# Patient Record
Sex: MALE | Race: WHITE | NOT HISPANIC OR LATINO | Employment: OTHER | URBAN - METROPOLITAN AREA
[De-identification: names, ages, dates, MRNs, and addresses within clinical notes are randomized per-mention and may not be internally consistent; named-entity substitution may affect disease eponyms.]

---

## 2018-05-14 ENCOUNTER — HOSPITAL ENCOUNTER (INPATIENT)
Facility: MEDICAL CENTER | Age: 68
LOS: 4 days | DRG: 603 | End: 2018-05-18
Attending: EMERGENCY MEDICINE | Admitting: INTERNAL MEDICINE
Payer: MEDICARE

## 2018-05-14 ENCOUNTER — HOSPITAL ENCOUNTER (OUTPATIENT)
Dept: RADIOLOGY | Facility: MEDICAL CENTER | Age: 68
End: 2018-05-14

## 2018-05-14 ENCOUNTER — APPOINTMENT (OUTPATIENT)
Dept: RADIOLOGY | Facility: MEDICAL CENTER | Age: 68
DRG: 603 | End: 2018-05-14
Attending: EMERGENCY MEDICINE
Payer: MEDICARE

## 2018-05-14 DIAGNOSIS — L03.116 LEFT LEG CELLULITIS: ICD-10-CM

## 2018-05-14 LAB
CRP SERPL HS-MCNC: 2.34 MG/DL (ref 0–0.75)
EKG IMPRESSION: NORMAL
ERYTHROCYTE [SEDIMENTATION RATE] IN BLOOD BY WESTERGREN METHOD: 18 MM/HOUR (ref 0–20)

## 2018-05-14 PROCEDURE — 93005 ELECTROCARDIOGRAM TRACING: CPT

## 2018-05-14 PROCEDURE — 700105 HCHG RX REV CODE 258: Performed by: EMERGENCY MEDICINE

## 2018-05-14 PROCEDURE — 51702 INSERT TEMP BLADDER CATH: CPT

## 2018-05-14 PROCEDURE — 93005 ELECTROCARDIOGRAM TRACING: CPT | Performed by: EMERGENCY MEDICINE

## 2018-05-14 PROCEDURE — 93922 UPR/L XTREMITY ART 2 LEVELS: CPT

## 2018-05-14 PROCEDURE — 303105 HCHG CATHETER EXTRA

## 2018-05-14 PROCEDURE — 770020 HCHG ROOM/CARE - TELE (206)

## 2018-05-14 PROCEDURE — 96375 TX/PRO/DX INJ NEW DRUG ADDON: CPT

## 2018-05-14 PROCEDURE — 96365 THER/PROPH/DIAG IV INF INIT: CPT

## 2018-05-14 PROCEDURE — 85652 RBC SED RATE AUTOMATED: CPT

## 2018-05-14 PROCEDURE — 73590 X-RAY EXAM OF LOWER LEG: CPT | Mod: LT

## 2018-05-14 PROCEDURE — 86140 C-REACTIVE PROTEIN: CPT

## 2018-05-14 PROCEDURE — 700111 HCHG RX REV CODE 636 W/ 250 OVERRIDE (IP): Performed by: EMERGENCY MEDICINE

## 2018-05-14 PROCEDURE — 99285 EMERGENCY DEPT VISIT HI MDM: CPT

## 2018-05-14 PROCEDURE — 96366 THER/PROPH/DIAG IV INF ADDON: CPT

## 2018-05-14 RX ORDER — CARVEDILOL 3.12 MG/1
3.12 TABLET ORAL 2 TIMES DAILY WITH MEALS
Status: DISCONTINUED | OUTPATIENT
Start: 2018-05-15 | End: 2018-05-18 | Stop reason: HOSPADM

## 2018-05-14 RX ORDER — NICOTINE 21 MG/24HR
21 PATCH, TRANSDERMAL 24 HOURS TRANSDERMAL
Status: DISCONTINUED | OUTPATIENT
Start: 2018-05-15 | End: 2018-05-18 | Stop reason: HOSPADM

## 2018-05-14 RX ORDER — ACETAMINOPHEN 325 MG/1
650 TABLET ORAL EVERY 6 HOURS PRN
Status: DISCONTINUED | OUTPATIENT
Start: 2018-05-14 | End: 2018-05-18 | Stop reason: HOSPADM

## 2018-05-14 RX ORDER — BISACODYL 10 MG
10 SUPPOSITORY, RECTAL RECTAL
Status: DISCONTINUED | OUTPATIENT
Start: 2018-05-14 | End: 2018-05-18 | Stop reason: HOSPADM

## 2018-05-14 RX ORDER — OXYCODONE AND ACETAMINOPHEN 10; 325 MG/1; MG/1
1-2 TABLET ORAL EVERY 6 HOURS PRN
Status: DISCONTINUED | OUTPATIENT
Start: 2018-05-14 | End: 2018-05-18 | Stop reason: HOSPADM

## 2018-05-14 RX ORDER — ONDANSETRON 2 MG/ML
4 INJECTION INTRAMUSCULAR; INTRAVENOUS ONCE
Status: COMPLETED | OUTPATIENT
Start: 2018-05-14 | End: 2018-05-14

## 2018-05-14 RX ORDER — CEPHALEXIN 500 MG/1
500 CAPSULE ORAL 4 TIMES DAILY
Status: ON HOLD | COMMUNITY
Start: 2018-04-05 | End: 2018-05-18

## 2018-05-14 RX ORDER — ASPIRIN 325 MG
325 TABLET ORAL DAILY
Status: DISCONTINUED | OUTPATIENT
Start: 2018-05-15 | End: 2018-05-18 | Stop reason: HOSPADM

## 2018-05-14 RX ORDER — ONDANSETRON 4 MG/1
4 TABLET, ORALLY DISINTEGRATING ORAL EVERY 4 HOURS PRN
Status: DISCONTINUED | OUTPATIENT
Start: 2018-05-14 | End: 2018-05-18 | Stop reason: HOSPADM

## 2018-05-14 RX ORDER — ONDANSETRON 2 MG/ML
4 INJECTION INTRAMUSCULAR; INTRAVENOUS EVERY 4 HOURS PRN
Status: DISCONTINUED | OUTPATIENT
Start: 2018-05-14 | End: 2018-05-18 | Stop reason: HOSPADM

## 2018-05-14 RX ORDER — MORPHINE SULFATE 4 MG/ML
4 INJECTION, SOLUTION INTRAMUSCULAR; INTRAVENOUS ONCE
Status: COMPLETED | OUTPATIENT
Start: 2018-05-14 | End: 2018-05-14

## 2018-05-14 RX ORDER — POLYETHYLENE GLYCOL 3350 17 G/17G
1 POWDER, FOR SOLUTION ORAL
Status: DISCONTINUED | OUTPATIENT
Start: 2018-05-14 | End: 2018-05-18 | Stop reason: HOSPADM

## 2018-05-14 RX ORDER — TRAZODONE HYDROCHLORIDE 50 MG/1
450 TABLET ORAL NIGHTLY
Status: DISCONTINUED | OUTPATIENT
Start: 2018-05-14 | End: 2018-05-18 | Stop reason: HOSPADM

## 2018-05-14 RX ORDER — DOXYCYCLINE HYCLATE 100 MG
100 TABLET ORAL 2 TIMES DAILY
Status: ON HOLD | COMMUNITY
Start: 2018-04-05 | End: 2018-05-18

## 2018-05-14 RX ORDER — SODIUM CHLORIDE 9 MG/ML
1000 INJECTION, SOLUTION INTRAVENOUS ONCE
Status: COMPLETED | OUTPATIENT
Start: 2018-05-14 | End: 2018-05-14

## 2018-05-14 RX ORDER — GABAPENTIN 300 MG/1
600 CAPSULE ORAL 2 TIMES DAILY
Status: DISCONTINUED | OUTPATIENT
Start: 2018-05-14 | End: 2018-05-18 | Stop reason: HOSPADM

## 2018-05-14 RX ORDER — AMOXICILLIN 250 MG
2 CAPSULE ORAL 2 TIMES DAILY
Status: DISCONTINUED | OUTPATIENT
Start: 2018-05-14 | End: 2018-05-17

## 2018-05-14 RX ORDER — GABAPENTIN 600 MG/1
2400 TABLET ORAL 2 TIMES DAILY
Status: ON HOLD | COMMUNITY
End: 2020-02-20

## 2018-05-14 RX ADMIN — ONDANSETRON HYDROCHLORIDE 4 MG: 2 INJECTION, SOLUTION INTRAMUSCULAR; INTRAVENOUS at 21:51

## 2018-05-14 RX ADMIN — SODIUM CHLORIDE 1000 ML: 9 INJECTION, SOLUTION INTRAVENOUS at 20:15

## 2018-05-14 RX ADMIN — MORPHINE SULFATE 4 MG: 4 INJECTION INTRAVENOUS at 21:51

## 2018-05-14 RX ADMIN — VANCOMYCIN HYDROCHLORIDE 1700 MG: 100 INJECTION, POWDER, LYOPHILIZED, FOR SOLUTION INTRAVENOUS at 20:15

## 2018-05-14 ASSESSMENT — LIFESTYLE VARIABLES: DO YOU DRINK ALCOHOL: NO

## 2018-05-14 ASSESSMENT — PAIN SCALES - GENERAL
PAINLEVEL_OUTOF10: 8
PAINLEVEL_OUTOF10: 4
PAINLEVEL_OUTOF10: 10

## 2018-05-14 ASSESSMENT — PATIENT HEALTH QUESTIONNAIRE - PHQ9
2. FEELING DOWN, DEPRESSED, IRRITABLE, OR HOPELESS: NOT AT ALL
1. LITTLE INTEREST OR PLEASURE IN DOING THINGS: NOT AT ALL
SUM OF ALL RESPONSES TO PHQ9 QUESTIONS 1 AND 2: 0

## 2018-05-15 ENCOUNTER — APPOINTMENT (OUTPATIENT)
Dept: RADIOLOGY | Facility: MEDICAL CENTER | Age: 68
DRG: 603 | End: 2018-05-15
Attending: INTERNAL MEDICINE
Payer: MEDICARE

## 2018-05-15 PROBLEM — K57.92 DIVERTICULITIS: Status: ACTIVE | Noted: 2018-05-15

## 2018-05-15 PROBLEM — I73.9 PERIPHERAL ARTERY DISEASE (HCC): Status: ACTIVE | Noted: 2018-05-15

## 2018-05-15 PROBLEM — L03.116 CELLULITIS OF LEFT LOWER EXTREMITY: Status: ACTIVE | Noted: 2018-05-15

## 2018-05-15 PROBLEM — R20.2 NUMBNESS AND TINGLING IN LEFT HAND: Status: ACTIVE | Noted: 2018-05-15

## 2018-05-15 PROBLEM — R20.0 NUMBNESS AND TINGLING IN LEFT HAND: Status: ACTIVE | Noted: 2018-05-15

## 2018-05-15 LAB
ALBUMIN SERPL BCP-MCNC: 3.3 G/DL (ref 3.2–4.9)
ALBUMIN/GLOB SERPL: 1.2 G/DL
ALP SERPL-CCNC: 58 U/L (ref 30–99)
ALT SERPL-CCNC: 29 U/L (ref 2–50)
ANION GAP SERPL CALC-SCNC: 4 MMOL/L (ref 0–11.9)
AST SERPL-CCNC: 22 U/L (ref 12–45)
BASOPHILS # BLD AUTO: 0.6 % (ref 0–1.8)
BASOPHILS # BLD: 0.05 K/UL (ref 0–0.12)
BILIRUB SERPL-MCNC: 0.5 MG/DL (ref 0.1–1.5)
BUN SERPL-MCNC: 10 MG/DL (ref 8–22)
CALCIUM SERPL-MCNC: 8.8 MG/DL (ref 8.5–10.5)
CHLORIDE SERPL-SCNC: 107 MMOL/L (ref 96–112)
CO2 SERPL-SCNC: 27 MMOL/L (ref 20–33)
CREAT SERPL-MCNC: 0.72 MG/DL (ref 0.5–1.4)
EOSINOPHIL # BLD AUTO: 0.3 K/UL (ref 0–0.51)
EOSINOPHIL NFR BLD: 3.9 % (ref 0–6.9)
ERYTHROCYTE [DISTWIDTH] IN BLOOD BY AUTOMATED COUNT: 47.2 FL (ref 35.9–50)
EST. AVERAGE GLUCOSE BLD GHB EST-MCNC: 214 MG/DL
GLOBULIN SER CALC-MCNC: 2.8 G/DL (ref 1.9–3.5)
GLUCOSE BLD-MCNC: 191 MG/DL (ref 65–99)
GLUCOSE BLD-MCNC: 235 MG/DL (ref 65–99)
GLUCOSE SERPL-MCNC: 157 MG/DL (ref 65–99)
GRAM STN SPEC: NORMAL
HBA1C MFR BLD: 9.1 % (ref 0–5.6)
HCT VFR BLD AUTO: 44 % (ref 42–52)
HGB BLD-MCNC: 14.2 G/DL (ref 14–18)
IMM GRANULOCYTES # BLD AUTO: 0.06 K/UL (ref 0–0.11)
IMM GRANULOCYTES NFR BLD AUTO: 0.8 % (ref 0–0.9)
LACTATE BLD-SCNC: 1.1 MMOL/L (ref 0.5–2)
LYMPHOCYTES # BLD AUTO: 2.26 K/UL (ref 1–4.8)
LYMPHOCYTES NFR BLD: 29 % (ref 22–41)
MCH RBC QN AUTO: 30 PG (ref 27–33)
MCHC RBC AUTO-ENTMCNC: 32.3 G/DL (ref 33.7–35.3)
MCV RBC AUTO: 93 FL (ref 81.4–97.8)
MONOCYTES # BLD AUTO: 0.88 K/UL (ref 0–0.85)
MONOCYTES NFR BLD AUTO: 11.3 % (ref 0–13.4)
NEUTROPHILS # BLD AUTO: 4.24 K/UL (ref 1.82–7.42)
NEUTROPHILS NFR BLD: 54.4 % (ref 44–72)
NRBC # BLD AUTO: 0 K/UL
NRBC BLD-RTO: 0 /100 WBC
PLATELET # BLD AUTO: 217 K/UL (ref 164–446)
PMV BLD AUTO: 10.2 FL (ref 9–12.9)
POTASSIUM SERPL-SCNC: 3.8 MMOL/L (ref 3.6–5.5)
PROT SERPL-MCNC: 6.1 G/DL (ref 6–8.2)
RBC # BLD AUTO: 4.73 M/UL (ref 4.7–6.1)
SIGNIFICANT IND 70042: NORMAL
SITE SITE: NORMAL
SODIUM SERPL-SCNC: 138 MMOL/L (ref 135–145)
SOURCE SOURCE: NORMAL
WBC # BLD AUTO: 7.8 K/UL (ref 4.8–10.8)

## 2018-05-15 PROCEDURE — 700105 HCHG RX REV CODE 258: Performed by: INTERNAL MEDICINE

## 2018-05-15 PROCEDURE — 700102 HCHG RX REV CODE 250 W/ 637 OVERRIDE(OP): Performed by: STUDENT IN AN ORGANIZED HEALTH CARE EDUCATION/TRAINING PROGRAM

## 2018-05-15 PROCEDURE — A9270 NON-COVERED ITEM OR SERVICE: HCPCS | Performed by: INTERNAL MEDICINE

## 2018-05-15 PROCEDURE — 87070 CULTURE OTHR SPECIMN AEROBIC: CPT

## 2018-05-15 PROCEDURE — 70553 MRI BRAIN STEM W/O & W/DYE: CPT

## 2018-05-15 PROCEDURE — 99223 1ST HOSP IP/OBS HIGH 75: CPT | Performed by: INTERNAL MEDICINE

## 2018-05-15 PROCEDURE — A9270 NON-COVERED ITEM OR SERVICE: HCPCS | Performed by: STUDENT IN AN ORGANIZED HEALTH CARE EDUCATION/TRAINING PROGRAM

## 2018-05-15 PROCEDURE — A9579 GAD-BASE MR CONTRAST NOS,1ML: HCPCS | Performed by: INTERNAL MEDICINE

## 2018-05-15 PROCEDURE — 85025 COMPLETE CBC W/AUTO DIFF WBC: CPT

## 2018-05-15 PROCEDURE — 82962 GLUCOSE BLOOD TEST: CPT

## 2018-05-15 PROCEDURE — 83036 HEMOGLOBIN GLYCOSYLATED A1C: CPT

## 2018-05-15 PROCEDURE — 700111 HCHG RX REV CODE 636 W/ 250 OVERRIDE (IP): Performed by: INTERNAL MEDICINE

## 2018-05-15 PROCEDURE — 700102 HCHG RX REV CODE 250 W/ 637 OVERRIDE(OP): Performed by: INTERNAL MEDICINE

## 2018-05-15 PROCEDURE — 87205 SMEAR GRAM STAIN: CPT

## 2018-05-15 PROCEDURE — 770006 HCHG ROOM/CARE - MED/SURG/GYN SEMI*

## 2018-05-15 PROCEDURE — 36415 COLL VENOUS BLD VENIPUNCTURE: CPT

## 2018-05-15 PROCEDURE — 83605 ASSAY OF LACTIC ACID: CPT

## 2018-05-15 PROCEDURE — 87040 BLOOD CULTURE FOR BACTERIA: CPT | Mod: 91

## 2018-05-15 PROCEDURE — 80053 COMPREHEN METABOLIC PANEL: CPT

## 2018-05-15 PROCEDURE — 700111 HCHG RX REV CODE 636 W/ 250 OVERRIDE (IP): Performed by: STUDENT IN AN ORGANIZED HEALTH CARE EDUCATION/TRAINING PROGRAM

## 2018-05-15 PROCEDURE — 700117 HCHG RX CONTRAST REV CODE 255: Performed by: INTERNAL MEDICINE

## 2018-05-15 RX ORDER — ATORVASTATIN CALCIUM 80 MG/1
80 TABLET, FILM COATED ORAL
Status: DISCONTINUED | OUTPATIENT
Start: 2018-05-15 | End: 2018-05-18 | Stop reason: HOSPADM

## 2018-05-15 RX ORDER — SODIUM CHLORIDE 9 MG/ML
INJECTION, SOLUTION INTRAVENOUS
Status: ACTIVE
Start: 2018-05-15 | End: 2018-05-15

## 2018-05-15 RX ORDER — DEXTROSE MONOHYDRATE 25 G/50ML
25 INJECTION, SOLUTION INTRAVENOUS
Status: DISCONTINUED | OUTPATIENT
Start: 2018-05-15 | End: 2018-05-18 | Stop reason: HOSPADM

## 2018-05-15 RX ADMIN — AMPICILLIN SODIUM AND SULBACTAM SODIUM 3 G: 2; 1 INJECTION, POWDER, FOR SOLUTION INTRAMUSCULAR; INTRAVENOUS at 17:00

## 2018-05-15 RX ADMIN — ENOXAPARIN SODIUM 40 MG: 100 INJECTION SUBCUTANEOUS at 05:16

## 2018-05-15 RX ADMIN — GABAPENTIN 600 MG: 300 CAPSULE ORAL at 17:00

## 2018-05-15 RX ADMIN — ATORVASTATIN CALCIUM 80 MG: 80 TABLET, FILM COATED ORAL at 20:23

## 2018-05-15 RX ADMIN — OXYCODONE HYDROCHLORIDE AND ACETAMINOPHEN 2 TABLET: 10; 325 TABLET ORAL at 00:10

## 2018-05-15 RX ADMIN — AMPICILLIN SODIUM AND SULBACTAM SODIUM 3 G: 2; 1 INJECTION, POWDER, FOR SOLUTION INTRAMUSCULAR; INTRAVENOUS at 05:17

## 2018-05-15 RX ADMIN — STANDARDIZED SENNA CONCENTRATE AND DOCUSATE SODIUM 2 TABLET: 8.6; 5 TABLET, FILM COATED ORAL at 05:16

## 2018-05-15 RX ADMIN — STANDARDIZED SENNA CONCENTRATE AND DOCUSATE SODIUM 2 TABLET: 8.6; 5 TABLET, FILM COATED ORAL at 17:00

## 2018-05-15 RX ADMIN — AMPICILLIN SODIUM AND SULBACTAM SODIUM 3 G: 2; 1 INJECTION, POWDER, FOR SOLUTION INTRAMUSCULAR; INTRAVENOUS at 00:33

## 2018-05-15 RX ADMIN — VANCOMYCIN HYDROCHLORIDE 2000 MG: 100 INJECTION, POWDER, LYOPHILIZED, FOR SOLUTION INTRAVENOUS at 14:09

## 2018-05-15 RX ADMIN — INSULIN HUMAN 1 UNITS: 100 INJECTION, SOLUTION PARENTERAL at 19:45

## 2018-05-15 RX ADMIN — TRAZODONE HYDROCHLORIDE 450 MG: 50 TABLET ORAL at 00:09

## 2018-05-15 RX ADMIN — GADODIAMIDE 20 ML: 287 INJECTION INTRAVENOUS at 12:07

## 2018-05-15 RX ADMIN — GABAPENTIN 600 MG: 300 CAPSULE ORAL at 00:10

## 2018-05-15 RX ADMIN — AMPICILLIN SODIUM AND SULBACTAM SODIUM 3 G: 2; 1 INJECTION, POWDER, FOR SOLUTION INTRAMUSCULAR; INTRAVENOUS at 23:34

## 2018-05-15 RX ADMIN — ASPIRIN 325 MG: 325 TABLET ORAL at 05:15

## 2018-05-15 RX ADMIN — GABAPENTIN 600 MG: 300 CAPSULE ORAL at 05:15

## 2018-05-15 RX ADMIN — AMPICILLIN SODIUM AND SULBACTAM SODIUM 3 G: 2; 1 INJECTION, POWDER, FOR SOLUTION INTRAMUSCULAR; INTRAVENOUS at 12:31

## 2018-05-15 RX ADMIN — TRAZODONE HYDROCHLORIDE 450 MG: 50 TABLET ORAL at 21:36

## 2018-05-15 RX ADMIN — STANDARDIZED SENNA CONCENTRATE AND DOCUSATE SODIUM 2 TABLET: 8.6; 5 TABLET, FILM COATED ORAL at 00:10

## 2018-05-15 RX ADMIN — OXYCODONE HYDROCHLORIDE AND ACETAMINOPHEN 1 TABLET: 10; 325 TABLET ORAL at 12:31

## 2018-05-15 ASSESSMENT — ENCOUNTER SYMPTOMS
STRIDOR: 0
ORTHOPNEA: 0
HEADACHES: 0
SPEECH CHANGE: 1
NAUSEA: 0
DIARRHEA: 0
BRUISES/BLEEDS EASILY: 0
EYE DISCHARGE: 0
SENSORY CHANGE: 1
INSOMNIA: 0
DOUBLE VISION: 0
WEIGHT LOSS: 0
HEMOPTYSIS: 0
NERVOUS/ANXIOUS: 0
FOCAL WEAKNESS: 1
SORE THROAT: 0
BACK PAIN: 0
SHORTNESS OF BREATH: 0
PALPITATIONS: 0
DIZZINESS: 0
SEIZURES: 0
COUGH: 0
CHILLS: 0
SPUTUM PRODUCTION: 0
POLYDIPSIA: 0
HEARTBURN: 0
TINGLING: 1
FEVER: 0
ABDOMINAL PAIN: 0
DEPRESSION: 0
PHOTOPHOBIA: 0
FOCAL WEAKNESS: 0
BLURRED VISION: 0
DIAPHORESIS: 0
SHORTNESS OF BREATH: 1
SINUS PAIN: 0
LOSS OF CONSCIOUSNESS: 0
VOMITING: 0
MYALGIAS: 0
NECK PAIN: 0

## 2018-05-15 ASSESSMENT — PAIN SCALES - GENERAL
PAINLEVEL_OUTOF10: 0
PAINLEVEL_OUTOF10: 7
PAINLEVEL_OUTOF10: 7
PAINLEVEL_OUTOF10: 0

## 2018-05-15 ASSESSMENT — LIFESTYLE VARIABLES
EVER_SMOKED: YES
SUBSTANCE_ABUSE: 0

## 2018-05-15 NOTE — ED PROVIDER NOTES
"ED Provider Note    CHIEF COMPLAINT  Chief Complaint   Patient presents with   • Numbness     left hand numbness x1 week   • Dizziness     x2-3 months   • Slurred Speech     x3 days   • Other     transfer from [facility]   • Open Wound     left lower leg     Patient is a vague historian    HPI  Luis Kellogg is a 67 y.o. diabetic male who presents as a transfer from [facility] for evaluation of left leg cellulitis.  Patient complains of multiple issues.    Left hand weakness and tingling x 3 days  Right leg collapsed today while getting into friend's car  Left leg weakness   Left leg wound x 5 years which he believes is making his left hand and leg weak  Stent in left leg placed 4 months ago at [facility] for a blocked artery    10/10 pain, sharp, intermittent shocks radiating into left leg    Denies trauma, fever, chills, head trauma, abdominal pain, vomiting, diarrhea    ALLERGIES  No Known Allergies    CURRENT MEDICATIONS  Aspirin, carvedilol, trazodone    PAST MEDICAL HISTORY   has a past medical history of Chicken pox; Diabetes (HCC); Heart disease; and Measles.    CADz s/p stent placement  DVT     SURGICAL HISTORY   has a past surgical history that includes knee replacement, total (Bilateral) and other surgical procedure.    SOCIAL HISTORY  Social History     Social History Main Topics   • Smoking status: Current Every Day Smoker     Packs/day: 1.00     Years: 54.00     Types: Cigarettes   • Smokeless tobacco: Former User   • Alcohol use No      Comment: No per patient    • Drug use: No   • Sexual activity: Not Currently       REVIEW OF SYSTEMS  See HPI for further details.  All other systems are negative except as above in HPI.      PHYSICAL EXAM  VITAL SIGNS: /65   Pulse 82   Temp 36.2 °C (97.2 °F)   Resp 12   Ht 1.702 m (5' 7\")   Wt 108.4 kg (239 lb)   SpO2 93%   BMI 37.43 kg/m²     General:  WDobese, nontoxic appearing in NAD; A+Ox3; V/S as above; afebrile  Skin: warm and dry; " good color; no rash  HEENT: NCAT; strabismus of right eye noted; no scleral icterus   Neck: FROM; soft  Cardiovascular: Regular heart rate and rhythm.  No murmurs, rubs, or gallops; pulses 2+ bilaterally radially  Lungs: Clear to auscultation with good air movement bilaterally.  No wheezes, rhonchi, or rales.   Abdomen: BS present; soft; NTND; no rebound, guarding, or rigidity.  No organomegaly or pulsatile mass  Extremities: MOYER x 4; thickened toenails; deep ulcerated lesion medial aspect of left lower leg with yellow, crusted d/c with edema and deep erythema with tenderness over left calf; no streaking, no obvious abscess; cool purplish colored toes on left with decreased DP pulse but palpable; tenderness to palpation of left calf; no crepitus  Neurologic: CNs III-XII grossly intact; speech clear; distal sensation intact  Psychiatric: Appropriate affect, normal mood    LABS  Results for orders placed or performed during the hospital encounter of 18   EKG (ER)   Result Value Ref Range    Report       [facility] Emergency Dept.    Test Date:  2018  Pt Name:    MILI HANSON               Department: ER  MRN:        [id number]                      Room:       [facility]  Gender:     Male                         Technician: [id number]  :        1950                   Requested By:ER TRIAGE PROTOCOL  Order #:    [id number]                    Reading MD:    Measurements  Intervals                                Axis  Rate:       77                           P:          50  AR:         148                          QRS:        41  QRSD:       116                          T:          24  QT:         412  QTc:        467    Interpretive Statements  SINUS RHYTHM  INCOMPLETE RIGHT BUNDLE BRANCH BLOCK  INFERIOR INFARCT, AGE INDETERMINATE  No previous ECG available for comparison       IMAGING  DX-TIBIA AND FIBULA LEFT   Final Result         Diffuse soft tissue swelling.   No acute osseous  abnormality.      OUTSIDE IMAGES-DX CHEST   Final Result      US-FOREIGN FILM ULTRASOUND   Final Result      OUTSIDE IMAGES-CT ABDOMEN /PELVIS   Final Result      OUTSIDE IMAGES-CT HEAD   Final Result      LE ART DUPLX/IMAG (Specify in Comments Left, Right Or Bilateral)    (Results Pending)   LE ART/MIA (Specify in Comments Left, Right Or Bilateral)    (Results Pending)         MEDICAL RECORD  I have reviewed patient's medical record and pertinent results are listed below.    WBC 8.6  ESR elevated to 25  CRP elevated to 3.9    CT abd/pelvis and head  US GB      COURSE & MEDICAL DECISION MAKING  I have reviewed any medical record information, laboratory studies and radiographic results as noted.    Luis Kellogg is a 67 y.o. diabetic male who presents as a transfer for evaluation of left leg cellulitis.  I was called by the RN for a down trending blood pressure, in the low 100s.  Normal saline bolus was ordered.    Vanc and Zosyn given prior to arrival    Concern for occlusion of stent, osteomyelitis, cellulitis.  I doubt necrotizing fasciitis as patient is afebrile, without an elevated white blood cell count, and without pain out of proportion to exam.  Sodium was also normal at the outside facility which can also be seen with necrotizing fasciitis.    ESR and CRP were both elevated.    I have added imaging including ultrasound and MIA of the left leg to evaluate for patency of his stents that he reports as well as x-ray.  I also requested records from [facility] for the patient's recent left lower extremity stent placement.      9:19 PM  Paging [facility] IM for admission    X-ray negative for subcutaneous gas  RN reports Doppler signal is audible on the left DP and PT areas    9:41 PM  I discussed the case with the [facility] internal medicine resident who agrees to admit the patient and is aware that arterial/Doppler studies are pending.    FINAL IMPRESSION  1. Left leg cellulitis      Electronically signed by: Hlaey BAUER  Miles, 5/14/2018 7:46 PM

## 2018-05-15 NOTE — PROGRESS NOTES
2 RN skin check completed. Pt has an Arterial ulcer on medial aspect of the L shin with cellulitis. Scab to R knee.

## 2018-05-15 NOTE — CARE PLAN
Problem: Safety  Goal: Will remain free from falls  Outcome: PROGRESSING AS EXPECTED  Pt educated regarding fall precautions, confirms understanding. Call light within reach. Bed in low position. Non-skid socks in place.     Problem: Venous Thromboembolism (VTW)/Deep Vein Thrombosis (DVT) Prevention:  Goal: Patient will participate in Venous Thrombosis (VTE)/Deep Vein Thrombosis (DVT)Prevention Measures  Outcome: PROGRESSING AS EXPECTED   05/14/18 2345   OTHER   Risk Assessment Score 1   VTE RISK Moderate   Pharmacologic Prophylaxis Used LMWH: Enoxaparin(Lovenox)

## 2018-05-15 NOTE — PROGRESS NOTES
Internal Medicine Interval Note  Note Author: Dylan Zavala M.D.     Name Luis Kellogg 1950   Age/Sex 67 y.o. male   MRN [id number]   Code Status full     After 5PM or if no immediate response to page, please call for cross-coverage  Attending/Team: /Soto See Patient List for primary contact information  Call [phone] to page    1st Call - Day Intern (R1):    2nd Call - Day Sr. Resident (R2/R3):   Dr Bragg         Reason for interval visit  (Principal Problem)   Pain of left lower leg    Interval Problem Daily Status Update  (24 hours)   Patient still having pain and tenderness to the left lower extremity,with discomfort in the left lower leg.and inability to weight bear on the leg.Patient has a persistent swelling and discomfort in the left calf muscles.no other new complaints seen.patient still has a feeling of weakness in his left  hands and his left leg,and and feelings of cold toes    Review of Systems   Constitutional: Positive for malaise/fatigue. Negative for chills, diaphoresis, fever and weight loss.   HENT: Negative for nosebleeds and sinus pain.    Eyes: Negative for double vision, photophobia and discharge.   Respiratory: Negative for cough, hemoptysis, sputum production and stridor.    Cardiovascular: Negative for chest pain, palpitations and leg swelling.   Gastrointestinal: Negative for nausea and vomiting.   Genitourinary: Negative for frequency, hematuria and urgency.   Musculoskeletal: Negative for back pain, myalgias and neck pain.   Skin: Negative for itching and rash.   Neurological: Positive for tingling, sensory change, speech change and focal weakness. Negative for dizziness, seizures, loss of consciousness and headaches.   Endo/Heme/Allergies: Does not bruise/bleed easily.   Psychiatric/Behavioral: Negative for depression. The patient is not nervous/anxious and does not have insomnia.        Consultants/Specialty  Limb  preservation consult.  Vascular surgery consult    Disposition  inpatient    Quality Measures  Quality-Core Measures   Reviewed items::  Labs reviewed, Medications reviewed and Radiology images reviewed          Physical Exam       Vitals:    05/15/18 0745 05/15/18 0800 05/15/18 1230 05/15/18 1600   BP: (!) 92/65 (!) 89/56 (!) 97/63 (!) 92/58   Pulse: 74 62 68 63   Resp: 16  16 16   Temp: 36.2 °C (97.2 °F)  36.3 °C (97.4 °F) 36.4 °C (97.5 °F)   SpO2: 92%  93% 91%   Weight:       Height:         Body mass index is 39.85 kg/m². Weight: 115.4 kg (254 lb 6.6 oz)  Oxygen Therapy:  Pulse Oximetry: 91 %, O2 (LPM): 0, O2 Delivery: None (Room Air)    Physical Exam   Constitutional: He is oriented to person, place, and time and well-developed, well-nourished, and in no distress.   HENT:   Head: Normocephalic and atraumatic.   Eyes: Pupils are equal, round, and reactive to light.   Neck: Normal range of motion. Neck supple. No tracheal deviation present. No thyromegaly present.   Cardiovascular: Normal rate and regular rhythm.  Exam reveals no friction rub.    Murmur heard.  Pulmonary/Chest: Effort normal. He has no wheezes. He has no rales. He exhibits no tenderness.   Abdominal: Soft. Bowel sounds are normal. He exhibits no distension. There is no tenderness. There is no rebound and no guarding.   Musculoskeletal: Normal range of motion. He exhibits no edema, tenderness or deformity.   Pain and swelling to the posterior aspect of the left leg with erythema and trophic changes to the distal 1/3 of the left  leg   Neurological: He is alert and oriented to person, place, and time. No cranial nerve deficit. Coordination normal.   Grade-4+/5 power in the left hand   Skin: Skin is warm and dry.   Psychiatric: Mood, memory, affect and judgment normal.         Lab Data Review:         5/15/2018  3:43 PM    Recent Labs      05/14/18   1300  05/15/18   0333   SODIUM  135*  138   POTASSIUM  4.0  3.8   CHLORIDE  100  107   CO2  24  27    BUN  12  10   CREATININE  0.8  0.72   CALCIUM  9.4  8.8       Recent Labs      05/14/18   1300  05/15/18   0333   ALTSGPT  49  29   ASTSGOT  35  22   ALKPHOSPHAT  80  58   TBILIRUBIN  0.5  0.5   GLUCOSE  173*  157*       Recent Labs      05/14/18   1300  05/15/18   0333   RBC  5.22  4.73   HEMOGLOBIN  16.0  14.2   HEMATOCRIT  45.8  44.0   PLATELETCT  258  217   PROTHROMBTM  10.5   --    APTT  29.5   --    INR  1.00   --        Recent Labs      05/14/18   1300  05/15/18   0333   WBC  8.6  7.8   NEUTSPOLYS   --   54.40   LYMPHOCYTES   --   29.00   MONOCYTES   --   11.30   EOSINOPHILS   --   3.90   BASOPHILS   --   0.60   ASTSGOT  35  22   ALTSGPT  49  29   ALKPHOSPHAT  80  58   TBILIRUBIN  0.5  0.5           Assessment/Plan     * Pain of left lower leg- (present on admission)   Assessment & Plan    - per patient symptoms of discoloration and swelling are chronic for years, his chief complaint was hand numbness and he mentioned this while he was there after the fact  - no acute ischemia  - weak pulses on exam however present on Doppler, normal MIA  - venous and arterial studies to assess for DVT and stent patency.  -Patient has a healing ulcer on the posterior aspect of the left leg with pain and swelling ,and trophic changes in the left leg.  Pulses -dorsalis pedis and the post tibial of the left leg are feeble.  -Awaiting duplex ultrasound of the carotids,as there is less flow noted on the left side.        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   -wound culture and limb preservation consult placed.  -vascular surgery consult to be placed after results are in.         Diverticulitis   Assessment & Plan    - had dark stool and abdominal pain days before presentation which resolved  - no symptoms currently  - continue to monitor        Numbness and tingling in left hand   Assessment & Plan    - also noted dysarthria, ?1 week duration of symptoms (poor historian), NIHSS 2  - CT head at [facility] no acute findings  - ordered MRI brain w/wo contrast.  -MRI Brain shows mild supratentorial white matter disease consistent with microvascular ischemic change,and no evidence of intracranial bleed or mass lesion,         Type 2 diabetes mellitus with other circulatory complications (HCC)- (present on admission)   Assessment & Plan    - hold home Januvia and metformin.  -patient awaiting contrast studies.  -insulin sliding scale if noted to be elevated

## 2018-05-15 NOTE — WOUND TEAM
"[facility]  Inpatient Services  Initial Wound and Skin Care Evaluation    Admission Date: 05/14/2018  HPI, PMH, SH: Reviewed  Unit where seen by Wound Team: T713 Bed 1    WOUND CONSULT RELATED TO: Wound to posterior of LLE    SUBJECTIVE:  \"I try to keep my blood sugar between 130 and 230.\"    Self Report / Pain Level: 6/10 when wound bed touched.     OBJECTIVE: Pt sitting up in bed eating breakfast. Pt agreeable to assessment.  Bilateral lower extremities red, edematous and hot to touch.  Wound noted to back of LLE ROBI.    WOUND TYPE, LOCATION, CHARACTERISTICS (Pressure ulcers: location, stage, POA or date identified)    Location and type of wound: Left Posterior Medial Lower Leg of Unknown Etiology, POA.        Periwound:               Red, Edematous  Drainage:              Scant, Serosanginous    Tissue Type and %:  >95% yellow slough with red border.   Wound Edges:   attached  Odor:     NONE  Exposed structure(s):  NONE  S&S of Infection:          NONE      Measurements: (Taken 05/15/2018)  Length:    3.7 cm  Width:     2.0 cm  Depth:                           ROSITA due to slough  Tracts/Undermining:  ROSITA due to slough      INTERVENTIONS BY WOUND TEAM: Wound team in to assess wound. Wound ROBI.  Attempted to cleanse with NS and dry gauze, pt states it is painful to touch and flinches with cleansing. Wound dried with dry gauze and indu-wound prepped with no sting barrier.  Honey colloid then cut to fit wound bed and secured with adhesive foam dressing.     Interdisciplinary consultation: Patient, Staff RN; spoke with case coordination regarding patient need for outpatient resources for wound care and proper footwear upon discharge.     EVALUATION: Pt is an uncontrolled diabetic with infection to lower extremity. Pt states he lives in [place] and states he does not drive and has transportation barriers.  Pt last A1C was completed 2yrs ago.  Recommend repeat A1C. Pt denies having a primary care " provider. Wound bed has a yellow slough over wound bed. Honey colloid placed to assist with autolytic debridement and to keep wound moist.     Factors affecting wound healing: Obesity, Infection, Diabetes, non compliance with disease process   Goals: Wound will decrease in size by 2% weekly    NURSING PLAN OF CARE ORDERS (X):    Dressing changes: See Dressing Maintenance orders: X  Skin care: See Skin Care orders:   Rectal tube care: See Rectal Tube Care orders:   Other orders:    RSKIN: CURRENT (X) ORDERED (O)  Q shift Donis:  X  Q shift pressure point assessments:  X  Pressure redistribution mattress        CRISTOPHER      Bariatric CRISTOPHER      Bariatric foam        Heel float boots  Patient independent with bed mobility     Heels floated on pillows      Barrier wipes      Barrier Cream      Barrier paste      Sacral silicone dressing      Silicone O2 tubing      Anchorfast      Trach with Optifoam split foam       Waffle cushion      Rectal tube or BMS      Antifungal tx    Turn q 2 hours   Pt moves self in bed.  Up to chair  X   Ambulate X  PT/OT     Dietician      PO  X   TF   TPN     PVN    NPO   # days   Other       WOUND TEAM PLAN OF CARE (X):   NPWT change 3 x week:        Dressing changes by wound team:       Follow up as needed:  X     Other (explain):    Anticipated discharge plans (X):  SNF:           Home Care:           Outpatient Wound Center:            Self Care:            Other:    X TBD Case coordination to provide patient with outpatient wound care resources

## 2018-05-15 NOTE — ED NOTES
[place] Fall risk done- high risk.    Call light within reach.  Bed low.  Railings up.  Armband on.  Socks on.  Sign posted.  Possessions close.    Pt understands to call RN prior to moving.

## 2018-05-15 NOTE — ASSESSMENT & PLAN NOTE
- Per patient symptoms of discoloration and swelling are chronic for years, his chief complaint was hand numbness and he mentioned this while he was there after the fact  - No acute ischemia of the left lower limb seen.  - weak pulses on exam however present on Doppler, normal MIA  -Venous duplex Doppler ultrasound is negative.  -Patient has a healing ulcer on the posterior aspect of the left leg with pain and swelling ,and trophic changes in the left leg.  Pulses dorsalis pedis and the post tibial of the left leg are present on the left lower limb  - duplex ultrasound of the carotids,  -normal MIA.  -LE  duplex U/S  reveals no evidence of  superficial or deep violet thrombosis.  -Patient was seen by Dr. Babb, vascular surgery, who has assessed him, as a chronic venous insufficiency, secondary to valvular incompetence from recurrent deep vein thrombosis and has been advised graduated compression stockings and weight loss.  Patient to be followed up in approximately 2 weeks time.  -Patient has been evaluated for his, left carotid artery stenosis and is to be placed on a surveillance program for his carotid disease.  -Patient advised follow-up at Dr. Babb's office for  Patient has been given , compression stockings for home at [facility]

## 2018-05-15 NOTE — SENIOR ADMIT NOTE
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 He reports pain in his left leg but denies any fever/chills, chest pain/sob/palpitations. He also reported having purplish discoloration of his left leg/toes which is not new/ongoing for several months as well they are cold all the time.Denies any abdominal pain,hematochezia, diarrhea or constipation or melena.    Per ERP/notes from [facility] transfer for left leg cellulitis and concern for possible occlusion of stent.      At [facility] he was noted to be hypotensive(96/64) and given 2L bolus of fluids/Vanco plus Zosyn. His labs showed elevated ESR 25, CRP 3.9 with no leukocytosis and lactic acid 1.8. He reports 3 days before presentation he was experiencing abdominal pain and dark stools. Underwent CT of abdomen/pelvis showing fat stranding?concerning for diverticulitis and U/s showing hydropic gallbladder with no evidence of cholecystitis or cholangitis. CT of head/CXR/UA was normal.      On physical examination, left leg swelling with circumferential erythema, non palpable dorsalis pedis/posterior tibialis of the LLE. Open wound ulcer noted on the medial aspect of the left lower extremities. Pulses palpable on the Right lower extremities.     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   2)Left leg/toes with purple discoloration/cold toes with non palpable pulses but appreciated with  doppler signals in the setting of May-Thurner syndrome(Iliac vein compression).  3)Left Iliac vein stenosis s/p stent with ?re-occlusion of the stent.   4)?Acute Diverticulitis/Diverticulosis  5)DM type -Insulin dependent  6) Peripheral Neuropathy.  7)History of left leg DVT  8)Smoking Abuse    Plan:  -IV fluids, empiric treatment of left leg cellulitis with open wound ulcer with Vancomycin plus Unasyn.  -Bilateral MIA is normal:Doppler waveforms at the ankle are biphasic, slurred, and widened.  -Follow up on duplex/Arterial studies and Vascular consult/wound care consult as appropriate.   -Follow up on MRI of the brain.  -Aspirin 325 mg plus Statin 80 mg.  -Hold Metformin/Sitagliptin/Lasix/Lisinopril given his hypotension.      Code Status: Full Code  DVT Prophylaxis: Enoxaparin.

## 2018-05-15 NOTE — PROGRESS NOTES
Care of patient assumed after report. Assessment completed. Patient denies having any pain, no distress noted. Call light in reach, fall precautions in place. Discussed plan of care with patient. Will continue to monitor.

## 2018-05-15 NOTE — H&P
"      Internal Medicine Admitting History and Physical    Note Author: Abdi Toledo M.D.       Name Luis Kellogg 1950   Age/Sex 67 y.o. male   MRN [id number]   Code Status FULL     After 5PM or if no immediate response to page, please call for cross-coverage  Attending/Team: Zoe See Patient List for primary contact information  Call [phone] to page    1st Call - Day Intern (R1):   Lucas 2nd Call - Day Sr. Resident (R2/R3):   Elpidio       Chief Complaint:  Left leg wound    HPI:  67 year old male with a history of PAD with left common femoral vein stent 2/15/2018 at [facility], 3 years of LLE swelling and discoloration, recurrent lower extremity cellulitis with recent 1 week antibiotic course of Keflex and doxycycline ending , LLE DVT 4 months ago, peripheral neuropathy, MI 1.5 years ago, T2DM, HTN, current tobacco use 1 PPD, was transferred from [facility] for management of left lower extremity due to concern of ischemic limb.    Patient states he presented to [facility] ED because his left hand had been numb and tingling for 1 week, and he \"wanted to make sure it wouldn't spread to the rest of my body.\"    At the outside hospital he was found to be hypotensive, unclear to what blood pressure based on outside records, only set of vitals shows 96/64. He was given 2L IVF. He was noted to have  left leg and hand weakness, slurred speech, and abdominal tenderness. NIHSS 2. ESR 25, CRP 3.9, UA no infection, lactic acid 1.8, Na 135, , BUN/Cr and AST/ALT WNL, albumin 3.3, troponin negative, WBC 8.6, H/H and plt WNL. CT abd/pelv w/contrast was done showing diverticulitis and question of gallbladder stones, so US RUQ done showing hydropic gallbladder without stones. CT head showed no acute intracranial processes. CXR showed no acute changes. Given empiric vancomycin and Zosyn to cover for diverticulitis as patient was initially hypotensive. Black stools were noted 3 days " before presentation that resolved since. After patient was stabilized, he was transferred to [facility] due to left leg discoloration and wound due to concern of stent thrombosis, however it was noted that these symptoms have been present for 3 years without change per patient.     At [facility] ED, patient noted to have left leg cellulitis and ulcer of medial aspect of LLE, cold left toes but with positive Doppler signal, and normal MIA. Patient was started on Unasyn and vancomycin, and was admitted for further imaging to assess for thrombosis and extremity stent patency.    Denies EtOH and illicit drugs.    Review of Systems   Constitutional: Negative for chills and fever.   HENT: Negative for congestion and sore throat.    Eyes: Negative for double vision and photophobia.   Respiratory: Positive for shortness of breath (chronic, unchanged). Negative for cough.    Cardiovascular: Negative for chest pain and palpitations.   Gastrointestinal: Negative for abdominal pain, diarrhea, nausea and vomiting.   Genitourinary: Negative for dysuria and urgency.   Musculoskeletal: Negative for myalgias and neck pain.   Skin:        Left leg discoloration   Neurological: Positive for tingling, sensory change and speech change. Negative for focal weakness, seizures and headaches.   Endo/Heme/Allergies: Negative for polydipsia. Does not bruise/bleed easily.   Psychiatric/Behavioral: Negative for depression and substance abuse.             Past Medical History:   Past Medical History:   Diagnosis Date   • Chicken pox    • Diabetes (HCC)    • Heart disease    • Measles        Past Surgical History:  Past Surgical History:   Procedure Laterality Date   • KNEE REPLACEMENT, TOTAL Bilateral    • OTHER SURGICAL PROCEDURE      back surgery - Unknown        Current Outpatient Medications:  Home Medications     Reviewed by Joan Miles (Pharmacy Tech) on 05/14/18 at 2141  Med List Status: Complete   Medication Last Dose Status   aspirin  "(ASA) 325 MG Tab 5/14/2018 Active   carvedilol (COREG) 3.125 MG Tab 5/14/2018 Active   cephALEXin (KEFLEX) 500 MG Cap 4/11/2018 Active   docusate sodium (COLACE) 100 MG Cap 5/14/2018 Active   doxycycline (VIBRAMYCIN) 100 MG Tab 4/11/2018 Active   furosemide (LASIX) 80 MG Tab 5/14/2018 Active   gabapentin (NEURONTIN) 600 MG tablet 5/14/2018 Active   lisinopril (PRINIVIL) 5 MG Tab 5/14/2018 Active   metformin (GLUCOPHAGE) 1000 MG tablet 5/14/2018 Active   Oxycodone-Acetaminophen (PERCOCET-10)  MG Tab > 1 week Active   sitagliptin (JANUVIA) 50 MG Tab 5/14/2018 Active   traZODone (DESYREL) 150 MG Tab 5/13/2018 Active                Medication Allergy/Sensitivities:  No Known Allergies      Family History:  Family History   Problem Relation Age of Onset   • Arthritis Father    • Cancer Father    • Heart Disease Father    • Diabetes         Social History:  Social History     Social History   • Marital status: Single     Spouse name: N/A   • Number of children: N/A   • Years of education: N/A     Occupational History   • Not on file.     Social History Main Topics   • Smoking status: Current Every Day Smoker     Packs/day: 1.00     Years: 54.00     Types: Cigarettes   • Smokeless tobacco: Former User   • Alcohol use No      Comment: No per patient    • Drug use: No   • Sexual activity: Not Currently     Other Topics Concern   • Not on file     Social History Narrative   • No narrative on file     Living situation: lives in [place]  PCP : Jet Gonzalez P.A.-C.      Physical Exam     Vitals:    05/14/18 1957 05/14/18 2002 05/14/18 2035 05/14/18 2100   BP:       Pulse: 81 82 82 83   Resp: 17 13 12 (!) 21   Temp:       SpO2: 94% 93% 93% 94%   Weight:       Height:         Body mass index is 37.43 kg/m².  /65   Pulse 83   Temp 36.2 °C (97.2 °F)   Resp (!) 21   Ht 1.702 m (5' 7\")   Wt 108.4 kg (239 lb)   SpO2 94%   BMI 37.43 kg/m²   O2 therapy: Pulse Oximetry: 94 %    Physical Exam   Constitutional: No " distress.   HENT:   Head: Normocephalic and atraumatic.   Eyes: EOM are normal. Pupils are equal, round, and reactive to light.   Neck: Normal range of motion. Neck supple.   Cardiovascular: Normal rate and regular rhythm.    Pulses:       Dorsalis pedis pulses are 2+ on the right side, and 0 on the left side.        Posterior tibial pulses are 2+ on the right side, and 0 on the left side.   Pulmonary/Chest: Effort normal and breath sounds normal.   Abdominal: Soft. Bowel sounds are normal. There is no tenderness.   Musculoskeletal: He exhibits edema and tenderness.   Lymphadenopathy:     He has no cervical adenopathy.   Neurological: He is alert.   Skin: He is not diaphoretic.   Left lower extremity purple discoloration and ulcer on medical aspect   Psychiatric: Mood and affect normal.             Data Review       Old Records Request:   Deferred  Current Records review and summary: Completed    Lab Data Review:  Recent Results (from the past 24 hour(s))   CBC WITH DIFFERENTIAL    Collection Time: 05/14/18  1:00 PM   Result Value Ref Range    WBC 8.6 4.8 - 10.8 K/uL    RBC 5.22 4.70 - 6.10 M/uL    Hemoglobin 16.0 14.0 - 18.0 g/dL    Hematocrit 45.8 42.0 - 52.0 %    MCV 87.7 80.0 - 94.0 fL    MCH 30.7 27.0 - 31.0 pg    MCHC 34.9 33.0 - 37.0 g/dL    RDW 13.3 11.5 - 14.5 %    Platelet Count 258 130 - 400 K/uL    MPV 10.2 7.4 - 10.4 fL    Neutrophils Automated 59.8 39.0 - 70.0 %    Lymphocytes Automated 24.9 21.0 - 50.0 %    Monocytes Automated 12.0 (H) 2.0 - 9.0 %    Eosinophils Automated 2.1 0.0 - 5.0 %    Basophils Automated 0.6 0.0 - 3.0 %    Abs Neutrophils Automated 5.2 1.8 - 7.7 K/uL    Abs Lymph Automated 2.2 1.2 - 4.8 K/uL    Eosinophil Count, Blood 0.18 0.00 - 0.50 K/uL   COMP METABOLIC PANEL    Collection Time: 05/14/18  1:00 PM   Result Value Ref Range    Sodium 135 (L) 136 - 145 mmol/L    Potassium 4.0 3.5 - 5.1 mmol/L    Chloride 100 98 - 107 mmol/L    Co2 24 21 - 32 mmol/L    Anion Gap 15 10 - 18 mmol/L     Glucose 173 (H) 74 - 99 mg/dL    Bun 12 7 - 18 mg/dL    Creatinine 0.8 0.8 - 1.3 mg/dL    Calcium 9.4 8.5 - 11.0 mg/dL    AST(SGOT) 35 15 - 37 U/L    ALT(SGPT) 49 12 - 78 U/L    Alkaline Phosphatase 80 46 - 116 U/L    Total Bilirubin 0.5 0.2 - 1.0 mg/dL    Albumin 3.3 (L) 3.4 - 5.0 g/dL    Total Protein 8.0 6.4 - 8.2 g/dL    A-G Ratio 0.7    TROPONIN    Collection Time: 05/14/18  1:00 PM   Result Value Ref Range    Troponin I <0.02 0.00 - 0.06 ng/mL   LACTIC ACID    Collection Time: 05/14/18  1:00 PM   Result Value Ref Range    Lactic Acid 1.8 0.4 - 2.0 mmol/L   APTT    Collection Time: 05/14/18  1:00 PM   Result Value Ref Range    APTT 29.5 22.8 - 31.5 sec   PROTHROMBIN TIME    Collection Time: 05/14/18  1:00 PM   Result Value Ref Range    PT 10.5 9.3 - 11.7 sec    INR 1.00    ESTIMATED GFR    Collection Time: 05/14/18  1:00 PM   Result Value Ref Range    GFR If African American >60 >60 mL/min/1.73 m 2    GFR If Non African American >60 >60 mL/min/1.73 m 2   WESTERGREN SED RATE    Collection Time: 05/14/18  1:00 PM   Result Value Ref Range    Sed Rate Westergren 25 (H) 0 - 20 mm/hour   CRP QUANTITIVE (NON-CARDIAC)    Collection Time: 05/14/18  1:00 PM   Result Value Ref Range    Stat C-Reactive Protein 3.9 (H) 0.0 - 0.9 mg/dL   ABO AND RH DETERMINATION    Collection Time: 05/14/18  1:27 PM   Result Value Ref Range    ABO Grouping Only O     Rh Grouping Only Positive    ANTIBODY SCREEN    Collection Time: 05/14/18  1:27 PM   Result Value Ref Range    Antibody Screen Scrn Negative    URINALYSIS,CULTURE IF INDICATED    Collection Time: 05/14/18  2:28 PM   Result Value Ref Range    Culture Indicated No UA Culture    Color YELLOW     Character CLEAR     Specific Gravity 1.015 1.003 - 1.030    Ph 7.0 5.0 - 8.0    Glucose NEGATIVE Negative mg/dL    Ketones NEGATIVE Negative mg/dL    Protein TRACE (A) Negative mg/dL    Urobilinogen, Urine 1.0 0.2 - 1.0 mg/dL    Nitrite NEGATIVE Negative    Leukocyte Esterase NEGATIVE  Negative    Occult Blood NEGATIVE Negative   EKG (ER)    Collection Time: 18  7:13 PM   Result Value Ref Range    Report       [facility] Emergency Dept.    Test Date:  2018  Pt Name:    MILI HANSON               Department: ER  MRN:        [id number]                      Room:        16  Gender:     Male                         Technician: [id number]  :        1950                   Requested By:ER TRIAGE PROTOCOL  Order #:    [id number]                    Reading MD:    Measurements  Intervals                                Axis  Rate:       77                           P:          50  PA:         148                          QRS:        41  QRSD:       116                          T:          24  QT:         412  QTc:        467    Interpretive Statements  SINUS RHYTHM  INCOMPLETE RIGHT BUNDLE BRANCH BLOCK  INFERIOR INFARCT, AGE INDETERMINATE  No previous ECG available for comparison         Imaging/Procedures Review:    ndependant Imaging Review: Completed  LE ART/MIA (Specify in Comments Left, Right Or Bilateral)   Final Result      DX-TIBIA AND FIBULA LEFT   Final Result         Diffuse soft tissue swelling.   No acute osseous abnormality.      OUTSIDE IMAGES-DX CHEST   Final Result      US-FOREIGN FILM ULTRASOUND   Final Result      OUTSIDE IMAGES-CT ABDOMEN /PELVIS   Final Result      OUTSIDE IMAGES-CT HEAD   Final Result      LE ART DUPLX/IMAG (Specify in Comments Left, Right Or Bilateral)    (Results Pending)   MR-BRAIN-WITH & W/O    (Results Pending)   ARTERIAL STUDY FOR PREVIOUS INTERVENTION    (Results Pending)              Assessment/Plan     * Pain of left lower leg- (present on admission)   Assessment & Plan    - per patient symptoms of discoloration and swelling are chronic for years, his chief complaint was hand numbness and he mentioned this while he was there after the fact  - no acute ischemia  - weak pulses on exam however present on Doppler, normal MIA  -  venous and arterial studies to assess for DVT and stent patency        Cellulitis of left lower extremity   Assessment & Plan    - diabetic neuropathy   - concern for MRSA due to recent finished course 1 month ago of doxycycline  - Unasyn and vancomycin IV        Diverticulitis   Assessment & Plan    - had dark stool and abdominal pain days before presentation which resolved  - no symptoms currently  - continue to monitor        Numbness and tingling in left hand   Assessment & Plan    - also noted dysarthria, ?1 week duration of symptoms (poor historian), NIHSS 2  - CT head at [facility] no acute findings  - ordered MRI brain w/wo        Type 2 diabetes mellitus with other circulatory complications (HCC)- (present on admission)   Assessment & Plan    - hold home Januvia and metformin  - monitor BG on chem, start insulin sliding scale if noted to be elevated            Anticipated Hospital stay:  >2 midnights        Quality Measures  Quality-Core Measures   Reviewed items::  EKG reviewed, Radiology images reviewed, Labs reviewed and Medications reviewed  Fajardo catheter::  No Fajardo  DVT prophylaxis pharmacological::  Enoxaparin (Lovenox)  Ulcer Prophylaxis::  Not indicated  Antibiotics:  Treating active infection/contamination beyond 24 hours perioperative coverage

## 2018-05-15 NOTE — PROGRESS NOTES
"Pharmacy Kinetics 67 y.o. male on vancomycin day # 1 5/15/2018    Received ~1g Vancomycin prior to transfer  Given 1700mg @2000 at [facility] to complete loading dose    Indication for Treatment: Left leg cellulitis    Pertinent history per medical record: Admitted on 2018. Presents as a transfer from [facility] for evaluation of left leg cellulitis.  Left leg wound x 5 years. Stent in left leg placed 4 months ago at [facility] for a blocked artery.     Examined leg in room, warm to the touch, erythema with tenderness over left calf, complaining of pain. No streaking, no obvious abscess, deep yellow ulcerated lesion. States he has been putting topical ABX on wound for quite some time.    Other antibiotics: Unasyn    Allergies: Patient has no known allergies.     List concerns for renal function: age, obesity (BMI 37), recent IV contrast     Pertinent cultures to date:   18 blood cultures x2 - in process    Recent Labs      18   1300   WBC  8.6     Recent Labs      18   1300   BUN  12   CREATININE  0.8   ALBUMIN  3.3*     No results for input(s): VANCOTROUGH, VANCOPEAK, VANCORANDOM in the last 72 hours.  Intake/Output Summary (Last 24 hours) at 05/15/18 0058  Last data filed at 05/15/18 0000   Gross per 24 hour   Intake              240 ml   Output              500 ml   Net             -260 ml      Blood pressure 126/65, pulse 77, temperature 36.2 °C (97.2 °F), resp. rate 12, height 1.702 m (5' 7\"), weight 108.4 kg (239 lb), SpO2 97 %. Temp (24hrs), Av.2 °C (97.2 °F), Min:36.2 °C (97.2 °F), Max:36.2 °C (97.2 °F)      A/P   1. Vancomycin dose change: Start vancomycin 19mg/kg (2000mg) q24 hours, first dose ~18 hours post loading dose.  2. Next vancomycin level: Prior to the 3rd or 4th dose if remains on vancomycin (not ordered)  3. Goal trough: 12-16 mcg/ml  4. Comments: Dosing as above, no leukocytosis or fever. Consider PO ABX if improvement. Pharmacy will follow. Narrow " therapy as able.    Alfonso Moreno, PharmD, BCPS

## 2018-05-15 NOTE — PROGRESS NOTES
Pt admitted from ED. Report received. Pt A&Ox4, in no signs of distress. LDAs functioning properly. Discussed POC with pt. Oriented to room and unit routine. Monitor placed on pt, confirmed with monitor room.  Reports pain at this time, will medicate per [facility]. Call light within reach. Fall precautions in place. Requesting food, will check diet order.

## 2018-05-15 NOTE — ASSESSMENT & PLAN NOTE
- had dark stool and abdominal pain days before presentation which resolved  -Patient advised to follow-up with the ED or the PCP in case of recurrence of dark tarry stools.

## 2018-05-15 NOTE — ED NOTES
Med Rec complete per PT at bedside   Allergies Reviewed    Pt finished 7 day course of KEFLEX on 4/11     Pt finshed 7 day course of DOXYCYCLINE on 4/11

## 2018-05-15 NOTE — ED NOTES
Report received from Leonor LEDEZMA. Assumed care of patient. Pt assessed, A&O x 4. Patient's concerns addressed.  Fall precautions in place.  Pt repositioned and comfortable.  Call light within reach. Will continue to monitor.

## 2018-05-15 NOTE — PROGRESS NOTES
"Pharmacy Kinetics 67 y.o. male on vancomycin day # 2   5/15/2018    Currently on Vancomycin 2,000mg iv q24hr (1300)    Indication for Treatment: Left leg cellulitis     Pertinent history per medical record: Admitted on 2018 as a transfer from [facility] for evaluation of left leg cellulitis. PMH significant for chronic left leg wound x 5 years s/p stent to left the leg 4 months ago at [facility] for a blocked artery. Dx Tibia, Fibula revealed Diffuse soft tissue swelling. No acute osseous abnormality. LE ART/MIA showed no evidence of major arterial disease. Empiric antibiotics initiated.      Physical examination: warm to the touch, erythema with tenderness over left calf, complaining of pain. No streaking, no obvious abscess, deep yellow ulcerated lesion. States he has been putting topical ABX on wound for quite some time.     Other antibiotics: Unasyn     Allergies: Patient has no known allergies.      List concerns for renal function: age, obesity (BMI 37), recent IV contrast      Pertinent cultures to date:   18 blood cultures x2 - in process    Recent Labs      18   1300  05/15/18   0333   WBC  8.6  7.8   NEUTSPOLYS   --   54.40     Recent Labs      18   1300  05/15/18   0333   BUN  12  10   CREATININE  0.8  0.72   ALBUMIN  3.3*  3.3     No results for input(s): VANCOTROUGH, VANCOPEAK, VANCORANDOM in the last 72 hours.  Intake/Output Summary (Last 24 hours) at 05/15/18 1107  Last data filed at 05/15/18 0900   Gross per 24 hour   Intake             1020 ml   Output              500 ml   Net              520 ml      Blood pressure (!) 89/56, pulse 62, temperature 36.2 °C (97.2 °F), resp. rate 16, height 1.702 m (5' 7\"), weight 115.4 kg (254 lb 6.6 oz), SpO2 92 %. Temp (24hrs), Av.3 °C (97.4 °F), Min:36.2 °C (97.2 °F), Max:36.6 °C (97.8 °F)      A/P   1. Vancomycin dose change: Not indicated.   2. Next vancomycin level: Tomorrow, , at 1230   3. Goal trough: 12 - " 16 mcg/mL   4. Comments: Hypotensive (Coreg held this morning), afebrile without leukocytosis. Renal indices are stable following loading doses of vancomycin. Patient is high risk for accumulation as vancomycin therapy continues, therefore, conservative dosing frequency of every 24 hours initiated. Vancomycin trough level tomorrow to preliminarily assess pharmacokinetics. Blood cultures in process.     Sandy Jameson, PharmD

## 2018-05-15 NOTE — CARE PLAN
Problem: Safety  Goal: Will remain free from falls  Bed in low/locked position. Treaded slipper socks on. Call light within patient's reach. Hourly rounding in place.     Problem: Pain Management  Goal: Pain level will decrease to patient's comfort goal    Intervention: Follow pain managment plan developed in collaboration with patient and Interdisciplinary Team  Patient denies having any pain at this time.

## 2018-05-15 NOTE — NON-PROVIDER
Internal Medicine Medical Student Admitting History and Physical  Note Author: Dwight Richmond, Student    Name Luis Kellogg 1950   Age/Sex 67 y.o. male   MRN [id number]   Code Status FULL     After 5PM or if no immediate response to page, please call for cross-coverage  Attending/Team: Dr. Mims See Patient List for primary contact information  Call [phone] to page after hours   1st Call - Day Intern (R1):   Dr. Zavala 2nd Call - Day Sr. Resident (R2/R3):   Dr. Padilla       Chief Complaint:  Left leg wound     HPI:  Mr. Kellogg is a 68 yo male with PMH of DM type 2, HTN, PAD with left common femoral vein stent, chronic LLE swelling and discoloration, recent LLE cellulitis after 1 week antibiotic course, LLE DVT 4 months ago, peripheral neuropathy, and 54 smoke pack year hx, that was from [facility] for management of LLE due to concern of ischemic limb. He initially went to [facility] due to left hand numbness and tingling. There, he was given 2L of IVF after being shown to be hypotensive. He had an NIHSS of 2. There were no signs of infection and troponins were negative. CT head showed no acute intracranial processes. CT abdomen showed diverticulitis; US RUQ showed hydropic gallbladder without stones. The patient was given vancomycin and zosyn to treat the diverticulitis. After patient was stabilized, he was transferred to [facility] to further assess his LLE. At the [facility] ED, the patient was noted to have LLE cellulitis and ulcer of medial aspect, cold left toes with positive Doppler signal and normal MIA. Patient was started on unasyn and vancomycin. Venous duplex and arterial study was ordered, along with brain MRI.     Review of Systems   Constitutional: Negative for chills, fever and weight loss.   HENT: Negative for congestion, ear pain, hearing loss, sore throat and tinnitus.    Eyes: Negative for blurred vision, double vision and photophobia.   Respiratory: Negative for  "cough, sputum production and shortness of breath.    Cardiovascular: Positive for leg swelling. Negative for chest pain, palpitations and orthopnea.   Gastrointestinal: Negative for abdominal pain, diarrhea, heartburn, nausea and vomiting.   Genitourinary: Negative for dysuria, frequency, hematuria and urgency.   Musculoskeletal: Negative for back pain, joint pain and neck pain.   Neurological: Positive for tingling and sensory change.             Past Medical History  And current medications (link outpatient medications  with diagnosis)    DM type 2, HTN, Heart disease, PAD with left common femoral vein stent, LLE DVT, peripheral neuropathy, chicken pox, measles     Past Surgical History:  Past Surgical History:   Procedure Laterality Date   • KNEE REPLACEMENT, TOTAL Bilateral    • OTHER SURGICAL PROCEDURE      back surgery - Unknown        Medication Allergy/Sensitivities:  No Known Allergies      Family History:  Father has history of arthritis, cancer, heart disease, diabetes     Social History:  Smokin ppd x 54 years   Alcohol: denies  Illictis: denies  Other: denies   Living situation:           Physical Exam     Vitals:    18 2230 18 2325 18 2331 05/15/18 0000   BP:    136/75   Pulse: 75 79 77 90   Resp: 12 17 12 18   Temp:    36.4 °C (97.5 °F)   SpO2: 95% 98% 97% 91%   Weight:    115.4 kg (254 lb 6.6 oz)   Height:         Body mass index is 39.85 kg/m².  /75   Pulse 90   Temp 36.4 °C (97.5 °F)   Resp 18   Ht 1.702 m (5' 7\")   Wt 115.4 kg (254 lb 6.6 oz)   SpO2 91%   BMI 39.85 kg/m²   O2 therapy: Pulse Oximetry: 91 %, O2 (LPM): 3, O2 Delivery: Silicone Nasal Cannula     Recent Labs      18   1300  05/15/18   0333   WBC  8.6  7.8   RBC  5.22  4.73   HEMOGLOBIN  16.0  14.2   HEMATOCRIT  45.8  44.0   MCV  87.7  93.0   MCH  30.7  30.0   RDW  13.3  47.2   PLATELETCT  258  217   MPV  10.2  10.2   NEUTSPOLYS   --   54.40   LYMPHOCYTES   --   29.00   MONOCYTES   --   11.30 "   EOSINOPHILS   --   3.90   BASOPHILS   --   0.60     Recent Labs      05/14/18   1300  05/15/18   0333   SODIUM  135*  138   POTASSIUM  4.0  3.8   CHLORIDE  100  107   CO2  24  27   GLUCOSE  173*  157*   BUN  12  10     Dx - tibia and fibula left  Diffuse soft tissue swelling.   No acute osseous abnormality.     Physical Exam   Constitutional: He is oriented to person, place, and time.   HENT:   Head: Normocephalic.   Eyes: Pupils are equal, round, and reactive to light.   Neck: No JVD present. No tracheal deviation present.   Cardiovascular: Normal rate, regular rhythm and normal heart sounds.    No murmur heard.  Pulmonary/Chest: Effort normal and breath sounds normal. No respiratory distress. He has no wheezes. He has no rales.   Abdominal: Soft. Bowel sounds are normal. He exhibits no distension. There is no tenderness. There is no rebound.   Musculoskeletal: Normal range of motion. He exhibits edema and tenderness. He exhibits no deformity.   LLE swelling and discoloration; cellulitis on medial aspect of left leg    Neurological: He is alert and oriented to person, place, and time.   Skin: Skin is warm and dry. There is erythema.           Assessment/Plan     #pain of left lower leg   Patient reports the discoloration and swelling of the LLE has been a chronic issue  -normal Doppler and MIA   -Duplex venous and arterial studies to assess DVT and stent     #cellulitis of LLE   Continue IV unasyn and vancomycin   -wound culture   -limb preservation study     #numbness and tingling of left hand   CT of head at [facility] showed no acute findings  -MRI brain w/wo pending     #diverticulitis  Abdominal US at [facility] showed diverticulitis. Was treated with course of antibiotics.  -no current symptoms     #DM type 2   Hold metformin   -monitor blood glucose, get A1C

## 2018-05-15 NOTE — ASSESSMENT & PLAN NOTE
Patient's wound on the left posterior aspect of the leg, healing up well with no signs of overt infection.  -Patient is being discharged on oral Augmentin twice daily for a period of 11 days.  Advised to follow-up with PCP, in case there is reinfection.  Healing left leg posterior calf ulcer, with decrease in swelling of the left lower limb.

## 2018-05-15 NOTE — ASSESSMENT & PLAN NOTE
-Patient has a history of dysarthria in the past with numbness and tingling of his left hand.  -Tinel's sign positive-possible left carpal tunnel syndrome.  Advised left wrist splint, and follow-up with the PCP.  -Advised to take orthopedic opinion if PCP concurs for carpal tunnel release surgery.  -MRI Brain shows mild supratentorial white matter disease consistent with microvascular ischemic change,and no evidence of intracranial bleed or mass lesion,.  -Patient reports no new focal neurological deficits during his hospital stay.

## 2018-05-15 NOTE — ED NOTES
Pt bib [facility], transfer from [facility] for left hand numbness x1 week, dizziness x 2-3 months, slurred speech x3 days, open wound LLE x4 weeks.      Per EMS, pt had a few episodes of hypotension while in [facility] ER.    Per nursing notes from [facility], levophed, 2L NS, vancomycin & zosyn given PTA.    Pt arrives with vancomycin NOT running.    Pt arrives a&ox4.  On cardiac monitor.  Chart up for ERP evaluation.

## 2018-05-16 LAB
ANION GAP SERPL CALC-SCNC: 7 MMOL/L (ref 0–11.9)
BUN SERPL-MCNC: 9 MG/DL (ref 8–22)
CALCIUM SERPL-MCNC: 8.7 MG/DL (ref 8.5–10.5)
CHLORIDE SERPL-SCNC: 108 MMOL/L (ref 96–112)
CO2 SERPL-SCNC: 23 MMOL/L (ref 20–33)
CREAT SERPL-MCNC: 0.68 MG/DL (ref 0.5–1.4)
ERYTHROCYTE [DISTWIDTH] IN BLOOD BY AUTOMATED COUNT: 44.2 FL (ref 35.9–50)
GLUCOSE BLD-MCNC: 172 MG/DL (ref 65–99)
GLUCOSE BLD-MCNC: 178 MG/DL (ref 65–99)
GLUCOSE BLD-MCNC: 186 MG/DL (ref 65–99)
GLUCOSE SERPL-MCNC: 178 MG/DL (ref 65–99)
HCT VFR BLD AUTO: 38.9 % (ref 42–52)
HGB BLD-MCNC: 13.1 G/DL (ref 14–18)
MCH RBC QN AUTO: 30.3 PG (ref 27–33)
MCHC RBC AUTO-ENTMCNC: 33.7 G/DL (ref 33.7–35.3)
MCV RBC AUTO: 89.8 FL (ref 81.4–97.8)
PLATELET # BLD AUTO: 212 K/UL (ref 164–446)
PMV BLD AUTO: 10.4 FL (ref 9–12.9)
POTASSIUM SERPL-SCNC: 3.7 MMOL/L (ref 3.6–5.5)
RBC # BLD AUTO: 4.33 M/UL (ref 4.7–6.1)
SODIUM SERPL-SCNC: 138 MMOL/L (ref 135–145)
VANCOMYCIN TROUGH SERPL-MCNC: 7 UG/ML (ref 10–20)
WBC # BLD AUTO: 7.1 K/UL (ref 4.8–10.8)

## 2018-05-16 PROCEDURE — 700111 HCHG RX REV CODE 636 W/ 250 OVERRIDE (IP): Performed by: INTERNAL MEDICINE

## 2018-05-16 PROCEDURE — 36415 COLL VENOUS BLD VENIPUNCTURE: CPT

## 2018-05-16 PROCEDURE — A9270 NON-COVERED ITEM OR SERVICE: HCPCS | Performed by: INTERNAL MEDICINE

## 2018-05-16 PROCEDURE — 700102 HCHG RX REV CODE 250 W/ 637 OVERRIDE(OP): Performed by: STUDENT IN AN ORGANIZED HEALTH CARE EDUCATION/TRAINING PROGRAM

## 2018-05-16 PROCEDURE — 82962 GLUCOSE BLOOD TEST: CPT | Mod: 91

## 2018-05-16 PROCEDURE — 93880 EXTRACRANIAL BILAT STUDY: CPT

## 2018-05-16 PROCEDURE — 700111 HCHG RX REV CODE 636 W/ 250 OVERRIDE (IP): Performed by: STUDENT IN AN ORGANIZED HEALTH CARE EDUCATION/TRAINING PROGRAM

## 2018-05-16 PROCEDURE — A9270 NON-COVERED ITEM OR SERVICE: HCPCS | Performed by: STUDENT IN AN ORGANIZED HEALTH CARE EDUCATION/TRAINING PROGRAM

## 2018-05-16 PROCEDURE — 700102 HCHG RX REV CODE 250 W/ 637 OVERRIDE(OP): Performed by: INTERNAL MEDICINE

## 2018-05-16 PROCEDURE — 80202 ASSAY OF VANCOMYCIN: CPT

## 2018-05-16 PROCEDURE — 770006 HCHG ROOM/CARE - MED/SURG/GYN SEMI*

## 2018-05-16 PROCEDURE — 700105 HCHG RX REV CODE 258

## 2018-05-16 PROCEDURE — 700105 HCHG RX REV CODE 258: Performed by: INTERNAL MEDICINE

## 2018-05-16 PROCEDURE — 80048 BASIC METABOLIC PNL TOTAL CA: CPT

## 2018-05-16 PROCEDURE — 93971 EXTREMITY STUDY: CPT

## 2018-05-16 PROCEDURE — 99233 SBSQ HOSP IP/OBS HIGH 50: CPT | Mod: GC | Performed by: INTERNAL MEDICINE

## 2018-05-16 PROCEDURE — 85027 COMPLETE CBC AUTOMATED: CPT

## 2018-05-16 RX ORDER — SODIUM CHLORIDE 9 MG/ML
INJECTION, SOLUTION INTRAVENOUS
Status: COMPLETED
Start: 2018-05-16 | End: 2018-05-16

## 2018-05-16 RX ADMIN — NICOTINE POLACRILEX 2 MG: 2 GUM, CHEWING BUCCAL at 10:30

## 2018-05-16 RX ADMIN — INSULIN HUMAN 1 UNITS: 100 INJECTION, SOLUTION PARENTERAL at 17:57

## 2018-05-16 RX ADMIN — INSULIN HUMAN 1 UNITS: 100 INJECTION, SOLUTION PARENTERAL at 20:55

## 2018-05-16 RX ADMIN — INSULIN HUMAN 1 UNITS: 100 INJECTION, SOLUTION PARENTERAL at 08:55

## 2018-05-16 RX ADMIN — CARVEDILOL 3.12 MG: 3.12 TABLET, FILM COATED ORAL at 08:56

## 2018-05-16 RX ADMIN — AMPICILLIN SODIUM AND SULBACTAM SODIUM 3 G: 2; 1 INJECTION, POWDER, FOR SOLUTION INTRAMUSCULAR; INTRAVENOUS at 05:32

## 2018-05-16 RX ADMIN — STANDARDIZED SENNA CONCENTRATE AND DOCUSATE SODIUM 2 TABLET: 8.6; 5 TABLET, FILM COATED ORAL at 05:27

## 2018-05-16 RX ADMIN — TRAZODONE HYDROCHLORIDE 450 MG: 50 TABLET ORAL at 20:51

## 2018-05-16 RX ADMIN — ATORVASTATIN CALCIUM 80 MG: 80 TABLET, FILM COATED ORAL at 20:51

## 2018-05-16 RX ADMIN — AMPICILLIN SODIUM AND SULBACTAM SODIUM 3 G: 2; 1 INJECTION, POWDER, FOR SOLUTION INTRAMUSCULAR; INTRAVENOUS at 12:04

## 2018-05-16 RX ADMIN — ASPIRIN 325 MG: 325 TABLET ORAL at 05:27

## 2018-05-16 RX ADMIN — AMPICILLIN SODIUM AND SULBACTAM SODIUM 3 G: 2; 1 INJECTION, POWDER, FOR SOLUTION INTRAMUSCULAR; INTRAVENOUS at 17:56

## 2018-05-16 RX ADMIN — SODIUM CHLORIDE 250 ML: 9 INJECTION, SOLUTION INTRAVENOUS at 05:39

## 2018-05-16 RX ADMIN — ENOXAPARIN SODIUM 40 MG: 100 INJECTION SUBCUTANEOUS at 05:28

## 2018-05-16 RX ADMIN — CARVEDILOL 3.12 MG: 3.12 TABLET, FILM COATED ORAL at 17:56

## 2018-05-16 RX ADMIN — VANCOMYCIN HYDROCHLORIDE 2000 MG: 100 INJECTION, POWDER, LYOPHILIZED, FOR SOLUTION INTRAVENOUS at 14:15

## 2018-05-16 RX ADMIN — GABAPENTIN 600 MG: 300 CAPSULE ORAL at 17:56

## 2018-05-16 RX ADMIN — GABAPENTIN 600 MG: 300 CAPSULE ORAL at 05:27

## 2018-05-16 ASSESSMENT — ENCOUNTER SYMPTOMS
HEADACHES: 0
HEMOPTYSIS: 0
WEIGHT LOSS: 0
DIZZINESS: 0
FEVER: 0
VOMITING: 0
ABDOMINAL PAIN: 0
SPUTUM PRODUCTION: 0
FOCAL WEAKNESS: 0
BRUISES/BLEEDS EASILY: 0
ORTHOPNEA: 0
DOUBLE VISION: 0
NECK PAIN: 0
TINGLING: 0
SORE THROAT: 0
SENSORY CHANGE: 0
CHILLS: 0
SHORTNESS OF BREATH: 0
COUGH: 0
NERVOUS/ANXIOUS: 0
BACK PAIN: 0
TREMORS: 0
BLURRED VISION: 0
PALPITATIONS: 0
DIARRHEA: 0
NAUSEA: 0

## 2018-05-16 ASSESSMENT — PAIN SCALES - GENERAL
PAINLEVEL_OUTOF10: 3
PAINLEVEL_OUTOF10: 3
PAINLEVEL_OUTOF10: 5
PAINLEVEL_OUTOF10: 4
PAINLEVEL_OUTOF10: 6

## 2018-05-16 ASSESSMENT — LIFESTYLE VARIABLES: SUBSTANCE_ABUSE: 0

## 2018-05-16 NOTE — NON-PROVIDER
Internal Medicine Medical Student Note  Note Author: Dwight Richmond, Student    Name Luis Kellogg 1950   Age/Sex 67 y.o. male   MRN [id number]   Code Status Full     After 5PM or if no immediate response to page, please call for cross-coverage  Attending/Team: Dr. Mims See Patient List for primary contact information  Call [phone] to page after hours   1st Call - Day Intern (R1):   Dr. Zavala 2nd Call - Day Sr. Resident (R2/R3):   Dr. Padilla         Reason for interval visit  (Principal Problem)   Pain of left lower leg    Interval Problem Daily Status Update  (24 hours)   No acute events overnight. The patient's leg pain and swelling has improved from yesterday. The patient rates the pain 4/10. Wound cultures were negative. The patient denies headache, chest pain, or shortness of breath. Awaiting duplex venous and arterial studies to assess for DVT and stent patency.     Review of Systems   Constitutional: Negative for chills, fever and weight loss.   HENT: Negative for congestion and sore throat.    Eyes: Negative for blurred vision and double vision.   Respiratory: Negative for cough, sputum production and shortness of breath.    Cardiovascular: Positive for leg swelling. Negative for chest pain, palpitations and orthopnea.   Gastrointestinal: Negative for abdominal pain, diarrhea, nausea and vomiting.   Genitourinary: Negative for dysuria, frequency and urgency.   Musculoskeletal: Negative for back pain, joint pain and neck pain.   Neurological: Negative for dizziness, tingling, tremors, sensory change, focal weakness and headaches.       Physical Exam       Vitals:    05/15/18 1600 05/15/18 2000 18 0400 18 0800   BP: (!) 92/58 (!) 89/49 110/81 129/82   Pulse: 63 79 67 73   Resp:    Temp: 36.4 °C (97.5 °F) 36.9 °C (98.4 °F) 37.6 °C (99.7 °F) 36.1 °C (97 °F)   SpO2: 91% 97% 94% 92%   Weight:  114.3 kg (251 lb 15.8 oz)     Height:         Body mass index  is 39.47 kg/m². Weight: 114.3 kg (251 lb 15.8 oz)  Oxygen Therapy:  Pulse Oximetry: 92 %, O2 (LPM): 0, O2 Delivery: None (Room Air)     Recent Labs      05/14/18   1300  05/15/18   0333  05/16/18   0349   WBC  8.6  7.8  7.1   RBC  5.22  4.73  4.33*   HEMOGLOBIN  16.0  14.2  13.1*   HEMATOCRIT  45.8  44.0  38.9*   MCV  87.7  93.0  89.8   MCH  30.7  30.0  30.3   RDW  13.3  47.2  44.2   PLATELETCT  258  217  212   MPV  10.2  10.2  10.4   NEUTSPOLYS   --   54.40   --    LYMPHOCYTES   --   29.00   --    MONOCYTES   --   11.30   --    EOSINOPHILS   --   3.90   --    BASOPHILS   --   0.60   --      Recent Labs      05/14/18   1300  05/15/18   0333  05/16/18   0350   SODIUM  135*  138  138   POTASSIUM  4.0  3.8  3.7   CHLORIDE  100  107  108   CO2  24  27  23   GLUCOSE  173*  157*  178*   BUN  12  10  9       Blood and wound cultures negative.     Physical Exam   Constitutional: He is oriented to person, place, and time.   HENT:   Head: Normocephalic.   Eyes: Pupils are equal, round, and reactive to light.   Neck: No JVD present. No tracheal deviation present.   Cardiovascular: Normal rate, regular rhythm, normal heart sounds and intact distal pulses.    Pulmonary/Chest: Effort normal and breath sounds normal. No respiratory distress. He has no wheezes.   Abdominal: Soft. Bowel sounds are normal. He exhibits no distension. There is no tenderness. There is no rebound.   Musculoskeletal: Normal range of motion. He exhibits edema and tenderness. He exhibits no deformity.   Neurological: He is alert and oriented to person, place, and time.   Skin: Skin is warm and dry. No rash noted. There is erythema. No pallor.       Assessment/Plan     #pain of left lower leg   Patient reports the discoloration and swelling of the LLE has been a chronic issue  -normal Doppler and MIA   -Duplex venous and arterial studies to assess DVT and stent   -ASA 325mg daily      #cellulitis of LLE   Continue IV unasyn and vancomycin   -wound culture  negative   -limb preservation team       #numbness and tingling of left hand   CT of head at [facility] showed no acute findings  -MRI brain shows no evidence of acute infarct, hemorrhage, or mass enhancing lesion      #diverticulitis  Abdominal US at [facility] showed diverticulitis. Was treated with course of antibiotics.  -no current symptoms      #DM type 2   A1C 9.1   Hold metformin   -insulin sliding scale

## 2018-05-16 NOTE — PROGRESS NOTES
Bedside report completed. Pt lying in bed comfortably. A/O x 4, pain 7/10 in left leg, pt declines and medication, repostioned for comfort. Safety precautions in place. Call light and personal belongings within reach. Pt educated on POC and all questions answered at this time.  Educated patient on calling for assistance when needed. All pt needs are met at this time.  Will continue to monitor.

## 2018-05-16 NOTE — PROGRESS NOTES
Bedside report completed with noc RN. Pt resting, denies pain. Bed locked in low position, call light within reach. Reviewed plan of care with noc RN and pt. Patient has no questions at this time.

## 2018-05-16 NOTE — CARE PLAN
Problem: Safety  Goal: Will remain free from falls  Outcome: PROGRESSING AS EXPECTED  Pt remains free from falls at this time. Safety precautions in place. Pt educated on calling for assistance when needed.     Problem: Venous Thromboembolism (VTW)/Deep Vein Thrombosis (DVT) Prevention:  Goal: Patient will participate in Venous Thrombosis (VTE)/Deep Vein Thrombosis (DVT)Prevention Measures  Outcome: PROGRESSING AS EXPECTED  Pt is receiving LMWH (lovenox) for VTE prophylaxis. Pt assessed and monitored for anticoagulation complications. Pt educated on importantance of ambulation at level with which they can tolerate. Pt verbalized understanding.

## 2018-05-17 LAB
ANION GAP SERPL CALC-SCNC: 5 MMOL/L (ref 0–11.9)
BUN SERPL-MCNC: 9 MG/DL (ref 8–22)
CALCIUM SERPL-MCNC: 8.7 MG/DL (ref 8.5–10.5)
CHLORIDE SERPL-SCNC: 110 MMOL/L (ref 96–112)
CO2 SERPL-SCNC: 25 MMOL/L (ref 20–33)
CREAT SERPL-MCNC: 0.67 MG/DL (ref 0.5–1.4)
ERYTHROCYTE [DISTWIDTH] IN BLOOD BY AUTOMATED COUNT: 42.6 FL (ref 35.9–50)
GLUCOSE BLD-MCNC: 145 MG/DL (ref 65–99)
GLUCOSE BLD-MCNC: 174 MG/DL (ref 65–99)
GLUCOSE BLD-MCNC: 178 MG/DL (ref 65–99)
GLUCOSE BLD-MCNC: 194 MG/DL (ref 65–99)
GLUCOSE SERPL-MCNC: 154 MG/DL (ref 65–99)
HCT VFR BLD AUTO: 38.9 % (ref 42–52)
HGB BLD-MCNC: 13.4 G/DL (ref 14–18)
LACTATE BLD-SCNC: 1.3 MMOL/L (ref 0.5–2)
MCH RBC QN AUTO: 30.8 PG (ref 27–33)
MCHC RBC AUTO-ENTMCNC: 34.4 G/DL (ref 33.7–35.3)
MCV RBC AUTO: 89.4 FL (ref 81.4–97.8)
PLATELET # BLD AUTO: 204 K/UL (ref 164–446)
PMV BLD AUTO: 10 FL (ref 9–12.9)
POTASSIUM SERPL-SCNC: 3.7 MMOL/L (ref 3.6–5.5)
RBC # BLD AUTO: 4.35 M/UL (ref 4.7–6.1)
SODIUM SERPL-SCNC: 140 MMOL/L (ref 135–145)
WBC # BLD AUTO: 6 K/UL (ref 4.8–10.8)

## 2018-05-17 PROCEDURE — 700111 HCHG RX REV CODE 636 W/ 250 OVERRIDE (IP): Performed by: INTERNAL MEDICINE

## 2018-05-17 PROCEDURE — 99232 SBSQ HOSP IP/OBS MODERATE 35: CPT | Performed by: INTERNAL MEDICINE

## 2018-05-17 PROCEDURE — 82962 GLUCOSE BLOOD TEST: CPT

## 2018-05-17 PROCEDURE — 80048 BASIC METABOLIC PNL TOTAL CA: CPT

## 2018-05-17 PROCEDURE — A6212 FOAM DRG <=16 SQ IN W/BORDER: HCPCS | Performed by: INTERNAL MEDICINE

## 2018-05-17 PROCEDURE — 700102 HCHG RX REV CODE 250 W/ 637 OVERRIDE(OP): Performed by: INTERNAL MEDICINE

## 2018-05-17 PROCEDURE — A9270 NON-COVERED ITEM OR SERVICE: HCPCS | Performed by: INTERNAL MEDICINE

## 2018-05-17 PROCEDURE — 700111 HCHG RX REV CODE 636 W/ 250 OVERRIDE (IP): Performed by: STUDENT IN AN ORGANIZED HEALTH CARE EDUCATION/TRAINING PROGRAM

## 2018-05-17 PROCEDURE — 36415 COLL VENOUS BLD VENIPUNCTURE: CPT

## 2018-05-17 PROCEDURE — 700102 HCHG RX REV CODE 250 W/ 637 OVERRIDE(OP): Performed by: STUDENT IN AN ORGANIZED HEALTH CARE EDUCATION/TRAINING PROGRAM

## 2018-05-17 PROCEDURE — 700105 HCHG RX REV CODE 258: Performed by: INTERNAL MEDICINE

## 2018-05-17 PROCEDURE — 85027 COMPLETE CBC AUTOMATED: CPT

## 2018-05-17 PROCEDURE — 770006 HCHG ROOM/CARE - MED/SURG/GYN SEMI*

## 2018-05-17 PROCEDURE — 83605 ASSAY OF LACTIC ACID: CPT

## 2018-05-17 PROCEDURE — 700105 HCHG RX REV CODE 258

## 2018-05-17 PROCEDURE — A9270 NON-COVERED ITEM OR SERVICE: HCPCS | Performed by: STUDENT IN AN ORGANIZED HEALTH CARE EDUCATION/TRAINING PROGRAM

## 2018-05-17 RX ORDER — DOCUSATE SODIUM 100 MG/1
100 CAPSULE, LIQUID FILLED ORAL 2 TIMES DAILY PRN
Status: DISCONTINUED | OUTPATIENT
Start: 2018-05-17 | End: 2018-05-18 | Stop reason: HOSPADM

## 2018-05-17 RX ORDER — SODIUM CHLORIDE 9 MG/ML
INJECTION, SOLUTION INTRAVENOUS
Status: COMPLETED
Start: 2018-05-17 | End: 2018-05-17

## 2018-05-17 RX ORDER — INSULIN GLARGINE 100 [IU]/ML
5 INJECTION, SOLUTION SUBCUTANEOUS
Status: DISCONTINUED | OUTPATIENT
Start: 2018-05-17 | End: 2018-05-18 | Stop reason: HOSPADM

## 2018-05-17 RX ADMIN — GABAPENTIN 600 MG: 300 CAPSULE ORAL at 05:14

## 2018-05-17 RX ADMIN — CARVEDILOL 3.12 MG: 3.12 TABLET, FILM COATED ORAL at 09:31

## 2018-05-17 RX ADMIN — SODIUM CHLORIDE 500 ML: 9 INJECTION, SOLUTION INTRAVENOUS at 13:37

## 2018-05-17 RX ADMIN — TRAZODONE HYDROCHLORIDE 450 MG: 50 TABLET ORAL at 20:59

## 2018-05-17 RX ADMIN — AMPICILLIN SODIUM AND SULBACTAM SODIUM 3 G: 2; 1 INJECTION, POWDER, FOR SOLUTION INTRAMUSCULAR; INTRAVENOUS at 18:21

## 2018-05-17 RX ADMIN — NICOTINE 21 MG: 21 PATCH, EXTENDED RELEASE TRANSDERMAL at 09:31

## 2018-05-17 RX ADMIN — INSULIN HUMAN 1 UNITS: 100 INJECTION, SOLUTION PARENTERAL at 21:06

## 2018-05-17 RX ADMIN — INSULIN GLARGINE 5 UNITS: 100 INJECTION, SOLUTION SUBCUTANEOUS at 09:30

## 2018-05-17 RX ADMIN — ATORVASTATIN CALCIUM 80 MG: 80 TABLET, FILM COATED ORAL at 20:59

## 2018-05-17 RX ADMIN — AMPICILLIN SODIUM AND SULBACTAM SODIUM 3 G: 2; 1 INJECTION, POWDER, FOR SOLUTION INTRAMUSCULAR; INTRAVENOUS at 05:14

## 2018-05-17 RX ADMIN — ASPIRIN 325 MG: 325 TABLET ORAL at 05:14

## 2018-05-17 RX ADMIN — AMPICILLIN SODIUM AND SULBACTAM SODIUM 3 G: 2; 1 INJECTION, POWDER, FOR SOLUTION INTRAMUSCULAR; INTRAVENOUS at 00:14

## 2018-05-17 RX ADMIN — ENOXAPARIN SODIUM 40 MG: 100 INJECTION SUBCUTANEOUS at 05:22

## 2018-05-17 RX ADMIN — INSULIN HUMAN 1 UNITS: 100 INJECTION, SOLUTION PARENTERAL at 13:45

## 2018-05-17 RX ADMIN — CARVEDILOL 3.12 MG: 3.12 TABLET, FILM COATED ORAL at 18:20

## 2018-05-17 RX ADMIN — GABAPENTIN 600 MG: 300 CAPSULE ORAL at 18:21

## 2018-05-17 RX ADMIN — AMPICILLIN SODIUM AND SULBACTAM SODIUM 3 G: 2; 1 INJECTION, POWDER, FOR SOLUTION INTRAMUSCULAR; INTRAVENOUS at 13:36

## 2018-05-17 RX ADMIN — INSULIN HUMAN 1 UNITS: 100 INJECTION, SOLUTION PARENTERAL at 09:29

## 2018-05-17 ASSESSMENT — ENCOUNTER SYMPTOMS
DOUBLE VISION: 0
DIARRHEA: 0
TREMORS: 0
ABDOMINAL PAIN: 0
NECK PAIN: 0
WEIGHT LOSS: 0
TINGLING: 0
VOMITING: 0
BRUISES/BLEEDS EASILY: 0
HEADACHES: 0
DIZZINESS: 0
ORTHOPNEA: 0
BLURRED VISION: 0
CHILLS: 0
SENSORY CHANGE: 1
COUGH: 0
SHORTNESS OF BREATH: 0
SORE THROAT: 0
SPEECH CHANGE: 0
HEMOPTYSIS: 0
BACK PAIN: 0
NAUSEA: 0
FEVER: 0
NERVOUS/ANXIOUS: 0
PALPITATIONS: 0

## 2018-05-17 ASSESSMENT — PAIN SCALES - GENERAL
PAINLEVEL_OUTOF10: 0

## 2018-05-17 ASSESSMENT — LIFESTYLE VARIABLES: SUBSTANCE_ABUSE: 0

## 2018-05-17 NOTE — CARE PLAN
Problem: Safety  Goal: Will remain free from falls  Outcome: PROGRESSING AS EXPECTED  Bed alarm on, bed locked in low position, treaded socks on, call light and items within reach. Hourly rounding in place, near nurses station. Assessed mobility and communicated with CNA and patient regarding risk of fall.   Stayed with patient in the bathroom, and assisted him to and from restroom. Reinforced calling before getting up.     Ensured pt was up for meals, to prevent aspiration pneumonia     Problem: Infection  Goal: Will remain free from infection  Outcome: PROGRESSING AS EXPECTED  Assessing closely for s/s of worsening infection. Vitals q4, and additionally as needed. Encouraged pt on using alcohol hand wipes before eating meals, and after using urinal.   Used scrupulous hand hygiene, and sterile technique when administering IV medications.

## 2018-05-17 NOTE — PROGRESS NOTES
Internal Medicine Interval Note  Note Author: Timothy Nino M.D.     Name Luis Kellogg 1950   Age/Sex 67 y.o. male   MRN [id number]   Code Status Full      After 5PM or if no immediate response to page, please call for cross-coverage  Attending/Team: /[place]oto See Patient List for primary contact information  Call [phone] to page    1st Call - Day Intern (R1):    2nd Call - Day Sr. Resident (R2/R3):   Dr Timothy Martines.         Reason for interval visit  (Principal Problem)   Pain of left lower leg    Interval Problem Daily Status Update  (24 hours)   No acute events overnight, feel better, pain is improving, will continue with one day of IV antibiotics, switch to oral and discharge tomorrow on oral antibiotics augmentin for a total of 20 days including the hospital stay time, patient aware of the plan, vascular eval the patient today and recommend graduated compression stocking and weight loss, they will schedule to follow the patient as outpatient     Review of Systems   Constitutional: Negative for chills and fever.   HENT: Negative for congestion, ear discharge and nosebleeds.    Eyes: Negative for blurred vision and double vision.   Respiratory: Negative for cough and hemoptysis.    Cardiovascular: Negative for chest pain and palpitations.   Gastrointestinal: Negative for nausea and vomiting.   Genitourinary: Negative for frequency and urgency.   Musculoskeletal: Negative for back pain and neck pain.   Skin: Negative for itching and rash.   Neurological: Negative for dizziness and headaches.   Endo/Heme/Allergies: Does not bruise/bleed easily.   Psychiatric/Behavioral: Negative for substance abuse and suicidal ideas. The patient is not nervous/anxious.        Consultants/Specialty  Awaiting vascular surgery recs.  Limb preservation consult  Disposition  inpatient    Quality Measures  Quality-Core Measures   Reviewed items::  Labs reviewed and  Medications reviewed          Physical Exam       Vitals:    05/16/18 1600 05/16/18 2040 05/17/18 0450 05/17/18 0730   BP:   138/86 140/98   Pulse: 75 76 76 73   Resp: 20 20 20 20   Temp:   36.7 °C (98.1 °F) 36.7 °C (98.1 °F)   SpO2: 93% 92% 92% 93%   Weight:  115.8 kg (255 lb 4.7 oz)     Height:         Body mass index is 39.98 kg/m². Weight: 115.8 kg (255 lb 4.7 oz)  Oxygen Therapy:  Pulse Oximetry: 93 %, O2 (LPM): 0, O2 Delivery: None (Room Air)    Physical Exam   Constitutional: He is oriented to person, place, and time and well-developed, well-nourished, and in no distress.   HENT:   Head: Normocephalic and atraumatic.   Eyes: EOM are normal. Pupils are equal, round, and reactive to light.   Neck: Normal range of motion. Neck supple.   Cardiovascular: Normal rate, regular rhythm and normal heart sounds.    No murmur heard.  Pulmonary/Chest: Effort normal and breath sounds normal. No respiratory distress. He has no wheezes.   Abdominal: Soft. Bowel sounds are normal. He exhibits no distension. There is no tenderness. There is no rebound.   Musculoskeletal: Normal range of motion.     left leg stasis dermatitis and mild erythema.mild discharge from the wound on the posterior calf.   Lymphadenopathy:     He has no cervical adenopathy.   Neurological: He is alert and oriented to person, place, and time. No cranial nerve deficit.   Skin: Skin is warm and dry. No rash noted. No erythema.   Psychiatric: Mood and affect normal.         Lab Data Review:     5/16/2018  6:34 PM    Recent Labs      05/15/18   0333  05/16/18   0350  05/17/18   0333   SODIUM  138  138  140   POTASSIUM  3.8  3.7  3.7   CHLORIDE  107  108  110   CO2  27  23  25   BUN  10  9  9   CREATININE  0.72  0.68  0.67   CALCIUM  8.8  8.7  8.7       Recent Labs      05/15/18   0333  05/16/18   0350  05/17/18   0333   ALTSGPT  29   --    --    ASTSGOT  22   --    --    ALKPHOSPHAT  58   --    --    TBILIRUBIN  0.5   --    --    GLUCOSE  157*  178*  154*        Recent Labs      05/15/18   0333  05/16/18   0349  05/17/18   0333   RBC  4.73  4.33*  4.35*   HEMOGLOBIN  14.2  13.1*  13.4*   HEMATOCRIT  44.0  38.9*  38.9*   PLATELETCT  217  212  204       Recent Labs      05/15/18   0333  05/16/18 0349  05/17/18   0333   WBC  7.8  7.1  6.0   NEUTSPOLYS  54.40   --    --    LYMPHOCYTES  29.00   --    --    MONOCYTES  11.30   --    --    EOSINOPHILS  3.90   --    --    BASOPHILS  0.60   --    --    ASTSGOT  22   --    --    ALTSGPT  29   --    --    ALKPHOSPHAT  58   --    --    TBILIRUBIN  0.5   --    --            Assessment/Plan     1. Acute cellulitis of the left leg with a wound   2. Carpel tunnel syndrome  3. Left-sided carotid artery total occlusion    4. type two DM             -  No acute events overnight, feel better, pain is improving,             - on IV unasyn day three, stopped vanco after two days treatment             - ve blood and wound culture results             - pain controlled              - negative US leg results             - Dr Skinny brannon the patient and recommended graduated compression stocking and weight loss, they will schedule to follow the patient as outpatient              -  will continue with one day of IV antibiotics, switch to oral and discharge tomorrow on oral antibiotics augmentin for a total of 20 days including the hospital stay time, patient aware of the plan that discharge is tomorrow             - left sided carotid artery occlusion need no intervention              - started with Lantus today with dose of 5 units and ISS

## 2018-05-17 NOTE — PROGRESS NOTES
Bedside report received from day shift RN, pt is resting in bed, denies pain, no needs at this time, updated on POC and answered all questions, call light and personal belongings in reach.

## 2018-05-17 NOTE — NON-PROVIDER
Internal Medicine Medical Student Note  Note Author: Dwight Richmond, Student    Name Luis Kellogg 1950   Age/Sex 67 y.o. male   MRN [id number]   Code Status Full     After 5PM or if no immediate response to page, please call for cross-coverage  Attending/Team: Dr. Mims See Patient List for primary contact information  Call [phone] to page after hours   1st Call - Day Intern (R1):   Dr. Zavala 2nd Call - Day Sr. Resident (R2/R3):   Dr. Nino         Reason for interval visit  (Principal Problem)   Pain of left lower leg    Interval Problem Daily Status Update  (24 hours)   No acute events overnight. Patient reports LLE tenderness is getting much better. LE venous duplex showed normal flow and no evidence of DVT. Carotid duplex showed absence of left carotid flow. Vascular surgery is going to see the patient today. The patient reports that his hand numbness is getting worse. Patient was advised to lift leg and follow up with rheumatology outpatient. The patient denies headache, shortness of breath, chest pain or abdominal pain.     Review of Systems   Constitutional: Negative for chills, fever and weight loss.   HENT: Negative for congestion, ear pain and sore throat.    Eyes: Negative for blurred vision and double vision.   Respiratory: Negative for cough and shortness of breath.    Cardiovascular: Negative for chest pain, palpitations, orthopnea and leg swelling.   Gastrointestinal: Negative for abdominal pain, diarrhea, nausea and vomiting.   Genitourinary: Negative for dysuria, frequency and urgency.   Musculoskeletal: Negative for back pain, joint pain and neck pain.   Neurological: Positive for sensory change. Negative for tingling, tremors, speech change and headaches.       Physical Exam       Vitals:    18 0800 18 1600 18 2040 18 0450   BP: 129/82 126/89 133/94 138/86   Pulse: 73 75 76 76   Resp: 20 20 20 20   Temp: 36.1 °C (97 °F) 36.2 °C (97.2  °F) 36.4 °C (97.6 °F) 36.7 °C (98.1 °F)   SpO2: 92% 93% 92% 92%   Weight:   115.8 kg (255 lb 4.7 oz)    Height:         Body mass index is 39.98 kg/m². Weight: 115.8 kg (255 lb 4.7 oz)  Oxygen Therapy:  Pulse Oximetry: 92 %, O2 (LPM): 0, O2 Delivery: None (Room Air)     Recent Labs      05/15/18   0333  05/16/18   0349  05/17/18   0333   WBC  7.8  7.1  6.0   RBC  4.73  4.33*  4.35*   HEMOGLOBIN  14.2  13.1*  13.4*   HEMATOCRIT  44.0  38.9*  38.9*   MCV  93.0  89.8  89.4   MCH  30.0  30.3  30.8   RDW  47.2  44.2  42.6   PLATELETCT  217  212  204   MPV  10.2  10.4  10.0   NEUTSPOLYS  54.40   --    --    LYMPHOCYTES  29.00   --    --    MONOCYTES  11.30   --    --    EOSINOPHILS  3.90   --    --    BASOPHILS  0.60   --    --      Recent Labs      05/15/18   0333  05/16/18   0350  05/17/18   0333   SODIUM  138  138  140   POTASSIUM  3.8  3.7  3.7   CHLORIDE  107  108  110   CO2  27  23  25   GLUCOSE  157*  178*  154*   BUN  10  9  9     Wound and blood culture negative     Physical Exam   Constitutional: He is oriented to person, place, and time.   Obese male   HENT:   Head: Normocephalic.   Eyes: Pupils are equal, round, and reactive to light.   Neck: No JVD present. No tracheal deviation present.   Cardiovascular: Normal rate, regular rhythm and normal heart sounds.    No murmur heard.  Pulmonary/Chest: Effort normal and breath sounds normal. No respiratory distress. He has no wheezes. He has no rales.   Abdominal: Soft. Bowel sounds are normal. He exhibits no distension. There is no tenderness. There is no rebound.   Musculoskeletal: Normal range of motion. He exhibits tenderness. He exhibits no edema or deformity.   Neurological: He is alert and oriented to person, place, and time.   Skin: Skin is warm and dry. No rash noted. There is erythema. No pallor.         Assessment/Plan     #pain of left lower leg   Patient reports the discoloration and swelling of the LLE has been a chronic issue  -normal Doppler and MIA    -Duplex venous study normal  -ASA 325mg daily   -patient advised to lift leg      #cellulitis of LLE  Wound and blood culture negative    Continue IV unasyn  -d/c augmentin tomorrow       #numbness and tingling of left hand   CT of head at [facility] showed no acute findings. MRI brain shows no evidence of acute infarct, hemorrhage, or mass enhancing lesion. Left carotid duplex showed absence of flow. Hand numbness most likely unrelated to carotids, more related to nerve compression.   -vascular surgeon, Dr. Echeverria, will follow up  -f/u with rheumatology outpatient      #diverticulitis  Abdominal US at [facility] showed diverticulitis. Was treated with course of antibiotics.  -no current symptoms      #DM type 2   A1C 9.1   Hold metformin   -insulin sliding scale

## 2018-05-17 NOTE — PROGRESS NOTES
Internal Medicine Interval Note  Note Author: Dylan Zavala M.D.     Name Luis Kellogg 1950   Age/Sex 67 y.o. male   MRN [id number]   Code Status Full      After 5PM or if no immediate response to page, please call for cross-coverage  Attending/Team: /Soto See Patient List for primary contact information  Call [phone] to page    1st Call - Day Intern (R1):    2nd Call - Day Sr. Resident (R2/R3):   Dr Timothy Martines.         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    Review of Systems   Constitutional: Negative for chills and fever.   HENT: Negative for congestion, ear discharge and nosebleeds.    Eyes: Negative for blurred vision and double vision.   Respiratory: Negative for cough and hemoptysis.    Cardiovascular: Negative for chest pain and palpitations.   Gastrointestinal: Negative for nausea and vomiting.   Genitourinary: Negative for frequency and urgency.   Musculoskeletal: Negative for back pain and neck pain.   Skin: Negative for itching and rash.   Neurological: Negative for dizziness and headaches.   Endo/Heme/Allergies: Does not bruise/bleed easily.   Psychiatric/Behavioral: Negative for substance abuse and suicidal ideas. The patient is not nervous/anxious.        Consultants/Specialty  Awaiting vascular surgery recs.  Limb preservation consult  Disposition  inpatient    Quality Measures  Quality-Core Measures   Reviewed items::  Labs reviewed and Medications reviewed          Physical Exam       Vitals:    05/15/18 2000  05/16/18 0400 05/16/18 0800 05/16/18 1600   BP: (!) 89/49 110/81 129/82 126/89   Pulse: 79 67 73 75   Resp: 18 18 20 20   Temp: 36.9 °C (98.4 °F) 37.6 °C (99.7 °F) 36.1 °C (97 °F) 36.2 °C (97.2 °F)   SpO2: 97% 94% 92% 93%   Weight: 114.3 kg (251 lb 15.8 oz)      Height:         Body mass index is 39.47 kg/m². Weight: 114.3 kg (251 lb 15.8 oz)  Oxygen Therapy:  Pulse Oximetry: 93 %, O2 (LPM): 0, O2 Delivery: None (Room Air)    Physical Exam   Constitutional: He is oriented to person, place, and time and well-developed, well-nourished, and in no distress.   HENT:   Head: Normocephalic and atraumatic.   Eyes: EOM are normal. Pupils are equal, round, and reactive to light.   Neck: Normal range of motion. Neck supple.   Cardiovascular: Normal rate, regular rhythm and normal heart sounds.    No murmur heard.  Pulmonary/Chest: Effort normal and breath sounds normal. No respiratory distress. He has no wheezes.   Abdominal: Soft. Bowel sounds are normal. He exhibits no distension. There is no tenderness. There is no rebound.   Musculoskeletal: Normal range of motion.   Pain and swelling with trophic changes to the skin of the left leg,with stasis dermatitis and mild erythema.mild discharge from the wound on the posterior calf.   Lymphadenopathy:     He has no cervical adenopathy.   Neurological: He is alert and oriented to person, place, and time. No cranial nerve deficit.   Skin: Skin is warm and dry. No rash noted. No erythema.   Psychiatric: Mood and affect normal.         Lab Data Review:         5/16/2018  6:34 PM    Recent Labs      05/14/18   1300  05/15/18   0333  05/16/18   0350   SODIUM  135*  138  138   POTASSIUM  4.0  3.8  3.7   CHLORIDE  100  107  108   CO2  24  27  23   BUN  12  10  9   CREATININE  0.8  0.72  0.68   CALCIUM  9.4  8.8  8.7       Recent Labs      05/14/18   1300  05/15/18   0333  05/16/18   0350   ALTSGPT  49  29   --    ASTSGOT  35  22   --    ALKPHOSPHAT  80  58   --    TBILIRUBIN  0.5  0.5    --    GLUCOSE  173*  157*  178*       Recent Labs      05/14/18   1300  05/15/18   0333  05/16/18   0349   RBC  5.22  4.73  4.33*   HEMOGLOBIN  16.0  14.2  13.1*   HEMATOCRIT  45.8  44.0  38.9*   PLATELETCT  258  217  212   PROTHROMBTM  10.5   --    --    APTT  29.5   --    --    INR  1.00   --    --        Recent Labs      05/14/18   1300  05/15/18   0333  05/16/18   0349   WBC  8.6  7.8  7.1   NEUTSPOLYS   --   54.40   --    LYMPHOCYTES   --   29.00   --    MONOCYTES   --   11.30   --    EOSINOPHILS   --   3.90   --    BASOPHILS   --   0.60   --    ASTSGOT  35  22   --    ALTSGPT  49  29   --    ALKPHOSPHAT  80  58   --    TBILIRUBIN  0.5  0.5   --            Assessment/Plan     * Pain of left lower leg- (present on admission)   Assessment & Plan    - per patient symptoms of discoloration and swelling are chronic for years, his chief complaint was hand numbness and he mentioned this while he was there after the fact  - no acute ischemia  - weak pulses on exam however present on Doppler, normal MIA  - venous and arterial studies to assess for DVT and stent patency.  -Patient has a healing ulcer on the posterior aspect of the left leg with pain and swelling ,and trophic changes in the left leg.  Pulses -dorsalis pedis and the post tibial of the left leg are feeble.  -Awaiting duplex ultrasound of the carotids,as there is less flow noted on the left side.  -normal MIA.  -LE  duplex U/S  reveals no evidence of  superficial or deep violet thrombosis.  -patient D/W ,vascular surgery, about his venous stasis ,and possible hypoperfusion with regard to weak pulses .will assess the patient.  - limb preservation consult on.  -wound care consult on.         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   -wound culture and limb preservation consult placed.  -vascular surgery consult placed, would review.        Diverticulitis   Assessment & Plan    - had dark stool and abdominal pain days before presentation which resolved  - no symptoms currently  - continue to monitor        Numbness and tingling in left hand   Assessment & Plan    - also noted dysarthria, ?1 week duration of symptoms (poor historian), NIHSS 2  - CT [facility] at [facility] no acute findings  - ordered MRI brain w/wo contrast.  -MRI Brain shows mild supratentorial white matter disease consistent with microvascular ischemic change,and no evidence of intracranial bleed or mass lesion,         Type 2 diabetes mellitus with other circulatory complications (HCC)- (present on admission)   Assessment & Plan    - hold home Januvia and metformin.  -patient awaiting contrast studies.  -insulin sliding scale if noted to be elevated

## 2018-05-17 NOTE — CARE PLAN
Problem: Safety  Goal: Will remain free from injury    Intervention: Provide assistance with mobility  Bed alarm on, bed locked in low position, treaded socks on, call light and items within reach. Hourly rounding in place, near nurses station. Assessed mobility and communicated with CNA and patient regarding risk of fall.         Problem: Infection  Goal: Will remain free from infection    Intervention: Implement standard precautions and perform hand washing before and after patient contact  Assessing closely for s/s of worsening infection. Vitals q4, and additionally as needed. Encouraged pt on using alcohol hand wipes before eating meals, and after using urinal.   Used scrupulous hand hygiene, and sterile technique when administering IV medications.   Up for meals to prevent pneumonia

## 2018-05-17 NOTE — PROGRESS NOTES
Pt has been resting in bed and chair, watching TV. Denies leg pain. He had one episode of a dull ache near his epigastric area, and food and repositioning relieved it. It has not returned. He walked around half of the unit with the RN earlier and tolerated it well, used the FWW. He is walking to and from restroom with minimal assistance from staff.  RN went over diabetes education and reason for compliance. Pt verbalized understanding and teach back was used, as well as assessing prior knowledge before starting teaching.

## 2018-05-17 NOTE — PROGRESS NOTES
Pt has been sitting in bed, and the chair this morning, ambulating to and from the restroom with minimal assistance and his cane. He denies any interventions for his foot pain, and has all needs met. Hourly rounding in place, call light and items within reach. He calls appropriately today.

## 2018-05-17 NOTE — CARE PLAN
Problem: Communication  Goal: The ability to communicate needs accurately and effectively will improve  Outcome: PROGRESSING AS EXPECTED  RN updated pt on POC, answered all questions, educated use on call light system.     Problem: Safety  Goal: Will remain free from falls  Outcome: PROGRESSING AS EXPECTED  RN educated pt on fall risk and fall prevention, bed locked and in lowest position, call light and personal belongings in reach, bed alarm and hourly rounding in place, mobility assessed and proper communication signs placed.

## 2018-05-17 NOTE — PROGRESS NOTES
Pt ambulated to and from restroom with minimal assistance, and denies any interventions for his pain in his wound, pain is minimal he says. Call light and items within reach, bed locked in low position. He calls appropriately. He showered this afternoon. VSS

## 2018-05-18 ENCOUNTER — PATIENT OUTREACH (OUTPATIENT)
Dept: HEALTH INFORMATION MANAGEMENT | Facility: OTHER | Age: 68
End: 2018-05-18

## 2018-05-18 VITALS
DIASTOLIC BLOOD PRESSURE: 93 MMHG | WEIGHT: 254.63 LBS | RESPIRATION RATE: 18 BRPM | HEIGHT: 67 IN | HEART RATE: 60 BPM | SYSTOLIC BLOOD PRESSURE: 163 MMHG | BODY MASS INDEX: 39.97 KG/M2 | TEMPERATURE: 97.5 F | OXYGEN SATURATION: 91 %

## 2018-05-18 LAB
BACTERIA WND AEROBE CULT: NORMAL
GLUCOSE BLD-MCNC: 223 MG/DL (ref 65–99)
GLUCOSE BLD-MCNC: 233 MG/DL (ref 65–99)
GRAM STN SPEC: NORMAL
SIGNIFICANT IND 70042: NORMAL
SITE SITE: NORMAL
SOURCE SOURCE: NORMAL

## 2018-05-18 PROCEDURE — 700102 HCHG RX REV CODE 250 W/ 637 OVERRIDE(OP): Performed by: INTERNAL MEDICINE

## 2018-05-18 PROCEDURE — 82962 GLUCOSE BLOOD TEST: CPT | Mod: 91

## 2018-05-18 PROCEDURE — 700111 HCHG RX REV CODE 636 W/ 250 OVERRIDE (IP): Performed by: STUDENT IN AN ORGANIZED HEALTH CARE EDUCATION/TRAINING PROGRAM

## 2018-05-18 PROCEDURE — 99239 HOSP IP/OBS DSCHRG MGMT >30: CPT | Performed by: INTERNAL MEDICINE

## 2018-05-18 PROCEDURE — 700102 HCHG RX REV CODE 250 W/ 637 OVERRIDE(OP): Performed by: STUDENT IN AN ORGANIZED HEALTH CARE EDUCATION/TRAINING PROGRAM

## 2018-05-18 PROCEDURE — 700105 HCHG RX REV CODE 258: Performed by: INTERNAL MEDICINE

## 2018-05-18 PROCEDURE — A9270 NON-COVERED ITEM OR SERVICE: HCPCS | Performed by: STUDENT IN AN ORGANIZED HEALTH CARE EDUCATION/TRAINING PROGRAM

## 2018-05-18 PROCEDURE — A9270 NON-COVERED ITEM OR SERVICE: HCPCS | Performed by: INTERNAL MEDICINE

## 2018-05-18 PROCEDURE — 700111 HCHG RX REV CODE 636 W/ 250 OVERRIDE (IP): Performed by: INTERNAL MEDICINE

## 2018-05-18 RX ORDER — AMOXICILLIN AND CLAVULANATE POTASSIUM 875; 125 MG/1; MG/1
1 TABLET, FILM COATED ORAL 2 TIMES DAILY
Qty: 22 TAB | Refills: 0 | Status: SHIPPED | OUTPATIENT
Start: 2018-05-18 | End: 2019-01-05

## 2018-05-18 RX ADMIN — INSULIN GLARGINE 5 UNITS: 100 INJECTION, SOLUTION SUBCUTANEOUS at 08:55

## 2018-05-18 RX ADMIN — ENOXAPARIN SODIUM 40 MG: 100 INJECTION SUBCUTANEOUS at 04:03

## 2018-05-18 RX ADMIN — ASPIRIN 325 MG: 325 TABLET ORAL at 04:03

## 2018-05-18 RX ADMIN — AMPICILLIN SODIUM AND SULBACTAM SODIUM 3 G: 2; 1 INJECTION, POWDER, FOR SOLUTION INTRAMUSCULAR; INTRAVENOUS at 00:05

## 2018-05-18 RX ADMIN — INSULIN HUMAN 2 UNITS: 100 INJECTION, SOLUTION PARENTERAL at 08:53

## 2018-05-18 RX ADMIN — AMPICILLIN SODIUM AND SULBACTAM SODIUM 3 G: 2; 1 INJECTION, POWDER, FOR SOLUTION INTRAMUSCULAR; INTRAVENOUS at 06:15

## 2018-05-18 RX ADMIN — CARVEDILOL 3.12 MG: 3.12 TABLET, FILM COATED ORAL at 09:00

## 2018-05-18 RX ADMIN — NICOTINE 21 MG: 21 PATCH, EXTENDED RELEASE TRANSDERMAL at 04:04

## 2018-05-18 RX ADMIN — GABAPENTIN 600 MG: 300 CAPSULE ORAL at 04:03

## 2018-05-18 RX ADMIN — INSULIN HUMAN 2 UNITS: 100 INJECTION, SOLUTION PARENTERAL at 13:00

## 2018-05-18 RX ADMIN — AMPICILLIN SODIUM AND SULBACTAM SODIUM 3 G: 2; 1 INJECTION, POWDER, FOR SOLUTION INTRAMUSCULAR; INTRAVENOUS at 12:53

## 2018-05-18 ASSESSMENT — ENCOUNTER SYMPTOMS
VOMITING: 0
ORTHOPNEA: 0
SPEECH CHANGE: 0
SPUTUM PRODUCTION: 0
FEVER: 0
ABDOMINAL PAIN: 0
NAUSEA: 0
COUGH: 0
HEADACHES: 0
TREMORS: 0
BACK PAIN: 0
CHILLS: 0
SHORTNESS OF BREATH: 0
PALPITATIONS: 0
BLURRED VISION: 0
DOUBLE VISION: 0
TINGLING: 0
DIARRHEA: 0
SENSORY CHANGE: 1
NECK PAIN: 0

## 2018-05-18 ASSESSMENT — PAIN SCALES - GENERAL
PAINLEVEL_OUTOF10: 0

## 2018-05-18 NOTE — DISCHARGE INSTRUCTIONS
Discharge Instructions    Discharged to home by car with relative. Discharged via wheelchair, hospital escort: Yes.  Special equipment needed: Not Applicable    Be sure to schedule a follow-up appointment with your primary care doctor or any specialists as instructed.     Discharge Plan:   Diet Plan: Discussed  Activity Level: Discussed  Smoking Cessation Offered: Patient Refused  Confirmed Follow up Appointment: Appointment Scheduled  Confirmed Symptoms Management: Discussed  Medication Reconciliation Updated: Yes  Influenza Vaccine Indication: Not indicated: Previously immunized this influenza season and > 8 years of age    I understand that a diet low in cholesterol, fat, and sodium is recommended for good health. Unless I have been given specific instructions below for another diet, I accept this instruction as my diet prescription.   Other diet: diabetic    Special Instructions: Sepsis, Adult  Sepsis is a serious infection of your blood or tissues that affects your whole body. The infection that causes sepsis may be bacterial, viral, fungal, or parasitic. Sepsis may be life threatening. Sepsis can cause your blood pressure to drop. This may result in shock. Shock causes your central nervous system and your organs to stop working correctly.  What increases the risk?  Sepsis can happen in anyone, but it is more likely to happen in people who have weakened immune systems.  What are the signs or symptoms?  Symptoms of sepsis can include:  · Fever or low body temperature (hypothermia).  · Rapid breathing (hyperventilation).  · Chills.  · Rapid heartbeat (tachycardia).  · Confusion or light-headedness.  · Trouble breathing.  · Urinating much less than usual.  · Cool, clammy skin or red, flushed skin.  · Other problems with the heart, kidneys, or brain.  How is this diagnosed?  Your health care provider will likely do tests to look for an infection, to see if the infection has spread to your blood, and to see how  serious your condition is. Tests can include:  · Blood tests, including cultures of your blood.  · Cultures of other fluids from your body, such as:  ¨ Urine.  ¨ Pus from wounds.  ¨ Mucus coughed up from your lungs.  · Urine tests other than cultures.  · X-ray exams or other imaging tests.  How is this treated?  Treatment will begin with elimination of the source of infection. If your sepsis is likely caused by a bacterial or fungal infection, you will be given antibiotic or antifungal medicines.  You may also receive:  · Oxygen.  · Fluids through an IV tube.  · Medicines to increase your blood pressure.  · A machine to clean your blood (dialysis) if your kidneys fail.  · A machine to help you breathe if your lungs fail.  Get help right away if:  You get an infection or develop any of the signs and symptoms of sepsis after surgery or a hospitalization.  This information is not intended to replace advice given to you by your health care provider. Make sure you discuss any questions you have with your health care provider.  Document Released: 09/15/2004 Document Revised: 05/25/2017 Document Reviewed: 08/25/2014  [employer] Interactive Patient Education © 2017 [employer]      · Is patient discharged on Warfarin / Coumadin?   No       Cellulitis, Adult  Introduction  Cellulitis is a skin infection. The infected area is usually red and sore. This condition occurs most often in the arms and lower legs. It is very important to get treated for this condition.  Follow these instructions at home:  · Take over-the-counter and prescription medicines only as told by your doctor.  · If you were prescribed an antibiotic medicine, take it as told by your doctor. Do not stop taking the antibiotic even if you start to feel better.  · Drink enough fluid to keep your pee (urine) clear or pale yellow.  · Do not touch or rub the infected area.  · Raise (elevate) the infected area above the level of your heart while you are sitting or  lying down.  · Place warm or cold wet cloths (warm or cold compresses) on the infected area. Do this as told by your doctor.  · Keep all follow-up visits as told by your doctor. This is important. These visits let your doctor make sure your infection is not getting worse.  Contact a doctor if:  · You have a fever.  · Your symptoms do not get better after 1-2 days of treatment.  · Your bone or joint under the infected area starts to hurt after the skin has healed.  · Your infection comes back. This can happen in the same area or another area.  · You have a swollen bump in the infected area.  · You have new symptoms.  · You feel ill and also have muscle aches and pains.  Get help right away if:  · Your symptoms get worse.  · You feel very sleepy.  · You throw up (vomit) or have watery poop (diarrhea) for a long time.  · There are red streaks coming from the infected area.  · Your red area gets larger.  · Your red area turns darker.  This information is not intended to replace advice given to you by your health care provider. Make sure you discuss any questions you have with your health care provider.  Document Released: 06/05/2009 Document Revised: 05/25/2017 Document Reviewed: 10/26/2016  © 2017 [employer]      Depression / Suicide Risk    As you are discharged from this [facility] facility, it is important to learn how to keep safe from harming yourself.    Recognize the warning signs:  · Abrupt changes in personality, positive or negative- including increase in energy   · Giving away possessions  · Change in eating patterns- significant weight changes-  positive or negative  · Change in sleeping patterns- unable to sleep or sleeping all the time   · Unwillingness or inability to communicate  · Depression  · Unusual sadness, discouragement and loneliness  · Talk of wanting to die  · Neglect of personal appearance   · Rebelliousness- reckless behavior  · Withdrawal from people/activities they love  · Confusion-  inability to concentrate     If you or a loved one observes any of these behaviors or has concerns about self-harm, here's what you can do:  · Talk about it- your feelings and reasons for harming yourself  · Remove any means that you might use to hurt yourself (examples: pills, rope, extension cords, firearm)  · Get professional help from the community (Mental Health, Substance Abuse, psychological counseling)  · Do not be alone:Call your Safe Contact- someone whom you trust who will be there for you.  · Call your local CRISIS HOTLINE 369-5962 or 961-329-0451  · Call your local Children's Mobile Crisis Response Team [place] (267) 088-9604 or www.[employer]  · Call the toll free National Suicide Prevention Hotlines   · National Suicide Prevention Lifeline 340-353-GFNR (6742)  · [employer] Line Network 800-SUICIDE (194-1080)          Diabetes Mellitus and Exercise  Exercising regularly is important for your overall health, especially when you have diabetes (diabetes mellitus). Exercising is not only about losing weight. It has many health benefits, such as increasing muscle strength and bone density and reducing body fat and stress. This leads to improved fitness, flexibility, and endurance, all of which result in better overall health.  Exercise has additional benefits for people with diabetes, including:  · Reducing appetite.  · Helping to lower and control blood glucose.  · Lowering blood pressure.  · Helping to control amounts of fatty substances (lipids) in the blood, such as cholesterol and triglycerides.  · Helping the body to respond better to insulin (improving insulin sensitivity).  · Reducing how much insulin the body needs.  · Decreasing the risk for heart disease by:  ¨ Lowering cholesterol and triglyceride levels.  ¨ Increasing the levels of good cholesterol.  ¨ Lowering blood glucose levels.  What is my activity plan?  Your health care provider or certified diabetes educator can help you  make a plan for the type and frequency of exercise (activity plan) that works for you. Make sure that you:  · Do at least 150 minutes of moderate-intensity or vigorous-intensity exercise each week. This could be brisk walking, biking, or water aerobics.  ¨ Do stretching and strength exercises, such as yoga or weightlifting, at least 2 times a week.  ¨ Spread out your activity over at least 3 days of the week.  · Get some form of physical activity every day.  ¨ Do not go more than 2 days in a row without some kind of physical activity.  ¨ Avoid being inactive for more than 90 minutes at a time. Take frequent breaks to walk or stretch.  · Choose a type of exercise or activity that you enjoy, and set realistic goals.  · Start slowly, and gradually increase the intensity of your exercise over time.  What do I need to know about managing my diabetes?  · Check your blood glucose before and after exercising.  ¨ If your blood glucose is higher than 240 mg/dL (13.3 mmol/L) before you exercise, check your urine for ketones. If you have ketones in your urine, do not exercise until your blood glucose returns to normal.  · Know the symptoms of low blood glucose (hypoglycemia) and how to treat it. Your risk for hypoglycemia increases during and after exercise. Common symptoms of hypoglycemia can include:  ¨ Hunger.  ¨ Anxiety.  ¨ Sweating and feeling clammy.  ¨ Confusion.  ¨ Dizziness or feeling light-headed.  ¨ Increased heart rate or palpitations.  ¨ Blurry vision.  ¨ Tingling or numbness around the mouth, lips, or tongue.  ¨ Tremors or shakes.  ¨ Irritability.  · Keep a rapid-acting carbohydrate snack available before, during, and after exercise to help prevent or treat hypoglycemia.  · Avoid injecting insulin into areas of the body that are going to be exercised. For example, avoid injecting insulin into:  ¨ The arms, when playing tennis.  ¨ The legs, when jogging.  · Keep records of your exercise habits. Doing this can help  you and your health care provider adjust your diabetes management plan as needed. Write down:  ¨ Food that you eat before and after you exercise.  ¨ Blood glucose levels before and after you exercise.  ¨ The type and amount of exercise you have done.  ¨ When your insulin is expected to peak, if you use insulin. Avoid exercising at times when your insulin is peaking.  · When you start a new exercise or activity, work with your health care provider to make sure the activity is safe for you, and to adjust your insulin, medicines, or food intake as needed.  · Drink plenty of water while you exercise to prevent dehydration or heat stroke. Drink enough fluid to keep your urine clear or pale yellow.  This information is not intended to replace advice given to you by your health care provider. Make sure you discuss any questions you have with your health care provider.  Document Released: 03/09/2005 Document Revised: 07/07/2017 Document Reviewed: 05/29/2017  [employer] Interactive Patient Education © 2017 [employer]

## 2018-05-18 NOTE — PROGRESS NOTES
Pt discharged via wheelchair to friend's car to home. He is AOx4 at time of discharge. All PIVs removed,  Compression stalkings applied and given extra supply. Went over discharge instructions thoroughly including medications, diabetic diet, activity & exercise, signs and symptoms of infection, heart attack and stroke. Paperwork signed and all questions answered. Follow up appointments set and pt verbally understands that following through with the appointments is necessary. He also verbally stated he knows he needs to call the [facility] office to schedule appt. Phone number written down for him on the AVS instructions. All personal belongings with patient including his single point cane. Signed prescriptions given to patient and a copy of AVS discharge instructions.

## 2018-05-18 NOTE — NON-PROVIDER
Internal Medicine Medical Student Note  Note Author: Dwight Richmond, Student    Name Luis Kellogg 1950   Age/Sex 67 y.o. male   MRN [id number]   Code Status Full     After 5PM or if no immediate response to page, please call for cross-coverage  Attending/Team: Dr. Mims See Patient List for primary contact information  Call [phone] to page after hours   1st Call - Day Intern (R1):   Dr. Zavala 2nd Call - Day Sr. Resident (R2/R3):   Dr. Nino         Reason for interval visit  (Principal Problem)   Pain of left lower leg    Interval Problem Daily Status Update  (24 hours)   Vascular came to talk to patient. Recommends compression stocking and weight loss; they will follow up with patient in two weeks as outpatient. Patient's pain and swelling has improved significantly. He still reports numbness on his left hand. The patient denies headache, chest pain, or shortness of breath. He will be discharged on oral augmentin.     Review of Systems   Constitutional: Negative for chills and fever.   HENT: Negative for congestion and ear pain.    Eyes: Negative for blurred vision and double vision.   Respiratory: Negative for cough, sputum production and shortness of breath.    Cardiovascular: Positive for leg swelling. Negative for chest pain, palpitations and orthopnea.   Gastrointestinal: Negative for abdominal pain, diarrhea, nausea and vomiting.   Genitourinary: Negative for dysuria, frequency and urgency.   Musculoskeletal: Negative for back pain, joint pain and neck pain.   Neurological: Positive for sensory change. Negative for tingling, tremors, speech change and headaches.         Physical Exam       Vitals:    18 0730 18 1555 18 2000 18 0400   BP: 140/98 141/91 146/95 151/91   Pulse: 73 71 76 68   Resp:    Temp: 36.7 °C (98.1 °F) 36.5 °C (97.7 °F) 36.6 °C (97.8 °F) 36.5 °C (97.7 °F)   SpO2: 93% 92% 92% 95%   Weight:   115.5 kg (254 lb 10.1 oz)     Height:         Body mass index is 39.88 kg/m². Weight: 115.5 kg (254 lb 10.1 oz)  Oxygen Therapy:  Pulse Oximetry: 95 %, O2 (LPM): 0, O2 Delivery: None (Room Air)     Recent Labs      05/16/18   0349  05/17/18   0333   WBC  7.1  6.0   RBC  4.33*  4.35*   HEMOGLOBIN  13.1*  13.4*   HEMATOCRIT  38.9*  38.9*   MCV  89.8  89.4   MCH  30.3  30.8   RDW  44.2  42.6   PLATELETCT  212  204   MPV  10.4  10.0     Recent Labs      05/16/18   0350  05/17/18   0333   SODIUM  138  140   POTASSIUM  3.7  3.7   CHLORIDE  108  110   CO2  23  25   GLUCOSE  178*  154*   BUN  9  9     Blood and wound cultures negative to date    Physical Exam   Constitutional: He is oriented to person, place, and time.   Obese male   HENT:   Head: Normocephalic.   Eyes: Pupils are equal, round, and reactive to light.   Neck: No JVD present. No tracheal deviation present.   Cardiovascular: Normal rate, regular rhythm, normal heart sounds and intact distal pulses.    Pulmonary/Chest: Effort normal and breath sounds normal. No respiratory distress. He has no wheezes. He has no rales.   Abdominal: Soft. Bowel sounds are normal. He exhibits no distension. There is no tenderness. There is no rebound.   Musculoskeletal: Normal range of motion. He exhibits tenderness. He exhibits no edema.   Neurological: He is alert and oriented to person, place, and time.   Skin: Skin is warm and dry. No rash noted. There is erythema.     Assessment/Plan        #pain of left lower leg   Vascular surgery saw patient. Advised compression stocking and weight loss.   -normal Doppler and MIA   -Duplex venous study normal  -compression stocking  -ASA 325mg daily   -patient advised to lift leg   -weight loss       #cellulitis of LLE  Wound and blood culture negative   -d/c on augmentin today         #numbness and tingling of left hand   CT of head at [facility] showed no acute findings. MRI brain shows no evidence of acute infarct, hemorrhage, or mass enhancing lesion. Left  carotid duplex showed absence of flow. Hand numbness most likely unrelated to carotids, more related to nerve compression.   -vascular surgeon, Dr. Echeverria, will follow up  -f/u with rheumatology outpatient      #diverticulitis  Abdominal US at [facility] showed diverticulitis. Was treated with course of antibiotics.  -no current symptoms      #DM type 2   A1C 9.1   -metformin

## 2018-05-18 NOTE — DISCHARGE SUMMARY
Internal Medicine Discharge Summary  Note Author: Dylan Zavala M.D.       Admit Date:  5/14/2018       Discharge Date:5/18/18    Service:   [facility] Internal Medicine green team  Attending Physician(s):     Senior Resident(s):Dr.Jaafar Nino  Jony Resident(s): .      Primary Diagnosis:  Cellulitis with venous ulcer left leg    Secondary Diagnoses:                Principal Problem:    Pain of left lower leg POA: Yes  Active Problems:    Cellulitis of left lower extremity POA: Unknown    Numbness and tingling in left hand POA: Unknown    Diverticulitis POA: Unknown    Type 2 diabetes mellitus with other circulatory complications (HCC) POA: Yes    Peripheral neuropathy (HCC) POA: Yes    History of MI (myocardial infarction) POA: Yes    Peripheral artery disease (HCC) POA: Unknown  Resolved Problems:    * No resolved hospital problems. *      Hospital Summary (Brief Narrative):       Patient is a 67-year-old male with a history of peripheral artery disease, with a left iliac vein stent at [facility] on 15 February 2018, and a 3 year history of left lower extremity swelling and discoloration, and recurrent left lower extremity cellulitis, left lower extremity DVT 4 months ago, MI 1.5 years ago, type 2 diabetes mellitus, hypertension, who was transferred from [facility] for the management of left lower extremity pain, ulceration to the back of the left leg and concerns of ischemia to the left lower limb.  Patient also had numbness and tingling to the left hand for many months which was worsening at the time of presentation.  Patient was seen in the emergency department and significant findings on admission was a WBC count of 8.6, troponins were negative, CT abdomen and pelvis with contrast, revealed diverticulitis and possible gallstones which was repeated with an ultrasound of the right upper quadrant which showed hydropic gallbladder without stones.   Patient was stabilized in the emergency department and was admitted to the medical floor with concerns for possible recurrent thrombosis in the left lower extremity associated with the cellulitis and the patient was started on Unasyn and vancomycin.  Patient had, an arterial duplex which demonstrated good flow down to the lower extremities and bilateral lower extremity venous study which revealed no acute deep vein thrombosis although the stent was not seen.  Patient had good blood flow dynamics and flow waveforms in the femoral vein and the distal external iliac vein per vascular surgery, who were consulted on the medical floor, and an diagnosis of chronic venous insufficiency likely secondary to a valvular incompetence, from previous deep vein thrombosis, with no evidence of iliac stent thrombosis was made.  Vascular surgery has advised graduated compression stockings and weight loss.  The patient also had a carotid artery occlusion on duplex carotid artery ultrasound which will be followed up by Dr. Babb in his office in 2 weeks time.  Patient had intravenous Unasyn for 4 days and vancomycin was stopped 2 days into the treatment of the wound cultures and blood cultures turned out negative and the pain was controlled.  Patient was discharged on oral Augmentin twice daily for 12 days and has been advised to follow up with his PCP, and to follow-up with vascular surgery as scheduled in 2 weeks time.  Patient has been discharged on oral Augmentin tablets for 12 days, compression stockings, and advised for a wrist splint,, and to continue taking his aspirin , and to follow-up with his PCP and vascular surgery.      Patient /Hospital Summary (Details -- Problem Oriented) :          Cellulitis of left lower extremity   Assessment & Plan    Patient's wound on the left posterior aspect of the leg, healing up well with no signs of overt infection.  -Patient is being discharged on oral Augmentin twice daily for a period  of 11 days.  Advised to follow-up with PCP, in case there is reinfection.  Healing left leg posterior calf ulcer, with decrease in swelling of the left lower limb.        * Pain of left lower leg   Assessment & Plan    - Per patient symptoms of discoloration and swelling are chronic for years, his chief complaint was hand numbness and he mentioned this while he was there after the fact  - No acute ischemia of the left lower limb seen.  - weak pulses on exam however present on Doppler, normal MIA  -Venous duplex Doppler ultrasound is negative.  -Patient has a healing ulcer on the posterior aspect of the left leg with pain and swelling ,and trophic changes in the left leg.  Pulses dorsalis pedis and the post tibial of the left leg are present on the left lower limb  - duplex ultrasound of the carotids,  -normal MIA.  -LE  duplex U/S  reveals no evidence of  superficial or deep violet thrombosis.  -Patient was seen by Dr. Babb, vascular surgery, who has assessed him, as a chronic venous insufficiency, secondary to valvular incompetence from recurrent deep vein thrombosis and has been advised graduated compression stockings and weight loss.  Patient to be followed up in approximately 2 weeks time.  -Patient has been evaluated for his, left carotid artery stenosis and is to be placed on a surveillance program for his carotid disease.  -Patient advised follow-up at Dr. Babb's office for  Patient [facility]   Assessment & Plan    - had dark stool and abdominal pain days before presentation which resolved  -Patient advised to follow-up with the ED or the PCP in case of recurrence of dark tarry stools.        Numbness and tingling in left hand   Assessment & Plan    -Patient has a history of dysarthria in the past with numbness and tingling of his left hand.  -Tinel's sign positive-possible left carpal tunnel syndrome.  Advised left wrist splint, and  follow-up with the PCP.  -Advised to take orthopedic opinion if PCP concurs for carpal tunnel release surgery.  -MRI Brain shows mild supratentorial white matter disease consistent with microvascular ischemic change,and no evidence of intracranial bleed or mass lesion,.  -Patient reports no new focal neurological deficits during his hospital stay.        Type 2 diabetes mellitus with other circulatory complications (HCC)   Assessment & Plan    -Patient had his home Januvia and metformin, withheld in the hospital, due to concerns of lactic acidosis, and decreased organ perfusion, and because of contrast imaging study.  - will discharge on home medications.            Consultants:     Vascular surgery    Procedures:        None    Imaging/ Testing:      CT scan of the abdomen and pelvis.  CT scan of the head without contrast  X-rays of the tibia and fibula.  X-ray of the chest.  MRA of the brain with and without contrast.  Carotid duplex ultrasound.  Arterial ultrasound of the lower extremities.  Lower extremity venous duplex ultrasound  Discharge Medications:         Medication Reconciliation: Completed       Medication List      START taking these medications      Instructions   amoxicillin-clavulanate 875-125 MG Tabs  Commonly known as:  AUGMENTIN   Take 1 Tab by mouth 2 times a day.  Dose:  1 Tab        CONTINUE taking these medications      Instructions   aspirin 325 MG Tabs  Commonly known as:  ASA   Take 325 mg by mouth every day.  Dose:  325 mg     carvedilol 3.125 MG Tabs  Commonly known as:  COREG   Take 3.125 mg by mouth 2 times a day, with meals.  Dose:  3.125 mg     docusate sodium 100 MG Caps  Commonly known as:  COLACE   Take 100 mg by mouth 2 times a day.  Dose:  100 mg     furosemide 80 MG Tabs  Commonly known as:  LASIX   Take 80 mg by mouth every day.  Dose:  80 mg     gabapentin 600 MG tablet  Commonly known as:  NEURONTIN   Take 600 mg by mouth 2 times a day as needed (pain).  Dose:  600 mg      lisinopril 5 MG Tabs  Commonly known as:  PRINIVIL   Take 5 mg by mouth every day.  Dose:  5 mg     metformin 1000 MG tablet  Commonly known as:  GLUCOPHAGE   Take 1,000 mg by mouth 2 times a day, with meals.  Dose:  1000 mg     oxyCODONE-acetaminophen  MG Tabs  Commonly known as:  PERCOCET-10   Take 1-2 Tabs by mouth every 6 hours as needed for Severe Pain.  Dose:  1-2 Tab     SITagliptin 50 MG Tabs  Commonly known as:  JANUVIA   Take 50 mg by mouth every day.  Dose:  50 mg     traZODone 150 MG Tabs  Commonly known as:  DESYREL   Take 450 mg by mouth every evening.  Dose:  450 mg        STOP taking these medications    cephALEXin 500 MG Caps  Commonly known as:  KEFLEX     doxycycline 100 MG Tabs  Commonly known as:  VIBRAMYCIN            Can use .DISCHARGEMEDSLIST if going to another facility         Disposition: For home    Diet: Diabetic diet    Activity: As tolerated    Instructions:      The patient was instructed to return to the ER in the event of worsening symptoms. I have counseled the patient on the importance of compliance and the patient has agreed to proceed with all medical recommendations and follow up plan indicated above.   The patient understands that all medications come with benefits and risks. Risks may include permanent injury or death and these risks can be minimized with close reassessment and monitoring.        Primary Care Provider:     Jet Gonzalez P.A.-C.Discharge summary faxed to primary care provider:  Completed  Copy of discharge summary given to the patient: Completed      Follow up appointment details :      Follow up with the PCP and vascular surgery.'  Patient to follow-up with orthopedics for  possible carpal tunnel release, after consulting with the primary care doctor.  Patient advised a left wrist splint and compression stockings at the hospital, at discharge.      Pending Studies:        None    Time spent on discharge day patient visit, preparing discharge paperwork  and arranging for patient follow up.    Summary of follow up issues:   Patient to follow-up with primary care and vascular surgery as an outpatient.    Discharge Time (Minutes) : 45 minutes  Hospital Course Type: Inpatient Stay < 2 midnights, patient recovered more rapidly than anticipated      Condition on Discharge stable  ______________________________________________________________________    Interval history/exam for day of discharge:       No acute events overnight, feel better, pain is improving Patient will be  discharged today on oral antibiotics Augmentin, compression stockings, and advice for a wrist splint to the left wrist , as the patient still has a little pain in the wrist.  Patient advised to consult with his PCP regarding the best pain and orthopedic referral if needed.  Patient also advised to follow-up with vascular surgery as an outpatient basis in 2 weeks time and has been recommended  compression stockings and weight loss.    Vitals:    05/17/18 1555 05/17/18 2000 05/18/18 0400 05/18/18 0800   BP: 141/91 146/95 151/91 (!) 163/93   Pulse: 71 76 68 60   Resp: 20 18 18 18   Temp: 36.5 °C (97.7 °F) 36.6 °C (97.8 °F) 36.5 °C (97.7 °F) 36.4 °C (97.5 °F)   SpO2: 92% 92% 95% 91%   Weight:  115.5 kg (254 lb 10.1 oz)     Height:         Weight/BMI: Body mass index is 39.88 kg/m².  Pulse Oximetry: 91 %, O2 (LPM): 0, O2 Delivery: None (Room Air)    Constitutional: He is oriented to person, place, and time and well-developed, well-nourished, and in no distress.   HENT:   Head: Normocephalic and atraumatic.   Eyes: EOM are normal. Pupils are equal, round, and reactive to light.   Neck: Normal range of motion. Neck supple.   Cardiovascular: Normal rate, regular rhythm and normal heart sounds.    No murmur heard.  Pulmonary/Chest: Effort normal and breath sounds normal. No respiratory distress. He has no wheezes.   Abdominal: Soft. Bowel sounds are normal. He exhibits no distension. There is no tenderness.  There is no rebound.   Musculoskeletal: Normal range of motion.     left leg stasis dermatitis and mild erythema.mild discharge from the wound on the posterior calf.   Lymphadenopathy:     He has no cervical adenopathy.   Neurological: He is alert and oriented to person, place, and time. No cranial nerve deficit.   Skin: Skin is warm and dry. No rash noted. No erythema.   Psychiatric: Mood and affect normal.  Most Recent Labs:    Lab Results   Component Value Date/Time    WBC 6.0 05/17/2018 03:33 AM    RBC 4.35 (L) 05/17/2018 03:33 AM    HEMOGLOBIN 13.4 (L) 05/17/2018 03:33 AM    HEMATOCRIT 38.9 (L) 05/17/2018 03:33 AM    MCV 89.4 05/17/2018 03:33 AM    MCH 30.8 05/17/2018 03:33 AM    MCHC 34.4 05/17/2018 03:33 AM    MPV 10.0 05/17/2018 03:33 AM    NEUTSPOLYS 54.40 05/15/2018 03:33 AM    LYMPHOCYTES 29.00 05/15/2018 03:33 AM    MONOCYTES 11.30 05/15/2018 03:33 AM    EOSINOPHILS 3.90 05/15/2018 03:33 AM    BASOPHILS 0.60 05/15/2018 03:33 AM      Lab Results   Component Value Date/Time    SODIUM 140 05/17/2018 03:33 AM    POTASSIUM 3.7 05/17/2018 03:33 AM    CHLORIDE 110 05/17/2018 03:33 AM    CO2 25 05/17/2018 03:33 AM    GLUCOSE 154 (H) 05/17/2018 03:33 AM    BUN 9 05/17/2018 03:33 AM    CREATININE 0.67 05/17/2018 03:33 AM      Lab Results   Component Value Date/Time    ALTSGPT 29 05/15/2018 03:33 AM    ASTSGOT 22 05/15/2018 03:33 AM    ALKPHOSPHAT 58 05/15/2018 03:33 AM    TBILIRUBIN 0.5 05/15/2018 03:33 AM    ALBUMIN 3.3 05/15/2018 03:33 AM    GLOBULIN 2.8 05/15/2018 03:33 AM    INR 1.00 05/14/2018 01:00 PM     Lab Results   Component Value Date/Time    PROTHROMBTM 10.5 05/14/2018 01:00 PM    INR 1.00 05/14/2018 01:00 PM      CAROTID DUPLEX   Final Result      LE VENOUS DUPLEX (DVT)   Final Result      MR-BRAIN-WITH & W/O   Final Result      1.  27 mm suboccipital lipoma. Doubtful clinical significance.   2.  Mild supratentorial white matter disease most consistent with microvascular ischemic change.   3.   Abnormal left carotid flow void which shows abnormal intermediate T1 and T2 signal consistent with slow flow (possible proximal stenosis) or occlusion. Duplex Doppler ultrasound cervical carotid arteries, CTA, or MRA may be helpful for further    evaluation.   4.  Probable chronic right maxillary sinusitis. Additional right frontoethmoid opacities.   5.  No evidence of acute infarction, acute hemorrhage, or enhancing mass lesion.      LE ART/MIA (Specify in Comments Left, Right Or Bilateral)   Final Result      DX-TIBIA AND FIBULA LEFT   Final Result         Diffuse soft tissue swelling.   No acute osseous abnormality.      OUTSIDE IMAGES-DX CHEST   Final Result      US-FOREIGN FILM ULTRASOUND   Final Result      OUTSIDE IMAGES-CT ABDOMEN /PELVIS   Final Result      OUTSIDE IMAGES-CT HEAD   Final Result      LE ART DUPLX/IMAG (Specify in Comments Left, Right Or Bilateral)    (Results Pending)   Dylan Zavala

## 2018-05-18 NOTE — CONSULTS
Diabetes Education:  Patient with existing type 2 diabetes x 3 years, HbA1c was 7.4% 2 weeks ago, now 9.1% due to cellulitis.  He takes metformin twice daily and rarely tests FSBG.      Reviewed basic type 2 care including nutrition, exercise, FSBG testing, medications, sick day care, foot care, preventing and treating hypoglycemia, preventing complications.  Written material provided.  Recommend follow up with PCP after discharge.    I recommend starting glimepiride 2 mg, as patients's HbA1c was a bit high at 7.4% prior to the infection.  Patient has metformin and testing supplies at home

## 2018-05-18 NOTE — CONSULTS
DATE OF SERVICE:  05/17/2018    CONSULTING PHYSICIAN:  Hospitalist.    CONSULTANT:  Juan Francisco Echeverria MD    REASON FOR CONSULTATION:  Evaluate patient with venous disease.    HISTORY OF PRESENT ILLNESS:  This is a 67-year-old gentleman, who has a   history of previous thromboembolic disease.  He has had several episodes of   deep vein thrombosis in his bilateral lower extremities.  He also underwent a   left iliac stent allegedly reported by the patient.  The patient was admitted   to the hospital with some suspicion of perhaps an occluded left iliac vein   stent, and he also had an ulceration on his left leg.  I was asked to consult.    The patient has undergone several noninvasive studies including arterial   duplex, which demonstrated good flow down to the lower extremities.  He also   underwent a bilateral lower extremity venous study, which demonstrated no   acute deep vein thrombosis, although the stent was not seen.  The flow   dynamics and flow waveforms in the femoral vein and distal external iliac vein   did not suggest any degree of more proximal occlusion or stenosis.    PAST MEDICAL HISTORY:  The patient's past medical history is that for morbid   obesity.  He has a history of diverticulitis and diverticulosis, has a history   of diabetes, peripheral neuropathy, and tobacco use.    PHYSICAL EXAMINATION:  GENERAL:  He is sitting in bed, in no apparent distress.  HEENT:  Head is normocephalic, atraumatic.  LUNGS:  Clear.  ABDOMEN:  Soft, obese.  EXTREMITIES:  His left leg has moderate swelling.  He has clear evidence of   chronic venous insufficiency with lipodermatosclerosis, small ulceration, and   stasis dermatitis.  Right leg has similar findings, but less significant.  He   has palpable pedal pulses on his lower extremities, and there is no erythema,   no significant evidence of infection.    LABORATORY WORK:  Demonstrates a white blood cell count of 6, H and H of 13   and 38, platelet count of 204.   Sodium is 140, potassium is 3.7, BUN is 9 with   a creatinine 0.67.    IMAGING STUDIES:  As noted above.    IMPRESSION:  Chronic venous insufficiency, likely secondary to valvular   incompetence from previous deep vein thrombosis.  There is nothing to suggest   that iliac stent is occluded based on noninvasive studies.  The patient has   not been compliant with graduated compression stockings, and I had a long   discussion with the patient with respect to the pathophysiology and natural   history of chronic venous insufficiency.  I have recommended graduated   compression stockings.  I have also strongly recommended weight loss.  The   patient also of note had a carotid artery occlusion noted on his carotid   duplex with minimal contralateral disease.  I will follow up with the patient   in my office in approximately 2 weeks to reiterate my recommendations for   management of his chronic venous insufficiency and placed on a surveillance   program for his carotid disease.       ____________________________________     MD JAYDEN FINCH / DORON    DD:  05/17/2018 16:38:14  DT:  05/17/2018 17:15:52    D#:  [id number]  Job#:  [id number]

## 2018-05-18 NOTE — PROGRESS NOTES
Bedside report completed with noc RN. Pt resting, denies pain. Bed locked in low position, call light within reach.  Reviewed plan of care with noc RN and pt. Patient has no questions at this time.   No events overnight

## 2018-05-18 NOTE — PROGRESS NOTES
Received report on patient. He is sitting up in bed eating dinner, has no complaints of pain, and no indications of distress. Bed in the lowest position and call light and personal belongings within reach.

## 2018-05-18 NOTE — CARE PLAN
Problem: Bowel/Gastric:  Goal: Normal bowel function is maintained or improved  Outcome: PROGRESSING AS EXPECTED  Patient has a daily bowel movement and has no complaints of complications.    Problem: Pain Management  Goal: Pain level will decrease to patient's comfort goal  Outcome: PROGRESSING AS EXPECTED  Patient has not had any complaints of pain during shift.

## 2018-05-19 ENCOUNTER — PATIENT OUTREACH (OUTPATIENT)
Dept: HEALTH INFORMATION MANAGEMENT | Facility: OTHER | Age: 68
End: 2018-05-19

## 2018-05-20 LAB
BACTERIA BLD CULT: NORMAL
BACTERIA BLD CULT: NORMAL
SIGNIFICANT IND 70042: NORMAL
SIGNIFICANT IND 70042: NORMAL
SITE SITE: NORMAL
SITE SITE: NORMAL
SOURCE SOURCE: NORMAL
SOURCE SOURCE: NORMAL

## 2018-05-21 NOTE — ED NOTES
Chart reviewed. Pt was seen here on 5-14 and transferred to [facility]. He has been discharged from [facility]. His blood cultures were negative . His wound culture shows mod growth priscila. Reviewed by dr obando, no new meds rxd. No further bact workup requested by dr arango. Pt states his leg is better. He is on augmentin. He is to f/u at his clinic and dr de la cruz in [place]. He was advised to f/u as planned and ret er any concerns. Pt advised wound showed candida growth and no new rxs at this time

## 2018-05-21 NOTE — ED NOTES
Chart reviewed. Pt was seen here on 5-14 and transferred to [facility]. He has been discharged from [facility]. His blood cultures were negative. His wound culture shows mod growth priscila. Reviewed by dr arango no new meds rxd.no further bact t workup requested by dr arango. Pt states his leg is better. He is on augmentin. He is to f/u at his clinic and dr de la cruz in [place]. He was advised to f/u as planned and ret er any concerns. Pt advised wound showed candida growth and no new rxs at this time. This note was originally entered erroneously under concha infante sign-on and subsequently corrected by this author

## 2018-10-30 PROBLEM — Z91.81 HISTORY OF FALLING: Status: ACTIVE | Noted: 2018-10-30

## 2019-01-16 PROBLEM — G56.22 ULNAR NEUROPATHY OF LEFT UPPER EXTREMITY: Status: ACTIVE | Noted: 2019-01-16

## 2020-02-12 PROBLEM — R60.0 LEG EDEMA, LEFT: Status: ACTIVE | Noted: 2020-02-12

## 2020-02-12 PROBLEM — I96 NECROSIS OF TOE (HCC): Status: ACTIVE | Noted: 2020-02-12

## 2020-02-12 PROBLEM — I25.10 CORONARY ARTERY DISEASE INVOLVING NATIVE CORONARY ARTERY: Status: ACTIVE | Noted: 2020-02-12

## 2020-02-15 PROBLEM — K59.03 DRUG-INDUCED CONSTIPATION: Status: ACTIVE | Noted: 2020-02-15

## 2020-02-15 PROBLEM — E87.1 HYPONATREMIA: Status: ACTIVE | Noted: 2020-02-15

## 2020-02-15 PROBLEM — I25.10 CORONARY ARTERY DISEASE INVOLVING NATIVE CORONARY ARTERY: Chronic | Status: ACTIVE | Noted: 2020-02-12

## 2020-02-15 PROBLEM — K57.92 DIVERTICULITIS: Status: RESOLVED | Noted: 2018-05-15 | Resolved: 2020-02-15

## 2020-02-16 PROBLEM — M86.9 OSTEOMYELITIS (HCC): Status: ACTIVE | Noted: 2020-02-16

## 2020-10-24 PROBLEM — E11.622 DIABETIC LEG ULCER (HCC): Status: ACTIVE | Noted: 2020-10-24

## 2020-10-24 PROBLEM — L03.116 CELLULITIS OF LEFT LEG WITHOUT FOOT: Status: ACTIVE | Noted: 2020-10-24

## 2020-10-24 PROBLEM — L97.909 DIABETIC LEG ULCER (HCC): Status: ACTIVE | Noted: 2020-10-24

## 2021-06-07 PROBLEM — I96 NECROSIS OF TOE (HCC): Status: RESOLVED | Noted: 2020-02-12 | Resolved: 2021-06-07

## 2021-06-07 PROBLEM — M86.9 OSTEOMYELITIS (HCC): Status: RESOLVED | Noted: 2020-02-16 | Resolved: 2021-06-07

## 2021-10-07 ENCOUNTER — HOSPITAL ENCOUNTER (INPATIENT)
Facility: MEDICAL CENTER | Age: 71
LOS: 22 days | DRG: 854 | End: 2021-10-30
Attending: EMERGENCY MEDICINE | Admitting: STUDENT IN AN ORGANIZED HEALTH CARE EDUCATION/TRAINING PROGRAM
Payer: MEDICARE

## 2021-10-07 DIAGNOSIS — E11.628 DIABETIC INFECTION OF LEFT FOOT (HCC): ICD-10-CM

## 2021-10-07 DIAGNOSIS — I25.10 CORONARY ARTERY DISEASE INVOLVING NATIVE CORONARY ARTERY OF NATIVE HEART WITHOUT ANGINA PECTORIS: Chronic | ICD-10-CM

## 2021-10-07 DIAGNOSIS — L08.9 DIABETIC INFECTION OF LEFT FOOT (HCC): ICD-10-CM

## 2021-10-07 DIAGNOSIS — L97.909 DIABETIC LEG ULCER (HCC): ICD-10-CM

## 2021-10-07 DIAGNOSIS — E11.622 DIABETIC LEG ULCER (HCC): ICD-10-CM

## 2021-10-07 DIAGNOSIS — E11.628 DIABETIC INFECTION OF RIGHT FOOT (HCC): ICD-10-CM

## 2021-10-07 DIAGNOSIS — I73.9 PERIPHERAL ARTERY DISEASE (HCC): ICD-10-CM

## 2021-10-07 DIAGNOSIS — L03.116 BILATERAL LOWER LEG CELLULITIS: ICD-10-CM

## 2021-10-07 DIAGNOSIS — E11.65 UNCONTROLLED TYPE 2 DIABETES MELLITUS WITH HYPERGLYCEMIA (HCC): ICD-10-CM

## 2021-10-07 DIAGNOSIS — L08.9 DIABETIC INFECTION OF RIGHT FOOT (HCC): ICD-10-CM

## 2021-10-07 DIAGNOSIS — L03.115 BILATERAL LOWER LEG CELLULITIS: ICD-10-CM

## 2021-10-07 LAB
ALBUMIN SERPL BCP-MCNC: 4.2 G/DL (ref 3.2–4.9)
ALBUMIN/GLOB SERPL: 1 G/DL
ALP SERPL-CCNC: 102 U/L (ref 30–99)
ALT SERPL-CCNC: 19 U/L (ref 2–50)
ANION GAP SERPL CALC-SCNC: 13 MMOL/L (ref 7–16)
AST SERPL-CCNC: 20 U/L (ref 12–45)
BASOPHILS # BLD AUTO: 0.3 % (ref 0–1.8)
BASOPHILS # BLD: 0.03 K/UL (ref 0–0.12)
BILIRUB SERPL-MCNC: 0.6 MG/DL (ref 0.1–1.5)
BUN SERPL-MCNC: 8 MG/DL (ref 8–22)
CALCIUM SERPL-MCNC: 9.8 MG/DL (ref 8.5–10.5)
CHLORIDE SERPL-SCNC: 98 MMOL/L (ref 96–112)
CO2 SERPL-SCNC: 24 MMOL/L (ref 20–33)
CREAT SERPL-MCNC: 0.47 MG/DL (ref 0.5–1.4)
CRP SERPL HS-MCNC: 6.25 MG/DL (ref 0–0.75)
EOSINOPHIL # BLD AUTO: 0.04 K/UL (ref 0–0.51)
EOSINOPHIL NFR BLD: 0.4 % (ref 0–6.9)
ERYTHROCYTE [DISTWIDTH] IN BLOOD BY AUTOMATED COUNT: 43.6 FL (ref 35.9–50)
ERYTHROCYTE [SEDIMENTATION RATE] IN BLOOD BY WESTERGREN METHOD: 1 MM/HOUR (ref 0–20)
GLOBULIN SER CALC-MCNC: 4.2 G/DL (ref 1.9–3.5)
GLUCOSE SERPL-MCNC: 187 MG/DL (ref 65–99)
HCT VFR BLD AUTO: 46.4 % (ref 42–52)
HGB BLD-MCNC: 15.5 G/DL (ref 14–18)
IMM GRANULOCYTES # BLD AUTO: 0.04 K/UL (ref 0–0.11)
IMM GRANULOCYTES NFR BLD AUTO: 0.4 % (ref 0–0.9)
LYMPHOCYTES # BLD AUTO: 1.29 K/UL (ref 1–4.8)
LYMPHOCYTES NFR BLD: 11.7 % (ref 22–41)
MCH RBC QN AUTO: 29.2 PG (ref 27–33)
MCHC RBC AUTO-ENTMCNC: 33.4 G/DL (ref 33.7–35.3)
MCV RBC AUTO: 87.4 FL (ref 81.4–97.8)
MONOCYTES # BLD AUTO: 0.86 K/UL (ref 0–0.85)
MONOCYTES NFR BLD AUTO: 7.8 % (ref 0–13.4)
NEUTROPHILS # BLD AUTO: 8.75 K/UL (ref 1.82–7.42)
NEUTROPHILS NFR BLD: 79.4 % (ref 44–72)
NRBC # BLD AUTO: 0 K/UL
NRBC BLD-RTO: 0 /100 WBC
PLATELET # BLD AUTO: 308 K/UL (ref 164–446)
PMV BLD AUTO: 9.9 FL (ref 9–12.9)
POTASSIUM SERPL-SCNC: 3.7 MMOL/L (ref 3.6–5.5)
PROT SERPL-MCNC: 8.4 G/DL (ref 6–8.2)
RBC # BLD AUTO: 5.31 M/UL (ref 4.7–6.1)
SODIUM SERPL-SCNC: 135 MMOL/L (ref 135–145)
WBC # BLD AUTO: 11 K/UL (ref 4.8–10.8)

## 2021-10-07 PROCEDURE — 86140 C-REACTIVE PROTEIN: CPT

## 2021-10-07 PROCEDURE — 80053 COMPREHEN METABOLIC PANEL: CPT

## 2021-10-07 PROCEDURE — 85610 PROTHROMBIN TIME: CPT

## 2021-10-07 PROCEDURE — 36415 COLL VENOUS BLD VENIPUNCTURE: CPT

## 2021-10-07 PROCEDURE — 700111 HCHG RX REV CODE 636 W/ 250 OVERRIDE (IP): Performed by: EMERGENCY MEDICINE

## 2021-10-07 PROCEDURE — 85025 COMPLETE CBC W/AUTO DIFF WBC: CPT

## 2021-10-07 PROCEDURE — 87040 BLOOD CULTURE FOR BACTERIA: CPT | Mod: 91

## 2021-10-07 PROCEDURE — 96366 THER/PROPH/DIAG IV INF ADDON: CPT

## 2021-10-07 PROCEDURE — 700105 HCHG RX REV CODE 258: Performed by: EMERGENCY MEDICINE

## 2021-10-07 PROCEDURE — 96365 THER/PROPH/DIAG IV INF INIT: CPT

## 2021-10-07 PROCEDURE — 99285 EMERGENCY DEPT VISIT HI MDM: CPT

## 2021-10-07 PROCEDURE — 94760 N-INVAS EAR/PLS OXIMETRY 1: CPT

## 2021-10-07 PROCEDURE — 85652 RBC SED RATE AUTOMATED: CPT

## 2021-10-07 PROCEDURE — 96375 TX/PRO/DX INJ NEW DRUG ADDON: CPT

## 2021-10-07 PROCEDURE — 83735 ASSAY OF MAGNESIUM: CPT

## 2021-10-07 PROCEDURE — 96376 TX/PRO/DX INJ SAME DRUG ADON: CPT

## 2021-10-07 RX ORDER — INSULIN GLARGINE 100 [IU]/ML
40 INJECTION, SOLUTION SUBCUTANEOUS EVERY EVENING
Status: ON HOLD | COMMUNITY
End: 2021-10-30 | Stop reason: SDUPTHER

## 2021-10-07 RX ADMIN — FENTANYL CITRATE 50 MCG: 50 INJECTION INTRAMUSCULAR; INTRAVENOUS at 21:35

## 2021-10-07 RX ADMIN — VANCOMYCIN HYDROCHLORIDE 2500 MG: 500 INJECTION, POWDER, LYOPHILIZED, FOR SOLUTION INTRAVENOUS at 21:36

## 2021-10-07 RX ADMIN — FENTANYL CITRATE 50 MCG: 50 INJECTION INTRAMUSCULAR; INTRAVENOUS at 22:27

## 2021-10-07 ASSESSMENT — FIBROSIS 4 INDEX: FIB4 SCORE: 1.97

## 2021-10-07 NOTE — ED TRIAGE NOTES
Pt to triage .  Chief Complaint   Patient presents with   • Wound Infection     bilateral leg wounds. pt states he has had them for years but seem to be getting worse

## 2021-10-08 ENCOUNTER — APPOINTMENT (OUTPATIENT)
Dept: RADIOLOGY | Facility: MEDICAL CENTER | Age: 71
DRG: 854 | End: 2021-10-08
Attending: GENERAL PRACTICE
Payer: MEDICARE

## 2021-10-08 ENCOUNTER — APPOINTMENT (OUTPATIENT)
Dept: RADIOLOGY | Facility: MEDICAL CENTER | Age: 71
DRG: 854 | End: 2021-10-08
Attending: STUDENT IN AN ORGANIZED HEALTH CARE EDUCATION/TRAINING PROGRAM
Payer: MEDICARE

## 2021-10-08 PROBLEM — L08.9 DIABETIC INFECTION OF RIGHT FOOT (HCC): Status: ACTIVE | Noted: 2021-10-08

## 2021-10-08 PROBLEM — A40.0 SEPSIS DUE TO GROUP A STREPTOCOCCUS (HCC): Status: ACTIVE | Noted: 2021-10-08

## 2021-10-08 PROBLEM — E11.628 DIABETIC INFECTION OF RIGHT FOOT (HCC): Status: ACTIVE | Noted: 2021-10-08

## 2021-10-08 LAB
ALBUMIN SERPL BCP-MCNC: 3.3 G/DL (ref 3.2–4.9)
ALBUMIN/GLOB SERPL: 0.9 G/DL
ALP SERPL-CCNC: 87 U/L (ref 30–99)
ALT SERPL-CCNC: 15 U/L (ref 2–50)
ANION GAP SERPL CALC-SCNC: 14 MMOL/L (ref 7–16)
AST SERPL-CCNC: 22 U/L (ref 12–45)
BILIRUB SERPL-MCNC: 0.7 MG/DL (ref 0.1–1.5)
BUN SERPL-MCNC: 7 MG/DL (ref 8–22)
CALCIUM SERPL-MCNC: 9.1 MG/DL (ref 8.5–10.5)
CHLORIDE SERPL-SCNC: 102 MMOL/L (ref 96–112)
CHOLEST SERPL-MCNC: 121 MG/DL (ref 100–199)
CO2 SERPL-SCNC: 20 MMOL/L (ref 20–33)
CREAT SERPL-MCNC: 0.39 MG/DL (ref 0.5–1.4)
EKG IMPRESSION: NORMAL
ERYTHROCYTE [DISTWIDTH] IN BLOOD BY AUTOMATED COUNT: 45.9 FL (ref 35.9–50)
EST. AVERAGE GLUCOSE BLD GHB EST-MCNC: 214 MG/DL
GLOBULIN SER CALC-MCNC: 3.6 G/DL (ref 1.9–3.5)
GLUCOSE BLD-MCNC: 141 MG/DL (ref 65–99)
GLUCOSE BLD-MCNC: 146 MG/DL (ref 65–99)
GLUCOSE BLD-MCNC: 177 MG/DL (ref 65–99)
GLUCOSE BLD-MCNC: 183 MG/DL (ref 65–99)
GLUCOSE SERPL-MCNC: 158 MG/DL (ref 65–99)
GRAM STN SPEC: NORMAL
HBA1C MFR BLD: 9.1 % (ref 4–5.6)
HCT VFR BLD AUTO: 44.9 % (ref 42–52)
HDLC SERPL-MCNC: 28 MG/DL
HGB BLD-MCNC: 14.5 G/DL (ref 14–18)
INR PPP: 1.09 (ref 0.87–1.13)
LACTATE BLD-SCNC: 1.6 MMOL/L (ref 0.5–2)
LDLC SERPL CALC-MCNC: 69 MG/DL
MAGNESIUM SERPL-MCNC: 1.8 MG/DL (ref 1.5–2.5)
MCH RBC QN AUTO: 29.6 PG (ref 27–33)
MCHC RBC AUTO-ENTMCNC: 32.3 G/DL (ref 33.7–35.3)
MCV RBC AUTO: 91.6 FL (ref 81.4–97.8)
MRSA DNA SPEC QL NAA+PROBE: ABNORMAL
PLATELET # BLD AUTO: 259 K/UL (ref 164–446)
PMV BLD AUTO: 9.9 FL (ref 9–12.9)
POTASSIUM SERPL-SCNC: 3.7 MMOL/L (ref 3.6–5.5)
PROT SERPL-MCNC: 6.9 G/DL (ref 6–8.2)
PROTHROMBIN TIME: 13.8 SEC (ref 12–14.6)
RBC # BLD AUTO: 4.9 M/UL (ref 4.7–6.1)
SIGNIFICANT IND 70042: ABNORMAL
SIGNIFICANT IND 70042: NORMAL
SITE SITE: ABNORMAL
SITE SITE: NORMAL
SODIUM SERPL-SCNC: 136 MMOL/L (ref 135–145)
SOURCE SOURCE: ABNORMAL
SOURCE SOURCE: NORMAL
TRIGL SERPL-MCNC: 120 MG/DL (ref 0–149)
WBC # BLD AUTO: 10 K/UL (ref 4.8–10.8)

## 2021-10-08 PROCEDURE — 93971 EXTREMITY STUDY: CPT | Mod: LT

## 2021-10-08 PROCEDURE — 97535 SELF CARE MNGMENT TRAINING: CPT

## 2021-10-08 PROCEDURE — 93925 LOWER EXTREMITY STUDY: CPT | Mod: 26,GZ | Performed by: INTERNAL MEDICINE

## 2021-10-08 PROCEDURE — 93010 ELECTROCARDIOGRAM REPORT: CPT | Performed by: INTERNAL MEDICINE

## 2021-10-08 PROCEDURE — 36415 COLL VENOUS BLD VENIPUNCTURE: CPT

## 2021-10-08 PROCEDURE — 99233 SBSQ HOSP IP/OBS HIGH 50: CPT | Performed by: GENERAL PRACTICE

## 2021-10-08 PROCEDURE — 73590 X-RAY EXAM OF LOWER LEG: CPT | Mod: RT

## 2021-10-08 PROCEDURE — 83036 HEMOGLOBIN GLYCOSYLATED A1C: CPT

## 2021-10-08 PROCEDURE — A9270 NON-COVERED ITEM OR SERVICE: HCPCS | Performed by: GENERAL PRACTICE

## 2021-10-08 PROCEDURE — 700102 HCHG RX REV CODE 250 W/ 637 OVERRIDE(OP): Performed by: GENERAL PRACTICE

## 2021-10-08 PROCEDURE — 87077 CULTURE AEROBIC IDENTIFY: CPT

## 2021-10-08 PROCEDURE — 85027 COMPLETE CBC AUTOMATED: CPT

## 2021-10-08 PROCEDURE — 80061 LIPID PANEL: CPT

## 2021-10-08 PROCEDURE — 96366 THER/PROPH/DIAG IV INF ADDON: CPT

## 2021-10-08 PROCEDURE — 80053 COMPREHEN METABOLIC PANEL: CPT

## 2021-10-08 PROCEDURE — 87205 SMEAR GRAM STAIN: CPT

## 2021-10-08 PROCEDURE — 96372 THER/PROPH/DIAG INJ SC/IM: CPT

## 2021-10-08 PROCEDURE — 99223 1ST HOSP IP/OBS HIGH 75: CPT | Mod: AI | Performed by: STUDENT IN AN ORGANIZED HEALTH CARE EDUCATION/TRAINING PROGRAM

## 2021-10-08 PROCEDURE — 93922 UPR/L XTREMITY ART 2 LEVELS: CPT | Mod: 26,GZ | Performed by: INTERNAL MEDICINE

## 2021-10-08 PROCEDURE — 700111 HCHG RX REV CODE 636 W/ 250 OVERRIDE (IP): Performed by: STUDENT IN AN ORGANIZED HEALTH CARE EDUCATION/TRAINING PROGRAM

## 2021-10-08 PROCEDURE — 93005 ELECTROCARDIOGRAM TRACING: CPT | Performed by: STUDENT IN AN ORGANIZED HEALTH CARE EDUCATION/TRAINING PROGRAM

## 2021-10-08 PROCEDURE — 87641 MR-STAPH DNA AMP PROBE: CPT

## 2021-10-08 PROCEDURE — 93971 EXTREMITY STUDY: CPT | Mod: 26,LT | Performed by: INTERNAL MEDICINE

## 2021-10-08 PROCEDURE — 87070 CULTURE OTHR SPECIMN AEROBIC: CPT

## 2021-10-08 PROCEDURE — 93922 UPR/L XTREMITY ART 2 LEVELS: CPT

## 2021-10-08 PROCEDURE — 82962 GLUCOSE BLOOD TEST: CPT

## 2021-10-08 PROCEDURE — 87147 CULTURE TYPE IMMUNOLOGIC: CPT

## 2021-10-08 PROCEDURE — 700105 HCHG RX REV CODE 258: Performed by: STUDENT IN AN ORGANIZED HEALTH CARE EDUCATION/TRAINING PROGRAM

## 2021-10-08 PROCEDURE — 73590 X-RAY EXAM OF LOWER LEG: CPT | Mod: LT

## 2021-10-08 PROCEDURE — 770006 HCHG ROOM/CARE - MED/SURG/GYN SEMI*

## 2021-10-08 PROCEDURE — 87186 SC STD MICRODIL/AGAR DIL: CPT

## 2021-10-08 PROCEDURE — 93925 LOWER EXTREMITY STUDY: CPT

## 2021-10-08 PROCEDURE — 83605 ASSAY OF LACTIC ACID: CPT

## 2021-10-08 PROCEDURE — A9270 NON-COVERED ITEM OR SERVICE: HCPCS | Performed by: STUDENT IN AN ORGANIZED HEALTH CARE EDUCATION/TRAINING PROGRAM

## 2021-10-08 PROCEDURE — 700102 HCHG RX REV CODE 250 W/ 637 OVERRIDE(OP): Performed by: STUDENT IN AN ORGANIZED HEALTH CARE EDUCATION/TRAINING PROGRAM

## 2021-10-08 PROCEDURE — 96367 TX/PROPH/DG ADDL SEQ IV INF: CPT

## 2021-10-08 PROCEDURE — 73630 X-RAY EXAM OF FOOT: CPT | Mod: RT

## 2021-10-08 PROCEDURE — 73630 X-RAY EXAM OF FOOT: CPT | Mod: LT

## 2021-10-08 RX ORDER — ONDANSETRON 4 MG/1
4 TABLET, ORALLY DISINTEGRATING ORAL EVERY 4 HOURS PRN
Status: DISCONTINUED | OUTPATIENT
Start: 2021-10-08 | End: 2021-10-30 | Stop reason: HOSPADM

## 2021-10-08 RX ORDER — FUROSEMIDE 40 MG/1
80 TABLET ORAL DAILY
Status: DISCONTINUED | OUTPATIENT
Start: 2021-10-08 | End: 2021-10-18

## 2021-10-08 RX ORDER — HYDROXYZINE HYDROCHLORIDE 10 MG/1
10 TABLET, FILM COATED ORAL 3 TIMES DAILY PRN
Status: DISCONTINUED | OUTPATIENT
Start: 2021-10-08 | End: 2021-10-30 | Stop reason: HOSPADM

## 2021-10-08 RX ORDER — HEPARIN SODIUM 5000 [USP'U]/ML
5000 INJECTION, SOLUTION INTRAVENOUS; SUBCUTANEOUS EVERY 8 HOURS
Status: DISCONTINUED | OUTPATIENT
Start: 2021-10-08 | End: 2021-10-08

## 2021-10-08 RX ORDER — ONDANSETRON 2 MG/ML
4 INJECTION INTRAMUSCULAR; INTRAVENOUS EVERY 4 HOURS PRN
Status: DISCONTINUED | OUTPATIENT
Start: 2021-10-08 | End: 2021-10-30 | Stop reason: HOSPADM

## 2021-10-08 RX ORDER — ASPIRIN 325 MG
325 TABLET ORAL DAILY
Status: DISCONTINUED | OUTPATIENT
Start: 2021-10-08 | End: 2021-10-14

## 2021-10-08 RX ORDER — OXYCODONE AND ACETAMINOPHEN 10; 325 MG/1; MG/1
1 TABLET ORAL EVERY 4 HOURS PRN
Status: DISCONTINUED | OUTPATIENT
Start: 2021-10-07 | End: 2021-10-12

## 2021-10-08 RX ORDER — POLYETHYLENE GLYCOL 3350 17 G/17G
1 POWDER, FOR SOLUTION ORAL
Status: DISCONTINUED | OUTPATIENT
Start: 2021-10-07 | End: 2021-10-30 | Stop reason: HOSPADM

## 2021-10-08 RX ORDER — BISACODYL 10 MG
10 SUPPOSITORY, RECTAL RECTAL
Status: DISCONTINUED | OUTPATIENT
Start: 2021-10-07 | End: 2021-10-30 | Stop reason: HOSPADM

## 2021-10-08 RX ORDER — SODIUM HYPOCHLORITE 1.25 MG/ML
SOLUTION TOPICAL 2 TIMES DAILY
Status: DISCONTINUED | OUTPATIENT
Start: 2021-10-08 | End: 2021-10-14

## 2021-10-08 RX ORDER — INSULIN LISPRO 100 [IU]/ML
2-9 INJECTION, SOLUTION INTRAVENOUS; SUBCUTANEOUS EVERY 6 HOURS
Status: DISCONTINUED | OUTPATIENT
Start: 2021-10-08 | End: 2021-10-12

## 2021-10-08 RX ORDER — TRAZODONE HYDROCHLORIDE 150 MG/1
450 TABLET ORAL NIGHTLY
Status: DISCONTINUED | OUTPATIENT
Start: 2021-10-08 | End: 2021-10-30 | Stop reason: HOSPADM

## 2021-10-08 RX ORDER — GABAPENTIN 300 MG/1
300 CAPSULE ORAL 2 TIMES DAILY
Status: DISCONTINUED | OUTPATIENT
Start: 2021-10-08 | End: 2021-10-30 | Stop reason: HOSPADM

## 2021-10-08 RX ORDER — ENALAPRILAT 1.25 MG/ML
1.25 INJECTION INTRAVENOUS EVERY 6 HOURS PRN
Status: DISCONTINUED | OUTPATIENT
Start: 2021-10-08 | End: 2021-10-26

## 2021-10-08 RX ORDER — CARVEDILOL 12.5 MG/1
12.5 TABLET ORAL 2 TIMES DAILY WITH MEALS
Status: DISCONTINUED | OUTPATIENT
Start: 2021-10-08 | End: 2021-10-18

## 2021-10-08 RX ORDER — OXYCODONE HYDROCHLORIDE AND ACETAMINOPHEN 5; 325 MG/1; MG/1
2 TABLET ORAL EVERY 6 HOURS PRN
Status: DISCONTINUED | OUTPATIENT
Start: 2021-10-08 | End: 2021-10-12

## 2021-10-08 RX ORDER — SODIUM CHLORIDE 9 MG/ML
INJECTION, SOLUTION INTRAVENOUS CONTINUOUS
Status: DISCONTINUED | OUTPATIENT
Start: 2021-10-08 | End: 2021-10-08

## 2021-10-08 RX ORDER — DEXTROSE MONOHYDRATE 25 G/50ML
50 INJECTION, SOLUTION INTRAVENOUS
Status: DISCONTINUED | OUTPATIENT
Start: 2021-10-08 | End: 2021-10-12

## 2021-10-08 RX ORDER — ACETAMINOPHEN 325 MG/1
650 TABLET ORAL EVERY 6 HOURS PRN
Status: DISCONTINUED | OUTPATIENT
Start: 2021-10-08 | End: 2021-10-30 | Stop reason: HOSPADM

## 2021-10-08 RX ORDER — AMOXICILLIN 250 MG
2 CAPSULE ORAL 2 TIMES DAILY
Status: DISCONTINUED | OUTPATIENT
Start: 2021-10-08 | End: 2021-10-18

## 2021-10-08 RX ORDER — LABETALOL HYDROCHLORIDE 5 MG/ML
10 INJECTION, SOLUTION INTRAVENOUS EVERY 4 HOURS PRN
Status: DISCONTINUED | OUTPATIENT
Start: 2021-10-08 | End: 2021-10-26

## 2021-10-08 RX ORDER — SODIUM CHLORIDE, SODIUM LACTATE, POTASSIUM CHLORIDE, AND CALCIUM CHLORIDE .6; .31; .03; .02 G/100ML; G/100ML; G/100ML; G/100ML
30 INJECTION, SOLUTION INTRAVENOUS ONCE
Status: ACTIVE | OUTPATIENT
Start: 2021-10-08 | End: 2021-10-09

## 2021-10-08 RX ORDER — HYDROCHLOROTHIAZIDE 25 MG/1
25 TABLET ORAL DAILY
Status: DISCONTINUED | OUTPATIENT
Start: 2021-10-08 | End: 2021-10-08

## 2021-10-08 RX ORDER — DICLOFENAC SODIUM 25 MG/1
50 TABLET, DELAYED RELEASE ORAL
Status: DISCONTINUED | OUTPATIENT
Start: 2021-10-08 | End: 2021-10-26

## 2021-10-08 RX ORDER — LISINOPRIL 5 MG/1
5 TABLET ORAL DAILY
Status: DISCONTINUED | OUTPATIENT
Start: 2021-10-08 | End: 2021-10-23

## 2021-10-08 RX ADMIN — CARVEDILOL 12.5 MG: 12.5 TABLET, FILM COATED ORAL at 09:43

## 2021-10-08 RX ADMIN — INSULIN LISPRO 2 UNITS: 100 INJECTION, SOLUTION INTRAVENOUS; SUBCUTANEOUS at 17:43

## 2021-10-08 RX ADMIN — ASPIRIN 325 MG ORAL TABLET 325 MG: 325 PILL ORAL at 06:09

## 2021-10-08 RX ADMIN — HEPARIN SODIUM 5000 UNITS: 5000 INJECTION, SOLUTION INTRAVENOUS; SUBCUTANEOUS at 01:39

## 2021-10-08 RX ADMIN — DICLOFENAC SODIUM 50 MG: 25 TABLET, DELAYED RELEASE ORAL at 01:32

## 2021-10-08 RX ADMIN — CARVEDILOL 12.5 MG: 12.5 TABLET, FILM COATED ORAL at 17:34

## 2021-10-08 RX ADMIN — SODIUM CHLORIDE 3 G: 900 INJECTION INTRAVENOUS at 06:06

## 2021-10-08 RX ADMIN — OXYCODONE HYDROCHLORIDE AND ACETAMINOPHEN 2 TABLET: 5; 325 TABLET ORAL at 11:24

## 2021-10-08 RX ADMIN — INSULIN GLARGINE 20 UNITS: 100 INJECTION, SOLUTION SUBCUTANEOUS at 17:44

## 2021-10-08 RX ADMIN — VANCOMYCIN HYDROCHLORIDE 1250 MG: 500 INJECTION, POWDER, LYOPHILIZED, FOR SOLUTION INTRAVENOUS at 09:36

## 2021-10-08 RX ADMIN — DICLOFENAC SODIUM 50 MG: 25 TABLET, DELAYED RELEASE ORAL at 09:43

## 2021-10-08 RX ADMIN — VANCOMYCIN HYDROCHLORIDE 1250 MG: 500 INJECTION, POWDER, LYOPHILIZED, FOR SOLUTION INTRAVENOUS at 21:00

## 2021-10-08 RX ADMIN — OXYCODONE AND ACETAMINOPHEN 1 TABLET: 10; 325 TABLET ORAL at 06:46

## 2021-10-08 RX ADMIN — TRAZODONE HYDROCHLORIDE 450 MG: 150 TABLET ORAL at 01:31

## 2021-10-08 RX ADMIN — SODIUM CHLORIDE 3 G: 900 INJECTION INTRAVENOUS at 12:04

## 2021-10-08 RX ADMIN — SODIUM CHLORIDE: 9 INJECTION, SOLUTION INTRAVENOUS at 02:42

## 2021-10-08 RX ADMIN — INSULIN LISPRO 2 UNITS: 100 INJECTION, SOLUTION INTRAVENOUS; SUBCUTANEOUS at 01:40

## 2021-10-08 RX ADMIN — GABAPENTIN 300 MG: 300 CAPSULE ORAL at 06:09

## 2021-10-08 RX ADMIN — SODIUM CHLORIDE 3 G: 900 INJECTION INTRAVENOUS at 01:40

## 2021-10-08 RX ADMIN — OXYCODONE HYDROCHLORIDE AND ACETAMINOPHEN 2 TABLET: 5; 325 TABLET ORAL at 20:59

## 2021-10-08 RX ADMIN — OXYCODONE AND ACETAMINOPHEN 1 TABLET: 10; 325 TABLET ORAL at 02:36

## 2021-10-08 RX ADMIN — GABAPENTIN 300 MG: 300 CAPSULE ORAL at 17:34

## 2021-10-08 RX ADMIN — SODIUM CHLORIDE 3 G: 900 INJECTION INTRAVENOUS at 17:34

## 2021-10-08 RX ADMIN — TRAZODONE HYDROCHLORIDE 450 MG: 150 TABLET ORAL at 20:59

## 2021-10-08 RX ADMIN — DICLOFENAC SODIUM 50 MG: 25 TABLET, DELAYED RELEASE ORAL at 20:58

## 2021-10-08 ASSESSMENT — PAIN DESCRIPTION - PAIN TYPE
TYPE: ACUTE PAIN

## 2021-10-08 ASSESSMENT — FIBROSIS 4 INDEX: FIB4 SCORE: 1.04

## 2021-10-08 ASSESSMENT — ACTIVITIES OF DAILY LIVING (ADL): TOILETING: INDEPENDENT

## 2021-10-08 NOTE — ASSESSMENT & PLAN NOTE
Continue DAPT after stenting  Restarted atorvastatin (max dose as tolerated) and carvedilol (half-dose due to borderline BP)

## 2021-10-08 NOTE — PROGRESS NOTES
Pt arrived to unit via gurney at 0150, ambulated to hospital bed with x1-2 assist and FWW, unsteady gait. Pt A&Ox4 on RA, reported 8/10 pain, managed with PRN Percocet per MAR. Dressings applied to BLE wounds. Pt running NS at 100 mL/hr. Safety precautions and hourly rounding in place.      COVID-19 surge in effect.

## 2021-10-08 NOTE — ED PROVIDER NOTES
ED Provider Note    CHIEF COMPLAINT  Chief Complaint   Patient presents with   • Wound Infection     bilateral leg wounds. pt states he has had them for years but seem to be getting worse        HPI  Luis Kellogg is a 71 y.o. male who presents to the emergency department with bilateral lower extremity wounds with chronic worsening. States that the wounds have been present for at least six months and progressively worsening over the last few months. Lives in [place]. Known diabetic. Seen by PCP two days ago and was told to be seen in the emergency department. Drove today for further help. Has not had help with wound care. Notes of the lesions again had been progressively worsening. No other real symptomatology. No other recent illness. No recent medication changes.    REVIEW OF SYSTEMS  See HPI for further details. All other systems are negative.     PAST MEDICAL HISTORY   has a past medical history of Arthritis, Asthma, At risk for sleep apnea, Breath shortness, Chicken pox, Diabetes (HCC), Heart disease, Hepatitis C, Hyperlipidemia, Measles, Muscle disorder, Myocardial infarct (HCC), Parathyroid disorder (HCC), Snoring, Substance abuse (HCC), and Urinary tract infection.    SOCIAL HISTORY  Social History     Tobacco Use   • Smoking status: Current Every Day Smoker     Packs/day: 0.50     Years: 54.00     Pack years: 27.00     Types: Cigarettes   • Smokeless tobacco: Former User   Vaping Use   • Vaping Use: Unknown   Substance and Sexual Activity   • Alcohol use: Not Currently     Alcohol/week: 0.0 oz     Comment: occas   • Drug use: Yes     Types: Inhaled     Comment: nightly for sleep   • Sexual activity: Not Currently       SURGICAL HISTORY   has a past surgical history that includes knee replacement, total (Bilateral); other surgical procedure; unlisted proc, arthroscopy (Left, 7/25/2019); and toe amputation (Left, 2/17/2020).    CURRENT MEDICATIONS  Home Medications     Reviewed by Joan Knowles  "(Pharmacy Tech) on 10/07/21 at 2146  Med List Status: Complete   Medication Last Dose Status   aspirin (ASA) 325 MG Tab 10/7/2021 Active   bisacodyl (DULCOLAX) 10 MG Suppos prn Active   carvedilol (COREG) 12.5 MG Tab 10/7/2021 Active   diclofenac (CATAFLAM) 50 MG tablet 10/7/2021 Active   docusate sodium (COLACE) 100 MG Cap 10/7/2021 Active   furosemide (LASIX) 80 MG Tab 10/7/2021 Active   gabapentin (NEURONTIN) 600 MG tablet 10/7/2021 Active   glucagon 1 MG Recon Soln prn Active   hydroCHLOROthiazide (HYDRODIURIL) 25 MG Tab 10/7/2021 Active   insulin glargine (LANTUS) 100 UNIT/ML Solution 10/5/2021 Active   lisinopril (PRINIVIL) 5 MG Tab 10/7/2021 Active   metformin (GLUCOPHAGE) 1000 MG tablet 10/7/2021 Active   oxyCODONE-acetaminophen (PERCOCET-10)  MG Tab 10/6/2021 Active   traZODone (DESYREL) 150 MG Tab 10/6/2021 Active                ALLERGIES  No Known Allergies    PHYSICAL EXAM  VITAL SIGNS: BP (!) 166/81   Pulse 100   Temp 36.3 °C (97.3 °F) (Temporal)   Resp (!) 22   Ht 1.702 m (5' 7\")   Wt 102 kg (225 lb)   SpO2 96%   BMI 35.24 kg/m²  @NURY[[id number]::@  Pulse ox interpretation: I interpret this pulse ox as normal.  Constitutional: Alert in no apparent distress.  HENT: Normocephalic, Atraumatic, Bilateral external ears normal. Nose normal.   Eyes: Pupils are equal and reactive.  Heart: Regular rate and rythm, no murmurs.    Lungs: Clear to auscultation bilaterally.  Skin: Warm, Dry. Severe ulcerative bilateral leg wounds with right lateral leg necrotic eschar. Additional absence of middle toes on left foot with additional ulcerative diabetic foot wounds with exudative bases similar to others.   Neurologic: Alert, Grossly non-focal.         Results for orders placed or performed during the hospital encounter of 10/07/21   CBC WITH DIFFERENTIAL   Result Value Ref Range    WBC 11.0 (H) 4.8 - 10.8 K/uL    RBC 5.31 4.70 - 6.10 M/uL    Hemoglobin 15.5 14.0 - 18.0 g/dL    Hematocrit 46.4 42.0 - 52.0 %    " MCV 87.4 81.4 - 97.8 fL    MCH 29.2 27.0 - 33.0 pg    MCHC 33.4 (L) 33.7 - 35.3 g/dL    RDW 43.6 35.9 - 50.0 fL    Platelet Count 308 164 - 446 K/uL    MPV 9.9 9.0 - 12.9 fL    Neutrophils-Polys 79.40 (H) 44.00 - 72.00 %    Lymphocytes 11.70 (L) 22.00 - 41.00 %    Monocytes 7.80 0.00 - 13.40 %    Eosinophils 0.40 0.00 - 6.90 %    Basophils 0.30 0.00 - 1.80 %    Immature Granulocytes 0.40 0.00 - 0.90 %    Nucleated RBC 0.00 /100 WBC    Neutrophils (Absolute) 8.75 (H) 1.82 - 7.42 K/uL    Lymphs (Absolute) 1.29 1.00 - 4.80 K/uL    Monos (Absolute) 0.86 (H) 0.00 - 0.85 K/uL    Eos (Absolute) 0.04 0.00 - 0.51 K/uL    Baso (Absolute) 0.03 0.00 - 0.12 K/uL    Immature Granulocytes (abs) 0.04 0.00 - 0.11 K/uL    NRBC (Absolute) 0.00 K/uL   COMP METABOLIC PANEL   Result Value Ref Range    Sodium 135 135 - 145 mmol/L    Potassium 3.7 3.6 - 5.5 mmol/L    Chloride 98 96 - 112 mmol/L    Co2 24 20 - 33 mmol/L    Anion Gap 13.0 7.0 - 16.0    Glucose 187 (H) 65 - 99 mg/dL    Bun 8 8 - 22 mg/dL    Creatinine 0.47 (L) 0.50 - 1.40 mg/dL    Calcium 9.8 8.5 - 10.5 mg/dL    AST(SGOT) 20 12 - 45 U/L    ALT(SGPT) 19 2 - 50 U/L    Alkaline Phosphatase 102 (H) 30 - 99 U/L    Total Bilirubin 0.6 0.1 - 1.5 mg/dL    Albumin 4.2 3.2 - 4.9 g/dL    Total Protein 8.4 (H) 6.0 - 8.2 g/dL    Globulin 4.2 (H) 1.9 - 3.5 g/dL    A-G Ratio 1.0 g/dL   Sed Rate   Result Value Ref Range    Sed Rate Westergren 1 0 - 20 mm/hour   CRP QUANTITIVE (NON-CARDIAC)   Result Value Ref Range    Stat C-Reactive Protein 6.25 (H) 0.00 - 0.75 mg/dL   ESTIMATED GFR   Result Value Ref Range    GFR If African American >60 >60 mL/min/1.73 m 2    GFR If Non African American >60 >60 mL/min/1.73 m 2     DX-TIBIA AND FIBULA LEFT    (Results Pending)   US-EXTREMITY VENOUS LOWER UNILAT LEFT    (Results Pending)         COURSE & MEDICAL DECISION MAKING  Pertinent Labs & Imaging studies reviewed. (See chart for details)  71-year-old male presented to the emergency department for  diabetic leg wounds. History as above. Patient has been started on empiric antibiotics. I discussed case with hospitals for ongoing inpatient care. Osteomyelitis will need to be in the differential for ongoing workup. Patient hemodynamically stable does not appear slightly as time. Wound and blood culture is pending.        FINAL IMPRESSION  1. Bilateral lower leg cellulitis               Electronically signed by: Dany Arnold M.D., 10/7/2021 8:53 PM

## 2021-10-08 NOTE — ED NOTES
Med Rec completed: per pt at bedside      No ORAL antibiotics in last 30 days    Preferred Pharmacy: [facility]     Pt confirmed following allergies:  No Known Allergies     Pt's home medications:   Medication Sig   • insulin glargine (LANTUS) 100 UNIT/ML Solution Inject 40 Units under the skin every evening.   • metformin (GLUCOPHAGE) 1000 MG tablet Take 1 tablet by mouth 2 times a day.   • diclofenac (CATAFLAM) 50 MG tablet Take 50 mg by mouth 2 (two) times a day. TAKE 1 TABLET BY MOUTH TWICE A DAY   • hydroCHLOROthiazide (HYDRODIURIL) 25 MG Tab Take 25 mg by mouth every day.   • oxyCODONE-acetaminophen (PERCOCET-10)  MG Tab Take 1 Tab by mouth every four hours as needed for Severe Pain.   • gabapentin (NEURONTIN) 600 MG tablet Take 2 Tabs by mouth 2 times a day.   • glucagon 1 MG Recon Soln 1 mg by Intramuscular route as needed (FSBG BELOW 70 MG/DL AND UNABLE TO SWALLOW WITH NO IV ACCESS.).   • bisacodyl (DULCOLAX) 10 MG Suppos Insert 1 Suppository in rectum as needed (if no BM from MOM).   • aspirin (ASA) 325 MG Tab Take 325 mg by mouth every day.   • traZODone (DESYREL) 150 MG Tab Take 450 mg by mouth every evening.   • carvedilol (COREG) 12.5 MG Tab Take 12.5 mg by mouth 2 times a day, with meals.   • furosemide (LASIX) 80 MG Tab Take 80 mg by mouth every day.   • lisinopril (PRINIVIL) 5 MG Tab Take 5 mg by mouth every day.   • docusate sodium (COLACE) 100 MG Cap Take 100 mg by mouth 2 times a day.     Removed medications:   Medication Removal Reason   • [DISCONTINUED] insulin lispro (HUMALOG) 100 UNIT/ML   [DISCONTINUED] polyethylene glycol/lytes (MIRALAX) Pack   [DISCONTINUED] magnesium hydroxide (MILK OF MAGNESIA) 400 MG/5ML Suspension    Pt reports not taking

## 2021-10-08 NOTE — ASSESSMENT & PLAN NOTE
A1c 9.1  POCT + SSI  Glargine held on admission, restart at half-dose for glucose monitoring prior to discharge  BG goal <200 for wound-healing

## 2021-10-08 NOTE — PROGRESS NOTES
ACUTE CARE VASCULAR SERVICE  PROGRESS NOTE      Notified of BLE wounds  NIAS show inflow disease therefore will order a CTA to further assess.  I suspect the patient is going to need revascularization of at least the left if not both lower extremities, however what will be needed and when we can schedule it won't be clear until after the CTA    Valerio Purcell MD  [facility] (General and Vascular Surgery)  Cell: [phone] (text/call)  Office: [phone]  __________________________________________________________________  Patient:Luis Kellogg   MRN:[id number]   CSN:[id number]    10/8/2021    2:41 PM

## 2021-10-08 NOTE — ASSESSMENT & PLAN NOTE
Patient has known history of uncontrolled type 2 diabetes, he has had prior admissions for cellulitis and osteomyelitis.  Patient does have history of PAD.  Venous dopplers negative for DVT   Arterial Dopplers - inflow arterial insufficiency in lower extremities bilaterally  X-ray of the left tib-fib, no concern for osteomyelitis.    Wound care consulted. LPS following.  S/p revasculazization, debridement on 10/12    Wound culture MRSA, GBS  Completed 14-day course of keflex and bactrim

## 2021-10-08 NOTE — ASSESSMENT & PLAN NOTE
This is Sepsis Present on admission  SIRS criteria identified on my evaluation include: Tachycardia, with heart rate greater than 90 BPM, Tachypnea, with respirations greater than 20 per minute and Bandemia, greater than 10% bands  Source is diabetic infected ulcers and cellulitis of Lower extremities  Sepsis protocol initiated  Fluid resuscitation ordered per protocol  IV antibiotics as appropriate for source of sepsis  While organ dysfunction may be noted elsewhere in this problem list or in the chart, degree of organ dysfunction does not meet CMS criteria for severe sepsis    BCx NGTD  Wounds are superficial and intervened on for revascularization  Wound Cx +MRSA  Completed 14-day course of antibiotics for leg wounds

## 2021-10-08 NOTE — THERAPY
Missed Therapy     Patient Name: Luis Kellogg  Age:  71 y.o., Sex:  male  Medical Record #: [id number]  Today's Date: 10/8/2021    Discussed missed therapy with RN       10/08/21 1010   Interdisciplinary Plan of Care Collaboration   IDT Collaboration with  Nursing   Collaboration Comments Imaging in room with two procedures planned, asks to return in 90 minutes. PT will complete eval as able.    Session Information   Date / Session Number  10/7-ed /PLF obtained

## 2021-10-08 NOTE — HOSPITAL COURSE
This is a 71 year old male with PMHx of hyperlipidemia, type 2 diabetes with A1c of 9.1, obesity, CAD, hx MI,PAD, history of lower extremity osteomyelitis and recurrent LLE cellulitis who was admitted for sepsis secondary to BLE diabetic, nonhealing wounds and cellulitis.    Patient has known history of uncontrolled type 2 diabetes, he has had prior admissions for cellulitis and osteomyelitis.  Patient does have history of PAD.  Venous dopplers negative for DVT and arterial Dopplers ordered.  X-ray of the left tib-fib, no concern for osteomyelitis.  Wound care consulted. LPS consulted.    Wound cultures from March 20, 2021, noted E. Faecalis sensitive to ampicillin, Cipro, Levaquin and vancomycin.  Patient is currently on vancomycin only as per wound culture results.

## 2021-10-08 NOTE — PROGRESS NOTES
COVID-19 surge in effect.    Pt is A&Ox4, on RA. Denies any pain at the moment. NS running at 100ml/hr. All needs met at this time. Hourly rounding in place.

## 2021-10-08 NOTE — ED NOTES
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

## 2021-10-08 NOTE — CONSULTS
LIMB PRESERVATION SERVICE CONSULT      REFERRED BY: Dr. Hu    DATE OF CONSULTATION: 10/8/2021    REASON FOR CONSULT: B/l lower extremity diabetic / vascular ulcers    HISTORY OF PRESENT ILLNESS: Luis Kellogg is a 71 y.o.  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   LPS has been consulted for bilateral lower extremity diabetic ulcers.    Patient has long history of lower extremity chronic wounds, PAD, and left 2-4th toe amputation in 2/2020 at outside hospital. Patient presented with worsening bilateral lower extremity wounds with worsening swelling and pain consistent with cellulitis. He denies any fevers, chills, nausea, or vomiting. Patient is poor historian and unable to tell me how long he has had wounds, but does report that they have been worsening with occasional purulent drainage. He reports that left toe amputation site never healed after surgery in 2/2020. Patient reports that he previously saw a vascular surgeon in [place], does not remember his name, and had angiogram and my have had a stent in one of his femoral arteries though not sure which and thinks it was 1.5-2 years ago. Patient was referred to  [facility] and had evaluation and non-invasive studies and was supposed to follow up with  [facility] for intervention but never followed up. Patient was previously being followed by [facility], however he has not followed up since 3/2021 and has not been performing any wound care on his lower extremity wounds.     IV antibiotics were started on this admission.  Infectious diseases has not been consulted.    Xray completed and is negative for osteomyelitis.  Ortho has not been consulted yet.    Diagnosed with diabetes 7-8 years ago, and is currently managing with metformin and insulin.  Checks blood sugars 1 times per day and reports  that these typically average 110-150. Does have numbness to feet. Usually wears tennis shoes . Does have diabetic shoes and inserts from 4-5 that are years old and in not interested in diabetic footwear at this time.  Has had previous foot surgeries including L 2-4 toe and MTH amputation.  Current occupation retired.     Smoking:   reports that he has been smoking cigarettes. He has a 27.00 pack-year smoking history. He has quit using smokeless tobacco.    Alcohol:   reports previous alcohol use.    Drug:   reports current drug use. Drug: Inhaled.      PAST MEDICAL HISTORY:   Past Medical History:   Diagnosis Date   • Arthritis    • Asthma    • At risk for sleep apnea    • Breath shortness     with exertion,   • Chicken pox    • Diabetes (HCC)    • Heart disease    • Hepatitis C    • Hyperlipidemia    • Measles    • Muscle disorder    • Myocardial infarct (HCC)     2016-stents   • Parathyroid disorder (HCC)    • Snoring    • Substance abuse (HCC)    • Urinary tract infection         PAST SURGICAL HISTORY:   Past Surgical History:   Procedure Laterality Date   • TOE AMPUTATION Left 2/17/2020    Procedure: LEFT SECOND, THIRD, AND FORTH TOE AMPUTATION;  Surgeon: Alfonso Esteban M.D.;  Location: SURGERY [facility];  Service: Orthopedics   • AK UNLISTED PROC, ARTHROSCOPY Left 7/25/2019    Procedure: LEFT ENDOSCOPIC ULNAR NERVE DECOMPRESSION, LEFT CARPAL TUNNEL RELEASE;  Surgeon: Red Chacon M.D.;  Location: SURGERY [facility];  Service: Orthopedics   • KNEE REPLACEMENT, TOTAL Bilateral    • OTHER SURGICAL PROCEDURE      back surgery - Unknown        MEDICATIONS:   Current Facility-Administered Medications   Medication Dose   • oxyCODONE-acetaminophen (PERCOCET-10)  MG per tablet 1 Tablet  1 Tablet   • aspirin (ASA) tablet 325 mg  325 mg   • diclofenac DR (Voltaren) tablet 50 mg  50 mg   • gabapentin (NEURONTIN) capsule 300 mg  300 mg   • traZODone (DESYREL) tablet 450 mg  450 mg   • lisinopril (PRINIVIL)  tablet 5 mg  5 mg   • carvedilol (COREG) tablet 12.5 mg  12.5 mg   • furosemide (LASIX) tablet 80 mg  80 mg   • senna-docusate (PERICOLACE or SENOKOT S) 8.6-50 MG per tablet 2 Tablet  2 Tablet    And   • polyethylene glycol/lytes (MIRALAX) PACKET 1 Packet  1 Packet    And   • magnesium hydroxide (MILK OF MAGNESIA) suspension 30 mL  30 mL    And   • bisacodyl (DULCOLAX) suppository 10 mg  10 mg   • lactated ringers infusion (BOLUS): BMI greater than 30  30 mL/kg (Ideal)   • acetaminophen (Tylenol) tablet 650 mg  650 mg   • ampicillin/sulbactam (UNASYN) 3 g in  mL IVPB  3 g   • MD Alert...Vancomycin per Pharmacy  1 Each   • enalaprilat (Vasotec) injection 1.25 mg 1 mL  1.25 mg   • labetalol (NORMODYNE/TRANDATE) injection 10 mg  10 mg   • ondansetron (ZOFRAN) syringe/vial injection 4 mg  4 mg   • ondansetron (ZOFRAN ODT) dispertab 4 mg  4 mg   • insulin glargine (Semglee) injection  0.2 Units/kg/day    And   • insulin lispro (AdmeLOG) injection  2-9 Units    And   • glucose 4 g chewable tablet 16 g  16 g    And   • dextrose 50% (D50W) injection 50 mL  50 mL   • vancomycin (VANCOCIN) 1,250 mg in  mL IVPB  1,250 mg   • oxyCODONE-acetaminophen (PERCOCET) 5-325 MG per tablet 2 Tablet  2 Tablet   • [Held by provider] enoxaparin (LOVENOX) inj 40 mg  40 mg       ALLERGIES:  No Known Allergies     FAMILY HISTORY:   Family History   Problem Relation Age of Onset   • Arthritis Father    • Cancer Father    • Heart Disease Father    • Diabetes Other          REVIEW OF SYSTEMS:   Constitutional: Negative for chills, fever   Respiratory: Negative for cough and shortness of breath.    Cardiovascular:Negative for chest pain, and claudication.   Gastrointestinal: Negative for constipation, diarrhea, nausea and vomiting.   Lower extremities: positive for swelling and redness  Neurological: positive for numbness to feet and lower legs  All other systems reviewed and are negative     RESULTS:     Recent Labs     10/07/21  2030  10/08/21  0426   WBC 11.0* 10.0   RBC 5.31 4.90   HEMOGLOBIN 15.5 14.5   HEMATOCRIT 46.4 44.9   MCV 87.4 91.6   MCH 29.2 29.6   MCHC 33.4* 32.3*   RDW 43.6 45.9   PLATELETCT 308 259   MPV 9.9 9.9     Recent Labs     10/07/21  2030 10/08/21  0426   SODIUM 135 136   POTASSIUM 3.7 3.7   CHLORIDE 98 102   CO2 24 20   GLUCOSE 187* 158*   BUN 8 7*         ESR:     Results from last 7 days   Lab Units 10/07/21  2030   SED RATE WESTERGREN 1526 mm/hour 1       CRP:       Results from last 7 days   Lab Units 10/07/21  2030   C REACTIVE PROTEIN 4596 mg/dL 6.25*         COVID-19: Not completed this admission    Imaging:  DX-FOOT-COMPLETE 3+ LEFT   Final Result         1.  Disuse osteoporosis of the left foot.      2. Large subcutaneous ulcer on the dorsal surface of left foot.      3. Previous amputations of the left second through fourth toes.      DX-FOOT-COMPLETE 3+ RIGHT   Final Result      1.  No fracture or dislocation of RIGHT foot.   2.  Periarticular osteopenia suggests inflammatory arthropathy.   3.  No soft tissue gas or focal bony destruction however osteomyelitis is not excluded by plain film.   4.  Severe degenerative change of ankle joint.      DX-TIBIA AND FIBULA RIGHT   Final Result      No evidence of fracture or dislocation or acute osteomyelitis.      US-EXTREMITY ARTERY LOWER BILAT   Final Result      US-MIA SINGLE LEVEL BILAT   Final Result      US-EXTREMITY VENOUS LOWER UNILAT LEFT   Final Result      DX-TIBIA AND FIBULA LEFT   Final Result         1.  No acute traumatic bony injury.   2.  Soft tissue erosion of the posterior mid calf, corresponding with history of soft tissue ulcers.      CT-CTA AORTA-RO WITH & W/O-POST PROCESS    (Results Pending)       Arterial studies: 10/8  Vascular Laboratory   Conclusions   Suspect inflow arterial insufficiency in bilateral lower extremities.   Bilateral arterial duplex scan was performed in accordance with lower    extremity arterial evaluation protocol - see  "separate report.  Findings   Bilateral.    Doppler waveforms of the common femoral arteries are abnormally dampened/    monophasic.    Doppler waveforms at the ankle are monophasic.    The ankle pressures are not obtained due to pain.    Digit PPG waveforms are normal in the right toes.   Digit PPG waveforms are normal in the left 1st and 5th toe. The other toes    are absent/amputated.    Toe-brachial indices is reduced right side.   Toe-brachial indices are left side.    Toe-brachial indices:     Right:  0.54   Left:  0.90    CONCLUSIONS   The RIGHT mid superficial femoral artery is occluded. Reconstitution of    flow is demonstrated in the distal superficial femoral artery.       More than 75% stenosis in the LEFT mid to distal superficial femoral    artery.      Suspect more proximal inflow disease, based on body habitus, a CTA might be    better tool to further assess.    A1c:  Lab Results   Component Value Date/Time    HBA1C 9.1 (H) 10/08/2021 04:26 AM            Microbiology:  Results     Procedure Component Value Units Date/Time    CULTURE WOUND W/ GRAM STAIN [[id number]] Collected: 10/08/21 1413    Order Status: Completed Specimen: Wound from Left Leg Updated: 10/08/21 1503    Narrative:      Collected By:[id number] MERRILL DUFF V    BLOOD CULTURE x2 [[id number]] Collected: 10/07/21 2118    Order Status: Completed Specimen: Blood from Peripheral Updated: 10/08/21 0904     Significant Indicator NEG     Source BLD     Site PERIPHERAL     Culture Result No Growth  Note: Blood cultures are incubated for 5 days and  are monitored continuously.Positive blood cultures  are called to the RN and reported as soon as  they are identified.      Narrative:      Per Hospital Policy: Only change Specimen Src: to \"Line\" if  specified by physician order.  No site indicated    BLOOD CULTURE x2 [[id number]] Collected: 10/07/21 2118    Order Status: Completed Specimen: Blood from Peripheral Updated: 10/08/21 0904     Significant " "Indicator NEG     Source BLD     Site PERIPHERAL     Culture Result No Growth  Note: Blood cultures are incubated for 5 days and  are monitored continuously.Positive blood cultures  are called to the RN and reported as soon as  they are identified.      Narrative:      Per Hospital Policy: Only change Specimen Src: to \"Line\" if  specified by physician order.  No site indicated    MRSA By PCR (Amp) [[id number]] Collected: 10/08/21 0615    Order Status: Completed Specimen: Respirate from Nares Updated: 10/08/21 0640    Narrative:      Collected By:[id number] DEMETRIA DOTTIE    CULTURE WOUND W/ GRAM STAIN [[id number]]     Order Status: Canceled Specimen: Wound from Right Leg     Urinalysis [[id number]]     Order Status: Canceled Specimen: Urine     Blood Culture [[id number]]     Order Status: Canceled Specimen: Blood from Peripheral     Blood Culture [[id number]]     Order Status: Canceled Specimen: Blood from Peripheral            PHYSICAL EXAMINATION:     VITAL SIGNS: /74   Pulse 87   Temp 36.7 °C (98 °F) (Temporal)   Resp 16   Ht 1.702 m (5' 7\")   Wt 100 kg (221 lb 1.9 oz)   SpO2 92%   BMI 34.63 kg/m²       General Appearance:  Well developed, Obese, in no acute distress    Lower Extremity Assessment:    Edema:   2+ lower extremity edema      ROM dorsi/plantarflexion   Dorsiflexion intact    Structural /mechanical changes:  Surgically absent L 2-4th toes with open amputation site    Sensory Assessment  Feet Insensate to light touch      Pulses:  R foot: Unable to palpate, Monophasic DP/PT  L foot: Unable to palpate, Monophasic DP/PT      Wound Assessment:    Right lateral tibial wounds x2  Proximal: 4.8x3.7xUTD  Distal: 11x7.7xUTD  Wound base covered with desiccated slough and eschar  No drainage  No odor  Soo wound erythema  Unable to assess wound depth with slough / eschar          R foot dorsal 1st toe wound - 1x1.2x0.2cm  R foot dorsal 2nd toe wound - 1.6x1x0.1  Wound appear with dried eschar  Appear " superficial  No drainage  No odor  Periwound erythema  Mild periwound callus          L medial heel wound  1.7x2.8x0.2cm  Wound base covered with dry eschar  No drainage  No odor  No erythema        L 2-4 toe and MTH amputation site  3.7x4.0xUTD  Chronic non healing amputation site from 2020  Patient reports never closed  Dried eschar covering wound bed  Periwound callus  Periwound erythema          Left foot dorsal wound  4.3x3.2x0.6cm  Wound bed covered with wet slough  Malodorous  Mild serous drainage  Periwound erythema        L Anterior Tibial wounds x 6  Largest: 3.2x3.3xUTD  Wound base covered with combination of dry and wet slough  Foul smelling  No discharge  Periwound erythema          L Posterior tib/fib wounds x 3  Wound base covered in dry exudate  No drainage  Periwound erythema  Fould smelling  Wound complex 14.5x4.5x0.2          Wound care completed by wound team: Wet to dry dressings with Dakins BID      ASSESSMENT AND PLAN:     71M with PMHx of uncontrolled DM2 (A1C 9.1), obesity, CAD with Hx MI, PAD, Current tobacco use. Patient admitted for sepsis due to lower extremity cellulitis with worsening chronic non healing diabetic wounds in setting of vasculopathy.    - Patient with history of PAD. He is poor historian but reports possible femoral stent but unsure of side. Was seen at [facility] vascular but did not follow up for intervention.  - B/l LE US show severe PAD with monophasic signals and SFA occlusion on R and 75% SFA stenosis left. Dr. Purcell consulted for evaluation and possible revascularization prior to surgical debridement. Concern that he will have worsening wounds if not re-vascularized  - CTA ordered by Dr. Purcell  - Start wound care with wet to dry Dakins for rehydration of wounds and removal of slough  - Due to chronicity of wounds, Xrays of b/l feet and b/l Tib/fib obtained with no evidence of osteomyelitis  - Abx per primary team for cellulitis  - Wound culture pending      Wound  care:   -Wound care orders placed for nursing  - b/l LE wet to dry with Dakins BID    Imaging/Labs:  -COVID-19: not completed    Vascular status:   -Non palpable pulses, Monophasic b/l PT/DP  - Vascular surgery consulted and discussed with Dr. Purcell  - CTA with run off pending    Surgery:   - Suspect patient will need re-vascularization by vascular prior to surgical debridement    Antibiotics:   -currently on antibiotics managed by hospitalist     Weight Bearing Status:   -Weight bearing as tolerated    Offloading:   -None; May need depending on surgical intervention  -Orthotic company: None    PT/OT:   -consult placed      Diabetes Education:   - consult CDE and RD.     - Implications of loss of protective sensation (LOPS) discussed with patient- including increased risk for amputation.  Advised to check feet at least daily, moisturize feet, and to always wear protective foot wear.   -avoid trimming own nails. See podiatrist or certified foot and nail RN  -keep blood sugars <150 for improved wound healing      Discharge Plan:  TBD  Will depend on extent of surgical intervention    Follow up: wound care clinic referral placed  Follow up: Will determine need and timing of LPS clinic follow up pending surgical intervention    D/W: pt, RN, Dr. Purcell, Dr. Hu    Please note that this dictation was created using voice recognition software. I have  worked with technical experts from [employer] to optimize the interface.  I have made every reasonable attempt to correct obvious errors, but there may be errors of grammar and possibly content that I did not discover before finalizing the note.    Please contact [facility] through [employer].

## 2021-10-08 NOTE — H&P
Hospital Medicine History & Physical Note    Date of Service  10/8/2021    Primary Care Physician  Pcp Pt States None    Consultants  None    Code Status  Full Code    Chief Complaint  Chief Complaint   Patient presents with   • Wound Infection     bilateral leg wounds. pt states he has had them for years but seem to be getting worse        History of Presenting Illness  71M with diabetes, Hx Hep C, MI, Parathyroid disorder, substance abuse, LLE was recommended to present to ED after being seen by PCP on 10/5/21 for worsening diabetic wounds for past 6 months with an acceleration of its worsening the past month. Patient lives remotely in [facility] and has not had consistent wound care. Patient denies fevers, chills, nausea, vomiting, night sweats, hot flashes, dysuria, abdominal pain, but has had ongoing lower extremity neuropathy consistent with his advanced state of diabetes for which he is compliant with his gabapentin. Patient denies any other complaints.    Patient at time of admission mild leukocytosis, ANC elevation, INR 1.09, crp 6.25 and  has glucose 187. LE Xray tibia fibula did not show overt Osteomyelitis.    Will admit for diabetic infected ulcers and sepsis.    I discussed the plan of care with patient.    Review of Systems  ROS  All systems reviewed and negative except as noted in HPI.    Past Medical History   has a past medical history of Arthritis, Asthma, At risk for sleep apnea, Breath shortness, Chicken pox, Diabetes (HCC), Heart disease, Hepatitis C, Hyperlipidemia, Measles, Muscle disorder, Myocardial infarct (HCC), Parathyroid disorder (HCC), Snoring, Substance abuse (HCC), and Urinary tract infection.    Surgical History   has a past surgical history that includes knee replacement, total (Bilateral); other surgical procedure; pr unlisted proc, arthroscopy (Left, 7/25/2019); and toe amputation (Left, 2/17/2020).     Family History  family history includes Arthritis in his father; Cancer in  his father; Diabetes in an other family member; Heart Disease in his father.   Family history reviewed with patient. There is family history that is pertinent to the chief complaint.     Social History   reports that he has been smoking cigarettes. He has a 27.00 pack-year smoking history. He has quit using smokeless tobacco. He reports previous alcohol use. He reports current drug use. Drug: Inhaled.    Allergies  No Known Allergies    Medications  Prior to Admission Medications   Prescriptions Last Dose Informant Patient Reported? Taking?   aspirin (ASA) 325 MG Tab 10/7/2021 at am Patient Yes No   Sig: Take 325 mg by mouth every day.   bisacodyl (DULCOLAX) 10 MG Suppos prn at prn  No No   Sig: Insert 1 Suppository in rectum as needed (if no BM from MOM).   carvedilol (COREG) 12.5 MG Tab 10/7/2021 at am Patient Yes No   Sig: Take 12.5 mg by mouth 2 times a day, with meals.   diclofenac (CATAFLAM) 50 MG tablet 10/7/2021 at am  Yes No   Sig: Take 50 mg by mouth 2 (two) times a day. TAKE 1 TABLET BY MOUTH TWICE A DAY   docusate sodium (COLACE) 100 MG Cap 10/7/2021 at am Patient Yes No   Sig: Take 100 mg by mouth 2 times a day.   furosemide (LASIX) 80 MG Tab 10/7/2021 at am Patient Yes No   Sig: Take 80 mg by mouth every day.   gabapentin (NEURONTIN) 600 MG tablet 10/7/2021 at am  No No   Sig: Take 2 Tabs by mouth 2 times a day.   glucagon 1 MG Recon Soln prn at prn  No No   Si mg by Intramuscular route as needed (FSBG BELOW 70 MG/DL AND UNABLE TO SWALLOW WITH NO IV ACCESS.).   hydroCHLOROthiazide (HYDRODIURIL) 25 MG Tab 10/7/2021 at am  Yes No   Sig: Take 25 mg by mouth every day.   insulin glargine (LANTUS) 100 UNIT/ML Solution 10/5/2021 at pm  Yes Yes   Sig: Inject 40 Units under the skin every evening.   lisinopril (PRINIVIL) 5 MG Tab 10/7/2021 at am Patient Yes No   Sig: Take 5 mg by mouth every day.   metformin (GLUCOPHAGE) 1000 MG tablet 10/7/2021 at am  No No   Sig: Take 1 tablet by mouth 2 times a day.    oxyCODONE-acetaminophen (PERCOCET-10)  MG Tab 10/6/2021 at Unknown time  Yes No   Sig: Take 1 Tab by mouth every four hours as needed for Severe Pain.   traZODone (DESYREL) 150 MG Tab 10/6/2021 at pmtime Patient Yes No   Sig: Take 450 mg by mouth every evening.      Facility-Administered Medications: None       Physical Exam  Temp:  [36.3 °C (97.3 °F)-36.9 °C (98.5 °F)] 36.6 °C (97.8 °F)  Pulse:  [] 97  Resp:  [17-22] 22  BP: (107-174)/() 107/60  SpO2:  [92 %-96 %] 92 %  Blood Pressure : (!) 166/81   Temperature: 36.3 °C (97.3 °F)   Pulse: 100   Respiration: (!) 22   Pulse Oximetry: 96 %       Physical Exam    Constitutional: Resting comfortably in NAD   HENT: Normocephalic, no obvious evidence of acute trauma.  Eyes: No scleral icterus. Normal conjunctiva   Neck: Comfortable movement without any obvious restriction in the range of motion.  Cardiovascular: Upon ascultation I appreciate a tachycardic heart rhythm and a normal rate with no murmurs, rubs or gallops  Thorax & Lungs: No respiratory distress. No wheezing, rales or rhonchi heard on ausculation.  there is no obvious chest wall tenderness. I appreciate normal air movement throughout.   Abdomen: The abdomen is not visibly distended but morbidly obese. Upon palpation, I find it to be without tenderness.  No mass appreciated.  Skin: The exposed portions of skin reveal no obvious rash or other abnormalities.  Extremities/Musculoskeletal: Lower extremity edema b/l Left >Right, with multiple infected diabetic ulcer and vascular sites including digits, stumps (only Digit #1 and #5 present on LLE), lateral calf (5 cm diameter), and medially. Malodorous and erythematous left > right. Palpable dorsal pulses b/l warm extremities.  Neurologic: Alert & oriented. No focal deficits observed.   Psychiatric: Normal affect appropriate for the clinical situation.      Laboratory:  Recent Labs     10/07/21  2030 10/08/21  0426   WBC 11.0* 10.0   RBC 5.31  4.90   HEMOGLOBIN 15.5 14.5   HEMATOCRIT 46.4 44.9   MCV 87.4 91.6   MCH 29.2 29.6   MCHC 33.4* 32.3*   RDW 43.6 45.9   PLATELETCT 308 259   MPV 9.9 9.9     Recent Labs     10/07/21  2030 10/08/21  0426   SODIUM 135 136   POTASSIUM 3.7 3.7   CHLORIDE 98 102   CO2 24 20   GLUCOSE 187* 158*   BUN 8 7*   CREATININE 0.47* 0.39*   CALCIUM 9.8 9.1     Recent Labs     10/07/21  2030 10/08/21  0426   ALTSGPT 19 15   ASTSGOT 20 22   ALKPHOSPHAT 102* 87   TBILIRUBIN 0.6 0.7   GLUCOSE 187* 158*     Recent Labs     10/07/21  2030   INR 1.09     No results for input(s): NTPROBNP in the last 72 hours.      No results for input(s): TROPONINT in the last 72 hours.    Imaging:  DX-TIBIA AND FIBULA LEFT   Final Result         1.  No acute traumatic bony injury.   2.  Soft tissue erosion of the posterior mid calf, corresponding with history of soft tissue ulcers.      US-EXTREMITY VENOUS LOWER UNILAT LEFT    (Results Pending)   DX-FOOT-COMPLETE 3+ RIGHT    (Results Pending)   US-EXTREMITY ARTERY LOWER BILAT W/MIA (COMBO)    (Results Pending)       X-Ray:  I have personally reviewed the images and compared with prior images.    Assessment/Plan:  I anticipate this patient will require at least two midnights for appropriate medical management, necessitating inpatient admission.    Sepsis due to group A Streptococcus (HCC)- (present on admission)  Assessment & Plan  This is Sepsis Present on admission  SIRS criteria identified on my evaluation include: Tachycardia, with heart rate greater than 90 BPM, Tachypnea, with respirations greater than 20 per minute and Bandemia, greater than 10% bands  Source is diabetic infected ulcers and cellulitis of Lower extremities  Sepsis protocol initiated  Fluid resuscitation ordered per protocol  IV antibiotics as appropriate for source of sepsis  While organ dysfunction may be noted elsewhere in this problem list or in the chart, degree of organ dysfunction does not meet CMS criteria for severe  sepsis    Diabetic infection may include other organisms such as staph. Will cover with unasyn vanc and obtain bcx, wcx          Uncontrolled type 2 diabetes mellitus (HCC)- (present on admission)  Assessment & Plan  ISS + Basal  A1c  Hypoglycemia protocol      Diabetic infection of left foot (HCC)- (present on admission)  Assessment & Plan  Unasyn + Vancomycin  Wound care  Wound culture  Sepsis protocol  Ortho consult in AM for possible debridement    Diabetic infection of right foot (HCC)- (present on admission)  Assessment & Plan  As per left lower extremity plan    Peripheral neuropathy (HCC)- (present on admission)  Assessment & Plan  Cont home pain medications    Hyperlipidemia- (present on admission)  Assessment & Plan  F/u lipid panel outpatient      VTE prophylaxis: SCDs/TEDs and pharmacologic prophylaxis contraindicated due to possible OR

## 2021-10-08 NOTE — PROGRESS NOTES
Pharmacy Vancomycin Kinetics Note for 10/8/2021     71 y.o. male on Vancomycin day # 2     Vancomycin Indication (AUC Dosing): Skin/skin structure infection  Vancomycin Indication (Two level/Trough based Dosing): Skin/skin structure infection (goal trough 10-15)    Provider specified end date: 10/17/21    Active Antibiotics (From admission, onward)    Ordered     Ordering Provider       Fri Oct 8, 2021  2:03 AM    10/08/21 0203  vancomycin (VANCOCIN) 1,250 mg in  mL IVPB  (vancomycin (VANCOCIN) IV (LD + Maintenance))  EVERY 12 HOURS         Davi Mendoza M.D.       Fri Oct 8, 2021 12:14 AM    10/08/21 0014  ampicillin/sulbactam (UNASYN) 3 g in  mL IVPB  (Diabetic Foot Infection (Simple Sepsis))  EVERY 6 HOURS         Davi Mendoza M.D.    10/08/21 0014  MD Alert...Vancomycin per Pharmacy  (Diabetic Foot Infection (Simple Sepsis))  PHARMACY TO DOSE        Question:  Indication(s) for vancomycin?  Answer:  Skin and soft tissue infection    Davi Mendoza M.D.       Thu Oct 7, 2021  8:56 PM    10/07/21 2056  vancomycin (VANCOCIN) 2,500 mg in  mL IVPB  (vancomycin (VANCOCIN) IV (LD + Maintenance))  ONCE         Dany Arnold M.D.          Dosing Weight: 102 kg (224 lb 13.9 oz)      Admission History: Admitted on 10/7/2021 for Cellulitis of left lower extremity [L03.116]  Pertinent history: Pt presents w/ worsening bilateral LE wounds he states he was had for years.  SIRS (leukocytosis, tachycardia, tachypnea),a febrile, elevated CRP on admit.  Pt has a PMH of T2DM, Hepatitis C (LFTs WNL), and wound cultures positive for Enterococcus faecalis and Bordetella hinzii last March.    Allergies:     Patient has no known allergies.     Pertinent cultures to date:     Results     Procedure Component Value Units Date/Time    MRSA By PCR (Amp) [[id number]]     Order Status: No result Specimen: Respirate from Nares     Urinalysis [[id number]]     Order Status: Sent Specimen: Urine     Blood Culture  "[[id number]]     Order Status: Sent Specimen: Blood from Peripheral     Blood Culture [[id number]]     Order Status: Sent Specimen: Blood from Peripheral     BLOOD CULTURE x2 [[id number]] Collected: 10/07/21 2118    Order Status: Completed Specimen: Blood from Peripheral Updated: 10/07/21 2143    Narrative:      Per Hospital Policy: Only change Specimen Src: to \"Line\" if  specified by physician order.    BLOOD CULTURE x2 [[id number]] Collected: 10/07/21 2118    Order Status: Completed Specimen: Blood from Peripheral Updated: 10/07/21 2140    Narrative:      Per Hospital Policy: Only change Specimen Src: to \"Line\" if  specified by physician order.          Labs:     Estimated Creatinine Clearance: 166.5 mL/min (A) (by C-G formula based on SCr of 0.47 mg/dL (L)).  Recent Labs     10/07/21  2030   WBC 11.0*   NEUTSPOLYS 79.40*     Recent Labs     10/07/21  2030   BUN 8   CREATININE 0.47*   ALBUMIN 4.2       Intake/Output Summary (Last 24 hours) at 10/8/2021 0213  Last data filed at 10/8/2021 0125  Gross per 24 hour   Intake 500 ml   Output --   Net 500 ml      BP (!) 166/77   Pulse (!) 103   Temp 36.3 °C (97.3 °F) (Temporal)   Resp (!) 21   Ht 1.702 m (5' 7\")   Wt 102 kg (225 lb)   SpO2 92%  Temp (24hrs), Av.6 °C (97.9 °F), Min:36.3 °C (97.3 °F), Max:36.9 °C (98.5 °F)      List concerns for Vancomycin clearance:     Obesity;Nephrotoxic drugs;Age;Hypermetabolic State (SIRS)    Pharmacokinetics:     AUC kinetics:   Ke (hr ^-1): 0.1214 hr^-1  Half life: 5.71 hr  Clearance: 5.164  Estimated TDD: 2582  Estimated Dose: 828  Estimated interval: 7.7    A/P:     -  Vancomycin dose: 1250mg every 12 hours    -  Next vancomycin level(s):    -10/10  @ 0000   -10/10 Vt @ 0830     -  Predicted vancomycin AUC from initial AUC test calculator: 484 mg·hr/L    -  Comments: 2500mg loading dose given at 2136.  PBC, UA MRSA nare swab, LA trend pending.  Pharmacy will continue to monitor.     Jacob Lucero, PharmD  "

## 2021-10-08 NOTE — PROGRESS NOTES
[facility] Medicine Daily Progress Note    Date of Service  10/8/2021    Chief Complaint  Luis Kellogg is a 71 y.o. male admitted 10/7/2021 with bilateral leg wounds    Hospital Course  This is a 71 year old male with PMHx of hyperlipidemia, type 2 diabetes with A1c of 9.1, obesity, CAD, hx MI,PAD, history of lower extremity osteomyelitis and recurrent LLE cellulitis who was admitted for sepsis secondary to BLE diabetic, nonhealing wounds and cellulitis.    Patient has known history of uncontrolled type 2 diabetes, he has had prior admissions for cellulitis and osteomyelitis.  Patient does have history of PAD.  Venous dopplers negative for DVT and arterial Dopplers ordered.  X-ray of the left tib-fib, no concern for osteomyelitis.  Wound care consulted. LPS consulted.    Wound cultures from March 20, 2021, noted E. Faecalis sensitive to ampicillin, Cipro, Levaquin and vancomycin.  Patient is currently on Unasyn and vancomycin.    Interval Problem Update  Patient does have history of PAD, previous MIA was performed in June 2021, showed mild to moderate disease.    Repeat ABIs ordered given the extent of nonhealing chronic wounds.  Patient was formally established with outpatient wound care, however he stopped going due to dissatisfaction with the service.    LPS and wound team consulted, patient to be kept n.p.o. until repeat ABIs are performed.  Patient will likely require debridement.    Currently on Unasyn and vancomycin.    I have personally seen and examined the patient at bedside. I discussed the plan of care with patient and bedside RN.    Consultants/Specialty  LPS    Code Status  Full Code    Disposition  Patient is not medically cleared.   Anticipate discharge to TBD.  I have placed the appropriate orders for post-discharge needs.    Review of Systems  Review of Systems   All other systems reviewed and are negative.    Physical Exam  Temp:  [36.3 °C (97.3 °F)-36.9 °C (98.5 °F)] 36.7 °C (98 °F)  Pulse:   [] 87  Resp:  [17-22] 17  BP: (107-174)/() 121/74  SpO2:  [92 %-96 %] 92 %    Physical Exam  Vitals and nursing note reviewed.   Constitutional:       General: He is not in acute distress.     Appearance: He is obese.   HENT:      Head: Normocephalic and atraumatic.   Eyes:      Extraocular Movements: Extraocular movements intact.      Conjunctiva/sclera: Conjunctivae normal.      Pupils: Pupils are equal, round, and reactive to light.   Cardiovascular:      Rate and Rhythm: Normal rate and regular rhythm.      Pulses: Normal pulses.      Heart sounds: No murmur heard.   No friction rub. No gallop.    Pulmonary:      Effort: Pulmonary effort is normal. No respiratory distress.      Breath sounds: Normal breath sounds. No wheezing, rhonchi or rales.   Abdominal:      General: Bowel sounds are normal. There is no distension.      Palpations: Abdomen is soft.      Tenderness: There is no abdominal tenderness.   Musculoskeletal:         General: No swelling or tenderness. Normal range of motion.      Cervical back: Normal range of motion and neck supple. No muscular tenderness.      Right lower leg: No edema.      Left lower leg: No edema.      Comments: Bilateral lower extremity cellulitis, with deep, purulent open wounds on the lateral aspects of bilateral legs, foul-smelling pus   Skin:     General: Skin is warm and dry.      Capillary Refill: Capillary refill takes less than 2 seconds.      Findings: No bruising, erythema or rash.   Neurological:      General: No focal deficit present.      Mental Status: He is alert and oriented to person, place, and time.       Fluids    Intake/Output Summary (Last 24 hours) at 10/8/2021 1005  Last data filed at 10/8/2021 0240  Gross per 24 hour   Intake 600 ml   Output --   Net 600 ml       Laboratory  Recent Labs     10/07/21  2030 10/08/21  0426   WBC 11.0* 10.0   RBC 5.31 4.90   HEMOGLOBIN 15.5 14.5   HEMATOCRIT 46.4 44.9   MCV 87.4 91.6   MCH 29.2 29.6   MCHC  33.4* 32.3*   RDW 43.6 45.9   PLATELETCT 308 259   MPV 9.9 9.9     Recent Labs     10/07/21  2030 10/08/21  0426   SODIUM 135 136   POTASSIUM 3.7 3.7   CHLORIDE 98 102   CO2 24 20   GLUCOSE 187* 158*   BUN 8 7*   CREATININE 0.47* 0.39*   CALCIUM 9.8 9.1     Recent Labs     10/07/21  2030   INR 1.09         Recent Labs     10/08/21  0426   TRIGLYCERIDE 120   HDL 28*   LDL 69       Imaging  US-EXTREMITY VENOUS LOWER UNILAT LEFT   Final Result      DX-TIBIA AND FIBULA LEFT   Final Result         1.  No acute traumatic bony injury.   2.  Soft tissue erosion of the posterior mid calf, corresponding with history of soft tissue ulcers.      US-MIA SINGLE LEVEL BILAT    (Results Pending)        Assessment/Plan  * Sepsis- (present on admission)  Assessment & Plan  This is Sepsis Present on admission  SIRS criteria identified on my evaluation include: Tachycardia, with heart rate greater than 90 BPM, Tachypnea, with respirations greater than 20 per minute and Bandemia, greater than 10% bands  Source is diabetic infected ulcers and cellulitis of Lower extremities  Sepsis protocol initiated  Fluid resuscitation ordered per protocol  IV antibiotics as appropriate for source of sepsis  While organ dysfunction may be noted elsewhere in this problem list or in the chart, degree of organ dysfunction does not meet CMS criteria for severe sepsis      Diabetic infection of right foot (HCC)- (present on admission)  Assessment & Plan  As per left lower extremity plan    Diabetic infection of left foot (HCC)- (present on admission)  Assessment & Plan  Patient has known history of uncontrolled type 2 diabetes, he has had prior admissions for cellulitis and osteomyelitis.  Patient does have history of PAD.  Venous dopplers negative for DVT and arterial Dopplers ordered.    X-ray of the left tib-fib, no concern for osteomyelitis.    Wound care consulted. LPS consulted.    Wound cultures from March 20, 2021, noted E. Faecalis sensitive to  ampicillin, Cipro, Levaquin and vancomycin.  Patient is currently on Unasyn and vancomycin.    Uncontrolled type 2 diabetes mellitus (HCC)- (present on admission)  Assessment & Plan  ISS + Basal  A1c 9.1  Hypoglycemia protocol    Coronary artery disease involving native coronary artery - lexiscan 2/14/2020 - EF 60%  with fixed prominent defecct of interior and lateral walls, no stress induced ischemia, mild global hypokinesis- (present on admission)  Assessment & Plan  Continue with aspirin and statin therapy    Peripheral neuropathy (HCC)- (present on admission)  Assessment & Plan  Cont home pain medications    Hyperlipidemia- (present on admission)  Assessment & Plan  F/u lipid panel outpatient       VTE prophylaxis: SCDs/TEDs and enoxaparin ppx    I have performed a physical exam and reviewed and updated ROS and Plan today (10/8/2021). In review of yesterday's note (10/7/2021), there are no changes except as documented above.

## 2021-10-09 ENCOUNTER — APPOINTMENT (OUTPATIENT)
Dept: RADIOLOGY | Facility: MEDICAL CENTER | Age: 71
DRG: 854 | End: 2021-10-09
Attending: SURGERY
Payer: MEDICARE

## 2021-10-09 LAB
ANION GAP SERPL CALC-SCNC: 11 MMOL/L (ref 7–16)
BUN SERPL-MCNC: 11 MG/DL (ref 8–22)
CALCIUM SERPL-MCNC: 9 MG/DL (ref 8.5–10.5)
CHLORIDE SERPL-SCNC: 101 MMOL/L (ref 96–112)
CO2 SERPL-SCNC: 22 MMOL/L (ref 20–33)
CREAT SERPL-MCNC: 0.45 MG/DL (ref 0.5–1.4)
GLUCOSE BLD-MCNC: 134 MG/DL (ref 65–99)
GLUCOSE BLD-MCNC: 160 MG/DL (ref 65–99)
GLUCOSE BLD-MCNC: 192 MG/DL (ref 65–99)
GLUCOSE SERPL-MCNC: 221 MG/DL (ref 65–99)
POTASSIUM SERPL-SCNC: 3.2 MMOL/L (ref 3.6–5.5)
SODIUM SERPL-SCNC: 134 MMOL/L (ref 135–145)

## 2021-10-09 PROCEDURE — A9270 NON-COVERED ITEM OR SERVICE: HCPCS | Performed by: GENERAL PRACTICE

## 2021-10-09 PROCEDURE — 700102 HCHG RX REV CODE 250 W/ 637 OVERRIDE(OP): Performed by: GENERAL PRACTICE

## 2021-10-09 PROCEDURE — 99233 SBSQ HOSP IP/OBS HIGH 50: CPT | Performed by: GENERAL PRACTICE

## 2021-10-09 PROCEDURE — 82962 GLUCOSE BLOOD TEST: CPT | Mod: 91

## 2021-10-09 PROCEDURE — A9270 NON-COVERED ITEM OR SERVICE: HCPCS | Performed by: STUDENT IN AN ORGANIZED HEALTH CARE EDUCATION/TRAINING PROGRAM

## 2021-10-09 PROCEDURE — 80048 BASIC METABOLIC PNL TOTAL CA: CPT

## 2021-10-09 PROCEDURE — 97535 SELF CARE MNGMENT TRAINING: CPT | Performed by: PHYSICAL THERAPIST

## 2021-10-09 PROCEDURE — 700117 HCHG RX CONTRAST REV CODE 255: Performed by: SURGERY

## 2021-10-09 PROCEDURE — 36415 COLL VENOUS BLD VENIPUNCTURE: CPT

## 2021-10-09 PROCEDURE — 770006 HCHG ROOM/CARE - MED/SURG/GYN SEMI*

## 2021-10-09 PROCEDURE — 700102 HCHG RX REV CODE 250 W/ 637 OVERRIDE(OP): Performed by: STUDENT IN AN ORGANIZED HEALTH CARE EDUCATION/TRAINING PROGRAM

## 2021-10-09 PROCEDURE — 700111 HCHG RX REV CODE 636 W/ 250 OVERRIDE (IP): Performed by: STUDENT IN AN ORGANIZED HEALTH CARE EDUCATION/TRAINING PROGRAM

## 2021-10-09 PROCEDURE — 700105 HCHG RX REV CODE 258: Performed by: STUDENT IN AN ORGANIZED HEALTH CARE EDUCATION/TRAINING PROGRAM

## 2021-10-09 PROCEDURE — 75635 CT ANGIO ABDOMINAL ARTERIES: CPT | Mod: MF

## 2021-10-09 PROCEDURE — 700101 HCHG RX REV CODE 250: Performed by: GENERAL PRACTICE

## 2021-10-09 RX ORDER — POTASSIUM CHLORIDE 20 MEQ/1
40 TABLET, EXTENDED RELEASE ORAL ONCE
Status: COMPLETED | OUTPATIENT
Start: 2021-10-09 | End: 2021-10-10

## 2021-10-09 RX ADMIN — OXYCODONE HYDROCHLORIDE AND ACETAMINOPHEN 2 TABLET: 5; 325 TABLET ORAL at 16:30

## 2021-10-09 RX ADMIN — INSULIN GLARGINE 20 UNITS: 100 INJECTION, SOLUTION SUBCUTANEOUS at 18:00

## 2021-10-09 RX ADMIN — ASPIRIN 325 MG ORAL TABLET 325 MG: 325 PILL ORAL at 04:14

## 2021-10-09 RX ADMIN — DOCUSATE SODIUM 50 MG AND SENNOSIDES 8.6 MG 2 TABLET: 8.6; 5 TABLET, FILM COATED ORAL at 16:46

## 2021-10-09 RX ADMIN — LISINOPRIL 5 MG: 5 TABLET ORAL at 04:14

## 2021-10-09 RX ADMIN — SODIUM CHLORIDE 3 G: 900 INJECTION INTRAVENOUS at 00:18

## 2021-10-09 RX ADMIN — DICLOFENAC SODIUM 50 MG: 25 TABLET, DELAYED RELEASE ORAL at 20:53

## 2021-10-09 RX ADMIN — SODIUM CHLORIDE 3 G: 900 INJECTION INTRAVENOUS at 18:00

## 2021-10-09 RX ADMIN — IOHEXOL 100 ML: 350 INJECTION, SOLUTION INTRAVENOUS at 12:00

## 2021-10-09 RX ADMIN — CARVEDILOL 12.5 MG: 12.5 TABLET, FILM COATED ORAL at 09:03

## 2021-10-09 RX ADMIN — CARVEDILOL 12.5 MG: 12.5 TABLET, FILM COATED ORAL at 16:45

## 2021-10-09 RX ADMIN — GABAPENTIN 300 MG: 300 CAPSULE ORAL at 04:15

## 2021-10-09 RX ADMIN — DICLOFENAC SODIUM 50 MG: 25 TABLET, DELAYED RELEASE ORAL at 09:03

## 2021-10-09 RX ADMIN — FUROSEMIDE 80 MG: 40 TABLET ORAL at 04:14

## 2021-10-09 RX ADMIN — TRAZODONE HYDROCHLORIDE 450 MG: 150 TABLET ORAL at 20:53

## 2021-10-09 RX ADMIN — SODIUM CHLORIDE 3 G: 900 INJECTION INTRAVENOUS at 05:30

## 2021-10-09 RX ADMIN — VANCOMYCIN HYDROCHLORIDE 1250 MG: 500 INJECTION, POWDER, LYOPHILIZED, FOR SOLUTION INTRAVENOUS at 20:55

## 2021-10-09 RX ADMIN — OXYCODONE HYDROCHLORIDE AND ACETAMINOPHEN 2 TABLET: 5; 325 TABLET ORAL at 10:00

## 2021-10-09 RX ADMIN — VANCOMYCIN HYDROCHLORIDE 1250 MG: 500 INJECTION, POWDER, LYOPHILIZED, FOR SOLUTION INTRAVENOUS at 09:04

## 2021-10-09 RX ADMIN — GABAPENTIN 300 MG: 300 CAPSULE ORAL at 16:46

## 2021-10-09 RX ADMIN — DAKIN'S SOLUTION 0.125% (QUARTER STRENGTH) 1 ML: 0.12 SOLUTION at 04:15

## 2021-10-09 RX ADMIN — OXYCODONE HYDROCHLORIDE AND ACETAMINOPHEN 2 TABLET: 5; 325 TABLET ORAL at 03:44

## 2021-10-09 RX ADMIN — INSULIN LISPRO 2 UNITS: 100 INJECTION, SOLUTION INTRAVENOUS; SUBCUTANEOUS at 00:18

## 2021-10-09 ASSESSMENT — PAIN DESCRIPTION - PAIN TYPE
TYPE: ACUTE PAIN
TYPE: ACUTE PAIN

## 2021-10-09 ASSESSMENT — PATIENT HEALTH QUESTIONNAIRE - PHQ9
2. FEELING DOWN, DEPRESSED, IRRITABLE, OR HOPELESS: NOT AT ALL
SUM OF ALL RESPONSES TO PHQ9 QUESTIONS 1 AND 2: 0
1. LITTLE INTEREST OR PLEASURE IN DOING THINGS: NOT AT ALL

## 2021-10-09 NOTE — PROGRESS NOTES
Hospital Medicine Daily Progress Note    Date of Service  10/9/2021    Chief Complaint  Luis Kellogg is a 71 y.o. male admitted 10/7/2021 with bilateral leg wounds    Hospital Course  This is a 71 year old male with PMHx of hyperlipidemia, type 2 diabetes with A1c of 9.1, obesity, CAD, hx MI,PAD, history of lower extremity osteomyelitis and recurrent LLE cellulitis who was admitted for sepsis secondary to BLE diabetic, nonhealing wounds and cellulitis.    Patient has known history of uncontrolled type 2 diabetes, he has had prior admissions for cellulitis and osteomyelitis.  Patient does have history of PAD.  Venous dopplers negative for DVT and arterial Dopplers ordered.  X-ray of the left tib-fib, no concern for osteomyelitis.  Wound care consulted. LPS consulted.    Wound cultures from March 20, 2021, noted E. Faecalis sensitive to ampicillin, Cipro, Levaquin and vancomycin.  Patient is currently on Unasyn and vancomycin.    Interval Problem Update  Patient does have history of PAD, previous MIA was performed in June 2021, showed mild to moderate disease. Repeat MIA noted significant worsening disease.  Vascular surgery consulted, patient is for CTA today, plans for revascularization prior to surgical intervention for the wounds.    Currently on Unasyn and vancomycin (MRSA nares +).    I have personally seen and examined the patient at bedside. I discussed the plan of care with patient and bedside RN.    Consultants/Specialty  LPS    Code Status  Full Code    Disposition  Patient is not medically cleared.   Anticipate discharge to TBD.  I have placed the appropriate orders for post-discharge needs.    Review of Systems  Review of Systems   All other systems reviewed and are negative.    Physical Exam  Temp:  [36.4 °C (97.5 °F)-36.8 °C (98.2 °F)] 36.4 °C (97.6 °F)  Pulse:  [70-92] 70  Resp:  [16-18] 18  BP: (102-137)/(53-84) 114/78  SpO2:  [93 %-94 %] 94 %    Physical Exam  Vitals and nursing note reviewed.    Constitutional:       General: He is not in acute distress.     Appearance: He is obese.   HENT:      Head: Normocephalic and atraumatic.   Eyes:      Extraocular Movements: Extraocular movements intact.      Conjunctiva/sclera: Conjunctivae normal.      Pupils: Pupils are equal, round, and reactive to light.   Cardiovascular:      Rate and Rhythm: Normal rate and regular rhythm.      Pulses: Normal pulses.      Heart sounds: No murmur heard.   No friction rub. No gallop.    Pulmonary:      Effort: Pulmonary effort is normal. No respiratory distress.      Breath sounds: Normal breath sounds. No wheezing, rhonchi or rales.   Abdominal:      General: Bowel sounds are normal. There is no distension.      Palpations: Abdomen is soft.      Tenderness: There is no abdominal tenderness.   Musculoskeletal:         General: No swelling or tenderness. Normal range of motion.      Cervical back: Normal range of motion and neck supple. No muscular tenderness.      Right lower leg: No edema.      Left lower leg: No edema.      Comments: Bilateral lower extremity cellulitis, with deep, purulent open wounds on the lateral aspects of bilateral legs, foul-smelling pus   Skin:     General: Skin is warm and dry.      Capillary Refill: Capillary refill takes less than 2 seconds.      Findings: No bruising, erythema or rash.   Neurological:      General: No focal deficit present.      Mental Status: He is alert and oriented to person, place, and time.       Fluids    Intake/Output Summary (Last 24 hours) at 10/9/2021 0914  Last data filed at 10/9/2021 0500  Gross per 24 hour   Intake 590 ml   Output 1200 ml   Net -610 ml       Laboratory  Recent Labs     10/07/21  2030 10/08/21  0426   WBC 11.0* 10.0   RBC 5.31 4.90   HEMOGLOBIN 15.5 14.5   HEMATOCRIT 46.4 44.9   MCV 87.4 91.6   MCH 29.2 29.6   MCHC 33.4* 32.3*   RDW 43.6 45.9   PLATELETCT 308 259   MPV 9.9 9.9     Recent Labs     10/07/21  2030 10/08/21  0426   SODIUM 135 136    POTASSIUM 3.7 3.7   CHLORIDE 98 102   CO2 24 20   GLUCOSE 187* 158*   BUN 8 7*   CREATININE 0.47* 0.39*   CALCIUM 9.8 9.1     Recent Labs     10/07/21  2030   INR 1.09         Recent Labs     10/08/21  0426   TRIGLYCERIDE 120   HDL 28*   LDL 69       Imaging  DX-FOOT-COMPLETE 3+ LEFT   Final Result         1.  Disuse osteoporosis of the left foot.      2. Large subcutaneous ulcer on the dorsal surface of left foot.      3. Previous amputations of the left second through fourth toes.      DX-FOOT-COMPLETE 3+ RIGHT   Final Result      1.  No fracture or dislocation of RIGHT foot.   2.  Periarticular osteopenia suggests inflammatory arthropathy.   3.  No soft tissue gas or focal bony destruction however osteomyelitis is not excluded by plain film.   4.  Severe degenerative change of ankle joint.      DX-TIBIA AND FIBULA RIGHT   Final Result      No evidence of fracture or dislocation or acute osteomyelitis.      US-EXTREMITY ARTERY LOWER BILAT   Final Result      US-MIA SINGLE LEVEL BILAT   Final Result      US-EXTREMITY VENOUS LOWER UNILAT LEFT   Final Result      DX-TIBIA AND FIBULA LEFT   Final Result         1.  No acute traumatic bony injury.   2.  Soft tissue erosion of the posterior mid calf, corresponding with history of soft tissue ulcers.      CT-CTA AORTA-RO WITH & W/O-POST PROCESS    (Results Pending)        Assessment/Plan  * Sepsis- (present on admission)  Assessment & Plan  This is Sepsis Present on admission  SIRS criteria identified on my evaluation include: Tachycardia, with heart rate greater than 90 BPM, Tachypnea, with respirations greater than 20 per minute and Bandemia, greater than 10% bands  Source is diabetic infected ulcers and cellulitis of Lower extremities  Sepsis protocol initiated  Fluid resuscitation ordered per protocol  IV antibiotics as appropriate for source of sepsis  While organ dysfunction may be noted elsewhere in this problem list or in the chart, degree of organ dysfunction  does not meet CMS criteria for severe sepsis      Diabetic infection of right foot (HCC)- (present on admission)  Assessment & Plan  As per left lower extremity plan    Peripheral artery disease (HCC) - (present on admission)  Assessment & Plan  Patient does have history of PAD, previous MIA was performed in June 2021, showed mild to moderate disease. Repeat MIA noted significant worsening disease.    Vascular surgery consulted, patient is for CTA today, plans for revascularization prior to surgical intervention for the wounds.    Diabetic infection of left foot (HCC)- (present on admission)  Assessment & Plan  Patient has known history of uncontrolled type 2 diabetes, he has had prior admissions for cellulitis and osteomyelitis.  Patient does have history of PAD.  Venous dopplers negative for DVT and arterial Dopplers ordered.    X-ray of the left tib-fib, no concern for osteomyelitis.    Wound care consulted. LPS consulted.    Wound cultures from March 20, 2021, noted E. Faecalis sensitive to ampicillin, Cipro, Levaquin and vancomycin.  Patient is currently on Unasyn and vancomycin.    Uncontrolled type 2 diabetes mellitus (HCC)- (present on admission)  Assessment & Plan  ISS + Basal  A1c 9.1  Hypoglycemia protocol    Coronary artery disease involving native coronary artery - lexiscan 2/14/2020 - EF 60%  with fixed prominent defecct of interior and lateral walls, no stress induced ischemia, mild global hypokinesis- (present on admission)  Assessment & Plan  Continue with aspirin and statin therapy    Peripheral neuropathy (HCC)- (present on admission)  Assessment & Plan  Cont home pain medications    Hyperlipidemia- (present on admission)  Assessment & Plan  F/u lipid panel outpatient       VTE prophylaxis: SCDs/TEDs and enoxaparin ppx    I have performed a physical exam and reviewed and updated ROS and Plan today (10/9/2021). In review of yesterday's note (10/8/2021), there are no changes except as documented  above.

## 2021-10-09 NOTE — WOUND TEAM
"[facility]  Inpatient Services  Initial Wound and Skin Care Evaluation    Admission Date: 10/7/2021     Last order of IP CONSULT TO WOUND CARE was found on 10/8/2021 from Hospital Encounter on 10/7/2021     HPI, PMH, SH: Reviewed    Past Surgical History:   Procedure Laterality Date   • TOE AMPUTATION Left 2/17/2020    Procedure: LEFT SECOND, THIRD, AND FORTH TOE AMPUTATION;  Surgeon: Alfonso Esteban M.D.;  Location: SURGERY [facility];  Service: Orthopedics   • AK UNLISTED PROC, ARTHROSCOPY Left 7/25/2019    Procedure: LEFT ENDOSCOPIC ULNAR NERVE DECOMPRESSION, LEFT CARPAL TUNNEL RELEASE;  Surgeon: Red Chcaon M.D.;  Location: SURGERY [facility];  Service: Orthopedics   • KNEE REPLACEMENT, TOTAL Bilateral    • OTHER SURGICAL PROCEDURE      back surgery - Unknown      Social History     Tobacco Use   • Smoking status: Current Every Day Smoker     Packs/day: 0.50     Years: 54.00     Pack years: 27.00     Types: Cigarettes   • Smokeless tobacco: Former User   Substance Use Topics   • Alcohol use: Not Currently     Alcohol/week: 0.0 oz     Comment: occas     Chief Complaint   Patient presents with   • Wound Infection     bilateral leg wounds. pt states he has had them for years but seem to be getting worse      Diagnosis: Cellulitis of left lower extremity [L03.116]    Unit where seen by Wound Team: S623/02     WOUND CONSULT/FOLLOW UP RELATED TO:  BLE     WOUND HISTORY:  Patient admitted with infected large wounds to [facility]. States they have been there for 3-4 years and he has had multiple wound care treatments and toe amputations. \"71M with diabetes, Hx Hep C, MI, Parathyroid disorder, substance abuse, LLE was recommended to present to ED after being seen by PCP on 10/5/21 for worsening diabetic wounds for past 6 months with an acceleration of its worsening the past month. Patient lives remotely in [facility] and has not had consistent wound care. Patient denies fevers, chills, nausea, " "vomiting, night sweats, hot flashes, dysuria, abdominal pain, but has had ongoing lower extremity neuropathy consistent with his advanced state of diabetes for which he is compliant with his gabapentin\"    WOUND ASSESSMENT/LDA      Wound 10/08/21 Soft Tissue Necrosis Leg Left POA full thickness to lateral & posterior leg, dorsal foot, heel, toe amputation site (Active)   Wound Image      10/08/21 1500   Site Assessment Brown;Black;Pink;Yellow;Tan 10/08/21 1500   Periwound Assessment Red;Edema;Pink 10/08/21 1500   Margins Unattached edges;Defined edges 10/08/21 1500   Closure Secondary intention 10/08/21 1500   Drainage Amount Small 10/08/21 1500   Drainage Description Purulent 10/08/21 1500   Treatments Cleansed;Site care;Tape change 10/08/21 1500   Wound Cleansing Approved Wound Cleanser 10/08/21 1500   Periwound Protectant Barrier Paste 10/08/21 1500   Dressing Cleansing/Solutions 1/4 Strength Dakin's Solution 10/08/21 1500   Dressing Options Dry Roll Gauze;Absorbent Abdominal Pad;Moist Roll Gauze 10/08/21 1500   Dressing Changed New 10/08/21 1500   Dressing Status Clean;Dry;Intact 10/08/21 1500   Dressing Change/Treatment Frequency Every Shift, and As Needed 10/08/21 1500   NEXT Dressing Change/Treatment Date 10/08/21 10/08/21 1500   NEXT Weekly Photo (Inpatient Only) 10/15/21 10/08/21 1500   Non-staged Wound Description Full thickness 10/08/21 1500   Number of days: 0       Wound 10/08/21 Soft Tissue Necrosis Leg;Pretibial Lateral Right POA full thickness (Active)   Wound Image    10/08/21 1500   Site Assessment Black;Brown;Yellow;Painful;Drainage;Fragile 10/08/21 1500   Periwound Assessment Pink;Red;Edema 10/08/21 1500   Margins Unattached edges;Defined edges 10/08/21 1500   Closure Secondary intention 10/08/21 1500   Drainage Amount Moderate 10/08/21 1500   Drainage Description Purulent 10/08/21 1500   Treatments Cleansed;Site care;Tape change 10/08/21 1500   Wound Cleansing Approved Wound Cleanser 10/08/21 " 1500   Periwound Protectant Barrier Paste 10/08/21 1500   Dressing Cleansing/Solutions 1/4 Strength Dakin's Solution 10/08/21 1500   Dressing Options Dry Roll Gauze;Moist Roll Gauze;Absorbent Abdominal Pad 10/08/21 1500   Dressing Changed New 10/08/21 1500   Dressing Status Clean;Dry;Intact 10/08/21 1500   Dressing Change/Treatment Frequency Every Shift, and As Needed 10/08/21 1500   NEXT Dressing Change/Treatment Date 10/08/21 10/08/21 1500   NEXT Weekly Photo (Inpatient Only) 10/15/21 10/08/21 1500   Non-staged Wound Description Full thickness 10/08/21 1500   WOUND NURSE ONLY - Time Spent with Patient (mins) 60 10/08/21 1500   Number of days: 0        Vascular:    MIA:   MIA Results, Last 30 Days US-MIA SINGLE LEVEL BILAT    Result Date: 10/8/2021  Narrative  Vascular Laboratory  Conclusions  Suspect inflow arterial insufficiency in bilateral lower extremities.  Bilateral arterial duplex scan was performed in accordance with lower  extremity arterial evaluation protocol - see separate report.  MILI HANSON  Age:    71    Gender:     M  MRN:    [id number]  :    1950      BSA:  Exam Date:     10/08/2021 09:53  Room #:     Inpatient  Priority:     Stat  Ht (in):             Wt (lb):  Ordering Physician:        JOSE HORAN  Referring Physician:       JOSE HORAN  Sonographer:               Sally Whipple RDMS                             T  Study Type:                Complete Bilateral  Technical Quality:         Adequate  Indications:     Ulceration of LE  CPT Codes:       18877  ICD Codes:       707.1  History:         Peripheral Vascular Disease, Nonhealing wound,  Limitations:                 RIGHT  Waveform            Systolic BPs (mmHg)                                           Brachial  Monophasic                               Common Femoral  Monophasic                               Posterior Tibial  Monophasic                               Dorsalis Pedis                                            Peroneal                                           MIA                                           TBI                       LEFT  Waveform        Systolic BPs (mmHg)                             143           Brachial  Monophasic                               Common Femoral  Monophasic                               Posterior Tibial  Monophasic                               Dorsalis Pedis                                           Peroneal                                           MIA                                           TBI  Findings  Bilateral.  Doppler waveforms of the common femoral arteries are abnormally dampened/  monophasic.  Doppler waveforms at the ankle are monophasic.  The ankle pressures are not obtained due to pain.  Digit PPG waveforms are normal in the right toes.  Digit PPG waveforms are normal in the left 1st and 5th toe. The other toes  are absent/amputated.  Toe-brachial indices is reduced right side.  Toe-brachial indices are left side.  Toe-brachial indices:     Right:  0.54   Left:  0.90  Unable to obtained a right brachial pressure due to actively bleeding IV.  Bilateral arterial duplex scan was performed in accordance with lower  extremity arterial evaluation protocol - see separate report.    Du Hoffman  (Electronically Signed)  Final Date:      08 October 2021                   10:50    MIA Results, Last 30 Days US-EXTREMITY ARTERY LOWER BILAT    Result Date: 10/8/2021  Narrative Lower Extremity  Arterial Duplex Report  Vascular Laboratory  CONCLUSIONS  The RIGHT mid superficial femoral artery is occluded. Reconstitution of  flow is demonstrated in the distal superficial femoral artery.  More than 75% stenosis in the LEFT mid to distal superficial femoral  artery.  Suspect more proximal inflow disease, based on body habitus, a CTA might be  better tool to further assess.  MILI HANSON  Exam Date:     10/08/2021 10:46  Room #:     Inpatient  Priority:      Stat  Ht (in):             Wt (lb):  Ordering Physician:        JOSE HORAN  Referring Physician:       AUNG Mcqueen  Sonographer:               Sally Whipple RVT, RDMS  Study Type:                Complete Bilateral  Technical Quality:         Adequate  Age:    71    Gender:     M  MRN:    [id number]  :    1950      BSA:  Indications:     Ulceration of LE  CPT Codes:       49794  ICD Codes:       707.1  History:         Bilateral necrotic nonhealing wounds.  Limitations:                RIGHT  Waveform        Peak Systolic Velocity (cm/s)                  Prox    Prox-Mid  Mid    Mid-Dist  Distal  Triphasic                         106                      CFA  Triphasic       86                                         PFA  Absent          69       39       0       0        49      SFA  Monophasic                        55                       POP  Monophasic      82                                 47      AT  Monophasic      37                                 33      PT  Monophasic      17                                 24      SOFIA                LEFT  Waveform        Peak Systolic Velocity (cm/s)                  Prox    Prox-Mid  Mid    Mid-Dist  Distal  Monophasic                        138                      CFA  Monophasic      71                                         PFA  Monophasic      130               148     465      114     SFA  Monophasic                        76                       POP  Monophasic      40                        53       15      AT  Monophasic      96                                 71      PT  Absent                                                     SOFIA  FINDINGS  Right.  Diffuse calcific plaque seen throughout.  The mid superficial femoral artery is occluded- Reconstitution of flow is  demonstrated in the distal superficial femoral artery.  Triphasic waveforms demonstrated in the common femoral, profunda femoral  and proximal  femoral arteries.  Monophasic waveforms demonstrated in the distal femoral, popliteal and  three vessel runoff to the ankle.  No evidence of hemodynamically significant stenosis seen in this limb at  this time.  Left.  Diffuse calcific plaque seen throughout.  > 75% stenosis in the superficial femoral artery.  Waveforms from common femoral to distal superficial femoral artery are  monophasic/hyperemic with high amplitude.  Waveforms from popliteal to the visualized tibial arteries are monophasic  with reduced amplitude.  Monophasic waveforms with severely reduced amplitude seen at the distal  anterior tibial artery.  The peroneal artery could not be visualized at this time due to edema and  body habitus.  Du GHOSH To  (Electronically Signed)  Final Date:      08 October 2021                   11:56  Amended:         08 October 2021 11:58      Lab Values:    Lab Results   Component Value Date/Time    WBC 10.0 10/08/2021 04:26 AM    RBC 4.90 10/08/2021 04:26 AM    HEMOGLOBIN 14.5 10/08/2021 04:26 AM    HEMATOCRIT 44.9 10/08/2021 04:26 AM    CREACTPROT 6.25 (H) 10/07/2021 08:30 PM    SEDRATEWES 1 10/07/2021 08:30 PM    HBA1C 9.1 (H) 10/08/2021 04:26 AM        Culture Results show:  No results found for this or any previous visit (from the past 720 hour(s)).     **cultured LLE, no viable tissue on RLE to culture**    Pain Level/Medicated:  Pain with assessment, tolerated well. Will need pain meds most likely for further changes.       INTERVENTIONS BY WOUND TEAM:  Chart and images reviewed. Discussed with bedside RN. All areas of concern (based on picture review, LDA review and discussion with bedside RN) have been thoroughly assessed. Documentation of areas based on significant findings. This RN in to assess patient. Performed standard wound care which includes appropriate positioning, dressing removal and non-selective debridement. Pictures and measurements obtained weekly if/when required.  BLE:  Preparation  for Dressing removal: open to air.   Non-selectively Debrided with:  Wound cleanser and gauze.  Sharp debridement: n/a.   Soo wound: Cleansed with wound cleanser, Prepped with barrier paste  Primary Dressing: Dakins moistened roll gauze   Secondary (Outer) Dressing: abdominal pad, dry roll gauze.     Interdisciplinary consultation: Patient, Bedside RN (Mayra), wound tech Cassy, LUKASZ Ardon    EVALUATION / RATIONALE FOR TREATMENT:  Most Recent Date:  10/8/21: Patient admitted with POA full thickness soft necrosis wounds to his Bilateral lower extremites. Right pretibial lateral leg and toes with wounds. Left pretibial posterior / lateral / anterior / heel / dorsal foot / old amputation site with wounds. Odor present, no viable tissue on any wounds except for a 0.3 x 0.3cm spot on the left dorsal foot (where culture taken), all other tissue is necrotic. Dakins wet to dry applied bilaterally to assist in chemical debridement of non-viable tissue. Patient likely will need surgical interventions.      Goals: Steady decrease in wound area and depth weekly.    WOUND TEAM PLAN OF CARE ([X] for frequency of wound follow up,):   Nursing to follow orders written for wound care. Contact wound team if area fails to progress, deteriorates or with any questions/concerns  Dressing changes by wound team:                   Follow up 3 times weekly:                NPWT change 3 times weekly:     Follow up 1-2 times weekly:   X   Follow up Bi-Monthly:                   Follow up as needed:     Other (explain):     NURSING PLAN OF CARE ORDERS (X):  Dressing changes: See Dressing Care orders: X  Skin care: See Skin Care orders:   RN Prevention Protocol:   Rectal tube care: See Rectal Tube Care orders:   Other orders:    RSKIN:   CURRENTLY IN PLACE (X), APPLIED THIS VISIT (A), ORDERED (O):   Q shift Donis:  X  Q shift pressure point assessments:  X    Surface/Positioning   Pressure redistribution mattress   X         Low Airloss           Bariatric foam      Bariatric CRISTOPHER     Waffle cushion        Waffle Overlay          Reposition q 2 hours      TAPs Turning system     Z Jason Pillow     Offloading/Redistribution ROSITA  Sacral Mepilex (Silicone dressing)     Heel Mepilex (Silicone dressing)         Heel float boots (Prevalon boot)             Float Heels off Bed with Pillows           Respiratory ROSITA  Silicone O2 tubing         Gray Foam Ear protectors     Cannula fixation Device (Tender )          High flow offloading Clip    Elastic head band offloading device      Anchorfast                                                         Trach with Optifoam split foam             Containment/Moisture Prevention n/a    Rectal tube or BMS    Purwick/Condom Cath        Fajardo Catheter    Barrier wipes           Barrier paste       Antifungal tx      Interdry        Mobilization Edge of bed.       Up to chair        Ambulate      PT/OT      Nutrition       Dietician        Diabetes Education      PO  X   TF     TPN     NPO   # days     Other        Anticipated discharge plans: will need ongoing care.   LTACH:    X    SNF/Rehab:   X               Home Health Care:           Outpatient Wound Center:   X         Self/Family Care:        Other:                  Vac Discharge Needs:   Not Applicable Pt not on a wound vac:  X     Regular Vac while inpatient, alternative dressing at DC:        Regular Vac in use and continued at DC:            Reg. Vac w/ Skin Sub/Biologic in use. Will need to be changed 2x wkly:      Veraflo Vac while inpatient, ok to transition to Regular Vac on Discharge:           Veraflo Vac while inpatient, will need to remain on Veraflo Vac upon discharge:

## 2021-10-09 NOTE — ASSESSMENT & PLAN NOTE
Patient does have history of PAD, previous MIA was performed in June 2021, showed mild to moderate disease. Repeat MIA noted significant worsening disease.    Vascular surgery consulted,s/p CTA today, plans for revascularization prior to surgical intervention for the wounds, of LLE 10/11/2021    S/p OR 10/12 for revascularization  Continue DAPT and high-intensity atorvastatin after stent, follow up with Vascular Surgery

## 2021-10-09 NOTE — PROGRESS NOTES
Pt A&Ox4 on RA, reported 8/10 pain to BLE, managed with PRN Percocet per MAR. Pt up with x1-2 assist with FWW. All needs met at this time. Consent for iodine contrast in pt chart.  Safety precautions and hourly rounding in place.      COVID-19 surge in effect.

## 2021-10-10 LAB
ANION GAP SERPL CALC-SCNC: 12 MMOL/L (ref 7–16)
BACTERIA WND AEROBE CULT: ABNORMAL
BUN SERPL-MCNC: 12 MG/DL (ref 8–22)
CALCIUM SERPL-MCNC: 8.8 MG/DL (ref 8.5–10.5)
CHLORIDE SERPL-SCNC: 106 MMOL/L (ref 96–112)
CO2 SERPL-SCNC: 22 MMOL/L (ref 20–33)
CREAT SERPL-MCNC: 0.41 MG/DL (ref 0.5–1.4)
GLUCOSE BLD-MCNC: 124 MG/DL (ref 65–99)
GLUCOSE BLD-MCNC: 159 MG/DL (ref 65–99)
GLUCOSE BLD-MCNC: 160 MG/DL (ref 65–99)
GLUCOSE BLD-MCNC: 209 MG/DL (ref 65–99)
GLUCOSE SERPL-MCNC: 176 MG/DL (ref 65–99)
GRAM STN SPEC: ABNORMAL
POTASSIUM SERPL-SCNC: 3 MMOL/L (ref 3.6–5.5)
SIGNIFICANT IND 70042: ABNORMAL
SITE SITE: ABNORMAL
SODIUM SERPL-SCNC: 140 MMOL/L (ref 135–145)
SOURCE SOURCE: ABNORMAL
VANCOMYCIN PEAK SERPL-MCNC: 23.2 UG/ML (ref 20–40)
VANCOMYCIN TROUGH SERPL-MCNC: 10 UG/ML (ref 10–20)

## 2021-10-10 PROCEDURE — A9270 NON-COVERED ITEM OR SERVICE: HCPCS | Performed by: GENERAL PRACTICE

## 2021-10-10 PROCEDURE — 700111 HCHG RX REV CODE 636 W/ 250 OVERRIDE (IP): Performed by: STUDENT IN AN ORGANIZED HEALTH CARE EDUCATION/TRAINING PROGRAM

## 2021-10-10 PROCEDURE — 700102 HCHG RX REV CODE 250 W/ 637 OVERRIDE(OP): Performed by: STUDENT IN AN ORGANIZED HEALTH CARE EDUCATION/TRAINING PROGRAM

## 2021-10-10 PROCEDURE — 82962 GLUCOSE BLOOD TEST: CPT | Mod: 91

## 2021-10-10 PROCEDURE — 80202 ASSAY OF VANCOMYCIN: CPT | Mod: 91

## 2021-10-10 PROCEDURE — 80048 BASIC METABOLIC PNL TOTAL CA: CPT

## 2021-10-10 PROCEDURE — 700102 HCHG RX REV CODE 250 W/ 637 OVERRIDE(OP): Performed by: GENERAL PRACTICE

## 2021-10-10 PROCEDURE — A9270 NON-COVERED ITEM OR SERVICE: HCPCS | Performed by: STUDENT IN AN ORGANIZED HEALTH CARE EDUCATION/TRAINING PROGRAM

## 2021-10-10 PROCEDURE — 99233 SBSQ HOSP IP/OBS HIGH 50: CPT | Performed by: GENERAL PRACTICE

## 2021-10-10 PROCEDURE — 770006 HCHG ROOM/CARE - MED/SURG/GYN SEMI*

## 2021-10-10 PROCEDURE — 700111 HCHG RX REV CODE 636 W/ 250 OVERRIDE (IP): Performed by: GENERAL PRACTICE

## 2021-10-10 PROCEDURE — 97161 PT EVAL LOW COMPLEX 20 MIN: CPT

## 2021-10-10 PROCEDURE — 36415 COLL VENOUS BLD VENIPUNCTURE: CPT

## 2021-10-10 PROCEDURE — 700105 HCHG RX REV CODE 258: Performed by: STUDENT IN AN ORGANIZED HEALTH CARE EDUCATION/TRAINING PROGRAM

## 2021-10-10 PROCEDURE — 99223 1ST HOSP IP/OBS HIGH 75: CPT | Performed by: SURGERY

## 2021-10-10 RX ORDER — POTASSIUM CHLORIDE 20 MEQ/1
40 TABLET, EXTENDED RELEASE ORAL DAILY
Status: DISCONTINUED | OUTPATIENT
Start: 2021-10-11 | End: 2021-10-21

## 2021-10-10 RX ORDER — POTASSIUM CHLORIDE 20 MEQ/1
40 TABLET, EXTENDED RELEASE ORAL 2 TIMES DAILY
Status: DISPENSED | OUTPATIENT
Start: 2021-10-10 | End: 2021-10-11

## 2021-10-10 RX ADMIN — SODIUM CHLORIDE 3 G: 900 INJECTION INTRAVENOUS at 06:13

## 2021-10-10 RX ADMIN — OXYCODONE HYDROCHLORIDE AND ACETAMINOPHEN 2 TABLET: 5; 325 TABLET ORAL at 15:00

## 2021-10-10 RX ADMIN — VANCOMYCIN HYDROCHLORIDE 1250 MG: 500 INJECTION, POWDER, LYOPHILIZED, FOR SOLUTION INTRAVENOUS at 10:27

## 2021-10-10 RX ADMIN — TRAZODONE HYDROCHLORIDE 450 MG: 150 TABLET ORAL at 20:13

## 2021-10-10 RX ADMIN — VANCOMYCIN HYDROCHLORIDE 1250 MG: 500 INJECTION, POWDER, LYOPHILIZED, FOR SOLUTION INTRAVENOUS at 20:13

## 2021-10-10 RX ADMIN — DAKIN'S SOLUTION 0.125% (QUARTER STRENGTH) 1 ML: 0.12 SOLUTION at 06:24

## 2021-10-10 RX ADMIN — CARVEDILOL 12.5 MG: 12.5 TABLET, FILM COATED ORAL at 08:05

## 2021-10-10 RX ADMIN — ENOXAPARIN SODIUM 40 MG: 40 INJECTION SUBCUTANEOUS at 06:16

## 2021-10-10 RX ADMIN — LISINOPRIL 5 MG: 5 TABLET ORAL at 06:17

## 2021-10-10 RX ADMIN — POTASSIUM CHLORIDE 40 MEQ: 1500 TABLET, EXTENDED RELEASE ORAL at 08:05

## 2021-10-10 RX ADMIN — GABAPENTIN 300 MG: 300 CAPSULE ORAL at 17:07

## 2021-10-10 RX ADMIN — SODIUM CHLORIDE 3 G: 900 INJECTION INTRAVENOUS at 00:06

## 2021-10-10 RX ADMIN — POTASSIUM CHLORIDE 40 MEQ: 1500 TABLET, EXTENDED RELEASE ORAL at 17:07

## 2021-10-10 RX ADMIN — GABAPENTIN 300 MG: 300 CAPSULE ORAL at 06:15

## 2021-10-10 RX ADMIN — INSULIN LISPRO 3 UNITS: 100 INJECTION, SOLUTION INTRAVENOUS; SUBCUTANEOUS at 12:20

## 2021-10-10 RX ADMIN — OXYCODONE AND ACETAMINOPHEN 1 TABLET: 10; 325 TABLET ORAL at 09:03

## 2021-10-10 RX ADMIN — DAKIN'S SOLUTION 0.125% (QUARTER STRENGTH) 5 ML: 0.12 SOLUTION at 16:53

## 2021-10-10 RX ADMIN — INSULIN LISPRO 2 UNITS: 100 INJECTION, SOLUTION INTRAVENOUS; SUBCUTANEOUS at 00:08

## 2021-10-10 RX ADMIN — DICLOFENAC SODIUM 50 MG: 25 TABLET, DELAYED RELEASE ORAL at 08:07

## 2021-10-10 RX ADMIN — CARVEDILOL 12.5 MG: 12.5 TABLET, FILM COATED ORAL at 17:07

## 2021-10-10 RX ADMIN — ASPIRIN 325 MG ORAL TABLET 325 MG: 325 PILL ORAL at 06:15

## 2021-10-10 RX ADMIN — OXYCODONE HYDROCHLORIDE AND ACETAMINOPHEN 2 TABLET: 5; 325 TABLET ORAL at 00:08

## 2021-10-10 RX ADMIN — FUROSEMIDE 80 MG: 40 TABLET ORAL at 06:17

## 2021-10-10 RX ADMIN — INSULIN LISPRO 2 UNITS: 100 INJECTION, SOLUTION INTRAVENOUS; SUBCUTANEOUS at 17:02

## 2021-10-10 ASSESSMENT — COGNITIVE AND FUNCTIONAL STATUS - GENERAL
STANDING UP FROM CHAIR USING ARMS: A LITTLE
MOVING FROM LYING ON BACK TO SITTING ON SIDE OF FLAT BED: A LITTLE
SUGGESTED CMS G CODE MODIFIER MOBILITY: CJ
WALKING IN HOSPITAL ROOM: A LITTLE
CLIMB 3 TO 5 STEPS WITH RAILING: A LITTLE
MOBILITY SCORE: 20

## 2021-10-10 ASSESSMENT — PAIN DESCRIPTION - PAIN TYPE
TYPE: ACUTE PAIN

## 2021-10-10 ASSESSMENT — GAIT ASSESSMENTS
DISTANCE (FEET): 10
ASSISTIVE DEVICE: FRONT WHEEL WALKER
DEVIATION: ANTALGIC;SHUFFLED GAIT;BRADYKINETIC;DECREASED HEEL STRIKE;DECREASED TOE OFF
GAIT LEVEL OF ASSIST: SUPERVISED

## 2021-10-10 NOTE — CONSULTS
ACUTE CARE VASCULAR SERVICE  CONSULT NOTE      Date: 10/10/2021    Referring Provider: Jennifer Hu D.o.    Consulting Physician: Valerio Purcell M.D. [facility]    -------------------------------------------------------------------------------------------------    Reason for consultation:  Bilateral lower leg wounds and peripheral arterial disease    HPI:  This is a 71 y.o. male who is presenting with a longstanding history of wounds of the bilateral lower legs and also wounds of the left foot.  The patient actually underwent an amputation of the left second through fourth toes sometime ago and the site is never healed and the resultant wound continues to enlarge.  Currently he is alert and conversant and is reporting pain with manipulation of the legs with the ulcerations are.  He reports that he has been trying to get an evaluation and treatment for his legs but it overall has been difficult.  Based on his noninvasive studies it appeared that the patient had inflow disease so he underwent a CT angiogram this actually showed good flow through the iliac femoral and popliteal arteries.  The status of the tibial arteries and microvasculature of the feet is indeterminate based on the CTA however given the fact that he has diabetes there is likely a component of tibial and microvascular disease compounding the situation with his wounds.    Past Medical History:   Diagnosis Date   • Arthritis    • Asthma    • At risk for sleep apnea    • Breath shortness     with exertion,   • Chicken pox    • Diabetes (HCC)    • Heart disease    • Hepatitis C    • Hyperlipidemia    • Measles    • Muscle disorder    • Myocardial infarct (HCC)     2016-stents   • Parathyroid disorder (HCC)    • Snoring    • Substance abuse (HCC)    • Urinary tract infection        Past Surgical History:   Procedure Laterality Date   • TOE AMPUTATION Left 2/17/2020    Procedure: LEFT SECOND, THIRD, AND FORTH TOE AMPUTATION;  Surgeon:  Alfonso Esteban M.D.;  Location: SURGERY [facility];  Service: Orthopedics   • DC UNLISTED PROC, ARTHROSCOPY Left 7/25/2019    Procedure: LEFT ENDOSCOPIC ULNAR NERVE DECOMPRESSION, LEFT CARPAL TUNNEL RELEASE;  Surgeon: Red Chacon M.D.;  Location: SURGERY [facility];  Service: Orthopedics   • KNEE REPLACEMENT, TOTAL Bilateral    • OTHER SURGICAL PROCEDURE      back surgery - Unknown        Current Facility-Administered Medications   Medication Dose Route Frequency Provider Last Rate Last Admin   • potassium chloride SA (Kdur) tablet 40 mEq  40 mEq Oral BID Jenniferminesh Hu, D.O.       • [START ON 10/11/2021] potassium chloride SA (Kdur) tablet 40 mEq  40 mEq Oral DAILY Jennifer Mayte, D.O.       • insulin glargine (Semglee) injection  10 Units Subcutaneous Q EVENING Jenniferminesh Hu, D.O.       • oxyCODONE-acetaminophen (PERCOCET-10)  MG per tablet 1 Tablet  1 Tablet Oral Q4HRS PRN Jennifer Hu D.O.   1 Tablet at 10/10/21 0903   • aspirin (ASA) tablet 325 mg  325 mg Oral DAILY Davi Mendoza M.D.   325 mg at 10/10/21 0615   • diclofenac DR (Voltaren) tablet 50 mg  50 mg Oral BID Davi Mendoza M.D.   50 mg at 10/10/21 0807   • gabapentin (NEURONTIN) capsule 300 mg  300 mg Oral BID Davi Mendoza M.D.   300 mg at 10/10/21 0615   • traZODone (DESYREL) tablet 450 mg  450 mg Oral Nightly Davi Mendoza M.D.   450 mg at 10/09/21 2053   • lisinopril (PRINIVIL) tablet 5 mg  5 mg Oral DAILY Jennifer Hu, D.O.   5 mg at 10/10/21 0617   • carvedilol (COREG) tablet 12.5 mg  12.5 mg Oral BID WITH MEALS Davi Mendoza M.D.   12.5 mg at 10/10/21 0805   • furosemide (LASIX) tablet 80 mg  80 mg Oral DAILY Jenniferjodee Hu, D.O.   80 mg at 10/10/21 0617   • senna-docusate (PERICOLACE or SENOKOT S) 8.6-50 MG per tablet 2 Tablet  2 Tablet Oral BID Davi Mendoza M.D.   2 Tablet at 10/09/21 1646    And   • polyethylene glycol/lytes (MIRALAX) PACKET 1 Packet  1 Packet Oral QDAY PRN Davi Mendoza M.D.         And   • magnesium hydroxide (MILK OF MAGNESIA) suspension 30 mL  30 mL Oral QDAY PRN Davi Mendoza M.D.        And   • bisacodyl (DULCOLAX) suppository 10 mg  10 mg Rectal QDAY PRN Davi Mendoza M.D.       • acetaminophen (Tylenol) tablet 650 mg  650 mg Oral Q6HRS PRN Davi Mendoza M.D.       • MD Alert...Vancomycin per Pharmacy  1 Each Other PHARMACY TO DOSE Davi Mendoza M.D.       • enalaprilat (Vasotec) injection 1.25 mg 1 mL  1.25 mg Intravenous Q6HRS PRN Davi Mendoza M.D.       • labetalol (NORMODYNE/TRANDATE) injection 10 mg  10 mg Intravenous Q4HRS PRN Davi Mendoza M.D.       • ondansetron (ZOFRAN) syringe/vial injection 4 mg  4 mg Intravenous Q4HRS PRN Davi Mendoza M.D.       • ondansetron (ZOFRAN ODT) dispertab 4 mg  4 mg Oral Q4HRS PRN Davi Mendoza M.D.       • [Held by provider] insulin glargine (Semglee) injection  0.2 Units/kg/day Subcutaneous Q EVENING Davi Mendoza M.D.   20 Units at 10/09/21 1800    And   • insulin lispro (AdmeLOG) injection  2-9 Units Subcutaneous Q6HRS Davi Mendoza M.D.   3 Units at 10/10/21 1220    And   • glucose 4 g chewable tablet 16 g  16 g Oral Q15 MIN PRN Davi Mendoza M.D.        And   • dextrose 50% (D50W) injection 50 mL  50 mL Intravenous Q15 MIN PRN Davi Mendoza M.D.       • vancomycin (VANCOCIN) 1,250 mg in  mL IVPB  1,250 mg Intravenous Q12HR Davi Mendoza M.D.   Stopped at 10/10/21 1227   • oxyCODONE-acetaminophen (PERCOCET) 5-325 MG per tablet 2 Tablet  2 Tablet Oral Q6HRS PRN Jennifer uH D.O.   2 Tablet at 10/10/21 0008   • [Held by provider] enoxaparin (LOVENOX) inj 40 mg  40 mg Subcutaneous DAILY Jennifer Fabara, D.O.   40 mg at 10/10/21 0616   • dakins 0.125% (1/4 strength) topical soln   Topical BID Jennifer Hu D.O.   1 mL at 10/10/21 0624   • hydrOXYzine HCl (ATARAX) tablet 10 mg  10 mg Oral TID PRN Jennifer Hu D.O.           Social History     Socioeconomic History   • Marital  status: Single     Spouse name: Not on file   • Number of children: Not on file   • Years of education: Not on file   • Highest education level: Not on file   Occupational History   • Not on file   Tobacco Use   • Smoking status: Current Every Day Smoker     Packs/day: 0.50     Years: 54.00     Pack years: 27.00     Types: Cigarettes   • Smokeless tobacco: Former User   Vaping Use   • Vaping Use: Unknown   Substance and Sexual Activity   • Alcohol use: Not Currently     Alcohol/week: 0.0 oz     Comment: occas   • Drug use: Yes     Types: Inhaled     Comment: nightly for sleep   • Sexual activity: Not Currently   Other Topics Concern   • Not on file   Social History Narrative   • Not on file     Social Determinants of Health     Financial Resource Strain:    • Difficulty of Paying Living Expenses:    Food Insecurity:    • Worried About Running Out of Food in the Last Year:    • Ran Out of Food in the Last Year:    Transportation Needs:    • Lack of Transportation (Medical):    • Lack of Transportation (Non-Medical):    Physical Activity:    • Days of Exercise per Week:    • Minutes of Exercise per Session:    Stress:    • Feeling of Stress :    Social Connections:    • Frequency of Communication with Friends and Family:    • Frequency of Social Gatherings with Friends and Family:    • Attends [employer] Services:    • Active Member of Clubs or Organizations:    • Attends Club or Organization Meetings:    • Marital Status:    Intimate Partner Violence:    • Fear of Current or Ex-Partner:    • Emotionally Abused:    • Physically Abused:    • Sexually Abused:        Family History   Problem Relation Age of Onset   • Arthritis Father    • Cancer Father    • Heart Disease Father    • Diabetes Other        Allergies:  Patient has no known allergies.    Review of Systems:  Constitutional: Negative for fever, chills, weight loss,   HENT:   Negative for hearing loss or tinnitus    Eyes:    Negative for blurred vision, double  "vision, or loss of vision  Respiratory:  Negative for cough, hemoptysis, or wheezing    Cardiac:  Negative for chest pain or palpitations or orthopnea  Vascular:  Negative for claudication or rest pain   Gastrointestinal: Negative for nausea, vomiting, or abdominal pain     Negative for hematochezia or melena   Genitourinary: Negative for dysuria, frequency, or hematuria   Musculoskeletal: Negative for myalgias, back pain, or joint pain  Skin:   As per hpi  Neurological:  Negative for dizziness, headaches, or tremors     Negative for speech disturbance     Negative for extremity weakness or paresthesias  Endo/Heme:  Negative for easy bruising or bleeding  Psychiatric:  Negative for depression, suicidal ideas, or hallucinations    Physical Exam:  /71   Pulse 80   Temp 36.7 °C (98 °F) (Temporal)   Resp 16   Ht 1.702 m (5' 7\")   Wt 100 kg (221 lb 1.9 oz)   SpO2 90%     Constitutional: Alert, oriented, no acute distress  HEENT:  Normocephalic and atraumatic, EOMI  Neck:   Supple, no JVD,   Cardiovascular: Regular rate and rhythm,   Pulmonary:  Good air entry bilaterally,    Abdominal:  Soft, non-tender, non-distended     Aortic impulse not widened  Musculoskeletal: No edema, no tenderness  Neurological:  CN II-XII grossly intact, no focal deficits  Skin:   Skin is warm and dry. No rash noted.  Psychiatric:  Normal mood and affect.  Vascular:  RUE: WWP     LUE: WWP     RLE: Monophasic pedal Doppler signals, superficial ulcerations of the mid lower leg, mild edema but no hyperpigmentation to suggest venous insufficiency     LLE: Monophasic pedal Doppler signals, superficial ulcerations of the mid lower leg, mild edema but no hyperpigmentation to suggest venous insufficiency.  On the left forefoot the second through fourth toes were amputated.      Labs:  Recent Labs     10/07/21  2030 10/08/21  0426   WBC 11.0* 10.0   RBC 5.31 4.90   HEMOGLOBIN 15.5 14.5   HEMATOCRIT 46.4 44.9   MCV 87.4 91.6   MCH 29.2 29.6 "   MCHC 33.4* 32.3*   RDW 43.6 45.9   PLATELETCT 308 259   MPV 9.9 9.9     Recent Labs     10/08/21  0426 10/09/21  1044 10/10/21  0017   SODIUM 136 134* 140   POTASSIUM 3.7 3.2* 3.0*   CHLORIDE 102 101 106   CO2 20 22 22   GLUCOSE 158* 221* 176*   BUN 7* 11 12   CREATININE 0.39* 0.45* 0.41*   CALCIUM 9.1 9.0 8.8     Recent Labs     10/07/21  2030   INR 1.09     Recent Labs     10/07/21  2030 10/08/21  0426   ASTSGOT 20 22   ALTSGPT 19 15   TBILIRUBIN 0.6 0.7   ALKPHOSPHAT 102* 87   GLOBULIN 4.2* 3.6*   INR 1.09  --        Radiology:  Noninvasive arterial study    Doppler waveforms of the common femoral arteries are abnormally dampened/    monophasic.    Doppler waveforms at the ankle are monophasic.    The ankle pressures are not obtained due to pain.    Digit PPG waveforms are normal in the right toes.   Digit PPG waveforms are normal in the left 1st and 5th toe. The other toes    are absent/amputated.    Toe-brachial indices is reduced right side.   Toe-brachial indices are left side.    Toe-brachial indices:     Right:  0.54   Left:  0.90      CT angiogram actually showed good flow through the iliac femoral and popliteal arteries.  The status of the tibial arteries and microvasculature of the feet is indeterminate based on the CTA     Assessment/Plan:  -Peripheral arterial occlusive disease  -Nonhealing wounds of the bilateral lower extremities  -Diabetes  -Coronary artery disease with history of MI  -Hepatitis C    Patient has nonhealing wounds of the bilateral lower extremities.  I am particularly concerned about the left lower extremity which has wounds not only of the lower leg but also the foot as well, and the foot wound is fairly large.  Most likely the left leg will require some level of amputation.  However before we progress that I am recommending we perform a formal angiogram and revascularization if possible.  The angiogram will include both lower extremities as there are nonhealing wounds of both  legs we will also plan to perform a surgical debridement of the wounds at the same time.  The results of the angiogram will help us determine whether or not an attempt limb salvage efforts or if we should discuss amputation    We will likely be able to perform this in the next 1 to 2 days, depending on OR availability. NPO except meds after midnight pending OR scheduling    Valerio Purcell MD  [facility] (General and Vascular Surgery)  Cell: [phone] (text or call is fine, if you don't reach me please try my office)  Office: [phone]    10/10/2021    2:48 PM  ___________________________________________________________________  Patient:Luis Kellogg   MRN:[id number]   CSN:[id number]

## 2021-10-10 NOTE — PROGRESS NOTES
LIMB PRESERVATION SERVICE  PROGRESS NOTE    HPI: Luis Kellogg is a 71 y.o.  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   LPS has been consulted for bilateral lower extremity diabetic ulcers.     Patient has long history of lower extremity chronic wounds, PAD, and left 2-4th toe amputation in 2/2020 at outside hospital. Patient presented with worsening bilateral lower extremity wounds with worsening swelling and pain consistent with cellulitis. He denies any fevers, chills, nausea, or vomiting. Patient is poor historian and unable to tell me how long he has had wounds, but does report that they have been worsening with occasional purulent drainage. He reports that left toe amputation site never healed after surgery in 2/2020. Patient reports that he previously saw a vascular surgeon in [place], does not remember his name, and had angiogram and my have had a stent in one of his femoral arteries though not sure which and thinks it was 1.5-2 years ago. Patient was referred to  [facility] and had evaluation and non-invasive studies and was supposed to follow up with [facility] for intervention but never followed up. Patient was previously being followed by [facility], however he has not followed up since 3/2021 and has not been performing any wound care on his lower extremity wounds.         INTERVAL HISTORY:  10/10/2021: Patient denies fevers, chills, nausea, vomiting.  Pain well controlled.       PERTINENT LPS RESULTS:   COVID-19: not detected    Arterial                RIGHT   Waveform        Peak Systolic Velocity (cm/s)                   Prox    Prox-Mid  Mid    Mid-Dist  Distal   Triphasic                         106                      CFA         Triphasic       86                                         PFA         Absent          69       39      "  0       0        49      SFA         Monophasic                        55                       POP         Monophasic      82                                 47      AT         Monophasic      37                                 33      PT         Monophasic      17                                 24      SOFIA                       LEFT   Waveform        Peak Systolic Velocity (cm/s)                   Prox    Prox-Mid  Mid    Mid-Dist  Distal   Monophasic                        138                      CFA         Monophasic      71                                         PFA         Monophasic      130               148     465      114     SFA         Monophasic                        76                       POP         Monophasic      40                        53       15      AT         Monophasic      96                                 71      PT         Absent                                                     SOFIA       EXAM:      /71   Pulse 80   Temp 36.7 °C (98 °F) (Temporal)   Resp 16   Ht 1.702 m (5' 7\")   Wt 100 kg (221 lb 1.9 oz)   SpO2 90%   BMI 34.63 kg/m²     Pedal Pulses:  Faint to absent    Sensation: intact      Wound :            DIABETES MANAGEMENT:    Blood glucose: Results from last 7 days   Lab Units 10/10/21  0612 10/10/21  0007 10/09/21  1634 10/09/21  0529 10/09/21  0012 10/08/21  1728 10/08/21  1129 10/08/21  0614   ACCU CHECK GLUCOSE 788 mg/dL 124* 159* 192* 134* 160* 183* 141* 146*     A1c:   Lab Results   Component Value Date/Time    HBA1C 9.1 (H) 10/08/2021 04:26 AM            INFECTION MANAGEMENT:    Results from last 7 days   Lab Units 10/08/21  0426 10/07/21  2030   WBC 1501 K/uL 10.0 11.0*   PLATELET COUNT 1518 K/uL 259 308     Wound culture results:   Results     Procedure Component Value Units Date/Time    CULTURE WOUND W/ GRAM STAIN [[id number]]  (Abnormal)  (Susceptibility) Collected: 10/08/21 1413    Order Status: Completed Specimen: Wound from Left Leg " Updated: 10/10/21 0928     Significant Indicator POS     Source WND     Site LEFT LEG     Culture Result Rare growth mixed enteric collins.     Gram Stain Result Moderate WBCs.  No organisms seen.       Culture Result Methicillin Resistant Staphylococcus aureus  Light growth  This isolate is presumed to be clindamycin resistant based on  detection of inducible resistance.  Clindamycin may still  be effective in some patients.        Streptococcus agalactiae (Group B)  Light growth      Narrative:      Collected By:[id number] MERRILL DUFF V  Collected By:[id number] MERRILL DUFF V    Susceptibility     Methicillin resistant staphylococcus aureus (1)     Antibiotic Interpretation Microscan Method Status    Azithromycin Resistant >4 mcg/mL MELA Final    Clindamycin Resistant <=0.25 mcg/mL MELA Final    Cefazolin Resistant <=8 mcg/mL MELA Final    Cefepime Resistant 16 mcg/mL MELA Final    Ceftaroline Sensitive <=0.5 mcg/mL MELA Final    Daptomycin Sensitive <=0.5 mcg/mL MELA Final    Ampicillin/sulbactam Resistant 16/8 mcg/mL MELA Final    Erythromycin Resistant >4 mcg/mL MELA Final    Vancomycin Sensitive 1 mcg/mL MELA Final    Oxacillin Resistant >2 mcg/mL MELA Final    Trimeth/Sulfa Sensitive <=0.5/9.5 mcg/mL MELA Final    Tetracycline Sensitive <=4 mcg/mL MELA Final                   GRAM STAIN [[id number]] Collected: 10/08/21 1413    Order Status: Completed Specimen: Wound Updated: 10/08/21 2152     Significant Indicator .     Source WND     Site LEFT LEG     Gram Stain Result Moderate WBCs.  No organisms seen.      Narrative:      Collected By:[id number] MERRILL DUFF V  Collected By:[id number] SkyDoxSANDY DUFF V    MRSA By PCR (Amp) [[id number]]  (Abnormal) Collected: 10/08/21 0615    Order Status: Completed Specimen: Respirate from Nares Updated: 10/08/21 1820     Significant Indicator POS     Source RESP     Site NARES     MRSA PCR POSITIVE for MRSA by PCR.    Narrative:      CALL  [place]  61 tel. [phone],  CALLED  61 tel.  "[phone] 10/08/2021, 18:20, RB PERF. RESULTS CALLED  TO:[id number], faxed [facility].  Collected By:[id number] DEMETRIA DOTTIE  Collected By:[id number] DEMETRIANORMA SICNLAIR    BLOOD CULTURE x2 [[id number]] Collected: 10/07/21 2118    Order Status: Completed Specimen: Blood from Peripheral Updated: 10/08/21 0904     Significant Indicator NEG     Source BLD     Site PERIPHERAL     Culture Result No Growth  Note: Blood cultures are incubated for 5 days and  are monitored continuously.Positive blood cultures  are called to the RN and reported as soon as  they are identified.      Narrative:      Per Hospital Policy: Only change Specimen Src: to \"Line\" if  specified by physician order.  No site indicated    BLOOD CULTURE x2 [[id number]] Collected: 10/07/21 2118    Order Status: Completed Specimen: Blood from Peripheral Updated: 10/08/21 0904     Significant Indicator NEG     Source BLD     Site PERIPHERAL     Culture Result No Growth  Note: Blood cultures are incubated for 5 days and  are monitored continuously.Positive blood cultures  are called to the RN and reported as soon as  they are identified.      Narrative:      Per Hospital Policy: Only change Specimen Src: to \"Line\" if  specified by physician order.  No site indicated    CULTURE WOUND W/ GRAM STAIN [[id number]]     Order Status: Canceled Specimen: Wound from Right Leg     Urinalysis [[id number]]     Order Status: Canceled Specimen: Urine     Blood Culture [[id number]]     Order Status: Canceled Specimen: Blood from Peripheral     Blood Culture [[id number]]     Order Status: Canceled Specimen: Blood from Peripheral            ASSESSMENT/PLAN:   NPO at midnight for left angiogram tomorrow with Dr. Purcell        Wound care:   -wound care orders placed for nursing   -    Labs/Imaging:  -COVID-19: not detected  -no further labs or imaging at this time    Vascular status:   -faint    Surgery:   -    Antibiotics:   -ID: involved    Weight Bearing Status:   -Weight bearing as " tolerated    Offloading:   -None, may need depending on surgical intervention  -Orthotic company:     PT/OT :   -involved, last seen 10/10    Diabetes Education:   -involved  - Implications of loss of protective sensation (LOPS) discussed with patient- including increased risk for amputation.  Advised to check feet at least daily, moisturize feet, and to always wear protective foot wear.   -avoid trimming own nails. See podiatrist or certified foot and nail RN  -keep blood sugars <150 for improved wound healing        DISCHARGE PLAN:    Disposition:     Follow-up: TBD.       Discussed with: pt, RN, Dr. Purcell     Please note that this dictation was created using voice recognition software. I have  worked with technical experts from [employer] to optimize the interface.  I have made every reasonable attempt to correct obvious errors, but there may be errors of grammar and possibly content that I did not discover before finalizing the note.    Enedina Hubbard R.N.    If any questions or concerns, please contact LPS through voalte.

## 2021-10-10 NOTE — PROGRESS NOTES
Pharmacy Vancomycin Kinetics Note for 10/10/2021     71 y.o. male on Vancomycin day # 4     Vancomycin Indication (Two level): Skin/skin structure infection (goal trough 10-15)    Provider specified end date: 10/17/21    Active Antibiotics (From admission, onward)    Ordered     Ordering Provider       Fri Oct 8, 2021  2:03 AM    10/08/21 0203  vancomycin (VANCOCIN) 1,250 mg in  mL IVPB  (vancomycin (VANCOCIN) IV (LD + Maintenance))  EVERY 12 HOURS         Davi Mendoza M.D.       Fri Oct 8, 2021 12:14 AM    10/08/21 0014  ampicillin/sulbactam (UNASYN) 3 g in  mL IVPB  (Diabetic Foot Infection (Simple Sepsis))  EVERY 6 HOURS         Davi Mendoza M.D.    10/08/21 0014  MD Alert...Vancomycin per Pharmacy  (Diabetic Foot Infection (Simple Sepsis))  PHARMACY TO DOSE        Question:  Indication(s) for vancomycin?  Answer:  Skin and soft tissue infection    Davi Mendoza M.D.          Dosing Weight: 102 kg (224 lb 13.9 oz)      Admission History: Admitted on 10/7/2021 for Cellulitis of left lower extremity [L03.116]  Pertinent history: Pt presents w/ worsening bilateral LE wounds he states he was had for years.  SIRS (leukocytosis, tachycardia, tachypnea),a febrile, elevated CRP on admit.  Pt has a PMH of T2DM, Hepatitis C (LFTs WNL), and wound cultures positive for Enterococcus faecalis and Bordetella hinzii last March.    Allergies:     Patient has no known allergies.     Pertinent cultures to date:     Results     Procedure Component Value Units Date/Time    CULTURE WOUND W/ GRAM STAIN [[id number]]  (Abnormal)  (Susceptibility) Collected: 10/08/21 1413    Order Status: Completed Specimen: Wound from Left Leg Updated: 10/10/21 0928     Significant Indicator POS     Source WND     Site LEFT LEG     Culture Result Rare growth mixed enteric collins.     Gram Stain Result Moderate WBCs.  No organisms seen.       Culture Result Methicillin Resistant Staphylococcus aureus  Light growth  This isolate is  "presumed to be clindamycin resistant based on  detection of inducible resistance.  Clindamycin may still  be effective in some patients.        Streptococcus agalactiae (Group B)  Light growth      Narrative:      Collected By:[id number] MERRILL DUFF V  Collected By:[id number] MERRILL DUFF V    GRAM STAIN [[id number]] Collected: 10/08/21 1413    Order Status: Completed Specimen: Wound Updated: 10/08/21 2152     Significant Indicator .     Source WND     Site LEFT LEG     Gram Stain Result Moderate WBCs.  No organisms seen.      Narrative:      Collected By:[id number] MERRILL DUFF  V  Collected By:[id number] MERRILL DUFF V    MRSA By PCR (Amp) [[id number]]  (Abnormal) Collected: 10/08/21 0615    Order Status: Completed Specimen: Respirate from Nares Updated: 10/08/21 1820     Significant Indicator POS     Source RESP     Site NARES     MRSA PCR POSITIVE for MRSA by PCR.    Narrative:      CALL  [facility]  61 tel. [phone],  CALLED  61 tel. [phone] 10/08/2021, 18:20, RB PERF. RESULTS CALLED  TO:[id number], faxed [facility].  Collected By:[id number] DEMETRIA SINCLAIR  Collected By:[id number] DEMETRIA SINCLAIR    BLOOD CULTURE x2 [[id number]] Collected: 10/07/21 2118    Order Status: Completed Specimen: Blood from Peripheral Updated: 10/08/21 0904     Significant Indicator NEG     Source BLD     Site PERIPHERAL     Culture Result No Growth  Note: Blood cultures are incubated for 5 days and  are monitored continuously.Positive blood cultures  are called to the RN and reported as soon as  they are identified.      Narrative:      Per Hospital Policy: Only change Specimen Src: to \"Line\" if  specified by physician order.  No site indicated    BLOOD CULTURE x2 [[id number]] Collected: 10/07/21 2118    Order Status: Completed Specimen: Blood from Peripheral Updated: 10/08/21 0904     Significant Indicator NEG     Source BLD     Site PERIPHERAL     Culture Result No Growth  Note: Blood cultures are incubated for 5 days and  are monitored " "continuously.Positive blood cultures  are called to the RN and reported as soon as  they are identified.      Narrative:      Per Hospital Policy: Only change Specimen Src: to \"Line\" if  specified by physician order.  No site indicated    CULTURE WOUND W/ GRAM STAIN [[id number]]     Order Status: Canceled Specimen: Wound from Right Leg     Urinalysis [[id number]]     Order Status: Canceled Specimen: Urine     Blood Culture [[id number]]     Order Status: Canceled Specimen: Blood from Peripheral     Blood Culture [[id number]]     Order Status: Canceled Specimen: Blood from Peripheral           Labs:     Estimated Creatinine Clearance: 189 mL/min (A) (by C-G formula based on SCr of 0.41 mg/dL (L)).  Recent Labs     10/07/21  2030 10/08/21  0426   WBC 11.0* 10.0   NEUTSPOLYS 79.40*  --      Recent Labs     10/07/21  2030 10/08/21  0426 10/09/21  1044 10/10/21  0017   BUN 8 7* 11 12   CREATININE 0.47* 0.39* 0.45* 0.41*   ALBUMIN 4.2 3.3  --   --        Intake/Output Summary (Last 24 hours) at 10/10/2021 1022  Last data filed at 10/10/2021 0948  Gross per 24 hour   Intake 720 ml   Output --   Net 720 ml      /71   Pulse 80   Temp 36.7 °C (98 °F) (Temporal)   Resp 16   Ht 1.702 m (5' 7\")   Wt 100 kg (221 lb 1.9 oz)   SpO2 90%  Temp (24hrs), Av.7 °C (98.1 °F), Min:36.5 °C (97.7 °F), Max:37.1 °C (98.8 °F)      List concerns for Vancomycin clearance:     Obesity;Age;Nephrotoxic drugs    Pharmacokinetics:     Two level kinetics:   Ke (hr ^-1): 0.103  Half life: 6.7  Vd: Steady state Vd : 47.691  Calculated AUC: 406 mg·hr/L    Trough kinetics:   Recent Labs     10/10/21  0017 10/10/21  0827   VANCOTROUGH  --  10.0   VANCOPEAK 23.2  --        A/P:     -  Vancomycin dose: Continue with vancomycin 1000 mg q12H    -  Next vancomycin level(s): ~5 days or sooner pending renal function     - Calculated AUC (from trough): 406 mg·hr/L (goal 400-600 mg·hr/L)    -  Comments: Scr stable. AUC within goal range. Culture positive " for MRSA. No changes to current vancomycin dosing regimen.     Ann Sutton, PharmD

## 2021-10-10 NOTE — PROGRESS NOTES
ACUTE CARE VASCULAR SERVICE  PROGRESS NOTE      CTA reviewed. Patient will need a left lower extremity angiogram. I will discuss it with him tomorrow and plan to perform on Monday    Valerio Purcell MD  [facility] (General and Vascular Surgery)  Cell: [phone] (text/call)  Office: [phone]  __________________________________________________________________  Patient:Luis Kellogg   MRN:[id number]   CSN:[id number]    10/9/2021    9:00 PM

## 2021-10-10 NOTE — DIETARY
Nutrition Education Note:    Education Documentation  Diet, taught by Kristina Lynn R.D. at 10/10/2021  1:27 PM.  Learner: Patient  Readiness: Refuses  Method: Explanation, Handout  Response: Teaching Refused  Comment: Patient stated he had had education and conversations like these at least 35 times.  He was unhappy with food choices and wanted more salt and liberalization of diet.

## 2021-10-10 NOTE — PROGRESS NOTES
For a POC value of 192 at 1634, a missing dose communication for insulin lispro was sent to pharmacy at 1642, 1702 and 1735. At 1834 the requested insulin lispro could not be found at either S6 tubing station nor in the patient's medication unit container.  Hence, the two scheduled units of lispro for a 192 POC value could not be given. S6 charge nurse aware. This communication will be shared with the oncoming nurse.

## 2021-10-10 NOTE — PROGRESS NOTES
Patient had an order for potassium 40 meq once and daily. Charted on the once then place held on the am scheduled one. Patient did receive 40 meq potassium this morning

## 2021-10-10 NOTE — THERAPY
"Physical Therapy   Initial Evaluation     Patient Name: Luis Kellogg  Age:  71 y.o., Sex:  male  Medical Record #: [id number]  Today's Date: 10/10/2021     Precautions  Precautions: Fall Risk    Assessment\  Patient is 71 y.o. male with a diagnosis of sepsis due to infected diabetic ulcers and cellulitis of LE's  Additional factors influencing patient status / progress [place]. PM hx: Hep C, MI, Parathyroid disorder, substance abuse.  Pt presenting with decreased activity tolerance and unsteady gait. Pt will benefit from continued inpatient PT while in house to advance mobility. ( see below for status and goals).    Plan    Recommend Physical Therapy 3 times per week until therapy goals are met for the following treatments:  Equipment, Gait Training, Neuro Re-Education / Balance, Stair Training, Therapeutic Activities and Therapeutic Exercises    DC Equipment Recommendations: Unable to determine at this time  Discharge Recommendations: Recommend post-acute placement for additional physical therapy services prior to discharge home (or home health  pending wound care )          Objective       10/10/21 1002   Prior Living Situation   Prior Services None   Housing / Facility Mobile Home   Steps Into Home 4   Steps In Home 0   Rail Right Rail  (Steps in Home)   Elevator No   Equipment Owned Front-Wheel Walker;Single Point Cane   Lives with - Patient's Self Care Capacity Alone and Able to Care For Self   Comments Pt has a neighbor who assists as needed with \"anything I need\"   Prior Level of Functional Mobility   Bed Mobility Independent   Transfer Status Independent   Ambulation Independent   Distance Ambulation (Feet)   (short distances in mobile home and outside to vehicle.)   Assistive Devices Used Front-Wheel Walker   Stairs Independent   Comments Pt states he has been doing his own wound care.    History of Falls   History of Falls Yes  (No specific date " given. )   Cognition    Cognition / Consciousness WDL   Active ROM Lower Body    Active ROM Lower Body  WDL   Comments limited left DF   Strength Lower Body   Lower Body Strength  X   Gross Strength Generalized Weakness, Equal Bilaterally   Sensation Lower Body   Lower Extremity Sensation   X   Paresthesia Present    Comments bilateral mid-calf distal    Vision   Vision Comments no c/o deficts   Balance Assessment   Sitting Balance (Static) Good   Sitting Balance (Dynamic) Good   Standing Balance (Static) Fair   Standing Balance (Dynamic) Fair -   Weight Shift Sitting Good   Weight Shift Standing Fair   Comments heavy reliance on furniture in room    Gait Analysis   Gait Level Of Assist Supervised   Assistive Device Front Wheel Walker   Distance (Feet) 10   # of Times Distance was Traveled 2   Deviation Antalgic;Shuffled Gait;Bradykinetic;Decreased Heel Strike;Decreased Toe Off  (forward flexed at hips, unable to stand upright, )   # of Stairs Climbed 0   Weight Bearing Status no restrictions.    Comments increased time and effort to ambulate, Pt found standing at sink performing ADL's. No socks or shoes on. Educated on fall risk and infection control.    Bed Mobility    Supine to Sit Supervised   Sit to Supine Supervised   Scooting Supervised   Rolling Supervised   Functional Mobility   Sit to Stand Supervised   Bed, Chair, Wheelchair Transfer Supervised   Toilet Transfers Supervised   Transfer Method Stand Step   Mobility in room only   Short Term Goals    Short Term Goal # 1 Pt will transfer with LRAD at Mod I level for safe DC home by the 6thtx   Short Term Goal # 2 Pt will ambulate for 50 ft with LRAD at supervision level to access home by the 6th tx   Short Term Goal # 3 Pt will ascend and descend steps with bilateral railing at SPV level to access his home by the 6th tx   Education Group   Role of Physical Therapist Patient Response Patient;Acceptance;Explanation;Verbal Demonstration   Problem List     Problems Pain;Impaired Transfers;Impaired Ambulation;Decreased Activity Tolerance

## 2021-10-10 NOTE — PROGRESS NOTES
Dressing to BLE changed, left leg dressing used yellow petroleum instead of dakin's per pt request. Pt refused to have moist gauze with dakin's on LLE due to increased pain when taking dressing off.

## 2021-10-10 NOTE — PROGRESS NOTES
Hospital Medicine Daily Progress Note    Date of Service  10/10/2021    Chief Complaint  Luis Kellogg is a 71 y.o. male admitted 10/7/2021 with bilateral leg wounds    Hospital Course  This is a 71 year old male with PMHx of hyperlipidemia, type 2 diabetes with A1c of 9.1, obesity, CAD, hx MI,PAD, history of lower extremity osteomyelitis and recurrent LLE cellulitis who was admitted for sepsis secondary to BLE diabetic, nonhealing wounds and cellulitis.    Patient has known history of uncontrolled type 2 diabetes, he has had prior admissions for cellulitis and osteomyelitis.  Patient does have history of PAD.  Venous dopplers negative for DVT and arterial Dopplers ordered.  X-ray of the left tib-fib, no concern for osteomyelitis.  Wound care consulted. LPS consulted.    Wound cultures from March 20, 2021, noted E. Faecalis sensitive to ampicillin, Cipro, Levaquin and vancomycin.  Patient is currently on Unasyn and vancomycin.    Interval Problem Update   Vascular surgery consulted, plans for revascularization 10/11/2021.  Patient to be n.p.o. past midnight, long-acting insulin reduced from 20 units to 10 units tonight as patient will not be eating tomorrow morning.    Currently on Unasyn and vancomycin (MRSA nares +).    I have personally seen and examined the patient at bedside. I discussed the plan of care with patient and bedside RN.    Consultants/Specialty  LPS    Code Status  Full Code    Disposition  Patient is not medically cleared.   Anticipate discharge to TBD.  I have placed the appropriate orders for post-discharge needs.    Review of Systems  Review of Systems   All other systems reviewed and are negative.    Physical Exam  Temp:  [36.5 °C (97.7 °F)-37.1 °C (98.8 °F)] 36.7 °C (98 °F)  Pulse:  [72-83] 80  Resp:  [16-19] 16  BP: (126-151)/(71-91) 137/71  SpO2:  [90 %-93 %] 90 %    Physical Exam  Vitals and nursing note reviewed.   Constitutional:       General: He is not in acute distress.      Appearance: He is obese.   HENT:      Head: Normocephalic and atraumatic.   Eyes:      Extraocular Movements: Extraocular movements intact.      Conjunctiva/sclera: Conjunctivae normal.      Pupils: Pupils are equal, round, and reactive to light.   Cardiovascular:      Rate and Rhythm: Normal rate and regular rhythm.      Pulses: Normal pulses.      Heart sounds: No murmur heard.   No friction rub. No gallop.    Pulmonary:      Effort: Pulmonary effort is normal. No respiratory distress.      Breath sounds: Normal breath sounds. No wheezing, rhonchi or rales.   Abdominal:      General: Bowel sounds are normal. There is no distension.      Palpations: Abdomen is soft.      Tenderness: There is no abdominal tenderness.   Musculoskeletal:         General: No swelling or tenderness. Normal range of motion.      Cervical back: Normal range of motion and neck supple. No muscular tenderness.      Right lower leg: No edema.      Left lower leg: No edema.      Comments: Bilateral lower extremity cellulitis, with deep, purulent open wounds on the lateral aspects of bilateral legs, foul-smelling pus   Skin:     General: Skin is warm and dry.      Capillary Refill: Capillary refill takes less than 2 seconds.      Findings: No bruising, erythema or rash.   Neurological:      General: No focal deficit present.      Mental Status: He is alert and oriented to person, place, and time.       Fluids    Intake/Output Summary (Last 24 hours) at 10/10/2021 1108  Last data filed at 10/10/2021 0948  Gross per 24 hour   Intake 720 ml   Output --   Net 720 ml       Laboratory  Recent Labs     10/07/21  2030 10/08/21  0426   WBC 11.0* 10.0   RBC 5.31 4.90   HEMOGLOBIN 15.5 14.5   HEMATOCRIT 46.4 44.9   MCV 87.4 91.6   MCH 29.2 29.6   MCHC 33.4* 32.3*   RDW 43.6 45.9   PLATELETCT 308 259   MPV 9.9 9.9     Recent Labs     10/08/21  0426 10/09/21  1044 10/10/21  0017   SODIUM 136 134* 140   POTASSIUM 3.7 3.2* 3.0*   CHLORIDE 102 101 106   CO2 20  22 22   GLUCOSE 158* 221* 176*   BUN 7* 11 12   CREATININE 0.39* 0.45* 0.41*   CALCIUM 9.1 9.0 8.8     Recent Labs     10/07/21  2030   INR 1.09         Recent Labs     10/08/21  0426   TRIGLYCERIDE 120   HDL 28*   LDL 69       Imaging  CT-CTA AORTA-RO WITH & W/O-POST PROCESS   Final Result      1.  Occlusion of the distal right femoral artery with reconstitution distally.      2.  Calcified plaque in small diameter of the tibial and peroneal arteries bilaterally makes it difficult to evaluate for degree of stenosis and occlusion.      3.  Plaque within the left femoral artery without evidence of occlusion.      4.  Aortic atherosclerosis without aneurysm.      5.  Diverticulosis without evidence of diverticulitis.      6.  Hepatic steatosis.      DX-FOOT-COMPLETE 3+ LEFT   Final Result         1.  Disuse osteoporosis of the left foot.      2. Large subcutaneous ulcer on the dorsal surface of left foot.      3. Previous amputations of the left second through fourth toes.      DX-FOOT-COMPLETE 3+ RIGHT   Final Result      1.  No fracture or dislocation of RIGHT foot.   2.  Periarticular osteopenia suggests inflammatory arthropathy.   3.  No soft tissue gas or focal bony destruction however osteomyelitis is not excluded by plain film.   4.  Severe degenerative change of ankle joint.      DX-TIBIA AND FIBULA RIGHT   Final Result      No evidence of fracture or dislocation or acute osteomyelitis.      US-EXTREMITY ARTERY LOWER BILAT   Final Result      US-MIA SINGLE LEVEL BILAT   Final Result      US-EXTREMITY VENOUS LOWER UNILAT LEFT   Final Result      DX-TIBIA AND FIBULA LEFT   Final Result         1.  No acute traumatic bony injury.   2.  Soft tissue erosion of the posterior mid calf, corresponding with history of soft tissue ulcers.           Assessment/Plan  * Sepsis- (present on admission)  Assessment & Plan  This is Sepsis Present on admission  SIRS criteria identified on my evaluation include: Tachycardia, with  heart rate greater than 90 BPM, Tachypnea, with respirations greater than 20 per minute and Bandemia, greater than 10% bands  Source is diabetic infected ulcers and cellulitis of Lower extremities  Sepsis protocol initiated  Fluid resuscitation ordered per protocol  IV antibiotics as appropriate for source of sepsis  While organ dysfunction may be noted elsewhere in this problem list or in the chart, degree of organ dysfunction does not meet CMS criteria for severe sepsis      Diabetic infection of right foot (HCC)- (present on admission)  Assessment & Plan  As per left lower extremity plan    Peripheral artery disease (HCC) - (present on admission)  Assessment & Plan  Patient does have history of PAD, previous MIA was performed in June 2021, showed mild to moderate disease. Repeat MIA noted significant worsening disease.    Vascular surgery consulted,s/p CTA today, plans for revascularization prior to surgical intervention for the wounds, of [facility] 10/11/2021    Diabetic infection of left foot (HCC)- (present on admission)  Assessment & Plan  Patient has known history of uncontrolled type 2 diabetes, he has had prior admissions for cellulitis and osteomyelitis.  Patient does have history of PAD.  Venous dopplers negative for DVT and arterial Dopplers ordered.    X-ray of the left tib-fib, no concern for osteomyelitis.    Wound care consulted. LPS consulted.    Wound cultures from March 20, 2021, noted E. Faecalis sensitive to ampicillin, Cipro, Levaquin and vancomycin.  Patient is currently on Unasyn and vancomycin.    Uncontrolled type 2 diabetes mellitus (HCC)- (present on admission)  Assessment & Plan  ISS + Basal  A1c 9.1  Hypoglycemia protocol    Coronary artery disease involving native coronary artery - lexiscan 2/14/2020 - EF 60%  with fixed prominent defecct of interior and lateral walls, no stress induced ischemia, mild global hypokinesis- (present on admission)  Assessment & Plan  Continue with aspirin and  statin therapy    Peripheral neuropathy (HCC)- (present on admission)  Assessment & Plan  Cont home pain medications    Hyperlipidemia- (present on admission)  Assessment & Plan  F/u lipid panel outpatient       VTE prophylaxis: SCDs/TEDs and enoxaparin ppx    I have performed a physical exam and reviewed and updated ROS and Plan today (10/10/2021). In review of yesterday's note (10/9/2021), there are no changes except as documented above.

## 2021-10-11 LAB
ANION GAP SERPL CALC-SCNC: 8 MMOL/L (ref 7–16)
BUN SERPL-MCNC: 15 MG/DL (ref 8–22)
CALCIUM SERPL-MCNC: 9.4 MG/DL (ref 8.5–10.5)
CHLORIDE SERPL-SCNC: 106 MMOL/L (ref 96–112)
CO2 SERPL-SCNC: 28 MMOL/L (ref 20–33)
CREAT SERPL-MCNC: 0.54 MG/DL (ref 0.5–1.4)
GLUCOSE BLD-MCNC: 129 MG/DL (ref 65–99)
GLUCOSE BLD-MCNC: 166 MG/DL (ref 65–99)
GLUCOSE BLD-MCNC: 168 MG/DL (ref 65–99)
GLUCOSE BLD-MCNC: 175 MG/DL (ref 65–99)
GLUCOSE BLD-MCNC: 177 MG/DL (ref 65–99)
GLUCOSE SERPL-MCNC: 165 MG/DL (ref 65–99)
MAGNESIUM SERPL-MCNC: 1.8 MG/DL (ref 1.5–2.5)
POTASSIUM SERPL-SCNC: 3.9 MMOL/L (ref 3.6–5.5)
SODIUM SERPL-SCNC: 142 MMOL/L (ref 135–145)

## 2021-10-11 PROCEDURE — 700111 HCHG RX REV CODE 636 W/ 250 OVERRIDE (IP): Performed by: STUDENT IN AN ORGANIZED HEALTH CARE EDUCATION/TRAINING PROGRAM

## 2021-10-11 PROCEDURE — 700102 HCHG RX REV CODE 250 W/ 637 OVERRIDE(OP): Performed by: GENERAL PRACTICE

## 2021-10-11 PROCEDURE — A9270 NON-COVERED ITEM OR SERVICE: HCPCS | Performed by: STUDENT IN AN ORGANIZED HEALTH CARE EDUCATION/TRAINING PROGRAM

## 2021-10-11 PROCEDURE — 36415 COLL VENOUS BLD VENIPUNCTURE: CPT

## 2021-10-11 PROCEDURE — 99233 SBSQ HOSP IP/OBS HIGH 50: CPT | Performed by: GENERAL PRACTICE

## 2021-10-11 PROCEDURE — 82962 GLUCOSE BLOOD TEST: CPT | Mod: 91

## 2021-10-11 PROCEDURE — 700105 HCHG RX REV CODE 258: Performed by: STUDENT IN AN ORGANIZED HEALTH CARE EDUCATION/TRAINING PROGRAM

## 2021-10-11 PROCEDURE — 83735 ASSAY OF MAGNESIUM: CPT

## 2021-10-11 PROCEDURE — 80048 BASIC METABOLIC PNL TOTAL CA: CPT

## 2021-10-11 PROCEDURE — A9270 NON-COVERED ITEM OR SERVICE: HCPCS | Performed by: GENERAL PRACTICE

## 2021-10-11 PROCEDURE — 770006 HCHG ROOM/CARE - MED/SURG/GYN SEMI*

## 2021-10-11 PROCEDURE — 700102 HCHG RX REV CODE 250 W/ 637 OVERRIDE(OP): Performed by: STUDENT IN AN ORGANIZED HEALTH CARE EDUCATION/TRAINING PROGRAM

## 2021-10-11 RX ADMIN — DICLOFENAC SODIUM 50 MG: 25 TABLET, DELAYED RELEASE ORAL at 00:27

## 2021-10-11 RX ADMIN — INSULIN LISPRO 2 UNITS: 100 INJECTION, SOLUTION INTRAVENOUS; SUBCUTANEOUS at 16:54

## 2021-10-11 RX ADMIN — GABAPENTIN 300 MG: 300 CAPSULE ORAL at 16:54

## 2021-10-11 RX ADMIN — CARVEDILOL 12.5 MG: 12.5 TABLET, FILM COATED ORAL at 08:06

## 2021-10-11 RX ADMIN — LISINOPRIL 5 MG: 5 TABLET ORAL at 05:09

## 2021-10-11 RX ADMIN — OXYCODONE AND ACETAMINOPHEN 1 TABLET: 10; 325 TABLET ORAL at 11:19

## 2021-10-11 RX ADMIN — VANCOMYCIN HYDROCHLORIDE 1250 MG: 500 INJECTION, POWDER, LYOPHILIZED, FOR SOLUTION INTRAVENOUS at 20:33

## 2021-10-11 RX ADMIN — INSULIN LISPRO 2 UNITS: 100 INJECTION, SOLUTION INTRAVENOUS; SUBCUTANEOUS at 05:10

## 2021-10-11 RX ADMIN — DICLOFENAC SODIUM 50 MG: 25 TABLET, DELAYED RELEASE ORAL at 23:41

## 2021-10-11 RX ADMIN — OXYCODONE HYDROCHLORIDE AND ACETAMINOPHEN 2 TABLET: 5; 325 TABLET ORAL at 00:27

## 2021-10-11 RX ADMIN — FUROSEMIDE 80 MG: 40 TABLET ORAL at 05:09

## 2021-10-11 RX ADMIN — POTASSIUM CHLORIDE 40 MEQ: 1500 TABLET, EXTENDED RELEASE ORAL at 05:09

## 2021-10-11 RX ADMIN — GABAPENTIN 300 MG: 300 CAPSULE ORAL at 05:09

## 2021-10-11 RX ADMIN — DICLOFENAC SODIUM 50 MG: 25 TABLET, DELAYED RELEASE ORAL at 08:09

## 2021-10-11 RX ADMIN — INSULIN LISPRO 2 UNITS: 100 INJECTION, SOLUTION INTRAVENOUS; SUBCUTANEOUS at 23:42

## 2021-10-11 RX ADMIN — OXYCODONE HYDROCHLORIDE AND ACETAMINOPHEN 2 TABLET: 5; 325 TABLET ORAL at 06:21

## 2021-10-11 RX ADMIN — OXYCODONE HYDROCHLORIDE AND ACETAMINOPHEN 2 TABLET: 5; 325 TABLET ORAL at 20:32

## 2021-10-11 RX ADMIN — VANCOMYCIN HYDROCHLORIDE 1250 MG: 500 INJECTION, POWDER, LYOPHILIZED, FOR SOLUTION INTRAVENOUS at 11:11

## 2021-10-11 RX ADMIN — ASPIRIN 325 MG ORAL TABLET 325 MG: 325 PILL ORAL at 05:10

## 2021-10-11 RX ADMIN — OXYCODONE AND ACETAMINOPHEN 1 TABLET: 10; 325 TABLET ORAL at 15:31

## 2021-10-11 RX ADMIN — DOCUSATE SODIUM 50 MG AND SENNOSIDES 8.6 MG 2 TABLET: 8.6; 5 TABLET, FILM COATED ORAL at 05:09

## 2021-10-11 RX ADMIN — TRAZODONE HYDROCHLORIDE 450 MG: 150 TABLET ORAL at 20:32

## 2021-10-11 RX ADMIN — DAKIN'S SOLUTION 0.125% (QUARTER STRENGTH) 1 ML: 0.12 SOLUTION at 16:58

## 2021-10-11 RX ADMIN — CARVEDILOL 12.5 MG: 12.5 TABLET, FILM COATED ORAL at 16:54

## 2021-10-11 ASSESSMENT — PAIN DESCRIPTION - PAIN TYPE
TYPE: ACUTE PAIN

## 2021-10-11 ASSESSMENT — PAIN SCALES - WONG BAKER: WONGBAKER_NUMERICALRESPONSE: HURTS A LITTLE MORE

## 2021-10-11 NOTE — DISCHARGE PLANNING
Anticipated Discharge Disposition: TBD, SNF vs HH likely     Action/Information: SW attended IDT rounds and reviewed chart. SW attempted to meet with pt for assessment, however, pt busy with medical staff. Per IDT rounds, pt will be having surgery today, and I&D likely later this week. Pt may need wound vac. Physical therapy saw pt yesterday and recommends post-acute placement, or possibly HH depending on progress and wound care needs.    Pt lives in [place], [place] in mobile home with very limited support. Chart review indicates that HH was unable to be arranged in that area in the past due to lack of service providers. Additionally, pt has [employer] as secondary, which may limit local options for care.      Barriers: Complex medical needs; lack of services where pt lives     Plan: HCM will continue to collaborate with pt/family, medical care team, and outpatient providers for ongoing discharge planning support and collaboration.

## 2021-10-11 NOTE — PROGRESS NOTES
ACUTE CARE VASCULAR SERVICE  PROGRESS NOTE      Limited OR availability today, will reschedule to tomorrow  Diet resumed  NPO except meds at midnight sana Prucell MD  [facility] (General and Vascular Surgery)  Cell: [phone] (text/call)  Office: [phone]  __________________________________________________________________  Patient:Luis Valdez   MRN:[id number]   CSN:[id number]    10/11/2021    11:01 AM

## 2021-10-11 NOTE — CARE PLAN
Problem: Skin Integrity  Goal: Skin integrity is maintained or improved  Outcome: Progressing  Note: Surgery today     Problem: Fall Risk  Goal: Patient will remain free from falls  Outcome: Progressing  Note: 19 risk for falls.  Non skid socks, fall band on, hourly rounding in place, call light within reach, path free of clutter. Pt calls appropriately. Education provided on need to call staff for assistance with mobility and pt states understanding.    The patient is Stable - Low risk of patient condition declining or worsening    Shift Goals  Clinical Goals: manage pain    Progress made toward(s) clinical / shift goals:  Surgery today, pain control. Pt remains NPO    Patient is not progressing towards the following goals:

## 2021-10-11 NOTE — PROGRESS NOTES
Hospital Medicine Daily Progress Note    Date of Service  10/11/2021    Chief Complaint  Luis Kellogg is a 71 y.o. male admitted 10/7/2021 with bilateral leg wounds    Hospital Course  This is a 71 year old male with PMHx of hyperlipidemia, type 2 diabetes with A1c of 9.1, obesity, CAD, hx MI,PAD, history of lower extremity osteomyelitis and recurrent LLE cellulitis who was admitted for sepsis secondary to BLE diabetic, nonhealing wounds and cellulitis.    Patient has known history of uncontrolled type 2 diabetes, he has had prior admissions for cellulitis and osteomyelitis.  Patient does have history of PAD.  Venous dopplers negative for DVT and arterial Dopplers ordered.  X-ray of the left tib-fib, no concern for osteomyelitis.  Wound care consulted. LPS consulted.    Wound cultures from March 20, 2021, noted E. Faecalis sensitive to ampicillin, Cipro, Levaquin and vancomycin.  Patient is currently on vancomycin only as per wound culture results.    Interval Problem Update  Vascular surgery consulted, plans for revascularization 10/12/2021, delayed due to OR availability.  Patient to be n.p.o. past midnight, long-acting insulin reduced from 20 units to 10 units tonight as patient will not be eating tomorrow morning.    Currently on vancomycin.     After patient has revascularization, then he will need to have surgical debridement of his bilateral lower extremity wounds.    I have personally seen and examined the patient at bedside. I discussed the plan of care with patient and bedside RN.    Consultants/Specialty  LPS    Code Status  Full Code    Disposition  Patient is not medically cleared.   Anticipate discharge to TBD.  I have placed the appropriate orders for post-discharge needs.    Review of Systems  Review of Systems   All other systems reviewed and are negative.    Physical Exam  Temp:  [36.3 °C (97.4 °F)-36.6 °C (97.9 °F)] 36.3 °C (97.4 °F)  Pulse:  [75-87] 75  Resp:  [16-19] 17  BP: (122-144)/(68-85)  144/82  SpO2:  [91 %-95 %] 94 %    Physical Exam  Vitals and nursing note reviewed.   Constitutional:       General: He is not in acute distress.     Appearance: He is obese.   HENT:      Head: Normocephalic and atraumatic.   Eyes:      Extraocular Movements: Extraocular movements intact.      Conjunctiva/sclera: Conjunctivae normal.      Pupils: Pupils are equal, round, and reactive to light.   Cardiovascular:      Rate and Rhythm: Normal rate and regular rhythm.      Pulses: Normal pulses.      Heart sounds: No murmur heard.   No friction rub. No gallop.    Pulmonary:      Effort: Pulmonary effort is normal. No respiratory distress.      Breath sounds: Normal breath sounds. No wheezing, rhonchi or rales.   Abdominal:      General: Bowel sounds are normal. There is no distension.      Palpations: Abdomen is soft.      Tenderness: There is no abdominal tenderness.   Musculoskeletal:         General: No swelling or tenderness. Normal range of motion.      Cervical back: Normal range of motion and neck supple. No muscular tenderness.      Right lower leg: No edema.      Left lower leg: No edema.      Comments: Bilateral lower extremity cellulitis, with deep, purulent open wounds on the lateral aspects of bilateral legs, foul-smelling pus   Skin:     General: Skin is warm and dry.      Capillary Refill: Capillary refill takes less than 2 seconds.      Findings: No bruising, erythema or rash.   Neurological:      General: No focal deficit present.      Mental Status: He is alert and oriented to person, place, and time.       Fluids    Intake/Output Summary (Last 24 hours) at 10/11/2021 1222  Last data filed at 10/11/2021 0800  Gross per 24 hour   Intake 1240 ml   Output --   Net 1240 ml       Laboratory      Recent Labs     10/09/21  1044 10/10/21  0017 10/11/21  0426   SODIUM 134* 140 142   POTASSIUM 3.2* 3.0* 3.9   CHLORIDE 101 106 106   CO2 22 22 28   GLUCOSE 221* 176* 165*   BUN 11 12 15   CREATININE 0.45* 0.41*  0.54   CALCIUM 9.0 8.8 9.4                   Imaging  CT-CTA AORTA-RO WITH & W/O-POST PROCESS   Final Result      1.  Occlusion of the distal right femoral artery with reconstitution distally.      2.  Calcified plaque in small diameter of the tibial and peroneal arteries bilaterally makes it difficult to evaluate for degree of stenosis and occlusion.      3.  Plaque within the left femoral artery without evidence of occlusion.      4.  Aortic atherosclerosis without aneurysm.      5.  Diverticulosis without evidence of diverticulitis.      6.  Hepatic steatosis.      DX-FOOT-COMPLETE 3+ LEFT   Final Result         1.  Disuse osteoporosis of the left foot.      2. Large subcutaneous ulcer on the dorsal surface of left foot.      3. Previous amputations of the left second through fourth toes.      DX-FOOT-COMPLETE 3+ RIGHT   Final Result      1.  No fracture or dislocation of RIGHT foot.   2.  Periarticular osteopenia suggests inflammatory arthropathy.   3.  No soft tissue gas or focal bony destruction however osteomyelitis is not excluded by plain film.   4.  Severe degenerative change of ankle joint.      DX-TIBIA AND FIBULA RIGHT   Final Result      No evidence of fracture or dislocation or acute osteomyelitis.      US-EXTREMITY ARTERY LOWER BILAT   Final Result      US-MIA SINGLE LEVEL BILAT   Final Result      US-EXTREMITY VENOUS LOWER UNILAT LEFT   Final Result      DX-TIBIA AND FIBULA LEFT   Final Result         1.  No acute traumatic bony injury.   2.  Soft tissue erosion of the posterior mid calf, corresponding with history of soft tissue ulcers.           Assessment/Plan  * Sepsis- (present on admission)  Assessment & Plan  This is Sepsis Present on admission  SIRS criteria identified on my evaluation include: Tachycardia, with heart rate greater than 90 BPM, Tachypnea, with respirations greater than 20 per minute and Bandemia, greater than 10% bands  Source is diabetic infected ulcers and cellulitis of  Lower extremities  Sepsis protocol initiated  Fluid resuscitation ordered per protocol  IV antibiotics as appropriate for source of sepsis  While organ dysfunction may be noted elsewhere in this problem list or in the chart, degree of organ dysfunction does not meet CMS criteria for severe sepsis      Diabetic infection of right foot (HCC)- (present on admission)  Assessment & Plan  As per left lower extremity plan    Peripheral artery disease (HCC) - (present on admission)  Assessment & Plan  Patient does have history of PAD, previous MIA was performed in June 2021, showed mild to moderate disease. Repeat MIA noted significant worsening disease.    Vascular surgery consulted,s/p CTA today, plans for revascularization prior to surgical intervention for the wounds, of LLE 10/11/2021    Diabetic infection of left foot (HCC)- (present on admission)  Assessment & Plan  Patient has known history of uncontrolled type 2 diabetes, he has had prior admissions for cellulitis and osteomyelitis.  Patient does have history of PAD.  Venous dopplers negative for DVT and arterial Dopplers ordered.    X-ray of the left tib-fib, no concern for osteomyelitis.    Wound care consulted. LPS consulted.    Wound cultures from March 20, 2021, noted E. Faecalis sensitive to ampicillin, Cipro, Levaquin and vancomycin.  Patient is currently on Vancomycin.    Uncontrolled type 2 diabetes mellitus (HCC)- (present on admission)  Assessment & Plan  ISS + Basal  A1c 9.1  Hypoglycemia protocol    Coronary artery disease involving native coronary artery - lexiscan 2/14/2020 - EF 60%  with fixed prominent defecct of interior and lateral walls, no stress induced ischemia, mild global hypokinesis- (present on admission)  Assessment & Plan  Continue with aspirin and statin therapy    Peripheral neuropathy (HCC)- (present on admission)  Assessment & Plan  Cont home pain medications    Hyperlipidemia- (present on admission)  Assessment & Plan  F/u lipid  panel outpatient       VTE prophylaxis: SCDs/TEDs and enoxaparin ppx    I have performed a physical exam and reviewed and updated ROS and Plan today (10/11/2021). In review of yesterday's note (10/10/2021), there are no changes except as documented above.

## 2021-10-12 ENCOUNTER — ANESTHESIA EVENT (OUTPATIENT)
Dept: SURGERY | Facility: MEDICAL CENTER | Age: 71
DRG: 854 | End: 2021-10-12
Payer: MEDICARE

## 2021-10-12 ENCOUNTER — ANESTHESIA (OUTPATIENT)
Dept: SURGERY | Facility: MEDICAL CENTER | Age: 71
DRG: 854 | End: 2021-10-12
Payer: MEDICARE

## 2021-10-12 ENCOUNTER — APPOINTMENT (OUTPATIENT)
Dept: RADIOLOGY | Facility: MEDICAL CENTER | Age: 71
DRG: 854 | End: 2021-10-12
Attending: SURGERY
Payer: MEDICARE

## 2021-10-12 LAB
BACTERIA BLD CULT: NORMAL
BACTERIA BLD CULT: NORMAL
EXTERNAL QUALITY CONTROL: NORMAL
GLUCOSE BLD-MCNC: 139 MG/DL (ref 65–99)
GLUCOSE BLD-MCNC: 227 MG/DL (ref 65–99)
GLUCOSE BLD-MCNC: 303 MG/DL (ref 65–99)
SARS-COV+SARS-COV-2 AG RESP QL IA.RAPID: NEGATIVE
SIGNIFICANT IND 70042: NORMAL
SIGNIFICANT IND 70042: NORMAL
SITE SITE: NORMAL
SITE SITE: NORMAL
SOURCE SOURCE: NORMAL
SOURCE SOURCE: NORMAL

## 2021-10-12 PROCEDURE — 11042 DBRDMT SUBQ TIS 1ST 20SQCM/<: CPT | Mod: 59 | Performed by: SURGERY

## 2021-10-12 PROCEDURE — 500166 HCHG CATH, ANGIO: Performed by: SURGERY

## 2021-10-12 PROCEDURE — 160002 HCHG RECOVERY MINUTES (STAT): Performed by: SURGERY

## 2021-10-12 PROCEDURE — 160028 HCHG SURGERY MINUTES - 1ST 30 MINS LEVEL 3: Performed by: SURGERY

## 2021-10-12 PROCEDURE — 0JBN0ZZ EXCISION OF RIGHT LOWER LEG SUBCUTANEOUS TISSUE AND FASCIA, OPEN APPROACH: ICD-10-PCS | Performed by: SURGERY

## 2021-10-12 PROCEDURE — C1887 CATHETER, GUIDING: HCPCS | Performed by: SURGERY

## 2021-10-12 PROCEDURE — 75716 ARTERY X-RAYS ARMS/LEGS: CPT | Mod: 26,59 | Performed by: SURGERY

## 2021-10-12 PROCEDURE — 700102 HCHG RX REV CODE 250 W/ 637 OVERRIDE(OP): Performed by: INTERNAL MEDICINE

## 2021-10-12 PROCEDURE — 37226 PR REVASCULARIZE FEM/POP ARTERY,ANGIOPLASTY/*: CPT | Mod: RT | Performed by: SURGERY

## 2021-10-12 PROCEDURE — C1894 INTRO/SHEATH, NON-LASER: HCPCS | Performed by: SURGERY

## 2021-10-12 PROCEDURE — 700111 HCHG RX REV CODE 636 W/ 250 OVERRIDE (IP): Performed by: STUDENT IN AN ORGANIZED HEALTH CARE EDUCATION/TRAINING PROGRAM

## 2021-10-12 PROCEDURE — 160035 HCHG PACU - 1ST 60 MINS PHASE I: Performed by: SURGERY

## 2021-10-12 PROCEDURE — C1760 CLOSURE DEV, VASC: HCPCS | Performed by: SURGERY

## 2021-10-12 PROCEDURE — 700101 HCHG RX REV CODE 250: Performed by: ANESTHESIOLOGY

## 2021-10-12 PROCEDURE — 700111 HCHG RX REV CODE 636 W/ 250 OVERRIDE (IP): Performed by: INTERNAL MEDICINE

## 2021-10-12 PROCEDURE — 82962 GLUCOSE BLOOD TEST: CPT

## 2021-10-12 PROCEDURE — A9270 NON-COVERED ITEM OR SERVICE: HCPCS | Performed by: GENERAL PRACTICE

## 2021-10-12 PROCEDURE — A6223 GAUZE >16<=48 NO W/SAL W/O B: HCPCS | Performed by: SURGERY

## 2021-10-12 PROCEDURE — 160039 HCHG SURGERY MINUTES - EA ADDL 1 MIN LEVEL 3: Performed by: SURGERY

## 2021-10-12 PROCEDURE — 700117 HCHG RX CONTRAST REV CODE 255: Performed by: SURGERY

## 2021-10-12 PROCEDURE — 75625 CONTRAST EXAM ABDOMINL AORTA: CPT | Mod: 26 | Performed by: SURGERY

## 2021-10-12 PROCEDURE — 99233 SBSQ HOSP IP/OBS HIGH 50: CPT | Performed by: INTERNAL MEDICINE

## 2021-10-12 PROCEDURE — 700111 HCHG RX REV CODE 636 W/ 250 OVERRIDE (IP)

## 2021-10-12 PROCEDURE — 502000 HCHG MISC OR IMPLANTS RC 0278: Performed by: SURGERY

## 2021-10-12 PROCEDURE — 87426 SARSCOV CORONAVIRUS AG IA: CPT | Performed by: SURGERY

## 2021-10-12 PROCEDURE — 700111 HCHG RX REV CODE 636 W/ 250 OVERRIDE (IP): Performed by: ANESTHESIOLOGY

## 2021-10-12 PROCEDURE — 36247 INS CATH ABD/L-EXT ART 3RD: CPT | Mod: 59,RT | Performed by: SURGERY

## 2021-10-12 PROCEDURE — A9270 NON-COVERED ITEM OR SERVICE: HCPCS | Performed by: INTERNAL MEDICINE

## 2021-10-12 PROCEDURE — 047K3DZ DILATION OF RIGHT FEMORAL ARTERY WITH INTRALUMINAL DEVICE, PERCUTANEOUS APPROACH: ICD-10-PCS | Performed by: SURGERY

## 2021-10-12 PROCEDURE — A9270 NON-COVERED ITEM OR SERVICE: HCPCS | Performed by: STUDENT IN AN ORGANIZED HEALTH CARE EDUCATION/TRAINING PROGRAM

## 2021-10-12 PROCEDURE — B41D1ZZ FLUOROSCOPY OF AORTA AND BILATERAL LOWER EXTREMITY ARTERIES USING LOW OSMOLAR CONTRAST: ICD-10-PCS | Performed by: SURGERY

## 2021-10-12 PROCEDURE — 0JBP0ZZ EXCISION OF LEFT LOWER LEG SUBCUTANEOUS TISSUE AND FASCIA, OPEN APPROACH: ICD-10-PCS | Performed by: SURGERY

## 2021-10-12 PROCEDURE — 770006 HCHG ROOM/CARE - MED/SURG/GYN SEMI*

## 2021-10-12 PROCEDURE — C1769 GUIDE WIRE: HCPCS | Performed by: SURGERY

## 2021-10-12 PROCEDURE — 700102 HCHG RX REV CODE 250 W/ 637 OVERRIDE(OP): Performed by: GENERAL PRACTICE

## 2021-10-12 PROCEDURE — 160048 HCHG OR STATISTICAL LEVEL 1-5: Performed by: SURGERY

## 2021-10-12 PROCEDURE — 700102 HCHG RX REV CODE 250 W/ 637 OVERRIDE(OP): Performed by: STUDENT IN AN ORGANIZED HEALTH CARE EDUCATION/TRAINING PROGRAM

## 2021-10-12 PROCEDURE — 700105 HCHG RX REV CODE 258: Performed by: STUDENT IN AN ORGANIZED HEALTH CARE EDUCATION/TRAINING PROGRAM

## 2021-10-12 PROCEDURE — C1725 CATH, TRANSLUMIN NON-LASER: HCPCS

## 2021-10-12 PROCEDURE — 160036 HCHG PACU - EA ADDL 30 MINS PHASE I: Performed by: SURGERY

## 2021-10-12 PROCEDURE — 700105 HCHG RX REV CODE 258: Performed by: ANESTHESIOLOGY

## 2021-10-12 PROCEDURE — 160009 HCHG ANES TIME/MIN: Performed by: SURGERY

## 2021-10-12 DEVICE — DEVICE CLSR 6FR HMST IMPL SLF STS PLUS ANGIOSEAL (10EA/CA): Type: IMPLANTABLE DEVICE | Status: FUNCTIONAL

## 2021-10-12 DEVICE — IMPLANTABLE DEVICE: Type: IMPLANTABLE DEVICE | Status: FUNCTIONAL

## 2021-10-12 RX ORDER — OXYCODONE HYDROCHLORIDE 5 MG/1
5 TABLET ORAL
Status: DISCONTINUED | OUTPATIENT
Start: 2021-10-12 | End: 2021-10-14

## 2021-10-12 RX ORDER — OXYCODONE HCL 5 MG/5 ML
10 SOLUTION, ORAL ORAL
Status: DISCONTINUED | OUTPATIENT
Start: 2021-10-12 | End: 2021-10-12 | Stop reason: HOSPADM

## 2021-10-12 RX ORDER — ONDANSETRON 2 MG/ML
4 INJECTION INTRAMUSCULAR; INTRAVENOUS
Status: DISCONTINUED | OUTPATIENT
Start: 2021-10-12 | End: 2021-10-12 | Stop reason: HOSPADM

## 2021-10-12 RX ORDER — DEXTROSE MONOHYDRATE 25 G/50ML
50 INJECTION, SOLUTION INTRAVENOUS
Status: DISCONTINUED | OUTPATIENT
Start: 2021-10-12 | End: 2021-10-30 | Stop reason: HOSPADM

## 2021-10-12 RX ORDER — HEPARIN SODIUM 1000 [USP'U]/ML
INJECTION, SOLUTION INTRAVENOUS; SUBCUTANEOUS PRN
Status: DISCONTINUED | OUTPATIENT
Start: 2021-10-12 | End: 2021-10-12 | Stop reason: SURG

## 2021-10-12 RX ORDER — SODIUM CHLORIDE, SODIUM LACTATE, POTASSIUM CHLORIDE, CALCIUM CHLORIDE 600; 310; 30; 20 MG/100ML; MG/100ML; MG/100ML; MG/100ML
INJECTION, SOLUTION INTRAVENOUS CONTINUOUS
Status: DISCONTINUED | OUTPATIENT
Start: 2021-10-12 | End: 2021-10-12 | Stop reason: HOSPADM

## 2021-10-12 RX ORDER — PROTAMINE SULFATE 10 MG/ML
INJECTION, SOLUTION INTRAVENOUS PRN
Status: DISCONTINUED | OUTPATIENT
Start: 2021-10-12 | End: 2021-10-12 | Stop reason: SURG

## 2021-10-12 RX ORDER — HALOPERIDOL 5 MG/ML
1 INJECTION INTRAMUSCULAR
Status: DISCONTINUED | OUTPATIENT
Start: 2021-10-12 | End: 2021-10-12 | Stop reason: HOSPADM

## 2021-10-12 RX ORDER — CEFAZOLIN SODIUM 1 G/3ML
INJECTION, POWDER, FOR SOLUTION INTRAMUSCULAR; INTRAVENOUS PRN
Status: DISCONTINUED | OUTPATIENT
Start: 2021-10-12 | End: 2021-10-12 | Stop reason: SURG

## 2021-10-12 RX ORDER — OXYCODONE HYDROCHLORIDE 10 MG/1
10 TABLET ORAL
Status: DISCONTINUED | OUTPATIENT
Start: 2021-10-12 | End: 2021-10-14

## 2021-10-12 RX ORDER — OXYCODONE HCL 5 MG/5 ML
5 SOLUTION, ORAL ORAL
Status: DISCONTINUED | OUTPATIENT
Start: 2021-10-12 | End: 2021-10-12 | Stop reason: HOSPADM

## 2021-10-12 RX ORDER — HYDROMORPHONE HYDROCHLORIDE 1 MG/ML
0.5 INJECTION, SOLUTION INTRAMUSCULAR; INTRAVENOUS; SUBCUTANEOUS
Status: DISCONTINUED | OUTPATIENT
Start: 2021-10-12 | End: 2021-10-14

## 2021-10-12 RX ORDER — INSULIN LISPRO 100 [IU]/ML
2-9 INJECTION, SOLUTION INTRAVENOUS; SUBCUTANEOUS
Status: DISCONTINUED | OUTPATIENT
Start: 2021-10-12 | End: 2021-10-30 | Stop reason: HOSPADM

## 2021-10-12 RX ORDER — IODIXANOL 270 MG/ML
95 INJECTION, SOLUTION INTRAVASCULAR ONCE
Status: COMPLETED | OUTPATIENT
Start: 2021-10-12 | End: 2021-10-12

## 2021-10-12 RX ORDER — IODIXANOL 270 MG/ML
95 INJECTION, SOLUTION INTRAVASCULAR ONCE
Status: CANCELLED | OUTPATIENT
Start: 2021-10-12 | End: 2021-10-12

## 2021-10-12 RX ORDER — SODIUM CHLORIDE 9 MG/ML
INJECTION, SOLUTION INTRAVENOUS
Status: DISCONTINUED | OUTPATIENT
Start: 2021-10-12 | End: 2021-10-12 | Stop reason: SURG

## 2021-10-12 RX ORDER — MIDAZOLAM HYDROCHLORIDE 1 MG/ML
INJECTION INTRAMUSCULAR; INTRAVENOUS
Status: COMPLETED
Start: 2021-10-12 | End: 2021-10-12

## 2021-10-12 RX ORDER — DEXTROSE MONOHYDRATE 25 G/50ML
50 INJECTION, SOLUTION INTRAVENOUS
Status: CANCELLED | OUTPATIENT
Start: 2021-10-12

## 2021-10-12 RX ORDER — DEXAMETHASONE SODIUM PHOSPHATE 4 MG/ML
INJECTION, SOLUTION INTRA-ARTICULAR; INTRALESIONAL; INTRAMUSCULAR; INTRAVENOUS; SOFT TISSUE PRN
Status: DISCONTINUED | OUTPATIENT
Start: 2021-10-12 | End: 2021-10-12 | Stop reason: SURG

## 2021-10-12 RX ORDER — DIPHENHYDRAMINE HYDROCHLORIDE 50 MG/ML
12.5 INJECTION INTRAMUSCULAR; INTRAVENOUS
Status: DISCONTINUED | OUTPATIENT
Start: 2021-10-12 | End: 2021-10-12 | Stop reason: HOSPADM

## 2021-10-12 RX ORDER — MIDAZOLAM HYDROCHLORIDE 1 MG/ML
1 INJECTION INTRAMUSCULAR; INTRAVENOUS
Status: COMPLETED | OUTPATIENT
Start: 2021-10-12 | End: 2021-10-12

## 2021-10-12 RX ORDER — ROCURONIUM BROMIDE 10 MG/ML
INJECTION, SOLUTION INTRAVENOUS PRN
Status: DISCONTINUED | OUTPATIENT
Start: 2021-10-12 | End: 2021-10-12 | Stop reason: SURG

## 2021-10-12 RX ORDER — INSULIN LISPRO 100 [IU]/ML
2-9 INJECTION, SOLUTION INTRAVENOUS; SUBCUTANEOUS EVERY 6 HOURS
Status: CANCELLED | OUTPATIENT
Start: 2021-10-12

## 2021-10-12 RX ORDER — DEXMEDETOMIDINE HYDROCHLORIDE 4 UG/ML
INJECTION, SOLUTION INTRAVENOUS PRN
Status: DISCONTINUED | OUTPATIENT
Start: 2021-10-12 | End: 2021-10-12 | Stop reason: SURG

## 2021-10-12 RX ADMIN — DICLOFENAC SODIUM 50 MG: 25 TABLET, DELAYED RELEASE ORAL at 20:00

## 2021-10-12 RX ADMIN — IODIXANOL 95 ML: 270 INJECTION, SOLUTION INTRAVASCULAR at 11:00

## 2021-10-12 RX ADMIN — CEFAZOLIN 2 G: 330 INJECTION, POWDER, FOR SOLUTION INTRAMUSCULAR; INTRAVENOUS at 10:43

## 2021-10-12 RX ADMIN — CARVEDILOL 12.5 MG: 12.5 TABLET, FILM COATED ORAL at 08:06

## 2021-10-12 RX ADMIN — FENTANYL CITRATE 50 MCG: 50 INJECTION, SOLUTION INTRAMUSCULAR; INTRAVENOUS at 12:38

## 2021-10-12 RX ADMIN — ROCURONIUM BROMIDE 70 MG: 10 INJECTION, SOLUTION INTRAVENOUS at 10:43

## 2021-10-12 RX ADMIN — FUROSEMIDE 80 MG: 40 TABLET ORAL at 05:58

## 2021-10-12 RX ADMIN — ONDANSETRON 4 MG: 2 INJECTION INTRAMUSCULAR; INTRAVENOUS at 11:27

## 2021-10-12 RX ADMIN — HEPARIN SODIUM 8000 UNITS: 1000 INJECTION, SOLUTION INTRAVENOUS; SUBCUTANEOUS at 11:32

## 2021-10-12 RX ADMIN — FENTANYL CITRATE 50 MCG: 50 INJECTION, SOLUTION INTRAMUSCULAR; INTRAVENOUS at 12:52

## 2021-10-12 RX ADMIN — ASPIRIN 325 MG ORAL TABLET 325 MG: 325 PILL ORAL at 05:57

## 2021-10-12 RX ADMIN — EPHEDRINE SULFATE 10 MG: 50 INJECTION INTRAMUSCULAR; INTRAVENOUS; SUBCUTANEOUS at 11:51

## 2021-10-12 RX ADMIN — DEXAMETHASONE SODIUM PHOSPHATE 4 MG: 4 INJECTION, SOLUTION INTRA-ARTICULAR; INTRALESIONAL; INTRAMUSCULAR; INTRAVENOUS; SOFT TISSUE at 11:27

## 2021-10-12 RX ADMIN — FENTANYL CITRATE 50 MCG: 50 INJECTION, SOLUTION INTRAMUSCULAR; INTRAVENOUS at 12:01

## 2021-10-12 RX ADMIN — VANCOMYCIN HYDROCHLORIDE 1250 MG: 500 INJECTION, POWDER, LYOPHILIZED, FOR SOLUTION INTRAVENOUS at 08:45

## 2021-10-12 RX ADMIN — INSULIN LISPRO 3 UNITS: 100 INJECTION, SOLUTION INTRAVENOUS; SUBCUTANEOUS at 17:09

## 2021-10-12 RX ADMIN — FENTANYL CITRATE 50 MCG: 50 INJECTION, SOLUTION INTRAMUSCULAR; INTRAVENOUS at 10:43

## 2021-10-12 RX ADMIN — TRAZODONE HYDROCHLORIDE 450 MG: 150 TABLET ORAL at 20:00

## 2021-10-12 RX ADMIN — OXYCODONE HYDROCHLORIDE 10 MG: 10 TABLET ORAL at 20:03

## 2021-10-12 RX ADMIN — FENTANYL CITRATE 100 MCG: 50 INJECTION, SOLUTION INTRAMUSCULAR; INTRAVENOUS at 13:09

## 2021-10-12 RX ADMIN — LISINOPRIL 5 MG: 5 TABLET ORAL at 06:03

## 2021-10-12 RX ADMIN — OXYCODONE HYDROCHLORIDE AND ACETAMINOPHEN 2 TABLET: 5; 325 TABLET ORAL at 16:00

## 2021-10-12 RX ADMIN — MIDAZOLAM HYDROCHLORIDE 1 MG: 1 INJECTION, SOLUTION INTRAMUSCULAR; INTRAVENOUS at 13:37

## 2021-10-12 RX ADMIN — DICLOFENAC SODIUM 50 MG: 25 TABLET, DELAYED RELEASE ORAL at 08:06

## 2021-10-12 RX ADMIN — OXYCODONE HYDROCHLORIDE AND ACETAMINOPHEN 2 TABLET: 5; 325 TABLET ORAL at 05:57

## 2021-10-12 RX ADMIN — DEXMEDETOMIDINE HYDROCHLORIDE 25 MCG: 100 INJECTION, SOLUTION INTRAVENOUS at 13:29

## 2021-10-12 RX ADMIN — GABAPENTIN 300 MG: 300 CAPSULE ORAL at 17:05

## 2021-10-12 RX ADMIN — SODIUM CHLORIDE: 9 INJECTION, SOLUTION INTRAVENOUS at 10:30

## 2021-10-12 RX ADMIN — HYDROMORPHONE HYDROCHLORIDE 0.5 MG: 1 INJECTION, SOLUTION INTRAMUSCULAR; INTRAVENOUS; SUBCUTANEOUS at 21:25

## 2021-10-12 RX ADMIN — GABAPENTIN 300 MG: 300 CAPSULE ORAL at 05:58

## 2021-10-12 RX ADMIN — DOCUSATE SODIUM 50 MG AND SENNOSIDES 8.6 MG 2 TABLET: 8.6; 5 TABLET, FILM COATED ORAL at 17:05

## 2021-10-12 RX ADMIN — CARVEDILOL 12.5 MG: 12.5 TABLET, FILM COATED ORAL at 17:05

## 2021-10-12 RX ADMIN — INSULIN LISPRO 6 UNITS: 100 INJECTION, SOLUTION INTRAVENOUS; SUBCUTANEOUS at 20:21

## 2021-10-12 RX ADMIN — MIDAZOLAM HYDROCHLORIDE 1 MG: 1 INJECTION, SOLUTION INTRAMUSCULAR; INTRAVENOUS at 13:50

## 2021-10-12 RX ADMIN — DOCUSATE SODIUM 50 MG AND SENNOSIDES 8.6 MG 2 TABLET: 8.6; 5 TABLET, FILM COATED ORAL at 05:57

## 2021-10-12 RX ADMIN — FENTANYL CITRATE 50 MCG: 50 INJECTION, SOLUTION INTRAMUSCULAR; INTRAVENOUS at 12:32

## 2021-10-12 RX ADMIN — SUGAMMADEX 200 MG: 100 INJECTION, SOLUTION INTRAVENOUS at 12:29

## 2021-10-12 RX ADMIN — FENTANYL CITRATE 50 MCG: 50 INJECTION, SOLUTION INTRAMUSCULAR; INTRAVENOUS at 13:30

## 2021-10-12 RX ADMIN — FENTANYL CITRATE 50 MCG: 50 INJECTION INTRAMUSCULAR; INTRAVENOUS at 13:30

## 2021-10-12 RX ADMIN — FENTANYL CITRATE 50 MCG: 50 INJECTION, SOLUTION INTRAMUSCULAR; INTRAVENOUS at 13:42

## 2021-10-12 RX ADMIN — PROPOFOL 150 MG: 10 INJECTION, EMULSION INTRAVENOUS at 10:43

## 2021-10-12 RX ADMIN — POTASSIUM CHLORIDE 40 MEQ: 1500 TABLET, EXTENDED RELEASE ORAL at 05:58

## 2021-10-12 RX ADMIN — PROTAMINE SULFATE 50 MG: 10 INJECTION, SOLUTION INTRAVENOUS at 12:12

## 2021-10-12 RX ADMIN — OXYCODONE HYDROCHLORIDE 10 MG: 10 TABLET ORAL at 23:05

## 2021-10-12 RX ADMIN — ROCURONIUM BROMIDE 30 MG: 10 INJECTION, SOLUTION INTRAVENOUS at 11:39

## 2021-10-12 RX ADMIN — VANCOMYCIN HYDROCHLORIDE 1250 MG: 500 INJECTION, POWDER, LYOPHILIZED, FOR SOLUTION INTRAVENOUS at 20:19

## 2021-10-12 RX ADMIN — FENTANYL CITRATE 50 MCG: 50 INJECTION, SOLUTION INTRAMUSCULAR; INTRAVENOUS at 12:25

## 2021-10-12 RX ADMIN — HYDROMORPHONE HYDROCHLORIDE 0.5 MG: 1 INJECTION, SOLUTION INTRAMUSCULAR; INTRAVENOUS; SUBCUTANEOUS at 18:34

## 2021-10-12 ASSESSMENT — ENCOUNTER SYMPTOMS
SEIZURES: 0
LOSS OF CONSCIOUSNESS: 0
VOMITING: 0
NAUSEA: 0
SORE THROAT: 0
ABDOMINAL PAIN: 0
COUGH: 0
MYALGIAS: 0
BLURRED VISION: 0
FEVER: 0
CHILLS: 0
DEPRESSION: 0
SHORTNESS OF BREATH: 0
CLAUDICATION: 1
PALPITATIONS: 0

## 2021-10-12 ASSESSMENT — PAIN DESCRIPTION - PAIN TYPE
TYPE: SURGICAL PAIN
TYPE: ACUTE PAIN
TYPE: ACUTE PAIN;SURGICAL PAIN
TYPE: SURGICAL PAIN
TYPE: SURGICAL PAIN
TYPE: ACUTE PAIN;SURGICAL PAIN
TYPE: ACUTE PAIN;SURGICAL PAIN

## 2021-10-12 ASSESSMENT — PAIN SCALES - GENERAL: PAIN_LEVEL: 4

## 2021-10-12 NOTE — PROGRESS NOTES
ACUTE CARE VASCULAR SERVICE  PROGRESS NOTE      OR today for BLE angiogram and BLE wound debridement  NPO except meds  Time TBD based on OR availability    Valerio Purcell MD  [facility] (General and Vascular Surgery)  Cell: [phone] (text/call)  Office: [phone]  __________________________________________________________________  Patient:Luis Valdez   MRN:[id number]   CSN:[id number]    10/12/2021    9:16 AM

## 2021-10-12 NOTE — WOUND TEAM
Wound team consulted for bilateral LE wound VAC.  VAC orders placed for checks.  Wound consult completed, wound team to change wound VAC starting on 10/14/21.

## 2021-10-12 NOTE — PROGRESS NOTES
ACUTE CARE VASCULAR SERVICE  PROGRESS NOTE      Angio complete: Right SFA stent placed and right foot now well perfused, the left foot has good perfusion without intervention.    Vac applied to larger wounds.    Wound care consulted    Valerio Purcell MD  [facility] (General and Vascular Surgery)  Cell: [phone] (text/call)  Office: [phone]  __________________________________________________________________  Patient:Luis Kellogg   MRN:[id number]   CSN:[id number]    10/12/2021    2:50 PM

## 2021-10-12 NOTE — PROGRESS NOTES
Hospital Medicine Daily Progress Note    Date of Service  10/12/2021    Chief Complaint  Luis Kellogg is a 71 y.o. male admitted 10/7/2021 with bilateral leg wounds    Hospital Course  This is a 71 year old male with PMHx of hyperlipidemia, type 2 diabetes with A1c of 9.1, obesity, CAD, hx MI,PAD, history of lower extremity osteomyelitis and recurrent LLE cellulitis who was admitted for sepsis secondary to BLE diabetic, nonhealing wounds and cellulitis.    Patient has known history of uncontrolled type 2 diabetes, he has had prior admissions for cellulitis and osteomyelitis.  Patient does have history of PAD.  Venous dopplers negative for DVT and arterial Dopplers ordered.  X-ray of the left tib-fib, no concern for osteomyelitis.  Wound care consulted. LPS consulted.    Wound cultures from March 20, 2021, noted E. Faecalis sensitive to ampicillin, Cipro, Levaquin and vancomycin.  Patient is currently on vancomycin only as per wound culture results.    Interval Problem Update  Patient was seen and examined at bedside.  No acute events overnight. Patient is resting comfortably in bed and in no acute distress.     OR today for revascularization and BLE wound debridement  Vanc/Unasyn  Wound culture: GBS and MRSA  Recent a1C 9.1    I have personally seen and examined the patient at bedside. I discussed the plan of care with patient and bedside RN.    Consultants/Specialty  LPS   Vascular surgery    Code Status  Full Code    Disposition  Patient is not medically cleared.   Anticipate discharge to TBD.  I have placed the appropriate orders for post-discharge needs.    Review of Systems  Review of Systems   Constitutional: Negative for chills and fever.   HENT: Negative for congestion and sore throat.    Eyes: Negative for blurred vision.   Respiratory: Negative for cough and shortness of breath.    Cardiovascular: Positive for claudication. Negative for chest pain and palpitations.   Gastrointestinal: Negative for  abdominal pain, nausea and vomiting.   Genitourinary: Negative for dysuria and frequency.   Musculoskeletal: Negative for myalgias.   Neurological: Negative for seizures and loss of consciousness.   Psychiatric/Behavioral: Negative for depression.     Physical Exam  Temp:  [36.3 °C (97.4 °F)-37.4 °C (99.4 °F)] 36.3 °C (97.4 °F)  Pulse:  [69-92] 72  Resp:  [17-20] 18  BP: ()/(55-91) 94/55  SpO2:  [92 %-95 %] 93 %    Physical Exam  Vitals and nursing note reviewed.   Constitutional:       General: He is not in acute distress.     Appearance: He is obese. He is not toxic-appearing.   HENT:      Head: Normocephalic and atraumatic.      Right Ear: External ear normal.      Left Ear: External ear normal.      Nose: No congestion.   Eyes:      General: No scleral icterus.     Extraocular Movements: Extraocular movements intact.      Conjunctiva/sclera: Conjunctivae normal.      Pupils: Pupils are equal, round, and reactive to light.   Cardiovascular:      Rate and Rhythm: Normal rate and regular rhythm.      Pulses: Normal pulses.      Heart sounds: No murmur heard.     Pulmonary:      Effort: Pulmonary effort is normal.      Breath sounds: Normal breath sounds. No wheezing.   Abdominal:      General: Bowel sounds are normal.      Palpations: Abdomen is soft.      Tenderness: There is no abdominal tenderness. There is no guarding or rebound.   Musculoskeletal:         General: No swelling or tenderness. Normal range of motion.      Cervical back: Normal range of motion and neck supple. No muscular tenderness.      Comments: Bilateral lower extremity cellulitis, with deep, purulent open wounds on the lateral aspects of bilateral legs, foul-smelling pus   Skin:     General: Skin is warm and dry.      Capillary Refill: Capillary refill takes less than 2 seconds.      Findings: No bruising, erythema or rash.   Neurological:      General: No focal deficit present.      Mental Status: He is alert and oriented to person,  place, and time.   Psychiatric:         Mood and Affect: Mood normal.         Thought Content: Thought content normal.       Fluids    Intake/Output Summary (Last 24 hours) at 10/12/2021 1320  Last data filed at 10/11/2021 1731  Gross per 24 hour   Intake 240 ml   Output --   Net 240 ml       Laboratory      Recent Labs     10/10/21  0017 10/11/21  0426   SODIUM 140 142   POTASSIUM 3.0* 3.9   CHLORIDE 106 106   CO2 22 28   GLUCOSE 176* 165*   BUN 12 15   CREATININE 0.41* 0.54   CALCIUM 8.8 9.4                   Imaging  CT-CTA AORTA-RO WITH & W/O-POST PROCESS   Final Result      1.  Occlusion of the distal right femoral artery with reconstitution distally.      2.  Calcified plaque in small diameter of the tibial and peroneal arteries bilaterally makes it difficult to evaluate for degree of stenosis and occlusion.      3.  Plaque within the left femoral artery without evidence of occlusion.      4.  Aortic atherosclerosis without aneurysm.      5.  Diverticulosis without evidence of diverticulitis.      6.  Hepatic steatosis.      DX-FOOT-COMPLETE 3+ LEFT   Final Result         1.  Disuse osteoporosis of the left foot.      2. Large subcutaneous ulcer on the dorsal surface of left foot.      3. Previous amputations of the left second through fourth toes.      DX-FOOT-COMPLETE 3+ RIGHT   Final Result      1.  No fracture or dislocation of RIGHT foot.   2.  Periarticular osteopenia suggests inflammatory arthropathy.   3.  No soft tissue gas or focal bony destruction however osteomyelitis is not excluded by plain film.   4.  Severe degenerative change of ankle joint.      DX-TIBIA AND FIBULA RIGHT   Final Result      No evidence of fracture or dislocation or acute osteomyelitis.      US-EXTREMITY ARTERY LOWER BILAT   Final Result      US-MIA SINGLE LEVEL BILAT   Final Result      US-EXTREMITY VENOUS LOWER UNILAT LEFT   Final Result      DX-TIBIA AND FIBULA LEFT   Final Result         1.  No acute traumatic bony injury.    2.  Soft tissue erosion of the posterior mid calf, corresponding with history of soft tissue ulcers.      IR-EXTREMITY ANGIOGRAM-BILATERAL    (Results Pending)        Assessment/Plan  * Diabetic infection of left foot (HCC)- (present on admission)  Assessment & Plan  Patient has known history of uncontrolled type 2 diabetes, he has had prior admissions for cellulitis and osteomyelitis.  Patient does have history of PAD.  Venous dopplers negative for DVT and arterial Dopplers ordered.    X-ray of the left tib-fib, no concern for osteomyelitis.    Wound care consulted. LPS consulted.    Wound culture MRSA, GBS  Continue vanc/unasyn    Diabetic infection of right foot (HCC)- (present on admission)  Assessment & Plan  As per left lower extremity plan    Sepsis- (present on admission)  Assessment & Plan  This is Sepsis Present on admission  SIRS criteria identified on my evaluation include: Tachycardia, with heart rate greater than 90 BPM, Tachypnea, with respirations greater than 20 per minute and Bandemia, greater than 10% bands  Source is diabetic infected ulcers and cellulitis of Lower extremities  Sepsis protocol initiated  Fluid resuscitation ordered per protocol  IV antibiotics as appropriate for source of sepsis  While organ dysfunction may be noted elsewhere in this problem list or in the chart, degree of organ dysfunction does not meet CMS criteria for severe sepsis      Uncontrolled type 2 diabetes mellitus (HCC)- (present on admission)  Assessment & Plan  ISS + Basal  A1c 9.1  Hypoglycemia protocol    Coronary artery disease involving native coronary artery - lexiscan 2/14/2020 - EF 60%  with fixed prominent defecct of interior and lateral walls, no stress induced ischemia, mild global hypokinesis- (present on admission)  Assessment & Plan  Continue with aspirin and statin therapy    Peripheral artery disease (HCC) - (present on admission)  Assessment & Plan  Patient does have history of PAD, previous MIA  was performed in June 2021, showed mild to moderate disease. Repeat MIA noted significant worsening disease.    Vascular surgery consulted,s/p CTA today, plans for revascularization prior to surgical intervention for the wounds, of LLE 10/11/2021    OR today for revascularization    Peripheral neuropathy (HCC)- (present on admission)  Assessment & Plan  Cont home pain medications    Hyperlipidemia- (present on admission)  Assessment & Plan  F/u lipid panel outpatient       VTE prophylaxis: SCDs/TEDs and enoxaparin ppx    I have performed a physical exam and reviewed and updated ROS and Plan today (10/12/2021). In review of yesterday's note (10/11/2021), there are no changes except as documented above.

## 2021-10-12 NOTE — CONSULTS
Diabetes education:  Met with pt this evening. Pt has a hx of diabetes on Lantus and metformin per med rec. Pt was admitted with blood sugar of 187 and Hg a1c of 9.1%. Pt is currently on Admelog sliding scale coverage every six hours (should be ac and hs if eating and not NPO). Semglee 20 units was last given on 10/9 , was consistently held and now discontinued. Blood sugars are 177 ( 2 units), 129, and 166 (2 units) without Semglee.  Insulin pens reviewed, as well as insulin storage, shelf life and site rotation. Pt has a meter and supplies but only does finger sticks occasionally. Discussed [place] but pt states he would get 4 sensors for $90 ( not sure if that is the 10 day or the 14 sensors).  Plan: CDE to continue to follow. Please send text via Voalte if needs change.

## 2021-10-12 NOTE — ANESTHESIA PREPROCEDURE EVALUATION
Relevant Problems   NEURO   (positive) History of MI (myocardial infarction)   (positive) History of falling      CARDIAC   (positive) Coronary artery disease involving native coronary artery - lexiscan 2/14/2020 - EF 60%  with fixed prominent defecct of interior and lateral walls, no stress induced ischemia, mild global hypokinesis   (positive) Peripheral artery disease (HCC)        Physical Exam    Airway   Mallampati: II  TM distance: >3 FB  Neck ROM: full       Cardiovascular - normal exam  Rhythm: regular  Rate: normal  (-) murmur     Dental - normal exam           Pulmonary - normal exam  Breath sounds clear to auscultation     Abdominal    Neurological - normal exam                 Anesthesia Plan    ASA 3   ASA physical status 3 criteria: other (comment)    Plan - general       Airway plan will be ETT                    Informed Consent:

## 2021-10-12 NOTE — ANESTHESIA TIME REPORT
Anesthesia Start and Stop Event Times     Date Time Event    10/12/2021 1027 Ready for Procedure     1034 Anesthesia Start     1334 Anesthesia Stop        Responsible Staff  10/12/21    Name Role Begin End    Red Dudley M.D. Anesth 1034 1334        Preop Diagnosis (Free Text):  Pre-op Diagnosis     BILATERAL LOWER EXTREMITY WOUNDS, PERIPHERAL ARTERY DISEASE         Preop Diagnosis (Codes):    Premium Reason  Non-Premium    Comments:

## 2021-10-12 NOTE — ANESTHESIA PROCEDURE NOTES
Airway    Date/Time: 10/12/2021 10:44 AM  Performed by: Red Dudley M.D.  Authorized by: Red Dudley M.D.     Location:  OR  Urgency:  Elective  Indications for Airway Management:  Anesthesia      Spontaneous Ventilation: absent    Sedation Level:  Deep  Preoxygenated: Yes    Patient Position:  Sniffing  Final Airway Type:  Endotracheal airway  Final Endotracheal Airway:  ETT  Cuffed: Yes    Technique Used for Successful ETT Placement:  Video laryngoscopy    Insertion Site:  Oral  Blade Type:  Glide  Laryngoscope Blade/Videolaryngoscope Blade Size:  4  ETT Size (mm):  7.5  Measured from:  Teeth  ETT to Teeth (cm):  21  Placement Verified by: auscultation and capnometry    Cormack-Lehane Classification:  Grade I - full view of glottis  Number of Attempts at Approach:  1

## 2021-10-12 NOTE — PROGRESS NOTES
Diabetes education: Met with pt this evening. Please see consult note.  Plan: CDE to continue to follow. Please send text via Voalte if needs change.

## 2021-10-12 NOTE — PROGRESS NOTES
Bedside report received at change of shift. Pt is A&Ox4, reports no pain at this time. Plan is to have angiogram of lower extremity done today. Educated on use of call light. Safety precautions in place. All pt needs met at this time.     COVID-19 surge in effect.

## 2021-10-12 NOTE — THERAPY
Missed Therapy     Patient Name: Luis Kellogg  Age:  71 y.o., Sex:  male  Medical Record #: [id number]  Today's Date: 10/12/2021         10/12/21 1014   Initial Contact Note    Initial Contact Note Order Received and Verified, Occupational Therapy Evaluation in Progress with Full Report to Follow.   Interdisciplinary Plan of Care Collaboration   Collaboration Comments OT eval held pt in surgery today will follow up post op as appropriate needs eval    Session Information   Date / Session Number  10/12 NEEDS EVAL    Priority 4

## 2021-10-12 NOTE — ANESTHESIA POSTPROCEDURE EVALUATION
Patient: Luis Kellogg    Procedure Summary     Date: 10/12/21 Room / Location: [place] [id number] / SURGERY [facility]    Anesthesia Start: 1034 Anesthesia Stop: 1334    Procedures:       BILATERAL LOWER EXTREMITY ANGIOGRAM (Bilateral Groin)      IRRIGATION AND DEBRIDEMENT, WOUND, BILATERAL LOWER EXTREMITY (Bilateral Leg Lower)      APPLICATION WOUND VAC (Bilateral Leg Lower)      STENT PLACEMENT, RIGHT SUPERFICIAL FEMORAL ARTERY (Right Groin) Diagnosis: (BILATERAL LOWER EXTREMITY WOUNDS, PERIPHERAL ARTERY DISEASE )    Surgeons: Valerio Purcell M.D. Responsible Provider: Red Dudley M.D.    Anesthesia Type: general ASA Status: 3          Final Anesthesia Type: general  Last vitals  BP   Blood Pressure : (!) 94/55    Temp   36.3 °C (97.4 °F)    Pulse   72   Resp   18    SpO2   93 %      Anesthesia Post Evaluation    Patient location during evaluation: PACU  Patient participation: complete - patient participated  Level of consciousness: awake and alert  Pain score: 4    Airway patency: patent  Anesthetic complications: no  Cardiovascular status: hemodynamically stable  Respiratory status: acceptable  Hydration status: euvolemic    PONV: none          No complications documented.     Nurse Pain Score: 0 (NPRS)

## 2021-10-12 NOTE — OR NURSING
Patient is awake, alert, expresses fatigue, vital signs stable, on 3L oxymask, denies nausea states pain is tolerable.     B/L groin site soft, dressings CDI. Right pedal pulse 2+, left inaccessible, however great toe cap refill is 3 sec. B/L leg wound vacs in place, 125 mmHg continuous therapy.     Report given to Jazlyn LEDEZMA

## 2021-10-12 NOTE — PROGRESS NOTES
Pt returned to floor from post-op. Report received from Danae LEDEZMA. Pt reported 10/10 pain in his lower extremities. Medicated per MAR. VSS. Right LE pulse is 2+, cap refill on L side is <3 seconds. Wound vac in place bilaterally. Groin site dressings are C/D/I. All pt needs met at this time.

## 2021-10-13 LAB
ALBUMIN SERPL BCP-MCNC: 3.6 G/DL (ref 3.2–4.9)
ANION GAP SERPL CALC-SCNC: 11 MMOL/L (ref 7–16)
BASOPHILS # BLD AUTO: 0.4 % (ref 0–1.8)
BASOPHILS # BLD: 0.03 K/UL (ref 0–0.12)
BUN SERPL-MCNC: 16 MG/DL (ref 8–22)
CALCIUM SERPL-MCNC: 9.4 MG/DL (ref 8.5–10.5)
CHLORIDE SERPL-SCNC: 101 MMOL/L (ref 96–112)
CO2 SERPL-SCNC: 26 MMOL/L (ref 20–33)
CREAT SERPL-MCNC: 0.59 MG/DL (ref 0.5–1.4)
EOSINOPHIL # BLD AUTO: 0.03 K/UL (ref 0–0.51)
EOSINOPHIL NFR BLD: 0.4 % (ref 0–6.9)
ERYTHROCYTE [DISTWIDTH] IN BLOOD BY AUTOMATED COUNT: 44.1 FL (ref 35.9–50)
GLUCOSE BLD-MCNC: 165 MG/DL (ref 65–99)
GLUCOSE BLD-MCNC: 197 MG/DL (ref 65–99)
GLUCOSE BLD-MCNC: 232 MG/DL (ref 65–99)
GLUCOSE BLD-MCNC: 234 MG/DL (ref 65–99)
GLUCOSE SERPL-MCNC: 181 MG/DL (ref 65–99)
HCT VFR BLD AUTO: 39.6 % (ref 42–52)
HGB BLD-MCNC: 13.1 G/DL (ref 14–18)
IMM GRANULOCYTES # BLD AUTO: 0.05 K/UL (ref 0–0.11)
IMM GRANULOCYTES NFR BLD AUTO: 0.6 % (ref 0–0.9)
LYMPHOCYTES # BLD AUTO: 1.24 K/UL (ref 1–4.8)
LYMPHOCYTES NFR BLD: 15.4 % (ref 22–41)
MAGNESIUM SERPL-MCNC: 1.9 MG/DL (ref 1.5–2.5)
MCH RBC QN AUTO: 29.8 PG (ref 27–33)
MCHC RBC AUTO-ENTMCNC: 33.1 G/DL (ref 33.7–35.3)
MCV RBC AUTO: 90 FL (ref 81.4–97.8)
MONOCYTES # BLD AUTO: 1.08 K/UL (ref 0–0.85)
MONOCYTES NFR BLD AUTO: 13.4 % (ref 0–13.4)
NEUTROPHILS # BLD AUTO: 5.64 K/UL (ref 1.82–7.42)
NEUTROPHILS NFR BLD: 69.8 % (ref 44–72)
NRBC # BLD AUTO: 0 K/UL
NRBC BLD-RTO: 0 /100 WBC
PHOSPHATE SERPL-MCNC: 3 MG/DL (ref 2.5–4.5)
PLATELET # BLD AUTO: 284 K/UL (ref 164–446)
PMV BLD AUTO: 9.9 FL (ref 9–12.9)
POTASSIUM SERPL-SCNC: 3.9 MMOL/L (ref 3.6–5.5)
RBC # BLD AUTO: 4.4 M/UL (ref 4.7–6.1)
SODIUM SERPL-SCNC: 138 MMOL/L (ref 135–145)
WBC # BLD AUTO: 8.1 K/UL (ref 4.8–10.8)

## 2021-10-13 PROCEDURE — 99233 SBSQ HOSP IP/OBS HIGH 50: CPT | Performed by: INTERNAL MEDICINE

## 2021-10-13 PROCEDURE — A9270 NON-COVERED ITEM OR SERVICE: HCPCS | Performed by: STUDENT IN AN ORGANIZED HEALTH CARE EDUCATION/TRAINING PROGRAM

## 2021-10-13 PROCEDURE — A9270 NON-COVERED ITEM OR SERVICE: HCPCS | Performed by: GENERAL PRACTICE

## 2021-10-13 PROCEDURE — 700105 HCHG RX REV CODE 258: Performed by: INTERNAL MEDICINE

## 2021-10-13 PROCEDURE — 36415 COLL VENOUS BLD VENIPUNCTURE: CPT

## 2021-10-13 PROCEDURE — 85025 COMPLETE CBC W/AUTO DIFF WBC: CPT

## 2021-10-13 PROCEDURE — 700102 HCHG RX REV CODE 250 W/ 637 OVERRIDE(OP): Performed by: GENERAL PRACTICE

## 2021-10-13 PROCEDURE — A9270 NON-COVERED ITEM OR SERVICE: HCPCS | Performed by: INTERNAL MEDICINE

## 2021-10-13 PROCEDURE — 700102 HCHG RX REV CODE 250 W/ 637 OVERRIDE(OP): Performed by: STUDENT IN AN ORGANIZED HEALTH CARE EDUCATION/TRAINING PROGRAM

## 2021-10-13 PROCEDURE — 700111 HCHG RX REV CODE 636 W/ 250 OVERRIDE (IP): Performed by: STUDENT IN AN ORGANIZED HEALTH CARE EDUCATION/TRAINING PROGRAM

## 2021-10-13 PROCEDURE — 700102 HCHG RX REV CODE 250 W/ 637 OVERRIDE(OP): Performed by: INTERNAL MEDICINE

## 2021-10-13 PROCEDURE — 770006 HCHG ROOM/CARE - MED/SURG/GYN SEMI*

## 2021-10-13 PROCEDURE — 83735 ASSAY OF MAGNESIUM: CPT

## 2021-10-13 PROCEDURE — 82962 GLUCOSE BLOOD TEST: CPT | Mod: 91

## 2021-10-13 PROCEDURE — 80069 RENAL FUNCTION PANEL: CPT

## 2021-10-13 PROCEDURE — 700105 HCHG RX REV CODE 258: Performed by: STUDENT IN AN ORGANIZED HEALTH CARE EDUCATION/TRAINING PROGRAM

## 2021-10-13 PROCEDURE — 97530 THERAPEUTIC ACTIVITIES: CPT

## 2021-10-13 PROCEDURE — 700111 HCHG RX REV CODE 636 W/ 250 OVERRIDE (IP): Performed by: INTERNAL MEDICINE

## 2021-10-13 RX ORDER — SULFAMETHOXAZOLE AND TRIMETHOPRIM 800; 160 MG/1; MG/1
1 TABLET ORAL EVERY 12 HOURS
Status: DISPENSED | OUTPATIENT
Start: 2021-10-13 | End: 2021-10-27

## 2021-10-13 RX ORDER — NICOTINE 21 MG/24HR
14 PATCH, TRANSDERMAL 24 HOURS TRANSDERMAL
Status: DISCONTINUED | OUTPATIENT
Start: 2021-10-13 | End: 2021-10-26

## 2021-10-13 RX ORDER — SODIUM CHLORIDE, SODIUM LACTATE, POTASSIUM CHLORIDE, AND CALCIUM CHLORIDE .6; .31; .03; .02 G/100ML; G/100ML; G/100ML; G/100ML
500 INJECTION, SOLUTION INTRAVENOUS ONCE
Status: COMPLETED | OUTPATIENT
Start: 2021-10-13 | End: 2021-10-13

## 2021-10-13 RX ORDER — CEPHALEXIN 500 MG/1
1000 CAPSULE ORAL 3 TIMES DAILY
Status: DISPENSED | OUTPATIENT
Start: 2021-10-13 | End: 2021-10-27

## 2021-10-13 RX ADMIN — GABAPENTIN 300 MG: 300 CAPSULE ORAL at 17:49

## 2021-10-13 RX ADMIN — DICLOFENAC SODIUM 50 MG: 25 TABLET, DELAYED RELEASE ORAL at 20:55

## 2021-10-13 RX ADMIN — CEPHALEXIN 1000 MG: 500 CAPSULE ORAL at 15:03

## 2021-10-13 RX ADMIN — DOCUSATE SODIUM 50 MG AND SENNOSIDES 8.6 MG 2 TABLET: 8.6; 5 TABLET, FILM COATED ORAL at 04:06

## 2021-10-13 RX ADMIN — INSULIN LISPRO 3 UNITS: 100 INJECTION, SOLUTION INTRAVENOUS; SUBCUTANEOUS at 11:25

## 2021-10-13 RX ADMIN — CEPHALEXIN 1000 MG: 500 CAPSULE ORAL at 21:00

## 2021-10-13 RX ADMIN — DOCUSATE SODIUM 50 MG AND SENNOSIDES 8.6 MG 2 TABLET: 8.6; 5 TABLET, FILM COATED ORAL at 17:49

## 2021-10-13 RX ADMIN — SODIUM CHLORIDE, POTASSIUM CHLORIDE, SODIUM LACTATE AND CALCIUM CHLORIDE 500 ML: 600; 310; 30; 20 INJECTION, SOLUTION INTRAVENOUS at 14:12

## 2021-10-13 RX ADMIN — OXYCODONE HYDROCHLORIDE 10 MG: 10 TABLET ORAL at 20:54

## 2021-10-13 RX ADMIN — GABAPENTIN 300 MG: 300 CAPSULE ORAL at 04:07

## 2021-10-13 RX ADMIN — HYDROMORPHONE HYDROCHLORIDE 0.5 MG: 1 INJECTION, SOLUTION INTRAMUSCULAR; INTRAVENOUS; SUBCUTANEOUS at 00:32

## 2021-10-13 RX ADMIN — TRAZODONE HYDROCHLORIDE 450 MG: 150 TABLET ORAL at 21:00

## 2021-10-13 RX ADMIN — ASPIRIN 325 MG ORAL TABLET 325 MG: 325 PILL ORAL at 04:06

## 2021-10-13 RX ADMIN — OXYCODONE HYDROCHLORIDE 10 MG: 10 TABLET ORAL at 07:13

## 2021-10-13 RX ADMIN — INSULIN LISPRO 3 UNITS: 100 INJECTION, SOLUTION INTRAVENOUS; SUBCUTANEOUS at 20:55

## 2021-10-13 RX ADMIN — INSULIN LISPRO 2 UNITS: 100 INJECTION, SOLUTION INTRAVENOUS; SUBCUTANEOUS at 16:57

## 2021-10-13 RX ADMIN — OXYCODONE HYDROCHLORIDE 10 MG: 10 TABLET ORAL at 14:53

## 2021-10-13 RX ADMIN — DICLOFENAC SODIUM 50 MG: 25 TABLET, DELAYED RELEASE ORAL at 09:02

## 2021-10-13 RX ADMIN — POTASSIUM CHLORIDE 40 MEQ: 1500 TABLET, EXTENDED RELEASE ORAL at 04:06

## 2021-10-13 RX ADMIN — OXYCODONE HYDROCHLORIDE 10 MG: 10 TABLET ORAL at 10:16

## 2021-10-13 RX ADMIN — INSULIN LISPRO 2 UNITS: 100 INJECTION, SOLUTION INTRAVENOUS; SUBCUTANEOUS at 06:50

## 2021-10-13 RX ADMIN — SULFAMETHOXAZOLE AND TRIMETHOPRIM 1 TABLET: 800; 160 TABLET ORAL at 17:49

## 2021-10-13 RX ADMIN — OXYCODONE HYDROCHLORIDE 10 MG: 10 TABLET ORAL at 04:06

## 2021-10-13 RX ADMIN — OXYCODONE HYDROCHLORIDE 10 MG: 10 TABLET ORAL at 17:51

## 2021-10-13 RX ADMIN — VANCOMYCIN HYDROCHLORIDE 1250 MG: 500 INJECTION, POWDER, LYOPHILIZED, FOR SOLUTION INTRAVENOUS at 09:02

## 2021-10-13 ASSESSMENT — COGNITIVE AND FUNCTIONAL STATUS - GENERAL
MOBILITY SCORE: 20
MOVING FROM LYING ON BACK TO SITTING ON SIDE OF FLAT BED: A LITTLE
WALKING IN HOSPITAL ROOM: A LITTLE
CLIMB 3 TO 5 STEPS WITH RAILING: A LITTLE
SUGGESTED CMS G CODE MODIFIER MOBILITY: CJ
STANDING UP FROM CHAIR USING ARMS: A LITTLE

## 2021-10-13 ASSESSMENT — ENCOUNTER SYMPTOMS
MYALGIAS: 0
NAUSEA: 0
PALPITATIONS: 0
SHORTNESS OF BREATH: 0
FEVER: 0
ABDOMINAL PAIN: 0
SEIZURES: 0
BLURRED VISION: 0
LOSS OF CONSCIOUSNESS: 0
COUGH: 0
SORE THROAT: 0
DEPRESSION: 0
CHILLS: 0
VOMITING: 0

## 2021-10-13 ASSESSMENT — PAIN DESCRIPTION - PAIN TYPE
TYPE: ACUTE PAIN;SURGICAL PAIN

## 2021-10-13 ASSESSMENT — GAIT ASSESSMENTS: GAIT LEVEL OF ASSIST: REFUSED

## 2021-10-13 NOTE — THERAPY
Physical Therapy   Daily Treatment     Patient Name: Luis Kellogg  Age:  71 y.o., Sex:  male  Medical Record #: [id number]  Today's Date: 10/13/2021     Precautions  Precautions: Fall Risk  Comments:  (BLE w/ one vac and T piece may be a mobility hazard)    Assessment    Rec'd pt alert, in bed, initially pleasant and willing to work w/ PT.  He does now have one wound vac, attached to both LE's.  He is able to move to the eob w/o assist, but then became immediately frustrated, shouted profanities and refused to participate any further w/ therapy.  Per nsg, this has happened before.  Also spoke w/ nsg regarding obtaining a shoe for his LE to aide in ambulation. Unable to perform a full treatment due to pts behavior.    Plan    Continue current treatment plan.    DC Equipment Recommendations: Unable to determine at this time  Discharge Recommendations: Recommend post-acute placement for additional physical therapy services prior to discharge home        Objective       10/13/21 0727   Balance   Sitting Balance (Static) Fair +   Sitting Balance (Dynamic) Fair +   Standing Balance (Static)   (refused)   Weight Shift Sitting Good   Skilled Intervention Verbal Cuing   Gait Analysis   Gait Level Of Assist Refused   Bed Mobility    Supine to Sit Supervised   Sit to Supine Supervised   Skilled Intervention Verbal Cuing   Functional Mobility   Sit to Stand Refused   Short Term Goals    Short Term Goal # 1 Pt will transfer with LRAD at Mod I level for safe DC home by the 6thtx   Goal Outcome # 1 goal not met   Short Term Goal # 2 Pt will ambulate for 50 ft with LRAD at supervision level to access home by the 6th tx   Goal Outcome # 2 Goal not met   Short Term Goal # 3 Pt will ascend and descend steps with bilateral railing at SPV level to access his home by the 6th tx   Goal Outcome # 3 Goal not met   Anticipated Discharge Equipment and Recommendations   DC Equipment Recommendations Unable to determine at this time   Discharge  Recommendations Recommend post-acute placement for additional physical therapy services prior to discharge home

## 2021-10-13 NOTE — PROGRESS NOTES
Pt's blood pressure was 72/44 at 1406. MD Kamara notified immediately. 500 mL bolus of LR is currently running.    After the bolus BP was 92/52. MD Kamara aware. No further orders at this time. Pt encouraged to increase his oral fluid intake.

## 2021-10-13 NOTE — DISCHARGE PLANNING
Anticipated Discharge Disposition: SNF for rehab services      Action: met with pt at bedside to discuss coordination of SNF stay. Provided pt with SNF choice options for [facility] as well as [facility] area. Pt chose SNF facilities in both areas.     [place] SNF choice:   1. [facility]  2. [facility]   3. [facility]   4. [facility] SNF choice:   1. [facility]   2. [facility]   3. [facility]   4. [facility]     Faxed to [facility] at [phone]      Barriers to Discharge: medical clearance, accepting snf     Plan: LSW to assist as needed and monitor for barriers to discharge.

## 2021-10-13 NOTE — DISCHARGE PLANNING
Received Choice form at 0428  Agency/Facility Name: [facility]  Referral sent per Choice form @ 2821

## 2021-10-13 NOTE — PROGRESS NOTES
Bedside report received at change of shift. Pt is A&Ox4, reported 9/10 pain in his lower legs, medicated per MAR. Pt sat up in bed for breakfast this AM. Safety precautions in place. All pt needs met at this time.     COVID-19 surge in effect.

## 2021-10-13 NOTE — PROGRESS NOTES
A&Ox4. Medicating as needed for post-surgical pain. Pt denied further needs at this time. Snack provided per request. Call light and belongings within reach, able to make needs known.    COVID-19 surge in effect.

## 2021-10-13 NOTE — PROGRESS NOTES
Hospital Medicine Daily Progress Note    Date of Service  10/13/2021    Chief Complaint  Luis Kellogg is a 71 y.o. male admitted 10/7/2021 with bilateral leg wounds    Hospital Course  This is a 71 year old male with PMHx of hyperlipidemia, type 2 diabetes with A1c of 9.1, obesity, CAD, hx MI,PAD, history of lower extremity osteomyelitis and recurrent LLE cellulitis who was admitted for sepsis secondary to BLE diabetic, nonhealing wounds and cellulitis.    Patient has known history of uncontrolled type 2 diabetes, he has had prior admissions for cellulitis and osteomyelitis.  Patient does have history of PAD.  Venous dopplers negative for DVT and arterial Dopplers ordered.  X-ray of the left tib-fib, no concern for osteomyelitis.  Wound care consulted. LPS consulted.    Wound cultures from March 20, 2021, noted E. Faecalis sensitive to ampicillin, Cipro, Levaquin and vancomycin.  Patient is currently on vancomycin only as per wound culture results.    Interval Problem Update  Patient was seen and examined at bedside.  No acute events overnight. Patient is resting comfortably in bed and in no acute distress.     S/p revascularization and debridement of lower extremity wounds  LPS following  Wound vac in place  Wound culture: GBS and MRSA  Keflex/Bactrim    I have personally seen and examined the patient at bedside. I discussed the plan of care with patient and bedside RN.    Consultants/Specialty  LPS   Vascular surgery    Code Status  Full Code    Disposition  Patient is not medically cleared.   Anticipate discharge to TBD.  I have placed the appropriate orders for post-discharge needs.    Review of Systems  Review of Systems   Constitutional: Negative for chills and fever.   HENT: Negative for congestion and sore throat.    Eyes: Negative for blurred vision.   Respiratory: Negative for cough and shortness of breath.    Cardiovascular: Negative for chest pain and palpitations.   Gastrointestinal: Negative for  abdominal pain, nausea and vomiting.   Genitourinary: Negative for dysuria and frequency.   Musculoskeletal: Positive for joint pain. Negative for myalgias.   Neurological: Negative for seizures and loss of consciousness.   Psychiatric/Behavioral: Negative for depression.     Physical Exam  Temp:  [36.4 °C (97.5 °F)-36.8 °C (98.3 °F)] 36.8 °C (98.3 °F)  Pulse:  [81-93] 87  Resp:  [12-19] 18  BP: ()/(57-92) 90/57  SpO2:  [90 %-97 %] 90 %    Physical Exam  Vitals and nursing note reviewed.   Constitutional:       General: He is not in acute distress.     Appearance: He is obese. He is not toxic-appearing.   HENT:      Head: Normocephalic and atraumatic.      Right Ear: External ear normal.      Left Ear: External ear normal.      Nose: No congestion.      Mouth/Throat:      Pharynx: No oropharyngeal exudate.   Eyes:      General: No scleral icterus.     Extraocular Movements: Extraocular movements intact.      Conjunctiva/sclera: Conjunctivae normal.      Pupils: Pupils are equal, round, and reactive to light.   Cardiovascular:      Rate and Rhythm: Normal rate and regular rhythm.      Pulses: Normal pulses.      Heart sounds: No murmur heard.     Pulmonary:      Effort: Pulmonary effort is normal.      Breath sounds: Normal breath sounds. No wheezing.   Abdominal:      General: Bowel sounds are normal.      Palpations: Abdomen is soft.      Tenderness: There is no abdominal tenderness. There is no guarding or rebound.   Musculoskeletal:         General: No swelling or tenderness. Normal range of motion.      Cervical back: Normal range of motion and neck supple. No muscular tenderness.      Comments: Bilateral lower extremities with wound vacs and dressings in place   Skin:     General: Skin is warm and dry.      Capillary Refill: Capillary refill takes less than 2 seconds.      Findings: No bruising, erythema or rash.   Neurological:      General: No focal deficit present.      Mental Status: He is alert and  oriented to person, place, and time.   Psychiatric:         Mood and Affect: Mood normal.         Thought Content: Thought content normal.       Fluids    Intake/Output Summary (Last 24 hours) at 10/13/2021 1413  Last data filed at 10/13/2021 1239  Gross per 24 hour   Intake 960 ml   Output 1600 ml   Net -640 ml       Laboratory  Recent Labs     10/13/21  0324   WBC 8.1   RBC 4.40*   HEMOGLOBIN 13.1*   HEMATOCRIT 39.6*   MCV 90.0   MCH 29.8   MCHC 33.1*   RDW 44.1   PLATELETCT 284   MPV 9.9     Recent Labs     10/11/21  0426 10/13/21  0324   SODIUM 142 138   POTASSIUM 3.9 3.9   CHLORIDE 106 101   CO2 28 26   GLUCOSE 165* 181*   BUN 15 16   CREATININE 0.54 0.59   CALCIUM 9.4 9.4                   Imaging  CT-CTA AORTA-RO WITH & W/O-POST PROCESS   Final Result      1.  Occlusion of the distal right femoral artery with reconstitution distally.      2.  Calcified plaque in small diameter of the tibial and peroneal arteries bilaterally makes it difficult to evaluate for degree of stenosis and occlusion.      3.  Plaque within the left femoral artery without evidence of occlusion.      4.  Aortic atherosclerosis without aneurysm.      5.  Diverticulosis without evidence of diverticulitis.      6.  Hepatic steatosis.      DX-FOOT-COMPLETE 3+ LEFT   Final Result         1.  Disuse osteoporosis of the left foot.      2. Large subcutaneous ulcer on the dorsal surface of left foot.      3. Previous amputations of the left second through fourth toes.      DX-FOOT-COMPLETE 3+ RIGHT   Final Result      1.  No fracture or dislocation of RIGHT foot.   2.  Periarticular osteopenia suggests inflammatory arthropathy.   3.  No soft tissue gas or focal bony destruction however osteomyelitis is not excluded by plain film.   4.  Severe degenerative change of ankle joint.      DX-TIBIA AND FIBULA RIGHT   Final Result      No evidence of fracture or dislocation or acute osteomyelitis.      US-EXTREMITY ARTERY LOWER BILAT   Final Result       US-MIA SINGLE LEVEL BILAT   Final Result      US-EXTREMITY VENOUS LOWER UNILAT LEFT   Final Result      DX-TIBIA AND FIBULA LEFT   Final Result         1.  No acute traumatic bony injury.   2.  Soft tissue erosion of the posterior mid calf, corresponding with history of soft tissue ulcers.      IR-EXTREMITY ANGIOGRAM-BILATERAL    (Results Pending)        Assessment/Plan  * Diabetic infection of left foot (HCC)- (present on admission)  Assessment & Plan  Patient has known history of uncontrolled type 2 diabetes, he has had prior admissions for cellulitis and osteomyelitis.  Patient does have history of PAD.  Venous dopplers negative for DVT   Arterial Dopplers - inflow arterial insufficiency in lower extremities bilaterally  X-ray of the left tib-fib, no concern for osteomyelitis.    Wound care consulted. LPS following.  S/p revasculazization, debridement on 10/12    Wound culture MRSA, GBS  Keflex, bactrim    Diabetic infection of right foot (HCC)- (present on admission)  Assessment & Plan  As per left lower extremity plan    Sepsis- (present on admission)  Assessment & Plan  This is Sepsis Present on admission  SIRS criteria identified on my evaluation include: Tachycardia, with heart rate greater than 90 BPM, Tachypnea, with respirations greater than 20 per minute and Bandemia, greater than 10% bands  Source is diabetic infected ulcers and cellulitis of Lower extremities  Sepsis protocol initiated  Fluid resuscitation ordered per protocol  IV antibiotics as appropriate for source of sepsis  While organ dysfunction may be noted elsewhere in this problem list or in the chart, degree of organ dysfunction does not meet CMS criteria for severe sepsis      Uncontrolled type 2 diabetes mellitus (HCC)- (present on admission)  Assessment & Plan  ISS + Basal  A1c 9.1  Hypoglycemia protocol    Coronary artery disease involving native coronary artery - lexiscan 2/14/2020 - EF 60%  with fixed prominent defecct of interior  and lateral walls, no stress induced ischemia, mild global hypokinesis- (present on admission)  Assessment & Plan  Continue with aspirin and statin therapy    Peripheral artery disease (HCC) - (present on admission)  Assessment & Plan  Patient does have history of PAD, previous MIA was performed in June 2021, showed mild to moderate disease. Repeat MIA noted significant worsening disease.    Vascular surgery consulted,s/p CTA today, plans for revascularization prior to surgical intervention for the wounds, of LLE 10/11/2021    OR 10/12 for revascularization    Peripheral neuropathy (HCC)- (present on admission)  Assessment & Plan  Cont home pain medications    Hyperlipidemia- (present on admission)  Assessment & Plan  F/u lipid panel outpatient       VTE prophylaxis: SCDs/TEDs and enoxaparin ppx    I have performed a physical exam and reviewed and updated ROS and Plan today (10/13/2021). In review of yesterday's note (10/12/2021), there are no changes except as documented above.

## 2021-10-14 PROBLEM — I95.9 HYPOTENSION: Status: ACTIVE | Noted: 2021-10-14

## 2021-10-14 LAB
ALBUMIN SERPL BCP-MCNC: 3.2 G/DL (ref 3.2–4.9)
ANION GAP SERPL CALC-SCNC: 7 MMOL/L (ref 7–16)
BASOPHILS # BLD AUTO: 0.8 % (ref 0–1.8)
BASOPHILS # BLD: 0.06 K/UL (ref 0–0.12)
BUN SERPL-MCNC: 21 MG/DL (ref 8–22)
CALCIUM SERPL-MCNC: 9.3 MG/DL (ref 8.5–10.5)
CHLORIDE SERPL-SCNC: 104 MMOL/L (ref 96–112)
CO2 SERPL-SCNC: 28 MMOL/L (ref 20–33)
CREAT SERPL-MCNC: 0.96 MG/DL (ref 0.5–1.4)
EOSINOPHIL # BLD AUTO: 0.17 K/UL (ref 0–0.51)
EOSINOPHIL NFR BLD: 2.2 % (ref 0–6.9)
ERYTHROCYTE [DISTWIDTH] IN BLOOD BY AUTOMATED COUNT: 44.5 FL (ref 35.9–50)
GLUCOSE BLD-MCNC: 174 MG/DL (ref 65–99)
GLUCOSE BLD-MCNC: 195 MG/DL (ref 65–99)
GLUCOSE BLD-MCNC: 202 MG/DL (ref 65–99)
GLUCOSE BLD-MCNC: 257 MG/DL (ref 65–99)
GLUCOSE SERPL-MCNC: 185 MG/DL (ref 65–99)
HCT VFR BLD AUTO: 38.6 % (ref 42–52)
HGB BLD-MCNC: 12.4 G/DL (ref 14–18)
IMM GRANULOCYTES # BLD AUTO: 0.04 K/UL (ref 0–0.11)
IMM GRANULOCYTES NFR BLD AUTO: 0.5 % (ref 0–0.9)
LYMPHOCYTES # BLD AUTO: 1.16 K/UL (ref 1–4.8)
LYMPHOCYTES NFR BLD: 15 % (ref 22–41)
MAGNESIUM SERPL-MCNC: 1.9 MG/DL (ref 1.5–2.5)
MCH RBC QN AUTO: 28.9 PG (ref 27–33)
MCHC RBC AUTO-ENTMCNC: 32.1 G/DL (ref 33.7–35.3)
MCV RBC AUTO: 90 FL (ref 81.4–97.8)
MONOCYTES # BLD AUTO: 0.93 K/UL (ref 0–0.85)
MONOCYTES NFR BLD AUTO: 12 % (ref 0–13.4)
NEUTROPHILS # BLD AUTO: 5.38 K/UL (ref 1.82–7.42)
NEUTROPHILS NFR BLD: 69.5 % (ref 44–72)
NRBC # BLD AUTO: 0 K/UL
NRBC BLD-RTO: 0 /100 WBC
PHOSPHATE SERPL-MCNC: 2.8 MG/DL (ref 2.5–4.5)
PLATELET # BLD AUTO: 276 K/UL (ref 164–446)
PMV BLD AUTO: 9.7 FL (ref 9–12.9)
POTASSIUM SERPL-SCNC: 4.7 MMOL/L (ref 3.6–5.5)
RBC # BLD AUTO: 4.29 M/UL (ref 4.7–6.1)
SODIUM SERPL-SCNC: 139 MMOL/L (ref 135–145)
WBC # BLD AUTO: 7.7 K/UL (ref 4.8–10.8)

## 2021-10-14 PROCEDURE — 97605 NEG PRS WND THER DME<=50SQCM: CPT

## 2021-10-14 PROCEDURE — 83735 ASSAY OF MAGNESIUM: CPT

## 2021-10-14 PROCEDURE — 700111 HCHG RX REV CODE 636 W/ 250 OVERRIDE (IP): Performed by: GENERAL PRACTICE

## 2021-10-14 PROCEDURE — 36415 COLL VENOUS BLD VENIPUNCTURE: CPT

## 2021-10-14 PROCEDURE — 82962 GLUCOSE BLOOD TEST: CPT | Mod: 91

## 2021-10-14 PROCEDURE — 700102 HCHG RX REV CODE 250 W/ 637 OVERRIDE(OP): Performed by: SURGERY

## 2021-10-14 PROCEDURE — 302098 PASTE RING (FLAT): Performed by: INTERNAL MEDICINE

## 2021-10-14 PROCEDURE — 700111 HCHG RX REV CODE 636 W/ 250 OVERRIDE (IP): Performed by: INTERNAL MEDICINE

## 2021-10-14 PROCEDURE — 85025 COMPLETE CBC W/AUTO DIFF WBC: CPT

## 2021-10-14 PROCEDURE — A9270 NON-COVERED ITEM OR SERVICE: HCPCS | Performed by: SURGERY

## 2021-10-14 PROCEDURE — 700102 HCHG RX REV CODE 250 W/ 637 OVERRIDE(OP): Performed by: GENERAL PRACTICE

## 2021-10-14 PROCEDURE — 700101 HCHG RX REV CODE 250: Performed by: INTERNAL MEDICINE

## 2021-10-14 PROCEDURE — 700102 HCHG RX REV CODE 250 W/ 637 OVERRIDE(OP): Performed by: NURSE PRACTITIONER

## 2021-10-14 PROCEDURE — 770001 HCHG ROOM/CARE - MED/SURG/GYN PRIV*

## 2021-10-14 PROCEDURE — 700105 HCHG RX REV CODE 258: Performed by: INTERNAL MEDICINE

## 2021-10-14 PROCEDURE — A9270 NON-COVERED ITEM OR SERVICE: HCPCS | Performed by: INTERNAL MEDICINE

## 2021-10-14 PROCEDURE — A9270 NON-COVERED ITEM OR SERVICE: HCPCS | Performed by: STUDENT IN AN ORGANIZED HEALTH CARE EDUCATION/TRAINING PROGRAM

## 2021-10-14 PROCEDURE — A9270 NON-COVERED ITEM OR SERVICE: HCPCS | Performed by: GENERAL PRACTICE

## 2021-10-14 PROCEDURE — A9270 NON-COVERED ITEM OR SERVICE: HCPCS | Performed by: NURSE PRACTITIONER

## 2021-10-14 PROCEDURE — 700105 HCHG RX REV CODE 258: Performed by: NURSE PRACTITIONER

## 2021-10-14 PROCEDURE — 700102 HCHG RX REV CODE 250 W/ 637 OVERRIDE(OP): Performed by: INTERNAL MEDICINE

## 2021-10-14 PROCEDURE — 80069 RENAL FUNCTION PANEL: CPT

## 2021-10-14 PROCEDURE — 700111 HCHG RX REV CODE 636 W/ 250 OVERRIDE (IP): Performed by: NURSE PRACTITIONER

## 2021-10-14 PROCEDURE — 99233 SBSQ HOSP IP/OBS HIGH 50: CPT | Performed by: INTERNAL MEDICINE

## 2021-10-14 PROCEDURE — 700102 HCHG RX REV CODE 250 W/ 637 OVERRIDE(OP): Performed by: STUDENT IN AN ORGANIZED HEALTH CARE EDUCATION/TRAINING PROGRAM

## 2021-10-14 RX ORDER — MIDODRINE HYDROCHLORIDE 5 MG/1
5 TABLET ORAL
Status: COMPLETED | OUTPATIENT
Start: 2021-10-14 | End: 2021-10-15

## 2021-10-14 RX ORDER — SODIUM CHLORIDE, SODIUM LACTATE, POTASSIUM CHLORIDE, AND CALCIUM CHLORIDE .6; .31; .03; .02 G/100ML; G/100ML; G/100ML; G/100ML
500 INJECTION, SOLUTION INTRAVENOUS ONCE
Status: COMPLETED | OUTPATIENT
Start: 2021-10-14 | End: 2021-10-14

## 2021-10-14 RX ORDER — LIDOCAINE HYDROCHLORIDE 40 MG/ML
SOLUTION TOPICAL
Status: DISCONTINUED | OUTPATIENT
Start: 2021-10-14 | End: 2021-10-30 | Stop reason: HOSPADM

## 2021-10-14 RX ORDER — LIDOCAINE HYDROCHLORIDE 20 MG/ML
20 INJECTION, SOLUTION INFILTRATION; PERINEURAL
Status: DISCONTINUED | OUTPATIENT
Start: 2021-10-14 | End: 2021-10-30 | Stop reason: HOSPADM

## 2021-10-14 RX ORDER — OXYCODONE HYDROCHLORIDE 10 MG/1
10 TABLET ORAL
Status: DISCONTINUED | OUTPATIENT
Start: 2021-10-14 | End: 2021-10-30 | Stop reason: HOSPADM

## 2021-10-14 RX ORDER — CLOPIDOGREL BISULFATE 75 MG/1
75 TABLET ORAL DAILY
Status: DISCONTINUED | OUTPATIENT
Start: 2021-10-14 | End: 2021-10-30 | Stop reason: HOSPADM

## 2021-10-14 RX ORDER — SODIUM CHLORIDE, SODIUM LACTATE, POTASSIUM CHLORIDE, AND CALCIUM CHLORIDE .6; .31; .03; .02 G/100ML; G/100ML; G/100ML; G/100ML
1000 INJECTION, SOLUTION INTRAVENOUS ONCE
Status: COMPLETED | OUTPATIENT
Start: 2021-10-14 | End: 2021-10-14

## 2021-10-14 RX ORDER — HYDROMORPHONE HYDROCHLORIDE 1 MG/ML
0.5 INJECTION, SOLUTION INTRAMUSCULAR; INTRAVENOUS; SUBCUTANEOUS
Status: DISCONTINUED | OUTPATIENT
Start: 2021-10-14 | End: 2021-10-30 | Stop reason: HOSPADM

## 2021-10-14 RX ORDER — SODIUM CHLORIDE, SODIUM LACTATE, POTASSIUM CHLORIDE, CALCIUM CHLORIDE 600; 310; 30; 20 MG/100ML; MG/100ML; MG/100ML; MG/100ML
INJECTION, SOLUTION INTRAVENOUS ONCE
Status: COMPLETED | OUTPATIENT
Start: 2021-10-15 | End: 2021-10-14

## 2021-10-14 RX ORDER — KETOROLAC TROMETHAMINE 30 MG/ML
15 INJECTION, SOLUTION INTRAMUSCULAR; INTRAVENOUS ONCE
Status: COMPLETED | OUTPATIENT
Start: 2021-10-15 | End: 2021-10-14

## 2021-10-14 RX ORDER — OXYCODONE HYDROCHLORIDE 10 MG/1
20 TABLET ORAL
Status: DISCONTINUED | OUTPATIENT
Start: 2021-10-14 | End: 2021-10-30 | Stop reason: HOSPADM

## 2021-10-14 RX ORDER — HYDROMORPHONE HYDROCHLORIDE 1 MG/ML
1 INJECTION, SOLUTION INTRAMUSCULAR; INTRAVENOUS; SUBCUTANEOUS
Status: DISCONTINUED | OUTPATIENT
Start: 2021-10-14 | End: 2021-10-30 | Stop reason: HOSPADM

## 2021-10-14 RX ADMIN — DOCUSATE SODIUM 50 MG AND SENNOSIDES 8.6 MG 2 TABLET: 8.6; 5 TABLET, FILM COATED ORAL at 05:11

## 2021-10-14 RX ADMIN — SULFAMETHOXAZOLE AND TRIMETHOPRIM 1 TABLET: 800; 160 TABLET ORAL at 16:59

## 2021-10-14 RX ADMIN — OXYCODONE HYDROCHLORIDE 10 MG: 10 TABLET ORAL at 12:54

## 2021-10-14 RX ADMIN — OXYCODONE HYDROCHLORIDE 20 MG: 10 TABLET ORAL at 15:52

## 2021-10-14 RX ADMIN — CEPHALEXIN 1000 MG: 500 CAPSULE ORAL at 08:41

## 2021-10-14 RX ADMIN — SODIUM CHLORIDE, POTASSIUM CHLORIDE, SODIUM LACTATE AND CALCIUM CHLORIDE 1000 ML: 600; 310; 30; 20 INJECTION, SOLUTION INTRAVENOUS at 20:05

## 2021-10-14 RX ADMIN — ENOXAPARIN SODIUM 40 MG: 40 INJECTION SUBCUTANEOUS at 05:11

## 2021-10-14 RX ADMIN — ASPIRIN 325 MG ORAL TABLET 325 MG: 325 PILL ORAL at 05:12

## 2021-10-14 RX ADMIN — CEPHALEXIN 1000 MG: 500 CAPSULE ORAL at 15:31

## 2021-10-14 RX ADMIN — MIDODRINE HYDROCHLORIDE 5 MG: 5 TABLET ORAL at 20:04

## 2021-10-14 RX ADMIN — OXYCODONE HYDROCHLORIDE 10 MG: 10 TABLET ORAL at 05:12

## 2021-10-14 RX ADMIN — INSULIN LISPRO 3 UNITS: 100 INJECTION, SOLUTION INTRAVENOUS; SUBCUTANEOUS at 21:23

## 2021-10-14 RX ADMIN — SODIUM CHLORIDE, POTASSIUM CHLORIDE, SODIUM LACTATE AND CALCIUM CHLORIDE 500 ML: 600; 310; 30; 20 INJECTION, SOLUTION INTRAVENOUS at 08:09

## 2021-10-14 RX ADMIN — CARVEDILOL 12.5 MG: 12.5 TABLET, FILM COATED ORAL at 16:59

## 2021-10-14 RX ADMIN — DICLOFENAC SODIUM 50 MG: 25 TABLET, DELAYED RELEASE ORAL at 08:41

## 2021-10-14 RX ADMIN — SODIUM CHLORIDE, POTASSIUM CHLORIDE, SODIUM LACTATE AND CALCIUM CHLORIDE 500 ML: 600; 310; 30; 20 INJECTION, SOLUTION INTRAVENOUS at 09:49

## 2021-10-14 RX ADMIN — KETOROLAC TROMETHAMINE 15 MG: 30 INJECTION, SOLUTION INTRAMUSCULAR at 23:47

## 2021-10-14 RX ADMIN — FUROSEMIDE 80 MG: 40 TABLET ORAL at 05:13

## 2021-10-14 RX ADMIN — INSULIN LISPRO 5 UNITS: 100 INJECTION, SOLUTION INTRAVENOUS; SUBCUTANEOUS at 17:01

## 2021-10-14 RX ADMIN — SODIUM CHLORIDE, POTASSIUM CHLORIDE, SODIUM LACTATE AND CALCIUM CHLORIDE: 600; 310; 30; 20 INJECTION, SOLUTION INTRAVENOUS at 23:53

## 2021-10-14 RX ADMIN — CLOPIDOGREL BISULFATE 75 MG: 75 TABLET ORAL at 15:31

## 2021-10-14 RX ADMIN — INSULIN LISPRO 2 UNITS: 100 INJECTION, SOLUTION INTRAVENOUS; SUBCUTANEOUS at 11:29

## 2021-10-14 RX ADMIN — HYDROMORPHONE HYDROCHLORIDE 0.5 MG: 1 INJECTION, SOLUTION INTRAMUSCULAR; INTRAVENOUS; SUBCUTANEOUS at 10:51

## 2021-10-14 RX ADMIN — LISINOPRIL 5 MG: 5 TABLET ORAL at 05:13

## 2021-10-14 RX ADMIN — POTASSIUM CHLORIDE 40 MEQ: 1500 TABLET, EXTENDED RELEASE ORAL at 05:12

## 2021-10-14 RX ADMIN — ACETAMINOPHEN 650 MG: 325 TABLET ORAL at 22:25

## 2021-10-14 RX ADMIN — DOCUSATE SODIUM 50 MG AND SENNOSIDES 8.6 MG 2 TABLET: 8.6; 5 TABLET, FILM COATED ORAL at 17:00

## 2021-10-14 RX ADMIN — SULFAMETHOXAZOLE AND TRIMETHOPRIM 1 TABLET: 800; 160 TABLET ORAL at 05:12

## 2021-10-14 RX ADMIN — CEPHALEXIN 1000 MG: 500 CAPSULE ORAL at 21:19

## 2021-10-14 RX ADMIN — LIDOCAINE HYDROCHLORIDE 60 ML: 40 SOLUTION TOPICAL at 11:00

## 2021-10-14 RX ADMIN — DICLOFENAC SODIUM 50 MG: 25 TABLET, DELAYED RELEASE ORAL at 21:19

## 2021-10-14 RX ADMIN — GABAPENTIN 300 MG: 300 CAPSULE ORAL at 05:12

## 2021-10-14 RX ADMIN — INSULIN LISPRO 2 UNITS: 100 INJECTION, SOLUTION INTRAVENOUS; SUBCUTANEOUS at 05:10

## 2021-10-14 RX ADMIN — OXYCODONE HYDROCHLORIDE 10 MG: 10 TABLET ORAL at 00:20

## 2021-10-14 RX ADMIN — GABAPENTIN 300 MG: 300 CAPSULE ORAL at 16:59

## 2021-10-14 ASSESSMENT — ENCOUNTER SYMPTOMS
SHORTNESS OF BREATH: 0
ABDOMINAL PAIN: 0
SORE THROAT: 0
FEVER: 0
PALPITATIONS: 0
NAUSEA: 0
DEPRESSION: 0
LOSS OF CONSCIOUSNESS: 0
VOMITING: 0
CHILLS: 0
MYALGIAS: 0
SEIZURES: 0
COUGH: 0
BLURRED VISION: 0

## 2021-10-14 ASSESSMENT — PAIN DESCRIPTION - PAIN TYPE
TYPE: ACUTE PAIN

## 2021-10-14 NOTE — DISCHARGE PLANNING
Notified by RN that pt is refusing offloading shoe if his insurance will not cover cost of shoe.     Provided RN with phone numbers to contact patient supply to find out if shoe is covered by insurance. [phone] or [phone]

## 2021-10-14 NOTE — PROGRESS NOTES
Patient refused the offloading shoe. RN notified shoe was left with RN please [place] at [phone] if offloading shoe is not needed.

## 2021-10-14 NOTE — WOUND TEAM
[facility]  Inpatient Services  Initial Wound and Skin Care Evaluation    Admission Date: 10/7/2021     Last order of IP CONSULT TO WOUND CARE was found on 10/12/2021 from Hospital Encounter on 10/7/2021     HPI, PMH, SH: Reviewed    Past Surgical History:   Procedure Laterality Date   • ANGIOGRAM Bilateral 10/12/2021    Procedure: BILATERAL LOWER EXTREMITY ANGIOGRAM;  Surgeon: Valerio Purcell M.D.;  Location: SURGERY [facility];  Service: Vascular   • IRRIGATION & DEBRIDEMENT GENERAL Bilateral 10/12/2021    Procedure: IRRIGATION AND DEBRIDEMENT, WOUND, BILATERAL LOWER EXTREMITY;  Surgeon: Valerio Purcell M.D.;  Location: SURGERY [facility];  Service: Vascular   • APPLICATION OR REPLACEMENT, WOUND VAC Bilateral 10/12/2021    Procedure: APPLICATION WOUND VAC;  Surgeon: Valerio Purcell M.D.;  Location: SURGERY [facility];  Service: Vascular   • STENT PLACEMENT Right 10/12/2021    Procedure: STENT PLACEMENT, RIGHT SUPERFICIAL FEMORAL ARTERY;  Surgeon: Valerio Purcell M.D.;  Location: SURGERY [facility];  Service: Vascular   • TOE AMPUTATION Left 2/17/2020    Procedure: LEFT SECOND, THIRD, AND FORTH TOE AMPUTATION;  Surgeon: Alfonso Esteban M.D.;  Location: SURGERY [facility];  Service: Orthopedics   • KS UNLISTED PROC, ARTHROSCOPY Left 7/25/2019    Procedure: LEFT ENDOSCOPIC ULNAR NERVE DECOMPRESSION, LEFT CARPAL TUNNEL RELEASE;  Surgeon: Red Chacon M.D.;  Location: SURGERY [facility];  Service: Orthopedics   • KNEE REPLACEMENT, TOTAL Bilateral    • OTHER SURGICAL PROCEDURE      back surgery - Unknown      Social History     Tobacco Use   • Smoking status: Current Every Day Smoker     Packs/day: 0.50     Years: 54.00     Pack years: 27.00     Types: Cigarettes   • Smokeless tobacco: Former User   Substance Use Topics   • Alcohol use: Not Currently     Alcohol/week: 0.0 oz     Comment: occas     Chief Complaint   Patient presents with   • Wound Infection     bilateral leg  wounds. pt states he has had them for years but seem to be getting worse      Diagnosis: Cellulitis of left lower extremity [L03.116]    Unit where seen by Wound Team: S623/02     WOUND CONSULT/FOLLOW UP RELATED TO:  Initial vac change     WOUND HISTORY:  70 yo Male underwent Right SFA stent placement and OR debridement on BLE with subsequent vac placement.    WOUND ASSESSMENT/LDA      Negative Pressure Wound Therapy 10/12/21 Other (Comment);Surgical Pretibial;Leg Right;Left (Active)   NPWT Pump Mode / Pressure Setting Ulta;125 mmHg    Dressing Type Medium;Black Foam (Regular);Black Foam (Veraflo);Non-adherent    Canister Changed No    VAC VeraFlo Irrigant Normal Saline    VAC VeraFlo Soak Time (mins) 6    VAC VeraFlo Instill Volume (ml) 16    VAC VeraFlo - Therapy Time (hrs) 1.5    VAC VeraFlo Pressure (mm/Hg) 125 mmHg            Wound 10/14/21 Full Thickness Wound Leg Lateral Right NPWT veraflo (Active)   Wound Image      Site Assessment Yellow;Red;Painful    Periwound Assessment Intact    Margins Unattached edges    Closure Secondary intention    Drainage Amount Small    Drainage Description Sanguineous    Treatments Site care    Wound Cleansing Approved Wound Cleanser    Periwound Protectant Paste Ring    Dressing Cleansing/Solutions Normal Saline    Dressing Options Wound Vac    Dressing Changed Changed    Dressing Status Intact    Dressing Change/Treatment Frequency By Wound Team Only    NEXT Dressing Change/Treatment Date 10/16/21    NEXT Weekly Photo (Inpatient Only) 10/21/21    Wound Length (cm) 17 cm    Wound Width (cm) 8.5 cm    Wound Depth (cm) 0.5 cm    Wound Surface Area (cm^2) 144.5 cm^2    Wound Volume (cm^3) 72.25 cm^3    Shape oval    Wound Odor None    Exposed Structures None        Wound 10/14/21 Full Thickness Wound Leg Lateral Left + Toe amp site Regular NPWT with adaptic (Active)   Wound Image    10/14/21 1100   Site Assessment Red;Painful 10/14/21 1100   Periwound Assessment Red 10/14/21 1100    Margins Unattached edges 10/14/21 1100   Closure Secondary intention 10/14/21 1100   Drainage Amount Small 10/14/21 1100   Drainage Description Serosanguineous 10/14/21 1100   Treatments Site care 10/14/21 1100   Wound Cleansing Approved Wound Cleanser 10/14/21 1100   Periwound Protectant Drape;Skin Protectant Wipes to Periwound 10/14/21 1100   Dressing Cleansing/Solutions Not Applicable 10/14/21 1100   Dressing Options Adaptic;Wound Vac 10/14/21 1100   Dressing Changed Changed 10/14/21 1100   Dressing Status Intact 10/14/21 1100   Dressing Change/Treatment Frequency By Wound Team Only 10/14/21 1100   NEXT Dressing Change/Treatment Date 10/16/21 10/14/21 1100   NEXT Weekly Photo (Inpatient Only) 10/21/21 10/14/21 1100   Wound Length (cm) 9.5 cm 10/14/21 1100   Wound Width (cm) 8 cm 10/14/21 1100   Wound Depth (cm) 0.1 cm 10/14/21 1100   Wound Surface Area (cm^2) 76 cm^2 10/14/21 1100   Wound Volume (cm^3) 7.6 cm^3 10/14/21 1100   Shape oval 10/14/21 1100   Wound Odor None 10/14/21 1100   Exposed Structures None 10/14/21 1100       Wound 10/14/21 Full Thickness Wound Leg Anterior;Posterior Honey colloid (Active)   Wound Image    10/14/21 1100   Site Assessment Red;Yellow;Painful    Periwound Assessment Red    Margins Unattached edges    Closure Secondary intention    Drainage Amount Scant    Drainage Description Serosanguineous    Treatments Site care    Wound Cleansing Approved Wound Cleanser    Periwound Protectant Not Applicable    Dressing Cleansing/Solutions Not Applicable    Dressing Options Honey Colloid;Mepilex    Dressing Changed Changed    Dressing Status Intact    Dressing Change/Treatment Frequency By Wound Team Only    NEXT Dressing Change/Treatment Date 10/16/21    NEXT Weekly Photo (Inpatient Only) 10/21/21    Shape irregular, scattered    Wound Odor None    Exposed Structures None           Vascular:    MIA:   MIA Results, Last 30 Days US-MIA SINGLE LEVEL BILAT     RIGHT      Waveform             Systolic BPs (mmHg)                                                     Brachial   Monophasic                               Common Femoral   Monophasic                               Posterior Tibial   Monophasic                               Dorsalis Pedis                                                    Peroneal                                            MIA                                            TBI                           LEFT   Waveform        Systolic BPs (mmHg)                                    143           Brachial   Monophasic                               Common Femoral   Monophasic                               Posterior Tibial   Monophasic                               Dorsalis Pedis                                                     Peroneal                                            MIA                                            TBI         Findings   Bilateral.    Doppler waveforms of the common femoral arteries are abnormally dampened/    monophasic.    Doppler waveforms at the ankle are monophasic.    The ankle pressures are not obtained due to pain.    Digit PPG waveforms are normal in the right toes.   Digit PPG waveforms are normal in the left 1st and 5th toe. The other toes    are absent/amputated.    Toe-brachial indices is reduced right side.   Toe-brachial indices are left side.    Toe-brachial indices:     Right:  0.54   Left:  0.90         Lab Values:    Lab Results   Component Value Date/Time    WBC 7.7 10/14/2021 06:32 AM    RBC 4.29 (L) 10/14/2021 06:32 AM    HEMOGLOBIN 12.4 (L) 10/14/2021 06:32 AM    HEMATOCRIT 38.6 (L) 10/14/2021 06:32 AM    CREACTPROT 6.25 (H) 10/07/2021 08:30 PM    SEDRATEWES 1 10/07/2021 08:30 PM    HBA1C 9.1 (H) 10/08/2021 04:26 AM        Culture Results show:  Recent Results (from the past 720 hour(s))   CULTURE WOUND W/ GRAM STAIN    Collection Time: 10/08/21  2:13 PM    Specimen: Left Leg; Wound   Result Value Ref Range    Significant Indicator POS  (POS)     Source WND     Site LEFT LEG     Culture Result Rare growth mixed enteric collins. (A)     Gram Stain Result Moderate WBCs.  No organisms seen.       Culture Result (A)      Methicillin Resistant Staphylococcus aureus  Light growth  This isolate is presumed to be clindamycin resistant based on  detection of inducible resistance.  Clindamycin may still  be effective in some patients.      Culture Result Streptococcus agalactiae (Group B)  Light growth   (A)        Susceptibility    Methicillin resistant staphylococcus aureus - MELA     Azithromycin >4 Resistant mcg/mL     Clindamycin <=0.25 Resistant mcg/mL     Cefazolin <=8 Resistant mcg/mL     Cefepime 16 Resistant mcg/mL     Ceftaroline <=0.5 Sensitive mcg/mL     Daptomycin <=0.5 Sensitive mcg/mL     Ampicillin/sulbactam 16/8 Resistant mcg/mL     Erythromycin >4 Resistant mcg/mL     Vancomycin 1 Sensitive mcg/mL     Oxacillin >2 Resistant mcg/mL     Trimeth/Sulfa <=0.5/9.5 Sensitive mcg/mL     Tetracycline <=4 Sensitive mcg/mL       Pain Level/Medicated:  Max pain; Premed IV, topical lidocaine Soln x2 vials, Requires pain meds after dressing change       INTERVENTIONS BY WOUND TEAM:  Chart and images reviewed. Discussed with bedside RN. All areas of concern (based on picture review, LDA review and discussion with bedside RN) have been thoroughly assessed. Documentation of areas based on significant findings. This RN in to assess patient. Performed standard wound care which includes appropriate positioning, dressing removal and non-selective debridement. Pictures and measurements obtained weekly if/when required.    LAT RLE, LAT LLE and L toe amp site   Preparation for Dressing removal: Dressing soaked with Lidocaine and NS. Staples Removed.  Non-selectively Debrided with:  cleanser and gauze.  Sharp debridement: NA  Soo wound: Cleansed with cleanser, Prepped with Cavilon skin prep, drape and paste ring on RLE  Primary Dressing: LLE and toe amp: adaptic to  wound bed, regular wound vac foam to depth.  RLE: veraflo foam. Both sites Y-connected.  Secondary (Outer) Dressing: button, drape and tracpad to foam. Mepilex applied to offload tubing see flowsheet for vac settings.    ANTERIOR/ POSTERIOR LLE   Preparation for Dressing removal: Dressing soaked with Lidocaine and cleanser  Non-selectively Debrided with:  cleanser and gauze.  Sharp debridement: NA  Soo wound: Cleansed with cleanser and gauze  Primary Dressing: honeycolloid cut to fit  Secondary (Outer) Dressing: Mepilex    --Hydrocolloid thin to R dorsal toes, betadine to L medial heel    Interdisciplinary consultation: Patient, Bedside RN, Dr Duglas LAL, Love wound PT, [facility] wound tech.    EVALUATION / RATIONALE FOR TREATMENT:  Most Recent Date:  10/14: RLE lateral wound with adherent slough at base. Vera harry initiated to encourage mechanical debridement. LLE lateral and toe amp wound with clean, partially granulated wound base. Maintained regular wound vac suction to these sites. Implemented adaptic as a nonadherent layer to decrease pain upon removal next change.     Honeycolloid applied to anterior and posterior aspect on LLE given adherent slough at base. Pt will benefit from veraflo wound vac to LLE including bridging of scattered lesions, therefore, will need extra machine, cassette, Y-connector and tracpad next session.     Pt was in significant pain throughout the dressing care process. Will benefit from ongoing  Premedication and lidocaine soln for pain control. Check with primary RN prior to wound care as pt may be hypotensive and not appropriate for IV pain meds right away. LPS following patient, refer to LPS noted for POC.      Goals: Steady decrease in wound area and depth weekly.    WOUND TEAM PLAN OF CARE ([X] for frequency of wound follow up,):   Nursing to follow orders written for wound care. Contact wound team if area fails to progress, deteriorates or with any questions/concerns  Dressing  changes by wound team:                   Follow up 3 times weekly:                NPWT change 3 times weekly: x    Follow up 1-2 times weekly:      Follow up Bi-Monthly:                   Follow up as needed:     Other (explain):     NURSING PLAN OF CARE ORDERS (X):  Dressing changes: See Dressing Care orders: x  Skin care: See Skin Care orders: x  RN Prevention Protocol: x  Rectal tube care: See Rectal Tube Care orders:   Other orders:    RSKIN:   CURRENTLY IN PLACE (X), APPLIED THIS VISIT (A), ORDERED (O):   Q shift Donis:  X  Q shift pressure point assessments:  X    Surface/Positioning   Pressure redistribution mattress   x         Low Airloss          Bariatric foam      Bariatric CRISTOPHER     Waffle cushion    O    Waffle Overlay    O      Reposition q 2 hours    O  TAPs Turning system     Z Jason Pillow     Offloading/Redistribution   Sacral Mepilex (Silicone dressing)     Heel Mepilex (Silicone dressing)         Heel float boots (Prevalon boot)   O          Float Heels off Bed with Pillows           Respiratory   Silicone O2 tubing         Gray Foam Ear protectors     Cannula fixation Device (Tender )          High flow offloading Clip    Elastic head band offloading device      Anchorfast                                                         Trach with Optifoam split foam             Containment/Moisture Prevention     Rectal tube or BMS    Purwick/Condom Cath     O  Fajardo Catheter    Barrier wipes        O   Barrier paste       Antifungal tx      Interdry    O  Mobilization       Up to chair     x   Ambulate      PT/OT      Nutrition       Dietician        Diabetes Education      PO x    TF     TPN     NPO   # days     Other        Anticipated discharge plans:   LTACH:        SNF/Rehab:      X will need ongoing wound care            Home Health Care:           Outpatient Wound Center:            Self/Family Care:        Other:                  Vac Discharge Needs:   Not Applicable Pt not on a wound vac:        Regular Vac while inpatient, alternative dressing at DC:        Regular Vac in use and continued at DC:     X       Reg. Vac w/ Skin Sub/Biologic in use. Will need to be changed 2x wkly:      Veraflo Vac while inpatient, ok to transition to Regular Vac on Discharge:           Veraflo Vac while inpatient, will need to remain on Veraflo Vac upon discharge:

## 2021-10-14 NOTE — PROGRESS NOTES
ACUTE CARE VASCULAR SERVICE  PROGRESS NOTE      Seen and examined 10/13/21  Vacs intact  Feet well perfused    Changed ASA to 81mg daily and added Plavix 75mg daily started today  No further plans for surgical intervention at this time  Continue wound care, ideally vac therapy if able  OK for discharge from my standpoint anytime with outpatient FU 2-4 weeks    Valerio Purcell MD  [facility] (General and Vascular Surgery)  Cell: [phone] (text/call)  Office: [phone]  __________________________________________________________________  Patient:Luis Mariscalino   MRN:[id number]   CSN:[id number]    10/14/2021    3:41 PM

## 2021-10-14 NOTE — PROGRESS NOTES
LIMB PRESERVATION SERVICE   POST SURGICAL PROGRESS NOTE      HPI:  Luis Kellogg is a 71 y.o.  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   LPS has been consulted for bilateral lower extremity diabetic ulcers.     Patient has long history of lower extremity chronic wounds, PAD, and left 2-4th toe amputation in 2/2020 at outside hospital. Patient presented with worsening bilateral lower extremity wounds with worsening swelling and pain consistent with cellulitis. He denies any fevers, chills, nausea, or vomiting. Patient is poor historian and unable to tell me how long he has had wounds, but does report that they have been worsening with occasional purulent drainage. He reports that left toe amputation site never healed after surgery in 2/2020. Patient reports that he previously saw a vascular surgeon in [place], does not remember his name, and had angiogram and my have had a stent in one of his femoral arteries though not sure which and thinks it was 1.5-2 years ago. Patient was referred to [facility] and had evaluation and non-invasive studies and was supposed to follow up with [facility] for intervention but never followed up. Patient was previously being followed by [facility], however he has not followed up since 3/2021 and has not been performing any wound care on his lower extremity wounds.      IV antibiotics were started on this admission.  Infectious diseases has not been consulted.    Xray completed and is negative for osteomyelitis.  Ortho has not been consulted yet.     Diagnosed with diabetes 7-8 years ago, and is currently managing with metformin and insulin.  Checks blood sugars 1 times per day and reports that these typically average 110-150. Does have numbness to feet. Usually wears tennis shoes . Does have diabetic shoes and inserts  "from 4-5 that are years old and in not interested in diabetic footwear at this time.  Has had previous foot surgeries including L 2-4 toe and MTH amputation.  Current occupation retired.     SURGERY DATE: 10/12/2021 - Dr. Purcell  PROCEDURE:   1) Angiogram  2) Right SFA stent  3) I&D of wounds with wound vac placement      INTERVAL HISTORY:  10/14/2021: POD #2.  Patient denies fevers, chills, nausea, vomiting.  Pain poorly controlled, discussed with hospitalist. Patient hypotensive this morning requiring fluid bolus. Seen with wound care team for wound vac change. Patient in excruciating pain with vac change    PERTINENT LPS RESULTS:   Pathology: None sent  Cultures: leg wound 10/8: MRSA, Group B Streptococcus    SURGICAL SITE EXAM:      BP (!) 98/54   Pulse 78   Temp 36 °C (96.8 °F) (Temporal)   Resp 18   Ht 1.702 m (5' 7\")   Wt 100 kg (221 lb 1.9 oz)   SpO2 91%   BMI 34.63 kg/m²     Pedal Pulses: R foot: Unable to palpate, Monophasic DP/PT  L foot: Unable to palpate, Monophasic DP/PT   Sensation: Sensation largely intact on legs, diminished on feet  Surgical foot warm, well perfused    Right lateral tibial wounds x2  Surgically debrided in OR, wound vac placed in OR  Slough improved following OR, 30% slough now  No drainage  No odor  Soo wound erythema    17 x 8.5 x 0.5cm      R foot dorsal 1st toe wound   R foot dorsal 2nd toe wound   Treated with wound vac  Dried base with desiccated slough  No drainage  No odor  Periwound erythema improved  Mild periwound callus    Picture from 10/8          L medial heel wound  Wound base dry with desiccated slough  Stable  No drainage  No odor  No erythema            L 2-4 toe and MTH amputation site  Left foot dorsal wound  Chronic non healing amputation site from 2020  Patient reports never closed  Good granulation tissue with minimal slough  Periwound callus  Periwound erythema improved          L Anterior Tibial wounds x 6  Improved slough, 50% present now  Wound " "bed dry  No odor  No discharge  Periwound erythema          Left Lateral Leg  Treated with wound vac  Base is majority granulation tissue, minimal slough  No odor or drainage          L Posterior tib/fib wounds x 3  Wound base improved, 80% slough  No drainage  Periwound erythema  No odor                Wound care completed by wound care team: please refer to wound care note and flow sheet for details       DIABETES MANAGEMENT:    Blood glucose:   Results from last 7 days   Lab Units 10/14/21  0459 10/13/21  2045 10/13/21  1656 10/13/21  1125 10/13/21  0646 10/12/21  2009 10/12/21  1704 10/12/21  0549   ACCU CHECK GLUCOSE 788 mg/dL 174* 234* 197* 232* 165* 303* 227* 139*     A1c:   Lab Results   Component Value Date/Time    HBA1C 9.1 (H) 10/08/2021 04:26 AM            INFECTION MANAGEMENT:    Results from last 7 days   Lab Units 10/14/21  0632 10/13/21  0324 10/08/21  0426 10/07/21  2030   WBC 1501 K/uL 7.7 8.1 10.0 11.0*   PLATELET COUNT 1518 K/uL 276 284 259 308     Wound culture results:   Results     Procedure Component Value Units Date/Time    BLOOD CULTURE x2 [[id number]] Collected: 10/07/21 2118    Order Status: Completed Specimen: Blood from Peripheral Updated: 10/12/21 2300     Significant Indicator NEG     Source BLD     Site PERIPHERAL     Culture Result No growth after 5 days of incubation.    Narrative:      Per Hospital Policy: Only change Specimen Src: to \"Line\" if  specified by physician order.  No site indicated    BLOOD CULTURE x2 [[id number]] Collected: 10/07/21 2118    Order Status: Completed Specimen: Blood from Peripheral Updated: 10/12/21 2300     Significant Indicator NEG     Source BLD     Site PERIPHERAL     Culture Result No growth after 5 days of incubation.    Narrative:      Per Hospital Policy: Only change Specimen Src: to \"Line\" if  specified by physician order.  No site indicated    CULTURE WOUND W/ GRAM STAIN [[id number]]  (Abnormal)  (Susceptibility) Collected: 10/08/21 1413    Order " Status: Completed Specimen: Wound from Left Leg Updated: 10/10/21 0928     Significant Indicator POS     Source WND     Site LEFT LEG     Culture Result Rare growth mixed enteric collins.     Gram Stain Result Moderate WBCs.  No organisms seen.       Culture Result Methicillin Resistant Staphylococcus aureus  Light growth  This isolate is presumed to be clindamycin resistant based on  detection of inducible resistance.  Clindamycin may still  be effective in some patients.        Streptococcus agalactiae (Group B)  Light growth      Narrative:      Collected By:[id number] MERRILL DUFF V  Collected By:[id number] MERRILL DUFF V    Susceptibility     Methicillin resistant staphylococcus aureus (1)     Antibiotic Interpretation Microscan Method Status    Azithromycin Resistant >4 mcg/mL MELA Final    Clindamycin Resistant <=0.25 mcg/mL MELA Final    Cefazolin Resistant <=8 mcg/mL MELA Final    Cefepime Resistant 16 mcg/mL MELA Final    Ceftaroline Sensitive <=0.5 mcg/mL MELA Final    Daptomycin Sensitive <=0.5 mcg/mL MELA Final    Ampicillin/sulbactam Resistant 16/8 mcg/mL MELA Final    Erythromycin Resistant >4 mcg/mL MELA Final    Vancomycin Sensitive 1 mcg/mL MELA Final    Oxacillin Resistant >2 mcg/mL MELA Final    Trimeth/Sulfa Sensitive <=0.5/9.5 mcg/mL MELA Final    Tetracycline Sensitive <=4 mcg/mL MELA Final                   GRAM STAIN [[id number]] Collected: 10/08/21 1413    Order Status: Completed Specimen: Wound Updated: 10/08/21 2152     Significant Indicator .     Source WND     Site LEFT LEG     Gram Stain Result Moderate WBCs.  No organisms seen.      Narrative:      Collected By:[id number] MERRILL DUFF V  Collected By:[id number] MERRILL DUFF V    MRSA By PCR (Amp) [[id number]]  (Abnormal) Collected: 10/08/21 0615    Order Status: Completed Specimen: Respirate from Nares Updated: 10/08/21 1820     Significant Indicator POS     Source RESP     Site NARES     MRSA PCR POSITIVE for MRSA by PCR.    Narrative:      CALL   [place]  61 tel. [phone],  CALLED  61 tel. [phone] 10/08/2021, 18:20, RB PERF. RESULTS CALLED  TO:[id number], faxed [phone].  Collected By:[id number] DEMETRIA SINCLAIR  Collected By:[id number] DEMETRIA SINCLAIR    CULTURE WOUND W/ GRAM STAIN [[id number]]     Order Status: Canceled Specimen: Wound from Right Leg     Urinalysis [[id number]]     Order Status: Canceled Specimen: Urine     Blood Culture [[id number]]     Order Status: Canceled Specimen: Blood from Peripheral     Blood Culture [[id number]]     Order Status: Canceled Specimen: Blood from Peripheral                ASSESSMENT/PLAN:   POD #2 S/P Angiogram, I&D, wound vac placement by Dr. Purcell on 10/12.    - Hypotensive today, received fluid bolus. Pain poorly controlled and discussed with hospitalist.  - Patient treated with wound vac to largest wounds.  - Due to pain and slough of right lateral leg wounds, wound vac changed to veraflo wound vac for now.  - Left lateral leg wound very painful with vac change, placed adaptic to wound bed prior to vac application to assist with pain  - Did not tolerate bedside debridement due to pain  - Will need to continue wound vac for now due to extent of wounds and to promote granulation and more rapid healing.      Wound care:   -Wound care orders updated for nursing  -Right dorsal toes: Apply hydrocolloid thin to allow for softening of base and slough  -Left medial heel: betadine to L medial heel, off loading in moon boot  - Right lateral leg: Veraflo wound vac, to be changed by wound team 3x weekly  - Left lateral leg wound, left foot amputation site and dorsal wound: Adaptic to prevent adhesion of foam for pain, regular wound vac with black foam  - Left anterior and posterior leg wounds: Honey colloid for improved enzymatic debridement and added moisture to wound beds    Imaging/Labs:  -COVID-19: not detected this admission    Vascular status:   -s/p angiogram and right SFA stent by Dr. Purcell 10/12, adequate blood flow after re-  vascularization    Surgery:   --no further surgeries planned at this time    Antibiotics:   -Defer to hospitalist    Weight Bearing Status:   -Weight bearing as tolerated    Offloading:   -Offloading shoe; ordered  -Orthotic company:     PT/OT:   -involved       Diabetes Education:   - consult CDE and RD involved     - Implications of loss of protective sensation (LOPS) discussed with patient- including increased risk for amputation.  Advised to check feet at least daily, moisturize feet, and to always wear protective foot wear.   -avoid trimming own nails. See podiatrist or certified foot and nail RN  -keep blood sugars <150 for improved wound healing    DISCHARGE PLAN:  Patient has extensive wounds requiring wound Vac  Treated with Veraflo wound vac while admitted, can be converted to regular vac on discharge    Disposition:   SNF/LTAC per PT recommendation and due to extensive wounds    Follow-up: Dr. Purcell approximately 3 weeks post-op  Follow up LPS rounds: Will be scheduled when closer to discharge date.      Discussed with: pt, RN, Dr. Kamara    Please note that this dictation was created using voice recognition software. I have  worked with technical experts from [employer] [facility] to optimize the interface.  I have made every reasonable attempt to correct obvious errors, but there may be errors of grammar and possibly content that I did not discover before finalizing the note.      Rodri Ardon M.D.    If any questions or concerns, please contact [facility] through voalte.

## 2021-10-14 NOTE — ASSESSMENT & PLAN NOTE
Asymptomatic, after stenting  Lisinopril and lasix held  Carvedilol restarted at half-dose due to h/o CAD  No indication for strict BP control, PRN antihypertensives discontinued

## 2021-10-14 NOTE — PROGRESS NOTES
Hospital Medicine Daily Progress Note    Date of Service  10/14/2021    Chief Complaint  Luis Kellogg is a 71 y.o. male admitted 10/7/2021 with bilateral leg wounds    Hospital Course  This is a 71 year old male with PMHx of hyperlipidemia, type 2 diabetes with A1c of 9.1, obesity, CAD, hx MI,PAD, history of lower extremity osteomyelitis and recurrent LLE cellulitis who was admitted for sepsis secondary to BLE diabetic, nonhealing wounds and cellulitis.    Patient has known history of uncontrolled type 2 diabetes, he has had prior admissions for cellulitis and osteomyelitis.  Patient does have history of PAD.  Venous dopplers negative for DVT and arterial Dopplers ordered.  X-ray of the left tib-fib, no concern for osteomyelitis.  Wound care consulted. LPS consulted.    Wound cultures from March 20, 2021, noted E. Faecalis sensitive to ampicillin, Cipro, Levaquin and vancomycin.  Patient is currently on vancomycin only as per wound culture results.    Interval Problem Update  Patient was seen and examined at bedside.  No acute events overnight. Patient is resting comfortably in bed and in no acute distress.     S/p revascularization and debridement of lower extremity wounds  LPS following  Wound vac in place  Wound culture: GBS and MRSA  Keflex/Bactrim  SNF placement  Asymptomatic hypotension requiring IVF  Check cortisol in AM    I have personally seen and examined the patient at bedside. I discussed the plan of care with patient and bedside RN.    Consultants/Specialty  LPS   Vascular surgery    Code Status  Full Code    Disposition  Patient is not medically cleared.   Anticipate discharge to TBD.  I have placed the appropriate orders for post-discharge needs.    Review of Systems  Review of Systems   Constitutional: Negative for chills and fever.   HENT: Negative for congestion and sore throat.    Eyes: Negative for blurred vision.   Respiratory: Negative for cough and shortness of breath.    Cardiovascular:  Negative for chest pain and palpitations.   Gastrointestinal: Negative for abdominal pain, nausea and vomiting.   Genitourinary: Negative for dysuria and frequency.   Musculoskeletal: Positive for joint pain. Negative for myalgias.   Neurological: Negative for seizures and loss of consciousness.   Psychiatric/Behavioral: Negative for depression.     Physical Exam  Temp:  [36 °C (96.8 °F)-37.2 °C (99 °F)] 36 °C (96.8 °F)  Pulse:  [73-95] 78  Resp:  [16-18] 18  BP: ()/(42-72) 98/54  SpO2:  [91 %-95 %] 91 %    Physical Exam  Vitals and nursing note reviewed.   Constitutional:       General: He is not in acute distress.     Appearance: He is obese. He is not toxic-appearing.   HENT:      Head: Normocephalic and atraumatic.      Right Ear: External ear normal.      Left Ear: External ear normal.      Nose: No congestion.      Mouth/Throat:      Pharynx: No oropharyngeal exudate.   Eyes:      General: No scleral icterus.     Extraocular Movements: Extraocular movements intact.      Conjunctiva/sclera: Conjunctivae normal.      Pupils: Pupils are equal, round, and reactive to light.   Cardiovascular:      Rate and Rhythm: Normal rate and regular rhythm.      Pulses: Normal pulses.      Heart sounds: No murmur heard.     Pulmonary:      Effort: Pulmonary effort is normal.      Breath sounds: Normal breath sounds. No wheezing.   Abdominal:      General: Bowel sounds are normal.      Palpations: Abdomen is soft.      Tenderness: There is no abdominal tenderness. There is no guarding or rebound.   Musculoskeletal:         General: No swelling or tenderness. Normal range of motion.      Cervical back: Normal range of motion and neck supple. No muscular tenderness.      Comments: Bilateral lower extremities with wound vacs and dressings in place   Skin:     General: Skin is warm and dry.      Capillary Refill: Capillary refill takes less than 2 seconds.      Findings: No bruising, erythema or rash.   Neurological:       General: No focal deficit present.      Mental Status: He is alert and oriented to person, place, and time.   Psychiatric:         Mood and Affect: Mood normal.         Thought Content: Thought content normal.                     Fluids    Intake/Output Summary (Last 24 hours) at 10/14/2021 1330  Last data filed at 10/14/2021 0730  Gross per 24 hour   Intake 720 ml   Output 450 ml   Net 270 ml       Laboratory  Recent Labs     10/13/21  0324 10/14/21  0632   WBC 8.1 7.7   RBC 4.40* 4.29*   HEMOGLOBIN 13.1* 12.4*   HEMATOCRIT 39.6* 38.6*   MCV 90.0 90.0   MCH 29.8 28.9   MCHC 33.1* 32.1*   RDW 44.1 44.5   PLATELETCT 284 276   MPV 9.9 9.7     Recent Labs     10/13/21  0324 10/14/21  0632   SODIUM 138 139   POTASSIUM 3.9 4.7   CHLORIDE 101 104   CO2 26 28   GLUCOSE 181* 185*   BUN 16 21   CREATININE 0.59 0.96   CALCIUM 9.4 9.3                   Imaging  CT-CTA AORTA-RO WITH & W/O-POST PROCESS   Final Result      1.  Occlusion of the distal right femoral artery with reconstitution distally.      2.  Calcified plaque in small diameter of the tibial and peroneal arteries bilaterally makes it difficult to evaluate for degree of stenosis and occlusion.      3.  Plaque within the left femoral artery without evidence of occlusion.      4.  Aortic atherosclerosis without aneurysm.      5.  Diverticulosis without evidence of diverticulitis.      6.  Hepatic steatosis.      DX-FOOT-COMPLETE 3+ LEFT   Final Result         1.  Disuse osteoporosis of the left foot.      2. Large subcutaneous ulcer on the dorsal surface of left foot.      3. Previous amputations of the left second through fourth toes.      DX-FOOT-COMPLETE 3+ RIGHT   Final Result      1.  No fracture or dislocation of RIGHT foot.   2.  Periarticular osteopenia suggests inflammatory arthropathy.   3.  No soft tissue gas or focal bony destruction however osteomyelitis is not excluded by plain film.   4.  Severe degenerative change of ankle joint.      DX-TIBIA AND  FIBULA RIGHT   Final Result      No evidence of fracture or dislocation or acute osteomyelitis.      US-EXTREMITY ARTERY LOWER BILAT   Final Result      US-MIA SINGLE LEVEL BILAT   Final Result      US-EXTREMITY VENOUS LOWER UNILAT LEFT   Final Result      DX-TIBIA AND FIBULA LEFT   Final Result         1.  No acute traumatic bony injury.   2.  Soft tissue erosion of the posterior mid calf, corresponding with history of soft tissue ulcers.      IR-EXTREMITY ANGIOGRAM-BILATERAL    (Results Pending)        Assessment/Plan  * Diabetic infection of left foot (HCC)- (present on admission)  Assessment & Plan  Patient has known history of uncontrolled type 2 diabetes, he has had prior admissions for cellulitis and osteomyelitis.  Patient does have history of PAD.  Venous dopplers negative for DVT   Arterial Dopplers - inflow arterial insufficiency in lower extremities bilaterally  X-ray of the left tib-fib, no concern for osteomyelitis.    Wound care consulted. LPS following.  S/p revasculazization, debridement on 10/12    Wound culture MRSA, GBS  Keflex, bactrim    Hypotension  Assessment & Plan  Asymptomatic  Requiring intermittent IVF bolus  Check AM cortisol    Diabetic infection of right foot (HCC)- (present on admission)  Assessment & Plan  As per left lower extremity plan    Sepsis- (present on admission)  Assessment & Plan  This is Sepsis Present on admission  SIRS criteria identified on my evaluation include: Tachycardia, with heart rate greater than 90 BPM, Tachypnea, with respirations greater than 20 per minute and Bandemia, greater than 10% bands  Source is diabetic infected ulcers and cellulitis of Lower extremities  Sepsis protocol initiated  Fluid resuscitation ordered per protocol  IV antibiotics as appropriate for source of sepsis  While organ dysfunction may be noted elsewhere in this problem list or in the chart, degree of organ dysfunction does not meet CMS criteria for severe sepsis      Uncontrolled  type 2 diabetes mellitus (HCC)- (present on admission)  Assessment & Plan  ISS + Basal  A1c 9.1  Hypoglycemia protocol    Coronary artery disease involving native coronary artery - lexiscan 2/14/2020 - EF 60%  with fixed prominent defecct of interior and lateral walls, no stress induced ischemia, mild global hypokinesis- (present on admission)  Assessment & Plan  Continue with aspirin and statin therapy    Peripheral artery disease (HCC) - (present on admission)  Assessment & Plan  Patient does have history of PAD, previous MIA was performed in June 2021, showed mild to moderate disease. Repeat MIA noted significant worsening disease.    Vascular surgery consulted,s/p CTA today, plans for revascularization prior to surgical intervention for the wounds, of LLE 10/11/2021    OR 10/12 for revascularization    Peripheral neuropathy (HCC)- (present on admission)  Assessment & Plan  Cont home pain medications    Hyperlipidemia- (present on admission)  Assessment & Plan  F/u lipid panel outpatient       VTE prophylaxis: SCDs/TEDs and enoxaparin ppx    I have performed a physical exam and reviewed and updated ROS and Plan today (10/14/2021). In review of yesterday's note (10/13/2021), there are no changes except as documented above.

## 2021-10-14 NOTE — PROGRESS NOTES
Bedside report received at change of shift. Pt is A&Ox4. Blood pressure this AM was 73/48(0804), MD Kamara notified. Pt is asymptomatic, currently sitting up in bed eating breakfast. Lactated Ringers 500mL bolus running at this time. Safety precautions in place. All pt needs met at this time.     0858- pt's BP remains low at 71/48. MD Kamara notified and visited pt at bedside. Pt remains asymptomatic. Will recheck BP at 0930.     0954- BP 76/42 at 0930, new bolus ordered and running at this time. Will recheck when completed.    1035- BP 81/54, pt remains asymptomatic. Pt educated to let this RN know if he begins to feel dizzy or lightheaded. No further orders at this time. MD okay with pre-medicating pt before dressing change.    1250- BP 98/54, pt medicated per MAR for pain after dressing change.     1500- /63    COVID-19 surge in effect.

## 2021-10-15 LAB
ALBUMIN SERPL BCP-MCNC: 3.2 G/DL (ref 3.2–4.9)
ANION GAP SERPL CALC-SCNC: 11 MMOL/L (ref 7–16)
BASOPHILS # BLD AUTO: 0.6 % (ref 0–1.8)
BASOPHILS # BLD: 0.04 K/UL (ref 0–0.12)
BUN SERPL-MCNC: 27 MG/DL (ref 8–22)
BUN SERPL-MCNC: 27 MG/DL (ref 8–22)
CALCIUM SERPL-MCNC: 9.3 MG/DL (ref 8.5–10.5)
CALCIUM SERPL-MCNC: 9.4 MG/DL (ref 8.5–10.5)
CHLORIDE SERPL-SCNC: 103 MMOL/L (ref 96–112)
CHLORIDE SERPL-SCNC: 103 MMOL/L (ref 96–112)
CO2 SERPL-SCNC: 26 MMOL/L (ref 20–33)
CO2 SERPL-SCNC: 28 MMOL/L (ref 20–33)
CORTIS SERPL-MCNC: 4 UG/DL (ref 0–23)
CREAT SERPL-MCNC: 1.2 MG/DL (ref 0.5–1.4)
CREAT SERPL-MCNC: 1.3 MG/DL (ref 0.5–1.4)
EOSINOPHIL # BLD AUTO: 0.19 K/UL (ref 0–0.51)
EOSINOPHIL NFR BLD: 2.7 % (ref 0–6.9)
ERYTHROCYTE [DISTWIDTH] IN BLOOD BY AUTOMATED COUNT: 45.1 FL (ref 35.9–50)
GLUCOSE BLD-MCNC: 145 MG/DL (ref 65–99)
GLUCOSE BLD-MCNC: 147 MG/DL (ref 65–99)
GLUCOSE BLD-MCNC: 169 MG/DL (ref 65–99)
GLUCOSE BLD-MCNC: 207 MG/DL (ref 65–99)
GLUCOSE SERPL-MCNC: 183 MG/DL (ref 65–99)
GLUCOSE SERPL-MCNC: 188 MG/DL (ref 65–99)
HCT VFR BLD AUTO: 36.2 % (ref 42–52)
HGB BLD-MCNC: 12 G/DL (ref 14–18)
IMM GRANULOCYTES # BLD AUTO: 0.04 K/UL (ref 0–0.11)
IMM GRANULOCYTES NFR BLD AUTO: 0.6 % (ref 0–0.9)
LYMPHOCYTES # BLD AUTO: 1.36 K/UL (ref 1–4.8)
LYMPHOCYTES NFR BLD: 19.2 % (ref 22–41)
MAGNESIUM SERPL-MCNC: 1.9 MG/DL (ref 1.5–2.5)
MCH RBC QN AUTO: 30.2 PG (ref 27–33)
MCHC RBC AUTO-ENTMCNC: 33.1 G/DL (ref 33.7–35.3)
MCV RBC AUTO: 91 FL (ref 81.4–97.8)
MONOCYTES # BLD AUTO: 0.97 K/UL (ref 0–0.85)
MONOCYTES NFR BLD AUTO: 13.7 % (ref 0–13.4)
NEUTROPHILS # BLD AUTO: 4.49 K/UL (ref 1.82–7.42)
NEUTROPHILS NFR BLD: 63.2 % (ref 44–72)
NRBC # BLD AUTO: 0 K/UL
NRBC BLD-RTO: 0 /100 WBC
PHOSPHATE SERPL-MCNC: 3.8 MG/DL (ref 2.5–4.5)
PLATELET # BLD AUTO: 256 K/UL (ref 164–446)
PMV BLD AUTO: 10.2 FL (ref 9–12.9)
POTASSIUM SERPL-SCNC: 5.3 MMOL/L (ref 3.6–5.5)
POTASSIUM SERPL-SCNC: 5.4 MMOL/L (ref 3.6–5.5)
RBC # BLD AUTO: 3.98 M/UL (ref 4.7–6.1)
SODIUM SERPL-SCNC: 139 MMOL/L (ref 135–145)
SODIUM SERPL-SCNC: 140 MMOL/L (ref 135–145)
WBC # BLD AUTO: 7.1 K/UL (ref 4.8–10.8)

## 2021-10-15 PROCEDURE — 80048 BASIC METABOLIC PNL TOTAL CA: CPT

## 2021-10-15 PROCEDURE — 770001 HCHG ROOM/CARE - MED/SURG/GYN PRIV*

## 2021-10-15 PROCEDURE — A9270 NON-COVERED ITEM OR SERVICE: HCPCS | Performed by: NURSE PRACTITIONER

## 2021-10-15 PROCEDURE — 700102 HCHG RX REV CODE 250 W/ 637 OVERRIDE(OP): Performed by: INTERNAL MEDICINE

## 2021-10-15 PROCEDURE — 97165 OT EVAL LOW COMPLEX 30 MIN: CPT

## 2021-10-15 PROCEDURE — 700111 HCHG RX REV CODE 636 W/ 250 OVERRIDE (IP): Performed by: GENERAL PRACTICE

## 2021-10-15 PROCEDURE — 700102 HCHG RX REV CODE 250 W/ 637 OVERRIDE(OP): Performed by: NURSE PRACTITIONER

## 2021-10-15 PROCEDURE — 99233 SBSQ HOSP IP/OBS HIGH 50: CPT | Performed by: INTERNAL MEDICINE

## 2021-10-15 PROCEDURE — 700102 HCHG RX REV CODE 250 W/ 637 OVERRIDE(OP): Performed by: SURGERY

## 2021-10-15 PROCEDURE — 700102 HCHG RX REV CODE 250 W/ 637 OVERRIDE(OP): Performed by: STUDENT IN AN ORGANIZED HEALTH CARE EDUCATION/TRAINING PROGRAM

## 2021-10-15 PROCEDURE — A9270 NON-COVERED ITEM OR SERVICE: HCPCS | Performed by: INTERNAL MEDICINE

## 2021-10-15 PROCEDURE — 700102 HCHG RX REV CODE 250 W/ 637 OVERRIDE(OP): Performed by: GENERAL PRACTICE

## 2021-10-15 PROCEDURE — A9270 NON-COVERED ITEM OR SERVICE: HCPCS | Performed by: GENERAL PRACTICE

## 2021-10-15 PROCEDURE — 82533 TOTAL CORTISOL: CPT

## 2021-10-15 PROCEDURE — 80069 RENAL FUNCTION PANEL: CPT

## 2021-10-15 PROCEDURE — A9270 NON-COVERED ITEM OR SERVICE: HCPCS | Performed by: SURGERY

## 2021-10-15 PROCEDURE — 82962 GLUCOSE BLOOD TEST: CPT | Mod: 91

## 2021-10-15 PROCEDURE — A9270 NON-COVERED ITEM OR SERVICE: HCPCS | Performed by: STUDENT IN AN ORGANIZED HEALTH CARE EDUCATION/TRAINING PROGRAM

## 2021-10-15 PROCEDURE — 83735 ASSAY OF MAGNESIUM: CPT

## 2021-10-15 PROCEDURE — 36415 COLL VENOUS BLD VENIPUNCTURE: CPT

## 2021-10-15 PROCEDURE — 85025 COMPLETE CBC W/AUTO DIFF WBC: CPT

## 2021-10-15 RX ADMIN — CEPHALEXIN 1000 MG: 500 CAPSULE ORAL at 14:45

## 2021-10-15 RX ADMIN — DOCUSATE SODIUM 50 MG AND SENNOSIDES 8.6 MG 2 TABLET: 8.6; 5 TABLET, FILM COATED ORAL at 16:33

## 2021-10-15 RX ADMIN — INSULIN LISPRO 2 UNITS: 100 INJECTION, SOLUTION INTRAVENOUS; SUBCUTANEOUS at 06:29

## 2021-10-15 RX ADMIN — OXYCODONE HYDROCHLORIDE 10 MG: 10 TABLET ORAL at 23:06

## 2021-10-15 RX ADMIN — OXYCODONE HYDROCHLORIDE 10 MG: 10 TABLET ORAL at 14:46

## 2021-10-15 RX ADMIN — SULFAMETHOXAZOLE AND TRIMETHOPRIM 1 TABLET: 800; 160 TABLET ORAL at 16:33

## 2021-10-15 RX ADMIN — CEPHALEXIN 1000 MG: 500 CAPSULE ORAL at 19:50

## 2021-10-15 RX ADMIN — POTASSIUM CHLORIDE 40 MEQ: 1500 TABLET, EXTENDED RELEASE ORAL at 06:21

## 2021-10-15 RX ADMIN — DICLOFENAC SODIUM 50 MG: 25 TABLET, DELAYED RELEASE ORAL at 08:33

## 2021-10-15 RX ADMIN — TRAZODONE HYDROCHLORIDE 450 MG: 150 TABLET ORAL at 19:50

## 2021-10-15 RX ADMIN — GABAPENTIN 300 MG: 300 CAPSULE ORAL at 16:33

## 2021-10-15 RX ADMIN — MIDODRINE HYDROCHLORIDE 5 MG: 5 TABLET ORAL at 11:38

## 2021-10-15 RX ADMIN — OXYCODONE HYDROCHLORIDE 10 MG: 10 TABLET ORAL at 08:35

## 2021-10-15 RX ADMIN — MIDODRINE HYDROCHLORIDE 5 MG: 5 TABLET ORAL at 08:33

## 2021-10-15 RX ADMIN — CLOPIDOGREL BISULFATE 75 MG: 75 TABLET ORAL at 06:21

## 2021-10-15 RX ADMIN — DOCUSATE SODIUM 50 MG AND SENNOSIDES 8.6 MG 2 TABLET: 8.6; 5 TABLET, FILM COATED ORAL at 06:21

## 2021-10-15 RX ADMIN — OXYCODONE HYDROCHLORIDE 10 MG: 10 TABLET ORAL at 03:11

## 2021-10-15 RX ADMIN — OXYCODONE HYDROCHLORIDE 10 MG: 10 TABLET ORAL at 19:50

## 2021-10-15 RX ADMIN — FUROSEMIDE 80 MG: 40 TABLET ORAL at 06:21

## 2021-10-15 RX ADMIN — OXYCODONE HYDROCHLORIDE 10 MG: 10 TABLET ORAL at 11:38

## 2021-10-15 RX ADMIN — GABAPENTIN 300 MG: 300 CAPSULE ORAL at 06:21

## 2021-10-15 RX ADMIN — DICLOFENAC SODIUM 50 MG: 25 TABLET, DELAYED RELEASE ORAL at 19:50

## 2021-10-15 RX ADMIN — INSULIN LISPRO 3 UNITS: 100 INJECTION, SOLUTION INTRAVENOUS; SUBCUTANEOUS at 19:55

## 2021-10-15 RX ADMIN — HYDROXYZINE HYDROCHLORIDE 10 MG: 10 TABLET, FILM COATED ORAL at 14:46

## 2021-10-15 RX ADMIN — CEPHALEXIN 1000 MG: 500 CAPSULE ORAL at 08:33

## 2021-10-15 RX ADMIN — ASPIRIN 81 MG: 81 TABLET, COATED ORAL at 06:21

## 2021-10-15 RX ADMIN — ENOXAPARIN SODIUM 40 MG: 40 INJECTION SUBCUTANEOUS at 06:21

## 2021-10-15 RX ADMIN — POLYETHYLENE GLYCOL 3350 1 PACKET: 17 POWDER, FOR SOLUTION ORAL at 06:20

## 2021-10-15 RX ADMIN — SULFAMETHOXAZOLE AND TRIMETHOPRIM 1 TABLET: 800; 160 TABLET ORAL at 06:21

## 2021-10-15 RX ADMIN — CARVEDILOL 12.5 MG: 12.5 TABLET, FILM COATED ORAL at 16:33

## 2021-10-15 ASSESSMENT — ENCOUNTER SYMPTOMS
CHILLS: 0
PALPITATIONS: 0
LOSS OF CONSCIOUSNESS: 0
SEIZURES: 0
NAUSEA: 0
SHORTNESS OF BREATH: 0
MYALGIAS: 0
DEPRESSION: 0
FEVER: 0
SORE THROAT: 0
ABDOMINAL PAIN: 0
BLURRED VISION: 0
COUGH: 0
VOMITING: 0

## 2021-10-15 ASSESSMENT — PAIN DESCRIPTION - PAIN TYPE
TYPE: ACUTE PAIN

## 2021-10-15 ASSESSMENT — COGNITIVE AND FUNCTIONAL STATUS - GENERAL
DAILY ACTIVITIY SCORE: 21
DRESSING REGULAR LOWER BODY CLOTHING: A LITTLE
TOILETING: A LITTLE
HELP NEEDED FOR BATHING: A LITTLE
SUGGESTED CMS G CODE MODIFIER DAILY ACTIVITY: CJ

## 2021-10-15 ASSESSMENT — ACTIVITIES OF DAILY LIVING (ADL): TOILETING: INDEPENDENT

## 2021-10-15 NOTE — PROGRESS NOTES
Assumed care at 1845. Pt resting in bed. A&ox 4  LLE and RLE wound vac.   Left heel painted with betadine. Float heel boots ordered  Rt toes dressed with hydrocolloid thin  Pt didn't ambulate for the night  Voiding in urinal  No bm for 4 days per pt. miralax and stool softener given this AM  Call light within reach. Hourly rounding in place      Pt's bp 66/43 at the beginning of shift. Asymptomatic. hospitalist notified. 1L LR bolus ordered and given. Midodrine ordered as well.  bp 88/39 after bolus. Pt c/o significant shooting pain in b/l LE. Tylenol given without relief.  toradol ordered x1  and given.  LR @ 125 cc x 1 bag ordered as well.  bp @ 0300 106/65

## 2021-10-15 NOTE — CARE PLAN
The patient is Stable - Low risk of patient condition declining or worsening    Shift Goals  Clinical Goals: pain control. wound healing  Patient Goals: rest    Progress made toward(s) clinical / shift goals:  wound vac in place. Dressing changed,  Patient is not progressing towards the following goals:    Problem: Fluid Volume  Goal: Fluid volume balance will be maintained  Outcome: Progressing     Problem: Skin Integrity  Goal: Skin integrity is maintained or improved  Outcome: Progressing

## 2021-10-15 NOTE — CARE PLAN
The patient is Stable - Low risk of patient condition declining or worsening    Shift Goals  Clinical Goals: pain control.   Patient Goals:     Progress made toward(s) clinical / shift goals:  Medicated for pain per MAR.    Patient is not progressing towards the following goals:      Problem: Fluid Volume  Goal: Fluid volume balance will be maintained  Outcome: Progressing    Problem: Pain - Standard  Goal: Alleviation of pain or a reduction in pain to the patient’s comfort goal  Outcome: Progressing

## 2021-10-15 NOTE — PROGRESS NOTES
Diabetes education: CDE called [facility] regarding cost of [place]. Per pharmacy [facility] 2 was last picked up in March 2021, for $65.25.

## 2021-10-15 NOTE — PROGRESS NOTES
Assumed care of pt at shift change. Pt is on RA with no signs of acute distress. A&Ox4.Medicated pr MAR for pain to BLE.wound vac in place. Dressings to LEs in place, CDI. All safety measures in place. Call light and personal belongings by bedside. Bed locked and in lowest position. Hourly rounding in place.

## 2021-10-15 NOTE — THERAPY
Occupational Therapy   Initial Evaluation     Patient Name: Luis Kellogg  Age:  71 y.o., Sex:  male  Medical Record #: [id number]  Today's Date: 10/15/2021     Precautions  Precautions: Fall Risk, Other (See Comments)  Comments: Wound vac bilat LE into one device; gets under feet with standing and steps    Assessment  Patient is 71 y.o. male with a diagnosis of bilateral LE cellulitis with wounds and bilateral LE wound vacs.  Additional factors influencing patient status / progress: Pt lives alone with little to no social support in a remote area ([facility]). Pt is lacking insight into his risk for falls and increased medical decline if non-compliant. Pt is not wanting to use AD or off-loading shoes. Pt is cooperative and agreeable to try therapy at this time despite above factors. Pt can benefit from acute OT to increase independence with ADLs prior to d/c. If agreeable, pt could benefit from post acute placement but he is not sure if this is his preferred destination. But at current level of function, he is not safe to d/c home alone.     Plan    Recommend Occupational Therapy 3 times per week until therapy goals are met for the following treatments:  Adaptive Equipment, Neuro Re-Education / Balance, Self Care/Activities of Daily Living and Therapeutic Activities.    DC Equipment Recommendations: Unable to determine at this time  Discharge Recommendations: Recommend post-acute placement for additional occupational therapy services prior to discharge home        10/15/21 1006   Prior Living Situation   Prior Services None;Home-Independent   Housing / Facility Mobile Home   Steps Into Home 4   Steps In Home 0   Rail Left Rail  (Steps into Home)   Bathroom Set up Bathtub / Shower Combination  (Pt has modified steps to enter bathtub)   Equipment Owned Front-Wheel Walker;Single Point Cane   Lives with - Patient's Self Care Capacity Alone and Able to Care For Self   Comments Pt gets light assist from neighbor; uses  "paid  for appts   Prior Level of ADL Function   Self Feeding Independent   Grooming / Hygiene Independent   Bathing Independent   Dressing Independent   Toileting Independent   Prior Level of IADL Function   Medication Management Independent   Laundry Independent   Kitchen Mobility Independent   Finances Independent   Home Management Independent   Shopping Independent   Prior Level Of Mobility Independent With Device in Community   Driving / Transportation Utilizes Taxi Service for Transportation   History of Falls   History of Falls Yes  (pt unable to state)   Precautions   Precautions Fall Risk;Other (See Comments)   Comments Wound vac bilat LE into one device; gets under feet with standing and steps   Cognition    Cognition / Consciousness X   Safety Awareness Impaired   Comments Pt lacks appropriate fall prevention and insight; pleasant but stubborn and set in his ways; declining off-loading shoe   Balance Assessment   Sitting Balance (Static) Fair +   Sitting Balance (Dynamic) Fair +   Standing Balance (Static) Poor +   Standing Balance (Dynamic) Poor   Weight Shift Sitting Fair   Weight Shift Standing Poor   Comments Refusing devices; heavy reliance on furniture despite warning of lack of safety; risk for falls.    Bed Mobility    Supine to Sit Supervised   Sit to Supine Supervised   Scooting Supervised   Comments using rail   ADL Assessment   Eating Independent   Grooming Seated;Independent   Upper Body Dressing Supervision   Lower Body Dressing Supervision   Functional Mobility   Sit to Stand Minimal Assist   Bed, Chair, Wheelchair Transfer Minimal Assist   Transfer Method Stand Step  (with HHA; declined device)   Comments Pt is kyphotic, unsteady, at risk for falls, holdig onto furniture and stating \"I've got this.\"   Edema / Skin Assessment   Edema / Skin  X   Comments multiple wounds B LE; wound vacs B LE   Activity Tolerance   Sitting Edge of Bed 10 min   Standing 2 min   Patient / Family Goals "   Patient / Family Goal #1 Get these vacs off and go home   Short Term Goals   Short Term Goal # 1 Toilet xfer with mod I using AD prn   Short Term Goal # 2 standing grooming  x 10 min without LOB at Mod I   Problem List   Problem List Decreased Active Daily Living Skills;Decreased Homemaking Skills;Decreased Functional Mobility;Decreased Activity Tolerance;Safety Awareness Deficits / Cognition;Impaired Postural Control / Balance   Anticipated Discharge Equipment and Recommendations   DC Equipment Recommendations Unable to determine at this time   Discharge Recommendations Recommend post-acute placement for additional occupational therapy services prior to discharge home

## 2021-10-16 LAB
ANION GAP SERPL CALC-SCNC: 8 MMOL/L (ref 7–16)
BUN SERPL-MCNC: 18 MG/DL (ref 8–22)
CALCIUM SERPL-MCNC: 9.4 MG/DL (ref 8.5–10.5)
CHLORIDE SERPL-SCNC: 99 MMOL/L (ref 96–112)
CO2 SERPL-SCNC: 26 MMOL/L (ref 20–33)
CREAT SERPL-MCNC: 0.97 MG/DL (ref 0.5–1.4)
GLUCOSE BLD-MCNC: 148 MG/DL (ref 65–99)
GLUCOSE BLD-MCNC: 166 MG/DL (ref 65–99)
GLUCOSE BLD-MCNC: 200 MG/DL (ref 65–99)
GLUCOSE BLD-MCNC: 217 MG/DL (ref 65–99)
GLUCOSE SERPL-MCNC: 164 MG/DL (ref 65–99)
POTASSIUM SERPL-SCNC: 4.6 MMOL/L (ref 3.6–5.5)
SODIUM SERPL-SCNC: 133 MMOL/L (ref 135–145)

## 2021-10-16 PROCEDURE — 700111 HCHG RX REV CODE 636 W/ 250 OVERRIDE (IP): Performed by: GENERAL PRACTICE

## 2021-10-16 PROCEDURE — A9270 NON-COVERED ITEM OR SERVICE: HCPCS | Performed by: INTERNAL MEDICINE

## 2021-10-16 PROCEDURE — 97606 NEG PRS WND THER DME>50 SQCM: CPT

## 2021-10-16 PROCEDURE — 770001 HCHG ROOM/CARE - MED/SURG/GYN PRIV*

## 2021-10-16 PROCEDURE — 36415 COLL VENOUS BLD VENIPUNCTURE: CPT

## 2021-10-16 PROCEDURE — 700102 HCHG RX REV CODE 250 W/ 637 OVERRIDE(OP): Performed by: SURGERY

## 2021-10-16 PROCEDURE — A9270 NON-COVERED ITEM OR SERVICE: HCPCS | Performed by: GENERAL PRACTICE

## 2021-10-16 PROCEDURE — 700102 HCHG RX REV CODE 250 W/ 637 OVERRIDE(OP): Performed by: INTERNAL MEDICINE

## 2021-10-16 PROCEDURE — 99233 SBSQ HOSP IP/OBS HIGH 50: CPT | Performed by: INTERNAL MEDICINE

## 2021-10-16 PROCEDURE — 80048 BASIC METABOLIC PNL TOTAL CA: CPT

## 2021-10-16 PROCEDURE — 700102 HCHG RX REV CODE 250 W/ 637 OVERRIDE(OP): Performed by: STUDENT IN AN ORGANIZED HEALTH CARE EDUCATION/TRAINING PROGRAM

## 2021-10-16 PROCEDURE — 82962 GLUCOSE BLOOD TEST: CPT | Mod: 91

## 2021-10-16 PROCEDURE — A9270 NON-COVERED ITEM OR SERVICE: HCPCS | Performed by: STUDENT IN AN ORGANIZED HEALTH CARE EDUCATION/TRAINING PROGRAM

## 2021-10-16 PROCEDURE — 302098 PASTE RING (FLAT): Performed by: INTERNAL MEDICINE

## 2021-10-16 PROCEDURE — 700102 HCHG RX REV CODE 250 W/ 637 OVERRIDE(OP): Performed by: GENERAL PRACTICE

## 2021-10-16 PROCEDURE — 700111 HCHG RX REV CODE 636 W/ 250 OVERRIDE (IP): Performed by: INTERNAL MEDICINE

## 2021-10-16 PROCEDURE — A9270 NON-COVERED ITEM OR SERVICE: HCPCS | Performed by: SURGERY

## 2021-10-16 RX ORDER — LIDOCAINE HYDROCHLORIDE 20 MG/ML
1 JELLY TOPICAL
Status: DISCONTINUED | OUTPATIENT
Start: 2021-10-16 | End: 2021-10-30 | Stop reason: HOSPADM

## 2021-10-16 RX ADMIN — CLOPIDOGREL BISULFATE 75 MG: 75 TABLET ORAL at 06:00

## 2021-10-16 RX ADMIN — INSULIN LISPRO 2 UNITS: 100 INJECTION, SOLUTION INTRAVENOUS; SUBCUTANEOUS at 11:29

## 2021-10-16 RX ADMIN — CEPHALEXIN 1000 MG: 500 CAPSULE ORAL at 22:12

## 2021-10-16 RX ADMIN — POTASSIUM CHLORIDE 40 MEQ: 1500 TABLET, EXTENDED RELEASE ORAL at 05:08

## 2021-10-16 RX ADMIN — DOCUSATE SODIUM 50 MG AND SENNOSIDES 8.6 MG 2 TABLET: 8.6; 5 TABLET, FILM COATED ORAL at 05:08

## 2021-10-16 RX ADMIN — GABAPENTIN 300 MG: 300 CAPSULE ORAL at 16:25

## 2021-10-16 RX ADMIN — HYDROMORPHONE HYDROCHLORIDE 1 MG: 1 INJECTION, SOLUTION INTRAMUSCULAR; INTRAVENOUS; SUBCUTANEOUS at 21:11

## 2021-10-16 RX ADMIN — DICLOFENAC SODIUM 50 MG: 25 TABLET, DELAYED RELEASE ORAL at 08:18

## 2021-10-16 RX ADMIN — INSULIN LISPRO 3 UNITS: 100 INJECTION, SOLUTION INTRAVENOUS; SUBCUTANEOUS at 05:14

## 2021-10-16 RX ADMIN — HYDROMORPHONE HYDROCHLORIDE 0.5 MG: 1 INJECTION, SOLUTION INTRAMUSCULAR; INTRAVENOUS; SUBCUTANEOUS at 11:53

## 2021-10-16 RX ADMIN — OXYCODONE HYDROCHLORIDE 10 MG: 10 TABLET ORAL at 19:33

## 2021-10-16 RX ADMIN — DICLOFENAC SODIUM 50 MG: 25 TABLET, DELAYED RELEASE ORAL at 22:12

## 2021-10-16 RX ADMIN — ENOXAPARIN SODIUM 40 MG: 40 INJECTION SUBCUTANEOUS at 05:07

## 2021-10-16 RX ADMIN — OXYCODONE HYDROCHLORIDE 10 MG: 10 TABLET ORAL at 08:18

## 2021-10-16 RX ADMIN — OXYCODONE HYDROCHLORIDE 10 MG: 10 TABLET ORAL at 11:24

## 2021-10-16 RX ADMIN — LISINOPRIL 5 MG: 5 TABLET ORAL at 05:08

## 2021-10-16 RX ADMIN — DOCUSATE SODIUM 50 MG AND SENNOSIDES 8.6 MG 2 TABLET: 8.6; 5 TABLET, FILM COATED ORAL at 16:25

## 2021-10-16 RX ADMIN — ASPIRIN 81 MG: 81 TABLET, COATED ORAL at 05:08

## 2021-10-16 RX ADMIN — TRAZODONE HYDROCHLORIDE 450 MG: 150 TABLET ORAL at 22:12

## 2021-10-16 RX ADMIN — OXYCODONE HYDROCHLORIDE 10 MG: 10 TABLET ORAL at 05:08

## 2021-10-16 RX ADMIN — OXYCODONE HYDROCHLORIDE 10 MG: 10 TABLET ORAL at 14:55

## 2021-10-16 RX ADMIN — GABAPENTIN 300 MG: 300 CAPSULE ORAL at 05:08

## 2021-10-16 RX ADMIN — INSULIN LISPRO 2 UNITS: 100 INJECTION, SOLUTION INTRAVENOUS; SUBCUTANEOUS at 22:17

## 2021-10-16 RX ADMIN — SULFAMETHOXAZOLE AND TRIMETHOPRIM 1 TABLET: 800; 160 TABLET ORAL at 05:08

## 2021-10-16 RX ADMIN — CEPHALEXIN 1000 MG: 500 CAPSULE ORAL at 08:18

## 2021-10-16 RX ADMIN — NICOTINE 14 MG: 14 PATCH TRANSDERMAL at 05:08

## 2021-10-16 RX ADMIN — MAGNESIUM HYDROXIDE 30 ML: 400 SUSPENSION ORAL at 08:18

## 2021-10-16 RX ADMIN — SULFAMETHOXAZOLE AND TRIMETHOPRIM 1 TABLET: 800; 160 TABLET ORAL at 16:25

## 2021-10-16 ASSESSMENT — PAIN DESCRIPTION - PAIN TYPE
TYPE: ACUTE PAIN

## 2021-10-16 ASSESSMENT — ENCOUNTER SYMPTOMS
MYALGIAS: 0
PALPITATIONS: 0
SHORTNESS OF BREATH: 0
BLURRED VISION: 0
FEVER: 0
SEIZURES: 0
DEPRESSION: 0
ABDOMINAL PAIN: 0
CONSTIPATION: 1
COUGH: 0
CHILLS: 0
NAUSEA: 0
LOSS OF CONSCIOUSNESS: 0
SORE THROAT: 0
VOMITING: 0

## 2021-10-16 NOTE — DISCHARGE PLANNING
Anticipated Discharge Disposition: SNF vs LTAC    Action: pt unlikely to be accepted by SNF due to complexity, [facility] choice for [facility] faxed to [facility]. Will need to f/u with pt if accepted.    Barriers to Discharge: SNF vs LTAC acceptance    Plan: f/u with medical team for clearance, pt for placement if accepted.

## 2021-10-16 NOTE — DISCHARGE PLANNING
Received Choice form at 1311  Agency/Facility Name: [facility]  Referral sent per Choice form @ 1885

## 2021-10-16 NOTE — PROGRESS NOTES
Hospital Medicine Daily Progress Note    Date of Service  10/15/2021    Chief Complaint  Lius Kellogg is a 71 y.o. male admitted 10/7/2021 with bilateral leg wounds    Hospital Course  This is a 71 year old male with PMHx of hyperlipidemia, type 2 diabetes with A1c of 9.1, obesity, CAD, hx MI,PAD, history of lower extremity osteomyelitis and recurrent LLE cellulitis who was admitted for sepsis secondary to BLE diabetic, nonhealing wounds and cellulitis.    Patient has known history of uncontrolled type 2 diabetes, he has had prior admissions for cellulitis and osteomyelitis.  Patient does have history of PAD.  Venous dopplers negative for DVT and arterial Dopplers ordered.  X-ray of the left tib-fib, no concern for osteomyelitis.  Wound care consulted. LPS consulted.    Wound cultures from March 20, 2021, noted E. Faecalis sensitive to ampicillin, Cipro, Levaquin and vancomycin.  Patient is currently on vancomycin only as per wound culture results.    Interval Problem Update  Patient was seen and examined at bedside.  No acute events overnight. Patient is resting comfortably in bed and in no acute distress.     S/p revascularization and debridement of lower extremity wounds  LPS following  Wound vac in place  Wound culture: GBS and MRSA  Keflex/Bactrim  SNF placement    I have personally seen and examined the patient at bedside. I discussed the plan of care with patient and bedside RN.    Consultants/Specialty  LPS   Vascular surgery    Code Status  Full Code    Disposition  Patient is not medically cleared.   Anticipate discharge to TBD.  I have placed the appropriate orders for post-discharge needs.    Review of Systems  Review of Systems   Constitutional: Negative for chills and fever.   HENT: Negative for congestion and sore throat.    Eyes: Negative for blurred vision.   Respiratory: Negative for cough and shortness of breath.    Cardiovascular: Negative for chest pain and palpitations.   Gastrointestinal:  Negative for abdominal pain, nausea and vomiting.   Genitourinary: Negative for dysuria and frequency.   Musculoskeletal: Positive for joint pain. Negative for myalgias.   Neurological: Negative for seizures and loss of consciousness.   Psychiatric/Behavioral: Negative for depression.     Physical Exam  Temp:  [36.2 °C (97.1 °F)-36.4 °C (97.6 °F)] 36.3 °C (97.3 °F)  Pulse:  [62-75] 62  Resp:  [16-18] 16  BP: ()/(39-76) 129/76  SpO2:  [94 %-95 %] 94 %    Physical Exam  Vitals and nursing note reviewed.   Constitutional:       General: He is not in acute distress.     Appearance: He is obese. He is not toxic-appearing.   HENT:      Head: Normocephalic and atraumatic.      Right Ear: External ear normal.      Left Ear: External ear normal.      Nose: No congestion.      Mouth/Throat:      Pharynx: No oropharyngeal exudate.   Eyes:      General: No scleral icterus.     Extraocular Movements: Extraocular movements intact.      Conjunctiva/sclera: Conjunctivae normal.      Pupils: Pupils are equal, round, and reactive to light.   Cardiovascular:      Rate and Rhythm: Normal rate and regular rhythm.      Pulses: Normal pulses.      Heart sounds: No murmur heard.     Pulmonary:      Effort: Pulmonary effort is normal.      Breath sounds: Normal breath sounds. No wheezing.   Abdominal:      General: Bowel sounds are normal.      Palpations: Abdomen is soft.      Tenderness: There is no abdominal tenderness. There is no guarding or rebound.   Musculoskeletal:         General: No swelling or tenderness. Normal range of motion.      Cervical back: Normal range of motion and neck supple. No muscular tenderness.      Comments: Bilateral lower extremities with wound vacs and dressings in place   Skin:     General: Skin is warm and dry.      Capillary Refill: Capillary refill takes less than 2 seconds.      Findings: No bruising, erythema or rash.   Neurological:      General: No focal deficit present.      Mental Status: He  is alert and oriented to person, place, and time.   Psychiatric:         Mood and Affect: Mood normal.         Thought Content: Thought content normal.                   Patient was seen and examined at bedside. No changes in physical exam from prior evaluation.    Fluids    Intake/Output Summary (Last 24 hours) at 10/15/2021 1719  Last data filed at 10/15/2021 0931  Gross per 24 hour   Intake 2100 ml   Output 1350 ml   Net 750 ml       Laboratory  Recent Labs     10/13/21  0324 10/14/21  0632 10/15/21  0257   WBC 8.1 7.7 7.1   RBC 4.40* 4.29* 3.98*   HEMOGLOBIN 13.1* 12.4* 12.0*   HEMATOCRIT 39.6* 38.6* 36.2*   MCV 90.0 90.0 91.0   MCH 29.8 28.9 30.2   MCHC 33.1* 32.1* 33.1*   RDW 44.1 44.5 45.1   PLATELETCT 284 276 256   MPV 9.9 9.7 10.2     Recent Labs     10/13/21  0324 10/14/21  0632 10/15/21  0257   SODIUM 138 139 140  139   POTASSIUM 3.9 4.7 5.4  5.3   CHLORIDE 101 104 103  103   CO2 26 28 26  28   GLUCOSE 181* 185* 183*  188*   BUN 16 21 27*  27*   CREATININE 0.59 0.96 1.20  1.30   CALCIUM 9.4 9.3 9.4  9.3                   Imaging  CT-CTA AORTA-RO WITH & W/O-POST PROCESS   Final Result      1.  Occlusion of the distal right femoral artery with reconstitution distally.      2.  Calcified plaque in small diameter of the tibial and peroneal arteries bilaterally makes it difficult to evaluate for degree of stenosis and occlusion.      3.  Plaque within the left femoral artery without evidence of occlusion.      4.  Aortic atherosclerosis without aneurysm.      5.  Diverticulosis without evidence of diverticulitis.      6.  Hepatic steatosis.      DX-FOOT-COMPLETE 3+ LEFT   Final Result         1.  Disuse osteoporosis of the left foot.      2. Large subcutaneous ulcer on the dorsal surface of left foot.      3. Previous amputations of the left second through fourth toes.      DX-FOOT-COMPLETE 3+ RIGHT   Final Result      1.  No fracture or dislocation of RIGHT foot.   2.  Periarticular osteopenia  suggests inflammatory arthropathy.   3.  No soft tissue gas or focal bony destruction however osteomyelitis is not excluded by plain film.   4.  Severe degenerative change of ankle joint.      DX-TIBIA AND FIBULA RIGHT   Final Result      No evidence of fracture or dislocation or acute osteomyelitis.      US-EXTREMITY ARTERY LOWER BILAT   Final Result      US-MIA SINGLE LEVEL BILAT   Final Result      US-EXTREMITY VENOUS LOWER UNILAT LEFT   Final Result      DX-TIBIA AND FIBULA LEFT   Final Result         1.  No acute traumatic bony injury.   2.  Soft tissue erosion of the posterior mid calf, corresponding with history of soft tissue ulcers.      IR-EXTREMITY ANGIOGRAM-BILATERAL    (Results Pending)        Assessment/Plan  * Diabetic infection of left foot (HCC)- (present on admission)  Assessment & Plan  Patient has known history of uncontrolled type 2 diabetes, he has had prior admissions for cellulitis and osteomyelitis.  Patient does have history of PAD.  Venous dopplers negative for DVT   Arterial Dopplers - inflow arterial insufficiency in lower extremities bilaterally  X-ray of the left tib-fib, no concern for osteomyelitis.    Wound care consulted. LPS following.  S/p revasculazization, debridement on 10/12    Wound culture MRSA, GBS  Keflex, bactrim    Hypotension  Assessment & Plan  Asymptomatic  Requiring intermittent IVF bolus  Check AM cortisol    Diabetic infection of right foot (HCC)- (present on admission)  Assessment & Plan  As per left lower extremity plan    Sepsis- (present on admission)  Assessment & Plan  This is Sepsis Present on admission  SIRS criteria identified on my evaluation include: Tachycardia, with heart rate greater than 90 BPM, Tachypnea, with respirations greater than 20 per minute and Bandemia, greater than 10% bands  Source is diabetic infected ulcers and cellulitis of Lower extremities  Sepsis protocol initiated  Fluid resuscitation ordered per protocol  IV antibiotics as  appropriate for source of sepsis  While organ dysfunction may be noted elsewhere in this problem list or in the chart, degree of organ dysfunction does not meet CMS criteria for severe sepsis      Uncontrolled type 2 diabetes mellitus (HCC)- (present on admission)  Assessment & Plan  ISS + Basal  A1c 9.1  Hypoglycemia protocol    Coronary artery disease involving native coronary artery - lexiscan 2/14/2020 - EF 60%  with fixed prominent defecct of interior and lateral walls, no stress induced ischemia, mild global hypokinesis- (present on admission)  Assessment & Plan  Continue with aspirin and statin therapy    Peripheral artery disease (HCC) - (present on admission)  Assessment & Plan  Patient does have history of PAD, previous MIA was performed in June 2021, showed mild to moderate disease. Repeat MIA noted significant worsening disease.    Vascular surgery consulted,s/p CTA today, plans for revascularization prior to surgical intervention for the wounds, of LLE 10/11/2021    OR 10/12 for revascularization    Peripheral neuropathy (HCC)- (present on admission)  Assessment & Plan  Cont home pain medications    Hyperlipidemia- (present on admission)  Assessment & Plan  F/u lipid panel outpatient       VTE prophylaxis: SCDs/TEDs and enoxaparin ppx    I have performed a physical exam and reviewed and updated ROS and Plan today (10/15/2021). In review of yesterday's note (10/14/2021), there are no changes except as documented above.

## 2021-10-16 NOTE — PROGRESS NOTES
Assumed care of pt at shift change. Pt is on RA with no signs of acute distress. A&Ox4. Medicated with Oxycodone per MAR for pain to BLE.wound vac in place. Dressings to LEs in place, CDI. Dressing changes performed as ordered.  All safety measures in place. Call light and personal belongings by bedside. Bed locked and in lowest position. Hourly rounding in place.

## 2021-10-16 NOTE — PROGRESS NOTES
COVID-19 surge in effect      Report received by eusebio RN. Assumed care of pt. Assessment complete. Contact precautions remain in place for MRSA in wound. Pt A&Ox4, VSS and on RA. Pt c/o BLE pain rated 8/10, medicated with PRN oxycodone 10mg q.3hr. Evening BS= 207, 3 units given. Plan of care discussed. Call light within reach, bed in lowest position, and pt has no further questions at this time.

## 2021-10-16 NOTE — WOUND TEAM
[facility]  Inpatient Services   Wound and Skin Care     Admission Date: 10/7/2021     Last order of IP CONSULT TO WOUND CARE was found on 10/12/2021 from Hospital Encounter on 10/7/2021     HPI, PMH, SH: Reviewed    Past Surgical History:   Procedure Laterality Date   • ANGIOGRAM Bilateral 10/12/2021    Procedure: BILATERAL LOWER EXTREMITY ANGIOGRAM;  Surgeon: Valerio Purcell M.D.;  Location: SURGERY [facility];  Service: Vascular   • IRRIGATION & DEBRIDEMENT GENERAL Bilateral 10/12/2021    Procedure: IRRIGATION AND DEBRIDEMENT, WOUND, BILATERAL LOWER EXTREMITY;  Surgeon: Valerio Purcell M.D.;  Location: SURGERY [facility];  Service: Vascular   • APPLICATION OR REPLACEMENT, WOUND VAC Bilateral 10/12/2021    Procedure: APPLICATION WOUND VAC;  Surgeon: Valerio Purcell M.D.;  Location: SURGERY [facility];  Service: Vascular   • STENT PLACEMENT Right 10/12/2021    Procedure: STENT PLACEMENT, RIGHT SUPERFICIAL FEMORAL ARTERY;  Surgeon: Valerio Purcell M.D.;  Location: SURGERY [facility];  Service: Vascular   • TOE AMPUTATION Left 2/17/2020    Procedure: LEFT SECOND, THIRD, AND FORTH TOE AMPUTATION;  Surgeon: Alfonso Esteban M.D.;  Location: SURGERY [facility];  Service: Orthopedics   • [place] UNLISTED PROC, ARTHROSCOPY Left 7/25/2019    Procedure: LEFT ENDOSCOPIC ULNAR NERVE DECOMPRESSION, LEFT CARPAL TUNNEL RELEASE;  Surgeon: Red Chacon M.D.;  Location: SURGERY [facility];  Service: Orthopedics   • KNEE REPLACEMENT, TOTAL Bilateral    • OTHER SURGICAL PROCEDURE      back surgery - Unknown      Social History     Tobacco Use   • Smoking status: Current Every Day Smoker     Packs/day: 0.50     Years: 54.00     Pack years: 27.00     Types: Cigarettes   • Smokeless tobacco: Former User   Substance Use Topics   • Alcohol use: Not Currently     Alcohol/week: 0.0 oz     Comment: occas     Chief Complaint   Patient presents with   • Wound Infection     bilateral leg wounds. pt states  he has had them for years but seem to be getting worse      Diagnosis: Cellulitis of left lower extremity [L03.116]    Unit where seen by Wound Team: S623/02     WOUND CONSULT/FOLLOW UP RELATED TO:  Initial vac change     WOUND HISTORY:  70 yo Male underwent Right SFA stent placement and OR debridement on BLE with subsequent vac placement.    WOUND ASSESSMENT/LDA       Negative Pressure Wound Therapy 10/12/21 Other (Comment);Surgical Leg Left (Active)   NPWT Pump Mode / Pressure Setting 125 mmHg;Continuous 10/16/21 1500   Dressing Type Black Foam (Veraflo) 10/16/21 1500   Canister Changed Yes 10/15/21 2000   Output (mL) 300 mL 10/15/21 2000   VAC VeraFlo Irrigant Normal Saline 10/16/21 1500   VAC VeraFlo Soak Time (mins) 6 10/16/21 1500   VAC VeraFlo Instill Volume (ml) 42 10/16/21 1500   VAC VeraFlo - Therapy Time (hrs) 2 10/16/21 1500   VAC VeraFlo Pressure (mm/Hg) 125 mmHg;Continuous 10/16/21 1500       Negative Pressure Wound Therapy 10/16/21 Right (Active)   Dressing Type Black Foam (Veraflo) 10/16/21 1500   VAC VeraFlo Irrigant Normal Saline 10/16/21 1500   VAC VeraFlo Soak Time (mins) 6 10/16/21 1500   VAC VeraFlo Instill Volume (ml) 16 10/16/21 1500   VAC VeraFlo - Therapy Time (hrs) 2 10/16/21 1500   VAC VeraFlo Pressure (mm/Hg) 125 mmHg;Continuous 10/16/21 1500               Wound 10/14/21 Full Thickness Wound Leg Lateral Right NPWT veraflo (Active)   Wound Image   10/14/21 1100   Site Assessment Red;Yellow 10/16/21 1500   Periwound Assessment Intact 10/14/21 1100   Margins Unattached edges 10/14/21 1100   Closure Secondary intention 10/14/21 1100   Drainage Amount Small 10/16/21 1500   Drainage Description Serosanguineous 10/16/21 1500   Treatments Site care 10/14/21 1100   Wound Cleansing Approved Wound Cleanser 10/15/21 0000   Periwound Protectant Paste Ring 10/14/21 1100   Dressing Cleansing/Solutions Normal Saline 10/15/21 0000   Dressing Options Wound Vac 10/16/21 1500   Dressing Changed Changed  10/16/21 1500   Dressing Status Clean;Dry;Intact 10/15/21 2000   Dressing Change/Treatment Frequency By Wound Team Only 10/15/21 2000   NEXT Dressing Change/Treatment Date 10/19/21 10/16/21 1500   NEXT Weekly Photo (Inpatient Only) 10/21/21 10/16/21 1500   Wound Length (cm) 17 cm 10/14/21 1100   Wound Width (cm) 8.5 cm 10/14/21 1100   Wound Depth (cm) 0.5 cm 10/14/21 1100   Wound Surface Area (cm^2) 144.5 cm^2 10/14/21 1100   Wound Volume (cm^3) 72.25 cm^3 10/14/21 1100   Wound Bed Granulation (%) 70 % 10/16/21 1500   Wound Bed Slough (%) 30 % 10/16/21 1500   Shape oval 10/14/21 1100   Wound Odor None 10/16/21 1500   Exposed Structures None 10/16/21 1500   WOUND NURSE ONLY - Time Spent with Patient (mins) 90 10/16/21 1500       Wound 10/14/21 Full Thickness Wound Leg;Foot Lateral;Dorsal Left (Active)   Wound Image    10/14/21 1100   Site Assessment Red;Yellow 10/16/21 1500   Periwound Assessment Red 10/14/21 1100   Margins Unattached edges 10/14/21 1100   Closure Secondary intention 10/14/21 1100   Drainage Amount Small 10/16/21 1500   Drainage Description Serosanguineous 10/16/21 1500   Treatments Site care 10/14/21 1100   Wound Cleansing Approved Wound Cleanser 10/15/21 0000   Periwound Protectant Drape;Skin Protectant Wipes to Periwound 10/14/21 1100   Dressing Cleansing/Solutions Normal Saline 10/15/21 0000   Dressing Options Wound Vac 10/16/21 1500   Dressing Changed Changed 10/16/21 1500   Dressing Status Clean;Dry;Intact 10/15/21 0000   Dressing Change/Treatment Frequency By Wound Team Only 10/15/21 2000   NEXT Dressing Change/Treatment Date 10/19/21 10/16/21 1500   NEXT Weekly Photo (Inpatient Only) 10/21/21 10/16/21 1500   Wound Length (cm) 9.5 cm 10/14/21 1100   Wound Width (cm) 8 cm 10/14/21 1100   Wound Depth (cm) 0.1 cm 10/14/21 1100   Wound Surface Area (cm^2) 76 cm^2 10/14/21 1100   Wound Volume (cm^3) 7.6 cm^3 10/14/21 1100   Wound Bed Granulation (%) 70 % 10/16/21 1500   Wound Bed Slough (%) 30 %  10/16/21 1500   Shape oval 10/14/21 1100   Wound Odor None 10/16/21 1500   Exposed Structures None 10/16/21 1500       Wound 10/07/21 Heel Lateral Left unstag POA (Active)            Site Assessment Dry;Brown 10/16/21 1500   Drainage Amount None 10/16/21 1500   Dressing Cleansing/Solutions 3% Betadine 10/16/21 1500   Dressing Options Open to Air 10/16/21 1500   Pressure Injury Stage U 10/16/21 1500   Wound Length (cm) 2 cm 10/16/21 1500   Wound Width (cm) 2 cm 10/16/21 1500   Wound Surface Area (cm^2) 4 cm^2 10/16/21 1500   Exposed Structures ROSITA 10/16/21 1500           Vascular:    MIA:   MIA Results, Last 30 Days US-MIA SINGLE LEVEL BILAT     RIGHT      Waveform            Systolic BPs (mmHg)                                                     Brachial   Monophasic                               Common Femoral   Monophasic                               Posterior Tibial   Monophasic                               Dorsalis Pedis                                                    Peroneal                                            MIA                                            TBI                           LEFT   Waveform        Systolic BPs (mmHg)                                    143           Brachial   Monophasic                               Common Femoral   Monophasic                               Posterior Tibial   Monophasic                               Dorsalis Pedis                                                     Peroneal                                            MIA                                            TBI         Findings   Bilateral.    Doppler waveforms of the common femoral arteries are abnormally dampened/    monophasic.    Doppler waveforms at the ankle are monophasic.    The ankle pressures are not obtained due to pain.    Digit PPG waveforms are normal in the right toes.   Digit PPG waveforms are normal in the left 1st and 5th toe. The other toes    are absent/amputated.    Toe-brachial  indices is reduced right side.   Toe-brachial indices are left side.    Toe-brachial indices:     Right:  0.54   Left:  0.90         Lab Values:    Lab Results   Component Value Date/Time    WBC 7.1 10/15/2021 02:57 AM    RBC 3.98 (L) 10/15/2021 02:57 AM    HEMOGLOBIN 12.0 (L) 10/15/2021 02:57 AM    HEMATOCRIT 36.2 (L) 10/15/2021 02:57 AM    CREACTPROT 6.25 (H) 10/07/2021 08:30 PM    SEDRATEWES 1 10/07/2021 08:30 PM    HBA1C 9.1 (H) 10/08/2021 04:26 AM        Culture Results show:  Recent Results (from the past 720 hour(s))   CULTURE WOUND W/ GRAM STAIN    Collection Time: 10/08/21  2:13 PM    Specimen: Left Leg; Wound   Result Value Ref Range    Significant Indicator POS (POS)     Source WND     Site LEFT LEG     Culture Result Rare growth mixed enteric collins. (A)     Gram Stain Result Moderate WBCs.  No organisms seen.       Culture Result (A)      Methicillin Resistant Staphylococcus aureus  Light growth  This isolate is presumed to be clindamycin resistant based on  detection of inducible resistance.  Clindamycin may still  be effective in some patients.      Culture Result Streptococcus agalactiae (Group B)  Light growth   (A)        Susceptibility    Methicillin resistant staphylococcus aureus - MELA     Azithromycin >4 Resistant mcg/mL     Clindamycin <=0.25 Resistant mcg/mL     Cefazolin <=8 Resistant mcg/mL     Cefepime 16 Resistant mcg/mL     Ceftaroline <=0.5 Sensitive mcg/mL     Daptomycin <=0.5 Sensitive mcg/mL     Ampicillin/sulbactam 16/8 Resistant mcg/mL     Erythromycin >4 Resistant mcg/mL     Vancomycin 1 Sensitive mcg/mL     Oxacillin >2 Resistant mcg/mL     Trimeth/Sulfa <=0.5/9.5 Sensitive mcg/mL     Tetracycline <=4 Sensitive mcg/mL       Pain Level/Medicated:  Max pain; Premed IV, topical lidocaine Soln x2 vials, Requires pain meds after dressing change       INTERVENTIONS BY WOUND TEAM:   Performed standard wound care which includes appropriate positioning, dressing removal and non-selective  debridement. Pictures and measurements obtained weekly if/when required.    RIGHT LE  Preparation for Dressing removal: Dressing soaked with Lidocaine and NS.   Non-selectively Debrided with:  cleanser and gauze.  Sharp debridement: NA  Soo wound: Cleansed with cleanser, Prepped with Cavilon skin prep, drape and paste rings, drape covered paste rings on LLE due to extensive bridging and portion of R paste ring to bridge 2 wounds.     aquacel ag to PTWs on left LE  Primary Dressing: black VF foam bridged as neede and covered with drape to achieve a seal.    Secondary (Outer) Dressing: button, drape and tracpad to foam. Tubi E bilateral with hole for tubing.       betadine to L medial heel - pt has monophasic pulses and usual care for heel wounds is to allow stable eschar to remain in place until it sloughs off on it's own.      Interdisciplinary consultation: Patient, Bedside RN, Elsi Elizabeth RN, Vinicius SOLANO.    EVALUATION / RATIONALE FOR TREATMENT:  Most Recent Date:  10/16/21 All wounds (except heel )with VF VAC to remove slough and encourage granular tissue formation.  VF should ease the pain with dressing removal.  Keeping heel wound dry due to diminished pulses and eschar is stable.    10/14: RLE lateral wound with adherent slough at base. Vera harry initiated to encourage mechanical debridement. LLE lateral and toe amp wound with clean, partially granulated wound base. Maintained regular wound vac suction to these sites. Implemented adaptic as a nonadherent layer to decrease pain upon removal next change.     Honeycolloid applied to anterior and posterior aspect on LLE given adherent slough at base. Pt will benefit from veraflo wound vac to LLE including bridging of scattered lesions, therefore, will need extra machine, cassette, Y-connector and tracpad next session.     Pt was in significant pain throughout the dressing care process. Will benefit from ongoing  Premedication and lidocaine soln for pain  control. Check with primary RN prior to wound care as pt may be hypotensive and not appropriate for IV pain meds right away. LPS following patient, refer to LPS noted for POC.      Goals: Steady decrease in wound area and depth weekly.    WOUND TEAM PLAN OF CARE ([X] for frequency of wound follow up,):   Nursing to follow orders written for wound care. Contact wound team if area fails to progress, deteriorates or with any questions/concerns  Dressing changes by wound team:                   Follow up 3 times weekly:                NPWT change 3 times weekly: x    Follow up 1-2 times weekly:      Follow up Bi-Monthly:                   Follow up as needed:     Other (explain):     NURSING PLAN OF CARE ORDERS (X):  Dressing changes: See Dressing Care orders: x  Skin care: See Skin Care orders: x  RN Prevention Protocol: x  Rectal tube care: See Rectal Tube Care orders:   Other orders:    Anticipated discharge plans:   LTACH:        SNF/Rehab:      X will need ongoing wound care            Home Health Care:           Outpatient Wound Center:            Self/Family Care:        Other:                  Vac Discharge Needs:   Not Applicable Pt not on a wound vac:       Regular Vac while inpatient, alternative dressing at DC:        Regular Vac in use and continued at DC:           Reg. Vac w/ Skin Sub/Biologic in use. Will need to be changed 2x wkly:      Veraflo Vac while inpatient, ok to transition to Regular Vac on Discharge:  X - pt will require 3x/week dressing changes         Veraflo Vac while inpatient, will need to remain on Veraflo Vac upon discharge:

## 2021-10-16 NOTE — CARE PLAN
The patient is Stable - Low risk of patient condition declining or worsening    Shift Goals  Clinical Goals: Pain management  Patient Goals: rest    Progress made toward(s) clinical / shift goals:  Pt assessed for pain regularly and medicated per MAR.    Patient is not progressing towards the following goals:

## 2021-10-16 NOTE — PROGRESS NOTES
Hospital Medicine Daily Progress Note    Date of Service  10/16/2021    Chief Complaint  Luis Kellogg is a 71 y.o. male admitted 10/7/2021 with bilateral leg wounds    Hospital Course  This is a 71 year old male with PMHx of hyperlipidemia, type 2 diabetes with A1c of 9.1, obesity, CAD, hx MI,PAD, history of lower extremity osteomyelitis and recurrent LLE cellulitis who was admitted for sepsis secondary to BLE diabetic, nonhealing wounds and cellulitis.    Patient has known history of uncontrolled type 2 diabetes, he has had prior admissions for cellulitis and osteomyelitis.  Patient does have history of PAD.  Venous dopplers negative for DVT and arterial Dopplers ordered.  X-ray of the left tib-fib, no concern for osteomyelitis.  Wound care consulted. LPS consulted.    Wound cultures from March 20, 2021, noted E. Faecalis sensitive to ampicillin, Cipro, Levaquin and vancomycin.  Patient is currently on vancomycin only as per wound culture results.    Interval Problem Update  Patient was seen and examined at bedside.  No acute events overnight. Patient is resting comfortably in bed and in no acute distress.     S/p revascularization and debridement of lower extremity wounds  LPS following  Wound vac in place  Wound culture: GBS and MRSA  Keflex/Bactrim  SNF placement  Constipation - miralax yesterday and milk of mag this AM    I have personally seen and examined the patient at bedside. I discussed the plan of care with patient and bedside RN.    Consultants/Specialty  LPS   Vascular surgery    Code Status  Full Code    Disposition  Patient is not medically cleared.   Anticipate discharge to TBD.  I have placed the appropriate orders for post-discharge needs.    Review of Systems  Review of Systems   Constitutional: Negative for chills and fever.   HENT: Negative for congestion and sore throat.    Eyes: Negative for blurred vision.   Respiratory: Negative for cough and shortness of breath.    Cardiovascular:  Negative for chest pain and palpitations.   Gastrointestinal: Positive for constipation. Negative for abdominal pain, nausea and vomiting.   Genitourinary: Negative for dysuria and frequency.   Musculoskeletal: Positive for joint pain. Negative for myalgias.   Neurological: Negative for seizures and loss of consciousness.   Psychiatric/Behavioral: Negative for depression.     Physical Exam  Temp:  [36.3 °C (97.3 °F)-36.7 °C (98.1 °F)] 36.3 °C (97.3 °F)  Pulse:  [62-78] 72  Resp:  [16-18] 16  BP: (102-129)/(61-76) 103/61  SpO2:  [92 %-94 %] 94 %    Physical Exam  Vitals and nursing note reviewed.   Constitutional:       General: He is not in acute distress.     Appearance: He is obese. He is not toxic-appearing.   HENT:      Head: Normocephalic and atraumatic.      Right Ear: External ear normal.      Left Ear: External ear normal.      Nose: No congestion.      Mouth/Throat:      Pharynx: No oropharyngeal exudate.   Eyes:      General: No scleral icterus.     Extraocular Movements: Extraocular movements intact.      Conjunctiva/sclera: Conjunctivae normal.      Pupils: Pupils are equal, round, and reactive to light.   Cardiovascular:      Rate and Rhythm: Normal rate and regular rhythm.      Pulses: Normal pulses.      Heart sounds: No murmur heard.     Pulmonary:      Effort: Pulmonary effort is normal.      Breath sounds: Normal breath sounds. No wheezing.   Abdominal:      General: Bowel sounds are normal.      Palpations: Abdomen is soft.      Tenderness: There is no abdominal tenderness. There is no guarding or rebound.   Musculoskeletal:         General: No swelling or tenderness. Normal range of motion.      Cervical back: Normal range of motion and neck supple. No muscular tenderness.      Comments: Bilateral lower extremities with wound vacs and dressings in place   Skin:     General: Skin is warm and dry.      Capillary Refill: Capillary refill takes less than 2 seconds.      Findings: No bruising,  erythema or rash.   Neurological:      General: No focal deficit present.      Mental Status: He is alert and oriented to person, place, and time.   Psychiatric:         Mood and Affect: Mood normal.         Thought Content: Thought content normal.                   Patient was seen and examined at bedside. No changes in physical exam from prior evaluation.    Fluids    Intake/Output Summary (Last 24 hours) at 10/16/2021 1419  Last data filed at 10/15/2021 2306  Gross per 24 hour   Intake 360 ml   Output 2125 ml   Net -1765 ml       Laboratory  Recent Labs     10/14/21  0632 10/15/21  0257   WBC 7.7 7.1   RBC 4.29* 3.98*   HEMOGLOBIN 12.4* 12.0*   HEMATOCRIT 38.6* 36.2*   MCV 90.0 91.0   MCH 28.9 30.2   MCHC 32.1* 33.1*   RDW 44.5 45.1   PLATELETCT 276 256   MPV 9.7 10.2     Recent Labs     10/14/21  0632 10/15/21  0257 10/16/21  0907   SODIUM 139 140  139 133*   POTASSIUM 4.7 5.4  5.3 4.6   CHLORIDE 104 103  103 99   CO2 28 26  28 26   GLUCOSE 185* 183*  188* 164*   BUN 21 27*  27* 18   CREATININE 0.96 1.20  1.30 0.97   CALCIUM 9.3 9.4  9.3 9.4                   Imaging  CT-CTA AORTA-RO WITH & W/O-POST PROCESS   Final Result      1.  Occlusion of the distal right femoral artery with reconstitution distally.      2.  Calcified plaque in small diameter of the tibial and peroneal arteries bilaterally makes it difficult to evaluate for degree of stenosis and occlusion.      3.  Plaque within the left femoral artery without evidence of occlusion.      4.  Aortic atherosclerosis without aneurysm.      5.  Diverticulosis without evidence of diverticulitis.      6.  Hepatic steatosis.      DX-FOOT-COMPLETE 3+ LEFT   Final Result         1.  Disuse osteoporosis of the left foot.      2. Large subcutaneous ulcer on the dorsal surface of left foot.      3. Previous amputations of the left second through fourth toes.      DX-FOOT-COMPLETE 3+ RIGHT   Final Result      1.  No fracture or dislocation of RIGHT foot.   2.   Periarticular osteopenia suggests inflammatory arthropathy.   3.  No soft tissue gas or focal bony destruction however osteomyelitis is not excluded by plain film.   4.  Severe degenerative change of ankle joint.      DX-TIBIA AND FIBULA RIGHT   Final Result      No evidence of fracture or dislocation or acute osteomyelitis.      US-EXTREMITY ARTERY LOWER BILAT   Final Result      US-MIA SINGLE LEVEL BILAT   Final Result      US-EXTREMITY VENOUS LOWER UNILAT LEFT   Final Result      DX-TIBIA AND FIBULA LEFT   Final Result         1.  No acute traumatic bony injury.   2.  Soft tissue erosion of the posterior mid calf, corresponding with history of soft tissue ulcers.      IR-EXTREMITY ANGIOGRAM-BILATERAL    (Results Pending)        Assessment/Plan  * Diabetic infection of left foot (HCC)- (present on admission)  Assessment & Plan  Patient has known history of uncontrolled type 2 diabetes, he has had prior admissions for cellulitis and osteomyelitis.  Patient does have history of PAD.  Venous dopplers negative for DVT   Arterial Dopplers - inflow arterial insufficiency in lower extremities bilaterally  X-ray of the left tib-fib, no concern for osteomyelitis.    Wound care consulted. LPS following.  S/p revasculazization, debridement on 10/12    Wound culture MRSA, GBS  Keflex, bactrim    Hypotension  Assessment & Plan  Asymptomatic  Requiring intermittent IVF bolus  Check AM cortisol    Diabetic infection of right foot (HCC)- (present on admission)  Assessment & Plan  As per left lower extremity plan    Sepsis- (present on admission)  Assessment & Plan  This is Sepsis Present on admission  SIRS criteria identified on my evaluation include: Tachycardia, with heart rate greater than 90 BPM, Tachypnea, with respirations greater than 20 per minute and Bandemia, greater than 10% bands  Source is diabetic infected ulcers and cellulitis of Lower extremities  Sepsis protocol initiated  Fluid resuscitation ordered per  protocol  IV antibiotics as appropriate for source of sepsis  While organ dysfunction may be noted elsewhere in this problem list or in the chart, degree of organ dysfunction does not meet CMS criteria for severe sepsis      Uncontrolled type 2 diabetes mellitus (HCC)- (present on admission)  Assessment & Plan  ISS + Basal  A1c 9.1  Hypoglycemia protocol    Coronary artery disease involving native coronary artery - lexiscan 2/14/2020 - EF 60%  with fixed prominent defecct of interior and lateral walls, no stress induced ischemia, mild global hypokinesis- (present on admission)  Assessment & Plan  Continue with aspirin and statin therapy    Peripheral artery disease (HCC) - (present on admission)  Assessment & Plan  Patient does have history of PAD, previous MIA was performed in June 2021, showed mild to moderate disease. Repeat MIA noted significant worsening disease.    Vascular surgery consulted,s/p CTA today, plans for revascularization prior to surgical intervention for the wounds, of LLE 10/11/2021    OR 10/12 for revascularization    Peripheral neuropathy (HCC)- (present on admission)  Assessment & Plan  Cont home pain medications    Hyperlipidemia- (present on admission)  Assessment & Plan  F/u lipid panel outpatient       VTE prophylaxis: SCDs/TEDs and enoxaparin ppx    I have performed a physical exam and reviewed and updated ROS and Plan today (10/16/2021). In review of yesterday's note (10/15/2021), there are no changes except as documented above.

## 2021-10-16 NOTE — CARE PLAN
The patient is Stable - Low risk of patient condition declining or worsening    Shift Goals  Clinical Goals: pain control, wound healing  Patient Goals: rest    Progress made toward(s) clinical / shift goals:     Patient is not progressing towards the following goals:

## 2021-10-17 LAB
ANION GAP SERPL CALC-SCNC: 8 MMOL/L (ref 7–16)
BUN SERPL-MCNC: 19 MG/DL (ref 8–22)
CALCIUM SERPL-MCNC: 9.3 MG/DL (ref 8.5–10.5)
CHLORIDE SERPL-SCNC: 101 MMOL/L (ref 96–112)
CO2 SERPL-SCNC: 18 MMOL/L (ref 20–33)
CREAT SERPL-MCNC: 0.98 MG/DL (ref 0.5–1.4)
GLUCOSE BLD-MCNC: 157 MG/DL (ref 65–99)
GLUCOSE BLD-MCNC: 183 MG/DL (ref 65–99)
GLUCOSE BLD-MCNC: 196 MG/DL (ref 65–99)
GLUCOSE SERPL-MCNC: 143 MG/DL (ref 65–99)
POTASSIUM SERPL-SCNC: 4.6 MMOL/L (ref 3.6–5.5)
SODIUM SERPL-SCNC: 127 MMOL/L (ref 135–145)

## 2021-10-17 PROCEDURE — 770001 HCHG ROOM/CARE - MED/SURG/GYN PRIV*

## 2021-10-17 PROCEDURE — 700102 HCHG RX REV CODE 250 W/ 637 OVERRIDE(OP): Performed by: INTERNAL MEDICINE

## 2021-10-17 PROCEDURE — 99233 SBSQ HOSP IP/OBS HIGH 50: CPT | Performed by: INTERNAL MEDICINE

## 2021-10-17 PROCEDURE — 700105 HCHG RX REV CODE 258: Performed by: INTERNAL MEDICINE

## 2021-10-17 PROCEDURE — A9270 NON-COVERED ITEM OR SERVICE: HCPCS | Performed by: INTERNAL MEDICINE

## 2021-10-17 PROCEDURE — 700102 HCHG RX REV CODE 250 W/ 637 OVERRIDE(OP): Performed by: GENERAL PRACTICE

## 2021-10-17 PROCEDURE — 82962 GLUCOSE BLOOD TEST: CPT | Mod: 91

## 2021-10-17 PROCEDURE — A9270 NON-COVERED ITEM OR SERVICE: HCPCS | Performed by: SURGERY

## 2021-10-17 PROCEDURE — 80048 BASIC METABOLIC PNL TOTAL CA: CPT

## 2021-10-17 PROCEDURE — 700102 HCHG RX REV CODE 250 W/ 637 OVERRIDE(OP): Performed by: NURSE PRACTITIONER

## 2021-10-17 PROCEDURE — 700111 HCHG RX REV CODE 636 W/ 250 OVERRIDE (IP): Performed by: GENERAL PRACTICE

## 2021-10-17 PROCEDURE — 700102 HCHG RX REV CODE 250 W/ 637 OVERRIDE(OP): Performed by: SURGERY

## 2021-10-17 PROCEDURE — A9270 NON-COVERED ITEM OR SERVICE: HCPCS | Performed by: STUDENT IN AN ORGANIZED HEALTH CARE EDUCATION/TRAINING PROGRAM

## 2021-10-17 PROCEDURE — 36415 COLL VENOUS BLD VENIPUNCTURE: CPT

## 2021-10-17 PROCEDURE — A9270 NON-COVERED ITEM OR SERVICE: HCPCS | Performed by: GENERAL PRACTICE

## 2021-10-17 PROCEDURE — 700102 HCHG RX REV CODE 250 W/ 637 OVERRIDE(OP): Performed by: STUDENT IN AN ORGANIZED HEALTH CARE EDUCATION/TRAINING PROGRAM

## 2021-10-17 PROCEDURE — A9270 NON-COVERED ITEM OR SERVICE: HCPCS | Performed by: NURSE PRACTITIONER

## 2021-10-17 RX ORDER — LACTULOSE 20 G/30ML
30 SOLUTION ORAL ONCE
Status: COMPLETED | OUTPATIENT
Start: 2021-10-17 | End: 2021-10-17

## 2021-10-17 RX ORDER — SODIUM CHLORIDE 9 MG/ML
500 INJECTION, SOLUTION INTRAVENOUS ONCE
Status: COMPLETED | OUTPATIENT
Start: 2021-10-17 | End: 2021-10-17

## 2021-10-17 RX ADMIN — OXYCODONE HYDROCHLORIDE 20 MG: 10 TABLET ORAL at 04:11

## 2021-10-17 RX ADMIN — CEPHALEXIN 1000 MG: 500 CAPSULE ORAL at 08:19

## 2021-10-17 RX ADMIN — POTASSIUM CHLORIDE 40 MEQ: 1500 TABLET, EXTENDED RELEASE ORAL at 04:12

## 2021-10-17 RX ADMIN — SULFAMETHOXAZOLE AND TRIMETHOPRIM 1 TABLET: 800; 160 TABLET ORAL at 16:36

## 2021-10-17 RX ADMIN — CLOPIDOGREL BISULFATE 75 MG: 75 TABLET ORAL at 06:00

## 2021-10-17 RX ADMIN — OXYCODONE HYDROCHLORIDE 10 MG: 10 TABLET ORAL at 19:59

## 2021-10-17 RX ADMIN — DOCUSATE SODIUM 50 MG AND SENNOSIDES 8.6 MG 2 TABLET: 8.6; 5 TABLET, FILM COATED ORAL at 16:36

## 2021-10-17 RX ADMIN — SODIUM CHLORIDE 500 ML: 9 INJECTION, SOLUTION INTRAVENOUS at 08:20

## 2021-10-17 RX ADMIN — CEPHALEXIN 1000 MG: 500 CAPSULE ORAL at 16:36

## 2021-10-17 RX ADMIN — FUROSEMIDE 80 MG: 40 TABLET ORAL at 04:11

## 2021-10-17 RX ADMIN — NICOTINE 14 MG: 14 PATCH TRANSDERMAL at 04:11

## 2021-10-17 RX ADMIN — OXYCODONE HYDROCHLORIDE 10 MG: 10 TABLET ORAL at 16:36

## 2021-10-17 RX ADMIN — CEPHALEXIN 1000 MG: 500 CAPSULE ORAL at 19:59

## 2021-10-17 RX ADMIN — ASPIRIN 81 MG: 81 TABLET, COATED ORAL at 04:11

## 2021-10-17 RX ADMIN — LISINOPRIL 5 MG: 5 TABLET ORAL at 04:12

## 2021-10-17 RX ADMIN — MAGNESIUM CITRATE 296 ML: 1.75 LIQUID ORAL at 22:56

## 2021-10-17 RX ADMIN — GABAPENTIN 300 MG: 300 CAPSULE ORAL at 16:36

## 2021-10-17 RX ADMIN — DOCUSATE SODIUM 50 MG AND SENNOSIDES 8.6 MG 2 TABLET: 8.6; 5 TABLET, FILM COATED ORAL at 04:10

## 2021-10-17 RX ADMIN — GABAPENTIN 300 MG: 300 CAPSULE ORAL at 04:11

## 2021-10-17 RX ADMIN — SULFAMETHOXAZOLE AND TRIMETHOPRIM 1 TABLET: 800; 160 TABLET ORAL at 04:11

## 2021-10-17 RX ADMIN — POLYETHYLENE GLYCOL 3350 1 PACKET: 17 POWDER, FOR SOLUTION ORAL at 20:08

## 2021-10-17 RX ADMIN — LACTULOSE 30 ML: 20 SOLUTION ORAL at 16:56

## 2021-10-17 RX ADMIN — TRAZODONE HYDROCHLORIDE 450 MG: 150 TABLET ORAL at 19:59

## 2021-10-17 RX ADMIN — ENOXAPARIN SODIUM 40 MG: 40 INJECTION SUBCUTANEOUS at 04:10

## 2021-10-17 RX ADMIN — INSULIN LISPRO 2 UNITS: 100 INJECTION, SOLUTION INTRAVENOUS; SUBCUTANEOUS at 11:44

## 2021-10-17 RX ADMIN — INSULIN LISPRO 2 UNITS: 100 INJECTION, SOLUTION INTRAVENOUS; SUBCUTANEOUS at 19:59

## 2021-10-17 RX ADMIN — INSULIN LISPRO 2 UNITS: 100 INJECTION, SOLUTION INTRAVENOUS; SUBCUTANEOUS at 16:51

## 2021-10-17 RX ADMIN — DICLOFENAC SODIUM 50 MG: 25 TABLET, DELAYED RELEASE ORAL at 19:59

## 2021-10-17 RX ADMIN — OXYCODONE HYDROCHLORIDE 10 MG: 10 TABLET ORAL at 00:45

## 2021-10-17 RX ADMIN — DICLOFENAC SODIUM 50 MG: 25 TABLET, DELAYED RELEASE ORAL at 08:19

## 2021-10-17 ASSESSMENT — ENCOUNTER SYMPTOMS
SEIZURES: 0
ABDOMINAL PAIN: 0
NAUSEA: 0
MYALGIAS: 0
CHILLS: 0
COUGH: 0
DEPRESSION: 0
PALPITATIONS: 0
SORE THROAT: 0
LOSS OF CONSCIOUSNESS: 0
SHORTNESS OF BREATH: 0
BLURRED VISION: 0
FEVER: 0
CONSTIPATION: 1
VOMITING: 0

## 2021-10-17 ASSESSMENT — PAIN DESCRIPTION - PAIN TYPE
TYPE: ACUTE PAIN

## 2021-10-17 NOTE — PROGRESS NOTES
COVID-19 surge in effect      Report received by eusebio RN. Assumed care of pt. Assessment complete. Pt A&Ox4, VSS and on RA. Pt medicated per MAR for BLE pain q.3hr. Wound vacs connected to each leg and dressings cdi. Up to bedside commode with x1 assist. Calls appropriately. Plan of care discussed. Call light within reach, bed in lowest position, and pt has no further questions at this time.

## 2021-10-17 NOTE — PROGRESS NOTES
Pt hypotensive this AM, with SBPs in the 70s. Pt denies any dizziness. MD notified and ordered 500 mL Bolus of NS. Infusion started, will recheck BP after completion.       1010: BP after bolus 94/64

## 2021-10-17 NOTE — CARE PLAN
The patient is Watcher - Medium risk of patient condition declining or worsening    Shift Goals  Clinical Goals: Pt will remain hemodynamically stable  Patient Goals: Pain management    Progress made toward(s) clinical / shift goals: BP in the 90s after 500cc Bolus, of NS. will continue to monitor. Pt reported pain to LE, unable to administer Oxycodone at this time due to low BP. Will administer pain medications as appropriate while monitoring BP.     Patient is not progressing towards the following goals:

## 2021-10-17 NOTE — PROGRESS NOTES
Hospital Medicine Daily Progress Note    Date of Service  10/17/2021    Chief Complaint  Luis Kellogg is a 71 y.o. male admitted 10/7/2021 with bilateral leg wounds    Hospital Course  This is a 71 year old male with PMHx of hyperlipidemia, type 2 diabetes with A1c of 9.1, obesity, CAD, hx MI,PAD, history of lower extremity osteomyelitis and recurrent LLE cellulitis who was admitted for sepsis secondary to BLE diabetic, nonhealing wounds and cellulitis.    Patient has known history of uncontrolled type 2 diabetes, he has had prior admissions for cellulitis and osteomyelitis.  Patient does have history of PAD.  Venous dopplers negative for DVT and arterial Dopplers ordered.  X-ray of the left tib-fib, no concern for osteomyelitis.  Wound care consulted. LPS consulted.    Wound cultures from March 20, 2021, noted E. Faecalis sensitive to ampicillin, Cipro, Levaquin and vancomycin.  Patient is currently on vancomycin only as per wound culture results.    Interval Problem Update  Patient was seen and examined at bedside.  No acute events overnight. Patient is resting comfortably in bed and in no acute distress.     S/p revascularization and debridement of lower extremity wounds  LPS following  Wound vac in place  Wound culture: GBS and MRSA  Keflex/Bactrim  SNF placement  Constipation - follow bowel regimen protocol  Asymptomatic hypotension    I have personally seen and examined the patient at bedside. I discussed the plan of care with patient and bedside RN.    Consultants/Specialty  LPS   Vascular surgery    Code Status  Full Code    Disposition  Patient is not medically cleared.   Anticipate discharge to TBD.  I have placed the appropriate orders for post-discharge needs.    Review of Systems  Review of Systems   Constitutional: Negative for chills and fever.   HENT: Negative for congestion and sore throat.    Eyes: Negative for blurred vision.   Respiratory: Negative for cough and shortness of breath.     Cardiovascular: Negative for chest pain and palpitations.   Gastrointestinal: Positive for constipation. Negative for abdominal pain, nausea and vomiting.   Genitourinary: Negative for dysuria and frequency.   Musculoskeletal: Positive for joint pain. Negative for myalgias.   Neurological: Negative for seizures and loss of consciousness.   Psychiatric/Behavioral: Negative for depression.     Physical Exam  Temp:  [36.4 °C (97.6 °F)-36.7 °C (98.1 °F)] 36.4 °C (97.6 °F)  Pulse:  [60-85] 70  Resp:  [17-20] 17  BP: ()/(49-89) 94/64  SpO2:  [91 %-95 %] 94 %    Physical Exam  Vitals and nursing note reviewed.   Constitutional:       General: He is not in acute distress.     Appearance: He is obese. He is not toxic-appearing.   HENT:      Head: Normocephalic and atraumatic.      Right Ear: External ear normal.      Left Ear: External ear normal.      Nose: No congestion.      Mouth/Throat:      Pharynx: No oropharyngeal exudate.   Eyes:      General: No scleral icterus.     Extraocular Movements: Extraocular movements intact.      Conjunctiva/sclera: Conjunctivae normal.      Pupils: Pupils are equal, round, and reactive to light.   Cardiovascular:      Rate and Rhythm: Normal rate and regular rhythm.      Pulses: Normal pulses.      Heart sounds: No murmur heard.     Pulmonary:      Effort: Pulmonary effort is normal.      Breath sounds: Normal breath sounds. No wheezing.   Abdominal:      General: Bowel sounds are normal.      Palpations: Abdomen is soft.      Tenderness: There is no abdominal tenderness. There is no guarding or rebound.   Musculoskeletal:         General: No swelling or tenderness. Normal range of motion.      Cervical back: Normal range of motion and neck supple. No muscular tenderness.      Comments: Bilateral lower extremities with wound vacs and dressings in place   Skin:     General: Skin is warm and dry.      Capillary Refill: Capillary refill takes less than 2 seconds.      Findings: No  bruising, erythema or rash.   Neurological:      General: No focal deficit present.      Mental Status: He is alert and oriented to person, place, and time.   Psychiatric:         Mood and Affect: Mood normal.         Thought Content: Thought content normal.                   Patient was seen and examined at bedside. No changes in physical exam from prior evaluation.      Fluids    Intake/Output Summary (Last 24 hours) at 10/17/2021 1339  Last data filed at 10/17/2021 0857  Gross per 24 hour   Intake 118 ml   Output --   Net 118 ml       Laboratory  Recent Labs     10/15/21  0257   WBC 7.1   RBC 3.98*   HEMOGLOBIN 12.0*   HEMATOCRIT 36.2*   MCV 91.0   MCH 30.2   MCHC 33.1*   RDW 45.1   PLATELETCT 256   MPV 10.2     Recent Labs     10/15/21  0257 10/16/21  0907 10/17/21  0851   SODIUM 140  139 133* 127*   POTASSIUM 5.4  5.3 4.6 4.6   CHLORIDE 103  103 99 101   CO2 26  28 26 18*   GLUCOSE 183*  188* 164* 143*   BUN 27*  27* 18 19   CREATININE 1.20  1.30 0.97 0.98   CALCIUM 9.4  9.3 9.4 9.3                   Imaging  CT-CTA AORTA-RO WITH & W/O-POST PROCESS   Final Result      1.  Occlusion of the distal right femoral artery with reconstitution distally.      2.  Calcified plaque in small diameter of the tibial and peroneal arteries bilaterally makes it difficult to evaluate for degree of stenosis and occlusion.      3.  Plaque within the left femoral artery without evidence of occlusion.      4.  Aortic atherosclerosis without aneurysm.      5.  Diverticulosis without evidence of diverticulitis.      6.  Hepatic steatosis.      DX-FOOT-COMPLETE 3+ LEFT   Final Result         1.  Disuse osteoporosis of the left foot.      2. Large subcutaneous ulcer on the dorsal surface of left foot.      3. Previous amputations of the left second through fourth toes.      DX-FOOT-COMPLETE 3+ RIGHT   Final Result      1.  No fracture or dislocation of RIGHT foot.   2.  Periarticular osteopenia suggests inflammatory arthropathy.    3.  No soft tissue gas or focal bony destruction however osteomyelitis is not excluded by plain film.   4.  Severe degenerative change of ankle joint.      DX-TIBIA AND FIBULA RIGHT   Final Result      No evidence of fracture or dislocation or acute osteomyelitis.      US-EXTREMITY ARTERY LOWER BILAT   Final Result      US-MIA SINGLE LEVEL BILAT   Final Result      US-EXTREMITY VENOUS LOWER UNILAT LEFT   Final Result      DX-TIBIA AND FIBULA LEFT   Final Result         1.  No acute traumatic bony injury.   2.  Soft tissue erosion of the posterior mid calf, corresponding with history of soft tissue ulcers.      IR-EXTREMITY ANGIOGRAM-BILATERAL    (Results Pending)        Assessment/Plan  * Diabetic infection of left foot (HCC)- (present on admission)  Assessment & Plan  Patient has known history of uncontrolled type 2 diabetes, he has had prior admissions for cellulitis and osteomyelitis.  Patient does have history of PAD.  Venous dopplers negative for DVT   Arterial Dopplers - inflow arterial insufficiency in lower extremities bilaterally  X-ray of the left tib-fib, no concern for osteomyelitis.    Wound care consulted. LPS following.  S/p revasculazization, debridement on 10/12    Wound culture MRSA, GBS  Keflex, bactrim    Hypotension  Assessment & Plan  Asymptomatic  Requiring intermittent IVF bolus  AM Cortisol normal    Diabetic infection of right foot (HCC)- (present on admission)  Assessment & Plan  As per left lower extremity plan    Sepsis- (present on admission)  Assessment & Plan  This is Sepsis Present on admission  SIRS criteria identified on my evaluation include: Tachycardia, with heart rate greater than 90 BPM, Tachypnea, with respirations greater than 20 per minute and Bandemia, greater than 10% bands  Source is diabetic infected ulcers and cellulitis of Lower extremities  Sepsis protocol initiated  Fluid resuscitation ordered per protocol  IV antibiotics as appropriate for source of sepsis  While  organ dysfunction may be noted elsewhere in this problem list or in the chart, degree of organ dysfunction does not meet CMS criteria for severe sepsis      Uncontrolled type 2 diabetes mellitus (HCC)- (present on admission)  Assessment & Plan  ISS + Basal  A1c 9.1  Hypoglycemia protocol    Coronary artery disease involving native coronary artery - lexiscan 2/14/2020 - EF 60%  with fixed prominent defecct of interior and lateral walls, no stress induced ischemia, mild global hypokinesis- (present on admission)  Assessment & Plan  Continue with aspirin and statin therapy    Peripheral artery disease (HCC) - (present on admission)  Assessment & Plan  Patient does have history of PAD, previous MIA was performed in June 2021, showed mild to moderate disease. Repeat MIA noted significant worsening disease.    Vascular surgery consulted,s/p CTA today, plans for revascularization prior to surgical intervention for the wounds, of LLE 10/11/2021    OR 10/12 for revascularization    Peripheral neuropathy (HCC)- (present on admission)  Assessment & Plan  Cont home pain medications    Hyperlipidemia- (present on admission)  Assessment & Plan  F/u lipid panel outpatient       VTE prophylaxis: SCDs/TEDs and enoxaparin ppx    I have performed a physical exam and reviewed and updated ROS and Plan today (10/17/2021). In review of yesterday's note (10/16/2021), there are no changes except as documented above.

## 2021-10-18 ENCOUNTER — PATIENT OUTREACH (OUTPATIENT)
Dept: HEALTH INFORMATION MANAGEMENT | Facility: OTHER | Age: 71
End: 2021-10-18

## 2021-10-18 LAB
GLUCOSE BLD-MCNC: 172 MG/DL (ref 65–99)
GLUCOSE BLD-MCNC: 191 MG/DL (ref 65–99)
GLUCOSE BLD-MCNC: 196 MG/DL (ref 65–99)
GLUCOSE BLD-MCNC: 231 MG/DL (ref 65–99)

## 2021-10-18 PROCEDURE — 700102 HCHG RX REV CODE 250 W/ 637 OVERRIDE(OP): Performed by: STUDENT IN AN ORGANIZED HEALTH CARE EDUCATION/TRAINING PROGRAM

## 2021-10-18 PROCEDURE — A9270 NON-COVERED ITEM OR SERVICE: HCPCS | Performed by: GENERAL PRACTICE

## 2021-10-18 PROCEDURE — 770001 HCHG ROOM/CARE - MED/SURG/GYN PRIV*

## 2021-10-18 PROCEDURE — 82962 GLUCOSE BLOOD TEST: CPT | Mod: 91

## 2021-10-18 PROCEDURE — 700102 HCHG RX REV CODE 250 W/ 637 OVERRIDE(OP): Performed by: GENERAL PRACTICE

## 2021-10-18 PROCEDURE — A9270 NON-COVERED ITEM OR SERVICE: HCPCS | Performed by: INTERNAL MEDICINE

## 2021-10-18 PROCEDURE — 700102 HCHG RX REV CODE 250 W/ 637 OVERRIDE(OP): Performed by: SURGERY

## 2021-10-18 PROCEDURE — 99233 SBSQ HOSP IP/OBS HIGH 50: CPT | Performed by: INTERNAL MEDICINE

## 2021-10-18 PROCEDURE — A9270 NON-COVERED ITEM OR SERVICE: HCPCS | Performed by: SURGERY

## 2021-10-18 PROCEDURE — 700111 HCHG RX REV CODE 636 W/ 250 OVERRIDE (IP): Performed by: GENERAL PRACTICE

## 2021-10-18 PROCEDURE — A9270 NON-COVERED ITEM OR SERVICE: HCPCS | Performed by: STUDENT IN AN ORGANIZED HEALTH CARE EDUCATION/TRAINING PROGRAM

## 2021-10-18 PROCEDURE — 700105 HCHG RX REV CODE 258: Performed by: HOSPITALIST

## 2021-10-18 PROCEDURE — 700102 HCHG RX REV CODE 250 W/ 637 OVERRIDE(OP): Performed by: INTERNAL MEDICINE

## 2021-10-18 PROCEDURE — 700105 HCHG RX REV CODE 258: Performed by: NURSE PRACTITIONER

## 2021-10-18 RX ORDER — BISACODYL 5 MG
10 TABLET, DELAYED RELEASE (ENTERIC COATED) ORAL 2 TIMES DAILY
Status: DISCONTINUED | OUTPATIENT
Start: 2021-10-18 | End: 2021-10-24

## 2021-10-18 RX ORDER — SODIUM CHLORIDE 9 MG/ML
1000 INJECTION, SOLUTION INTRAVENOUS ONCE
Status: COMPLETED | OUTPATIENT
Start: 2021-10-18 | End: 2021-10-18

## 2021-10-18 RX ORDER — SULFAMETHOXAZOLE AND TRIMETHOPRIM 800; 160 MG/1; MG/1
1 TABLET ORAL EVERY 12 HOURS
Qty: 8 TABLET | Refills: 0 | Status: SHIPPED | OUTPATIENT
Start: 2021-10-18 | End: 2021-10-30

## 2021-10-18 RX ORDER — SODIUM CHLORIDE, SODIUM LACTATE, POTASSIUM CHLORIDE, AND CALCIUM CHLORIDE .6; .31; .03; .02 G/100ML; G/100ML; G/100ML; G/100ML
1000 INJECTION, SOLUTION INTRAVENOUS ONCE
Status: COMPLETED | OUTPATIENT
Start: 2021-10-18 | End: 2021-10-18

## 2021-10-18 RX ORDER — CARVEDILOL 6.25 MG/1
6.25 TABLET ORAL 2 TIMES DAILY WITH MEALS
Status: DISCONTINUED | OUTPATIENT
Start: 2021-10-19 | End: 2021-10-19

## 2021-10-18 RX ORDER — CEPHALEXIN 500 MG/1
1000 CAPSULE ORAL 3 TIMES DAILY
Qty: 24 CAPSULE | Refills: 0 | Status: SHIPPED | OUTPATIENT
Start: 2021-10-18 | End: 2021-10-30

## 2021-10-18 RX ORDER — FUROSEMIDE 40 MG/1
40 TABLET ORAL DAILY
Status: DISCONTINUED | OUTPATIENT
Start: 2021-10-19 | End: 2021-10-30 | Stop reason: HOSPADM

## 2021-10-18 RX ORDER — CLOPIDOGREL BISULFATE 75 MG/1
75 TABLET ORAL DAILY
Qty: 30 TABLET | Status: ON HOLD
Start: 2021-10-19 | End: 2022-05-18

## 2021-10-18 RX ADMIN — OXYCODONE HYDROCHLORIDE 10 MG: 10 TABLET ORAL at 09:09

## 2021-10-18 RX ADMIN — OXYCODONE HYDROCHLORIDE 10 MG: 10 TABLET ORAL at 17:44

## 2021-10-18 RX ADMIN — CEPHALEXIN 1000 MG: 500 CAPSULE ORAL at 09:09

## 2021-10-18 RX ADMIN — TRAZODONE HYDROCHLORIDE 450 MG: 150 TABLET ORAL at 21:00

## 2021-10-18 RX ADMIN — DICLOFENAC SODIUM 50 MG: 25 TABLET, DELAYED RELEASE ORAL at 09:09

## 2021-10-18 RX ADMIN — GABAPENTIN 300 MG: 300 CAPSULE ORAL at 06:13

## 2021-10-18 RX ADMIN — INSULIN LISPRO 2 UNITS: 100 INJECTION, SOLUTION INTRAVENOUS; SUBCUTANEOUS at 07:45

## 2021-10-18 RX ADMIN — ENOXAPARIN SODIUM 40 MG: 40 INJECTION SUBCUTANEOUS at 06:13

## 2021-10-18 RX ADMIN — INSULIN LISPRO 2 UNITS: 100 INJECTION, SOLUTION INTRAVENOUS; SUBCUTANEOUS at 16:49

## 2021-10-18 RX ADMIN — LISINOPRIL 5 MG: 5 TABLET ORAL at 06:14

## 2021-10-18 RX ADMIN — SODIUM CHLORIDE, POTASSIUM CHLORIDE, SODIUM LACTATE AND CALCIUM CHLORIDE 1000 ML: 600; 310; 30; 20 INJECTION, SOLUTION INTRAVENOUS at 03:44

## 2021-10-18 RX ADMIN — DICLOFENAC SODIUM 50 MG: 25 TABLET, DELAYED RELEASE ORAL at 21:00

## 2021-10-18 RX ADMIN — OXYCODONE HYDROCHLORIDE 10 MG: 10 TABLET ORAL at 21:53

## 2021-10-18 RX ADMIN — POTASSIUM CHLORIDE 40 MEQ: 1500 TABLET, EXTENDED RELEASE ORAL at 06:13

## 2021-10-18 RX ADMIN — NICOTINE 14 MG: 14 PATCH TRANSDERMAL at 06:13

## 2021-10-18 RX ADMIN — SULFAMETHOXAZOLE AND TRIMETHOPRIM 1 TABLET: 800; 160 TABLET ORAL at 17:43

## 2021-10-18 RX ADMIN — INSULIN LISPRO 2 UNITS: 100 INJECTION, SOLUTION INTRAVENOUS; SUBCUTANEOUS at 11:33

## 2021-10-18 RX ADMIN — ACETAMINOPHEN 650 MG: 325 TABLET ORAL at 06:13

## 2021-10-18 RX ADMIN — CLOPIDOGREL BISULFATE 75 MG: 75 TABLET ORAL at 06:13

## 2021-10-18 RX ADMIN — GABAPENTIN 300 MG: 300 CAPSULE ORAL at 17:43

## 2021-10-18 RX ADMIN — ASPIRIN 81 MG: 81 TABLET, COATED ORAL at 06:13

## 2021-10-18 RX ADMIN — CEPHALEXIN 1000 MG: 500 CAPSULE ORAL at 15:29

## 2021-10-18 RX ADMIN — INSULIN LISPRO 3 UNITS: 100 INJECTION, SOLUTION INTRAVENOUS; SUBCUTANEOUS at 20:09

## 2021-10-18 RX ADMIN — SODIUM CHLORIDE 1000 ML: 9 INJECTION, SOLUTION INTRAVENOUS at 20:26

## 2021-10-18 RX ADMIN — ACETAMINOPHEN 650 MG: 325 TABLET ORAL at 20:08

## 2021-10-18 RX ADMIN — SULFAMETHOXAZOLE AND TRIMETHOPRIM 1 TABLET: 800; 160 TABLET ORAL at 06:13

## 2021-10-18 RX ADMIN — CEPHALEXIN 1000 MG: 500 CAPSULE ORAL at 21:00

## 2021-10-18 ASSESSMENT — PAIN DESCRIPTION - PAIN TYPE
TYPE: ACUTE PAIN

## 2021-10-18 ASSESSMENT — ENCOUNTER SYMPTOMS
MYALGIAS: 0
DEPRESSION: 0
SHORTNESS OF BREATH: 0
SEIZURES: 0
CHILLS: 0
NAUSEA: 0
PALPITATIONS: 0
SORE THROAT: 0
VOMITING: 0
FEVER: 0
BLURRED VISION: 0
COUGH: 0
ABDOMINAL PAIN: 0
LOSS OF CONSCIOUSNESS: 0

## 2021-10-18 NOTE — DISCHARGE PLANNING
Anticipated Discharge Disposition: LTAC vs SNF     Action: Referral placed to LTAC's- [facility], [facility], and [facility] on 10/16. Epic showing that all referrals are still pending. LSW staffed with Shaista AGUILAR and requested f/u with referrals.     SNF referrals also placed for pt.   Pt declined by-   [facility]- care exceeds capacity  [facility]- no d/c plan  [facility]- care exceeds capacity  [facility]- current drug/alcohol use     SNF referrals pending-   [facility], [facility], [facility]     Barriers to Discharge: placement     Plan: Await LTAC acceptance, Await SNF acceptance, LSW to continue to follow for d/c needs       Care Transition Team Assessment    Information Source  Orientation Level: Oriented X4  Information Given By: Other (Comments)  Who is responsible for making decisions for patient? : Patient    Elopement Risk  Legal Hold: No  Ambulatory or Self Mobile in Wheelchair: No-Not an Elopement Risk  Disoriented: No  Elopement Risk: Not at Risk for Elopement    Interdisciplinary Discharge Planning  Lives with - Patient's Self Care Capacity: Alone and Able to Care For Self  Housing / Facility: Mobile Home  Prior Services: None, Home-Independent    Discharge Preparedness  What is your plan after discharge?: Other (comment)  What are your discharge supports?: Sibling  Prior Functional Level: Independent with Activities of Daily Living, Independent with Medication Management    Functional Assesment  Prior Functional Level: Independent with Activities of Daily Living, Independent with Medication Management    Finances  Financial Barriers to Discharge: No  Prescription Coverage: Yes    Vision / Hearing Impairment  Right Eye Vision: Impaired, Wears Glasses  Left Eye Vision: Impaired, Wears Glasses  Hearing Impairment: Both Ears    Advance Directive  Advance Directive?: None    Domestic Abuse  Have you ever been the victim of abuse or violence?: No    Psychological  Assessment  History of Substance Abuse: None  History of Psychiatric Problems: No    Discharge Risks or Barriers  Discharge risks or barriers?: No  Patient risk factors: Vulnerable adult    Anticipated Discharge Information  Discharge Disposition: D/T to Medicare cert LTCH w/planned hosp IP readmit (91)

## 2021-10-18 NOTE — PROGRESS NOTES
Hospital Medicine Daily Progress Note    Date of Service  10/18/2021    Chief Complaint  Luis Kellogg is a 71 y.o. male admitted 10/7/2021 with bilateral leg wounds    Hospital Course  This is a 71 year old male with PMHx of hyperlipidemia, type 2 diabetes with A1c of 9.1, obesity, CAD, hx MI,PAD, history of lower extremity osteomyelitis and recurrent LLE cellulitis who was admitted for sepsis secondary to BLE diabetic, nonhealing wounds and cellulitis.    Patient has known history of uncontrolled type 2 diabetes, he has had prior admissions for cellulitis and osteomyelitis.  Patient does have history of PAD.  Venous dopplers negative for DVT and arterial Dopplers ordered.  X-ray of the left tib-fib, no concern for osteomyelitis.  Wound care consulted. LPS consulted.    Wound cultures from March 20, 2021, noted E. Faecalis sensitive to ampicillin, Cipro, Levaquin and vancomycin.  Patient is currently on vancomycin only as per wound culture results.    Interval Problem Update  Patient was seen and examined at bedside.  No acute events overnight. Patient is resting comfortably in bed and in no acute distress.     S/p revascularization and debridement of lower extremity wounds  LPS following  Wound vac in place  Wound culture: GBS and MRSA  Keflex/Bactrim  SNF placement    I have personally seen and examined the patient at bedside. I discussed the plan of care with patient and bedside RN.    Consultants/Specialty  LPS   Vascular surgery    Code Status  Full Code    Disposition  Patient is not medically cleared.   Anticipate discharge to TBD.  I have placed the appropriate orders for post-discharge needs.    Review of Systems  Review of Systems   Constitutional: Negative for chills and fever.   HENT: Negative for congestion and sore throat.    Eyes: Negative for blurred vision.   Respiratory: Negative for cough and shortness of breath.    Cardiovascular: Negative for chest pain and palpitations.   Gastrointestinal:  Negative for abdominal pain, nausea and vomiting.   Genitourinary: Negative for dysuria and frequency.   Musculoskeletal: Positive for joint pain. Negative for myalgias.   Neurological: Negative for seizures and loss of consciousness.   Psychiatric/Behavioral: Negative for depression.     Physical Exam  Temp:  [36.3 °C (97.4 °F)-36.4 °C (97.6 °F)] 36.3 °C (97.4 °F)  Pulse:  [71-91] 77  Resp:  [16-18] 17  BP: ()/(44-71) 118/71  SpO2:  [95 %] 95 %    Physical Exam  Vitals and nursing note reviewed.   Constitutional:       General: He is not in acute distress.     Appearance: He is obese. He is not toxic-appearing.   HENT:      Head: Normocephalic and atraumatic.      Right Ear: External ear normal.      Left Ear: External ear normal.      Nose: No congestion.      Mouth/Throat:      Pharynx: No oropharyngeal exudate.   Eyes:      General: No scleral icterus.     Extraocular Movements: Extraocular movements intact.      Conjunctiva/sclera: Conjunctivae normal.      Pupils: Pupils are equal, round, and reactive to light.   Cardiovascular:      Rate and Rhythm: Normal rate and regular rhythm.      Pulses: Normal pulses.      Heart sounds: No murmur heard.     Pulmonary:      Effort: Pulmonary effort is normal.      Breath sounds: Normal breath sounds. No wheezing.   Abdominal:      General: Bowel sounds are normal.      Palpations: Abdomen is soft.      Tenderness: There is no abdominal tenderness. There is no guarding or rebound.   Musculoskeletal:         General: No swelling or tenderness. Normal range of motion.      Cervical back: Normal range of motion and neck supple. No muscular tenderness.      Comments: Bilateral lower extremities with wound vacs and dressings in place   Skin:     General: Skin is warm and dry.      Capillary Refill: Capillary refill takes less than 2 seconds.      Findings: No bruising, erythema or rash.   Neurological:      General: No focal deficit present.      Mental Status: He is  alert and oriented to person, place, and time.   Psychiatric:         Mood and Affect: Mood normal.         Thought Content: Thought content normal.                 Patient was seen and examined at bedside. No changes in physical exam from prior evaluation.      Fluids    Intake/Output Summary (Last 24 hours) at 10/18/2021 1518  Last data filed at 10/18/2021 0900  Gross per 24 hour   Intake 360 ml   Output --   Net 360 ml       Laboratory      Recent Labs     10/16/21  0907 10/17/21  0851   SODIUM 133* 127*   POTASSIUM 4.6 4.6   CHLORIDE 99 101   CO2 26 18*   GLUCOSE 164* 143*   BUN 18 19   CREATININE 0.97 0.98   CALCIUM 9.4 9.3                   Imaging  CT-CTA AORTA-RO WITH & W/O-POST PROCESS   Final Result      1.  Occlusion of the distal right femoral artery with reconstitution distally.      2.  Calcified plaque in small diameter of the tibial and peroneal arteries bilaterally makes it difficult to evaluate for degree of stenosis and occlusion.      3.  Plaque within the left femoral artery without evidence of occlusion.      4.  Aortic atherosclerosis without aneurysm.      5.  Diverticulosis without evidence of diverticulitis.      6.  Hepatic steatosis.      DX-FOOT-COMPLETE 3+ LEFT   Final Result         1.  Disuse osteoporosis of the left foot.      2. Large subcutaneous ulcer on the dorsal surface of left foot.      3. Previous amputations of the left second through fourth toes.      DX-FOOT-COMPLETE 3+ RIGHT   Final Result      1.  No fracture or dislocation of RIGHT foot.   2.  Periarticular osteopenia suggests inflammatory arthropathy.   3.  No soft tissue gas or focal bony destruction however osteomyelitis is not excluded by plain film.   4.  Severe degenerative change of ankle joint.      DX-TIBIA AND FIBULA RIGHT   Final Result      No evidence of fracture or dislocation or acute osteomyelitis.      US-EXTREMITY ARTERY LOWER BILAT   Final Result      US-MIA SINGLE LEVEL BILAT   Final Result       US-EXTREMITY VENOUS LOWER UNILAT LEFT   Final Result      DX-TIBIA AND FIBULA LEFT   Final Result         1.  No acute traumatic bony injury.   2.  Soft tissue erosion of the posterior mid calf, corresponding with history of soft tissue ulcers.      IR-EXTREMITY ANGIOGRAM-BILATERAL    (Results Pending)        Assessment/Plan  * Diabetic infection of left foot (HCC)- (present on admission)  Assessment & Plan  Patient has known history of uncontrolled type 2 diabetes, he has had prior admissions for cellulitis and osteomyelitis.  Patient does have history of PAD.  Venous dopplers negative for DVT   Arterial Dopplers - inflow arterial insufficiency in lower extremities bilaterally  X-ray of the left tib-fib, no concern for osteomyelitis.    Wound care consulted. LPS following.  S/p revasculazization, debridement on 10/12    Wound culture MRSA, GBS  Keflex, bactrim    Diabetic infection of right foot (HCC)- (present on admission)  Assessment & Plan  As per left lower extremity plan    Peripheral artery disease (HCC) - (present on admission)  Assessment & Plan  Patient does have history of PAD, previous MIA was performed in June 2021, showed mild to moderate disease. Repeat MIA noted significant worsening disease.    Vascular surgery consulted,s/p CTA today, plans for revascularization prior to surgical intervention for the wounds, of LLE 10/11/2021    S/p OR 10/12 for revascularization    Uncontrolled type 2 diabetes mellitus (HCC)- (present on admission)  Assessment & Plan  ISS + Basal  A1c 9.1  Hypoglycemia protocol    Coronary artery disease involving native coronary artery - lexiscan 2/14/2020 - EF 60%  with fixed prominent defecct of interior and lateral walls, no stress induced ischemia, mild global hypokinesis- (present on admission)  Assessment & Plan  Continue with aspirin and statin therapy    Hypotension  Assessment & Plan  Asymptomatic  Requiring intermittent IVF bolus  AM Cortisol normal    Sepsis-  (present on admission)  Assessment & Plan  This is Sepsis Present on admission  SIRS criteria identified on my evaluation include: Tachycardia, with heart rate greater than 90 BPM, Tachypnea, with respirations greater than 20 per minute and Bandemia, greater than 10% bands  Source is diabetic infected ulcers and cellulitis of Lower extremities  Sepsis protocol initiated  Fluid resuscitation ordered per protocol  IV antibiotics as appropriate for source of sepsis  While organ dysfunction may be noted elsewhere in this problem list or in the chart, degree of organ dysfunction does not meet CMS criteria for severe sepsis      Peripheral neuropathy (HCC)- (present on admission)  Assessment & Plan  neurontin    Hyperlipidemia- (present on admission)  Assessment & Plan  F/u lipid panel outpatient       VTE prophylaxis: SCDs/TEDs and enoxaparin ppx    I have performed a physical exam and reviewed and updated ROS and Plan today (10/18/2021). In review of yesterday's note (10/17/2021), there are no changes except as documented above.

## 2021-10-18 NOTE — CARE PLAN
The patient is Stable - Low risk of patient condition declining or worsening    Shift Goals  Clinical Goals: Pain mgmt; Have BM  Patient Goals: pain mgmt; have BM    Progress made toward(s) clinical / shift goals:  Pt reports 8/10 pain in Left leg. Medicated per MAR. Pt has not had bowel movement since 10/11 per pt. Gave PRN laxatives. Awaiting BM.      Problem: Knowledge Deficit - Standard  Goal: Patient and family/care givers will demonstrate understanding of plan of care, disease process/condition, diagnostic tests and medications  Outcome: Progressing     Problem: Pain - Standard  Goal: Alleviation of pain or a reduction in pain to the patient’s comfort goal  Outcome: Progressing       Patient is not progressing towards the following goals:

## 2021-10-18 NOTE — DISCHARGE PLANNING
Agency/Facility Name: [facility]  Spoke To: Elizabeth  Outcome: Referral received.  In review and pending bed availability    [facility] Name[facility]  Outcome: Left a message.  Awaiting a call back.    [facility]ility Name: [facility]  Spoke To: Leonor  Outcome: Does not meet criteria for LTAC. Wounds don't have wound depth, must be 2 cm deep, antibiotic are PO, must be IV antibiotics and long term.

## 2021-10-18 NOTE — PROGRESS NOTES
Assumed care of pt at 1900. Report received and bedside rounding completed with night RN. Pt is sitting up in bed calm, no SOB, no acute distress noted. AOx4.  Denies N,V. Pt rates pain 8/10. Medicated per MAR  Last BM: 10/11 per pt. +flatus  Dressings/Drains: BLE wound vacs CDI  Call light and pt belongings within reach - hourly rounding in place. See flowsheets for further assessment.     Pt is considered a HIGH fall risk. edu provided on risk level.   Fall precautions in place,  bed alarm on. - Treaded non slip socks. Bed locked. Communication board updated with POC.

## 2021-10-19 LAB
ALBUMIN SERPL BCP-MCNC: 3.5 G/DL (ref 3.2–4.9)
ANION GAP SERPL CALC-SCNC: 9 MMOL/L (ref 7–16)
BASOPHILS # BLD AUTO: 0.8 % (ref 0–1.8)
BASOPHILS # BLD: 0.05 K/UL (ref 0–0.12)
BUN SERPL-MCNC: 23 MG/DL (ref 8–22)
CALCIUM SERPL-MCNC: 9.1 MG/DL (ref 8.5–10.5)
CHLORIDE SERPL-SCNC: 102 MMOL/L (ref 96–112)
CO2 SERPL-SCNC: 24 MMOL/L (ref 20–33)
CREAT SERPL-MCNC: 1.14 MG/DL (ref 0.5–1.4)
EOSINOPHIL # BLD AUTO: 0.17 K/UL (ref 0–0.51)
EOSINOPHIL NFR BLD: 2.7 % (ref 0–6.9)
ERYTHROCYTE [DISTWIDTH] IN BLOOD BY AUTOMATED COUNT: 45.2 FL (ref 35.9–50)
GLUCOSE BLD-MCNC: 131 MG/DL (ref 65–99)
GLUCOSE BLD-MCNC: 164 MG/DL (ref 65–99)
GLUCOSE BLD-MCNC: 178 MG/DL (ref 65–99)
GLUCOSE BLD-MCNC: 195 MG/DL (ref 65–99)
GLUCOSE SERPL-MCNC: 138 MG/DL (ref 65–99)
HCT VFR BLD AUTO: 37.7 % (ref 42–52)
HGB BLD-MCNC: 12.1 G/DL (ref 14–18)
IMM GRANULOCYTES # BLD AUTO: 0.03 K/UL (ref 0–0.11)
IMM GRANULOCYTES NFR BLD AUTO: 0.5 % (ref 0–0.9)
LYMPHOCYTES # BLD AUTO: 1.46 K/UL (ref 1–4.8)
LYMPHOCYTES NFR BLD: 23.2 % (ref 22–41)
MAGNESIUM SERPL-MCNC: 2.7 MG/DL (ref 1.5–2.5)
MCH RBC QN AUTO: 29.2 PG (ref 27–33)
MCHC RBC AUTO-ENTMCNC: 32.1 G/DL (ref 33.7–35.3)
MCV RBC AUTO: 91.1 FL (ref 81.4–97.8)
MONOCYTES # BLD AUTO: 0.82 K/UL (ref 0–0.85)
MONOCYTES NFR BLD AUTO: 13.1 % (ref 0–13.4)
NEUTROPHILS # BLD AUTO: 3.75 K/UL (ref 1.82–7.42)
NEUTROPHILS NFR BLD: 59.7 % (ref 44–72)
NRBC # BLD AUTO: 0 K/UL
NRBC BLD-RTO: 0 /100 WBC
PHOSPHATE SERPL-MCNC: 3.4 MG/DL (ref 2.5–4.5)
PLATELET # BLD AUTO: 271 K/UL (ref 164–446)
PMV BLD AUTO: 9.9 FL (ref 9–12.9)
POTASSIUM SERPL-SCNC: 5.1 MMOL/L (ref 3.6–5.5)
RBC # BLD AUTO: 4.14 M/UL (ref 4.7–6.1)
SODIUM SERPL-SCNC: 135 MMOL/L (ref 135–145)
WBC # BLD AUTO: 6.3 K/UL (ref 4.8–10.8)

## 2021-10-19 PROCEDURE — 80069 RENAL FUNCTION PANEL: CPT

## 2021-10-19 PROCEDURE — A9270 NON-COVERED ITEM OR SERVICE: HCPCS | Performed by: INTERNAL MEDICINE

## 2021-10-19 PROCEDURE — 770001 HCHG ROOM/CARE - MED/SURG/GYN PRIV*

## 2021-10-19 PROCEDURE — 83735 ASSAY OF MAGNESIUM: CPT

## 2021-10-19 PROCEDURE — 700101 HCHG RX REV CODE 250: Performed by: INTERNAL MEDICINE

## 2021-10-19 PROCEDURE — 99233 SBSQ HOSP IP/OBS HIGH 50: CPT | Performed by: STUDENT IN AN ORGANIZED HEALTH CARE EDUCATION/TRAINING PROGRAM

## 2021-10-19 PROCEDURE — 97535 SELF CARE MNGMENT TRAINING: CPT | Mod: CO

## 2021-10-19 PROCEDURE — 85025 COMPLETE CBC W/AUTO DIFF WBC: CPT

## 2021-10-19 PROCEDURE — 302098 PASTE RING (FLAT): Performed by: STUDENT IN AN ORGANIZED HEALTH CARE EDUCATION/TRAINING PROGRAM

## 2021-10-19 PROCEDURE — 97608 NEG PRS WND THER NDME>50SQCM: CPT

## 2021-10-19 PROCEDURE — A9270 NON-COVERED ITEM OR SERVICE: HCPCS | Performed by: STUDENT IN AN ORGANIZED HEALTH CARE EDUCATION/TRAINING PROGRAM

## 2021-10-19 PROCEDURE — 36415 COLL VENOUS BLD VENIPUNCTURE: CPT

## 2021-10-19 PROCEDURE — 82962 GLUCOSE BLOOD TEST: CPT | Mod: 91

## 2021-10-19 PROCEDURE — A9270 NON-COVERED ITEM OR SERVICE: HCPCS | Performed by: GENERAL PRACTICE

## 2021-10-19 PROCEDURE — 700111 HCHG RX REV CODE 636 W/ 250 OVERRIDE (IP): Performed by: GENERAL PRACTICE

## 2021-10-19 PROCEDURE — 97598 DBRDMT OPN WND ADDL 20CM/<: CPT

## 2021-10-19 PROCEDURE — 97530 THERAPEUTIC ACTIVITIES: CPT | Mod: CO

## 2021-10-19 PROCEDURE — 700102 HCHG RX REV CODE 250 W/ 637 OVERRIDE(OP): Performed by: STUDENT IN AN ORGANIZED HEALTH CARE EDUCATION/TRAINING PROGRAM

## 2021-10-19 PROCEDURE — 700102 HCHG RX REV CODE 250 W/ 637 OVERRIDE(OP): Performed by: SURGERY

## 2021-10-19 PROCEDURE — 97530 THERAPEUTIC ACTIVITIES: CPT

## 2021-10-19 PROCEDURE — A9270 NON-COVERED ITEM OR SERVICE: HCPCS | Performed by: SURGERY

## 2021-10-19 PROCEDURE — 700102 HCHG RX REV CODE 250 W/ 637 OVERRIDE(OP): Performed by: GENERAL PRACTICE

## 2021-10-19 PROCEDURE — 700102 HCHG RX REV CODE 250 W/ 637 OVERRIDE(OP): Performed by: INTERNAL MEDICINE

## 2021-10-19 PROCEDURE — 700111 HCHG RX REV CODE 636 W/ 250 OVERRIDE (IP): Performed by: INTERNAL MEDICINE

## 2021-10-19 PROCEDURE — 97597 DBRDMT OPN WND 1ST 20 CM/<: CPT

## 2021-10-19 RX ORDER — BISACODYL 5 MG
10 TABLET, DELAYED RELEASE (ENTERIC COATED) ORAL
Status: DISCONTINUED | OUTPATIENT
Start: 2021-10-19 | End: 2021-10-27

## 2021-10-19 RX ORDER — CARVEDILOL 3.12 MG/1
3.12 TABLET ORAL 2 TIMES DAILY WITH MEALS
Status: DISCONTINUED | OUTPATIENT
Start: 2021-10-19 | End: 2021-10-24

## 2021-10-19 RX ADMIN — OXYCODONE HYDROCHLORIDE 20 MG: 10 TABLET ORAL at 21:08

## 2021-10-19 RX ADMIN — OXYCODONE HYDROCHLORIDE 20 MG: 10 TABLET ORAL at 16:34

## 2021-10-19 RX ADMIN — INSULIN LISPRO 2 UNITS: 100 INJECTION, SOLUTION INTRAVENOUS; SUBCUTANEOUS at 12:03

## 2021-10-19 RX ADMIN — TRAZODONE HYDROCHLORIDE 450 MG: 150 TABLET ORAL at 21:08

## 2021-10-19 RX ADMIN — CEPHALEXIN 1000 MG: 500 CAPSULE ORAL at 21:08

## 2021-10-19 RX ADMIN — OXYCODONE HYDROCHLORIDE 20 MG: 10 TABLET ORAL at 09:39

## 2021-10-19 RX ADMIN — OXYCODONE HYDROCHLORIDE 20 MG: 10 TABLET ORAL at 12:28

## 2021-10-19 RX ADMIN — DICLOFENAC SODIUM 50 MG: 25 TABLET, DELAYED RELEASE ORAL at 21:08

## 2021-10-19 RX ADMIN — POTASSIUM CHLORIDE 40 MEQ: 1500 TABLET, EXTENDED RELEASE ORAL at 06:13

## 2021-10-19 RX ADMIN — INSULIN LISPRO 2 UNITS: 100 INJECTION, SOLUTION INTRAVENOUS; SUBCUTANEOUS at 16:37

## 2021-10-19 RX ADMIN — INSULIN LISPRO 2 UNITS: 100 INJECTION, SOLUTION INTRAVENOUS; SUBCUTANEOUS at 21:08

## 2021-10-19 RX ADMIN — CEPHALEXIN 1000 MG: 500 CAPSULE ORAL at 14:18

## 2021-10-19 RX ADMIN — NICOTINE 14 MG: 14 PATCH TRANSDERMAL at 06:13

## 2021-10-19 RX ADMIN — GABAPENTIN 300 MG: 300 CAPSULE ORAL at 06:12

## 2021-10-19 RX ADMIN — HYDROXYZINE HYDROCHLORIDE 10 MG: 10 TABLET, FILM COATED ORAL at 23:29

## 2021-10-19 RX ADMIN — DICLOFENAC SODIUM 50 MG: 25 TABLET, DELAYED RELEASE ORAL at 09:40

## 2021-10-19 RX ADMIN — BISACODYL 10 MG: 5 TABLET, COATED ORAL at 06:12

## 2021-10-19 RX ADMIN — BISACODYL 10 MG: 5 TABLET, COATED ORAL at 16:44

## 2021-10-19 RX ADMIN — CLOPIDOGREL BISULFATE 75 MG: 75 TABLET ORAL at 06:18

## 2021-10-19 RX ADMIN — ENOXAPARIN SODIUM 40 MG: 40 INJECTION SUBCUTANEOUS at 06:13

## 2021-10-19 RX ADMIN — OXYCODONE HYDROCHLORIDE 10 MG: 10 TABLET ORAL at 01:05

## 2021-10-19 RX ADMIN — ASPIRIN 81 MG: 81 TABLET, COATED ORAL at 06:12

## 2021-10-19 RX ADMIN — SULFAMETHOXAZOLE AND TRIMETHOPRIM 1 TABLET: 800; 160 TABLET ORAL at 06:12

## 2021-10-19 RX ADMIN — SULFAMETHOXAZOLE AND TRIMETHOPRIM 1 TABLET: 800; 160 TABLET ORAL at 16:33

## 2021-10-19 RX ADMIN — HYDROMORPHONE HYDROCHLORIDE 0.5 MG: 1 INJECTION, SOLUTION INTRAMUSCULAR; INTRAVENOUS; SUBCUTANEOUS at 14:19

## 2021-10-19 RX ADMIN — OXYCODONE HYDROCHLORIDE 10 MG: 10 TABLET ORAL at 04:12

## 2021-10-19 RX ADMIN — LIDOCAINE HYDROCHLORIDE 1 BOTTLE: 40 SOLUTION TOPICAL at 14:42

## 2021-10-19 RX ADMIN — CEPHALEXIN 1000 MG: 500 CAPSULE ORAL at 09:39

## 2021-10-19 RX ADMIN — LISINOPRIL 5 MG: 5 TABLET ORAL at 06:13

## 2021-10-19 RX ADMIN — GABAPENTIN 300 MG: 300 CAPSULE ORAL at 16:34

## 2021-10-19 ASSESSMENT — ENCOUNTER SYMPTOMS
CHILLS: 0
MYALGIAS: 0
VOMITING: 0
SEIZURES: 0
ABDOMINAL PAIN: 0
BLURRED VISION: 0
DEPRESSION: 0
FEVER: 0
COUGH: 0
SHORTNESS OF BREATH: 0
LOSS OF CONSCIOUSNESS: 0
SORE THROAT: 0
PALPITATIONS: 0
NAUSEA: 0

## 2021-10-19 ASSESSMENT — GAIT ASSESSMENTS
DISTANCE (FEET): 25
GAIT LEVEL OF ASSIST: MINIMAL ASSIST
ASSISTIVE DEVICE: FRONT WHEEL WALKER
DISTANCE (FEET): 65
DEVIATION: ANTALGIC;DECREASED HEEL STRIKE;DECREASED TOE OFF

## 2021-10-19 ASSESSMENT — COGNITIVE AND FUNCTIONAL STATUS - GENERAL
HELP NEEDED FOR BATHING: A LITTLE
DRESSING REGULAR LOWER BODY CLOTHING: A LITTLE
DAILY ACTIVITIY SCORE: 19
SUGGESTED CMS G CODE MODIFIER DAILY ACTIVITY: CK
MOVING FROM LYING ON BACK TO SITTING ON SIDE OF FLAT BED: A LITTLE
STANDING UP FROM CHAIR USING ARMS: A LITTLE
WALKING IN HOSPITAL ROOM: A LITTLE
DRESSING REGULAR UPPER BODY CLOTHING: A LITTLE
CLIMB 3 TO 5 STEPS WITH RAILING: A LOT
PERSONAL GROOMING: A LITTLE
MOBILITY SCORE: 19
TOILETING: A LITTLE
SUGGESTED CMS G CODE MODIFIER MOBILITY: CK

## 2021-10-19 ASSESSMENT — PAIN DESCRIPTION - PAIN TYPE
TYPE: ACUTE PAIN

## 2021-10-19 NOTE — WOUND TEAM
[facility]  Inpatient Services   Wound and Skin Care     Admission Date: 10/7/2021     Last order of IP CONSULT TO WOUND CARE was found on 10/12/2021 from Hospital Encounter on 10/7/2021     HPI, PMH, SH: Reviewed    Past Surgical History:   Procedure Laterality Date   • ANGIOGRAM Bilateral 10/12/2021    Procedure: BILATERAL LOWER EXTREMITY ANGIOGRAM;  Surgeon: Valerio Purcell M.D.;  Location: SURGERY [facility];  Service: Vascular   • IRRIGATION & DEBRIDEMENT GENERAL Bilateral 10/12/2021    Procedure: IRRIGATION AND DEBRIDEMENT, WOUND, BILATERAL LOWER EXTREMITY;  Surgeon: Valerio Purcell M.D.;  Location: SURGERY [facility];  Service: Vascular   • APPLICATION OR REPLACEMENT, WOUND VAC Bilateral 10/12/2021    Procedure: APPLICATION WOUND VAC;  Surgeon: Valerio Purcell M.D.;  Location: SURGERY [facility];  Service: Vascular   • STENT PLACEMENT Right 10/12/2021    Procedure: STENT PLACEMENT, RIGHT SUPERFICIAL FEMORAL ARTERY;  Surgeon: Valerio Purcell M.D.;  Location: SURGERY [facility];  Service: Vascular   • TOE AMPUTATION Left 2/17/2020    Procedure: LEFT SECOND, THIRD, AND FORTH TOE AMPUTATION;  Surgeon: Alfonso Esteban M.D.;  Location: SURGERY [facility];  Service: Orthopedics   • VA UNLISTED PROC, ARTHROSCOPY Left 7/25/2019    Procedure: LEFT ENDOSCOPIC ULNAR NERVE DECOMPRESSION, LEFT CARPAL TUNNEL RELEASE;  Surgeon: Red Chacon M.D.;  Location: SURGERY [facility];  Service: Orthopedics   • KNEE REPLACEMENT, TOTAL Bilateral    • OTHER SURGICAL PROCEDURE      back surgery - Unknown      Social History     Tobacco Use   • Smoking status: Current Every Day Smoker     Packs/day: 0.50     Years: 54.00     Pack years: 27.00     Types: Cigarettes   • Smokeless tobacco: Former User   Substance Use Topics   • Alcohol use: Not Currently     Alcohol/week: 0.0 oz     Comment: occas     Chief Complaint   Patient presents with   • Wound Infection     bilateral leg wounds. pt states  he has had them for years but seem to be getting worse      Diagnosis: Cellulitis of left lower extremity [L03.116]    Unit where seen by Wound Team: S623/02     WOUND CONSULT/FOLLOW UP RELATED TO:  Initial vac change     WOUND HISTORY:  72 yo Male underwent Right SFA stent placement and OR debridement on BLE with subsequent vac placement.    WOUND ASSESSMENT/LDA      Negative Pressure Wound Therapy 10/12/21 Other (Comment);Surgical Leg Left (Active)   NPWT Pump Mode / Pressure Setting Continuous;150 mmHg 10/19/21 1600   Dressing Type Black Foam (Veraflo) 10/19/21 1600   Canister Changed No 10/19/21 1600   Output (mL) 0 mL 10/19/21 1600   NEXT Dressing Change/Treatment Date 10/21/21 10/19/21 1600   VAC VeraFlo Irrigant Normal Saline 10/19/21 1600   VAC VeraFlo Soak Time (mins) 6 10/19/21 1600   VAC VeraFlo Instill Volume (ml) 42 10/19/21 1600   VAC VeraFlo - Therapy Time (hrs) 2 10/19/21 1600   VAC VeraFlo Pressure (mm/Hg) 150 mmHg 10/19/21 1600       Negative Pressure Wound Therapy 10/16/21 Right (Active)   NPWT Pump Mode / Pressure Setting 125 mmHg 10/19/21 1600   Dressing Type Black Foam (Veraflo) 10/19/21 1600   Number of Foam Pieces Used 1 10/19/21 1600   Canister Changed No 10/19/21 1600   NEXT Dressing Change/Treatment Date 10/21/21 10/19/21 1600   VAC VeraFlo Irrigant Normal Saline 10/19/21 1600   VAC VeraFlo Soak Time (mins) 6 10/19/21 1600   VAC VeraFlo Instill Volume (ml) 16 10/19/21 1600   VAC VeraFlo - Therapy Time (hrs) 2 10/19/21 1600   VAC VeraFlo Pressure (mm/Hg) 125 mmHg;Continuous 10/19/21 1600            Wound 10/14/21 Full Thickness Wound Leg Lateral Right NPWT veraflo (Active)   Wound Image    10/19/21 1600   Site Assessment Red;Yellow;Shaft 10/19/21 1600   Periwound Assessment Pink;Red 10/19/21 1600   Margins Defined edges;Unattached edges 10/19/21 1600   Closure Secondary intention 10/19/21 1600   Drainage Amount Small 10/19/21 1600   Drainage Description Serosanguineous 10/19/21 1600    Treatments Cleansed;Site care 10/19/21 1600   Wound Cleansing Approved Wound Cleanser 10/19/21 1600   Periwound Protectant Skin Protectant Wipes to Periwound;Paste Ring 10/19/21 1600   Dressing Cleansing/Solutions Normal Saline 10/19/21 1600   Dressing Options Wound Vac 10/19/21 1600   Dressing Changed Changed 10/19/21 1600   Dressing Status Clean;Dry;Intact 10/19/21 1600   Dressing Change/Treatment Frequency Tuesday, Thursday, Saturday, and As Needed 10/19/21 1600   NEXT Dressing Change/Treatment Date 10/21/21 10/19/21 1600   NEXT Weekly Photo (Inpatient Only) 10/21/21 10/19/21 1600   Wound Length (cm) 17 cm 10/19/21 1600   Wound Width (cm) 7.5 cm 10/19/21 1600   Wound Depth (cm) 0.6 cm 10/19/21 1600   Wound Surface Area (cm^2) 127.5 cm^2 10/19/21 1600   Wound Volume (cm^3) 76.5 cm^3 10/19/21 1600   Wound Healing % -6 10/19/21 1600   Wound Bed Granulation (%) 70 % 10/16/21 1500   Wound Bed Slough (%) 30 % 10/16/21 1500   Shape irregular 10/19/21 1600   Wound Odor None 10/19/21 1600   Exposed Structures None 10/19/21 1600   WOUND NURSE ONLY - Time Spent with Patient (mins) 120 10/19/21 1600       Wound 10/14/21 Full Thickness Wound Leg;Foot Lateral;Dorsal Left (Active)   Wound Image      10/19/21 1600   Site Assessment Pink;Red;Yellow 10/19/21 1600   Periwound Assessment Pink;Red 10/19/21 1600   Margins Defined edges 10/19/21 1600   Closure Secondary intention 10/19/21 1600   Drainage Amount Moderate 10/19/21 1600   Drainage Description Serosanguineous 10/19/21 1600   Treatments Cleansed;Site care;CSWD - Conservative Sharp Wound Debridement 10/19/21 1600   Wound Cleansing Approved Wound Cleanser 10/19/21 1600   Periwound Protectant Skin Protectant Wipes to Periwound;Paste Ring 10/19/21 1600   Dressing Cleansing/Solutions Normal Saline 10/19/21 1600   Dressing Options Wound Vac 10/19/21 1600   Dressing Changed Changed 10/19/21 1600   Dressing Status Clean;Dry;Intact 10/19/21 1600   Dressing Change/Treatment  Frequency Tuesday, Thursday, Saturday, and As Needed 10/19/21 1600   NEXT Dressing Change/Treatment Date 10/21/21 10/19/21 1600   NEXT Weekly Photo (Inpatient Only) 10/21/21 10/19/21 1600   Wound Length (cm) 9.5 cm 10/14/21 1100   Wound Width (cm) 8 cm 10/14/21 1100   Wound Depth (cm) 0.1 cm 10/14/21 1100   Wound Surface Area (cm^2) 76 cm^2 10/14/21 1100   Wound Volume (cm^3) 7.6 cm^3 10/14/21 1100   Wound Bed Granulation (%) 70 % 10/16/21 1500   Wound Bed Slough (%) 30 % 10/16/21 1500   Shape irregular 10/19/21 1600   Wound Odor None 10/19/21 1600   Exposed Structures None 10/19/21 1600   WOUND NURSE ONLY - Time Spent with Patient (mins) 120 10/19/21 1600     Vascular:    MIA:   MIA Results, Last 30 Days US-MIA SINGLE LEVEL BILAT     RIGHT      Waveform            Systolic BPs (mmHg)                                                     Brachial   Monophasic                               Common Femoral   Monophasic                               Posterior Tibial   Monophasic                               Dorsalis Pedis                                                    Peroneal                                            MIA                                            TBI                           LEFT   Waveform        Systolic BPs (mmHg)                                    143           Brachial   Monophasic                               Common Femoral   Monophasic                               Posterior Tibial   Monophasic                               Dorsalis Pedis                                                     Peroneal                                            MIA                                            TBI         Findings   Bilateral.    Doppler waveforms of the common femoral arteries are abnormally dampened/    monophasic.    Doppler waveforms at the ankle are monophasic.    The ankle pressures are not obtained due to pain.    Digit PPG waveforms are normal in the right toes.   Digit PPG waveforms are  normal in the left 1st and 5th toe. The other toes    are absent/amputated.    Toe-brachial indices is reduced right side.   Toe-brachial indices are left side.    Toe-brachial indices:     Right:  0.54   Left:  0.90         Lab Values:    Lab Results   Component Value Date/Time    WBC 6.3 10/19/2021 08:34 AM    RBC 4.14 (L) 10/19/2021 08:34 AM    HEMOGLOBIN 12.1 (L) 10/19/2021 08:34 AM    HEMATOCRIT 37.7 (L) 10/19/2021 08:34 AM    CREACTPROT 6.25 (H) 10/07/2021 08:30 PM    SEDRATEWES 1 10/07/2021 08:30 PM    HBA1C 9.1 (H) 10/08/2021 04:26 AM        Culture Results show:  Recent Results (from the past 720 hour(s))   CULTURE WOUND W/ GRAM STAIN    Collection Time: 10/08/21  2:13 PM    Specimen: Left Leg; Wound   Result Value Ref Range    Significant Indicator POS (POS)     Source WND     Site LEFT LEG     Culture Result Rare growth mixed enteric collins. (A)     Gram Stain Result Moderate WBCs.  No organisms seen.       Culture Result (A)      Methicillin Resistant Staphylococcus aureus  Light growth  This isolate is presumed to be clindamycin resistant based on  detection of inducible resistance.  Clindamycin may still  be effective in some patients.      Culture Result Streptococcus agalactiae (Group B)  Light growth   (A)        Susceptibility    Methicillin resistant staphylococcus aureus - MELA     Azithromycin >4 Resistant mcg/mL     Clindamycin <=0.25 Resistant mcg/mL     Cefazolin <=8 Resistant mcg/mL     Cefepime 16 Resistant mcg/mL     Ceftaroline <=0.5 Sensitive mcg/mL     Daptomycin <=0.5 Sensitive mcg/mL     Ampicillin/sulbactam 16/8 Resistant mcg/mL     Erythromycin >4 Resistant mcg/mL     Vancomycin 1 Sensitive mcg/mL     Oxacillin >2 Resistant mcg/mL     Trimeth/Sulfa <=0.5/9.5 Sensitive mcg/mL     Tetracycline <=4 Sensitive mcg/mL       Pain Level/Medicated:  Max pain; Premed IV, topical lidocaine Soln x2 vials, Requires pain meds after dressing change       INTERVENTIONS BY WOUND TEAM:   Performed  standard wound care which includes appropriate positioning, dressing removal and non-selective debridement. Pictures and measurements obtained weekly if/when required.    RIGHT LE  Preparation for Dressing removal: Dressing soaked with Lidocaine and NS.   Non-selectively Debrided with:  cleanser and gauze.  Sharp debridement: NA  Soo wound: Cleansed with cleanser, Prepped with Cavilon skin prep, drape and paste rings, drape covered paste rings on LLE due to extensive bridging and portion of R paste ring to bridge 2 wounds.     aquacel ag to PTWs on left LE  Primary Dressing: black VF foam bridged as neede and covered with drape to achieve a seal.    Secondary (Outer) Dressing: button, drape and tracpad to foam. Tubi E bilateral with hole for tubing.   LLE an additional Tracpad attached to help with suction and fluid removal.       betadine to L medial heel - pt has monophasic pulses and usual care for heel wounds is to allow stable eschar to remain in place until it sloughs off on it's own.      Interdisciplinary consultation: Patient, Bedside RN, Elsi Elizabeth RN, Vinicius SOLANO.    EVALUATION / RATIONALE FOR TREATMENT:  Most Recent Date:  10/19/21: patient's pain is better controlled, more granular tissue, bedside debridement , slough on left leg adherent , VF hopeful will hopefully loosen it next vac change.  Continue VF NPWT  10/16/21 All wounds (except heel )with VF VAC to remove slough and encourage granular [id number] formation.  VF should ease the pain with dressing removal.  Keeping heel wound dry due to diminished pulses and eschar is stable.    10/14: RLE lateral wound with adherent slough at base. Vera harry initiated to encourage mechanical debridement. LLE lateral and toe amp wound with clean, partially granulated wound base. Maintained regular wound vac suction to these sites. Implemented adaptic as a nonadherent layer to decrease pain upon removal next change.     Honeycolloid applied to anterior  and posterior aspect on LLE given adherent slough at base. Pt will benefit from veraflo wound vac to LLE including bridging of scattered lesions, therefore, will need extra machine, cassette, Y-connector and tracpad next session.     Pt was in significant pain throughout the dressing care process. Will benefit from ongoing  Premedication and lidocaine soln for pain control. Check with primary RN prior to wound care as pt may be hypotensive and not appropriate for IV pain meds right away. LPS following patient, refer to LPS noted for POC.      Goals: Steady decrease in wound area and depth weekly.    WOUND TEAM PLAN OF CARE ([X] for frequency of wound follow up,):   Nursing to follow orders written for wound care. Contact wound team if area fails to progress, deteriorates or with any questions/concerns  Dressing changes by wound team:                   Follow up 3 times weekly:                NPWT change 3 times weekly: x    Follow up 1-2 times weekly:      Follow up Bi-Monthly:                   Follow up as needed:     Other (explain):     NURSING PLAN OF CARE ORDERS (X):  Dressing changes: See Dressing Care orders: x  Skin care: See Skin Care orders: x  RN Prevention Protocol: x  Rectal tube care: See Rectal Tube Care orders:   Other orders:    Anticipated discharge plans:   LTACH:        SNF/Rehab:      X will need ongoing wound care            Home Health Care:           Outpatient Wound Center:            Self/Family Care:        Other:                  Vac Discharge Needs:   Not Applicable Pt not on a wound vac:       Regular Vac while inpatient, alternative dressing at DC:        Regular Vac in use and continued at DC:           Reg. Vac w/ Skin Sub/Biologic in use. Will need to be changed 2x wkly:      Veraflo Vac while inpatient, ok to transition to Regular Vac on Discharge:  X       Veraflo Vac while inpatient, will need to remain on Veraflo Vac upon discharge:

## 2021-10-19 NOTE — PROGRESS NOTES
Received bedside report and assumed pt care. Pt is A&Ox4 and VSS on RA. Pt c/o 9/10 BLE pain this morning and was given PRN pain medication per MAR. Assessment complete. Wound vac dressings on BLE are CDI and vacs are working properly. Pt is able to get up to commode with 1x assistance and calls for help appropriately. Fall precautions in place. POC for today discussed with pt. All needs met at this time. Call light within reach. Hourly rounding in place.

## 2021-10-19 NOTE — CARE PLAN
The patient is Watcher - Medium risk of patient condition declining or worsening    Shift Goals  Clinical Goals: maintain BP; rest; pain mgmt  Patient Goals: pain mgmt    Progress made toward(s) clinical / shift goals:  Pt reprots BLE pain. Medicated per MAR. Pt had single BP of 80/50. Gave 1L NS bolus. BP up to 98/59. Will continue to monitor.      Problem: Knowledge Deficit - Standard  Goal: Patient and family/care givers will demonstrate understanding of plan of care, disease process/condition, diagnostic tests and medications  Outcome: Progressing     Problem: Hemodynamics  Goal: Patient's hemodynamics, fluid balance and neurologic status will be stable or improve  Outcome: Progressing     Problem: Pain - Standard  Goal: Alleviation of pain or a reduction in pain to the patient’s comfort goal  Outcome: Progressing           Patient is not progressing towards the following goals:

## 2021-10-19 NOTE — PROGRESS NOTES
Hospital Medicine Daily Progress Note    Date of Service  10/19/2021    Chief Complaint  Luis Kellogg is a 71 y.o. male admitted 10/7/2021 with bilateral leg wounds    Hospital Course  This is a 71 year old male with PMHx of hyperlipidemia, type 2 diabetes with A1c of 9.1, obesity, CAD, hx MI,PAD, history of lower extremity osteomyelitis and recurrent LLE cellulitis who was admitted for sepsis secondary to BLE diabetic, nonhealing wounds and cellulitis.    Patient has known history of uncontrolled type 2 diabetes, he has had prior admissions for cellulitis and osteomyelitis.  Patient does have history of PAD.  Venous dopplers negative for DVT and arterial Dopplers ordered.  X-ray of the left tib-fib, no concern for osteomyelitis.  Wound care consulted. LPS consulted.    Wound cultures from March 20, 2021, noted E. Faecalis sensitive to ampicillin, Cipro, Levaquin and vancomycin.  Patient is currently on vancomycin only as per wound culture results.    Interval Problem Update  Patient was seen and examined at bedside.  No acute events overnight. Patient is resting comfortably in bed and in no acute distress.     Patient reported no BM for 1 day, and requested po dulcolax.   LPS saw the pat, ok to switch to regular wound vacuum at dc  On bactrium, mornitor k and RF    S/p revascularization and debridement of lower extremity wounds  LPS following  Wound vac in place  Wound culture: GBS and MRSA  Keflex/Bactrim  SNF placement    I have personally seen and examined the patient at bedside. I discussed the plan of care with patient and bedside RN.    Consultants/Specialty  LPS   Vascular surgery    Code Status  Full Code    Disposition  Patient is not medically cleared.   Anticipate discharge to TBD.  I have placed the appropriate orders for post-discharge needs.    Review of Systems  Review of Systems   Constitutional: Negative for chills and fever.   HENT: Negative for congestion and sore throat.    Eyes: Negative for  blurred vision.   Respiratory: Negative for cough and shortness of breath.    Cardiovascular: Negative for chest pain and palpitations.   Gastrointestinal: Negative for abdominal pain, nausea and vomiting.   Genitourinary: Negative for dysuria and frequency.   Musculoskeletal: Positive for joint pain. Negative for myalgias.   Neurological: Negative for seizures and loss of consciousness.   Psychiatric/Behavioral: Negative for depression.     Physical Exam  Temp:  [36.1 °C (97 °F)-36.3 °C (97.4 °F)] 36.1 °C (97 °F)  Pulse:  [59-70] 59  Resp:  [17-19] 18  BP: ()/(50-79) 119/68  SpO2:  [93 %-97 %] 97 %    Physical Exam  Vitals and nursing note reviewed.   Constitutional:       General: He is not in acute distress.     Appearance: He is obese. He is not toxic-appearing.   HENT:      Head: Normocephalic and atraumatic.      Right Ear: External ear normal.      Left Ear: External ear normal.      Nose: No congestion.      Mouth/Throat:      Pharynx: No oropharyngeal exudate.   Eyes:      General: No scleral icterus.     Extraocular Movements: Extraocular movements intact.      Conjunctiva/sclera: Conjunctivae normal.      Pupils: Pupils are equal, round, and reactive to light.   Cardiovascular:      Rate and Rhythm: Normal rate and regular rhythm.      Pulses: Normal pulses.      Heart sounds: No murmur heard.     Pulmonary:      Effort: Pulmonary effort is normal.      Breath sounds: Normal breath sounds. No wheezing.   Abdominal:      General: Bowel sounds are normal.      Palpations: Abdomen is soft.      Tenderness: There is no abdominal tenderness. There is no guarding or rebound.   Musculoskeletal:         General: No swelling or tenderness. Normal range of motion.      Cervical back: Normal range of motion and neck supple. No muscular tenderness.      Comments: Bilateral lower extremities with wound vacs and dressings in place   Skin:     General: Skin is warm and dry.      Capillary Refill: Capillary refill  takes less than 2 seconds.      Findings: No bruising, erythema or rash.   Neurological:      General: No focal deficit present.      Mental Status: He is alert and oriented to person, place, and time.   Psychiatric:         Mood and Affect: Mood normal.         Thought Content: Thought content normal.                 Patient was seen and examined at bedside. No changes in physical exam from prior evaluation.      Fluids    Intake/Output Summary (Last 24 hours) at 10/19/2021 1555  Last data filed at 10/19/2021 1200  Gross per 24 hour   Intake 960 ml   Output --   Net 960 ml       Laboratory  Recent Labs     10/19/21  0834   WBC 6.3   RBC 4.14*   HEMOGLOBIN 12.1*   HEMATOCRIT 37.7*   MCV 91.1   MCH 29.2   MCHC 32.1*   RDW 45.2   PLATELETCT 271   MPV 9.9     Recent Labs     10/17/21  0851 10/19/21  0834   SODIUM 127* 135   POTASSIUM 4.6 5.1   CHLORIDE 101 102   CO2 18* 24   GLUCOSE 143* 138*   BUN 19 23*   CREATININE 0.98 1.14   CALCIUM 9.3 9.1                   Imaging  CT-CTA AORTA-RO WITH & W/O-POST PROCESS   Final Result      1.  Occlusion of the distal right femoral artery with reconstitution distally.      2.  Calcified plaque in small diameter of the tibial and peroneal arteries bilaterally makes it difficult to evaluate for degree of stenosis and occlusion.      3.  Plaque within the left femoral artery without evidence of occlusion.      4.  Aortic atherosclerosis without aneurysm.      5.  Diverticulosis without evidence of diverticulitis.      6.  Hepatic steatosis.      DX-FOOT-COMPLETE 3+ LEFT   Final Result         1.  Disuse osteoporosis of the left foot.      2. Large subcutaneous ulcer on the dorsal surface of left foot.      3. Previous amputations of the left second through fourth toes.      DX-FOOT-COMPLETE 3+ RIGHT   Final Result      1.  No fracture or dislocation of RIGHT foot.   2.  Periarticular osteopenia suggests inflammatory arthropathy.   3.  No soft tissue gas or focal bony destruction  however osteomyelitis is not excluded by plain film.   4.  Severe degenerative change of ankle joint.      DX-TIBIA AND FIBULA RIGHT   Final Result      No evidence of fracture or dislocation or acute osteomyelitis.      US-EXTREMITY ARTERY LOWER BILAT   Final Result      US-MIA SINGLE LEVEL BILAT   Final Result      US-EXTREMITY VENOUS LOWER UNILAT LEFT   Final Result      DX-TIBIA AND FIBULA LEFT   Final Result         1.  No acute traumatic bony injury.   2.  Soft tissue erosion of the posterior mid calf, corresponding with history of soft tissue ulcers.      IR-EXTREMITY ANGIOGRAM-BILATERAL    (Results Pending)        Assessment/Plan  * Diabetic infection of left foot (HCC)- (present on admission)  Assessment & Plan  Patient has known history of uncontrolled type 2 diabetes, he has had prior admissions for cellulitis and osteomyelitis.  Patient does have history of PAD.  Venous dopplers negative for DVT   Arterial Dopplers - inflow arterial insufficiency in lower extremities bilaterally  X-ray of the left tib-fib, no concern for osteomyelitis.    Wound care consulted. LPS following.  S/p revasculazization, debridement on 10/12    Wound culture MRSA, GBS  Keflex, bactrim    Hypotension  Assessment & Plan  Asymptomatic  Requiring intermittent IVF bolus  AM Cortisol normal    Diabetic infection of right foot (HCC)- (present on admission)  Assessment & Plan  As per left lower extremity plan    Sepsis- (present on admission)  Assessment & Plan  This is Sepsis Present on admission  SIRS criteria identified on my evaluation include: Tachycardia, with heart rate greater than 90 BPM, Tachypnea, with respirations greater than 20 per minute and Bandemia, greater than 10% bands  Source is diabetic infected ulcers and cellulitis of Lower extremities  Sepsis protocol initiated  Fluid resuscitation ordered per protocol  IV antibiotics as appropriate for source of sepsis  While organ dysfunction may be noted elsewhere in this  problem list or in the chart, degree of organ dysfunction does not meet CMS criteria for severe sepsis      Uncontrolled type 2 diabetes mellitus (HCC)- (present on admission)  Assessment & Plan  ISS + Basal  A1c 9.1  Hypoglycemia protocol    Coronary artery disease involving native coronary artery - lexiscan 2/14/2020 - EF 60%  with fixed prominent defecct of interior and lateral walls, no stress induced ischemia, mild global hypokinesis- (present on admission)  Assessment & Plan  Continue with aspirin and statin therapy    Peripheral artery disease (HCC) - (present on admission)  Assessment & Plan  Patient does have history of PAD, previous MIA was performed in June 2021, showed mild to moderate disease. Repeat MIA noted significant worsening disease.    Vascular surgery consulted,s/p CTA today, plans for revascularization prior to surgical intervention for the wounds, of LLE 10/11/2021    S/p OR 10/12 for revascularization    Peripheral neuropathy (HCC)- (present on admission)  Assessment & Plan  neurontin    Hyperlipidemia- (present on admission)  Assessment & Plan  F/u lipid panel outpatient       VTE prophylaxis: SCDs/TEDs and enoxaparin ppx    I have performed a physical exam and reviewed and updated ROS and Plan today (10/19/2021). In review of yesterday's note (10/18/2021), there are no changes except as documented above.

## 2021-10-19 NOTE — PROGRESS NOTES
LIMB PRESERVATION SERVICE   POST SURGICAL PROGRESS NOTE      HPI:  Luis Kellogg is a 71 y.o.  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   LPS has been consulted for bilateral lower extremity diabetic ulcers.     Patient has long history of lower extremity chronic wounds, PAD, and left 2-4th toe amputation in 2/2020 at outside hospital. Patient presented with worsening bilateral lower extremity wounds with worsening swelling and pain consistent with cellulitis. He denies any fevers, chills, nausea, or vomiting. Patient is poor historian and unable to tell me how long he has had wounds, but does report that they have been worsening with occasional purulent drainage. He reports that left toe amputation site never healed after surgery in 2/2020. Patient reports that he previously saw a vascular surgeon in [place], does not remember his name, and had angiogram and my have had a stent in one of his femoral arteries though not sure which and thinks it was 1.5-2 years ago. Patient was referred to [facility] and had evaluation and non-invasive studies and was supposed to follow up with [facility] for intervention but never followed up. Patient was previously being followed by [facility], however he has not followed up since 3/2021 and has not been performing any wound care on his lower extremity wounds.      IV antibiotics were started on this admission.  Infectious diseases has not been consulted.    Xray completed and is negative for osteomyelitis.  Ortho has not been consulted yet.     Diagnosed with diabetes 7-8 years ago, and is currently managing with metformin and insulin.  Checks blood sugars 1 times per day and reports that these typically average 110-150. Does have numbness to feet. Usually wears tennis shoes . Does have diabetic shoes and inserts  "from 4-5 that are years old and in not interested in diabetic footwear at this time.  Has had previous foot surgeries including L 2-4 toe and MTH amputation.  Current occupation retired.     SURGERY DATE: 10/12/2021 - Dr. Purcell  PROCEDURE:   1) Angiogram  2) Right SFA stent  3) I&D of wounds with wound vac placement      INTERVAL HISTORY:  10/14/2021: POD #2.  Patient denies fevers, chills, nausea, vomiting.  Pain poorly controlled, discussed with hospitalist. Patient hypotensive this morning requiring fluid bolus. Seen with wound care team for wound vac change. Patient in excruciating pain with vac change  10/19/2021: POD#7. Patient denies any complaints. Seen with wound care team during VAC change. Patient continues to have tenderness with VAC removal and placement. Wounds improving slowly.    PERTINENT LPS RESULTS:   Pathology: None sent  Cultures: leg wound 10/8: MRSA, Group B Streptococcus    SURGICAL SITE EXAM:      /68   Pulse (!) 59 Comment: nurse aware  Temp 36.1 °C (97 °F) (Temporal)   Resp 18   Ht 1.702 m (5' 7\")   Wt 100 kg (221 lb 1.9 oz)   SpO2 97%   BMI 34.63 kg/m²     Pedal Pulses: R foot: Unable to palpate, Monophasic DP/PT  L foot: Unable to palpate, Monophasic DP/PT   Sensation: Sensation largely intact on legs, diminished on feet  Surgical foot warm, well perfused    Right lateral tibial wounds x2  Wound base is mostly pink, 10% adherent slough  Continue Veraflo wound vac as showing good improvement in granulation tissue  No drainage  No odor  Soo wound erythema          R foot dorsal 1st toe wound   R foot dorsal 2nd toe wound   Dried scab over wound  Continue to treat by keeping wound dry  No drainage  No odor  Periwound erythema improved  Mild periwound callus            L medial heel wound  Wound base dry with desiccated slough  Stable  No drainage  No odor  No erythema          L 2-4 toe and MTH amputation site  Left foot dorsal wound  Chronic non healing amputation site from " "2020  Patient reports never closed  Good granulation tissue with minimal slough  Periwound callus  Periwound erythema improved              L Anterior Tibial wounds x 6  Improved adherent slough  Some granulation tissue  No odor  No discharge  Periwound erythema              Left Lateral Leg  Base is majority granulation tissue, minimal slough  No odor or drainage                L Posterior tib/fib wounds x 3  Wound base improved, some adherent slough  No drainage  Periwound erythema  No odor              Wound care completed by wound care team: please refer to wound care note and flow sheet for details       DIABETES MANAGEMENT:    Blood glucose:   Results from last 7 days   Lab Units 10/19/21  1202 10/19/21  0634 10/18/21  2007 10/18/21  1648 10/18/21  1133 10/18/21  0745 10/17/21  1957 10/17/21  1650   ACCU CHECK GLUCOSE 788 mg/dL 178* 131* 231* 191* 172* 196* 157* 196*     A1c:   Lab Results   Component Value Date/Time    HBA1C 9.1 (H) 10/08/2021 04:26 AM            INFECTION MANAGEMENT:    Results from last 7 days   Lab Units 10/19/21  0834 10/15/21  0257 10/14/21  0632 10/13/21  0324   WBC 1501 K/uL 6.3 7.1 7.7 8.1   PLATELET COUNT 1518 K/uL 271 256 276 284     Wound culture results:   Results     Procedure Component Value Units Date/Time    BLOOD CULTURE x2 [[id number]] Collected: 10/07/21 2118    Order Status: Completed Specimen: Blood from Peripheral Updated: 10/12/21 2300     Significant Indicator NEG     Source BLD     Site PERIPHERAL     Culture Result No growth after 5 days of incubation.    Narrative:      Per Hospital Policy: Only change Specimen Src: to \"Line\" if  specified by physician order.  No site indicated    BLOOD CULTURE x2 [[id number]] Collected: 10/07/21 2118    Order Status: Completed Specimen: Blood from Peripheral Updated: 10/12/21 2300     Significant Indicator NEG     Source BLD     Site PERIPHERAL     Culture Result No growth after 5 days of incubation.    Narrative:      Per Hospital " "Policy: Only change Specimen Src: to \"Line\" if  specified by physician order.  No site indicated               ASSESSMENT/PLAN:   POD #7 S/P Angiogram, I&D, wound vac placement by Dr. Purcell on 10/12.    - Patient continues to be treated with Veraflo wound vac to wounds while admitted, would continue as he can only tolerate mild debridement at bedside due to pain. OK to change to regular wound vac on discharge.  - Due to pain and slough, continue veraflo wound vac while admitted  - Only tolerated mild bedside debridement due to pain  - Will need to continue wound vac for now due to extent of wounds and to promote granulation and more rapid healing.      Wound care:   -Wound care orders updated for nursing  -Left medial heel: betadine to L medial heel, off loading in moon boot  - Dorsal right foot wounds: Betadine to wounds  - All other wounds: Managed by Veraflo wound vac, to be changed 3x weekly by wound team    Imaging/Labs:  -COVID-19: not detected this admission    Vascular status:   -s/p angiogram and right SFA stent by Dr. Purcell 10/12, adequate blood flow after re- vascularization    Surgery:   --no further surgeries planned at this time    Antibiotics:   -Defer to hospitalist    Weight Bearing Status:   -Weight bearing as tolerated    Offloading:   -Offloading shoe; ordered. Refused by patient.  -Orthotic company:     PT/OT:   -involved       Diabetes Education:   - consult CDE and RD involved     - Implications of loss of protective sensation (LOPS) discussed with patient- including increased risk for amputation.  Advised to check feet at least daily, moisturize feet, and to always wear protective foot wear.   -avoid trimming own nails. See podiatrist or certified foot and nail RN  -keep blood sugars <150 for improved wound healing    DISCHARGE PLAN:  Patient has extensive wounds requiring wound Vac  Treated with Veraflo wound vac while admitted, can be converted to regular vac on discharge  OK to " discharge to SNF that can continue wound care    Disposition:   SNF per PT recommendation and due to extensive wounds    Follow-up: Dr. Purcell approximately 3 weeks post-op  Follow up LPS rounds: Will be scheduled when closer to discharge date      Discussed with: pt, RN, . He    Please note that this dictation was created using voice recognition software. I have  worked with technical experts from [facility] to optimize the interface.  I have made every reasonable attempt to correct obvious errors, but there may be errors of grammar and possibly content that I did not discover before finalizing the note.      Rodri Ardon M.D.    If any questions or concerns, please contact [facility] through voalte.

## 2021-10-19 NOTE — THERAPY
"Physical Therapy   Daily Treatment     Patient Name: Luis Kellogg  Age:  71 y.o., Sex:  male  Medical Record #: [id number]  Today's Date: 10/19/2021     Precautions  Precautions: Fall Risk;Other (See Comments)  Comments: BLE wound vac    Assessment    Patient continues to be limited by pain in BLE wounds during activity. Today he was able to ambulate with FWW and min A in room. He demonstrated antalgic gait with forward flexed posture and improper use of FWW despite repeated cueing. He was cooperative with therapy but refused use of offloading shoes. Will continue to follow.    Plan    Continue current treatment plan.    DC Equipment Recommendations: Unable to determine at this time  Discharge Recommendations: Recommend post-acute placement for additional physical therapy services prior to discharge home      Subjective    \"I don't see why I should stay here.\" (once sitting in chair with legs elevated)     Objective       10/19/21 0918   Total Time Spent   Total Time Spent (Mins) 24   Charge Group   Charges  Yes   PT Therapeutic Activities 2   Precautions   Precautions Fall Risk   Comments BLE wound vac   Vitals   O2 (LPM) 0   O2 Delivery Device None - Room Air   Pain 0 - 10 Group   Location Foot   Location Orientation Right;Left   Therapist Pain Assessment During Activity;Post Activity Pain Same as Prior to Activity;Nurse Notified   Cognition    Cognition / Consciousness X   Safety Awareness Impulsive;Impaired   Comments coopearative but refused offloading shoe, poor insight   Balance   Sitting Balance (Static) Fair +   Sitting Balance (Dynamic) Fair +   Standing Balance (Static) Fair -   Standing Balance (Dynamic) Poor +   Weight Shift Sitting Fair   Weight Shift Standing Poor   Skilled Intervention Compensatory Strategies;Sequencing;Tactile Cuing;Verbal Cuing   Comments with FWW but poor use of walker. reaching for external support and leaning onto forearms   Gait Analysis   Gait Level Of Assist Minimal Assist "   Assistive Device Front Wheel Walker   Distance (Feet) 25   # of Times Distance was Traveled 1   Deviation Antalgic;Decreased Heel Strike;Decreased Toe Off   # of Stairs Climbed 0   Weight Bearing Status no restrictions   Vision Deficits Impacting Mobility NT   Skilled Intervention Verbal Cuing;Compensatory Strategies   Comments forward flexed posture and poor weight acceptance BLE   Bed Mobility    Supine to Sit Supervised   Sit to Supine Supervised   Scooting Supervised   Rolling Supervised   Skilled Intervention Verbal Cuing   Functional Mobility   Sit to Stand Minimal Assist  (CGA)   Bed, Chair, Wheelchair Transfer Minimal Assist   Transfer Method Stand Step   Skilled Intervention Verbal Cuing;Compensatory Strategies   How much difficulty does the patient currently have...   Turning over in bed (including adjusting bedclothes, sheets and blankets)? 4   Sitting down on and standing up from a chair with arms (e.g., wheelchair, bedside commode, etc.) 3   Moving from lying on back to sitting on the side of the bed? 4   How much help from another person does the patient currently need...   Moving to and from a bed to a chair (including a wheelchair)? 3   Need to walk in a hospital room? 3   Climbing 3-5 steps with a railing? 2   6 clicks Mobility Score 19   Activity Tolerance   Sitting in Chair 5 min   Sitting Edge of Bed 8 min   Standing 6 min   Comments limited by pain   Patient / Family Goals    Patient / Family Goal #1 return home   Goal #1 Outcome Goal not met   Short Term Goals    Short Term Goal # 1 Pt will transfer with LRAD at Mod I level for safe DC home by the 6thtx   Goal Outcome # 1 goal not met   Short Term Goal # 2 Pt will ambulate for 50 ft with LRAD at supervision level to access home by the 6th tx   Goal Outcome # 2 Goal not met   Short Term Goal # 3 Pt will ascend and descend steps with bilateral railing at SPV level to access his home by the 6th tx   Goal Outcome # 3 Goal not met   Anticipated  Discharge Equipment and Recommendations   DC Equipment Recommendations Unable to determine at this time   Discharge Recommendations Recommend post-acute placement for additional physical therapy services prior to discharge home   Interdisciplinary Plan of Care Collaboration   IDT Collaboration with  Nursing   Patient Position at End of Therapy In Bed;Bed Alarm On;Call Light within Reach;Tray Table within Reach;Phone within Reach   Collaboration Comments RN aware of visit, response   Session Information   Date / Session Number  10/19-3 (1/3, 10/24)   Priority   (.)

## 2021-10-19 NOTE — PROGRESS NOTES
Assumed care of pt at 1900. Report received and bedside rounding completed with night RN. Pt is sitting up in bed calm, no SOB, no acute distress noted. AOx4.  Denies N,V. Pt rates pain 5/10. Medicated per MAR  Last BM: 10/18  Dressings/Drains:    -BLE wound vac DIP  Call light and pt belongings within reach - hourly rounding in place. See flowsheets for further assessment.     Pt is considered a HIGH fall risk. edu provided on risk level.   Fall precautions in place,  bed alarm refused. - Treaded non slip socks. Bed locked. Communication board updated with POC.

## 2021-10-19 NOTE — CARE PLAN
The patient is Watcher - Medium risk of patient condition declining or worsening    Shift Goals  Clinical Goals: Pain mangement   Patient Goals: pain mgmt    Progress made toward(s) clinical / shift goals:  Pt assessed for pain regularly and medicated PRN per MAR.     Patient is not progressing towards the following goals:

## 2021-10-19 NOTE — DISCHARGE PLANNING
Anticipated Discharge Disposition: LTAC vs SNF    Action: Reviewed in IDT rounds, pt is med cleared for dc.   LTAC referrals remain in progress to [facility] and [facility], but [facility] denied pt.  Nedra Peterson for [facility] is possibly able to accept pt. Pt has bilateral LE Veriflows wound vacs. Per the MD, the wound vacs can be changed to regular vacs to transfer to [facility]. ([facility] cannot   Take Veriflows). Kristen to review with wound care nurse at [facility] to review for possible acceptance.     Barriers to Discharge: pending snf and ltac reviews.     Plan: care coordination to follow for dc planning needs.

## 2021-10-19 NOTE — WOUND TEAM
Assisted with wound VAC change with Ariane Villegas Wound care RN.     Procedures:                Debridement :  curette used to debride wound bed of left LE anterior/lateral and posterior LE along with left foot dorsal and amp site. Entire surface of wound, ~60cm2, debrided, removing non viable tissue.              Cleansed with:  Normal Saline                                                                                   Periwound protected with: Skin prep              Primary dressing: VF wound VAC              Secondary Dressing: drape to seal and mepilex to offload tubing.

## 2021-10-19 NOTE — THERAPY
"Occupational Therapy  Daily Treatment     Patient Name: Luis Kellogg  Age:  71 y.o., Sex:  male  Medical Record #: [id number]  Today's Date: 10/19/2021     Precautions  Precautions: (P) Fall Risk, Other (See Comments)  Comments: (P) BLE wound vacs . Needs assist for ambulating ADL's due to tubing from 2 wound vacs. Fall risk .    Assessment    Pt seen for OT treatment. Pt agreeable to get to EOB and work on LB dressing as well as ambulating ADL's to BR.  Pt required Min A for LB dressing. Pt has wound vacs in BLE's. Pt refused to don socks.Supervised for UB dressing seated EOB.  Pt gave good effort and is motivated in therapy. Pt does demo poor safety awareness pushing away FWW stating, \"It just gets in my way. \" Insisted pt use FWW to BR. Pt requested to go to BR to urinate. Min A for ambulating ADL's due to 2 wound vacs one in BLE's. Pt left FWW outside of BR using grab bar in shower and by toilet to complete transfer. Pt is a fall risk. Pt urinated seated base and again used grab bars for transfer. Full  toilet hygiene not assessed due to  no need for BM this OT session. Pt ambulated to sink pushing away FWW using wall and sharps container/dispenser on wall for support. Informed pt this is not safe. Pt stated, \"I can do it this way\".  Pt stood at sink for 5-7 mins for H/G tasks with set up leaning forward onto sink declining to sit. Pt needed Min A for ambulation back to EOB with FWW. Pt sat EOB with RN inn room with pt. RN updated on OT treatment findings and recommendations. Will continue to follow.       Plan    Continue current treatment plan.    DC Equipment Recommendations: (P) Unable to determine at this time  Discharge Recommendations: (P) Recommend post-acute placement for additional occupational therapy services prior to discharge home    Subjective    \"I know how this works best for me\". Pt demo only fair insight into his deficits. Pt is a fall risk.      Objective       10/19/21 1034   Cognition  " "  Cognition / Consciousness X   Safety Awareness Impulsive;Impaired   Comments Lacks insight into his deficits. Pt can be stubborn and insists on doing things \"my way\". Fall risk. Decreased safety awareness.      Passive ROM Upper Body   Comments WFL   Active ROM Upper Body   Dominant Hand Right   Comments WFL   Strength Upper Body   Comments WFL for simple self care tasks.    Other Treatments   Other Treatments Provided Psychosocial intervention addressed. Discussed  safety and using FWW . Pt pushes away FWW at times stating \"It gets in my way. \".    Balance   Sitting Balance (Static) Fair +   Sitting Balance (Dynamic) Fair +   Standing Balance (Static) Poor +   Standing Balance (Dynamic) Poor   Weight Shift Sitting Fair   Weight Shift Standing Poor   Skilled Intervention Verbal Cuing;Tactile Cuing;Sequencing;Compensatory Strategies   Comments with FWW   Bed Mobility    Supine to Sit Supervised   Sit to Supine Supervised   Comments for energy conservation and self pacing techs , impulsive   Activities of Daily Living   Eating Independent   Grooming Supervision;Standing  (required set up. )   Bathing   (declined to attempt. )   Upper Body Dressing Supervision   Lower Body Dressing Minimal Assist  (with assist for lines from wound vacs. )   Toileting Supervision  (full toilet hygiene not assessed. No need for BM )   Skilled Intervention Verbal Cuing;Sequencing;Compensatory Strategies   Comments Pt will need assist with most selfcare tasks.    Functional Mobility   Sit to Stand Minimal Assist   Bed, Chair, Wheelchair Transfer Minimal Assist   Toilet Transfers Supervised  (using FWW and grab bars. )   Skilled Intervention Verbal Cuing;Tactile Cuing;Sequencing;Compensatory Strategies   Comments Pt pushes away FWW at times using wall and sharps container dispenser on wall. Informed pt this is not safe but pt insisted to use. Fall Risk.     Activity Tolerance   Comments Pt leaned onto sink due to fatigue with standing " ADL's. Refusing to sit.     Patient / Family Goals   Patient / Family Goal #1 Get these vacs off and go home   Goal #1 Outcome Goal not met   Short Term Goals   Short Term Goal # 1 Toilet xfer with mod I using AD prn   Goal Outcome # 1 Progressing as expected   Short Term Goal # 2 standing grooming  x 10 min without LOB at Mod I   Goal Outcome # 2 Progressing as expected   Anticipated Discharge Equipment and Recommendations   DC Equipment Recommendations Unable to determine at this time   Discharge Recommendations Recommend post-acute placement for additional occupational therapy services prior to discharge home   Interdisciplinary Plan of Care Collaboration   IDT Collaboration with  Nursing;Certified Nursing Assistant   Collaboration Comments RN updated

## 2021-10-19 NOTE — CARE PLAN
Problem: Knowledge Deficit - Standard  Goal: Patient and family/care givers will demonstrate understanding of plan of care, disease process/condition, diagnostic tests and medications  Outcome: Progressing     Problem: Respiratory  Goal: Patient will achieve/maintain optimum respiratory ventilation and gas exchange  Outcome: Progressing     The patient is Stable - Low risk of patient condition declining or worsening    Shift Goals  Clinical Goals: discharge planning, wound care  Patient Goals: wound care, pain relief    Progress made toward(s) clinical / shift goals:  patient awaiting placement, wound team coming tomorrow     Patient is not progressing towards the following goals:

## 2021-10-20 LAB
ANION GAP SERPL CALC-SCNC: 8 MMOL/L (ref 7–16)
BUN SERPL-MCNC: 17 MG/DL (ref 8–22)
CALCIUM SERPL-MCNC: 9.3 MG/DL (ref 8.5–10.5)
CHLORIDE SERPL-SCNC: 101 MMOL/L (ref 96–112)
CO2 SERPL-SCNC: 24 MMOL/L (ref 20–33)
CREAT SERPL-MCNC: 0.89 MG/DL (ref 0.5–1.4)
GLUCOSE BLD-MCNC: 137 MG/DL (ref 65–99)
GLUCOSE BLD-MCNC: 148 MG/DL (ref 65–99)
GLUCOSE BLD-MCNC: 162 MG/DL (ref 65–99)
GLUCOSE BLD-MCNC: 163 MG/DL (ref 65–99)
GLUCOSE SERPL-MCNC: 151 MG/DL (ref 65–99)
POTASSIUM SERPL-SCNC: 4.5 MMOL/L (ref 3.6–5.5)
SODIUM SERPL-SCNC: 133 MMOL/L (ref 135–145)

## 2021-10-20 PROCEDURE — A9270 NON-COVERED ITEM OR SERVICE: HCPCS | Performed by: INTERNAL MEDICINE

## 2021-10-20 PROCEDURE — A9270 NON-COVERED ITEM OR SERVICE: HCPCS | Performed by: STUDENT IN AN ORGANIZED HEALTH CARE EDUCATION/TRAINING PROGRAM

## 2021-10-20 PROCEDURE — 700111 HCHG RX REV CODE 636 W/ 250 OVERRIDE (IP): Performed by: GENERAL PRACTICE

## 2021-10-20 PROCEDURE — 36415 COLL VENOUS BLD VENIPUNCTURE: CPT

## 2021-10-20 PROCEDURE — 700102 HCHG RX REV CODE 250 W/ 637 OVERRIDE(OP): Performed by: STUDENT IN AN ORGANIZED HEALTH CARE EDUCATION/TRAINING PROGRAM

## 2021-10-20 PROCEDURE — 770001 HCHG ROOM/CARE - MED/SURG/GYN PRIV*

## 2021-10-20 PROCEDURE — 700102 HCHG RX REV CODE 250 W/ 637 OVERRIDE(OP): Performed by: GENERAL PRACTICE

## 2021-10-20 PROCEDURE — 82962 GLUCOSE BLOOD TEST: CPT | Mod: 91

## 2021-10-20 PROCEDURE — 700102 HCHG RX REV CODE 250 W/ 637 OVERRIDE(OP): Performed by: SURGERY

## 2021-10-20 PROCEDURE — 80048 BASIC METABOLIC PNL TOTAL CA: CPT

## 2021-10-20 PROCEDURE — A9270 NON-COVERED ITEM OR SERVICE: HCPCS | Performed by: SURGERY

## 2021-10-20 PROCEDURE — 99233 SBSQ HOSP IP/OBS HIGH 50: CPT | Performed by: STUDENT IN AN ORGANIZED HEALTH CARE EDUCATION/TRAINING PROGRAM

## 2021-10-20 PROCEDURE — A9270 NON-COVERED ITEM OR SERVICE: HCPCS | Performed by: GENERAL PRACTICE

## 2021-10-20 PROCEDURE — 700102 HCHG RX REV CODE 250 W/ 637 OVERRIDE(OP): Performed by: INTERNAL MEDICINE

## 2021-10-20 RX ORDER — SULFAMETHOXAZOLE AND TRIMETHOPRIM 800; 160 MG/1; MG/1
1 TABLET ORAL EVERY 12 HOURS
Qty: 8 TABLET | Refills: 0 | DISCHARGE
Start: 2021-10-20 | End: 2021-10-27

## 2021-10-20 RX ORDER — GABAPENTIN 300 MG/1
300 CAPSULE ORAL 2 TIMES DAILY
Qty: 90 CAPSULE | DISCHARGE
Start: 2021-10-20

## 2021-10-20 RX ORDER — CEPHALEXIN 500 MG/1
1000 CAPSULE ORAL 3 TIMES DAILY
Qty: 24 CAPSULE | Refills: 0 | DISCHARGE
Start: 2021-10-20 | End: 2021-10-27

## 2021-10-20 RX ORDER — CARVEDILOL 3.12 MG/1
3.12 TABLET ORAL 2 TIMES DAILY WITH MEALS
Qty: 60 TABLET | DISCHARGE
Start: 2021-10-20

## 2021-10-20 RX ORDER — FUROSEMIDE 40 MG/1
40 TABLET ORAL DAILY
Qty: 30 TABLET | DISCHARGE
Start: 2021-10-21

## 2021-10-20 RX ORDER — CLOPIDOGREL BISULFATE 75 MG/1
75 TABLET ORAL DAILY
Qty: 30 TABLET | DISCHARGE
Start: 2021-10-20

## 2021-10-20 RX ORDER — NICOTINE 21 MG/24HR
1 PATCH, TRANSDERMAL 24 HOURS TRANSDERMAL EVERY 24 HOURS
Qty: 30 PATCH | DISCHARGE
Start: 2021-10-20

## 2021-10-20 RX ADMIN — CEPHALEXIN 1000 MG: 500 CAPSULE ORAL at 21:25

## 2021-10-20 RX ADMIN — OXYCODONE HYDROCHLORIDE 20 MG: 10 TABLET ORAL at 15:18

## 2021-10-20 RX ADMIN — SULFAMETHOXAZOLE AND TRIMETHOPRIM 1 TABLET: 800; 160 TABLET ORAL at 06:20

## 2021-10-20 RX ADMIN — CLOPIDOGREL BISULFATE 75 MG: 75 TABLET ORAL at 06:21

## 2021-10-20 RX ADMIN — BISACODYL 10 MG: 5 TABLET, COATED ORAL at 17:37

## 2021-10-20 RX ADMIN — SULFAMETHOXAZOLE AND TRIMETHOPRIM 1 TABLET: 800; 160 TABLET ORAL at 17:38

## 2021-10-20 RX ADMIN — BISACODYL 10 MG: 5 TABLET, COATED ORAL at 06:20

## 2021-10-20 RX ADMIN — TRAZODONE HYDROCHLORIDE 450 MG: 150 TABLET ORAL at 21:24

## 2021-10-20 RX ADMIN — INSULIN LISPRO 2 UNITS: 100 INJECTION, SOLUTION INTRAVENOUS; SUBCUTANEOUS at 11:43

## 2021-10-20 RX ADMIN — ENOXAPARIN SODIUM 40 MG: 40 INJECTION SUBCUTANEOUS at 06:21

## 2021-10-20 RX ADMIN — OXYCODONE HYDROCHLORIDE 20 MG: 10 TABLET ORAL at 21:25

## 2021-10-20 RX ADMIN — INSULIN LISPRO 2 UNITS: 100 INJECTION, SOLUTION INTRAVENOUS; SUBCUTANEOUS at 17:39

## 2021-10-20 RX ADMIN — POTASSIUM CHLORIDE 40 MEQ: 1500 TABLET, EXTENDED RELEASE ORAL at 06:20

## 2021-10-20 RX ADMIN — DICLOFENAC SODIUM 50 MG: 25 TABLET, DELAYED RELEASE ORAL at 08:15

## 2021-10-20 RX ADMIN — LISINOPRIL 5 MG: 5 TABLET ORAL at 06:20

## 2021-10-20 RX ADMIN — OXYCODONE HYDROCHLORIDE 20 MG: 10 TABLET ORAL at 08:19

## 2021-10-20 RX ADMIN — CEPHALEXIN 1000 MG: 500 CAPSULE ORAL at 08:16

## 2021-10-20 RX ADMIN — CEPHALEXIN 1000 MG: 500 CAPSULE ORAL at 14:07

## 2021-10-20 RX ADMIN — CARVEDILOL 3.12 MG: 3.12 TABLET, FILM COATED ORAL at 17:38

## 2021-10-20 RX ADMIN — GABAPENTIN 300 MG: 300 CAPSULE ORAL at 17:37

## 2021-10-20 RX ADMIN — DICLOFENAC SODIUM 50 MG: 25 TABLET, DELAYED RELEASE ORAL at 21:25

## 2021-10-20 RX ADMIN — ASPIRIN 81 MG: 81 TABLET, COATED ORAL at 06:20

## 2021-10-20 RX ADMIN — GABAPENTIN 300 MG: 300 CAPSULE ORAL at 06:21

## 2021-10-20 ASSESSMENT — ENCOUNTER SYMPTOMS
SHORTNESS OF BREATH: 0
FEVER: 0
ABDOMINAL PAIN: 0
CHILLS: 0
DEPRESSION: 0
LOSS OF CONSCIOUSNESS: 0
COUGH: 0
PALPITATIONS: 0
VOMITING: 0
SORE THROAT: 0
SEIZURES: 0
BLURRED VISION: 0
NAUSEA: 0
MYALGIAS: 0

## 2021-10-20 ASSESSMENT — PAIN DESCRIPTION - PAIN TYPE
TYPE: ACUTE PAIN

## 2021-10-20 NOTE — DISCHARGE PLANNING
[facility] liaison Kristen in to review case for possible acceptance. Kristen states their wound care nurse reviewed case and ok'd it from her perspective. Veriflow wound vac will need to be changed to regular wound vac before transfer top SNF. Pending review from [facility] Director.

## 2021-10-21 LAB
ANION GAP SERPL CALC-SCNC: 7 MMOL/L (ref 7–16)
BASOPHILS # BLD AUTO: 0.9 % (ref 0–1.8)
BASOPHILS # BLD: 0.05 K/UL (ref 0–0.12)
BUN SERPL-MCNC: 20 MG/DL (ref 8–22)
CALCIUM SERPL-MCNC: 9.3 MG/DL (ref 8.5–10.5)
CHLORIDE SERPL-SCNC: 102 MMOL/L (ref 96–112)
CO2 SERPL-SCNC: 23 MMOL/L (ref 20–33)
CREAT SERPL-MCNC: 1.03 MG/DL (ref 0.5–1.4)
EOSINOPHIL # BLD AUTO: 0.16 K/UL (ref 0–0.51)
EOSINOPHIL NFR BLD: 3 % (ref 0–6.9)
ERYTHROCYTE [DISTWIDTH] IN BLOOD BY AUTOMATED COUNT: 44.5 FL (ref 35.9–50)
GLUCOSE BLD-MCNC: 146 MG/DL (ref 65–99)
GLUCOSE BLD-MCNC: 150 MG/DL (ref 65–99)
GLUCOSE BLD-MCNC: 183 MG/DL (ref 65–99)
GLUCOSE BLD-MCNC: 189 MG/DL (ref 65–99)
GLUCOSE SERPL-MCNC: 161 MG/DL (ref 65–99)
HCT VFR BLD AUTO: 35.8 % (ref 42–52)
HGB BLD-MCNC: 11.9 G/DL (ref 14–18)
IMM GRANULOCYTES # BLD AUTO: 0.03 K/UL (ref 0–0.11)
IMM GRANULOCYTES NFR BLD AUTO: 0.6 % (ref 0–0.9)
LYMPHOCYTES # BLD AUTO: 1.39 K/UL (ref 1–4.8)
LYMPHOCYTES NFR BLD: 26.1 % (ref 22–41)
MCH RBC QN AUTO: 30 PG (ref 27–33)
MCHC RBC AUTO-ENTMCNC: 33.2 G/DL (ref 33.7–35.3)
MCV RBC AUTO: 90.2 FL (ref 81.4–97.8)
MONOCYTES # BLD AUTO: 0.92 K/UL (ref 0–0.85)
MONOCYTES NFR BLD AUTO: 17.3 % (ref 0–13.4)
NEUTROPHILS # BLD AUTO: 2.77 K/UL (ref 1.82–7.42)
NEUTROPHILS NFR BLD: 52.1 % (ref 44–72)
NRBC # BLD AUTO: 0 K/UL
NRBC BLD-RTO: 0 /100 WBC
PLATELET # BLD AUTO: 273 K/UL (ref 164–446)
PMV BLD AUTO: 10 FL (ref 9–12.9)
POTASSIUM SERPL-SCNC: 5.3 MMOL/L (ref 3.6–5.5)
RBC # BLD AUTO: 3.97 M/UL (ref 4.7–6.1)
SODIUM SERPL-SCNC: 132 MMOL/L (ref 135–145)
WBC # BLD AUTO: 5.3 K/UL (ref 4.8–10.8)

## 2021-10-21 PROCEDURE — A9270 NON-COVERED ITEM OR SERVICE: HCPCS | Performed by: GENERAL PRACTICE

## 2021-10-21 PROCEDURE — 700102 HCHG RX REV CODE 250 W/ 637 OVERRIDE(OP): Performed by: STUDENT IN AN ORGANIZED HEALTH CARE EDUCATION/TRAINING PROGRAM

## 2021-10-21 PROCEDURE — A9270 NON-COVERED ITEM OR SERVICE: HCPCS | Performed by: STUDENT IN AN ORGANIZED HEALTH CARE EDUCATION/TRAINING PROGRAM

## 2021-10-21 PROCEDURE — 700111 HCHG RX REV CODE 636 W/ 250 OVERRIDE (IP): Performed by: GENERAL PRACTICE

## 2021-10-21 PROCEDURE — 99233 SBSQ HOSP IP/OBS HIGH 50: CPT | Performed by: STUDENT IN AN ORGANIZED HEALTH CARE EDUCATION/TRAINING PROGRAM

## 2021-10-21 PROCEDURE — 770001 HCHG ROOM/CARE - MED/SURG/GYN PRIV*

## 2021-10-21 PROCEDURE — A9270 NON-COVERED ITEM OR SERVICE: HCPCS | Performed by: INTERNAL MEDICINE

## 2021-10-21 PROCEDURE — A9270 NON-COVERED ITEM OR SERVICE: HCPCS | Performed by: SURGERY

## 2021-10-21 PROCEDURE — 82962 GLUCOSE BLOOD TEST: CPT | Mod: 91

## 2021-10-21 PROCEDURE — 700102 HCHG RX REV CODE 250 W/ 637 OVERRIDE(OP): Performed by: SURGERY

## 2021-10-21 PROCEDURE — 36415 COLL VENOUS BLD VENIPUNCTURE: CPT

## 2021-10-21 PROCEDURE — 80048 BASIC METABOLIC PNL TOTAL CA: CPT

## 2021-10-21 PROCEDURE — 85025 COMPLETE CBC W/AUTO DIFF WBC: CPT

## 2021-10-21 PROCEDURE — 700102 HCHG RX REV CODE 250 W/ 637 OVERRIDE(OP): Performed by: GENERAL PRACTICE

## 2021-10-21 PROCEDURE — 700102 HCHG RX REV CODE 250 W/ 637 OVERRIDE(OP): Performed by: INTERNAL MEDICINE

## 2021-10-21 RX ADMIN — CARVEDILOL 3.12 MG: 3.12 TABLET, FILM COATED ORAL at 17:30

## 2021-10-21 RX ADMIN — BISACODYL 10 MG: 5 TABLET, COATED ORAL at 16:22

## 2021-10-21 RX ADMIN — SULFAMETHOXAZOLE AND TRIMETHOPRIM 1 TABLET: 800; 160 TABLET ORAL at 05:38

## 2021-10-21 RX ADMIN — OXYCODONE HYDROCHLORIDE 20 MG: 10 TABLET ORAL at 14:17

## 2021-10-21 RX ADMIN — ASPIRIN 81 MG: 81 TABLET, COATED ORAL at 05:37

## 2021-10-21 RX ADMIN — POTASSIUM CHLORIDE 40 MEQ: 1500 TABLET, EXTENDED RELEASE ORAL at 05:38

## 2021-10-21 RX ADMIN — ENOXAPARIN SODIUM 40 MG: 40 INJECTION SUBCUTANEOUS at 05:38

## 2021-10-21 RX ADMIN — DICLOFENAC SODIUM 50 MG: 25 TABLET, DELAYED RELEASE ORAL at 20:38

## 2021-10-21 RX ADMIN — INSULIN LISPRO 2 UNITS: 100 INJECTION, SOLUTION INTRAVENOUS; SUBCUTANEOUS at 16:24

## 2021-10-21 RX ADMIN — DICLOFENAC SODIUM 50 MG: 25 TABLET, DELAYED RELEASE ORAL at 07:50

## 2021-10-21 RX ADMIN — CEPHALEXIN 1000 MG: 500 CAPSULE ORAL at 14:17

## 2021-10-21 RX ADMIN — GABAPENTIN 300 MG: 300 CAPSULE ORAL at 05:37

## 2021-10-21 RX ADMIN — OXYCODONE HYDROCHLORIDE 20 MG: 10 TABLET ORAL at 05:44

## 2021-10-21 RX ADMIN — INSULIN LISPRO 2 UNITS: 100 INJECTION, SOLUTION INTRAVENOUS; SUBCUTANEOUS at 20:37

## 2021-10-21 RX ADMIN — CEPHALEXIN 1000 MG: 500 CAPSULE ORAL at 07:50

## 2021-10-21 RX ADMIN — OXYCODONE HYDROCHLORIDE 20 MG: 10 TABLET ORAL at 18:02

## 2021-10-21 RX ADMIN — CEPHALEXIN 1000 MG: 500 CAPSULE ORAL at 20:38

## 2021-10-21 RX ADMIN — CLOPIDOGREL BISULFATE 75 MG: 75 TABLET ORAL at 07:55

## 2021-10-21 RX ADMIN — GABAPENTIN 300 MG: 300 CAPSULE ORAL at 16:22

## 2021-10-21 RX ADMIN — TRAZODONE HYDROCHLORIDE 450 MG: 150 TABLET ORAL at 20:38

## 2021-10-21 RX ADMIN — SULFAMETHOXAZOLE AND TRIMETHOPRIM 1 TABLET: 800; 160 TABLET ORAL at 16:22

## 2021-10-21 RX ADMIN — BISACODYL 10 MG: 5 TABLET, COATED ORAL at 05:37

## 2021-10-21 ASSESSMENT — ENCOUNTER SYMPTOMS
BLURRED VISION: 0
DEPRESSION: 0
COUGH: 0
PALPITATIONS: 0
VOMITING: 0
SORE THROAT: 0
LOSS OF CONSCIOUSNESS: 0
SHORTNESS OF BREATH: 0
NAUSEA: 0
SEIZURES: 0
MYALGIAS: 0
ABDOMINAL PAIN: 0
CHILLS: 0
FEVER: 0

## 2021-10-21 ASSESSMENT — PAIN DESCRIPTION - PAIN TYPE
TYPE: ACUTE PAIN

## 2021-10-21 NOTE — PROGRESS NOTES
Hospital Medicine Daily Progress Note    Date of Service  10/20/2021    Chief Complaint  Luis Kellogg is a 71 y.o. male admitted 10/7/2021 with bilateral leg wounds    Hospital Course  This is a 71 year old male with PMHx of hyperlipidemia, type 2 diabetes with A1c of 9.1, obesity, CAD, hx MI,PAD, history of lower extremity osteomyelitis and recurrent LLE cellulitis who was admitted for sepsis secondary to BLE diabetic, nonhealing wounds and cellulitis.    Patient has known history of uncontrolled type 2 diabetes, he has had prior admissions for cellulitis and osteomyelitis.  Patient does have history of PAD.  Venous dopplers negative for DVT and arterial Dopplers ordered.  X-ray of the left tib-fib, no concern for osteomyelitis.  Wound care consulted. LPS consulted.    Wound cultures from March 20, 2021, noted E. Faecalis sensitive to ampicillin, Cipro, Levaquin and vancomycin.  Patient is currently on vancomycin only as per wound culture results.    Interval Problem Update  Patient was seen and examined at bedside.  No acute events overnight. Patient is resting comfortably in bed and in no acute distress.     LPS saw the [facility] to switch to regular wound vacuum at ID  On bactrium, mornitor k and RF    S/p revascularization and debridement of lower extremity wounds  LPS following  Wound vac in place  Wound culture: GBS and MRSA  Keflex/Bactrim  SNF placement    I have personally seen and examined the patient at bedside. I discussed the plan of care with patient and bedside RN.    Consultants/Specialty  LPS   Vascular surgery    Code Status  Full Code    Disposition  Patient is not medically cleared.   Anticipate discharge to TBD.  I have placed the appropriate orders for post-discharge needs.    Review of Systems  Review of Systems   Constitutional: Negative for chills and fever.   HENT: Negative for congestion and sore throat.    Eyes: Negative for blurred vision.   Respiratory: Negative for cough and shortness  of breath.    Cardiovascular: Negative for chest pain and palpitations.   Gastrointestinal: Negative for abdominal pain, nausea and vomiting.   Genitourinary: Negative for dysuria and frequency.   Musculoskeletal: Positive for joint pain. Negative for myalgias.   Neurological: Negative for seizures and loss of consciousness.   Psychiatric/Behavioral: Negative for depression.     Physical Exam  Temp:  [36 °C (96.8 °F)-36.8 °C (98.2 °F)] 36.8 °C (98.2 °F)  Pulse:  [66-78] 66  Resp:  [16-18] 17  BP: ()/(60-94) 98/69  SpO2:  [93 %-95 %] 93 %    Physical Exam  Vitals and nursing note reviewed.   Constitutional:       General: He is not in acute distress.     Appearance: He is obese. He is not toxic-appearing.   HENT:      Head: Normocephalic and atraumatic.      Right Ear: External ear normal.      Left Ear: External ear normal.      Nose: No congestion.      Mouth/Throat:      Pharynx: No oropharyngeal exudate.   Eyes:      General: No scleral icterus.     Extraocular Movements: Extraocular movements intact.      Conjunctiva/sclera: Conjunctivae normal.      Pupils: Pupils are equal, round, and reactive to light.   Cardiovascular:      Rate and Rhythm: Normal rate and regular rhythm.      Pulses: Normal pulses.      Heart sounds: No murmur heard.     Pulmonary:      Effort: Pulmonary effort is normal.      Breath sounds: Normal breath sounds. No wheezing.   Abdominal:      General: Bowel sounds are normal.      Palpations: Abdomen is soft.      Tenderness: There is no abdominal tenderness. There is no guarding or rebound.   Musculoskeletal:         General: No swelling or tenderness. Normal range of motion.      Cervical back: Normal range of motion and neck supple. No muscular tenderness.      Comments: Bilateral lower extremities with wound vacs and dressings in place   Skin:     General: Skin is warm and dry.      Capillary Refill: Capillary refill takes less than 2 seconds.      Findings: No bruising, erythema  or rash.   Neurological:      General: No focal deficit present.      Mental Status: He is alert and oriented to person, place, and time.   Psychiatric:         Mood and Affect: Mood normal.         Thought Content: Thought content normal.                 Patient was seen and examined at bedside. No changes in physical exam from prior evaluation.      Fluids    Intake/Output Summary (Last 24 hours) at 10/20/2021 1725  Last data filed at 10/19/2021 1825  Gross per 24 hour   Intake 240 ml   Output 625 ml   Net -385 ml       Laboratory  Recent Labs     10/19/21  0834   WBC 6.3   RBC 4.14*   HEMOGLOBIN 12.1*   HEMATOCRIT 37.7*   MCV 91.1   MCH 29.2   MCHC 32.1*   RDW 45.2   PLATELETCT 271   MPV 9.9     Recent Labs     10/19/21  0834 10/20/21  0357   SODIUM 135 133*   POTASSIUM 5.1 4.5   CHLORIDE 102 101   CO2 24 24   GLUCOSE 138* 151*   BUN 23* 17   CREATININE 1.14 0.89   CALCIUM 9.1 9.3                   Imaging  CT-CTA AORTA-RO WITH & W/O-POST PROCESS   Final Result      1.  Occlusion of the distal right femoral artery with reconstitution distally.      2.  Calcified plaque in small diameter of the tibial and peroneal arteries bilaterally makes it difficult to evaluate for degree of stenosis and occlusion.      3.  Plaque within the left femoral artery without evidence of occlusion.      4.  Aortic atherosclerosis without aneurysm.      5.  Diverticulosis without evidence of diverticulitis.      6.  Hepatic steatosis.      DX-FOOT-COMPLETE 3+ LEFT   Final Result         1.  Disuse osteoporosis of the left foot.      2. Large subcutaneous ulcer on the dorsal surface of left foot.      3. Previous amputations of the left second through fourth toes.      DX-FOOT-COMPLETE 3+ RIGHT   Final Result      1.  No fracture or dislocation of RIGHT foot.   2.  Periarticular osteopenia suggests inflammatory arthropathy.   3.  No soft tissue gas or focal bony destruction however osteomyelitis is not excluded by plain film.   4.   Severe degenerative change of ankle joint.      DX-TIBIA AND FIBULA RIGHT   Final Result      No evidence of fracture or dislocation or acute osteomyelitis.      US-EXTREMITY ARTERY LOWER BILAT   Final Result      US-MIA SINGLE LEVEL BILAT   Final Result      US-EXTREMITY VENOUS LOWER UNILAT LEFT   Final Result      DX-TIBIA AND FIBULA LEFT   Final Result         1.  No acute traumatic bony injury.   2.  Soft tissue erosion of the posterior mid calf, corresponding with history of soft tissue ulcers.      IR-EXTREMITY ANGIOGRAM-BILATERAL    (Results Pending)        Assessment/Plan  * Diabetic infection of left foot (HCC)- (present on admission)  Assessment & Plan  Patient has known history of uncontrolled type 2 diabetes, he has had prior admissions for cellulitis and osteomyelitis.  Patient does have history of PAD.  Venous dopplers negative for DVT   Arterial Dopplers - inflow arterial insufficiency in lower extremities bilaterally  X-ray of the left tib-fib, no concern for osteomyelitis.    Wound care consulted. LPS following.  S/p revasculazization, debridement on 10/12    Wound culture MRSA, GBS  Keflex, bactrim    Hypotension  Assessment & Plan  Asymptomatic  Requiring intermittent IVF bolus  AM Cortisol normal    Diabetic infection of right foot (HCC)- (present on admission)  Assessment & Plan  As per left lower extremity plan    Sepsis- (present on admission)  Assessment & Plan  This is Sepsis Present on admission  SIRS criteria identified on my evaluation include: Tachycardia, with heart rate greater than 90 BPM, Tachypnea, with respirations greater than 20 per minute and Bandemia, greater than 10% bands  Source is diabetic infected ulcers and cellulitis of Lower extremities  Sepsis protocol initiated  Fluid resuscitation ordered per protocol  IV antibiotics as appropriate for source of sepsis  While organ dysfunction may be noted elsewhere in this problem list or in the chart, degree of organ dysfunction  does not meet CMS criteria for severe sepsis      Uncontrolled type 2 diabetes mellitus (HCC)- (present on admission)  Assessment & Plan  ISS + Basal  A1c 9.1  Hypoglycemia protocol    Coronary artery disease involving native coronary artery - lexiscan 2/14/2020 - EF 60%  with fixed prominent defecct of interior and lateral walls, no stress induced ischemia, mild global hypokinesis- (present on admission)  Assessment & Plan  Continue with aspirin and statin therapy    Peripheral artery disease (HCC) - (present on admission)  Assessment & Plan  Patient does have history of PAD, previous MIA was performed in June 2021, showed mild to moderate disease. Repeat MIA noted significant worsening disease.    Vascular surgery consulted,s/p CTA today, plans for revascularization prior to surgical intervention for the wounds, of LLE 10/11/2021    S/p OR 10/12 for revascularization    Peripheral neuropathy (HCC)- (present on admission)  Assessment & Plan  neurontin    Hyperlipidemia- (present on admission)  Assessment & Plan  F/u lipid panel outpatient       VTE prophylaxis: SCDs/TEDs and enoxaparin ppx    I have performed a physical exam and reviewed and updated ROS and Plan today (10/20/2021). In review of yesterday's note (10/19/2021), there are no changes except as documented above.

## 2021-10-21 NOTE — PROGRESS NOTES
Pt is A&O X4, on RA.  Pt denies having any pain at the present time.  Pt is on isolation for MRSA in wounds.  Pt has a wound vac on bilateral feet. Pt is able to ambulate with 1 assist.  Safety precautions are in place.

## 2021-10-22 LAB
ANION GAP SERPL CALC-SCNC: 9 MMOL/L (ref 7–16)
BUN SERPL-MCNC: 22 MG/DL (ref 8–22)
CALCIUM SERPL-MCNC: 9.7 MG/DL (ref 8.5–10.5)
CHLORIDE SERPL-SCNC: 102 MMOL/L (ref 96–112)
CO2 SERPL-SCNC: 21 MMOL/L (ref 20–33)
CREAT SERPL-MCNC: 1.01 MG/DL (ref 0.5–1.4)
GLUCOSE BLD-MCNC: 131 MG/DL (ref 65–99)
GLUCOSE BLD-MCNC: 142 MG/DL (ref 65–99)
GLUCOSE BLD-MCNC: 160 MG/DL (ref 65–99)
GLUCOSE BLD-MCNC: 161 MG/DL (ref 65–99)
GLUCOSE SERPL-MCNC: 169 MG/DL (ref 65–99)
POTASSIUM SERPL-SCNC: 5 MMOL/L (ref 3.6–5.5)
SODIUM SERPL-SCNC: 132 MMOL/L (ref 135–145)

## 2021-10-22 PROCEDURE — 700102 HCHG RX REV CODE 250 W/ 637 OVERRIDE(OP): Performed by: SURGERY

## 2021-10-22 PROCEDURE — 80048 BASIC METABOLIC PNL TOTAL CA: CPT

## 2021-10-22 PROCEDURE — 700102 HCHG RX REV CODE 250 W/ 637 OVERRIDE(OP): Performed by: GENERAL PRACTICE

## 2021-10-22 PROCEDURE — A9270 NON-COVERED ITEM OR SERVICE: HCPCS | Performed by: STUDENT IN AN ORGANIZED HEALTH CARE EDUCATION/TRAINING PROGRAM

## 2021-10-22 PROCEDURE — 97606 NEG PRS WND THER DME>50 SQCM: CPT

## 2021-10-22 PROCEDURE — 82962 GLUCOSE BLOOD TEST: CPT | Mod: 91

## 2021-10-22 PROCEDURE — A9270 NON-COVERED ITEM OR SERVICE: HCPCS | Performed by: SURGERY

## 2021-10-22 PROCEDURE — A9270 NON-COVERED ITEM OR SERVICE: HCPCS | Performed by: INTERNAL MEDICINE

## 2021-10-22 PROCEDURE — 302098 PASTE RING (FLAT): Performed by: STUDENT IN AN ORGANIZED HEALTH CARE EDUCATION/TRAINING PROGRAM

## 2021-10-22 PROCEDURE — A9270 NON-COVERED ITEM OR SERVICE: HCPCS | Performed by: GENERAL PRACTICE

## 2021-10-22 PROCEDURE — 770001 HCHG ROOM/CARE - MED/SURG/GYN PRIV*

## 2021-10-22 PROCEDURE — 700111 HCHG RX REV CODE 636 W/ 250 OVERRIDE (IP): Performed by: GENERAL PRACTICE

## 2021-10-22 PROCEDURE — 99233 SBSQ HOSP IP/OBS HIGH 50: CPT | Performed by: STUDENT IN AN ORGANIZED HEALTH CARE EDUCATION/TRAINING PROGRAM

## 2021-10-22 PROCEDURE — 700111 HCHG RX REV CODE 636 W/ 250 OVERRIDE (IP): Performed by: INTERNAL MEDICINE

## 2021-10-22 PROCEDURE — 36415 COLL VENOUS BLD VENIPUNCTURE: CPT

## 2021-10-22 PROCEDURE — 700102 HCHG RX REV CODE 250 W/ 637 OVERRIDE(OP): Performed by: INTERNAL MEDICINE

## 2021-10-22 PROCEDURE — 700102 HCHG RX REV CODE 250 W/ 637 OVERRIDE(OP): Performed by: STUDENT IN AN ORGANIZED HEALTH CARE EDUCATION/TRAINING PROGRAM

## 2021-10-22 RX ADMIN — OXYCODONE HYDROCHLORIDE 20 MG: 10 TABLET ORAL at 17:04

## 2021-10-22 RX ADMIN — OXYCODONE HYDROCHLORIDE 20 MG: 10 TABLET ORAL at 00:56

## 2021-10-22 RX ADMIN — CEPHALEXIN 1000 MG: 500 CAPSULE ORAL at 21:28

## 2021-10-22 RX ADMIN — GABAPENTIN 300 MG: 300 CAPSULE ORAL at 05:33

## 2021-10-22 RX ADMIN — BISACODYL 10 MG: 5 TABLET, COATED ORAL at 05:33

## 2021-10-22 RX ADMIN — OXYCODONE HYDROCHLORIDE 20 MG: 10 TABLET ORAL at 05:32

## 2021-10-22 RX ADMIN — CLOPIDOGREL BISULFATE 75 MG: 75 TABLET ORAL at 08:50

## 2021-10-22 RX ADMIN — INSULIN LISPRO 2 UNITS: 100 INJECTION, SOLUTION INTRAVENOUS; SUBCUTANEOUS at 16:56

## 2021-10-22 RX ADMIN — SULFAMETHOXAZOLE AND TRIMETHOPRIM 1 TABLET: 800; 160 TABLET ORAL at 05:33

## 2021-10-22 RX ADMIN — CEPHALEXIN 1000 MG: 500 CAPSULE ORAL at 08:51

## 2021-10-22 RX ADMIN — DICLOFENAC SODIUM 50 MG: 25 TABLET, DELAYED RELEASE ORAL at 21:28

## 2021-10-22 RX ADMIN — LISINOPRIL 5 MG: 5 TABLET ORAL at 05:32

## 2021-10-22 RX ADMIN — ENOXAPARIN SODIUM 40 MG: 40 INJECTION SUBCUTANEOUS at 05:33

## 2021-10-22 RX ADMIN — BISACODYL 10 MG: 5 TABLET, COATED ORAL at 17:04

## 2021-10-22 RX ADMIN — ASPIRIN 81 MG: 81 TABLET, COATED ORAL at 05:33

## 2021-10-22 RX ADMIN — OXYCODONE HYDROCHLORIDE 20 MG: 10 TABLET ORAL at 21:28

## 2021-10-22 RX ADMIN — INSULIN LISPRO 2 UNITS: 100 INJECTION, SOLUTION INTRAVENOUS; SUBCUTANEOUS at 13:17

## 2021-10-22 RX ADMIN — CEPHALEXIN 1000 MG: 500 CAPSULE ORAL at 13:20

## 2021-10-22 RX ADMIN — DICLOFENAC SODIUM 50 MG: 25 TABLET, DELAYED RELEASE ORAL at 08:50

## 2021-10-22 RX ADMIN — GABAPENTIN 300 MG: 300 CAPSULE ORAL at 17:04

## 2021-10-22 RX ADMIN — SULFAMETHOXAZOLE AND TRIMETHOPRIM 1 TABLET: 800; 160 TABLET ORAL at 17:04

## 2021-10-22 RX ADMIN — TRAZODONE HYDROCHLORIDE 450 MG: 150 TABLET ORAL at 22:29

## 2021-10-22 RX ADMIN — OXYCODONE HYDROCHLORIDE 20 MG: 10 TABLET ORAL at 13:20

## 2021-10-22 RX ADMIN — HYDROMORPHONE HYDROCHLORIDE 0.5 MG: 1 INJECTION, SOLUTION INTRAMUSCULAR; INTRAVENOUS; SUBCUTANEOUS at 12:11

## 2021-10-22 ASSESSMENT — ENCOUNTER SYMPTOMS
MYALGIAS: 0
CHILLS: 0
FEVER: 0
NAUSEA: 0
SHORTNESS OF BREATH: 0
VOMITING: 0
SEIZURES: 0
BLURRED VISION: 0
PALPITATIONS: 0
DEPRESSION: 0
SORE THROAT: 0
LOSS OF CONSCIOUSNESS: 0
ABDOMINAL PAIN: 0
COUGH: 0

## 2021-10-22 ASSESSMENT — PAIN DESCRIPTION - PAIN TYPE
TYPE: ACUTE PAIN

## 2021-10-22 NOTE — DISCHARGE PLANNING
Anticipated Discharge Disposition: SNF    Action: LSW made phone call to Luis A at [facility]. Luis A reported that per their director, they are not currently accepting any more pt's with wound vacs due to the number of pt's they currently have with wound vacs.    Barriers to Discharge: SNF acceptance.    Plan: Care coordination will follow up with SNF referrals.

## 2021-10-22 NOTE — CARE PLAN
The patient is Watcher - Medium risk of patient condition declining or worsening    Shift Goals  Clinical Goals: manage pain  Patient Goals: rest    Progress made toward(s) clinical / shift goals:  Progressing    Patient is not progressing towards the following goals:

## 2021-10-22 NOTE — PROGRESS NOTES
LIMB PRESERVATION SERVICE   POST SURGICAL PROGRESS NOTE      HPI:  Luis Kellogg is a 71 y.o.  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   LPS has been consulted for bilateral lower extremity diabetic ulcers.     Patient has long history of lower extremity chronic wounds, PAD, and left 2-4th toe amputation in 2/2020 at outside hospital. Patient presented with worsening bilateral lower extremity wounds with worsening swelling and pain consistent with cellulitis. He denies any fevers, chills, nausea, or vomiting. Patient is poor historian and unable to tell me how long he has had wounds, but does report that they have been worsening with occasional purulent drainage. He reports that left toe amputation site never healed after surgery in 2/2020. Patient reports that he previously saw a vascular surgeon in [place], does not remember his name, and had angiogram and my have had a stent in one of his femoral arteries though not sure which and thinks it was 1.5-2 years ago. Patient was referred to [facility] and had evaluation and non-invasive studies and was supposed to follow up with [facility] for intervention but never followed up. Patient was previously being followed by [facility], however he has not followed up since 3/2021 and has not been performing any wound care on his lower extremity wounds.      IV antibiotics were started on this admission.  Infectious diseases has not been consulted.    Xray completed and is negative for osteomyelitis.  Ortho has not been consulted yet.     Diagnosed with diabetes 7-8 years ago, and is currently managing with metformin and insulin.  Checks blood sugars 1 times per day and reports that these typically average 110-150. Does have numbness to feet. Usually wears tennis shoes . Does have diabetic shoes and inserts  "from 4-5 that are years old and in not interested in diabetic footwear at this time.  Has had previous foot surgeries including L 2-4 toe and MTH amputation.  Current occupation retired.     SURGERY DATE: 10/12/2021 - Dr. Purcell  PROCEDURE:   1) Angiogram  2) Right SFA stent  3) I&D of wounds with wound vac placement      INTERVAL HISTORY:  10/14/2021: POD #2.  Patient denies fevers, chills, nausea, vomiting.  Pain poorly controlled, discussed with hospitalist. Patient hypotensive this morning requiring fluid bolus. Seen with wound care team for wound vac change. Patient in excruciating pain with vac change  10/19/2021: POD#7. Patient denies any complaints. Seen with wound care team during VAC change. Patient continues to have tenderness with VAC removal and placement. Wounds improving slowly.  10/21/2021: POD #10. Seen with wound team during VAC change. Continues to have pain with VAC changes, somewhat improved. Continue veraflo wound vac while inpatient. Pending dispo / placement.    PERTINENT LPS RESULTS:   Pathology: None sent  Cultures: leg wound 10/8: MRSA, Group B Streptococcus    SURGICAL SITE EXAM:      /74   Pulse 66   Temp 36.2 °C (97.2 °F) (Temporal)   Resp 17   Ht 1.702 m (5' 7\")   Wt 100 kg (221 lb 1.9 oz)   SpO2 95%   BMI 34.63 kg/m²     Pedal Pulses: R foot: Unable to palpate, Monophasic DP/PT  L foot: Unable to palpate, Monophasic DP/PT   Sensation: Sensation largely intact on legs, diminished on feet  Surgical foot warm, well perfused    Right lateral tibial wounds x2  Wound base is mostly pink, 10% adherent slough  Continue Veraflo wound vac as showing good improvement in granulation tissue  No drainage  No odor  Soo wound erythema          R foot dorsal 1st toe wound   R foot dorsal 2nd toe wound   Dried scab over wound  Continue to treat by keeping wound dry  No drainage  No odor  Periwound erythema improved  Mild periwound callus            L medial heel wound  Wound base with " moist slough, debrided to show healthy pink tissue  No drainage  No odor  No erythema          L 2-4 toe and MTH amputation site  Left foot dorsal wound  Chronic non healing amputation site from 2020  Patient reports never closed  Good granulation tissue with minimal slough  Periwound callus  Periwound erythema improved              L Anterior Tibial wounds x 6  Improved adherent slough  Some granulation tissue  No odor  No discharge  Periwound erythema              Left Lateral Leg  Base is majority granulation tissue, minimal slough  No odor or drainage                L Posterior tib/fib wounds x 3  Wound base improved, some adherent slough  No drainage  Periwound erythema  No odor              Wound care completed by wound care team: please refer to wound care note and flow sheet for details       DIABETES MANAGEMENT:    Blood glucose:   Results from last 7 days   Lab Units 10/22/21  1315 10/22/21  0527 10/21/21  2029 10/21/21  1617 10/21/21  1143 10/21/21  0522 10/20/21  2119 10/20/21  1736   ACCU CHECK GLUCOSE 788 mg/dL 161* 142* 189* 183* 146* 150* 148* 162*     A1c:   Lab Results   Component Value Date/Time    HBA1C 9.1 (H) 10/08/2021 04:26 AM            INFECTION MANAGEMENT:    Results from last 7 days   Lab Units 10/21/21  0429 10/19/21  0834   WBC 1501 K/uL 5.3 6.3   PLATELET COUNT 1518 K/uL 273 271     Wound culture results:   Results     ** No results found for the last 168 hours. **               ASSESSMENT/PLAN:   POD #10 S/P Angiogram, I&D, wound vac placement by Dr. Purcell on 10/12.    - Patient continues to be treated with Veraflo wound vac to wounds while admitted, would continue as he can only tolerate mild debridement at bedside due to pain. OK to change to regular wound vac on discharge.  - Due to pain and slough, continue veraflo wound vac while admitted  - Only tolerated mild bedside debridement due to pain  - Will need to continue wound vac for now due to extent of wounds and to promote  granulation and more rapid healing.  - Left heel wound opened with wet slough, debrided with healthy underlying tissue. Change to hydrofera blue.      Wound care:   -Wound care orders updated for nursing  -Left medial heel: Hydrofera blue to wound base, off loading in moon boot. Change dressing q 48 hours  - Dorsal right foot wounds: Betadine to wounds  - All other wounds: Managed by Veraflo wound vac, to be changed 3x weekly by wound team    Imaging/Labs:  -COVID-19: not detected this admission    Vascular status:   -s/p angiogram and right SFA stent by Dr. Purcell 10/12, adequate blood flow after re- vascularization    Surgery:   --no further surgeries planned at this time    Antibiotics:   -Defer to hospitalist    Weight Bearing Status:   -Weight bearing as tolerated    Offloading:   -Offloading shoe; ordered. Refused by patient.  -Orthotic company:     PT/OT:   -involved       Diabetes Education:   - consult CDE and RD involved     - Implications of loss of protective sensation (LOPS) discussed with patient- including increased risk for amputation.  Advised to check feet at least daily, moisturize feet, and to always wear protective foot wear.   -avoid trimming own nails. See podiatrist or certified foot and nail RN  -keep blood sugars <150 for improved wound healing    DISCHARGE PLAN:  Patient has extensive wounds requiring wound Vac  Treated with Veraflo wound vac while admitted, can be converted to regular vac on discharge  OK to discharge to SNF that can continue wound care    Disposition:   SNF per PT recommendation and due to extensive wounds    Follow-up: Dr. Purcell approximately 3 weeks post-op  Follow up LPS rounds: Will be scheduled when closer to discharge date      Discussed with: pt, RN, . He    Please note that this dictation was created using voice recognition software. I have  worked with technical experts from [employer] to optimize the interface.  I have made every reasonable attempt to  correct obvious errors, but there may be errors of grammar and possibly content that I did not discover before finalizing the note.      Rodri Ardon M.D.    If any questions or concerns, please contact LPS through voalte.

## 2021-10-22 NOTE — PROGRESS NOTES
Hospital Medicine Daily Progress Note    Date of Service  10/21/2021    Chief Complaint  Luis Kellogg is a 71 y.o. male admitted 10/7/2021 with bilateral leg wounds    Hospital Course  This is a 71 year old male with PMHx of hyperlipidemia, type 2 diabetes with A1c of 9.1, obesity, CAD, hx MI,PAD, history of lower extremity osteomyelitis and recurrent LLE cellulitis who was admitted for sepsis secondary to BLE diabetic, nonhealing wounds and cellulitis.    Patient has known history of uncontrolled type 2 diabetes, he has had prior admissions for cellulitis and osteomyelitis.  Patient does have history of PAD.  Venous dopplers negative for DVT and arterial Dopplers ordered.  X-ray of the left tib-fib, no concern for osteomyelitis.  Wound care consulted. LPS consulted.    Wound cultures from March 20, 2021, noted E. Faecalis sensitive to ampicillin, Cipro, Levaquin and vancomycin.  Patient is currently on vancomycin only as per wound culture results.    Interval Problem Update  Patient was seen and examined at bedside.    No acute events overnight. Patient is resting comfortably in bed and in no acute distress.   LPS saw the pat ok to switch to regular wound vacuum at dc  On keflex and bactrium, mornitor k and RF    S/p revascularization and debridement of lower extremity wounds  Wound vac in place  Wound culture: GBS and MRSA    SNF/LTAC placement    I have personally seen and examined the patient at bedside. I discussed the plan of care with patient and bedside RN.    Consultants/Specialty  [facility]   Vascular surgery    Code Status  Full Code    Disposition  Patient is medically cleared.   Anticipate discharge to TBD.  I have placed the appropriate orders for post-discharge needs.    Review of Systems  Review of Systems   Constitutional: Negative for chills and fever.   HENT: Negative for congestion and sore throat.    Eyes: Negative for blurred vision.   Respiratory: Negative for cough and shortness of breath.     Cardiovascular: Negative for chest pain and palpitations.   Gastrointestinal: Negative for abdominal pain, nausea and vomiting.   Genitourinary: Negative for dysuria and frequency.   Musculoskeletal: Positive for joint pain. Negative for myalgias.   Neurological: Negative for seizures and loss of consciousness.   Psychiatric/Behavioral: Negative for depression.     Physical Exam  Temp:  [36 °C (96.8 °F)-36.2 °C (97.2 °F)] 36.2 °C (97.1 °F)  Pulse:  [64-91] 68  Resp:  [18-19] 19  BP: ()/(54-66) 100/58  SpO2:  [90 %-94 %] 90 %    Physical Exam  Vitals and nursing note reviewed.   Constitutional:       General: He is not in acute distress.     Appearance: He is obese. He is not toxic-appearing.   HENT:      Head: Normocephalic and atraumatic.      Right Ear: External ear normal.      Left Ear: External ear normal.      Nose: No congestion.      Mouth/Throat:      Pharynx: No oropharyngeal exudate.   Eyes:      General: No scleral icterus.     Extraocular Movements: Extraocular movements intact.      Conjunctiva/sclera: Conjunctivae normal.      Pupils: Pupils are equal, round, and reactive to light.   Cardiovascular:      Rate and Rhythm: Normal rate and regular rhythm.      Pulses: Normal pulses.      Heart sounds: No murmur heard.     Pulmonary:      Effort: Pulmonary effort is normal.      Breath sounds: Normal breath sounds. No wheezing.   Abdominal:      General: Bowel sounds are normal.      Palpations: Abdomen is soft.      Tenderness: There is no abdominal tenderness. There is no guarding or rebound.   Musculoskeletal:         General: No swelling or tenderness. Normal range of motion.      Cervical back: Normal range of motion and neck supple. No muscular tenderness.      Comments: Bilateral lower extremities with wound vacs and dressings in place   Skin:     General: Skin is warm and dry.      Capillary Refill: Capillary refill takes less than 2 seconds.      Findings: No bruising, erythema or rash.    Neurological:      General: No focal deficit present.      Mental Status: He is alert and oriented to person, place, and time.   Psychiatric:         Mood and Affect: Mood normal.         Thought Content: Thought content normal.                 Patient was seen and examined at bedside. No changes in physical exam from prior evaluation.      Fluids    Intake/Output Summary (Last 24 hours) at 10/21/2021 1707  Last data filed at 10/21/2021 1300  Gross per 24 hour   Intake 240 ml   Output 50 ml   Net 190 ml       Laboratory  Recent Labs     10/19/21  0834 10/21/21  0429   WBC 6.3 5.3   RBC 4.14* 3.97*   HEMOGLOBIN 12.1* 11.9*   HEMATOCRIT 37.7* 35.8*   MCV 91.1 90.2   MCH 29.2 30.0   MCHC 32.1* 33.2*   RDW 45.2 44.5   PLATELETCT 271 273   MPV 9.9 10.0     Recent Labs     10/19/21  0834 10/20/21  0357 10/21/21  0429   SODIUM 135 133* 132*   POTASSIUM 5.1 4.5 5.3   CHLORIDE 102 101 102   CO2 24 24 23   GLUCOSE 138* 151* 161*   BUN 23* 17 20   CREATININE 1.14 0.89 1.03   CALCIUM 9.1 9.3 9.3                   Imaging  CT-CTA AORTA-RO WITH & W/O-POST PROCESS   Final Result      1.  Occlusion of the distal right femoral artery with reconstitution distally.      2.  Calcified plaque in small diameter of the tibial and peroneal arteries bilaterally makes it difficult to evaluate for degree of stenosis and occlusion.      3.  Plaque within the left femoral artery without evidence of occlusion.      4.  Aortic atherosclerosis without aneurysm.      5.  Diverticulosis without evidence of diverticulitis.      6.  Hepatic steatosis.      DX-FOOT-COMPLETE 3+ LEFT   Final Result         1.  Disuse osteoporosis of the left foot.      2. Large subcutaneous ulcer on the dorsal surface of left foot.      3. Previous amputations of the left second through fourth toes.      DX-FOOT-COMPLETE 3+ RIGHT   Final Result      1.  No fracture or dislocation of RIGHT foot.   2.  Periarticular osteopenia suggests inflammatory arthropathy.   3.  No  soft tissue gas or focal bony destruction however osteomyelitis is not excluded by plain film.   4.  Severe degenerative change of ankle joint.      DX-TIBIA AND FIBULA RIGHT   Final Result      No evidence of fracture or dislocation or acute osteomyelitis.      US-EXTREMITY ARTERY LOWER BILAT   Final Result      US-MIA SINGLE LEVEL BILAT   Final Result      US-EXTREMITY VENOUS LOWER UNILAT LEFT   Final Result      DX-TIBIA AND FIBULA LEFT   Final Result         1.  No acute traumatic bony injury.   2.  Soft tissue erosion of the posterior mid calf, corresponding with history of soft tissue ulcers.      IR-EXTREMITY ANGIOGRAM-BILATERAL    (Results Pending)        Assessment/Plan  * Diabetic infection of left foot (HCC)- (present on admission)  Assessment & Plan  Patient has known history of uncontrolled type 2 diabetes, he has had prior admissions for cellulitis and osteomyelitis.  Patient does have history of PAD.  Venous dopplers negative for DVT   Arterial Dopplers - inflow arterial insufficiency in lower extremities bilaterally  X-ray of the left tib-fib, no concern for osteomyelitis.    Wound care consulted. LPS following.  S/p revasculazization, debridement on 10/12    Wound culture MRSA, GBS  Keflex, bactrim    Hypotension  Assessment & Plan  Asymptomatic  Requiring intermittent IVF bolus  AM Cortisol normal    Diabetic infection of right foot (HCC)- (present on admission)  Assessment & Plan  As per left lower extremity plan    Sepsis- (present on admission)  Assessment & Plan  This is Sepsis Present on admission  SIRS criteria identified on my evaluation include: Tachycardia, with heart rate greater than 90 BPM, Tachypnea, with respirations greater than 20 per minute and Bandemia, greater than 10% bands  Source is diabetic infected ulcers and cellulitis of Lower extremities  Sepsis protocol initiated  Fluid resuscitation ordered per protocol  IV antibiotics as appropriate for source of sepsis  While organ  dysfunction may be noted elsewhere in this problem list or in the chart, degree of organ dysfunction does not meet CMS criteria for severe sepsis      Uncontrolled type 2 diabetes mellitus (HCC)- (present on admission)  Assessment & Plan  ISS + Basal  A1c 9.1  Hypoglycemia protocol    Coronary artery disease involving native coronary artery - lexiscan 2/14/2020 - EF 60%  with fixed prominent defecct of interior and lateral walls, no stress induced ischemia, mild global hypokinesis- (present on admission)  Assessment & Plan  Continue with aspirin and statin therapy    Peripheral artery disease (HCC) - (present on admission)  Assessment & Plan  Patient does have history of PAD, previous MIA was performed in June 2021, showed mild to moderate disease. Repeat MIA noted significant worsening disease.    Vascular surgery consulted,s/p CTA today, plans for revascularization prior to surgical intervention for the wounds, of LLE 10/11/2021    S/p OR 10/12 for revascularization    Peripheral neuropathy (HCC)- (present on admission)  Assessment & Plan  neurontin    Hyperlipidemia- (present on admission)  Assessment & Plan  F/u lipid panel outpatient       VTE prophylaxis: SCDs/TEDs and enoxaparin ppx    I have performed a physical exam and reviewed and updated ROS and Plan today (10/21/2021). In review of yesterday's note (10/20/2021), there are no changes except as documented above.

## 2021-10-23 LAB
ANION GAP SERPL CALC-SCNC: 9 MMOL/L (ref 7–16)
BUN SERPL-MCNC: 26 MG/DL (ref 8–22)
CALCIUM SERPL-MCNC: 9.5 MG/DL (ref 8.5–10.5)
CHLORIDE SERPL-SCNC: 103 MMOL/L (ref 96–112)
CO2 SERPL-SCNC: 23 MMOL/L (ref 20–33)
CREAT SERPL-MCNC: 1.16 MG/DL (ref 0.5–1.4)
GLUCOSE BLD-MCNC: 153 MG/DL (ref 65–99)
GLUCOSE BLD-MCNC: 155 MG/DL (ref 65–99)
GLUCOSE BLD-MCNC: 164 MG/DL (ref 65–99)
GLUCOSE BLD-MCNC: 198 MG/DL (ref 65–99)
GLUCOSE SERPL-MCNC: 150 MG/DL (ref 65–99)
POTASSIUM SERPL-SCNC: 4.8 MMOL/L (ref 3.6–5.5)
SODIUM SERPL-SCNC: 135 MMOL/L (ref 135–145)

## 2021-10-23 PROCEDURE — 36415 COLL VENOUS BLD VENIPUNCTURE: CPT

## 2021-10-23 PROCEDURE — 700102 HCHG RX REV CODE 250 W/ 637 OVERRIDE(OP): Performed by: STUDENT IN AN ORGANIZED HEALTH CARE EDUCATION/TRAINING PROGRAM

## 2021-10-23 PROCEDURE — 700111 HCHG RX REV CODE 636 W/ 250 OVERRIDE (IP): Performed by: GENERAL PRACTICE

## 2021-10-23 PROCEDURE — A9270 NON-COVERED ITEM OR SERVICE: HCPCS | Performed by: SURGERY

## 2021-10-23 PROCEDURE — 700102 HCHG RX REV CODE 250 W/ 637 OVERRIDE(OP): Performed by: INTERNAL MEDICINE

## 2021-10-23 PROCEDURE — A9270 NON-COVERED ITEM OR SERVICE: HCPCS | Performed by: STUDENT IN AN ORGANIZED HEALTH CARE EDUCATION/TRAINING PROGRAM

## 2021-10-23 PROCEDURE — 80048 BASIC METABOLIC PNL TOTAL CA: CPT

## 2021-10-23 PROCEDURE — 82962 GLUCOSE BLOOD TEST: CPT

## 2021-10-23 PROCEDURE — 99233 SBSQ HOSP IP/OBS HIGH 50: CPT | Performed by: STUDENT IN AN ORGANIZED HEALTH CARE EDUCATION/TRAINING PROGRAM

## 2021-10-23 PROCEDURE — 770001 HCHG ROOM/CARE - MED/SURG/GYN PRIV*

## 2021-10-23 PROCEDURE — A9270 NON-COVERED ITEM OR SERVICE: HCPCS | Performed by: INTERNAL MEDICINE

## 2021-10-23 PROCEDURE — 700102 HCHG RX REV CODE 250 W/ 637 OVERRIDE(OP): Performed by: SURGERY

## 2021-10-23 RX ADMIN — INSULIN LISPRO 2 UNITS: 100 INJECTION, SOLUTION INTRAVENOUS; SUBCUTANEOUS at 21:51

## 2021-10-23 RX ADMIN — DICLOFENAC SODIUM 50 MG: 25 TABLET, DELAYED RELEASE ORAL at 20:39

## 2021-10-23 RX ADMIN — CEPHALEXIN 1000 MG: 500 CAPSULE ORAL at 07:36

## 2021-10-23 RX ADMIN — DICLOFENAC SODIUM 50 MG: 25 TABLET, DELAYED RELEASE ORAL at 07:36

## 2021-10-23 RX ADMIN — INSULIN LISPRO 2 UNITS: 100 INJECTION, SOLUTION INTRAVENOUS; SUBCUTANEOUS at 12:01

## 2021-10-23 RX ADMIN — SULFAMETHOXAZOLE AND TRIMETHOPRIM 1 TABLET: 800; 160 TABLET ORAL at 17:08

## 2021-10-23 RX ADMIN — OXYCODONE HYDROCHLORIDE 20 MG: 10 TABLET ORAL at 20:39

## 2021-10-23 RX ADMIN — OXYCODONE HYDROCHLORIDE 20 MG: 10 TABLET ORAL at 04:42

## 2021-10-23 RX ADMIN — ENOXAPARIN SODIUM 40 MG: 40 INJECTION SUBCUTANEOUS at 04:40

## 2021-10-23 RX ADMIN — BISACODYL 10 MG: 5 TABLET, COATED ORAL at 04:42

## 2021-10-23 RX ADMIN — INSULIN LISPRO 2 UNITS: 100 INJECTION, SOLUTION INTRAVENOUS; SUBCUTANEOUS at 16:39

## 2021-10-23 RX ADMIN — GABAPENTIN 300 MG: 300 CAPSULE ORAL at 04:41

## 2021-10-23 RX ADMIN — INSULIN LISPRO 2 UNITS: 100 INJECTION, SOLUTION INTRAVENOUS; SUBCUTANEOUS at 05:50

## 2021-10-23 RX ADMIN — CARVEDILOL 3.12 MG: 3.12 TABLET, FILM COATED ORAL at 16:37

## 2021-10-23 RX ADMIN — TRAZODONE HYDROCHLORIDE 450 MG: 150 TABLET ORAL at 20:39

## 2021-10-23 RX ADMIN — BISACODYL 10 MG: 5 TABLET, COATED ORAL at 17:07

## 2021-10-23 RX ADMIN — CARVEDILOL 3.12 MG: 3.12 TABLET, FILM COATED ORAL at 07:36

## 2021-10-23 RX ADMIN — CEPHALEXIN 1000 MG: 500 CAPSULE ORAL at 16:37

## 2021-10-23 RX ADMIN — CEPHALEXIN 1000 MG: 500 CAPSULE ORAL at 20:39

## 2021-10-23 RX ADMIN — GABAPENTIN 300 MG: 300 CAPSULE ORAL at 17:07

## 2021-10-23 RX ADMIN — ASPIRIN 81 MG: 81 TABLET, COATED ORAL at 04:41

## 2021-10-23 RX ADMIN — CLOPIDOGREL BISULFATE 75 MG: 75 TABLET ORAL at 04:42

## 2021-10-23 RX ADMIN — SULFAMETHOXAZOLE AND TRIMETHOPRIM 1 TABLET: 800; 160 TABLET ORAL at 04:41

## 2021-10-23 RX ADMIN — OXYCODONE HYDROCHLORIDE 10 MG: 10 TABLET ORAL at 16:37

## 2021-10-23 ASSESSMENT — PAIN DESCRIPTION - PAIN TYPE
TYPE: ACUTE PAIN

## 2021-10-23 ASSESSMENT — ENCOUNTER SYMPTOMS
MYALGIAS: 0
SHORTNESS OF BREATH: 0
VOMITING: 0
BLURRED VISION: 0
DEPRESSION: 0
CHILLS: 0
FEVER: 0
LOSS OF CONSCIOUSNESS: 0
NAUSEA: 0
SEIZURES: 0
ABDOMINAL PAIN: 0
COUGH: 0
PALPITATIONS: 0
SORE THROAT: 0

## 2021-10-23 ASSESSMENT — FIBROSIS 4 INDEX: FIB4 SCORE: 1.46

## 2021-10-23 NOTE — CARE PLAN
The patient is Stable - Low risk of patient condition declining or worsening    Shift Goals  Clinical Goals: pain management  Patient Goals: rest    Progress made toward(s) clinical / shift goals:    Problem: Knowledge Deficit - Standard  Goal: Patient and family/care givers will demonstrate understanding of plan of care, disease process/condition, diagnostic tests and medications  Outcome: Progressing     Problem: Hemodynamics  Goal: Patient's hemodynamics, fluid balance and neurologic status will be stable or improve  Outcome: Progressing     Problem: Fluid Volume  Goal: Fluid volume balance will be maintained  Outcome: Progressing

## 2021-10-23 NOTE — WOUND TEAM
[facility]  Inpatient Services   Wound and Skin Care     Admission Date: 10/7/2021     Last order of IP CONSULT TO WOUND CARE was found on 10/12/2021 from Hospital Encounter on 10/7/2021     HPI, PMH, SH: Reviewed    Past Surgical History:   Procedure Laterality Date   • ANGIOGRAM Bilateral 10/12/2021    Procedure: BILATERAL LOWER EXTREMITY ANGIOGRAM;  Surgeon: Valerio Purcell M.D.;  Location: SURGERY [facility];  Service: Vascular   • IRRIGATION & DEBRIDEMENT GENERAL Bilateral 10/12/2021    Procedure: IRRIGATION AND DEBRIDEMENT, WOUND, BILATERAL LOWER EXTREMITY;  Surgeon: Valerio Purcell M.D.;  Location: SURGERY [facility];  Service: Vascular   • APPLICATION OR REPLACEMENT, WOUND VAC Bilateral 10/12/2021    Procedure: APPLICATION WOUND VAC;  Surgeon: Valerio Purcell M.D.;  Location: SURGERY [facility];  Service: Vascular   • STENT PLACEMENT Right 10/12/2021    Procedure: STENT PLACEMENT, RIGHT SUPERFICIAL FEMORAL ARTERY;  Surgeon: Valerio Purcell M.D.;  Location: SURGERY [facility];  Service: Vascular   • TOE AMPUTATION Left 2/17/2020    Procedure: LEFT SECOND, THIRD, AND FORTH TOE AMPUTATION;  Surgeon: Alfonso Esteban M.D.;  Location: SURGERY [facility];  Service: Orthopedics   • OH UNLISTED PROC, ARTHROSCOPY Left 7/25/2019    Procedure: LEFT ENDOSCOPIC ULNAR NERVE DECOMPRESSION, LEFT CARPAL TUNNEL RELEASE;  Surgeon: Red Chacon M.D.;  Location: SURGERY [facility];  Service: Orthopedics   • KNEE REPLACEMENT, TOTAL Bilateral    • OTHER SURGICAL PROCEDURE      back surgery - Unknown      Social History     Tobacco Use   • Smoking status: Current Every Day Smoker     Packs/day: 0.50     Years: 54.00     Pack years: 27.00     Types: Cigarettes   • Smokeless tobacco: Former User   Substance Use Topics   • Alcohol use: Not Currently     Alcohol/week: 0.0 oz     Comment: occas     Chief Complaint   Patient presents with   • Wound Infection     bilateral leg wounds. pt states  he has had them for years but seem to be getting worse      Diagnosis: Cellulitis of left lower extremity [L03.116]    Unit where seen by Wound Team: S623/02     WOUND CONSULT/FOLLOW UP RELATED TO:  Initial vac change     WOUND HISTORY:  72 yo Male underwent Right SFA stent placement and OR debridement on BLE with subsequent vac placement.    WOUND ASSESSMENT/LDA        Negative Pressure Wound Therapy 10/12/21 Other (Comment);Surgical Leg Left (Active)   NPWT Pump Mode / Pressure Setting 125 mmHg;Continuous 10/22/21 1600   Dressing Type Black Foam (Veraflo) 10/22/21 1600   Canister Changed No 10/22/21 1600   Output (mL) 50 mL 10/22/21 0520   NEXT Dressing Change/Treatment Date 10/21/21 10/19/21 1600   VAC VeraFlo Irrigant Normal Saline 10/22/21 1600   VAC VeraFlo Soak Time (mins) 6 10/22/21 1600   VAC VeraFlo Instill Volume (ml) 42 10/22/21 1600   VAC VeraFlo - Therapy Time (hrs) 2 10/22/21 1600   VAC VeraFlo Pressure (mm/Hg) 150 mmHg 10/22/21 1600       Negative Pressure Wound Therapy 10/16/21 Right (Active)   NPWT Pump Mode / Pressure Setting 125 mmHg 10/22/21 1600   Dressing Type Black Foam (Veraflo) 10/22/21 1600   Number of Foam Pieces Used 2 10/22/21 1600   Canister Changed No 10/22/21 1600   Output (mL) 100 mL 10/22/21 0520   NEXT Dressing Change/Treatment Date 10/25/21 10/22/21 1600   VAC VeraFlo Irrigant Normal Saline 10/22/21 1600   VAC VeraFlo Soak Time (mins) 6 10/22/21 1600   VAC VeraFlo Instill Volume (ml) 16 10/22/21 1600   VAC VeraFlo - Therapy Time (hrs) 2 10/22/21 1600   VAC VeraFlo Pressure (mm/Hg) 125 mmHg;Continuous 10/22/21 1600   WOUND NURSE ONLY - Time Spent with Patient (mins) 90 10/22/21 1600               Wound 10/14/21 Full Thickness Wound Leg Lateral Right NPWT veraflo (Active)   Wound Image    10/19/21 1600   Site Assessment Red;Pink;Yellow    Periwound Assessment Pink;Red    Margins Defined edges;Unattached edges    Closure Secondary intention    Drainage Amount Small    Drainage  Description Serosanguineous    Treatments Cleansed;Site care;Tape change    Wound Cleansing Approved Wound Cleanser    Periwound Protectant Skin Protectant Wipes to Periwound    Dressing Cleansing/Solutions Normal Saline    Dressing Options Wound Vac    Dressing Changed Changed    Dressing Status Clean;Dry;Intact    Dressing Change/Treatment Frequency Monday, Wednesday, Friday, and As Needed    NEXT Dressing Change/Treatment Date 10/25/21    NEXT Weekly Photo (Inpatient Only) 10/26/21    Wound Length (cm) 17 cm    Wound Width (cm) 7.5 cm    Wound Depth (cm) 0.6 cm    Wound Surface Area (cm^2) 127.5 cm^2    Wound Volume (cm^3) 76.5 cm^3    Wound Healing % -6    Wound Bed Granulation (%) 70 %    Wound Bed Slough (%) 30 %    Shape irregular    Wound Odor None    Exposed Structures None    WOUND NURSE ONLY - Time Spent with Patient (mins) 120        Wound 10/14/21 Full Thickness Wound Leg;Foot Lateral;Dorsal Left (Active)   Wound Image      10/19/21 1600   Site Assessment Pink;Red;Yellow    Periwound Assessment Pink;Red    Margins Defined edges    Closure Secondary intention    Drainage Amount Moderate    Drainage Description Serosanguineous    Treatments Cleansed;Site care;Tape change    Wound Cleansing Approved Wound Cleanser    Periwound Protectant Skin Protectant Wipes to Periwound    Dressing Cleansing/Solutions Normal Saline    Dressing Options Wound Vac    Dressing Changed Changed    Dressing Status Clean;Dry;Intact    Dressing Change/Treatment Frequency Monday, Wednesday, Friday, and As Needed    NEXT Dressing Change/Treatment Date 10/25/21    NEXT Weekly Photo (Inpatient Only) 10/27/21    Wound Length (cm) 9.5 cm    Wound Width (cm) 8 cm    Wound Depth (cm) 0.1 cm    Wound Surface Area (cm^2) 76 cm^2    Wound Volume (cm^3) 7.6 cm^3    Wound Bed Granulation (%) 70 %    Wound Bed Slough (%) 30 %    Shape irregular    Wound Odor None    Exposed Structures None    WOUND NURSE ONLY - Time Spent with Patient (mins)  120           Vascular:    MIA:   MIA Results, Last 30 Days US-MIA SINGLE LEVEL BILAT     RIGHT      Waveform            Systolic BPs (mmHg)                                                     Brachial   Monophasic                               Common Femoral   Monophasic                               Posterior Tibial   Monophasic                               Dorsalis Pedis                                                    Peroneal                                            MIA                                            TBI                           LEFT   Waveform        Systolic BPs (mmHg)                                    143           Brachial   Monophasic                               Common Femoral   Monophasic                               Posterior Tibial   Monophasic                               Dorsalis Pedis                                                     Peroneal                                            MIA                                            TBI         Findings   Bilateral.    Doppler waveforms of the common femoral arteries are abnormally dampened/    monophasic.    Doppler waveforms at the ankle are monophasic.    The ankle pressures are not obtained due to pain.    Digit PPG waveforms are normal in the right toes.   Digit PPG waveforms are normal in the left 1st and 5th toe. The other toes    are absent/amputated.    Toe-brachial indices is reduced right side.   Toe-brachial indices are left side.    Toe-brachial indices:     Right:  0.54   Left:  0.90         Lab Values:    Lab Results   Component Value Date/Time    WBC 5.3 10/21/2021 04:29 AM    RBC 3.97 (L) 10/21/2021 04:29 AM    HEMOGLOBIN 11.9 (L) 10/21/2021 04:29 AM    HEMATOCRIT 35.8 (L) 10/21/2021 04:29 AM    CREACTPROT 6.25 (H) 10/07/2021 08:30 PM    SEDRATEWES 1 10/07/2021 08:30 PM    HBA1C 9.1 (H) 10/08/2021 04:26 AM        Culture Results show:  Recent Results (from the past 720 hour(s))   CULTURE WOUND W/ GRAM STAIN     Collection Time: 10/08/21  2:13 PM    Specimen: Left Leg; Wound   Result Value Ref Range    Significant Indicator POS (POS)     Source WND     Site LEFT LEG     Culture Result Rare growth mixed enteric collins. (A)     Gram Stain Result Moderate WBCs.  No organisms seen.       Culture Result (A)      Methicillin Resistant Staphylococcus aureus  Light growth  This isolate is presumed to be clindamycin resistant based on  detection of inducible resistance.  Clindamycin may still  be effective in some patients.      Culture Result Streptococcus agalactiae (Group B)  Light growth   (A)        Susceptibility    Methicillin resistant staphylococcus aureus - MELA     Azithromycin >4 Resistant mcg/mL     Clindamycin <=0.25 Resistant mcg/mL     Cefazolin <=8 Resistant mcg/mL     Cefepime 16 Resistant mcg/mL     Ceftaroline <=0.5 Sensitive mcg/mL     Daptomycin <=0.5 Sensitive mcg/mL     Ampicillin/sulbactam 16/8 Resistant mcg/mL     Erythromycin >4 Resistant mcg/mL     Vancomycin 1 Sensitive mcg/mL     Oxacillin >2 Resistant mcg/mL     Trimeth/Sulfa <=0.5/9.5 Sensitive mcg/mL     Tetracycline <=4 Sensitive mcg/mL       Pain Level/Medicated:  Max pain; Premed IV, topical lidocaine Soln x2 vials, Requires pain meds after dressing change       INTERVENTIONS BY WOUND TEAM:   Performed standard wound care which includes appropriate positioning, dressing removal and non-selective debridement. Pictures and measurements obtained weekly if/when required.    RIGHT LE  Preparation for Dressing removal: Dressing soaked with Lidocaine and NS.   Non-selectively Debrided with:  cleanser and gauze.  Sharp debridement: NA  Soo wound: Cleansed with cleanser, Prepped with Cavilon skin prep, drape and paste rings, drape covered paste rings on LLE due to extensive bridging and portion of R paste ring to bridge 2 wounds.     aquacel ag to PTWs on left LE  Primary Dressing: black VF foam bridged as neede and covered with drape to achieve a seal.     Secondary (Outer) Dressing: button, drape and tracpad to foam. Patient refused tubi .     L medial heel: hyrdofera blue and mepilex.      Interdisciplinary consultation: Patient, Bedside RN, wound RN MD Duglas Barrett, ASIAI rep Fine      EVALUATION / RATIONALE FOR TREATMENT:  Most Recent Date:  10/22/21: patient pain better than previous, wounds continuing to improve. Continue VF NPWT. hydrofera blue to L medial heel.     10/19/21: patient's pain is better controlled, more granular tissue, bedside debridement , slough on left leg adherent , VF hopeful will hopefully loosen it next vac change.  Continue VF NPWT  10/16/21 All wounds (except heel )with VF VAC to remove slough and encourage granular hillfn56 formation.  VF should ease the pain with dressing removal.  Keeping heel wound dry due to diminished pulses and eschar is stable.    10/14: RLE lateral wound with adherent slough at base. Vera harry initiated to encourage mechanical debridement. LLE lateral and toe amp wound with clean, partially granulated wound base. Maintained regular wound vac suction to these sites. Implemented adaptic as a nonadherent layer to decrease pain upon removal next change.     Honeycolloid applied to anterior and posterior aspect on LLE given adherent slough at base. Pt will benefit from veraflo wound vac to LLE including bridging of scattered lesions, therefore, will need extra machine, cassette, Y-connector and tracpad next session.     Pt was in significant pain throughout the dressing care process. Will benefit from ongoing  Premedication and lidocaine soln for pain control. Check with primary RN prior to wound care as pt may be hypotensive and not appropriate for IV pain meds right away. LPS following patient, refer to LPS noted for POC.      Goals: Steady decrease in wound area and depth weekly.    WOUND TEAM PLAN OF CARE ([X] for frequency of wound follow up,):   Nursing to follow orders written for wound care. Contact  wound team if area fails to progress, deteriorates or with any questions/concerns  Dressing changes by wound team:                   Follow up 3 times weekly:                NPWT change 3 times weekly: x    Follow up 1-2 times weekly:      Follow up Bi-Monthly:                   Follow up as needed:     Other (explain):     NURSING PLAN OF CARE ORDERS (X):  Dressing changes: See Dressing Care orders: x  Skin care: See Skin Care orders: x  RN Prevention Protocol: x  Rectal tube care: See Rectal Tube Care orders:   Other orders:    Anticipated discharge plans:   LTACH:        SNF/Rehab:      X will need ongoing wound care            Home Health Care:           Outpatient Wound Center:            Self/Family Care:        Other:                  Vac Discharge Needs:   Not Applicable Pt not on a wound vac:       Regular Vac while inpatient, alternative dressing at DC:        Regular Vac in use and continued at DC:           Reg. Vac w/ Skin Sub/Biologic in use. Will need to be changed 2x wkly:      Veraflo Vac while inpatient, ok to transition to Regular Vac on Discharge:  X       Veraflo Vac while inpatient, will need to remain on Veraflo Vac upon discharge:

## 2021-10-23 NOTE — PROGRESS NOTES
Hospital Medicine Daily Progress Note    Date of Service  10/22/2021    Chief Complaint  Luis Kellogg is a 71 y.o. male admitted 10/7/2021 with bilateral leg wounds    Hospital Course  This is a 71 year old male with PMHx of hyperlipidemia, type 2 diabetes with A1c of 9.1, obesity, CAD, hx MI,PAD, history of lower extremity osteomyelitis and recurrent LLE cellulitis who was admitted for sepsis secondary to BLE diabetic, nonhealing wounds and cellulitis.    Patient has known history of uncontrolled type 2 diabetes, he has had prior admissions for cellulitis and osteomyelitis.  Patient does have history of PAD.  Venous dopplers negative for DVT and arterial Dopplers ordered.  X-ray of the left tib-fib, no concern for osteomyelitis.  Wound care consulted. LPS consulted.    Wound cultures from March 20, 2021, noted E. Faecalis sensitive to ampicillin, Cipro, Levaquin and vancomycin.  Patient is currently on vancomycin only as per wound culture results.    Interval Problem Update  Patient was seen and examined at bedside.  No acute events overnight. Patient is resting comfortably in bed and in no acute distress.     On keflex and bactrium, mornitor k and RF    S/p revascularization and debridement of lower extremity wounds  Wound vac in place  Wound culture: GBS and MRSA  LPS saw the pat, ok to switch to regular wound vacuum at dc    SNF/LTAC placement    I have personally seen and examined the patient at bedside. I discussed the plan of care with patient and bedside RN.    Consultants/Specialty  LPS   Vascular surgery    Code Status  Full Code    Disposition  Patient is medically cleared.   Anticipate discharge to TBD.  I have placed the appropriate orders for post-discharge needs.    Review of Systems  Review of Systems   Constitutional: Negative for chills and fever.   HENT: Negative for congestion and sore throat.    Eyes: Negative for blurred vision.   Respiratory: Negative for cough and shortness of breath.     Cardiovascular: Negative for chest pain and palpitations.   Gastrointestinal: Negative for abdominal pain, nausea and vomiting.   Genitourinary: Negative for dysuria and frequency.   Musculoskeletal: Positive for joint pain. Negative for myalgias.   Neurological: Negative for seizures and loss of consciousness.   Psychiatric/Behavioral: Negative for depression.     Physical Exam  Temp:  [36.2 °C (97.2 °F)-36.6 °C (97.8 °F)] 36.6 °C (97.8 °F)  Pulse:  [64-69] 65  Resp:  [17-18] 17  BP: (100-127)/(62-86) 108/67  SpO2:  [94 %-97 %] 97 %    Physical Exam  Vitals and nursing note reviewed.   Constitutional:       General: He is not in acute distress.     Appearance: He is obese. He is not toxic-appearing.   HENT:      Head: Normocephalic and atraumatic.      Right Ear: External ear normal.      Left Ear: External ear normal.      Nose: No congestion.      Mouth/Throat:      Pharynx: No oropharyngeal exudate.   Eyes:      General: No scleral icterus.     Extraocular Movements: Extraocular movements intact.      Conjunctiva/sclera: Conjunctivae normal.      Pupils: Pupils are equal, round, and reactive to light.   Cardiovascular:      Rate and Rhythm: Normal rate and regular rhythm.      Pulses: Normal pulses.      Heart sounds: No murmur heard.     Pulmonary:      Effort: Pulmonary effort is normal.      Breath sounds: Normal breath sounds. No wheezing.   Abdominal:      General: Bowel sounds are normal.      Palpations: Abdomen is soft.      Tenderness: There is no abdominal tenderness. There is no guarding or rebound.   Musculoskeletal:         General: No swelling or tenderness. Normal range of motion.      Cervical back: Normal range of motion and neck supple. No muscular tenderness.      Comments: Bilateral lower extremities with wound vacs and dressings in place   Skin:     General: Skin is warm and dry.      Capillary Refill: Capillary refill takes less than 2 seconds.      Findings: No bruising, erythema or  rash.   Neurological:      General: No focal deficit present.      Mental Status: He is alert and oriented to person, place, and time.   Psychiatric:         Mood and Affect: Mood normal.         Thought Content: Thought content normal.                 Patient was seen and examined at bedside. No changes in physical exam from prior evaluation.      Fluids    Intake/Output Summary (Last 24 hours) at 10/22/2021 1704  Last data filed at 10/22/2021 0520  Gross per 24 hour   Intake 240 ml   Output 200 ml   Net 40 ml       Laboratory  Recent Labs     10/21/21  0429   WBC 5.3   RBC 3.97*   HEMOGLOBIN 11.9*   HEMATOCRIT 35.8*   MCV 90.2   MCH 30.0   MCHC 33.2*   RDW 44.5   PLATELETCT 273   MPV 10.0     Recent Labs     10/20/21  0357 10/21/21  0429 10/22/21  0853   SODIUM 133* 132* 132*   POTASSIUM 4.5 5.3 5.0   CHLORIDE 101 102 102   CO2 24 23 21   GLUCOSE 151* 161* 169*   BUN 17 20 22   CREATININE 0.89 1.03 1.01   CALCIUM 9.3 9.3 9.7                   Imaging  CT-CTA AORTA-RO WITH & W/O-POST PROCESS   Final Result      1.  Occlusion of the distal right femoral artery with reconstitution distally.      2.  Calcified plaque in small diameter of the tibial and peroneal arteries bilaterally makes it difficult to evaluate for degree of stenosis and occlusion.      3.  Plaque within the left femoral artery without evidence of occlusion.      4.  Aortic atherosclerosis without aneurysm.      5.  Diverticulosis without evidence of diverticulitis.      6.  Hepatic steatosis.      DX-FOOT-COMPLETE 3+ LEFT   Final Result         1.  Disuse osteoporosis of the left foot.      2. Large subcutaneous ulcer on the dorsal surface of left foot.      3. Previous amputations of the left second through fourth toes.      DX-FOOT-COMPLETE 3+ RIGHT   Final Result      1.  No fracture or dislocation of RIGHT foot.   2.  Periarticular osteopenia suggests inflammatory arthropathy.   3.  No soft tissue gas or focal bony destruction however  osteomyelitis is not excluded by plain film.   4.  Severe degenerative change of ankle joint.      DX-TIBIA AND FIBULA RIGHT   Final Result      No evidence of fracture or dislocation or acute osteomyelitis.      US-EXTREMITY ARTERY LOWER BILAT   Final Result      US-MIA SINGLE LEVEL BILAT   Final Result      US-EXTREMITY VENOUS LOWER UNILAT LEFT   Final Result      DX-TIBIA AND FIBULA LEFT   Final Result         1.  No acute traumatic bony injury.   2.  Soft tissue erosion of the posterior mid calf, corresponding with history of soft tissue ulcers.      IR-EXTREMITY ANGIOGRAM-BILATERAL    (Results Pending)        Assessment/Plan  * Diabetic infection of left foot (HCC)- (present on admission)  Assessment & Plan  Patient has known history of uncontrolled type 2 diabetes, he has had prior admissions for cellulitis and osteomyelitis.  Patient does have history of PAD.  Venous dopplers negative for DVT   Arterial Dopplers - inflow arterial insufficiency in lower extremities bilaterally  X-ray of the left tib-fib, no concern for osteomyelitis.    Wound care consulted. LPS following.  S/p revasculazization, debridement on 10/12    Wound culture MRSA, GBS  Keflex, bactrim    Hypotension  Assessment & Plan  Asymptomatic  Requiring intermittent IVF bolus  AM Cortisol normal    Diabetic infection of right foot (HCC)- (present on admission)  Assessment & Plan  As per left lower extremity plan    Sepsis- (present on admission)  Assessment & Plan  This is Sepsis Present on admission  SIRS criteria identified on my evaluation include: Tachycardia, with heart rate greater than 90 BPM, Tachypnea, with respirations greater than 20 per minute and Bandemia, greater than 10% bands  Source is diabetic infected ulcers and cellulitis of Lower extremities  Sepsis protocol initiated  Fluid resuscitation ordered per protocol  IV antibiotics as appropriate for source of sepsis  While organ dysfunction may be noted elsewhere in this problem  list or in the chart, degree of organ dysfunction does not meet CMS criteria for severe sepsis      Uncontrolled type 2 diabetes mellitus (HCC)- (present on admission)  Assessment & Plan  ISS + Basal  A1c 9.1  Hypoglycemia protocol    Coronary artery disease involving native coronary artery - lexiscan 2/14/2020 - EF 60%  with fixed prominent defecct of interior and lateral walls, no stress induced ischemia, mild global hypokinesis- (present on admission)  Assessment & Plan  Continue with aspirin and statin therapy    Peripheral artery disease (HCC) - (present on admission)  Assessment & Plan  Patient does have history of PAD, previous MIA was performed in June 2021, showed mild to moderate disease. Repeat MIA noted significant worsening disease.    Vascular surgery consulted,s/p CTA today, plans for revascularization prior to surgical intervention for the wounds, of LLE 10/11/2021    S/p OR 10/12 for revascularization    Peripheral neuropathy (HCC)- (present on admission)  Assessment & Plan  neurontin    Hyperlipidemia- (present on admission)  Assessment & Plan  F/u lipid panel outpatient       VTE prophylaxis: SCDs/TEDs and enoxaparin ppx    I have performed a physical exam and reviewed and updated ROS and Plan today (10/22/2021). In review of yesterday's note (10/21/2021), there are no changes except as documented above.

## 2021-10-23 NOTE — DISCHARGE PLANNING
Medical Social Work  Patient denied at [facility], pending at [facility], and [facility].    Choice faxed to [facility] for skilled that choice has not been sent to [facility], [place] and [facility]

## 2021-10-23 NOTE — PROGRESS NOTES
Hospital Medicine Daily Progress Note    Date of Service  10/23/2021    Chief Complaint  Luis Kellogg is a 71 y.o. male admitted 10/7/2021 with bilateral leg wounds    Hospital Course  This is a 71 year old male with PMHx of hyperlipidemia, type 2 diabetes with A1c of 9.1, obesity, CAD, hx MI,PAD, history of lower extremity osteomyelitis and recurrent LLE cellulitis who was admitted for sepsis secondary to BLE diabetic, nonhealing wounds and cellulitis.    Patient has known history of uncontrolled type 2 diabetes, he has had prior admissions for cellulitis and osteomyelitis.  Patient does have history of PAD.  Venous dopplers negative for DVT and arterial Dopplers ordered.  X-ray of the left tib-fib, no concern for osteomyelitis.  Wound care consulted. LPS consulted.    Wound cultures from March 20, 2021, noted E. Faecalis sensitive to ampicillin, Cipro, Levaquin and vancomycin.  Patient is currently on vancomycin only as per wound culture results.    Interval Problem Update  Patient was seen and examined at bedside.  No acute events overnight. Patient is resting comfortably in bed and in no acute distress.     BP low, discontinue lisinopril, continue Coreg 3.125 mg twice daily  On keflex and bactrium util 10/27, mornitor k and RF    S/p revascularization and debridement of lower extremity wounds  Wound vac in place  Wound culture: GBS and MRSA  LPS saw the meli ok to switch to regular wound vacuum at dc    SNF placement    I have personally seen and examined the patient at bedside. I discussed the plan of care with patient and bedside RN.    Consultants/Specialty  LPS   Vascular surgery    Code Status  Full Code    Disposition  Patient is medically cleared.   Anticipate discharge to to skilled nursing facility.  I have placed the appropriate orders for post-discharge needs.    Review of Systems  Review of Systems   Constitutional: Negative for chills and fever.   HENT: Negative for congestion and sore throat.     Eyes: Negative for blurred vision.   Respiratory: Negative for cough and shortness of breath.    Cardiovascular: Negative for chest pain and palpitations.   Gastrointestinal: Negative for abdominal pain, nausea and vomiting.   Genitourinary: Negative for dysuria and frequency.   Musculoskeletal: Positive for joint pain. Negative for myalgias.   Neurological: Negative for seizures and loss of consciousness.   Psychiatric/Behavioral: Negative for depression.     Physical Exam  Temp:  [36.1 °C (96.9 °F)-36.6 °C (97.8 °F)] 36.1 °C (96.9 °F)  Pulse:  [65-78] 78  Resp:  [17-18] 17  BP: ()/(51-67) 91/60  SpO2:  [91 %-97 %] 97 %    Physical Exam  Vitals and nursing note reviewed.   Constitutional:       General: He is not in acute distress.     Appearance: He is obese. He is not toxic-appearing.   HENT:      Head: Normocephalic and atraumatic.      Right Ear: External ear normal.      Left Ear: External ear normal.      Nose: No congestion.      Mouth/Throat:      Pharynx: No oropharyngeal exudate.   Eyes:      General: No scleral icterus.     Extraocular Movements: Extraocular movements intact.      Conjunctiva/sclera: Conjunctivae normal.      Pupils: Pupils are equal, round, and reactive to light.   Cardiovascular:      Rate and Rhythm: Normal rate and regular rhythm.      Pulses: Normal pulses.      Heart sounds: No murmur heard.     Pulmonary:      Effort: Pulmonary effort is normal.      Breath sounds: Normal breath sounds. No wheezing.   Abdominal:      General: Bowel sounds are normal.      Palpations: Abdomen is soft.      Tenderness: There is no abdominal tenderness. There is no guarding or rebound.   Musculoskeletal:         General: No swelling or tenderness. Normal range of motion.      Cervical back: Normal range of motion and neck supple. No muscular tenderness.      Comments: Bilateral lower extremities with wound vacs and dressings in place   Skin:     General: Skin is warm and dry.      Capillary  Refill: Capillary refill takes less than 2 seconds.      Findings: No bruising, erythema or rash.   Neurological:      General: No focal deficit present.      Mental Status: He is alert and oriented to person, place, and time.   Psychiatric:         Mood and Affect: Mood normal.         Thought Content: Thought content normal.                 Patient was seen and examined at bedside. No changes in physical exam from prior evaluation.      Fluids    Intake/Output Summary (Last 24 hours) at 10/23/2021 1345  Last data filed at 10/23/2021 0800  Gross per 24 hour   Intake 720 ml   Output 1025 ml   Net -305 ml       Laboratory  Recent Labs     10/21/21  0429   WBC 5.3   RBC 3.97*   HEMOGLOBIN 11.9*   HEMATOCRIT 35.8*   MCV 90.2   MCH 30.0   MCHC 33.2*   RDW 44.5   PLATELETCT 273   MPV 10.0     Recent Labs     10/21/21  0429 10/22/21  0853 10/23/21  0437   SODIUM 132* 132* 135   POTASSIUM 5.3 5.0 4.8   CHLORIDE 102 102 103   CO2 23 21 23   GLUCOSE 161* 169* 150*   BUN 20 22 26*   CREATININE 1.03 1.01 1.16   CALCIUM 9.3 9.7 9.5                   Imaging  CT-CTA AORTA-RO WITH & W/O-POST PROCESS   Final Result      1.  Occlusion of the distal right femoral artery with reconstitution distally.      2.  Calcified plaque in small diameter of the tibial and peroneal arteries bilaterally makes it difficult to evaluate for degree of stenosis and occlusion.      3.  Plaque within the left femoral artery without evidence of occlusion.      4.  Aortic atherosclerosis without aneurysm.      5.  Diverticulosis without evidence of diverticulitis.      6.  Hepatic steatosis.      DX-FOOT-COMPLETE 3+ LEFT   Final Result         1.  Disuse osteoporosis of the left foot.      2. Large subcutaneous ulcer on the dorsal surface of left foot.      3. Previous amputations of the left second through fourth toes.      DX-FOOT-COMPLETE 3+ RIGHT   Final Result      1.  No fracture or dislocation of RIGHT foot.   2.  Periarticular osteopenia suggests  inflammatory arthropathy.   3.  No soft tissue gas or focal bony destruction however osteomyelitis is not excluded by plain film.   4.  Severe degenerative change of ankle joint.      DX-TIBIA AND FIBULA RIGHT   Final Result      No evidence of fracture or dislocation or acute osteomyelitis.      US-EXTREMITY ARTERY LOWER BILAT   Final Result      US-MIA SINGLE LEVEL BILAT   Final Result      US-EXTREMITY VENOUS LOWER UNILAT LEFT   Final Result      DX-TIBIA AND FIBULA LEFT   Final Result         1.  No acute traumatic bony injury.   2.  Soft tissue erosion of the posterior mid calf, corresponding with history of soft tissue ulcers.      IR-EXTREMITY ANGIOGRAM-BILATERAL    (Results Pending)        Assessment/Plan  * Diabetic infection of left foot (HCC)- (present on admission)  Assessment & Plan  Patient has known history of uncontrolled type 2 diabetes, he has had prior admissions for cellulitis and osteomyelitis.  Patient does have history of PAD.  Venous dopplers negative for DVT   Arterial Dopplers - inflow arterial insufficiency in lower extremities bilaterally  X-ray of the left tib-fib, no concern for osteomyelitis.    Wound care consulted. LPS following.  S/p revasculazization, debridement on 10/12    Wound culture MRSA, GBS  Keflex, bactrim    Hypotension  Assessment & Plan  Asymptomatic  Requiring intermittent IVF bolus  AM Cortisol normal    Diabetic infection of right foot (HCC)- (present on admission)  Assessment & Plan  As per left lower extremity plan    Sepsis- (present on admission)  Assessment & Plan  This is Sepsis Present on admission  SIRS criteria identified on my evaluation include: Tachycardia, with heart rate greater than 90 BPM, Tachypnea, with respirations greater than 20 per minute and Bandemia, greater than 10% bands  Source is diabetic infected ulcers and cellulitis of Lower extremities  Sepsis protocol initiated  Fluid resuscitation ordered per protocol  IV antibiotics as appropriate  for source of sepsis  While organ dysfunction may be noted elsewhere in this problem list or in the chart, degree of organ dysfunction does not meet CMS criteria for severe sepsis      Uncontrolled type 2 diabetes mellitus (HCC)- (present on admission)  Assessment & Plan  ISS + Basal  A1c 9.1  Hypoglycemia protocol    Coronary artery disease involving native coronary artery - lexiscan 2/14/2020 - EF 60%  with fixed prominent defecct of interior and lateral walls, no stress induced ischemia, mild global hypokinesis- (present on admission)  Assessment & Plan  Continue with aspirin and statin therapy    Peripheral artery disease (HCC) - (present on admission)  Assessment & Plan  Patient does have history of PAD, previous MIA was performed in June 2021, showed mild to moderate disease. Repeat MIA noted significant worsening disease.    Vascular surgery consulted,s/p CTA today, plans for revascularization prior to surgical intervention for the wounds, of LLE 10/11/2021    S/p OR 10/12 for revascularization    Peripheral neuropathy (HCC)- (present on admission)  Assessment & Plan  neurontin    Hyperlipidemia- (present on admission)  Assessment & Plan  F/u lipid panel outpatient       VTE prophylaxis: SCDs/TEDs and enoxaparin ppx    I have performed a physical exam and reviewed and updated ROS and Plan today (10/23/2021). In review of yesterday's note (10/22/2021), there are no changes except as documented above.

## 2021-10-23 NOTE — DISCHARGE PLANNING
Received Choice form at 0069  Agency/Facility Name: [facility], [facility], [facility], [facility]  Referral sent per Choice form @ 1057

## 2021-10-23 NOTE — PROGRESS NOTES
Assumed care of patient at bedside report from DAY RN. Updated on POC. Patient currently A & O x 4; on room air; He is a one person assist, pivots to the bedside [place], otherwise does not ambulate; He  complains of acute pain in lower extremities. Assessment completed at this time. Call light within reach. Whiteboard updated. Fall precautions in place. Bed locked and in lowest position. All questions answered. No other needs indicated at this time.  COVID-19 surge in effect

## 2021-10-24 LAB
ANION GAP SERPL CALC-SCNC: 10 MMOL/L (ref 7–16)
BUN SERPL-MCNC: 27 MG/DL (ref 8–22)
CALCIUM SERPL-MCNC: 9.2 MG/DL (ref 8.5–10.5)
CHLORIDE SERPL-SCNC: 104 MMOL/L (ref 96–112)
CO2 SERPL-SCNC: 20 MMOL/L (ref 20–33)
CREAT SERPL-MCNC: 1.08 MG/DL (ref 0.5–1.4)
GLUCOSE BLD-MCNC: 145 MG/DL (ref 65–99)
GLUCOSE BLD-MCNC: 150 MG/DL (ref 65–99)
GLUCOSE BLD-MCNC: 170 MG/DL (ref 65–99)
GLUCOSE BLD-MCNC: 172 MG/DL (ref 65–99)
GLUCOSE SERPL-MCNC: 121 MG/DL (ref 65–99)
POTASSIUM SERPL-SCNC: 4.6 MMOL/L (ref 3.6–5.5)
SODIUM SERPL-SCNC: 134 MMOL/L (ref 135–145)

## 2021-10-24 PROCEDURE — 700102 HCHG RX REV CODE 250 W/ 637 OVERRIDE(OP): Performed by: INTERNAL MEDICINE

## 2021-10-24 PROCEDURE — 82962 GLUCOSE BLOOD TEST: CPT | Mod: 91

## 2021-10-24 PROCEDURE — 700102 HCHG RX REV CODE 250 W/ 637 OVERRIDE(OP): Performed by: STUDENT IN AN ORGANIZED HEALTH CARE EDUCATION/TRAINING PROGRAM

## 2021-10-24 PROCEDURE — A9270 NON-COVERED ITEM OR SERVICE: HCPCS | Performed by: SURGERY

## 2021-10-24 PROCEDURE — A9270 NON-COVERED ITEM OR SERVICE: HCPCS | Performed by: GENERAL PRACTICE

## 2021-10-24 PROCEDURE — 700111 HCHG RX REV CODE 636 W/ 250 OVERRIDE (IP): Performed by: INTERNAL MEDICINE

## 2021-10-24 PROCEDURE — A9270 NON-COVERED ITEM OR SERVICE: HCPCS | Performed by: INTERNAL MEDICINE

## 2021-10-24 PROCEDURE — 770001 HCHG ROOM/CARE - MED/SURG/GYN PRIV*

## 2021-10-24 PROCEDURE — A9270 NON-COVERED ITEM OR SERVICE: HCPCS | Performed by: STUDENT IN AN ORGANIZED HEALTH CARE EDUCATION/TRAINING PROGRAM

## 2021-10-24 PROCEDURE — 700102 HCHG RX REV CODE 250 W/ 637 OVERRIDE(OP): Performed by: SURGERY

## 2021-10-24 PROCEDURE — 80048 BASIC METABOLIC PNL TOTAL CA: CPT

## 2021-10-24 PROCEDURE — 99233 SBSQ HOSP IP/OBS HIGH 50: CPT | Performed by: STUDENT IN AN ORGANIZED HEALTH CARE EDUCATION/TRAINING PROGRAM

## 2021-10-24 PROCEDURE — 700111 HCHG RX REV CODE 636 W/ 250 OVERRIDE (IP): Performed by: GENERAL PRACTICE

## 2021-10-24 PROCEDURE — 36415 COLL VENOUS BLD VENIPUNCTURE: CPT

## 2021-10-24 PROCEDURE — 700102 HCHG RX REV CODE 250 W/ 637 OVERRIDE(OP): Performed by: GENERAL PRACTICE

## 2021-10-24 RX ORDER — DOCUSATE SODIUM 100 MG/1
100 CAPSULE, LIQUID FILLED ORAL 2 TIMES DAILY
Status: DISCONTINUED | OUTPATIENT
Start: 2021-10-25 | End: 2021-10-26

## 2021-10-24 RX ORDER — DOCUSATE SODIUM 100 MG/1
200 CAPSULE, LIQUID FILLED ORAL ONCE
Status: COMPLETED | OUTPATIENT
Start: 2021-10-24 | End: 2021-10-24

## 2021-10-24 RX ADMIN — CEPHALEXIN 1000 MG: 500 CAPSULE ORAL at 14:55

## 2021-10-24 RX ADMIN — OXYCODONE HYDROCHLORIDE 20 MG: 10 TABLET ORAL at 09:40

## 2021-10-24 RX ADMIN — SULFAMETHOXAZOLE AND TRIMETHOPRIM 1 TABLET: 800; 160 TABLET ORAL at 05:30

## 2021-10-24 RX ADMIN — ASPIRIN 81 MG: 81 TABLET, COATED ORAL at 05:31

## 2021-10-24 RX ADMIN — TRAZODONE HYDROCHLORIDE 450 MG: 150 TABLET ORAL at 21:30

## 2021-10-24 RX ADMIN — ENOXAPARIN SODIUM 40 MG: 40 INJECTION SUBCUTANEOUS at 05:30

## 2021-10-24 RX ADMIN — BISACODYL 10 MG: 5 TABLET, COATED ORAL at 21:30

## 2021-10-24 RX ADMIN — OXYCODONE HYDROCHLORIDE 20 MG: 10 TABLET ORAL at 21:31

## 2021-10-24 RX ADMIN — OXYCODONE HYDROCHLORIDE 20 MG: 10 TABLET ORAL at 17:46

## 2021-10-24 RX ADMIN — INSULIN LISPRO 2 UNITS: 100 INJECTION, SOLUTION INTRAVENOUS; SUBCUTANEOUS at 18:34

## 2021-10-24 RX ADMIN — SULFAMETHOXAZOLE AND TRIMETHOPRIM 1 TABLET: 800; 160 TABLET ORAL at 17:41

## 2021-10-24 RX ADMIN — OXYCODONE HYDROCHLORIDE 20 MG: 10 TABLET ORAL at 00:18

## 2021-10-24 RX ADMIN — CEPHALEXIN 1000 MG: 500 CAPSULE ORAL at 09:34

## 2021-10-24 RX ADMIN — BISACODYL 10 MG: 5 TABLET, COATED ORAL at 05:31

## 2021-10-24 RX ADMIN — DOCUSATE SODIUM 200 MG: 100 CAPSULE ORAL at 11:42

## 2021-10-24 RX ADMIN — HYDROMORPHONE HYDROCHLORIDE 0.5 MG: 1 INJECTION, SOLUTION INTRAMUSCULAR; INTRAVENOUS; SUBCUTANEOUS at 18:55

## 2021-10-24 RX ADMIN — GABAPENTIN 300 MG: 300 CAPSULE ORAL at 05:30

## 2021-10-24 RX ADMIN — DICLOFENAC SODIUM 50 MG: 25 TABLET, DELAYED RELEASE ORAL at 09:34

## 2021-10-24 RX ADMIN — DICLOFENAC SODIUM 50 MG: 25 TABLET, DELAYED RELEASE ORAL at 21:30

## 2021-10-24 RX ADMIN — CLOPIDOGREL BISULFATE 75 MG: 75 TABLET ORAL at 05:31

## 2021-10-24 RX ADMIN — OXYCODONE HYDROCHLORIDE 10 MG: 10 TABLET ORAL at 05:30

## 2021-10-24 RX ADMIN — CEPHALEXIN 1000 MG: 500 CAPSULE ORAL at 21:30

## 2021-10-24 RX ADMIN — HYDROXYZINE HYDROCHLORIDE 10 MG: 10 TABLET, FILM COATED ORAL at 13:13

## 2021-10-24 RX ADMIN — INSULIN LISPRO 2 UNITS: 100 INJECTION, SOLUTION INTRAVENOUS; SUBCUTANEOUS at 21:36

## 2021-10-24 ASSESSMENT — PAIN DESCRIPTION - PAIN TYPE
TYPE: ACUTE PAIN

## 2021-10-24 ASSESSMENT — ENCOUNTER SYMPTOMS
NAUSEA: 0
SORE THROAT: 0
LOSS OF CONSCIOUSNESS: 0
COUGH: 0
SEIZURES: 0
SHORTNESS OF BREATH: 0
MYALGIAS: 0
CHILLS: 0
VOMITING: 0
DEPRESSION: 0
FEVER: 0
BLURRED VISION: 0
PALPITATIONS: 0
ABDOMINAL PAIN: 0

## 2021-10-24 NOTE — CARE PLAN
The patient is Stable - Low risk of patient condition declining or worsening    Shift Goals  Clinical Goals: pain management  Patient Goals: rest    Progress made toward(s) clinical / shift goals:    Problem: Knowledge Deficit - Standard  Goal: Patient and family/care givers will demonstrate understanding of plan of care, disease process/condition, diagnostic tests and medications  Outcome: Progressing     Problem: Pain - Standard  Goal: Alleviation of pain or a reduction in pain to the patient’s comfort goal  Outcome: Progressing     Problem: Skin Integrity  Goal: Skin integrity is maintained or improved  Outcome: Progressing

## 2021-10-24 NOTE — PROGRESS NOTES
Hospital Medicine Daily Progress Note    Date of Service  10/24/2021    Chief Complaint  Luis Kellogg is a 71 y.o. male admitted 10/7/2021 with bilateral leg wounds    Hospital Course  This is a 71 year old male with PMHx of hyperlipidemia, type 2 diabetes with A1c of 9.1, obesity, CAD, hx MI,PAD, history of lower extremity osteomyelitis and recurrent LLE cellulitis who was admitted for sepsis secondary to BLE diabetic, nonhealing wounds and cellulitis.    Patient has known history of uncontrolled type 2 diabetes, he has had prior admissions for cellulitis and osteomyelitis.  Patient does have history of PAD.  Venous dopplers negative for DVT and arterial Dopplers ordered.  X-ray of the left tib-fib, no concern for osteomyelitis.  Wound care consulted. LPS consulted.    Wound cultures from March 20, 2021, noted E. Faecalis sensitive to ampicillin, Cipro, Levaquin and vancomycin.  Patient is currently on vancomycin only as per wound culture results.    Interval Problem Update  Patient was seen and examined at bedside.  No acute events overnight. Patient is resting comfortably in bed and in no acute distress.     BP low, discontinue lisinopril, still low, HR 70s, discontinue Coreg 3.125, lasix on hold   keflex and bactrium util 10/27, mornitor k and RF    S/p revascularization and debridement of lower extremity wounds  Wound vac in place  Wound culture: GBS and MRSA  LPS saw the angie garrison to switch to regular wound vacuum at dc    SNF placement    I have personally seen and examined the patient at bedside. I discussed the plan of care with patient and bedside RN.    Consultants/Specialty  LPS   Vascular surgery    Code Status  Full Code    Disposition  Patient is medically cleared.   Anticipate discharge to to skilled nursing facility.  I have placed the appropriate orders for post-discharge needs.    Review of Systems  Review of Systems   Constitutional: Negative for chills and fever.   HENT: Negative for congestion  and sore throat.    Eyes: Negative for blurred vision.   Respiratory: Negative for cough and shortness of breath.    Cardiovascular: Negative for chest pain and palpitations.   Gastrointestinal: Negative for abdominal pain, nausea and vomiting.   Genitourinary: Negative for dysuria and frequency.   Musculoskeletal: Positive for joint pain. Negative for myalgias.   Neurological: Negative for seizures and loss of consciousness.   Psychiatric/Behavioral: Negative for depression.     Physical Exam  Temp:  [36.1 °C (96.9 °F)-36.5 °C (97.7 °F)] 36.4 °C (97.5 °F)  Pulse:  [68-77] 68  Resp:  [17-18] 18  BP: ()/(57-68) 95/61  SpO2:  [94 %-96 %] 94 %    Physical Exam  Vitals and nursing note reviewed.   Constitutional:       General: He is not in acute distress.     Appearance: He is obese. He is not toxic-appearing.   HENT:      Head: Normocephalic and atraumatic.      Right Ear: External ear normal.      Left Ear: External ear normal.      Nose: No congestion.      Mouth/Throat:      Pharynx: No oropharyngeal exudate.   Eyes:      General: No scleral icterus.     Extraocular Movements: Extraocular movements intact.      Conjunctiva/sclera: Conjunctivae normal.      Pupils: Pupils are equal, round, and reactive to light.   Cardiovascular:      Rate and Rhythm: Normal rate and regular rhythm.      Pulses: Normal pulses.      Heart sounds: No murmur heard.     Pulmonary:      Effort: Pulmonary effort is normal.      Breath sounds: Normal breath sounds. No wheezing.   Abdominal:      General: Bowel sounds are normal.      Palpations: Abdomen is soft.      Tenderness: There is no abdominal tenderness. There is no guarding or rebound.   Musculoskeletal:         General: No swelling or tenderness. Normal range of motion.      Cervical back: Normal range of motion and neck supple. No muscular tenderness.      Comments: Bilateral lower extremities with wound vacs and dressings in place   Skin:     General: Skin is warm and  dry.      Capillary Refill: Capillary refill takes less than 2 seconds.      Findings: No bruising, erythema or rash.   Neurological:      General: No focal deficit present.      Mental Status: He is alert and oriented to person, place, and time.   Psychiatric:         Mood and Affect: Mood normal.         Thought Content: Thought content normal.                 Patient was seen and examined at bedside. No changes in physical exam from prior evaluation.      Fluids    Intake/Output Summary (Last 24 hours) at 10/24/2021 1009  Last data filed at 10/24/2021 0600  Gross per 24 hour   Intake 600 ml   Output 750 ml   Net -150 ml       Laboratory      Recent Labs     10/22/21  0853 10/23/21  0437 10/24/21  0258   SODIUM 132* 135 134*   POTASSIUM 5.0 4.8 4.6   CHLORIDE 102 103 104   CO2 21 23 20   GLUCOSE 169* 150* 121*   BUN 22 26* 27*   CREATININE 1.01 1.16 1.08   CALCIUM 9.7 9.5 9.2                   Imaging  CT-CTA AORTA-RO WITH & W/O-POST PROCESS   Final Result      1.  Occlusion of the distal right femoral artery with reconstitution distally.      2.  Calcified plaque in small diameter of the tibial and peroneal arteries bilaterally makes it difficult to evaluate for degree of stenosis and occlusion.      3.  Plaque within the left femoral artery without evidence of occlusion.      4.  Aortic atherosclerosis without aneurysm.      5.  Diverticulosis without evidence of diverticulitis.      6.  Hepatic steatosis.      DX-FOOT-COMPLETE 3+ LEFT   Final Result         1.  Disuse osteoporosis of the left foot.      2. Large subcutaneous ulcer on the dorsal surface of left foot.      3. Previous amputations of the left second through fourth toes.      DX-FOOT-COMPLETE 3+ RIGHT   Final Result      1.  No fracture or dislocation of RIGHT foot.   2.  Periarticular osteopenia suggests inflammatory arthropathy.   3.  No soft tissue gas or focal bony destruction however osteomyelitis is not excluded by plain film.   4.  Severe  degenerative change of ankle joint.      DX-TIBIA AND FIBULA RIGHT   Final Result      No evidence of fracture or dislocation or acute osteomyelitis.      US-EXTREMITY ARTERY LOWER BILAT   Final Result      US-MIA SINGLE LEVEL BILAT   Final Result      US-EXTREMITY VENOUS LOWER UNILAT LEFT   Final Result      DX-TIBIA AND FIBULA LEFT   Final Result         1.  No acute traumatic bony injury.   2.  Soft tissue erosion of the posterior mid calf, corresponding with history of soft tissue ulcers.      IR-EXTREMITY ANGIOGRAM-BILATERAL    (Results Pending)        Assessment/Plan  * Diabetic infection of left foot (HCC)- (present on admission)  Assessment & Plan  Patient has known history of uncontrolled type 2 diabetes, he has had prior admissions for cellulitis and osteomyelitis.  Patient does have history of PAD.  Venous dopplers negative for DVT   Arterial Dopplers - inflow arterial insufficiency in lower extremities bilaterally  X-ray of the left tib-fib, no concern for osteomyelitis.    Wound care consulted. LPS following.  S/p revasculazization, debridement on 10/12    Wound culture MRSA, GBS  Keflex, bactrim    Hypotension  Assessment & Plan  Asymptomatic  Requiring intermittent IVF bolus  AM Cortisol normal    discontinue lisinopril, still low, HR 70s, discontinue Coreg 3.125, lasix on hold      Diabetic infection of right foot (HCC)- (present on admission)  Assessment & Plan  As per left lower extremity plan    Sepsis- (present on admission)  Assessment & Plan  This is Sepsis Present on admission  SIRS criteria identified on my evaluation include: Tachycardia, with heart rate greater than 90 BPM, Tachypnea, with respirations greater than 20 per minute and Bandemia, greater than 10% bands  Source is diabetic infected ulcers and cellulitis of Lower extremities  Sepsis protocol initiated  Fluid resuscitation ordered per protocol  IV antibiotics as appropriate for source of sepsis  While organ dysfunction may be  noted elsewhere in this problem list or in the chart, degree of organ dysfunction does not meet CMS criteria for severe sepsis      Uncontrolled type 2 diabetes mellitus (HCC)- (present on admission)  Assessment & Plan  ISS + Basal  A1c 9.1  Hypoglycemia protocol    Coronary artery disease involving native coronary artery - lexiscan 2/14/2020 - EF 60%  with fixed prominent defecct of interior and lateral walls, no stress induced ischemia, mild global hypokinesis- (present on admission)  Assessment & Plan  Continue with aspirin and statin therapy    Peripheral artery disease (HCC) - (present on admission)  Assessment & Plan  Patient does have history of PAD, previous MIA was performed in June 2021, showed mild to moderate disease. Repeat MIA noted significant worsening disease.    Vascular surgery consulted,s/p CTA today, plans for revascularization prior to surgical intervention for the wounds, of LLE 10/11/2021    S/p OR 10/12 for revascularization    Peripheral neuropathy (HCC)- (present on admission)  Assessment & Plan  neurontin    Hyperlipidemia- (present on admission)  Assessment & Plan  F/u lipid panel outpatient       VTE prophylaxis: SCDs/TEDs and enoxaparin ppx    I have performed a physical exam and reviewed and updated ROS and Plan today (10/24/2021). In review of yesterday's note (10/23/2021), there are no changes except as documented above.

## 2021-10-25 LAB
ANION GAP SERPL CALC-SCNC: 11 MMOL/L (ref 7–16)
BUN SERPL-MCNC: 26 MG/DL (ref 8–22)
CALCIUM SERPL-MCNC: 9.5 MG/DL (ref 8.5–10.5)
CHLORIDE SERPL-SCNC: 103 MMOL/L (ref 96–112)
CO2 SERPL-SCNC: 19 MMOL/L (ref 20–33)
CREAT SERPL-MCNC: 1.13 MG/DL (ref 0.5–1.4)
GLUCOSE BLD-MCNC: 146 MG/DL (ref 65–99)
GLUCOSE BLD-MCNC: 170 MG/DL (ref 65–99)
GLUCOSE BLD-MCNC: 172 MG/DL (ref 65–99)
GLUCOSE BLD-MCNC: 176 MG/DL (ref 65–99)
GLUCOSE SERPL-MCNC: 138 MG/DL (ref 65–99)
POTASSIUM SERPL-SCNC: 4.6 MMOL/L (ref 3.6–5.5)
SODIUM SERPL-SCNC: 133 MMOL/L (ref 135–145)

## 2021-10-25 PROCEDURE — A9270 NON-COVERED ITEM OR SERVICE: HCPCS | Performed by: SURGERY

## 2021-10-25 PROCEDURE — 36415 COLL VENOUS BLD VENIPUNCTURE: CPT

## 2021-10-25 PROCEDURE — 99233 SBSQ HOSP IP/OBS HIGH 50: CPT | Performed by: STUDENT IN AN ORGANIZED HEALTH CARE EDUCATION/TRAINING PROGRAM

## 2021-10-25 PROCEDURE — 302098 PASTE RING (FLAT): Performed by: STUDENT IN AN ORGANIZED HEALTH CARE EDUCATION/TRAINING PROGRAM

## 2021-10-25 PROCEDURE — 700102 HCHG RX REV CODE 250 W/ 637 OVERRIDE(OP): Performed by: STUDENT IN AN ORGANIZED HEALTH CARE EDUCATION/TRAINING PROGRAM

## 2021-10-25 PROCEDURE — 700111 HCHG RX REV CODE 636 W/ 250 OVERRIDE (IP): Performed by: GENERAL PRACTICE

## 2021-10-25 PROCEDURE — 82962 GLUCOSE BLOOD TEST: CPT | Mod: 91

## 2021-10-25 PROCEDURE — 80048 BASIC METABOLIC PNL TOTAL CA: CPT

## 2021-10-25 PROCEDURE — 700102 HCHG RX REV CODE 250 W/ 637 OVERRIDE(OP): Performed by: INTERNAL MEDICINE

## 2021-10-25 PROCEDURE — A9270 NON-COVERED ITEM OR SERVICE: HCPCS | Performed by: STUDENT IN AN ORGANIZED HEALTH CARE EDUCATION/TRAINING PROGRAM

## 2021-10-25 PROCEDURE — 700111 HCHG RX REV CODE 636 W/ 250 OVERRIDE (IP): Performed by: INTERNAL MEDICINE

## 2021-10-25 PROCEDURE — A9270 NON-COVERED ITEM OR SERVICE: HCPCS | Performed by: INTERNAL MEDICINE

## 2021-10-25 PROCEDURE — 770001 HCHG ROOM/CARE - MED/SURG/GYN PRIV*

## 2021-10-25 PROCEDURE — 700102 HCHG RX REV CODE 250 W/ 637 OVERRIDE(OP): Performed by: SURGERY

## 2021-10-25 RX ADMIN — DOCUSATE SODIUM 100 MG: 100 CAPSULE ORAL at 16:38

## 2021-10-25 RX ADMIN — CLOPIDOGREL BISULFATE 75 MG: 75 TABLET ORAL at 06:04

## 2021-10-25 RX ADMIN — CEPHALEXIN 1000 MG: 500 CAPSULE ORAL at 15:07

## 2021-10-25 RX ADMIN — ENOXAPARIN SODIUM 40 MG: 40 INJECTION SUBCUTANEOUS at 06:05

## 2021-10-25 RX ADMIN — CEPHALEXIN 1000 MG: 500 CAPSULE ORAL at 20:59

## 2021-10-25 RX ADMIN — INSULIN LISPRO 2 UNITS: 100 INJECTION, SOLUTION INTRAVENOUS; SUBCUTANEOUS at 20:59

## 2021-10-25 RX ADMIN — SULFAMETHOXAZOLE AND TRIMETHOPRIM 1 TABLET: 800; 160 TABLET ORAL at 16:38

## 2021-10-25 RX ADMIN — INSULIN LISPRO 2 UNITS: 100 INJECTION, SOLUTION INTRAVENOUS; SUBCUTANEOUS at 11:49

## 2021-10-25 RX ADMIN — SULFAMETHOXAZOLE AND TRIMETHOPRIM 1 TABLET: 800; 160 TABLET ORAL at 06:06

## 2021-10-25 RX ADMIN — OXYCODONE HYDROCHLORIDE 20 MG: 10 TABLET ORAL at 06:04

## 2021-10-25 RX ADMIN — GABAPENTIN 300 MG: 300 CAPSULE ORAL at 06:05

## 2021-10-25 RX ADMIN — DICLOFENAC SODIUM 50 MG: 25 TABLET, DELAYED RELEASE ORAL at 20:58

## 2021-10-25 RX ADMIN — DICLOFENAC SODIUM 50 MG: 25 TABLET, DELAYED RELEASE ORAL at 08:55

## 2021-10-25 RX ADMIN — OXYCODONE HYDROCHLORIDE 20 MG: 10 TABLET ORAL at 16:38

## 2021-10-25 RX ADMIN — ASPIRIN 81 MG: 81 TABLET, COATED ORAL at 06:04

## 2021-10-25 RX ADMIN — TRAZODONE HYDROCHLORIDE 450 MG: 150 TABLET ORAL at 20:59

## 2021-10-25 RX ADMIN — DOCUSATE SODIUM 100 MG: 100 CAPSULE ORAL at 06:04

## 2021-10-25 RX ADMIN — INSULIN LISPRO 2 UNITS: 100 INJECTION, SOLUTION INTRAVENOUS; SUBCUTANEOUS at 16:39

## 2021-10-25 RX ADMIN — CEPHALEXIN 1000 MG: 500 CAPSULE ORAL at 08:55

## 2021-10-25 ASSESSMENT — ENCOUNTER SYMPTOMS
MYALGIAS: 0
PALPITATIONS: 0
ABDOMINAL PAIN: 0
SEIZURES: 0
CHILLS: 0
DEPRESSION: 0
VOMITING: 0
NAUSEA: 0
FEVER: 0
SORE THROAT: 0
SHORTNESS OF BREATH: 0
COUGH: 0
LOSS OF CONSCIOUSNESS: 0
BLURRED VISION: 0

## 2021-10-25 ASSESSMENT — PAIN DESCRIPTION - PAIN TYPE
TYPE: ACUTE PAIN
TYPE: ACUTE PAIN

## 2021-10-25 NOTE — CARE PLAN
The patient is Stable - Low risk of patient condition declining or worsening    Shift Goals  Clinical Goals: Patient's pain will not be higher than 8/10  Patient Goals: Stop my wound from bleeding    Progress made toward(s) clinical / shift goals:    Problem: Pain - Standard  Goal: Alleviation of pain or a reduction in pain to the patient’s comfort goal  Outcome: Progressing   Pt reported pain no higher than an 8 for most of the day, then said he had breakthrough pain at 9/10 after getting up to the BC

## 2021-10-25 NOTE — DISCHARGE PLANNING
[facility]Facility Name: [facility] ([facility] is covering)  Outcome: Left a message for admissions.  Awaiting a call back.

## 2021-10-25 NOTE — PROGRESS NOTES
COVID-19 surge in effect.    Assumed care of pt at shift change. Pt is on RA with no signs of acute distress. A&Ox4. Dilaudid administered for wound dressing change. Wound vac in place.  POC discussed with patient. All comfort measures in place. Call light and personal belongings by bedside. Bed locked and in lowest position. Hourly rounding in place.

## 2021-10-25 NOTE — CARE PLAN
The patient is Stable - Low risk of patient condition declining or worsening    Shift Goals  Clinical Goals: pain management  Patient Goals: rest and bowel movement    Progress made toward(s) clinical / shift goals:    Problem: Knowledge Deficit - Standard  Goal: Patient and family/care givers will demonstrate understanding of plan of care, disease process/condition, diagnostic tests and medications  Outcome: Progressing     Problem: Hemodynamics  Goal: Patient's hemodynamics, fluid balance and neurologic status will be stable or improve  Outcome: Progressing     Problem: Fluid Volume  Goal: Fluid volume balance will be maintained  Outcome: Progressing

## 2021-10-25 NOTE — PROGRESS NOTES
Assumed care of patient at bedside report from DAY RN. Updated on POC. Patient currently A & O x 4; on room air; up with assistance of one with complaints of acute pain. Call light within reach. Whiteboard updated. Fall precautions in place. Bed locked and in lowest position. All questions answered. No other needs indicated at this time.  COVID-19 surge in effect

## 2021-10-26 PROBLEM — I95.81 POSTPROCEDURAL HYPOTENSION: Status: ACTIVE | Noted: 2021-10-14

## 2021-10-26 LAB
ANION GAP SERPL CALC-SCNC: 9 MMOL/L (ref 7–16)
BUN SERPL-MCNC: 21 MG/DL (ref 8–22)
CALCIUM SERPL-MCNC: 9.8 MG/DL (ref 8.5–10.5)
CHLORIDE SERPL-SCNC: 104 MMOL/L (ref 96–112)
CO2 SERPL-SCNC: 22 MMOL/L (ref 20–33)
CREAT SERPL-MCNC: 0.78 MG/DL (ref 0.5–1.4)
GLUCOSE BLD-MCNC: 147 MG/DL (ref 65–99)
GLUCOSE BLD-MCNC: 161 MG/DL (ref 65–99)
GLUCOSE BLD-MCNC: 165 MG/DL (ref 65–99)
GLUCOSE BLD-MCNC: 201 MG/DL (ref 65–99)
GLUCOSE SERPL-MCNC: 132 MG/DL (ref 65–99)
POTASSIUM SERPL-SCNC: 4.3 MMOL/L (ref 3.6–5.5)
SODIUM SERPL-SCNC: 135 MMOL/L (ref 135–145)

## 2021-10-26 PROCEDURE — A9270 NON-COVERED ITEM OR SERVICE: HCPCS | Performed by: STUDENT IN AN ORGANIZED HEALTH CARE EDUCATION/TRAINING PROGRAM

## 2021-10-26 PROCEDURE — 99233 SBSQ HOSP IP/OBS HIGH 50: CPT | Performed by: STUDENT IN AN ORGANIZED HEALTH CARE EDUCATION/TRAINING PROGRAM

## 2021-10-26 PROCEDURE — A9270 NON-COVERED ITEM OR SERVICE: HCPCS | Performed by: SURGERY

## 2021-10-26 PROCEDURE — 700102 HCHG RX REV CODE 250 W/ 637 OVERRIDE(OP): Performed by: INTERNAL MEDICINE

## 2021-10-26 PROCEDURE — 700102 HCHG RX REV CODE 250 W/ 637 OVERRIDE(OP): Performed by: STUDENT IN AN ORGANIZED HEALTH CARE EDUCATION/TRAINING PROGRAM

## 2021-10-26 PROCEDURE — 700111 HCHG RX REV CODE 636 W/ 250 OVERRIDE (IP): Performed by: GENERAL PRACTICE

## 2021-10-26 PROCEDURE — A9270 NON-COVERED ITEM OR SERVICE: HCPCS | Performed by: INTERNAL MEDICINE

## 2021-10-26 PROCEDURE — 700111 HCHG RX REV CODE 636 W/ 250 OVERRIDE (IP): Performed by: INTERNAL MEDICINE

## 2021-10-26 PROCEDURE — 770001 HCHG ROOM/CARE - MED/SURG/GYN PRIV*

## 2021-10-26 PROCEDURE — 80048 BASIC METABOLIC PNL TOTAL CA: CPT

## 2021-10-26 PROCEDURE — 36415 COLL VENOUS BLD VENIPUNCTURE: CPT

## 2021-10-26 PROCEDURE — 82962 GLUCOSE BLOOD TEST: CPT

## 2021-10-26 PROCEDURE — 97608 NEG PRS WND THER NDME>50SQCM: CPT

## 2021-10-26 PROCEDURE — 700102 HCHG RX REV CODE 250 W/ 637 OVERRIDE(OP): Performed by: SURGERY

## 2021-10-26 RX ORDER — INSULIN GLARGINE 100 [IU]/ML
20 INJECTION, SOLUTION SUBCUTANEOUS EVERY EVENING
Status: DISCONTINUED | OUTPATIENT
Start: 2021-10-26 | End: 2021-10-30 | Stop reason: HOSPADM

## 2021-10-26 RX ORDER — CARVEDILOL 6.25 MG/1
6.25 TABLET ORAL 2 TIMES DAILY WITH MEALS
Status: DISCONTINUED | OUTPATIENT
Start: 2021-10-26 | End: 2021-10-30 | Stop reason: HOSPADM

## 2021-10-26 RX ORDER — AMOXICILLIN 250 MG
2 CAPSULE ORAL 2 TIMES DAILY
Status: DISCONTINUED | OUTPATIENT
Start: 2021-10-26 | End: 2021-10-30 | Stop reason: HOSPADM

## 2021-10-26 RX ORDER — CARVEDILOL 12.5 MG/1
12.5 TABLET ORAL 2 TIMES DAILY WITH MEALS
Status: DISCONTINUED | OUTPATIENT
Start: 2021-10-26 | End: 2021-10-26

## 2021-10-26 RX ORDER — ATORVASTATIN CALCIUM 40 MG/1
80 TABLET, FILM COATED ORAL EVERY EVENING
Status: DISCONTINUED | OUTPATIENT
Start: 2021-10-26 | End: 2021-10-30 | Stop reason: HOSPADM

## 2021-10-26 RX ADMIN — CLOPIDOGREL BISULFATE 75 MG: 75 TABLET ORAL at 05:21

## 2021-10-26 RX ADMIN — DICLOFENAC SODIUM 50 MG: 25 TABLET, DELAYED RELEASE ORAL at 09:37

## 2021-10-26 RX ADMIN — CEPHALEXIN 1000 MG: 500 CAPSULE ORAL at 09:36

## 2021-10-26 RX ADMIN — ENOXAPARIN SODIUM 40 MG: 40 INJECTION SUBCUTANEOUS at 06:00

## 2021-10-26 RX ADMIN — OXYCODONE HYDROCHLORIDE 20 MG: 10 TABLET ORAL at 10:35

## 2021-10-26 RX ADMIN — ASPIRIN 81 MG: 81 TABLET, COATED ORAL at 05:21

## 2021-10-26 RX ADMIN — SULFAMETHOXAZOLE AND TRIMETHOPRIM 1 TABLET: 800; 160 TABLET ORAL at 05:21

## 2021-10-26 RX ADMIN — CEPHALEXIN 1000 MG: 500 CAPSULE ORAL at 21:00

## 2021-10-26 RX ADMIN — CEPHALEXIN 1000 MG: 500 CAPSULE ORAL at 15:00

## 2021-10-26 RX ADMIN — INSULIN LISPRO 3 UNITS: 100 INJECTION, SOLUTION INTRAVENOUS; SUBCUTANEOUS at 20:59

## 2021-10-26 RX ADMIN — HYDROMORPHONE HYDROCHLORIDE 0.5 MG: 1 INJECTION, SOLUTION INTRAMUSCULAR; INTRAVENOUS; SUBCUTANEOUS at 11:05

## 2021-10-26 RX ADMIN — GABAPENTIN 300 MG: 300 CAPSULE ORAL at 05:21

## 2021-10-26 RX ADMIN — INSULIN LISPRO 2 UNITS: 100 INJECTION, SOLUTION INTRAVENOUS; SUBCUTANEOUS at 12:29

## 2021-10-26 RX ADMIN — OXYCODONE HYDROCHLORIDE 20 MG: 10 TABLET ORAL at 05:21

## 2021-10-26 RX ADMIN — OXYCODONE HYDROCHLORIDE 20 MG: 10 TABLET ORAL at 21:05

## 2021-10-26 RX ADMIN — TRAZODONE HYDROCHLORIDE 450 MG: 150 TABLET ORAL at 21:00

## 2021-10-26 RX ADMIN — OXYCODONE HYDROCHLORIDE 20 MG: 10 TABLET ORAL at 00:59

## 2021-10-26 RX ADMIN — DOCUSATE SODIUM 100 MG: 100 CAPSULE ORAL at 05:21

## 2021-10-26 ASSESSMENT — ENCOUNTER SYMPTOMS
FEVER: 0
NAUSEA: 0
HEADACHES: 0
WEAKNESS: 0
EYE PAIN: 0
DEPRESSION: 0
NECK PAIN: 0
NERVOUS/ANXIOUS: 0
FLANK PAIN: 0
MYALGIAS: 1
FALLS: 0
SORE THROAT: 0
SHORTNESS OF BREATH: 0
CONSTIPATION: 1
CHILLS: 0
VOMITING: 0
SINUS PAIN: 0
BLOOD IN STOOL: 0
HEMOPTYSIS: 0
DIARRHEA: 0
ABDOMINAL PAIN: 0
BRUISES/BLEEDS EASILY: 0
BACK PAIN: 0

## 2021-10-26 NOTE — CARE PLAN
The patient is Stable - Low risk of patient condition declining or worsening    Shift Goals  Clinical Goals: Pain management  Patient Goals: Bowel movement    Progress made toward(s) clinical / shift goals:  Opt medicated per MAR for pain to Les. No BM in 4days, Dulcolax administered as ordered.     Patient is not progressing towards the following goals:

## 2021-10-26 NOTE — PROGRESS NOTES
LIMB PRESERVATION SERVICE   POST SURGICAL PROGRESS NOTE      HPI:  Luis Kellogg is a 71 y.o.  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   LPS has been consulted for bilateral lower extremity diabetic ulcers.     Patient has long history of lower extremity chronic wounds, PAD, and left 2-4th toe amputation in 2/2020 at outside hospital. Patient presented with worsening bilateral lower extremity wounds with worsening swelling and pain consistent with cellulitis. He denies any fevers, chills, nausea, or vomiting. Patient is poor historian and unable to tell me how long he has had wounds, but does report that they have been worsening with occasional purulent drainage. He reports that left toe amputation site never healed after surgery in 2/2020. Patient reports that he previously saw a vascular surgeon in [place], does not remember his name, and had angiogram and my have had a stent in one of his femoral arteries though not sure which and thinks it was 1.5-2 years ago. Patient was referred to [facility] and had evaluation and non-invasive studies and was supposed to follow up with [facility] for intervention but never followed up. Patient was previously being followed by [facility], however he has not followed up since 3/2021 and has not been performing any wound care on his lower extremity wounds.      IV antibiotics were started on this admission.  Infectious diseases has not been consulted.    Xray completed and is negative for osteomyelitis.  Ortho has not been consulted yet.     Diagnosed with diabetes 7-8 years ago, and is currently managing with metformin and insulin.  Checks blood sugars 1 times per day and reports that these typically average 110-150. Does have numbness to feet. Usually wears tennis shoes . Does have diabetic shoes and inserts  "from 4-5 that are years old and in not interested in diabetic footwear at this time.  Has had previous foot surgeries including L 2-4 toe and MTH amputation.  Current occupation retired.     SURGERY DATE: 10/12/2021 - Dr. Purcell  PROCEDURE:   1) Angiogram  2) Right SFA stent  3) I&D of wounds with wound vac placement      INTERVAL HISTORY:  10/14/2021: POD #2.  Patient denies fevers, chills, nausea, vomiting.  Pain poorly controlled, discussed with hospitalist. Patient hypotensive this morning requiring fluid bolus. Seen with wound care team for wound vac change. Patient in excruciating pain with vac change  10/19/2021: POD#7. Patient denies any complaints. Seen with wound care team during VAC change. Patient continues to have tenderness with VAC removal and placement. Wounds improving slowly.  10/21/2021: POD #10. Seen with wound team during VAC change. Continues to have pain with VAC changes, somewhat improved. Continue veraflo wound vac while inpatient. Pending dispo / placement.  10/27/2021: POD# 16. Seen with wound team during VAC change. Patient wounds improving, continued slough and tenderness preventing adequate bedside debridement.    PERTINENT LPS RESULTS:   Pathology: None sent  Cultures: leg wound 10/8: MRSA, Group B Streptococcus    SURGICAL SITE EXAM:      /66   Pulse 90   Temp 36.2 °C (97.2 °F) (Temporal)   Resp 18   Ht 1.702 m (5' 7\")   Wt 109 kg (240 lb 4.8 oz)   SpO2 91%   BMI 37.64 kg/m²     Pedal Pulses: R foot: Unable to palpate, Monophasic DP/PT  L foot: Unable to palpate, Monophasic DP/PT   Sensation: Sensation largely intact on legs, diminished on feet  Surgical foot warm, well perfused    Right lateral tibial wounds x2  Wound base is pink with very minor adherent slough  Due to improvement in wounds, change to Hydrofiber blue and remove wound vac  No drainage  No odor  No erythema               R foot dorsal 1st toe wound   R foot dorsal 2nd toe wound   Dried scab over " wound  Continue to treat by keeping wound dry  No drainage  No odor  Periwound erythema improved  Mild periwound callus          L medial heel wound  Healthy tissue, minor slough debrided  No drainage  No odor  No erythema              L 2-4 toe and MTH amputation site  Left foot dorsal wound  Chronic non healing amputation site from 2020  Patient reports never closed  Good granulation tissue with minimal slough  Periwound callus  Periwound erythema improved            L Anterior Tibial wounds x 6  Adherent slough, patient did not tolerate debridement  Some granulation tissue  No odor  No discharge  Periwound erythema resolved            Left Lateral Leg  Base is majority granulation tissue, minimal adherent   No odor or drainage              L Posterior tib/fib wounds x 3  Continued slough, did not tolerate debridement  No drainage  Periwound erythema improved  No odor          Wound care completed by wound care team: please refer to wound care note and flow sheet for details       DIABETES MANAGEMENT:    Blood glucose:   Results from last 7 days   Lab Units 10/26/21  0527 10/25/21  2038 10/25/21  1638 10/25/21  1147 10/25/21  0609 10/24/21  2129 10/24/21  1741 10/24/21  1137   ACCU CHECK GLUCOSE 788 mg/dL 147* 172* 176* 170* 146* 170* 172* 150*     A1c:   Lab Results   Component Value Date/Time    HBA1C 9.1 (H) 10/08/2021 04:26 AM            INFECTION MANAGEMENT:    Results from last 7 days   Lab Units 10/21/21  0429   WBC 1501 K/uL 5.3   PLATELET COUNT 1518 K/uL 273     Wound culture results:   Results     ** No results found for the last 168 hours. **               ASSESSMENT/PLAN:   POD #16 S/P Angiogram, I&D, wound vac placement by Dr. Purcell on 10/12.    - Improvement in right leg wounds, will change from wound vac to hydrofera blue and monitor closely.  - Due to continued slough and inability to tolerate debridement, will continue Veraflo wound vac. Will re-evaluate on next vac change to consider removing  vac.  - OK to change to regular wound vac on discharge.  - Only tolerated mild bedside debridement due to pain  - Will need to continue wound vac for now due to extent of wounds on left leg and to promote granulation and more rapid healing.      Wound care:   -Wound care orders updated for nursing  -Left medial heel: Hydrofera blue to wound base, off loading in moon boot. Change dressing q 48 hours  - Right lateral wounds: Continue hydrofera blue  - Dorsal right foot wounds: Betadine to wounds  - Left lower extremity wounds: Managed by Veraflo wound vac, to be changed 3x weekly by wound team    Imaging/Labs:  -COVID-19: not detected this admission    Vascular status:   -s/p angiogram and right SFA stent by Dr. Purcell 10/12, adequate blood flow after re- vascularization    Surgery:   --no further surgeries planned at this time    Antibiotics:   -Defer to hospitalist    Weight Bearing Status:   -Weight bearing as tolerated    Offloading:   -Offloading shoe; ordered. Refused by patient.  -Orthotic company:     PT/OT:   -involved       Diabetes Education:   - consult CDE and RD involved     - Implications of loss of protective sensation (LOPS) discussed with patient- including increased risk for amputation.  Advised to check feet at least daily, moisturize feet, and to always wear protective foot wear.   -avoid trimming own nails. See podiatrist or certified foot and nail RN  -keep blood sugars <150 for improved wound healing    DISCHARGE PLAN:  Patient has extensive wounds requiring wound Vac  Treated with Veraflo wound vac while admitted, can be converted to regular vac on discharge  OK to discharge to SNF that can continue wound care    Disposition:   SNF per PT recommendation and due to extensive wounds    Follow-up: Dr. Purcell approximately 3 weeks post-op  Follow up LPS rounds: Will be scheduled when closer to discharge date      Discussed with: pt, RN,  He    Please note that this dictation was created using  voice recognition software. I have  worked with technical experts from [facility] to optimize the interface.  I have made every reasonable attempt to correct obvious errors, but there may be errors of grammar and possibly content that I did not discover before finalizing the note.      Rodri Ardon M.D.    If any questions or concerns, please contact LPS through voalte.

## 2021-10-26 NOTE — DISCHARGE PLANNING
Agency/Facility Name: [facility]  Spoke To: Mary Anne  Outcome: DPA called stating the pt is medically clear and would like to know if facility has a bed available.  Mary Anne will check and call this DPA back.    Agency/Facility Name: [facility]  Spoke To: Marissa  Outcome: Requested an updated referral be sent.  DPA resent at 1022.    Agency/Facility Name: [facility]  Outcome: Left a message for admissions.  Awaiting a call back.    Agency/Facility Name: [facility]  Outcome: Left a message for admissions.  Awaiting a call back.    Agency/Facility Name: [facility]  Spoke To: Natasha  Outcome: ***

## 2021-10-26 NOTE — PROGRESS NOTES
COVID-19 surge in effect.     Assumed care of pt at shift change. Pt is on RA with no signs of acute distress. A&Ox4. Dilaudid administered for wound dressing change.  POC discussed with patient. All comfort measures in place. Call light and personal belongings by bedside. Bed locked and in lowest position. Hourly rounding in place

## 2021-10-26 NOTE — PROGRESS NOTES
Hospital Medicine Daily Progress Note    Date of Service  10/25/2021    Chief Complaint  Luis Kellogg is a 71 y.o. male admitted 10/7/2021 with bilateral leg wounds    Hospital Course  This is a 71 year old male with PMHx of hyperlipidemia, type 2 diabetes with A1c of 9.1, obesity, CAD, hx MI,PAD, history of lower extremity osteomyelitis and recurrent LLE cellulitis who was admitted for sepsis secondary to BLE diabetic, nonhealing wounds and cellulitis.    Patient has known history of uncontrolled type 2 diabetes, he has had prior admissions for cellulitis and osteomyelitis.  Patient does have history of PAD.  Venous dopplers negative for DVT and arterial Dopplers ordered.  X-ray of the left tib-fib, no concern for osteomyelitis.  Wound care consulted. LPS consulted.    Wound cultures from March 20, 2021, noted E. Faecalis sensitive to ampicillin, Cipro, Levaquin and vancomycin.  Patient is currently on vancomycin only as per wound culture results.    S/p revascularization and debridement of lower extremity wounds  Wound vac in place  Wound culture: GBS and MRSA      Interval Problem Update  Patient was seen and examined at bedside.  No acute events overnight. Patient is resting comfortably in bed and in no acute distress.     BP low, discontinue lisinopril, still low, HR 70s, discontinue Coreg 3.125, lasix on hold -improving   keflex and bactrium util 10/27, rosa k and RF    LPS saw the pat, ok to switch to regular wound vacuum at dc    Medically cleared , SNF placement    I have personally seen and examined the patient at bedside. I discussed the plan of care with patient and bedside RN.    Consultants/Specialty  LPS   Vascular surgery    Code Status  Full Code    Disposition  Patient is medically cleared.   Anticipate discharge to to skilled nursing facility.  I have placed the appropriate orders for post-discharge needs.    Review of Systems  Review of Systems   Constitutional: Negative for chills and  fever.   HENT: Negative for congestion and sore throat.    Eyes: Negative for blurred vision.   Respiratory: Negative for cough and shortness of breath.    Cardiovascular: Negative for chest pain and palpitations.   Gastrointestinal: Negative for abdominal pain, nausea and vomiting.   Genitourinary: Negative for dysuria and frequency.   Musculoskeletal: Positive for joint pain. Negative for myalgias.   Neurological: Negative for seizures and loss of consciousness.   Psychiatric/Behavioral: Negative for depression.     Physical Exam  Temp:  [36.2 °C (97.2 °F)-36.6 °C (97.8 °F)] 36.6 °C (97.8 °F)  Pulse:  [66-81] 74  Resp:  [16-18] 16  BP: ()/(57-67) 101/60  SpO2:  [95 %-97 %] 95 %    Physical Exam  Vitals and nursing note reviewed.   Constitutional:       General: He is not in acute distress.     Appearance: He is obese. He is not toxic-appearing.   HENT:      Head: Normocephalic and atraumatic.      Right Ear: External ear normal.      Left Ear: External ear normal.      Nose: No congestion.      Mouth/Throat:      Pharynx: No oropharyngeal exudate.   Eyes:      General: No scleral icterus.     Extraocular Movements: Extraocular movements intact.      Conjunctiva/sclera: Conjunctivae normal.      Pupils: Pupils are equal, round, and reactive to light.   Cardiovascular:      Rate and Rhythm: Normal rate and regular rhythm.      Pulses: Normal pulses.      Heart sounds: No murmur heard.     Pulmonary:      Effort: Pulmonary effort is normal.      Breath sounds: Normal breath sounds. No wheezing.   Abdominal:      General: Bowel sounds are normal.      Palpations: Abdomen is soft.      Tenderness: There is no abdominal tenderness. There is no guarding or rebound.   Musculoskeletal:         General: No swelling or tenderness. Normal range of motion.      Cervical back: Normal range of motion and neck supple. No muscular tenderness.      Comments: Bilateral lower extremities with wound vacs and dressings in place    Skin:     General: Skin is warm and dry.      Capillary Refill: Capillary refill takes less than 2 seconds.      Findings: No bruising, erythema or rash.   Neurological:      General: No focal deficit present.      Mental Status: He is alert and oriented to person, place, and time.   Psychiatric:         Mood and Affect: Mood normal.         Thought Content: Thought content normal.                 Patient was seen and examined at bedside. No changes in physical exam from prior evaluation.      Fluids    Intake/Output Summary (Last 24 hours) at 10/25/2021 1856  Last data filed at 10/25/2021 1735  Gross per 24 hour   Intake 840 ml   Output 915 ml   Net -75 ml       Laboratory      Recent Labs     10/23/21  0437 10/24/21  0258 10/25/21  0347   SODIUM 135 134* 133*   POTASSIUM 4.8 4.6 4.6   CHLORIDE 103 104 103   CO2 23 20 19*   GLUCOSE 150* 121* 138*   BUN 26* 27* 26*   CREATININE 1.16 1.08 1.13   CALCIUM 9.5 9.2 9.5                   Imaging  CT-CTA AORTA-RO WITH & W/O-POST PROCESS   Final Result      1.  Occlusion of the distal right femoral artery with reconstitution distally.      2.  Calcified plaque in small diameter of the tibial and peroneal arteries bilaterally makes it difficult to evaluate for degree of stenosis and occlusion.      3.  Plaque within the left femoral artery without evidence of occlusion.      4.  Aortic atherosclerosis without aneurysm.      5.  Diverticulosis without evidence of diverticulitis.      6.  Hepatic steatosis.      DX-FOOT-COMPLETE 3+ LEFT   Final Result         1.  Disuse osteoporosis of the left foot.      2. Large subcutaneous ulcer on the dorsal surface of left foot.      3. Previous amputations of the left second through fourth toes.      DX-FOOT-COMPLETE 3+ RIGHT   Final Result      1.  No fracture or dislocation of RIGHT foot.   2.  Periarticular osteopenia suggests inflammatory arthropathy.   3.  No soft tissue gas or focal bony destruction however osteomyelitis is not  excluded by plain film.   4.  Severe degenerative change of ankle joint.      DX-TIBIA AND FIBULA RIGHT   Final Result      No evidence of fracture or dislocation or acute osteomyelitis.      US-EXTREMITY ARTERY LOWER BILAT   Final Result      US-MIA SINGLE LEVEL BILAT   Final Result      US-EXTREMITY VENOUS LOWER UNILAT LEFT   Final Result      DX-TIBIA AND FIBULA LEFT   Final Result         1.  No acute traumatic bony injury.   2.  Soft tissue erosion of the posterior mid calf, corresponding with history of soft tissue ulcers.      IR-EXTREMITY ANGIOGRAM-BILATERAL    (Results Pending)        Assessment/Plan  * Diabetic infection of left foot (HCC)- (present on admission)  Assessment & Plan  Patient has known history of uncontrolled type 2 diabetes, he has had prior admissions for cellulitis and osteomyelitis.  Patient does have history of PAD.  Venous dopplers negative for DVT   Arterial Dopplers - inflow arterial insufficiency in lower extremities bilaterally  X-ray of the left tib-fib, no concern for osteomyelitis.    Wound care consulted. LPS following.  S/p revasculazization, debridement on 10/12    Wound culture MRSA, GBS  Keflex, bactrim    Hypotension  Assessment & Plan  Asymptomatic  Requiring intermittent IVF bolus  AM Cortisol normal    discontinue lisinopril, still low, HR 70s, discontinue Coreg 3.125, lasix on hold      Diabetic infection of right foot (HCC)- (present on admission)  Assessment & Plan  As per left lower extremity plan    Sepsis- (present on admission)  Assessment & Plan  This is Sepsis Present on admission  SIRS criteria identified on my evaluation include: Tachycardia, with heart rate greater than 90 BPM, Tachypnea, with respirations greater than 20 per minute and Bandemia, greater than 10% bands  Source is diabetic infected ulcers and cellulitis of Lower extremities  Sepsis protocol initiated  Fluid resuscitation ordered per protocol  IV antibiotics as appropriate for source of  sepsis  While organ dysfunction may be noted elsewhere in this problem list or in the chart, degree of organ dysfunction does not meet CMS criteria for severe sepsis      Uncontrolled type 2 diabetes mellitus (HCC)- (present on admission)  Assessment & Plan  ISS + Basal  A1c 9.1  Hypoglycemia protocol    Coronary artery disease involving native coronary artery - lexiscan 2/14/2020 - EF 60%  with fixed prominent defecct of interior and lateral walls, no stress induced ischemia, mild global hypokinesis- (present on admission)  Assessment & Plan  Continue with aspirin and statin therapy    Peripheral artery disease (HCC) - (present on admission)  Assessment & Plan  Patient does have history of PAD, previous MIA was performed in June 2021, showed mild to moderate disease. Repeat MIA noted significant worsening disease.    Vascular surgery consulted,s/p CTA today, plans for revascularization prior to surgical intervention for the wounds, of LLE 10/11/2021    S/p OR 10/12 for revascularization    Peripheral neuropathy (HCC)- (present on admission)  Assessment & Plan  neurontin    Hyperlipidemia- (present on admission)  Assessment & Plan  F/u lipid panel outpatient       VTE prophylaxis: SCDs/TEDs and enoxaparin ppx    I have performed a physical exam and reviewed and updated ROS and Plan today (10/25/2021). In review of yesterday's note (10/24/2021), there are no changes except as documented above.

## 2021-10-26 NOTE — WOUND TEAM
[facility]  Inpatient Services   Wound and Skin Care     Admission Date: 10/7/2021     Last order of IP CONSULT TO WOUND CARE was found on 10/12/2021 from Hospital Encounter on 10/7/2021     HPI, PMH, SH: Reviewed    Past Surgical History:   Procedure Laterality Date   • ANGIOGRAM Bilateral 10/12/2021    Procedure: BILATERAL LOWER EXTREMITY ANGIOGRAM;  Surgeon: Valerio Purcell M.D.;  Location: SURGERY [facility];  Service: Vascular   • IRRIGATION & DEBRIDEMENT GENERAL Bilateral 10/12/2021    Procedure: IRRIGATION AND DEBRIDEMENT, WOUND, BILATERAL LOWER EXTREMITY;  Surgeon: Valerio Purcell M.D.;  Location: SURGERY [facility];  Service: Vascular   • APPLICATION OR REPLACEMENT, WOUND VAC Bilateral 10/12/2021    Procedure: APPLICATION WOUND VAC;  Surgeon: Valerio Purcell M.D.;  Location: SURGERY [facility];  Service: Vascular   • STENT PLACEMENT Right 10/12/2021    Procedure: STENT PLACEMENT, RIGHT SUPERFICIAL FEMORAL ARTERY;  Surgeon: Valerio Purcell M.D.;  Location: SURGERY [facility];  Service: Vascular   • TOE AMPUTATION Left 2/17/2020    Procedure: LEFT SECOND, THIRD, AND FORTH TOE AMPUTATION;  Surgeon: Alfonso Esteban M.D.;  Location: SURGERY [facility];  Service: Orthopedics   • OK UNLISTED PROC, ARTHROSCOPY Left 7/25/2019    Procedure: LEFT ENDOSCOPIC ULNAR NERVE DECOMPRESSION, LEFT CARPAL TUNNEL RELEASE;  Surgeon: Red Chacon M.D.;  Location: SURGERY [facility];  Service: Orthopedics   • KNEE REPLACEMENT, TOTAL Bilateral    • OTHER SURGICAL PROCEDURE      back surgery - Unknown      Social History     Tobacco Use   • Smoking status: Current Every Day Smoker     Packs/day: 0.50     Years: 54.00     Pack years: 27.00     Types: Cigarettes   • Smokeless tobacco: Former User   Substance Use Topics   • Alcohol use: Not Currently     Alcohol/week: 0.0 oz     Comment: occas     Chief Complaint   Patient presents with   • Wound Infection     bilateral leg wounds. pt states  he has had them for years but seem to be getting worse      Diagnosis: Cellulitis of left lower extremity [L03.116]    Unit where seen by Wound Team: S623/02     WOUND CONSULT/FOLLOW UP RELATED TO:  Initial vac change     WOUND HISTORY:  70 yo Male underwent Right SFA stent placement and OR debridement on BLE with subsequent vac placement.    WOUND ASSESSMENT/LDA   Negative Pressure Wound Therapy 10/12/21 Other (Comment);Surgical Leg Left (Active)   NPWT Pump Mode / Pressure Setting 125 mmHg;Continuous 10/26/21 1600   Dressing Type Black Foam (Veraflo) 10/26/21 1600   Number of Foam Pieces Used 3 10/26/21 1600   Canister Changed No 10/26/21 1600   Output (mL) 0 mL 10/26/21 1600   NEXT Dressing Change/Treatment Date 10/28/21 10/26/21 1600   VAC VeraFlo Irrigant Normal Saline 10/26/21 1600   VAC VeraFlo Soak Time (mins) 5 10/26/21 1600   VAC VeraFlo Instill Volume (ml) 18 10/26/21 1600   VAC VeraFlo - Therapy Time (hrs) 1.5 10/26/21 1600   VAC VeraFlo Pressure (mm/Hg) 125 mmHg 10/26/21 1600   WOUND NURSE ONLY - Time Spent with Patient (mins) 120 10/26/21 1600           Wound 10/14/21 Full Thickness Wound Leg Lateral Right NPWT veraflo (Active)   Wound Imag   10/26/21 1600   Site Assessment Pink;Red 10/26/21 1600   Periwound Assessment [place] 10/26/21 1600   Margins Defined edges 10/26/21 1600   Closure Secondary intention 10/26/21 1600   Drainage Amount Small 10/26/21 1600   Drainage Description Serosanguineous 10/26/21 1600   Treatments Cleansed;Offloading;Site care 10/26/21 1600   Wound Cleansing Approved Wound Cleanser 10/26/21 1600   Periwound Protectant Skin Protectant Wipes to Periwound 10/26/21 1600   Dressing Cleansing/Solutions Not Applicable 10/26/21 1600   Dressing Options Collagen Dressing;Hydrofera Blue Ready;Hypafix Tape 10/26/21 1600   Dressing Changed New 10/26/21 1600   Dressing Status Clean;Dry;Intact 10/26/21 1600   Dressing Change/Treatment Frequency Every 72 hrs, and As Needed 10/26/21 1600   NEXT  Dressing Change/Treatment Date 10/29/21 10/26/21 1600   NEXT Weekly Photo (Inpatient Only) 11/02/21 10/26/21 1600   Wound Length (cm) 17 cm 10/26/21 1600   Wound Width (cm) 8 cm 10/26/21 1600   Wound Depth (cm) 0.2 cm 10/26/21 1600   Wound Surface Area (cm^2) 136 cm^2 10/26/21 1600   Wound Volume (cm^3) 27.2 cm^3 10/26/21 1600   Wound Healing % 62 10/26/21 1600   Wound Bed Granulation (%) 70 % 10/16/21 1500   Wound Bed Slough (%) 30 % 10/16/21 1500   Shape circular x2 10/26/21 1600   Wound Odor None 10/26/21 1600   Pulses 1+ 10/26/21 1600   Exposed Structures None 10/26/21 1600   WOUND NURSE ONLY - Time Spent with Patient (mins) 120 10/26/21 1600       Wound 10/14/21 Full Thickness Wound Leg;Foot Lateral;Dorsal Left (Active)   Wound Image     10/26/21 1600   Site Assessment Red;[place] 10/26/21 1600   Periwound Assessment Red;[place] 10/26/21 1600   Margins Defined edges 10/26/21 1600   Closure Secondary intention 10/26/21 1600   Drainage Amount Moderate 10/26/21 1600   Drainage Description Serosanguineous 10/26/21 1600   Treatments Cleansed;Site care;Offloading 10/26/21 1600   Wound Cleansing Approved Wound Cleanser 10/26/21 1600   Periwound Protectant Skin Protectant Wipes to Periwound 10/26/21 1600   Dressing Cleansing/Solutions Not Applicable 10/26/21 1600   Dressing Options Collagen Dressing;Hydrofera Blue Ready;Hypafix Tape 10/26/21 1600   Dressing Changed New 10/26/21 1600   Dressing Status Clean;Dry;Intact 10/26/21 1600   Dressing Change/Treatment Frequency Every 72 hrs, and As Needed 10/26/21 1600   NEXT Dressing Change/Treatment Date 10/29/21 10/26/21 1600   NEXT Weekly Photo (Inpatient Only) 11/02/21 10/26/21 1600   Wound Length (cm) 9.5 cm 10/14/21 1100   Wound Width (cm) 8 cm 10/14/21 1100   Wound Depth (cm) 0.1 cm 10/14/21 1100   Wound Surface Area (cm^2) 76 cm^2 10/14/21 1100   Wound Volume (cm^3) 7.6 cm^3 10/14/21 1100   Wound Bed Granulation (%) 70 % 10/16/21 1500   Wound Bed Slough (%) 30 % 10/16/21 1500    Shape irregular 10/19/21 1600   Wound Odor None 10/19/21 1600   Exposed Structures None 10/19/21 1600   WOUND NURSE ONLY - Time Spent with Patient (mins) 120 10/19/21 1600       Wound 10/07/21 Heel Lateral Left unstag POA (Active)   Wound Image   10/26/21 1600   Site Assessment Pink;Red 10/26/21 1600   Periwound Assessment [place] 10/26/21 1600   Margins Defined edges;Attached edges 10/26/21 1600   Closure Adhesive bandage 10/26/21 1600   Drainage Amount Small 10/26/21 1600   Drainage Description Serosanguineous 10/26/21 1600   Treatments Cleansed;Site care;Offloading 10/26/21 1600   Wound Cleansing Approved Wound Cleanser 10/26/21 1600   Periwound Protectant Skin Protectant Wipes to Periwound 10/26/21 1600   Dressing Cleansing/Solutions Not Applicable 10/26/21 1600   Dressing Options Collagen Dressing;Hydrofera Blue Ready;Mepilex Heel 10/26/21 1600   Dressing Changed New 10/26/21 1600   Dressing Status Clean;Dry;Intact 10/26/21 1600   Dressing Change/Treatment Frequency Every 72 hrs, and As Needed 10/26/21 1600   NEXT Dressing Change/Treatment Date 10/29/21 10/26/21 1600   NEXT Weekly Photo (Inpatient Only) 11/02/21 10/26/21 1600   Pressure Injury Stage U 10/16/21 1500   Wound Length (cm) 2 cm 10/16/21 1500   Wound Width (cm) 2 cm 10/16/21 1500   Wound Surface Area (cm^2) 4 cm^2 10/16/21 1500   Exposed Structures ROSITA 10/16/21 1500          Vascular:    MIA:   MIA Results, Last 30 Days US-MIA SINGLE LEVEL BILAT     RIGHT      Waveform            Systolic BPs (mmHg)                                                     Brachial   Monophasic                               Common Femoral   Monophasic                               Posterior Tibial   Monophasic                               Dorsalis Pedis                                                    Peroneal                                            MIA                                            TBI                           LEFT   Waveform        Systolic BPs  (mmHg)                                    143           Brachial   Monophasic                               Common Femoral   Monophasic                               Posterior Tibial   Monophasic                               Dorsalis Pedis                                                     Peroneal                                            MIA                                            TBI         Findings   Bilateral.    Doppler waveforms of the common femoral arteries are abnormally dampened/    monophasic.    Doppler waveforms at the ankle are monophasic.    The ankle pressures are not obtained due to pain.    Digit PPG waveforms are normal in the right toes.   Digit PPG waveforms are normal in the left 1st and 5th toe. The other toes    are absent/amputated.    Toe-brachial indices is reduced right side.   Toe-brachial indices are left side.    Toe-brachial indices:     Right:  0.54   Left:  0.90         Lab Values:    Lab Results   Component Value Date/Time    WBC 5.3 10/21/2021 04:29 AM    RBC 3.97 (L) 10/21/2021 04:29 AM    HEMOGLOBIN 11.9 (L) 10/21/2021 04:29 AM    HEMATOCRIT 35.8 (L) 10/21/2021 04:29 AM    CREACTPROT 6.25 (H) 10/07/2021 08:30 PM    SEDRATEWES 1 10/07/2021 08:30 PM    HBA1C 9.1 (H) 10/08/2021 04:26 AM        Culture Results show:  Recent Results (from the past 720 hour(s))   CULTURE WOUND W/ GRAM STAIN    Collection Time: 10/08/21  2:13 PM    Specimen: Left Leg; Wound   Result Value Ref Range    Significant Indicator POS (POS)     Source WND     Site LEFT LEG     Culture Result Rare growth mixed enteric collins. (A)     Gram Stain Result Moderate WBCs.  No organisms seen.       Culture Result (A)      Methicillin Resistant Staphylococcus aureus  Light growth  This isolate is presumed to be clindamycin resistant based on  detection of inducible resistance.  Clindamycin may still  be effective in some patients.      Culture Result Streptococcus agalactiae (Group B)  Light growth   (A)         Susceptibility    Methicillin resistant staphylococcus aureus - MELA     Azithromycin >4 Resistant mcg/mL     Clindamycin <=0.25 Resistant mcg/mL     Cefazolin <=8 Resistant mcg/mL     Cefepime 16 Resistant mcg/mL     Ceftaroline <=0.5 Sensitive mcg/mL     Daptomycin <=0.5 Sensitive mcg/mL     Ampicillin/sulbactam 16/8 Resistant mcg/mL     Erythromycin >4 Resistant mcg/mL     Vancomycin 1 Sensitive mcg/mL     Oxacillin >2 Resistant mcg/mL     Trimeth/Sulfa <=0.5/9.5 Sensitive mcg/mL     Tetracycline <=4 Sensitive mcg/mL       Pain Level/Medicated:  Max pain; Premed IV, topical lidocaine Soln x2 vials, Requires pain meds after dressing change       INTERVENTIONS BY WOUND TEAM:   Performed standard wound care which includes appropriate positioning, dressing removal and non-selective debridement. Pictures and measurements obtained weekly if/when required.    RIGHT LE  Preparation for Dressing removal: Dressing soaked with Lidocaine and NS.   Non-selectively Debrided with:  cleanser and gauze.  Sharp debridement: NA  Soo wound: Cleansed with cleanser, Prepped with Cavilon skin prep, drape and paste rings, drape covered paste rings on LLE due to extensive bridging and portion of R paste ring to bridge 2 wounds.     aquacel ag to PTWs on left LE  Primary Dressing: black VF foam bridged as neede and covered with drape to achieve a seal.    Secondary (Outer) Dressing: button, drape and tracpad to foam. Patient refused tubi .   RLE hydrofera blue and hypafix tape.    L medial heel: hyrdofera blue and mepilex.      Interdisciplinary consultation: Patient, Bedside RN MD Ardon,       EVALUATION / RATIONALE FOR TREATMENT:  Most Recent Date:  10/26/21: RLE wound bed almost to surface level removed wound vac and applied collagen and hydrofera blue and hypafix tape, LLE posterior wounds are clean and almost to surface level applied guadalupe and hydrofera blue, anterior wound still have slough continued VF NPWT to try and  clean wound up.      10/22/21: patient pain better than previous, wounds continuing to improve. Continue VF NPWT. hydrofera blue to L medial heel.     10/19/21: patient's pain is better controlled, more granular tissue, bedside debridement , slough on left leg adherent , VF hopeful will hopefully loosen it next vac change.  Continue VF NPWT  10/16/21 All wounds (except heel )with VF VAC to remove slough and encourage granular [id number] formation.  VF should ease the pain with dressing removal.  Keeping heel wound dry due to diminished pulses and eschar is stable.    10/14: RLE lateral wound with adherent slough at base. Vera harry initiated to encourage mechanical debridement. LLE lateral and toe amp wound with clean, partially granulated wound base. Maintained regular wound vac suction to these sites. Implemented adaptic as a nonadherent layer to decrease pain upon removal next change.     Honeycolloid applied to anterior and posterior aspect on LLE given adherent slough at base. Pt will benefit from veraflo wound vac to LLE including bridging of scattered lesions, therefore, will need extra machine, cassette, Y-connector and tracpad next session.     Pt was in significant pain throughout the dressing care process. Will benefit from ongoing  Premedication and lidocaine soln for pain control. Check with primary RN prior to wound care as pt may be hypotensive and not appropriate for IV pain meds right away. LPS following patient, refer to LPS noted for POC.      Goals: Steady decrease in wound area and depth weekly.    WOUND TEAM PLAN OF CARE ([X] for frequency of wound follow up,):   Nursing to follow orders written for wound care. Contact wound team if area fails to progress, deteriorates or with any questions/concerns  Dressing changes by wound team:                   Follow up 3 times weekly:                NPWT change 3 times weekly: x    Follow up 1-2 times weekly:      Follow up Bi-Monthly:                    Follow up as needed:     Other (explain):     NURSING PLAN OF CARE ORDERS (X):  Dressing changes: See Dressing Care orders: x  Skin care: See Skin Care orders: x  RN Prevention Protocol: x  Rectal tube care: See Rectal Tube Care orders:   Other orders:    Anticipated discharge plans:   LTACH:        SNF/Rehab:      X will need ongoing wound care            Home Health Care:           Outpatient Wound Center:            Self/Family Care:        Other:                  Vac Discharge Needs:   Not Applicable Pt not on a wound vac:       Regular Vac while inpatient, alternative dressing at DC:        Regular Vac in use and continued at DC:           Reg. Vac w/ Skin Sub/Biologic in use. Will need to be changed 2x wkly:      Veraflo Vac while inpatient, ok to transition to Regular Vac on Discharge:  X       Veraflo Vac while inpatient, will need to remain on Veraflo Vac upon discharge:

## 2021-10-27 LAB
ANION GAP SERPL CALC-SCNC: 10 MMOL/L (ref 7–16)
BUN SERPL-MCNC: 17 MG/DL (ref 8–22)
CALCIUM SERPL-MCNC: 9.7 MG/DL (ref 8.5–10.5)
CHLORIDE SERPL-SCNC: 100 MMOL/L (ref 96–112)
CO2 SERPL-SCNC: 24 MMOL/L (ref 20–33)
CREAT SERPL-MCNC: 0.82 MG/DL (ref 0.5–1.4)
GLUCOSE BLD-MCNC: 169 MG/DL (ref 65–99)
GLUCOSE BLD-MCNC: 182 MG/DL (ref 65–99)
GLUCOSE BLD-MCNC: 185 MG/DL (ref 65–99)
GLUCOSE SERPL-MCNC: 187 MG/DL (ref 65–99)
POTASSIUM SERPL-SCNC: 4.1 MMOL/L (ref 3.6–5.5)
SODIUM SERPL-SCNC: 134 MMOL/L (ref 135–145)

## 2021-10-27 PROCEDURE — 97530 THERAPEUTIC ACTIVITIES: CPT

## 2021-10-27 PROCEDURE — A9270 NON-COVERED ITEM OR SERVICE: HCPCS | Performed by: SURGERY

## 2021-10-27 PROCEDURE — 700111 HCHG RX REV CODE 636 W/ 250 OVERRIDE (IP): Performed by: GENERAL PRACTICE

## 2021-10-27 PROCEDURE — 770001 HCHG ROOM/CARE - MED/SURG/GYN PRIV*

## 2021-10-27 PROCEDURE — 700102 HCHG RX REV CODE 250 W/ 637 OVERRIDE(OP): Performed by: STUDENT IN AN ORGANIZED HEALTH CARE EDUCATION/TRAINING PROGRAM

## 2021-10-27 PROCEDURE — 700102 HCHG RX REV CODE 250 W/ 637 OVERRIDE(OP): Performed by: SURGERY

## 2021-10-27 PROCEDURE — 700102 HCHG RX REV CODE 250 W/ 637 OVERRIDE(OP): Performed by: HOSPITALIST

## 2021-10-27 PROCEDURE — 80048 BASIC METABOLIC PNL TOTAL CA: CPT

## 2021-10-27 PROCEDURE — 99233 SBSQ HOSP IP/OBS HIGH 50: CPT | Performed by: STUDENT IN AN ORGANIZED HEALTH CARE EDUCATION/TRAINING PROGRAM

## 2021-10-27 PROCEDURE — A9270 NON-COVERED ITEM OR SERVICE: HCPCS | Performed by: HOSPITALIST

## 2021-10-27 PROCEDURE — 700102 HCHG RX REV CODE 250 W/ 637 OVERRIDE(OP): Performed by: INTERNAL MEDICINE

## 2021-10-27 PROCEDURE — 36415 COLL VENOUS BLD VENIPUNCTURE: CPT

## 2021-10-27 PROCEDURE — A9270 NON-COVERED ITEM OR SERVICE: HCPCS | Performed by: STUDENT IN AN ORGANIZED HEALTH CARE EDUCATION/TRAINING PROGRAM

## 2021-10-27 PROCEDURE — 700111 HCHG RX REV CODE 636 W/ 250 OVERRIDE (IP): Performed by: INTERNAL MEDICINE

## 2021-10-27 PROCEDURE — A9270 NON-COVERED ITEM OR SERVICE: HCPCS | Performed by: INTERNAL MEDICINE

## 2021-10-27 PROCEDURE — 97535 SELF CARE MNGMENT TRAINING: CPT

## 2021-10-27 PROCEDURE — 82962 GLUCOSE BLOOD TEST: CPT | Mod: 91

## 2021-10-27 RX ORDER — BISACODYL 5 MG
10 TABLET, DELAYED RELEASE (ENTERIC COATED) ORAL DAILY
Status: DISCONTINUED | OUTPATIENT
Start: 2021-10-27 | End: 2021-10-30 | Stop reason: HOSPADM

## 2021-10-27 RX ADMIN — SENNOSIDES, DOCUSATE SODIUM 2 TABLET: 8.6; 5 TABLET ORAL at 17:26

## 2021-10-27 RX ADMIN — ASPIRIN 81 MG: 81 TABLET, COATED ORAL at 04:22

## 2021-10-27 RX ADMIN — INSULIN GLARGINE 20 UNITS: 100 INJECTION, SOLUTION SUBCUTANEOUS at 17:39

## 2021-10-27 RX ADMIN — TRAZODONE HYDROCHLORIDE 450 MG: 150 TABLET ORAL at 21:19

## 2021-10-27 RX ADMIN — SENNOSIDES, DOCUSATE SODIUM 2 TABLET: 8.6; 5 TABLET ORAL at 04:22

## 2021-10-27 RX ADMIN — INSULIN LISPRO 2 UNITS: 100 INJECTION, SOLUTION INTRAVENOUS; SUBCUTANEOUS at 21:20

## 2021-10-27 RX ADMIN — CLOPIDOGREL BISULFATE 75 MG: 75 TABLET ORAL at 04:21

## 2021-10-27 RX ADMIN — INSULIN LISPRO 2 UNITS: 100 INJECTION, SOLUTION INTRAVENOUS; SUBCUTANEOUS at 17:38

## 2021-10-27 RX ADMIN — GABAPENTIN 300 MG: 300 CAPSULE ORAL at 04:22

## 2021-10-27 RX ADMIN — ENOXAPARIN SODIUM 40 MG: 40 INJECTION SUBCUTANEOUS at 04:23

## 2021-10-27 RX ADMIN — OXYCODONE HYDROCHLORIDE 20 MG: 10 TABLET ORAL at 21:19

## 2021-10-27 RX ADMIN — INSULIN LISPRO 2 UNITS: 100 INJECTION, SOLUTION INTRAVENOUS; SUBCUTANEOUS at 12:03

## 2021-10-27 RX ADMIN — INSULIN LISPRO 2 UNITS: 100 INJECTION, SOLUTION INTRAVENOUS; SUBCUTANEOUS at 07:30

## 2021-10-27 RX ADMIN — OXYCODONE HYDROCHLORIDE 20 MG: 10 TABLET ORAL at 04:23

## 2021-10-27 RX ADMIN — HYDROMORPHONE HYDROCHLORIDE 0.5 MG: 1 INJECTION, SOLUTION INTRAMUSCULAR; INTRAVENOUS; SUBCUTANEOUS at 14:00

## 2021-10-27 RX ADMIN — SULFAMETHOXAZOLE AND TRIMETHOPRIM 1 TABLET: 800; 160 TABLET ORAL at 04:23

## 2021-10-27 RX ADMIN — CEPHALEXIN 1000 MG: 500 CAPSULE ORAL at 07:38

## 2021-10-27 RX ADMIN — OXYCODONE HYDROCHLORIDE 20 MG: 10 TABLET ORAL at 17:27

## 2021-10-27 RX ADMIN — OXYCODONE HYDROCHLORIDE 20 MG: 10 TABLET ORAL at 10:50

## 2021-10-27 RX ADMIN — ATORVASTATIN CALCIUM 80 MG: 40 TABLET, FILM COATED ORAL at 17:27

## 2021-10-27 RX ADMIN — BISACODYL 10 MG: 5 TABLET, COATED ORAL at 17:47

## 2021-10-27 RX ADMIN — FUROSEMIDE 40 MG: 40 TABLET ORAL at 04:23

## 2021-10-27 RX ADMIN — GABAPENTIN 300 MG: 300 CAPSULE ORAL at 17:26

## 2021-10-27 ASSESSMENT — COGNITIVE AND FUNCTIONAL STATUS - GENERAL
DRESSING REGULAR LOWER BODY CLOTHING: A LITTLE
DAILY ACTIVITIY SCORE: 19
DRESSING REGULAR UPPER BODY CLOTHING: A LITTLE
HELP NEEDED FOR BATHING: A LITTLE
SUGGESTED CMS G CODE MODIFIER MOBILITY: CI
TOILETING: A LITTLE
MOBILITY SCORE: 23
PERSONAL GROOMING: A LITTLE
SUGGESTED CMS G CODE MODIFIER DAILY ACTIVITY: CK
CLIMB 3 TO 5 STEPS WITH RAILING: A LITTLE

## 2021-10-27 ASSESSMENT — ENCOUNTER SYMPTOMS
BRUISES/BLEEDS EASILY: 0
BACK PAIN: 0
NAUSEA: 0
SORE THROAT: 0
VOMITING: 0
NERVOUS/ANXIOUS: 0
FLANK PAIN: 0
SHORTNESS OF BREATH: 0
HEMOPTYSIS: 0
FALLS: 0
HEADACHES: 0
DIARRHEA: 0
NECK PAIN: 0
BLOOD IN STOOL: 0
ABDOMINAL PAIN: 0
SINUS PAIN: 0
CHILLS: 0
CONSTIPATION: 1
MYALGIAS: 1
FEVER: 0
WEAKNESS: 0
EYE PAIN: 0
DEPRESSION: 1

## 2021-10-27 ASSESSMENT — PAIN DESCRIPTION - PAIN TYPE: TYPE: ACUTE PAIN

## 2021-10-27 ASSESSMENT — GAIT ASSESSMENTS
DISTANCE (FEET): 20
ASSISTIVE DEVICE: FRONT WHEEL WALKER
GAIT LEVEL OF ASSIST: MINIMAL ASSIST
DISTANCE (FEET): 30
DEVIATION: ANTALGIC;DECREASED HEEL STRIKE;DECREASED TOE OFF

## 2021-10-27 NOTE — PROGRESS NOTES
I certify that the patient requires continued medically necessary hospital services for the treatment of peripheral arterial disease and will remain in the hospital for 2 more days.  Discharge plan is anticipated to be home with home health and outpatient wound care versus SNF for additional post-acute PT/OT and wound care.

## 2021-10-27 NOTE — THERAPY
Physical Therapy   Daily Treatment     Patient Name: Luis Kellogg  Age:  71 y.o., Sex:  male  Medical Record #: [id number]  Today's Date: 10/27/2021     Precautions  Precautions: Fall Risk;Other (See Comments)  Comments: BLE wound vacs;     Assessment    Pt with improving upright tolerance; able to ambulate with intermittent unilateral UE support; pt very resistant to new learning, does not allow for physical assist/instruction, feels ready for home; from a PT perspective, safety concerns are likely baseline personality related and pt able to demonstrate functional mobility required to return home aside from stairs, which will attempt to trial next session; once wound care plan of care established and pt can demo carryover of follow up from that perspective, will follow to ensure discharge plan; pt would benefit from PT in next setting if pt agreeable.     Plan    Treatment plan modified to 1 time per week until therapy goals are met for the following treatments:  Equipment, Gait Training, Manual Therapy, Neuro Re-Education / Balance, Self Care/Home Evaluation, Stair Training, Therapeutic Activities and Therapeutic Exercises.    DC Equipment Recommendations: Unable to determine at this time  Discharge Recommendations:  Other - defer to wound vac/wound plan of care; PT plan of care can be completed in next setting home vs placement       Abridged Subjective/Objective     10/27/21 1140   Cognition    Cognition / Consciousness X   Safety Awareness Impulsive;Impaired   Comments cooperative, initially disgruntled but improved post session;    Passive ROM Lower Body   Passive ROM Lower Body WDL   Strength Lower Body   Lower Body Strength  X   Gross Strength Generalized Weakness, Equal Bilaterally   Sensation Lower Body   Lower Extremity Sensation   X   Comments diminished to light touch to mid shin;    Sitting Lower Body Exercises   Sitting Lower Body Exercises Yes   Long Arc Quad 1 set of 10   Balance   Sitting Balance  (Static) Fair +   Sitting Balance (Dynamic) Fair +   Standing Balance (Static) Fair   Standing Balance (Dynamic) Fair -   Weight Shift Sitting Good   Weight Shift Standing Fair   Skilled Intervention Postural Facilitation;Sequencing;Tactile Cuing;Verbal Cuing   Comments B UE support in sitting/standing; frequently taking UEs off FWW and furniture walking then refused to us SPC instead due to slick floor;    Gait Analysis   Gait Level Of Assist Minimal Assist  (CGA)   Assistive Device Front Wheel Walker   Distance (Feet) 30   # of Times Distance was Traveled 2   Deviation Antalgic;Decreased Heel Strike;Decreased Toe Off   # of Stairs Climbed 0   Weight Bearing Status no restrictions   Skilled Intervention Sequencing;Postural Facilitation;Tactile Cuing;Verbal Cuing   Comments distance limited by pt due to within room distances, states 'hallway's scare me'; states this is a distance that would get him around his room/place;    Bed Mobility    Supine to Sit Modified Independent   Sit to Supine Modified Independent   Functional Mobility   Sit to Stand Supervised  (x 2 with FWW)   Bed, Chair, Wheelchair Transfer Supervised  (with FWW)   Comments needs help with wound vac lines;    Patient / Family Goals    Patient / Family Goal #1 return home   Goal #1 Outcome Goal not met   Short Term Goals    Short Term Goal # 1 Pt will transfer with SPC at Mod I level for safe DC home by the 6thtx   Goal Outcome # 1 goal not met   Short Term Goal # 2 Pt will ambulate for 50 ft with SPC at supervision level to access home by the 6th tx   Goal Outcome # 2 Goal not met   Short Term Goal # 3 Pt will ascend and descend 4 steps with bilateral railing at SPV level to access his home by the 6th tx   Goal Outcome # 3 Goal not met

## 2021-10-27 NOTE — THERAPY
Missed Therapy     Patient Name: Luis Kellogg  Age:  71 y.o., Sex:  male  Medical Record #: [id number]  Today's Date: 10/27/2021    Discussed missed therapy with Nsg       10/26/21 2112   Interdisciplinary Plan of Care Collaboration   Collaboration Comments OT tx attempted, Nsg present when pt reported being in too much pain BLE to participate in OOB ADL's today.  Will attempt another day as able.  Pt encouraged to get OOB for meals.

## 2021-10-27 NOTE — THERAPY
Occupational Therapy  Daily Treatment     Patient Name: Luis Kellogg  Age:  71 y.o., Sex:  male  Medical Record #: [id number]  Today's Date: 10/27/2021     Precautions  Precautions: Fall Risk, Other (See Comments)  Comments: BLE wound vac's    Assessment    Pt seen for OT tx today, progressing as expected. Pt is primarily limited by pain, decreased safety awareness as needed max vc's for proper placement as pt tends to reach onto walls, and furniture in the room. Will continue to follow while in house.     Plan    Continue current treatment plan.    DC Equipment Recommendations: Unable to determine at this time  Discharge Recommendations: Recommend post-acute placement for additional occupational therapy services prior to discharge home       Objective       10/27/21 1152   Cognition    Cognition / Consciousness X   Safety Awareness Impaired;Impulsive   Attention Impaired   Comments at times poor insight into balance deficits, attempts to reach out/grab things instead of utilizing FWW properly, refused socks   Balance   Sitting Balance (Static) Fair +   Sitting Balance (Dynamic) Fair +   Standing Balance (Static) Fair   Standing Balance (Dynamic) Fair -   Weight Shift Sitting Good   Weight Shift Standing Fair   Skilled Intervention Verbal Cuing;Compensatory Strategies   Comments w/FWW, cues needed for utilizing FWW safely throughout the session   Bed Mobility    Supine to Sit Modified Independent   Sit to Supine Modified Independent   Scooting Supervised   Rolling   (sit pivot)   Comments raised hob   Activities of Daily Living   Eating Independent   Grooming Supervision;Seated  (declined standing ADLs)   Upper Body Dressing Supervision  (for hospital gown management)   Lower Body Dressing   (NT, refused)   Toileting   (declined need to use BR)   Skilled Intervention Verbal Cuing   Functional Mobility   Sit to Stand Supervised  (SBA w/FWW, cues for safety )   Bed, Chair, Wheelchair Transfer Supervised  (sba)   Toilet  Transfers Refused  (declined need to use BR)   Mobility within room   Skilled Intervention Verbal Cuing;Compensatory Strategies   Comments w/FWW, cues for FWW safety   Short Term Goals   Short Term Goal # 1 Toilet xfer with mod I using AD prn   Goal Outcome # 1 Progressing as expected   Short Term Goal # 2 standing grooming  x 10 min without LOB at Mod I   Goal Outcome # 2 Progressing as expected   Anticipated Discharge Equipment and Recommendations   DC Equipment Recommendations Unable to determine at this time

## 2021-10-27 NOTE — PROGRESS NOTES
COVID-19 surge in effect.     Assumed care of pt at shift change. Pt is on RA with no signs of acute distress. Reports pain to LEs, Pt BP low at this time, Pt agreeable to recheck in an hr, and medication will be administered appropriately.  A&Ox4. POC discussed with patient. All comfort measures in place. Call light and personal belongings by bedside. Bed locked and in lowest position. Hourly rounding in place.

## 2021-10-27 NOTE — PROGRESS NOTES
Hospital Medicine Daily Progress Note    Date of Service  10/26/2021    Chief Complaint  Luis Kellogg is a 71 y.o. male admitted 10/7/2021 with bilateral leg wounds    Hospital Course  This is a 71 year old male with PMHx of hyperlipidemia, type 2 diabetes with A1c of 9.1, obesity, CAD, hx MI,PAD, history of lower extremity osteomyelitis and recurrent LLE cellulitis who was admitted for sepsis secondary to BLE diabetic, nonhealing wounds and cellulitis.    Patient has known history of uncontrolled type 2 diabetes, he has had prior admissions for cellulitis and osteomyelitis.  Patient does have history of PAD.  Venous dopplers negative for DVT and arterial Dopplers ordered.  X-ray of the left tib-fib, no concern for osteomyelitis.  Wound care consulted. LPS consulted.    Wound cultures from March 20, 2021, noted E. Faecalis sensitive to ampicillin, Cipro, Levaquin and vancomycin.  Patient is currently on vancomycin only as per wound culture results.    S/p revascularization and debridement of lower extremity wounds  Wound vac in place  Wound culture: GBS and MRSA      Interval Problem Update  FANTA ON  He reports severe BLE pain post-procedure. His RLE is more painful than the LLE.  He was able to take a shower without issues. He denies falls.  He has been feeling constipated but was ready for a BM this afternoon.  He denies bleeding, N/V, F/C, bladder dysfunction, dyspnea.    He believes he can manage at home after discharge and requested coordination of wound care with [facility]. I advised that I would discuss with Case Management.    I have personally seen and examined the patient at bedside. I discussed the plan of care with patient, bedside RN, charge RN, , pharmacy and wound care.    POC discussed with Wound Care Dr. Ardon. He advised transtion off wound vac on discharge, likely Thursday. He advised the wound was superficial to the bone and did not warrant extended  antibiotics.    Consultants/Specialty  vascular surgery and LPS, wound care       Code Status  Full Code    Disposition  Patient is medically cleared pending wound care coordination.   Anticipate discharge to to home with organized home healthcare and close outpatient follow-up.  I have placed the appropriate orders for post-discharge needs.    Review of Systems  Review of Systems   Constitutional: Negative for chills, fever and malaise/fatigue.   HENT: Negative for ear pain, nosebleeds, sinus pain and sore throat.    Eyes: Negative for pain.   Respiratory: Negative for hemoptysis and shortness of breath.    Cardiovascular: Negative for chest pain and leg swelling.   Gastrointestinal: Positive for constipation. Negative for abdominal pain, blood in stool, diarrhea, melena, nausea and vomiting.   Genitourinary: Negative for dysuria, flank pain and hematuria.   Musculoskeletal: Positive for joint pain and myalgias. Negative for back pain, falls and neck pain.   Neurological: Negative for weakness and headaches.   Endo/Heme/Allergies: Does not bruise/bleed easily.   Psychiatric/Behavioral: Negative for depression. The patient is not nervous/anxious.      Physical Exam  Temp:  [36.2 °C (97.2 °F)-36.4 °C (97.6 °F)] 36.2 °C (97.2 °F)  Pulse:  [72-90] 90  Resp:  [16-19] 18  BP: ()/(65-74) 122/66  SpO2:  [91 %-93 %] 91 %    Physical Exam  Vitals and nursing note reviewed.   Constitutional:       General: He is not in acute distress.     Appearance: He is obese.      Comments: Nude   HENT:      Head: Normocephalic and atraumatic.      Nose: Nose normal.      Mouth/Throat:      Mouth: Mucous membranes are moist.      Pharynx: No posterior oropharyngeal erythema.   Eyes:      General: No scleral icterus.     Conjunctiva/sclera: Conjunctivae normal.      Comments: Right eye exotropia   Cardiovascular:      Rate and Rhythm: Normal rate and regular rhythm.      Pulses: Normal pulses.      Heart sounds: Normal heart sounds.  No murmur heard.   No friction rub. No gallop.    Pulmonary:      Effort: Pulmonary effort is normal. No respiratory distress.      Breath sounds: Normal breath sounds. No wheezing, rhonchi or rales.   Abdominal:      General: Abdomen is protuberant. Bowel sounds are normal. There is distension.      Tenderness: There is no abdominal tenderness. There is no guarding or rebound.   Genitourinary:     Comments: No nino  Musculoskeletal:         General: Deformity (Left toes 2-4 amputated) present.      Cervical back: Neck supple. No muscular tenderness.      Right lower leg: No edema.      Left lower leg: No edema.   Skin:     General: Skin is warm and dry.      Findings: Lesion (Multiple scattered plaques on BLE are nontender, nonerythematous) present.      Comments: RLE wrapped, exquisitely tender, nonbloody.  LLE wound vac, minimally tender, nonbloody.   Neurological:      Mental Status: He is alert.      Comments: Appropriately conversant   Psychiatric:         Attention and Perception: Attention and perception normal.         Mood and Affect: Mood and affect normal.         Speech: Speech normal.         Behavior: Behavior is slowed. Behavior is cooperative.         Thought Content: Thought content normal.         Cognition and Memory: Cognition and memory normal.         Judgment: Judgment normal.       Fluids    Intake/Output Summary (Last 24 hours) at 10/26/2021 1838  Last data filed at 10/26/2021 1600  Gross per 24 hour   Intake 360 ml   Output 0 ml   Net 360 ml       Laboratory      Recent Labs     10/24/21  0258 10/25/21  0347 10/26/21  0504   SODIUM 134* 133* 135   POTASSIUM 4.6 4.6 4.3   CHLORIDE 104 103 104   CO2 20 19* 22   GLUCOSE 121* 138* 132*   BUN 27* 26* 21   CREATININE 1.08 1.13 0.78   CALCIUM 9.2 9.5 9.8                   Imaging  CT-CTA AORTA-RO WITH & W/O-POST PROCESS   Final Result      1.  Occlusion of the distal right femoral artery with reconstitution distally.      2.  Calcified plaque in  small diameter of the tibial and peroneal arteries bilaterally makes it difficult to evaluate for degree of stenosis and occlusion.      3.  Plaque within the left femoral artery without evidence of occlusion.      4.  Aortic atherosclerosis without aneurysm.      5.  Diverticulosis without evidence of diverticulitis.      6.  Hepatic steatosis.      DX-FOOT-COMPLETE 3+ LEFT   Final Result         1.  Disuse osteoporosis of the left foot.      2. Large subcutaneous ulcer on the dorsal surface of left foot.      3. Previous amputations of the left second through fourth toes.      DX-FOOT-COMPLETE 3+ RIGHT   Final Result      1.  No fracture or dislocation of RIGHT foot.   2.  Periarticular osteopenia suggests inflammatory arthropathy.   3.  No soft tissue gas or focal bony destruction however osteomyelitis is not excluded by plain film.   4.  Severe degenerative change of ankle joint.      DX-TIBIA AND FIBULA RIGHT   Final Result      No evidence of fracture or dislocation or acute osteomyelitis.      US-EXTREMITY ARTERY LOWER BILAT   Final Result      US-MIA SINGLE LEVEL BILAT   Final Result      US-EXTREMITY VENOUS LOWER UNILAT LEFT   Final Result      DX-TIBIA AND FIBULA LEFT   Final Result         1.  No acute traumatic bony injury.   2.  Soft tissue erosion of the posterior mid calf, corresponding with history of soft tissue ulcers.      IR-EXTREMITY ANGIOGRAM-BILATERAL    (Results Pending)        Assessment/Plan  * Diabetic infection of left foot (HCC)- (present on admission)  Assessment & Plan  Patient has known history of uncontrolled type 2 diabetes, he has had prior admissions for cellulitis and osteomyelitis.  Patient does have history of PAD.  Venous dopplers negative for DVT   Arterial Dopplers - inflow arterial insufficiency in lower extremities bilaterally  X-ray of the left tib-fib, no concern for osteomyelitis.    Wound care consulted. LPS following.  S/p revasculazization, debridement on  10/12    Wound culture MRSA, GBS  Keflex, bactrim to complete 14-day course    Diabetic infection of right foot (HCC)- (present on admission)  Assessment & Plan  As per left lower extremity plan    Peripheral artery disease (HCC) - (present on admission)  Assessment & Plan  Patient does have history of PAD, previous MIA was performed in June 2021, showed mild to moderate disease. Repeat MIA noted significant worsening disease.    Vascular surgery consulted,s/p CTA today, plans for revascularization prior to surgical intervention for the wounds, of LLE 10/11/2021    S/p OR 10/12 for revascularization  Continue DAPT and high-intensity atorvastatin after stent, follow up with Vascular Surgery    Postprocedural hypotension  Assessment & Plan  Asymptomatic, after stenting  Lisinopril and lasix held  Carvedilol restarted at half-dose due to h/o CAD  No indication for strict BP control, PRN antihypertensives discontinued      Sepsis- (present on admission)  Assessment & Plan  This is Sepsis Present on admission  SIRS criteria identified on my evaluation include: Tachycardia, with heart rate greater than 90 BPM, Tachypnea, with respirations greater than 20 per minute and Bandemia, greater than 10% bands  Source is diabetic infected ulcers and cellulitis of Lower extremities  Sepsis protocol initiated  Fluid resuscitation ordered per protocol  IV antibiotics as appropriate for source of sepsis  While organ dysfunction may be noted elsewhere in this problem list or in the chart, degree of organ dysfunction does not meet CMS criteria for severe sepsis    BCx NGTD  Wounds are superficial and intervened on for revascularization  Wound Cx +MRSA  Complete 14-day course of antibiotics        Uncontrolled type 2 diabetes mellitus (HCC)- (present on admission)  Assessment & Plan  A1c 9.1  POCT + SSI  Glargine held on admission, restart at half-dose for glucose monitoring prior to discharge  BG goal <200 for wound-healing    Coronary  artery disease involving native coronary artery- (present on admission)  Assessment & Plan  Continue DAPT after stenting  Restarted atorvastatin (max dose as tolerated) and carvedilol (half-dose due to borderline BP)      Hyponatremia- (present on admission)  Assessment & Plan  Mild  Repeat AM BMP    Drug-induced constipation- (present on admission)  Assessment & Plan  Due to opiates for pain  Bowel protocol    Chronic low back pain- (present on admission)  Assessment & Plan  On percocet PTA  Continue oxycodone PRN for post-procedure and chronic pain    Peripheral neuropathy (HCC)- (present on admission)  Assessment & Plan  Due to diabetic neuropathy and vascular disease  Continue gabapentin    Hyperlipidemia- (present on admission)  Assessment & Plan  Maximal tolerated statin due to PAD / CAD       VTE prophylaxis: SCDs/TEDs and enoxaparin ppx    I have performed a physical exam and reviewed and updated ROS and Plan today (10/26/2021). In review of yesterday's note (10/25/2021), there are no changes except as documented above.

## 2021-10-27 NOTE — CARE PLAN
The patient is Stable - Low risk of patient condition declining or worsening    Shift Goals  Clinical Goals: Pain management  Patient Goals: rest    Progress made toward(s) clinical / shift goals:  Pt medicated per MA R for pain.     Patient is not progressing towards the following goals:

## 2021-10-28 LAB
ANION GAP SERPL CALC-SCNC: 10 MMOL/L (ref 7–16)
BASOPHILS # BLD AUTO: 0.7 % (ref 0–1.8)
BASOPHILS # BLD: 0.04 K/UL (ref 0–0.12)
BUN SERPL-MCNC: 19 MG/DL (ref 8–22)
CALCIUM SERPL-MCNC: 10 MG/DL (ref 8.5–10.5)
CHLORIDE SERPL-SCNC: 100 MMOL/L (ref 96–112)
CO2 SERPL-SCNC: 25 MMOL/L (ref 20–33)
CREAT SERPL-MCNC: 0.71 MG/DL (ref 0.5–1.4)
EOSINOPHIL # BLD AUTO: 0.23 K/UL (ref 0–0.51)
EOSINOPHIL NFR BLD: 3.9 % (ref 0–6.9)
ERYTHROCYTE [DISTWIDTH] IN BLOOD BY AUTOMATED COUNT: 45.1 FL (ref 35.9–50)
GLUCOSE BLD-MCNC: 149 MG/DL (ref 65–99)
GLUCOSE BLD-MCNC: 168 MG/DL (ref 65–99)
GLUCOSE BLD-MCNC: 177 MG/DL (ref 65–99)
GLUCOSE BLD-MCNC: 179 MG/DL (ref 65–99)
GLUCOSE BLD-MCNC: 194 MG/DL (ref 65–99)
GLUCOSE SERPL-MCNC: 142 MG/DL (ref 65–99)
HCT VFR BLD AUTO: 39.3 % (ref 42–52)
HGB BLD-MCNC: 12.8 G/DL (ref 14–18)
IMM GRANULOCYTES # BLD AUTO: 0.03 K/UL (ref 0–0.11)
IMM GRANULOCYTES NFR BLD AUTO: 0.5 % (ref 0–0.9)
LYMPHOCYTES # BLD AUTO: 1.41 K/UL (ref 1–4.8)
LYMPHOCYTES NFR BLD: 24 % (ref 22–41)
MCH RBC QN AUTO: 29 PG (ref 27–33)
MCHC RBC AUTO-ENTMCNC: 32.6 G/DL (ref 33.7–35.3)
MCV RBC AUTO: 89.1 FL (ref 81.4–97.8)
MONOCYTES # BLD AUTO: 0.84 K/UL (ref 0–0.85)
MONOCYTES NFR BLD AUTO: 14.3 % (ref 0–13.4)
NEUTROPHILS # BLD AUTO: 3.33 K/UL (ref 1.82–7.42)
NEUTROPHILS NFR BLD: 56.6 % (ref 44–72)
NRBC # BLD AUTO: 0 K/UL
NRBC BLD-RTO: 0 /100 WBC
PLATELET # BLD AUTO: 283 K/UL (ref 164–446)
PMV BLD AUTO: 9.4 FL (ref 9–12.9)
POTASSIUM SERPL-SCNC: 3.8 MMOL/L (ref 3.6–5.5)
RBC # BLD AUTO: 4.41 M/UL (ref 4.7–6.1)
SODIUM SERPL-SCNC: 135 MMOL/L (ref 135–145)
WBC # BLD AUTO: 5.9 K/UL (ref 4.8–10.8)

## 2021-10-28 PROCEDURE — 85025 COMPLETE CBC W/AUTO DIFF WBC: CPT

## 2021-10-28 PROCEDURE — 770001 HCHG ROOM/CARE - MED/SURG/GYN PRIV*

## 2021-10-28 PROCEDURE — 700102 HCHG RX REV CODE 250 W/ 637 OVERRIDE(OP): Performed by: INTERNAL MEDICINE

## 2021-10-28 PROCEDURE — 36415 COLL VENOUS BLD VENIPUNCTURE: CPT

## 2021-10-28 PROCEDURE — 700102 HCHG RX REV CODE 250 W/ 637 OVERRIDE(OP): Performed by: SURGERY

## 2021-10-28 PROCEDURE — 82962 GLUCOSE BLOOD TEST: CPT

## 2021-10-28 PROCEDURE — 99232 SBSQ HOSP IP/OBS MODERATE 35: CPT | Performed by: HOSPITALIST

## 2021-10-28 PROCEDURE — A9270 NON-COVERED ITEM OR SERVICE: HCPCS | Performed by: STUDENT IN AN ORGANIZED HEALTH CARE EDUCATION/TRAINING PROGRAM

## 2021-10-28 PROCEDURE — 80048 BASIC METABOLIC PNL TOTAL CA: CPT

## 2021-10-28 PROCEDURE — A9270 NON-COVERED ITEM OR SERVICE: HCPCS | Performed by: INTERNAL MEDICINE

## 2021-10-28 PROCEDURE — A9270 NON-COVERED ITEM OR SERVICE: HCPCS | Performed by: SURGERY

## 2021-10-28 PROCEDURE — 97606 NEG PRS WND THER DME>50 SQCM: CPT

## 2021-10-28 PROCEDURE — 700102 HCHG RX REV CODE 250 W/ 637 OVERRIDE(OP): Performed by: STUDENT IN AN ORGANIZED HEALTH CARE EDUCATION/TRAINING PROGRAM

## 2021-10-28 PROCEDURE — 700111 HCHG RX REV CODE 636 W/ 250 OVERRIDE (IP): Performed by: GENERAL PRACTICE

## 2021-10-28 PROCEDURE — 700111 HCHG RX REV CODE 636 W/ 250 OVERRIDE (IP): Performed by: INTERNAL MEDICINE

## 2021-10-28 PROCEDURE — 302098 PASTE RING (FLAT): Performed by: HOSPITALIST

## 2021-10-28 RX ADMIN — BISACODYL 10 MG: 5 TABLET, COATED ORAL at 05:17

## 2021-10-28 RX ADMIN — CARVEDILOL 6.25 MG: 6.25 TABLET, FILM COATED ORAL at 08:44

## 2021-10-28 RX ADMIN — OXYCODONE HYDROCHLORIDE 20 MG: 10 TABLET ORAL at 05:16

## 2021-10-28 RX ADMIN — INSULIN LISPRO 2 UNITS: 100 INJECTION, SOLUTION INTRAVENOUS; SUBCUTANEOUS at 12:05

## 2021-10-28 RX ADMIN — ATORVASTATIN CALCIUM 80 MG: 40 TABLET, FILM COATED ORAL at 17:34

## 2021-10-28 RX ADMIN — CARVEDILOL 6.25 MG: 6.25 TABLET, FILM COATED ORAL at 17:34

## 2021-10-28 RX ADMIN — ENOXAPARIN SODIUM 40 MG: 40 INJECTION SUBCUTANEOUS at 05:17

## 2021-10-28 RX ADMIN — OXYCODONE HYDROCHLORIDE 10 MG: 10 TABLET ORAL at 08:24

## 2021-10-28 RX ADMIN — INSULIN GLARGINE 20 UNITS: 100 INJECTION, SOLUTION SUBCUTANEOUS at 17:35

## 2021-10-28 RX ADMIN — SENNOSIDES, DOCUSATE SODIUM 2 TABLET: 8.6; 5 TABLET ORAL at 17:33

## 2021-10-28 RX ADMIN — HYDROMORPHONE HYDROCHLORIDE 0.5 MG: 1 INJECTION, SOLUTION INTRAMUSCULAR; INTRAVENOUS; SUBCUTANEOUS at 09:52

## 2021-10-28 RX ADMIN — GABAPENTIN 300 MG: 300 CAPSULE ORAL at 17:34

## 2021-10-28 RX ADMIN — CLOPIDOGREL BISULFATE 75 MG: 75 TABLET ORAL at 05:17

## 2021-10-28 RX ADMIN — OXYCODONE HYDROCHLORIDE 20 MG: 10 TABLET ORAL at 11:28

## 2021-10-28 RX ADMIN — INSULIN LISPRO 2 UNITS: 100 INJECTION, SOLUTION INTRAVENOUS; SUBCUTANEOUS at 20:28

## 2021-10-28 RX ADMIN — OXYCODONE HYDROCHLORIDE 20 MG: 10 TABLET ORAL at 20:27

## 2021-10-28 RX ADMIN — GABAPENTIN 300 MG: 300 CAPSULE ORAL at 05:17

## 2021-10-28 RX ADMIN — TRAZODONE HYDROCHLORIDE 450 MG: 150 TABLET ORAL at 20:31

## 2021-10-28 RX ADMIN — OXYCODONE HYDROCHLORIDE 20 MG: 10 TABLET ORAL at 17:33

## 2021-10-28 RX ADMIN — ASPIRIN 81 MG: 81 TABLET, COATED ORAL at 05:17

## 2021-10-28 RX ADMIN — SENNOSIDES, DOCUSATE SODIUM 2 TABLET: 8.6; 5 TABLET ORAL at 05:16

## 2021-10-28 RX ADMIN — INSULIN LISPRO 2 UNITS: 100 INJECTION, SOLUTION INTRAVENOUS; SUBCUTANEOUS at 17:36

## 2021-10-28 RX ADMIN — OXYCODONE HYDROCHLORIDE 20 MG: 10 TABLET ORAL at 23:44

## 2021-10-28 ASSESSMENT — PAIN DESCRIPTION - PAIN TYPE
TYPE: ACUTE PAIN

## 2021-10-28 ASSESSMENT — ENCOUNTER SYMPTOMS
SHORTNESS OF BREATH: 0
DIZZINESS: 0
VOMITING: 0
FEVER: 0
COUGH: 0
ABDOMINAL PAIN: 0
HEADACHES: 0
CHILLS: 0
PALPITATIONS: 0
NAUSEA: 0

## 2021-10-28 NOTE — PROGRESS NOTES
[facility] Medicine Daily Progress Note    Date of Service  10/27/2021    Chief Complaint  Luis Kellogg is a 71 y.o. male admitted 10/7/2021 with bilateral leg wounds    Hospital Course  This is a 71 year old male with PMHx of hyperlipidemia, type 2 diabetes with A1c of 9.1, obesity, CAD, hx MI,PAD, history of lower extremity osteomyelitis and recurrent LLE cellulitis who was admitted for sepsis secondary to BLE diabetic, nonhealing wounds and cellulitis.    Patient has known history of uncontrolled type 2 diabetes, he has had prior admissions for cellulitis and osteomyelitis.  Patient does have history of PAD.  Venous dopplers negative for DVT and arterial Dopplers ordered.  X-ray of the left tib-fib, no concern for osteomyelitis.  Wound care consulted. LPS consulted.    Wound cultures from March 20, 2021, noted E. Faecalis sensitive to ampicillin, Cipro, Levaquin and vancomycin.  Patient is currently on vancomycin only as per wound culture results.    S/p revascularization and debridement of lower extremity wounds  Wound vac in place  Wound culture: GBS and MRSA      Interval Problem Update  FANTA ON  He's having worsened Leg pain this morning.  He is frustrated that there's been a delay in his pain medication.  He is still constipated and requested dulcolax.  He denies F/C, N/V, bladder dysfunction, bleeding.    He indicated he does not wish for post-acute placement nor Home Health due to financial constraints. He is agreeable to follow up in wound clinic for supplies and assistance with managing his dressing changes at home.    I have personally seen and examined the patient at bedside. I discussed the plan of care with patient, bedside RN, charge RN, , pharmacy and wound care.    POC discussed with Wound Care Dr. Ardon. He will reassess tomorrow and determine if wound vac can come off or if additional surgical debridement is required.    Consultants/Specialty  vascular surgery and LPS, wound care      Code Status  Full Code    Disposition  Patient is medically cleared pending wound care coordination.   Anticipate discharge to to home with close outpatient follow-up.  I have placed the appropriate orders for post-discharge needs.    Review of Systems  Review of Systems   Constitutional: Negative for chills, fever and malaise/fatigue.   HENT: Negative for ear pain, nosebleeds, sinus pain and sore throat.    Eyes: Negative for pain.   Respiratory: Negative for hemoptysis and shortness of breath.    Cardiovascular: Negative for chest pain and leg swelling.   Gastrointestinal: Positive for constipation. Negative for abdominal pain, blood in stool, diarrhea, melena, nausea and vomiting.   Genitourinary: Negative for dysuria, flank pain and hematuria.   Musculoskeletal: Positive for joint pain and myalgias. Negative for back pain, falls and neck pain.   Neurological: Negative for weakness and headaches.   Endo/Heme/Allergies: Does not bruise/bleed easily.   Psychiatric/Behavioral: Positive for depression. The patient is not nervous/anxious.      Physical Exam  Temp:  [36.2 °C (97.1 °F)-36.6 °C (97.8 °F)] 36.4 °C (97.6 °F)  Pulse:  [58-89] 60  Resp:  [18] 18  BP: ()/(55-85) 112/85  SpO2:  [90 %-97 %] 90 %    Physical Exam  Vitals and nursing note reviewed.   Constitutional:       General: He is not in acute distress.     Appearance: He is obese.   HENT:      Head: Normocephalic and atraumatic.      Nose: Nose normal.      Mouth/Throat:      Mouth: Mucous membranes are moist.      Pharynx: No posterior oropharyngeal erythema.   Eyes:      General: No scleral icterus.     Conjunctiva/sclera: Conjunctivae normal.      Comments: Right eye exotropia   Cardiovascular:      Rate and Rhythm: Normal rate and regular rhythm.      Pulses: Normal pulses.      Heart sounds: Normal heart sounds. No murmur heard.   No friction rub. No gallop.    Pulmonary:      Effort: Pulmonary effort is normal. No respiratory distress.       Breath sounds: Normal breath sounds. No wheezing, rhonchi or rales.   Abdominal:      General: Abdomen is protuberant. Bowel sounds are normal. There is distension.      Tenderness: There is no abdominal tenderness. There is no guarding or rebound.   Genitourinary:     Comments: No nino  Musculoskeletal:         General: Deformity (Left toes 2-4 amputated) present.      Cervical back: Neck supple. No muscular tenderness.      Right lower leg: No edema.      Left lower leg: No edema.   Skin:     General: Skin is warm and dry.      Findings: Lesion (Multiple scattered plaques on BLE are nontender, nonerythematous) present.      Comments: RLE wrapped, dressing saturated with brown nonmalodorous fluid, exquisitely tender.  LLE wound vac, minimally tender, nonbloody.   Neurological:      Mental Status: He is alert.      Comments: Appropriately conversant   Psychiatric:         Attention and Perception: Attention and perception normal.         Mood and Affect: Mood and affect normal.         Speech: Speech normal.         Behavior: Behavior is slowed. Behavior is cooperative.         Thought Content: Thought content normal.         Cognition and Memory: Cognition and memory normal.         Judgment: Judgment normal.       Fluids    Intake/Output Summary (Last 24 hours) at 10/27/2021 1734  Last data filed at 10/27/2021 0900  Gross per 24 hour   Intake 360 ml   Output --   Net 360 ml       Laboratory      Recent Labs     10/25/21  0347 10/26/21  0504 10/27/21  0404   SODIUM 133* 135 134*   POTASSIUM 4.6 4.3 4.1   CHLORIDE 103 104 100   CO2 19* 22 24   GLUCOSE 138* 132* 187*   BUN 26* 21 17   CREATININE 1.13 0.78 0.82   CALCIUM 9.5 9.8 9.7                   Imaging  CT-CTA AORTA-RO WITH & W/O-POST PROCESS   Final Result      1.  Occlusion of the distal right femoral artery with reconstitution distally.      2.  Calcified plaque in small diameter of the tibial and peroneal arteries bilaterally makes it difficult to evaluate  for degree of stenosis and occlusion.      3.  Plaque within the left femoral artery without evidence of occlusion.      4.  Aortic atherosclerosis without aneurysm.      5.  Diverticulosis without evidence of diverticulitis.      6.  Hepatic steatosis.      DX-FOOT-COMPLETE 3+ LEFT   Final Result         1.  Disuse osteoporosis of the left foot.      2. Large subcutaneous ulcer on the dorsal surface of left foot.      3. Previous amputations of the left second through fourth toes.      DX-FOOT-COMPLETE 3+ RIGHT   Final Result      1.  No fracture or dislocation of RIGHT foot.   2.  Periarticular osteopenia suggests inflammatory arthropathy.   3.  No soft tissue gas or focal bony destruction however osteomyelitis is not excluded by plain film.   4.  Severe degenerative change of ankle joint.      DX-TIBIA AND FIBULA RIGHT   Final Result      No evidence of fracture or dislocation or acute osteomyelitis.      US-EXTREMITY ARTERY LOWER BILAT   Final Result      US-MIA SINGLE LEVEL BILAT   Final Result      US-EXTREMITY VENOUS LOWER UNILAT LEFT   Final Result      DX-TIBIA AND FIBULA LEFT   Final Result         1.  No acute traumatic bony injury.   2.  Soft tissue erosion of the posterior mid calf, corresponding with history of soft tissue ulcers.      IR-EXTREMITY ANGIOGRAM-BILATERAL    (Results Pending)        Assessment/Plan  * Diabetic infection of left foot (HCC)- (present on admission)  Assessment & Plan  Patient has known history of uncontrolled type 2 diabetes, he has had prior admissions for cellulitis and osteomyelitis.  Patient does have history of PAD.  Venous dopplers negative for DVT   Arterial Dopplers - inflow arterial insufficiency in lower extremities bilaterally  X-ray of the left tib-fib, no concern for osteomyelitis.    Wound care consulted. LPS following.  S/p revasculazization, debridement on 10/12    Wound culture MRSA, GBS  Completed 14-day course of keflex and bactrim    Diabetic infection of  right foot (HCC)- (present on admission)  Assessment & Plan  As per left lower extremity plan    Peripheral artery disease (HCC) - (present on admission)  Assessment & Plan  Patient does have history of PAD, previous MIA was performed in June 2021, showed mild to moderate disease. Repeat MIA noted significant worsening disease.    Vascular surgery consulted,s/p CTA today, plans for revascularization prior to surgical intervention for the wounds, of LLE 10/11/2021    S/p OR 10/12 for revascularization  Continue DAPT and high-intensity atorvastatin after stent, follow up with Vascular Surgery    Postprocedural hypotension  Assessment & Plan  Asymptomatic, after stenting  Lisinopril and lasix held  Carvedilol restarted at half-dose due to h/o CAD  No indication for strict BP control, PRN antihypertensives discontinued      Sepsis- (present on admission)  Assessment & Plan  This is Sepsis Present on admission  SIRS criteria identified on my evaluation include: Tachycardia, with heart rate greater than 90 BPM, Tachypnea, with respirations greater than 20 per minute and Bandemia, greater than 10% bands  Source is diabetic infected ulcers and cellulitis of Lower extremities  Sepsis protocol initiated  Fluid resuscitation ordered per protocol  IV antibiotics as appropriate for source of sepsis  While organ dysfunction may be noted elsewhere in this problem list or in the chart, degree of organ dysfunction does not meet CMS criteria for severe sepsis    BCx NGTD  Wounds are superficial and intervened on for revascularization  Wound Cx +MRSA  Completed 14-day course of antibiotics for leg wounds        Uncontrolled type 2 diabetes mellitus (HCC)- (present on admission)  Assessment & Plan  A1c 9.1  POCT + SSI  Glargine held on admission, restart at half-dose for glucose monitoring prior to discharge  BG goal <200 for wound-healing    Coronary artery disease involving native coronary artery- (present on admission)  Assessment &  Plan  Continue DAPT after stenting  Restarted atorvastatin (max dose as tolerated) and carvedilol (half-dose due to borderline BP)      Hyponatremia- (present on admission)  Assessment & Plan  Mild  Repeat AM BMP    Drug-induced constipation- (present on admission)  Assessment & Plan  Due to opiates for pain  Scheduled daily dulcolax per patient preference  Bowel protocol    Chronic low back pain- (present on admission)  Assessment & Plan  On percocet PTA  Continue oxycodone PRN for post-procedure and chronic pain    Peripheral neuropathy (HCC)- (present on admission)  Assessment & Plan  Due to diabetic neuropathy and vascular disease  Continue gabapentin    Hyperlipidemia- (present on admission)  Assessment & Plan  Maximal tolerated statin due to PAD / CAD       VTE prophylaxis: SCDs/TEDs and enoxaparin ppx    I have performed a physical exam and reviewed and updated ROS and Plan today (10/27/2021). In review of yesterday's note (10/26/2021), there are no changes except as documented above.

## 2021-10-28 NOTE — PROGRESS NOTES
LIMB PRESERVATION SERVICE   POST SURGICAL PROGRESS NOTE      HPI:  Luis Kellogg is a 71 y.o.  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   LPS has been consulted for bilateral lower extremity diabetic ulcers.     Patient has long history of lower extremity chronic wounds, PAD, and left 2-4th toe amputation in 2/2020 at outside hospital. Patient presented with worsening bilateral lower extremity wounds with worsening swelling and pain consistent with cellulitis. He denies any fevers, chills, nausea, or vomiting. Patient is poor historian and unable to tell me how long he has had wounds, but does report that they have been worsening with occasional purulent drainage. He reports that left toe amputation site never healed after surgery in 2/2020. Patient reports that he previously saw a vascular surgeon in [place], does not remember his name, and had angiogram and my have had a stent in one of his femoral arteries though not sure which and thinks it was 1.5-2 years ago. Patient was referred to [facility] and had evaluation and non-invasive studies and was supposed to follow up with [facility] for intervention but never followed up. Patient was previously being followed by [facility], however he has not followed up since 3/2021 and has not been performing any wound care on his lower extremity wounds.      IV antibiotics were started on this admission.  Infectious diseases has not been consulted.    Xray completed and is negative for osteomyelitis.  Ortho has not been consulted yet.     Diagnosed with diabetes 7-8 years ago, and is currently managing with metformin and insulin.  Checks blood sugars 1 times per day and reports that these typically average 110-150. Does have numbness to feet. Usually wears tennis shoes . Does have diabetic shoes and inserts  "from 4-5 that are years old and in not interested in diabetic footwear at this time.  Has had previous foot surgeries including L 2-4 toe and MTH amputation.  Current occupation retired.     SURGERY DATE: 10/12/2021 - Dr. Purcell  PROCEDURE:   1) Angiogram  2) Right SFA stent  3) I&D of wounds with wound vac placement      INTERVAL HISTORY:  10/14/2021: POD #2.  Patient denies fevers, chills, nausea, vomiting.  Pain poorly controlled, discussed with hospitalist. Patient hypotensive this morning requiring fluid bolus. Seen with wound care team for wound vac change. Patient in excruciating pain with vac change  10/19/2021: POD#7. Patient denies any complaints. Seen with wound care team during VAC change. Patient continues to have tenderness with VAC removal and placement. Wounds improving slowly.  10/21/2021: POD #10. Seen with wound team during VAC change. Continues to have pain with VAC changes, somewhat improved. Continue veraflo wound vac while inpatient. Pending dispo / placement.  10/26/2021: POD# 16. Seen with wound team during VAC change. Patient wounds improving, continued slough and tenderness preventing adequate bedside debridement.  10/28/2021: POD # 18. Seen with wound care team during wound vac change. Patient tolerated small amount of debridement at bedside. Difficult disposition with large amount of chronic wounds.    PERTINENT LPS RESULTS:   Pathology: None sent  Cultures: leg wound 10/8: MRSA, Group B Streptococcus    SURGICAL SITE EXAM:      /72   Pulse 81   Temp 35.9 °C (96.6 °F) (Temporal)   Resp 18   Ht 1.702 m (5' 7\")   Wt 109 kg (240 lb 4.8 oz)   SpO2 93%   BMI 37.64 kg/m²     Pedal Pulses: R foot: Unable to palpate, Monophasic DP/PT  L foot: Unable to palpate, Monophasic DP/PT   Sensation: Sensation largely intact on legs, diminished on feet  Surgical foot warm, well perfused    Right lateral tibial wounds x2  Wound base is pink with very minor adherent slough  Due to improvement " in wounds, change to Hydrofiber blue and remove wound vac  No drainage  No odor  No erythema               R foot dorsal 1st toe wound   R foot dorsal 2nd toe wound   Dried scab over wound  Continue to treat by keeping wound dry  No drainage  No odor  Periwound erythema improved  Mild periwound callus          L medial heel wound  Healthy tissue, minor slough debrided  No drainage  No odor  No erythema              L 2-4 toe and MTH amputation site  Left foot dorsal wound  Chronic non healing amputation site from 2020  Patient reports never closed  Good granulation tissue with minimal slough  Periwound callus  Periwound erythema improved            L Anterior Tibial wounds x 6  Adherent slough, patient did not tolerate debridement  Some granulation tissue  No odor  No discharge  Periwound erythema resolved            Left Lateral Leg  Base is majority granulation tissue, minimal adherent   No odor or drainage              L Posterior tib/fib wounds x 3  Continued slough, did not tolerate debridement  No drainage  Periwound erythema improved  No odor          Wound care completed by wound care team: please refer to wound care note and flow sheet for details       DIABETES MANAGEMENT:    Blood glucose:   Results from last 7 days   Lab Units 10/28/21  1154 10/28/21  0523 10/27/21  2118 10/27/21  1725 10/27/21  1159 10/27/21  0728 10/26/21  2055 10/26/21  1722   ACCU CHECK GLUCOSE 788 mg/dL 177* 149* 168* 182* 169* 185* 201* 165*     A1c:   Lab Results   Component Value Date/Time    HBA1C 9.1 (H) 10/08/2021 04:26 AM            INFECTION MANAGEMENT:    Results from last 7 days   Lab Units 10/28/21  0556   WBC 1501 K/uL 5.9   PLATELET COUNT 1518 K/uL 283     Wound culture results:   Results     ** No results found for the last 168 hours. **               ASSESSMENT/PLAN:   POD #18 S/P Angiogram, I&D, wound vac placement by Dr. Purcell on 10/12.    - Improvement in right leg wounds, will change from wound vac to hydrofera  blue and monitor closely.    - [place] access to home health and limited resources for follow up. I discussed with patient at length regarding various discharge solutions. Patient is refusing SNF or LTACH placement, which I strongly recommend to allow for adequate wound healing. Patient is unable to get home health due to remote location. As such, patient is not candidate for wound vac placement as he does not have the ability to change 3x weekly. We discussed the option of further surgery with either skin substitute or skin graft, however he would like to avoid further surgery at this time and he cannot follow up with wound care clinic regularly enough for this to be a viable option.  - Further discussion with wound team and patient I think that the only viable option is to order wound supplies from [facility] for home delivery and patient to perform his own dressing changes with hydrofiber blue. He will also be referred to wound care clinic in either [place] or [place] at [facility] to follow up when he is able.  - I discussed that this is not an optimal plan to ensure the most rapid closure of wounds, but patient is only agreeable to this plan moving forward.  - Discussed with social work who will investigate ordering wound care supplies from [facility] and setting up wound care follow up.      Wound care:   -Wound care orders updated for nursing  -Left medial heel: Hydrofera blue to wound base, off loading in moon boot. Change dressing q 48 hours  - Right lateral wounds: Continue hydrofera blue  - Dorsal right foot wounds: Betadine to wounds  - Left lower extremity wounds: Managed by Veraflo wound vac, to be changed 3x weekly by wound team    Imaging/Labs:  -COVID-19: not detected this admission    Vascular status:   -s/p angiogram and right SFA stent by Dr. Purcell 10/12, adequate blood flow after re- vascularization    Surgery:   --no further  surgeries planned at this time    Antibiotics:   -Defer to hospitalist    Weight Bearing Status:   -Weight bearing as tolerated    Offloading:   -Offloading shoe; ordered. Refused by patient.  -Orthotic company:     PT/OT:   -involved       Diabetes Education:   - consult CDE and RD involved     - Implications of loss of protective sensation (LOPS) discussed with patient- including increased risk for amputation.  Advised to check feet at least daily, moisturize feet, and to always wear protective foot wear.   -avoid trimming own nails. See podiatrist or certified foot and nail RN  -keep blood sugars <150 for improved wound healing    DISCHARGE PLAN:    Treated with Veraflo wound vac while admitted, can be converted to regular vac on discharge if he is agreeable to placement    Patient refusing SNF placement. Will attempt to get him set up for outpatient wound care clinic and have wound care supplies delivered to his house for his own wound care. Discussed with case management.    Disposition:   Likely home    Follow-up: Dr. Purcell approximately 3 weeks post-op  Follow up LPS rounds: Will be scheduled for LPS clinic on 11/12      Discussed with: pt, RN, Dr. Sullivan, case management Vandana Barron    Please note that this dictation was created using voice recognition software. I have  worked with technical experts from [employer] to optimize the interface.  I have made every reasonable attempt to correct obvious errors, but there may be errors of grammar and possibly content that I did not discover before finalizing the note.      Rodri Ardon M.D.    If any questions or concerns, please contact [facility] through voalte.

## 2021-10-28 NOTE — CARE PLAN
The patient is Stable - Low risk of patient condition declining or worsening    Shift Goals  Clinical Goals: Patient's pain will be controlled     Progress made toward(s) clinical / shift goals:  Patient complained of pain to bilateral lower extremities. Patient medicated with PRN pain medications for pain per MAR for pain and PRN for wound dressing change.       Problem: Knowledge Deficit - Standard  Goal: Patient and family/care givers will demonstrate understanding of plan of care, disease process/condition, diagnostic tests and medications  Outcome: Progressing     Problem: Pain - Standard  Goal: Alleviation of pain or a reduction in pain to the patient’s comfort goal  Outcome: Progressing     Problem: Skin Integrity  Goal: Skin integrity is maintained or improved  Outcome: Progressing     Problem: Fall Risk  Goal: Patient will remain free from falls  Outcome: Progressing

## 2021-10-28 NOTE — WOUND TEAM
[facility]  Inpatient Services   Wound and Skin Care     Admission Date: 10/7/2021     Last order of IP CONSULT TO WOUND CARE was found on 10/12/2021 from Hospital Encounter on 10/7/2021     HPI, PMH, SH: Reviewed    Past Surgical History:   Procedure Laterality Date   • ANGIOGRAM Bilateral 10/12/2021    Procedure: BILATERAL LOWER EXTREMITY ANGIOGRAM;  Surgeon: Valerio Purcell M.D.;  Location: SURGERY [facility];  Service: Vascular   • IRRIGATION & DEBRIDEMENT GENERAL Bilateral 10/12/2021    Procedure: IRRIGATION AND DEBRIDEMENT, WOUND, BILATERAL LOWER EXTREMITY;  Surgeon: Valerio Purcell M.D.;  Location: SURGERY [facility];  Service: Vascular   • APPLICATION OR REPLACEMENT, WOUND VAC Bilateral 10/12/2021    Procedure: APPLICATION WOUND VAC;  Surgeon: Valerio Purcell M.D.;  Location: SURGERY [facility];  Service: Vascular   • STENT PLACEMENT Right 10/12/2021    Procedure: STENT PLACEMENT, RIGHT SUPERFICIAL FEMORAL ARTERY;  Surgeon: Valerio Purcell M.D.;  Location: SURGERY [facility];  Service: Vascular   • TOE AMPUTATION Left 2/17/2020    Procedure: LEFT SECOND, THIRD, AND FORTH TOE AMPUTATION;  Surgeon: Alfonso Esteban M.D.;  Location: SURGERY [facility];  Service: Orthopedics   • [place] UNLISTED PROC, ARTHROSCOPY Left 7/25/2019    Procedure: LEFT ENDOSCOPIC ULNAR NERVE DECOMPRESSION, LEFT CARPAL TUNNEL RELEASE;  Surgeon: Red Chacon M.D.;  Location: SURGERY [facility];  Service: Orthopedics   • KNEE REPLACEMENT, TOTAL Bilateral    • OTHER SURGICAL PROCEDURE      back surgery - Unknown      Social History     Tobacco Use   • Smoking status: Current Every Day Smoker     Packs/day: 0.50     Years: 54.00     Pack years: 27.00     Types: Cigarettes   • Smokeless tobacco: Former User   Substance Use Topics   • Alcohol use: Not Currently     Alcohol/week: 0.0 oz     Comment: occas     Chief Complaint   Patient presents with   • Wound Infection     bilateral leg wounds. pt states  he has had them for years but seem to be getting worse      Diagnosis: Cellulitis of left lower extremity [L03.116]    Unit where seen by Wound Team: S623/02     WOUND CONSULT/FOLLOW UP RELATED TO:  Initial vac change     WOUND HISTORY:  70 yo Male underwent Right SFA stent placement and OR debridement on BLE with subsequent vac placement.    10/12/21 Angio R SFA stent placed, right foot well perfused, left foot with good perfusion without intervention.        WOUND ASSESSMENT/LDA      Negative Pressure Wound Therapy 10/12/21 Other (Comment);Surgical Leg Left (Active)   NPWT Pump Mode / Pressure Setting 125 mmHg;Continuous 10/28/21 2219   Dressing Type Black Foam (Regular) 10/28/21 2219   Number of Foam Pieces Used 3 10/26/21 1600   Canister Changed No 10/28/21 2219   Output (mL) 0 mL 10/26/21 1600   NEXT Dressing Change/Treatment Date 10/28/21 10/26/21 1600   VAC VeraFlo Irrigant Normal Saline 10/28/21 1200   VAC VeraFlo Soak Time (mins) 5 10/28/21 1200   VAC VeraFlo Instill Volume (ml) 18 10/28/21 1200   VAC VeraFlo - Therapy Time (hrs) 1.5 10/28/21 1200   VAC VeraFlo Pressure (mm/Hg) 125 mmHg 10/28/21 1200   WOUND NURSE ONLY - Time Spent with Patient (mins) 120 10/26/21 1600               Wound 10/14/21 Full Thickness Wound Leg Lateral Right (Active)   Wound Image   10/26/21 1600   Site Assessment Pink;Red 10/27/21 1800   Periwound Assessment [place] 10/27/21 1800   Margins Defined edges 10/26/21 1600   Closure Secondary intention 10/26/21 1600   Drainage Amount Small 10/28/21 1200   Drainage Description Serosanguineous 10/28/21 1200   Treatments Cleansed;Offloading;Site care 10/26/21 1600   Wound Cleansing Approved Wound Cleanser 10/27/21 1800   Periwound Protectant Skin Protectant Wipes to Periwound 10/27/21 1800   Dressing Cleansing/Solutions Not Applicable 10/26/21 1600   Dressing Options Collagen Dressing;Hydrofera Blue Ready;Mepilex 10/28/21 1200   Dressing Changed Changed 10/28/21 1200   Dressing Status  Clean;Dry;Intact 10/26/21 1600   Dressing Change/Treatment Frequency Every 72 hrs, and As Needed 10/28/21 1200   NEXT Dressing Change/Treatment Date 10/31/21 10/28/21 1200   NEXT Weekly Photo (Inpatient Only) 11/01/21 10/28/21 1200   Wound Length (cm) 17 cm 10/26/21 1600   Wound Width (cm) 8 cm 10/26/21 1600   Wound Depth (cm) 0.2 cm 10/26/21 1600   Wound Surface Area (cm^2) 136 cm^2 10/26/21 1600   Wound Volume (cm^3) 27.2 cm^3 10/26/21 1600   Wound Healing % 62 10/26/21 1600   Wound Bed Granulation (%) 90 % 10/28/21 1200   Wound Bed Slough (%) 10 % 10/28/21 1200   Shape circular 10/28/21 1200   Wound Odor None 10/28/21 1200   Pulses 1+ 10/26/21 1600   Exposed Structures None 10/28/21 1200   WOUND NURSE ONLY - Time Spent with Patient (mins) 120 10/26/21 1600       NEXT Dressing Change/Treatment Date 10/29/21 10/26/21 1600   NEXT Weekly Photo (Inpatient Only) 11/02/21 10/26/21 1600   Pressure Injury Stage U 10/16/21 1500   Wound Length (cm) 2 cm 10/16/21 1500   Wound Width (cm) 2 cm 10/16/21 1500   Wound Surface Area (cm^2) 4 cm^2 10/16/21 1500   Exposed Structures ROSITA 10/16/21 1500      LEFT LEG  Post sup 2.5x3  Post mid 3.5x2.5  Post inf 4x2.2  Lateral 8x2  Ant sup 1.2x1.2  Ant mid 2s2  Ant inf 3x2.2  Dorsal foot 3.5x2.2  Heel and distal foot not measured  All wounds have yellow slough so depth was not measured but wounds appear superficial  Wound bed are red granular and yellow slough  Dr Adron debrided wounds       Vascular:    MIA: 10/8/21  MIA Results, Last 30 Days US-MIA SINGLE LEVEL BILAT     RIGHT      Waveform            Systolic BPs (mmHg)                                                     Brachial   Monophasic                               Common Femoral   Monophasic                               Posterior Tibial   Monophasic                               Dorsalis Pedis                                                    Peroneal                                            MIA                                             TBI                           LEFT   Waveform        Systolic BPs (mmHg)                                    143           Brachial   Monophasic                               Common Femoral   Monophasic                               Posterior Tibial   Monophasic                               Dorsalis Pedis                                                     Peroneal                                            MIA                                            TBI         Findings   Bilateral.    Doppler waveforms of the common femoral arteries are abnormally dampened/    monophasic.    Doppler waveforms at the ankle are monophasic.    The ankle pressures are not obtained due to pain.    Digit PPG waveforms are normal in the right toes.   Digit PPG waveforms are normal in the left 1st and 5th toe. The other toes    are absent/amputated.    Toe-brachial indices is reduced right side.   Toe-brachial indices are left side.    Toe-brachial indices:     Right:  0.54   Left:  0.90         Lab Values:    Lab Results   Component Value Date/Time    WBC 5.9 10/28/2021 05:56 AM    RBC 4.41 (L) 10/28/2021 05:56 AM    HEMOGLOBIN 12.8 (L) 10/28/2021 05:56 AM    HEMATOCRIT 39.3 (L) 10/28/2021 05:56 AM    CREACTPROT 6.25 (H) 10/07/2021 08:30 PM    SEDRATEWES 1 10/07/2021 08:30 PM    HBA1C 9.1 (H) 10/08/2021 04:26 AM        Culture Results show:  Recent Results (from the past 720 hour(s))   CULTURE WOUND W/ GRAM STAIN    Collection Time: 10/08/21  2:13 PM    Specimen: Left Leg; Wound   Result Value Ref Range    Significant Indicator POS (POS)     Source WND     Site LEFT LEG     Culture Result Rare growth mixed enteric collins. (A)     Gram Stain Result Moderate WBCs.  No organisms seen.       Culture Result (A)      Methicillin Resistant Staphylococcus aureus  Light growth  This isolate is presumed to be clindamycin resistant based on  detection of inducible resistance.  Clindamycin may still  be effective in some  patients.      Culture Result Streptococcus agalactiae (Group B)  Light growth   (A)        Susceptibility    Methicillin resistant staphylococcus aureus - MELA     Azithromycin >4 Resistant mcg/mL     Clindamycin <=0.25 Resistant mcg/mL     Cefazolin <=8 Resistant mcg/mL     Cefepime 16 Resistant mcg/mL     Ceftaroline <=0.5 Sensitive mcg/mL     Daptomycin <=0.5 Sensitive mcg/mL     Ampicillin/sulbactam 16/8 Resistant mcg/mL     Erythromycin >4 Resistant mcg/mL     Vancomycin 1 Sensitive mcg/mL     Oxacillin >2 Resistant mcg/mL     Trimeth/Sulfa <=0.5/9.5 Sensitive mcg/mL     Tetracycline <=4 Sensitive mcg/mL       Pain Level/Medicated:  Max pain; Premed IV, topical lidocaine Soln x2 vials, Requires pain meds after dressing change       INTERVENTIONS BY WOUND TEAM:   Performed standard wound care which includes appropriate positioning, dressing removal and non-selective debridement. Pictures and measurements obtained weekly if/when required.    RIGHT LE  Preparation for Dressing removal: Dressing soaked with Lidocaine and NS.   Non-selectively Debrided with:  cleanser and gauze.  Sharp debridement: NA  Soo wound: Cleansed with cleanser, Prepped with Cavilon skin prep, drape and paste rings, drape covered paste rings on LLE due to extensive bridging and portion of R paste ring to bridge 2 wounds.     aquacel ag to PTWs on left LE  Primary Dressing: black VF foam bridged as neede and covered with drape to achieve a seal.    Secondary (Outer) Dressing: button, drape and tracpad to foam. Patient refused tubi .   RLE hydrofera blue and hypafix tape.    L medial heel: hyrdofera blue and mepilex.      Interdisciplinary consultation: Patient, Bedside RN MD Ardon,       EVALUATION / RATIONALE FOR TREATMENT:  Most Recent Date:  10/28/21 Wounds imrproving slowly with increased granular tissue.  Discussed compression but pt states the tubigrips were painful.  See LPS note for details regarding d/c plan.  Anticipate  slow resolution due to diminished blood flow and edema but encouraged by granular tissue formation.    10/26/21: RLE wound bed almost to surface level removed wound vac and applied collagen and hydrofera blue and hypafix tape, LLE posterior wounds are clean and almost to surface level applied guadalupe and hydrofera blue, anterior wound still have slough continued VF NPWT to try and clean wound up.      10/22/21: patient pain better than previous, wounds continuing to improve. Continue VF NPWT. hydrofera blue to L medial heel.       10/14: RLE lateral wound with adherent slough at base. Vera harry initiated to encourage mechanical debridement. LLE lateral and toe amp wound with clean, partially granulated wound base. Maintained regular wound vac suction to these sites. Implemented adaptic as a nonadherent layer to decrease pain upon removal next change.     Honeycolloid applied to anterior and posterior aspect on LLE given adherent slough at base. Pt will benefit from veraflo wound vac to LLE including bridging of scattered lesions, therefore, will need extra machine, cassette, Y-connector and tracpad next session.     Pt was in significant pain throughout the dressing care process. Will benefit from ongoing  Premedication and lidocaine soln for pain control. Check with primary RN prior to wound care as pt may be hypotensive and not appropriate for IV pain meds right away. LPS following patient, refer to LPS noted for POC.      Goals: Steady decrease in wound area and depth weekly.    WOUND TEAM PLAN OF CARE ([X] for frequency of wound follow up,):   Nursing to follow orders written for wound care. Contact wound team if area fails to progress, deteriorates or with any questions/concerns  Dressing changes by wound team:                   Follow up 3 times weekly:                NPWT change 3 times weekly: x    Follow up 1-2 times weekly:      Follow up Bi-Monthly:                   Follow up as needed:     Other  (explain):     NURSING PLAN OF CARE ORDERS (X):  Dressing changes: See Dressing Care orders: x  Skin care: See Skin Care orders: x  RN Prevention Protocol: x  Rectal tube care: See Rectal Tube Care orders:   Other orders:    Anticipated discharge plans:   LTACH:        SNF/Rehab:      X will need ongoing wound care            Home Health Care:           Outpatient Wound Center:            Self/Family Care:        Other:                  Vac Discharge Needs:   Not Applicable Pt not on a wound vac:       Regular Vac while inpatient, alternative dressing at DC:        Regular Vac in use and continued at DC:           Reg. Vac w/ Skin Sub/Biologic in use. Will need to be changed 2x wkly:      Veraflo Vac while inpatient, ok to transition to Regular Vac on Discharge:  X       Veraflo Vac while inpatient, will need to remain on Veraflo Vac upon discharge:

## 2021-10-28 NOTE — CARE PLAN
The patient is Stable - Low risk of patient condition declining or worsening    Shift Goals  Clinical Goals: manage pain  Patient Goals: rest    Progress made toward(s) clinical / shift goals:  Pt medicated peer MAR for pain to BLEs.    Patient is not progressing towards the following goals:

## 2021-10-28 NOTE — DISCHARGE PLANNING
Anticipated Discharge Disposition: outpt wound care    Action: Reviewed in IDT rounds. Per Dr Hou- pt states he does not want SNF or home health as he cannot afford it.   In addition, pt lives in [place], [place]- where there are no home health services.     Rev’d with Dr Zamora. The wound vac and wound will be evaluated tomorrow to see if able to remove the wound vac OR pt may need to go back to surgery.     Pt prefers to go to outpatient wound care at [place] or [place] [facility]. HCM will follow with MDs to determine where he can get his dressing changes done. Pt may need to be given supplies by  the hospital when he discharges so he can do his own dressing changes.     Barriers to Discharge: need outpt wound care upon discharge.     Plan: plan outpt wound care follow up upon discharge.

## 2021-10-28 NOTE — DISCHARGE PLANNING
@[id number]  Agency/Facility Name: [facility]  Spoke To: Morgan  Outcome: Does patient need wound vac and are they medically clear?    Agency[facility]Facility Name: [facility]  Outcome: Left message, awaiting call back.

## 2021-10-28 NOTE — CARE PLAN
The patient is Stable - Low risk of patient condition declining or worsening    Shift Goals  Clinical Goals: comfort  Patient Goals: pt will be able to sleep comfortably    Progress made toward(s) clinical / shift goals:      Patient is not progressing towards the following goals:

## 2021-10-29 LAB
GLUCOSE BLD-MCNC: 147 MG/DL (ref 65–99)
GLUCOSE BLD-MCNC: 152 MG/DL (ref 65–99)
GLUCOSE BLD-MCNC: 156 MG/DL (ref 65–99)
GLUCOSE BLD-MCNC: 159 MG/DL (ref 65–99)

## 2021-10-29 PROCEDURE — A9270 NON-COVERED ITEM OR SERVICE: HCPCS | Performed by: SURGERY

## 2021-10-29 PROCEDURE — 700102 HCHG RX REV CODE 250 W/ 637 OVERRIDE(OP): Performed by: STUDENT IN AN ORGANIZED HEALTH CARE EDUCATION/TRAINING PROGRAM

## 2021-10-29 PROCEDURE — A9270 NON-COVERED ITEM OR SERVICE: HCPCS | Performed by: STUDENT IN AN ORGANIZED HEALTH CARE EDUCATION/TRAINING PROGRAM

## 2021-10-29 PROCEDURE — 82962 GLUCOSE BLOOD TEST: CPT

## 2021-10-29 PROCEDURE — 770001 HCHG ROOM/CARE - MED/SURG/GYN PRIV*

## 2021-10-29 PROCEDURE — 99232 SBSQ HOSP IP/OBS MODERATE 35: CPT | Performed by: HOSPITALIST

## 2021-10-29 PROCEDURE — 700102 HCHG RX REV CODE 250 W/ 637 OVERRIDE(OP): Performed by: SURGERY

## 2021-10-29 PROCEDURE — A9270 NON-COVERED ITEM OR SERVICE: HCPCS | Performed by: INTERNAL MEDICINE

## 2021-10-29 PROCEDURE — 700102 HCHG RX REV CODE 250 W/ 637 OVERRIDE(OP): Performed by: INTERNAL MEDICINE

## 2021-10-29 PROCEDURE — 700111 HCHG RX REV CODE 636 W/ 250 OVERRIDE (IP): Performed by: GENERAL PRACTICE

## 2021-10-29 RX ADMIN — GABAPENTIN 300 MG: 300 CAPSULE ORAL at 05:42

## 2021-10-29 RX ADMIN — CARVEDILOL 6.25 MG: 6.25 TABLET, FILM COATED ORAL at 18:43

## 2021-10-29 RX ADMIN — BISACODYL 10 MG: 5 TABLET, COATED ORAL at 05:41

## 2021-10-29 RX ADMIN — OXYCODONE HYDROCHLORIDE 20 MG: 10 TABLET ORAL at 14:20

## 2021-10-29 RX ADMIN — OXYCODONE HYDROCHLORIDE 20 MG: 10 TABLET ORAL at 10:06

## 2021-10-29 RX ADMIN — INSULIN LISPRO 2 UNITS: 100 INJECTION, SOLUTION INTRAVENOUS; SUBCUTANEOUS at 20:30

## 2021-10-29 RX ADMIN — OXYCODONE HYDROCHLORIDE 20 MG: 10 TABLET ORAL at 05:42

## 2021-10-29 RX ADMIN — ENOXAPARIN SODIUM 40 MG: 40 INJECTION SUBCUTANEOUS at 05:42

## 2021-10-29 RX ADMIN — OXYCODONE HYDROCHLORIDE 20 MG: 10 TABLET ORAL at 19:46

## 2021-10-29 RX ADMIN — ATORVASTATIN CALCIUM 80 MG: 40 TABLET, FILM COATED ORAL at 18:42

## 2021-10-29 RX ADMIN — INSULIN LISPRO 2 UNITS: 100 INJECTION, SOLUTION INTRAVENOUS; SUBCUTANEOUS at 05:43

## 2021-10-29 RX ADMIN — TRAZODONE HYDROCHLORIDE 450 MG: 150 TABLET ORAL at 20:36

## 2021-10-29 RX ADMIN — GABAPENTIN 300 MG: 300 CAPSULE ORAL at 18:42

## 2021-10-29 RX ADMIN — INSULIN GLARGINE 20 UNITS: 100 INJECTION, SOLUTION SUBCUTANEOUS at 18:43

## 2021-10-29 RX ADMIN — ASPIRIN 81 MG: 81 TABLET, COATED ORAL at 05:41

## 2021-10-29 ASSESSMENT — PATIENT HEALTH QUESTIONNAIRE - PHQ9
SUM OF ALL RESPONSES TO PHQ9 QUESTIONS 1 AND 2: 0
1. LITTLE INTEREST OR PLEASURE IN DOING THINGS: NOT AT ALL
2. FEELING DOWN, DEPRESSED, IRRITABLE, OR HOPELESS: NOT AT ALL

## 2021-10-29 ASSESSMENT — PAIN DESCRIPTION - PAIN TYPE
TYPE: ACUTE PAIN

## 2021-10-29 ASSESSMENT — PAIN SCALES - WONG BAKER: WONGBAKER_NUMERICALRESPONSE: HURTS A LITTLE MORE

## 2021-10-29 ASSESSMENT — ENCOUNTER SYMPTOMS
DIZZINESS: 0
FEVER: 0
HEADACHES: 0
CHILLS: 0
PALPITATIONS: 0
SHORTNESS OF BREATH: 0
NAUSEA: 0
VOMITING: 0
COUGH: 0
ABDOMINAL PAIN: 0

## 2021-10-29 NOTE — CARE PLAN
The patient is Stable - Low risk of patient condition declining or worsening    Shift Goals  Clinical Goals: comfort  Patient Goals: pt will be able to rest and sleep    Progress made toward(s) clinical / shift goals:      Patient is not progressing towards the following goals:

## 2021-10-29 NOTE — PROGRESS NOTES
[facility] Medicine Daily Progress Note    Date of Service  10/29/2021    Chief Complaint  Luis Kellogg is a 71 y.o. male admitted 10/7/2021 with bilateral leg wounds    Hospital Course  This is a 71 year old male with PMHx of hyperlipidemia, type 2 diabetes with A1c of 9.1, obesity, CAD, hx MI,PAD, history of lower extremity osteomyelitis and recurrent LLE cellulitis who was admitted for sepsis secondary to BLE diabetic, nonhealing wounds and cellulitis.    Patient has known history of uncontrolled type 2 diabetes, he has had prior admissions for cellulitis and osteomyelitis.  Patient does have history of PAD.  Venous dopplers negative for DVT and arterial Dopplers ordered.  X-ray of the left tib-fib, no concern for osteomyelitis.  Wound care consulted. LPS consulted.    Wound cultures from March 20, 2021, noted E. Faecalis sensitive to ampicillin, Cipro, Levaquin and vancomycin.  Patient is currently on vancomycin only as per wound culture results.    S/p revascularization and debridement of lower extremity wounds  Wound vac in place  Wound culture: GBS and MRSA      Interval Problem Update  [facility] overnight  Awaiting delivery of wound care supplies- I d/w Dr. Ardon and SW; will try to arrange supplies to send home with so patient may discharge however need acceptance from [employer]me company first  His leg pain is well controlled  He is having some bleeding from his right arm wound      I have personally seen and examined the patient at bedside. I discussed the plan of care with patient, bedside RN, charge RN,  and pharmacy.      Consultants/Specialty  vascular surgery and LPS, wound care     Code Status  Full Code    Disposition  Patient is medically cleared pending wound care coordination.   Anticipate discharge to to home with close outpatient follow-up.- pt doesn't want SNF and no HH available in [place]  I have placed the appropriate orders for post-discharge needs.    Review of Systems  Review of  Systems   Constitutional: Negative for chills and fever.   Respiratory: Negative for cough and shortness of breath.    Cardiovascular: Negative for chest pain and palpitations.   Gastrointestinal: Negative for abdominal pain, nausea and vomiting.   Genitourinary: Negative for dysuria and urgency.   Neurological: Negative for dizziness and headaches.   All other systems reviewed and are negative.    Physical Exam  Temp:  [36.2 °C (97.2 °F)-36.5 °C (97.7 °F)] 36.3 °C (97.4 °F)  Pulse:  [63-87] 77  Resp:  [16-18] 18  BP: ()/(48-62) 83/60  SpO2:  [91 %-95 %] 94 %    Physical Exam  Vitals and nursing note reviewed.   Constitutional:       General: He is not in acute distress.     Appearance: He is obese.   Eyes:      Comments: Right eye exotropia   Cardiovascular:      Rate and Rhythm: Normal rate and regular rhythm.   Pulmonary:      Effort: Pulmonary effort is normal. No respiratory distress.      Breath sounds: Normal breath sounds.   Abdominal:      General: Abdomen is protuberant. Bowel sounds are normal. There is distension.   Genitourinary:     Comments: No nion  Musculoskeletal:         General: Deformity (Left toes 2-4 amputated) present.      Cervical back: Neck supple. No muscular tenderness.      Right lower leg: No edema.      Left lower leg: No edema.   Skin:     General: Skin is warm and dry.      Findings: Lesion (Multiple scattered plaques on BLE are nontender, nonerythematous) present.      Comments: Wound vac +  Bleeding from wound on RUE   Neurological:      Mental Status: He is alert.      Comments: Appropriately conversant   Psychiatric:         Attention and Perception: Attention and perception normal.         Mood and Affect: Mood and affect normal.         Speech: Speech normal.         Behavior: Behavior is slowed. Behavior is cooperative.         Thought Content: Thought content normal.         Cognition and Memory: Cognition and memory normal.         Judgment: Judgment normal.        Fluids    Intake/Output Summary (Last 24 hours) at 10/29/2021 0955  Last data filed at 10/29/2021 0438  Gross per 24 hour   Intake 1170 ml   Output --   Net 1170 ml       Laboratory  Recent Labs     10/28/21  0556   WBC 5.9   RBC 4.41*   HEMOGLOBIN 12.8*   HEMATOCRIT 39.3*   MCV 89.1   MCH 29.0   MCHC 32.6*   RDW 45.1   PLATELETCT 283   MPV 9.4     Recent Labs     10/27/21  0404 10/28/21  0556   SODIUM 134* 135   POTASSIUM 4.1 3.8   CHLORIDE 100 100   CO2 24 25   GLUCOSE 187* 142*   BUN 17 19   CREATININE 0.82 0.71   CALCIUM 9.7 10.0                   Imaging  CT-CTA AORTA-RO WITH & W/O-POST PROCESS   Final Result      1.  Occlusion of the distal right femoral artery with reconstitution distally.      2.  Calcified plaque in small diameter of the tibial and peroneal arteries bilaterally makes it difficult to evaluate for degree of stenosis and occlusion.      3.  Plaque within the left femoral artery without evidence of occlusion.      4.  Aortic atherosclerosis without aneurysm.      5.  Diverticulosis without evidence of diverticulitis.      6.  Hepatic steatosis.      DX-FOOT-COMPLETE 3+ LEFT   Final Result         1.  Disuse osteoporosis of the left foot.      2. Large subcutaneous ulcer on the dorsal surface of left foot.      3. Previous amputations of the left second through fourth toes.      DX-FOOT-COMPLETE 3+ RIGHT   Final Result      1.  No fracture or dislocation of RIGHT foot.   2.  Periarticular osteopenia suggests inflammatory arthropathy.   3.  No soft tissue gas or focal bony destruction however osteomyelitis is not excluded by plain film.   4.  Severe degenerative change of ankle joint.      DX-TIBIA AND FIBULA RIGHT   Final Result      No evidence of fracture or dislocation or acute osteomyelitis.      US-EXTREMITY ARTERY LOWER BILAT   Final Result      US-MIA SINGLE LEVEL BILAT   Final Result      US-EXTREMITY VENOUS LOWER UNILAT LEFT   Final Result      DX-TIBIA AND FIBULA LEFT   Final  Result         1.  No acute traumatic bony injury.   2.  Soft tissue erosion of the posterior mid calf, corresponding with history of soft tissue ulcers.      IR-EXTREMITY ANGIOGRAM-BILATERAL    (Results Pending)        Assessment/Plan  * Diabetic infection of left foot (HCC)- (present on admission)  Assessment & Plan  Patient has known history of uncontrolled type 2 diabetes, he has had prior admissions for cellulitis and osteomyelitis.  Patient does have history of PAD.  Venous dopplers negative for DVT   Arterial Dopplers - inflow arterial insufficiency in lower extremities bilaterally  X-ray of the left tib-fib, no concern for osteomyelitis.    Wound care consulted. LPS following.  S/p revasculazization, debridement on 10/12    Wound culture MRSA, GBS  Completed 14-day course of keflex and bactrim    Postprocedural hypotension  Assessment & Plan  Asymptomatic, after stenting  Lisinopril and lasix held  Carvedilol restarted at half-dose due to h/o CAD  No indication for strict BP control, PRN antihypertensives discontinued      Diabetic infection of right foot (HCC)- (present on admission)  Assessment & Plan  As per left lower extremity plan    Sepsis- (present on admission)  Assessment & Plan  This is Sepsis Present on admission  SIRS criteria identified on my evaluation include: Tachycardia, with heart rate greater than 90 BPM, Tachypnea, with respirations greater than 20 per minute and Bandemia, greater than 10% bands  Source is diabetic infected ulcers and cellulitis of Lower extremities  Sepsis protocol initiated  Fluid resuscitation ordered per protocol  IV antibiotics as appropriate for source of sepsis  While organ dysfunction may be noted elsewhere in this problem list or in the chart, degree of organ dysfunction does not meet CMS criteria for severe sepsis    BCx NGTD  Wounds are superficial and intervened on for revascularization  Wound Cx +MRSA  Completed 14-day course of antibiotics for leg  wounds        Drug-induced constipation- (present on admission)  Assessment & Plan  Due to opiates for pain  Scheduled daily dulcolax per patient preference  Bowel protocol    Hyponatremia- (present on admission)  Assessment & Plan  Mild  Repeat AM BMP    Uncontrolled type 2 diabetes mellitus (HCC)- (present on admission)  Assessment & Plan  A1c 9.1  POCT + SSI  Glargine held on admission, restart at half-dose for glucose monitoring prior to discharge  BG goal <200 for wound-healing    Coronary artery disease involving native coronary artery- (present on admission)  Assessment & Plan  Continue DAPT after stenting  Restarted atorvastatin (max dose as tolerated) and carvedilol (half-dose due to borderline BP)      Peripheral artery disease (HCC) - (present on admission)  Assessment & Plan  Patient does have history of PAD, previous MIA was performed in June 2021, showed mild to moderate disease. Repeat MIA noted significant worsening disease.    Vascular surgery consulted,s/p CTA today, plans for revascularization prior to surgical intervention for the wounds, of LLE 10/11/2021    S/p OR 10/12 for revascularization  Continue DAPT and high-intensity atorvastatin after stent, follow up with Vascular Surgery    Chronic low back pain- (present on admission)  Assessment & Plan  On percocet PTA  Continue oxycodone PRN for post-procedure and chronic pain    Peripheral neuropathy (HCC)- (present on admission)  Assessment & Plan  Due to diabetic neuropathy and vascular disease  Continue gabapentin    Hyperlipidemia- (present on admission)  Assessment & Plan  Maximal tolerated statin due to PAD / CAD       VTE prophylaxis: SCDs/TEDs and enoxaparin ppx    I have performed a physical exam and reviewed and updated ROS and Plan today (10/29/2021). In review of yesterday's note (10/28/2021), there are no changes except as documented above.

## 2021-10-30 VITALS
BODY MASS INDEX: 37.72 KG/M2 | TEMPERATURE: 97.6 F | OXYGEN SATURATION: 96 % | SYSTOLIC BLOOD PRESSURE: 105 MMHG | WEIGHT: 240.3 LBS | RESPIRATION RATE: 17 BRPM | HEART RATE: 77 BPM | HEIGHT: 67 IN | DIASTOLIC BLOOD PRESSURE: 62 MMHG

## 2021-10-30 LAB
GLUCOSE BLD-MCNC: 124 MG/DL (ref 65–99)
GLUCOSE BLD-MCNC: 142 MG/DL (ref 65–99)

## 2021-10-30 PROCEDURE — 82962 GLUCOSE BLOOD TEST: CPT

## 2021-10-30 PROCEDURE — 99239 HOSP IP/OBS DSCHRG MGMT >30: CPT | Performed by: HOSPITALIST

## 2021-10-30 PROCEDURE — 700102 HCHG RX REV CODE 250 W/ 637 OVERRIDE(OP): Performed by: STUDENT IN AN ORGANIZED HEALTH CARE EDUCATION/TRAINING PROGRAM

## 2021-10-30 PROCEDURE — 97602 WOUND(S) CARE NON-SELECTIVE: CPT

## 2021-10-30 PROCEDURE — A9270 NON-COVERED ITEM OR SERVICE: HCPCS | Performed by: INTERNAL MEDICINE

## 2021-10-30 PROCEDURE — 700102 HCHG RX REV CODE 250 W/ 637 OVERRIDE(OP): Performed by: INTERNAL MEDICINE

## 2021-10-30 PROCEDURE — A9270 NON-COVERED ITEM OR SERVICE: HCPCS | Performed by: STUDENT IN AN ORGANIZED HEALTH CARE EDUCATION/TRAINING PROGRAM

## 2021-10-30 PROCEDURE — 700111 HCHG RX REV CODE 636 W/ 250 OVERRIDE (IP): Performed by: INTERNAL MEDICINE

## 2021-10-30 RX ORDER — ATORVASTATIN CALCIUM 80 MG/1
80 TABLET, FILM COATED ORAL EVERY EVENING
Qty: 30 TABLET | Refills: 0 | Status: SHIPPED | OUTPATIENT
Start: 2021-10-30

## 2021-10-30 RX ORDER — INSULIN GLARGINE 100 [IU]/ML
20 INJECTION, SOLUTION SUBCUTANEOUS EVERY EVENING
Qty: 10 ML | Status: SHIPPED
Start: 2021-10-30

## 2021-10-30 RX ORDER — CARVEDILOL 6.25 MG/1
6.25 TABLET ORAL 2 TIMES DAILY WITH MEALS
Qty: 60 TABLET | Refills: 0 | Status: ON HOLD | OUTPATIENT
Start: 2021-10-30 | End: 2022-06-07

## 2021-10-30 RX ADMIN — GABAPENTIN 300 MG: 300 CAPSULE ORAL at 05:08

## 2021-10-30 RX ADMIN — OXYCODONE HYDROCHLORIDE 20 MG: 10 TABLET ORAL at 07:52

## 2021-10-30 RX ADMIN — HYDROMORPHONE HYDROCHLORIDE 0.5 MG: 1 INJECTION, SOLUTION INTRAMUSCULAR; INTRAVENOUS; SUBCUTANEOUS at 09:37

## 2021-10-30 RX ADMIN — OXYCODONE HYDROCHLORIDE 20 MG: 10 TABLET ORAL at 11:47

## 2021-10-30 RX ADMIN — OXYCODONE HYDROCHLORIDE 20 MG: 10 TABLET ORAL at 00:36

## 2021-10-30 RX ADMIN — OXYCODONE HYDROCHLORIDE 20 MG: 10 TABLET ORAL at 03:34

## 2021-10-30 ASSESSMENT — PAIN SCALES - WONG BAKER
WONGBAKER_NUMERICALRESPONSE: HURTS JUST A LITTLE BIT
WONGBAKER_NUMERICALRESPONSE: HURTS JUST A LITTLE BIT

## 2021-10-30 ASSESSMENT — PAIN DESCRIPTION - PAIN TYPE: TYPE: ACUTE PAIN

## 2021-10-30 NOTE — DISCHARGE SUMMARY
Discharge Summary    CHIEF COMPLAINT ON ADMISSION  Chief Complaint   Patient presents with   • Wound Infection     bilateral leg wounds. pt states he has had them for years but seem to be getting worse        Reason for Admission  Wound check     Admission Date  10/7/2021    CODE STATUS  Prior    HPI & HOSPITAL COURSE  This is a 71 year old male with PMHx of hyperlipidemia, type 2 diabetes with A1c of 9.1, obesity, CAD, hx MI,PAD, history of lower extremity osteomyelitis and recurrent LLE cellulitis who was admitted for sepsis secondary to BLE diabetic, nonhealing wounds and cellulitis.    Patient has known history of uncontrolled type 2 diabetes, he has had prior admissions for cellulitis and osteomyelitis.  Patient does have history of PAD.  Venous dopplers negative for DVT and arterial Dopplers ordered.  X-ray of the left tib-fib, no concern for osteomyelitis.  He was started on empiric antibiotics. He underwent LE angiogram with right SFA stent, wound debridement and wound vac application. His wound cultures grew Group B strep and MRSA. He completed course of bactrim and keflex on 10/27. SNF was recommended for patient however he refused. There are no  availabilities where patient lives. Ultimately wound care supplies were ordered from [facility] with home delivery and patient has been taught about wound care changes and has follow up set up with [facility].       Therefore, he is discharged in fair and stable condition to home with close outpatient follow-up.    The patient met 2-midnight criteria for an inpatient stay at the time of discharge.    Discharge Date  10/30/21    FOLLOW UP ITEMS POST DISCHARGE  Lasix held due to hypotension, pt to follow up with PCP for resumption    DISCHARGE DIAGNOSES  Principal Problem:    Diabetic infection of left foot (HCC) POA: Yes  Active Problems:    Hyperlipidemia (Chronic) POA: Yes    Peripheral neuropathy (HCC) POA: Yes    Chronic low back pain POA: Yes     Peripheral artery disease (HCC)  POA: Yes    Coronary artery disease involving native coronary artery (Chronic) POA: Yes    Uncontrolled type 2 diabetes mellitus (HCC) POA: Yes    Hyponatremia POA: Yes    Drug-induced constipation POA: Yes    Sepsis POA: Yes    Diabetic infection of right foot (HCC) POA: Yes    Postprocedural hypotension POA: No  Resolved Problems:    * No resolved hospital problems. *      FOLLOW UP  Future Appointments   Date Time Provider Department Center   11/12/2021  8:15 AM Alex Hoyos M.D. [facility] JASON Purcell M.D.  [address]  [place] [address]  [phone]    Schedule an appointment as soon as possible for a visit in 3 weeks  For Progress Check    [facility]  [address],   [place] [address]  [phone]  Call  Please call [facility] if you would like to establish with a Primary Care Provider in [place]. Thank you.      [facility]  [address] [address] [phone]  Call  Please call [facility] if you would like to establish with a Primary Care Provider in [place]. Thank you.     Pcp Pt States None            MEDICATIONS ON DISCHARGE     Medication List      START taking these medications      Instructions   atorvastatin 80 MG tablet  Commonly known as: LIPITOR   Take 1 Tablet by mouth every evening.  Dose: 80 mg     clopidogrel 75 MG Tabs  Commonly known as: PLAVIX   Take 1 Tablet by mouth every day.  Dose: 75 mg        CHANGE how you take these medications      Instructions   carvedilol 6.25 MG Tabs  What changed:   · medication strength  · how much to take  Commonly known as: COREG   Take 1 Tablet by mouth 2 times a day with meals.  Dose: 6.25 mg     insulin glargine 100 UNIT/ML Soln  What changed: how much to take  Commonly known as: Lantus   Inject 20 Units under the skin every evening.  Dose: 20  Units        CONTINUE taking these medications      Instructions   aspirin 325 MG Tabs  Commonly known as: ASA   Take 325 mg by mouth every day.  Dose: 325 mg     bisacodyl 10 MG Supp  Commonly known as: DULCOLAX   Insert 1 Suppository in rectum as needed (if no BM from MOM).  Dose: 10 mg     diclofenac 50 MG tablet  Commonly known as: CATAFLAM   Take 50 mg by mouth 2 (two) times a day. TAKE 1 TABLET BY MOUTH TWICE A DAY  Dose: 50 mg     docusate sodium 100 MG Caps  Commonly known as: COLACE   Take 100 mg by mouth 2 times a day.  Dose: 100 mg     gabapentin 600 MG tablet  Commonly known as: NEURONTIN   Take 2 Tabs by mouth 2 times a day.  Dose: 1,200 mg     glucagon 1 MG Solr   1 mg by Intramuscular route as needed (FSBG BELOW 70 MG/DL AND UNABLE TO SWALLOW WITH NO IV ACCESS.).  Dose: 1 mg     lisinopril 5 MG Tabs  Commonly known as: PRINIVIL   Take 5 mg by mouth every day.  Dose: 5 mg     metformin 1000 MG tablet  Commonly known as: GLUCOPHAGE   Take 1 tablet by mouth 2 times a day.  Dose: 1,000 mg     traZODone 150 MG Tabs  Commonly known as: DESYREL   Take 450 mg by mouth every evening.  Dose: 450 mg        STOP taking these medications    furosemide 80 MG Tabs  Commonly known as: LASIX     hydroCHLOROthiazide 25 MG Tabs  Commonly known as: HYDRODIURIL     oxyCODONE-acetaminophen  MG Tabs  Commonly known as: PERCOCET-10            Allergies  No Known Allergies    DIET  No orders of the defined types were placed in this encounter.      ACTIVITY  As tolerated.  Weight bearing as tolerated    CONSULTATIONS  LPS  Vascular surgery     PROCEDURES  Angiogram, L SFA stent    LABORATORY  Lab Results   Component Value Date    SODIUM 135 10/28/2021    POTASSIUM 3.8 10/28/2021    CHLORIDE 100 10/28/2021    CO2 25 10/28/2021    GLUCOSE 142 (H) 10/28/2021    BUN 19 10/28/2021    CREATININE 0.71 10/28/2021        Lab Results   Component Value Date    WBC 5.9 10/28/2021    HEMOGLOBIN 12.8 (L) 10/28/2021    HEMATOCRIT 39.3  (L) 10/28/2021    PLATELETCT 283 10/28/2021        Total time of the discharge process exceeds 41 minutes.

## 2021-10-30 NOTE — PROGRESS NOTES
COVID-19 surge in effect.     Assumed care of pt at shift change. Pt is on RA with no signs of acute distress. Reports pain to LEs, medicated per MAR.  A&Ox4. POC discussed with patient. All comfort measures in place. Call light and personal belongings by bedside. Bed locked and in lowest position. Hourly rounding in place

## 2021-10-30 NOTE — DISCHARGE INSTRUCTIONS
BILATERAL LOWER LEGS:Every 72 Hours. Nursing to remove old dressing. Cleanse wound with wound cleanser and gauze. Pat dry. Apply no sting skin barrier to indu-wound. Cut a piece of hydrofera blue slightly larger than wound and apply to wound bed foam side down (writing side up). Secure in place with dry roll gauze, mepilex, hypafix tape or adhesive foam. Nursing to change every 72hrs and PRN dislodgement and or drainage saturation.        Diabetes Mellitus and Foot Care  Foot care is an important part of your health, especially when you have diabetes. Diabetes may cause you to have problems because of poor blood flow (circulation) to your feet and legs, which can cause your skin to:  · Become thinner and drier.  · Break more easily.  · Heal more slowly.  · Peel and crack.  You may also have nerve damage (neuropathy) in your legs and feet, causing decreased feeling in them. This means that you may not notice minor injuries to your feet that could lead to more serious problems. Noticing and addressing any potential problems early is the best way to prevent future foot problems.  How to care for your feet  Foot hygiene  · Wash your feet daily with warm water and mild soap. Do not use hot water. Then, pat your feet and the areas between your toes until they are completely dry. Do not soak your feet as this can dry your skin.  · Trim your toenails straight across. Do not dig under them or around the cuticle. File the edges of your nails with an emery board or nail file.  · Apply a moisturizing lotion or petroleum jelly to the skin on your feet and to dry, brittle toenails. Use lotion that does not contain alcohol and is unscented. Do not apply lotion between your toes.  Shoes and socks  · Wear clean socks or stockings every day. Make sure they are not too tight. Do not wear knee-high stockings since they may decrease blood flow to your legs.  · Wear shoes that fit properly and have enough cushioning. Always look in your  shoes before you put them on to be sure there are no objects inside.  · To break in new shoes, wear them for just a few hours a day. This prevents injuries on your feet.  Wounds, scrapes, corns, and calluses  · Check your feet daily for blisters, cuts, bruises, sores, and redness. If you cannot see the bottom of your feet, use a mirror or ask someone for help.  · Do not cut corns or calluses or try to remove them with medicine.  · If you find a minor scrape, cut, or break in the skin on your feet, keep it and the skin around it clean and dry. You may clean these areas with mild soap and water. Do not clean the area with peroxide, alcohol, or iodine.  · If you have a wound, scrape, corn, or callus on your foot, look at it several times a day to make sure it is healing and not infected. Check for:  ? Redness, swelling, or pain.  ? Fluid or blood.  ? Warmth.  ? Pus or a bad smell.  General instructions  · Do not cross your legs. This may decrease blood flow to your feet.  · Do not use heating pads or hot water bottles on your feet. They may burn your skin. If you have lost feeling in your feet or legs, you may not know this is happening until it is too late.  · Protect your feet from hot and cold by wearing shoes, such as at the beach or on hot pavement.  · Schedule a complete foot exam at least once a year (annually) or more often if you have foot problems. If you have foot problems, report any cuts, sores, or bruises to your health care provider immediately.  Contact a health care provider if:  · You have a medical condition that increases your risk of infection and you have any cuts, sores, or bruises on your feet.  · You have an injury that is not healing.  · You have redness on your legs or feet.  · You feel burning or tingling in your legs or feet.  · You have pain or cramps in your legs and feet.  · Your legs or feet are numb.  · Your feet always feel cold.  · You have pain around a toenail.  Get help right away  if:  · You have a wound, scrape, corn, or callus on your foot and:  ? You have pain, swelling, or redness that gets worse.  ? You have fluid or blood coming from the wound, scrape, corn, or callus.  ? Your wound, scrape, corn, or callus feels warm to the touch.  ? You have pus or a bad smell coming from the wound, scrape, corn, or callus.  ? You have a fever.  ? You have a red line going up your leg.  Summary  · Check your feet every day for cuts, sores, red spots, swelling, and blisters.  · Moisturize feet and legs daily.  · Wear shoes that fit properly and have enough cushioning.  · If you have foot problems, report any cuts, sores, or bruises to your health care provider immediately.  · Schedule a complete foot exam at least once a year (annually) or more often if you have foot problems.  This information is not intended to replace advice given to you by your health care provider. Make sure you discuss any questions you have with your health care provider.  Document Released: 12/15/2001 Document Revised: 01/30/2019 Document Reviewed: 01/19/2018  [employer] Patient Education © 2020 [employer]      Discharge Instructions    Discharged to home by car with friend. Discharged via wheelchair, hospital escort: Yes.  Special equipment needed: Not Applicable    Be sure to schedule a follow-up appointment with your primary care doctor or any specialists as instructed.     Discharge Plan:   Diet Plan: Discussed  Activity Level: Discussed  Confirmed Follow up Appointment: Patient to Call and Schedule Appointment  Confirmed Symptoms Management: Discussed  Medication Reconciliation Updated: Yes    I understand that a diet low in cholesterol, fat, and sodium is recommended for good health. Unless I have been given specific instructions below for another diet, I accept this instruction as my diet prescription.   Other diet: Diabetic    Special Instructions: None    · Is patient discharged on Warfarin / Coumadin?   No      Depression / Suicide Risk    As you are discharged from this [facility] facility, it is important to learn how to keep safe from harming yourself.    Recognize the warning signs:  · Abrupt changes in personality, positive or negative- including increase in energy   · Giving away possessions  · Change in eating patterns- significant weight changes-  positive or negative  · Change in sleeping patterns- unable to sleep or sleeping all the time   · Unwillingness or inability to communicate  · Depression  · Unusual sadness, discouragement and loneliness  · Talk of wanting to die  · Neglect of personal appearance   · Rebelliousness- reckless behavior  · Withdrawal from people/activities they love  · Confusion- inability to concentrate     If you or a loved one observes any of these behaviors or has concerns about self-harm, here's what you can do:  · Talk about it- your feelings and reasons for harming yourself  · Remove any means that you might use to hurt yourself (examples: pills, rope, extension cords, firearm)  · Get professional help from the community (Mental Health, Substance Abuse, psychological counseling)  · Do not be alone:Call your Safe Contact- someone whom you trust who will be there for you.  · Call your local CRISIS HOTLINE 122-3687 or 473-538-3283  · Call your local Children's Mobile Crisis Response Team [place] (308) 906-9001 or www.Enpocket  · Call the toll free National Suicide Prevention Hotlines   · National Suicide Prevention Lifeline 062-247-FHUG (8394)  · National Hope Line Network 800-SUICIDE (718-0828)

## 2021-10-30 NOTE — PROGRESS NOTES
Pt dc'd home. IV  Removed. Pt left unit via WC with escort. Personal belongings with pt when leaving unit. Pt given discharge instructions prior to leaving unit including where to  prescriptions and when to follow-up; verbalizes understanding. Copy of discharge instructions with pt and in the chart.

## 2021-10-30 NOTE — CARE PLAN
The patient is Stable - Low risk of patient condition declining or worsening    Shift Goals  Clinical Goals: Pain mgt, wound vac sution well, Rest and discharge tommorrow  Patient Goals: Pain and wound vac mgt  Family Goals: n/a    Progress made toward(s) clinical / shift goals:  Patient is alert and oriented x 4. BS of 156 with 2 units on insulin coverage given. Complaint of Leg pain and prn pain medication given, see MAR. Wound vac suction at 125 mmHg with serosanguineous discharge noted. Plan for discharge tomorrow with wound vac off. Call light and personal items within reached. Can make his needs known. Kept safe an monitored.     Patient is not progressing towards the following goals:

## 2021-10-30 NOTE — CARE PLAN
The patient is Stable - Low risk of patient condition declining or worsening    Shift Goals  Clinical Goals: Patient's pain will be controlled       Progress made toward(s) clinical / shift goals:   Patient medicated with PRN pain medication per MAR.     Problem: Knowledge Deficit - Standard  Goal: Patient and family/care givers will demonstrate understanding of plan of care, disease process/condition, diagnostic tests and medications  Outcome: Progressing     Problem: Pain - Standard  Goal: Alleviation of pain or a reduction in pain to the patient’s comfort goal  Outcome: Progressing     Problem: Skin Integrity  Goal: Skin integrity is maintained or improved  Outcome: Progressing     Problem: Fall Risk  Goal: Patient will remain free from falls  Outcome: Progressing

## 2021-10-30 NOTE — WOUND TEAM
[facility]  Inpatient Services   Wound and Skin Care     Admission Date: 10/7/2021     Last order of IP CONSULT TO WOUND CARE was found on 10/12/2021 from Hospital Encounter on 10/7/2021     HPI, PMH, SH: Reviewed    Past Surgical History:   Procedure Laterality Date   • ANGIOGRAM Bilateral 10/12/2021    Procedure: BILATERAL LOWER EXTREMITY ANGIOGRAM;  Surgeon: Valerio Purcell M.D.;  Location: SURGERY [facility];  Service: Vascular   • IRRIGATION & DEBRIDEMENT GENERAL Bilateral 10/12/2021    Procedure: IRRIGATION AND DEBRIDEMENT, WOUND, BILATERAL LOWER EXTREMITY;  Surgeon: Valerio Purcell M.D.;  Location: SURGERY [facility];  Service: Vascular   • APPLICATION OR REPLACEMENT, WOUND VAC Bilateral 10/12/2021    Procedure: APPLICATION WOUND VAC;  Surgeon: Valerio Purcell M.D.;  Location: SURGERY [facility];  Service: Vascular   • STENT PLACEMENT Right 10/12/2021    Procedure: STENT PLACEMENT, RIGHT SUPERFICIAL FEMORAL ARTERY;  Surgeon: Valerio Purcell M.D.;  Location: SURGERY [facility];  Service: Vascular   • TOE AMPUTATION Left 2/17/2020    Procedure: LEFT SECOND, THIRD, AND FORTH TOE AMPUTATION;  Surgeon: Alfonso Esteban M.D.;  Location: SURGERY [facility];  Service: Orthopedics   • MO UNLISTED PROC, ARTHROSCOPY Left 7/25/2019    Procedure: LEFT ENDOSCOPIC ULNAR NERVE DECOMPRESSION, LEFT CARPAL TUNNEL RELEASE;  Surgeon: Red Chacon M.D.;  Location: SURGERY [facility];  Service: Orthopedics   • KNEE REPLACEMENT, TOTAL Bilateral    • OTHER SURGICAL PROCEDURE      back surgery - Unknown      Social History     Tobacco Use   • Smoking status: Current Every Day Smoker     Packs/day: 0.50     Years: 54.00     Pack years: 27.00     Types: Cigarettes   • Smokeless tobacco: Former User   Substance Use Topics   • Alcohol use: Not Currently     Alcohol/week: 0.0 oz     Comment: occas     Chief Complaint   Patient presents with   • Wound Infection     bilateral leg wounds. pt states  he has had them for years but seem to be getting worse      Diagnosis: Cellulitis of left lower extremity [L03.116]    Unit where seen by Wound Team: S623/02     WOUND CONSULT/FOLLOW UP RELATED TO:  Initial vac change     WOUND HISTORY:  72 yo Male underwent Right SFA stent placement and OR debridement on BLE with subsequent vac placement.    10/12/21 Angio R SFA stent placed, right foot well perfused, left foot with good perfusion without intervention.        WOUND ASSESSMENT/LDA                Wound 10/14/21 Full Thickness Wound Leg Lateral Right (Active)   Wound Image      Site Assessment Pink;Red    Periwound Assessment Pink    Margins Defined edges    Closure Secondary intention    Drainage Amount Small    Drainage Description Serosanguineous    Treatments Cleansed;Offloading;Site care    Wound Cleansing Approved Wound Cleanser    Periwound Protectant Skin Protectant Wipes to Periwound    Dressing Cleansing/Solutions Not Applicable    Dressing Options Collagen Dressing;Hydrofera Blue Ready;Mepilex    Dressing Changed Changed    Dressing Status Clean;Dry;Intact    Dressing Change/Treatment Frequency Every 72 hrs, and As Needed    NEXT Dressing Change/Treatment Date 10/31/21    NEXT Weekly Photo (Inpatient Only) 11/01/21    Wound Length (cm) 17 cm    Wound Width (cm) 8 cm    Wound Depth (cm) 0.2 cm    Wound Surface Area (cm^2) 136 cm^2    Wound Volume (cm^3) 27.2 cm^3    Wound Healing % 62    Wound Bed Granulation (%) 90 %    Wound Bed Slough (%) 10 %    Shape circular    Wound Odor None    Pulses 1+    Exposed Structures None    WOUND NURSE ONLY - Time Spent with Patient (mins) 120        NEXT Dressing Change/Treatment Date 10/29/21    NEXT Weekly Photo (Inpatient Only) 11/02/21    Pressure Injury Stage U    Wound Length (cm) 2 cm    Wound Width (cm) 2 cm    Wound Surface Area (cm^2) 4 cm^2    Exposed Structures ROSITA       LEFT LEG 10/28/21  Post sup 2.5x3  Post mid 3.5x2.5  Post inf 4x2.2  Lateral 8x2  Ant sup  1.2x1.2  Ant mid 2s2  Ant inf 3x2.2  Dorsal foot 3.5x2.2  Heel and distal foot not measured  All wounds have yellow slough so depth was not measured but wounds appear superficial  Wound bed are red granular and yellow slough  Dr Ardon debrided wounds       Vascular:    MIA: 10/8/21  MIA Results, Last 30 Days US-MIA SINGLE LEVEL BILAT     RIGHT      Waveform            Systolic BPs (mmHg)                                                     Brachial   Monophasic                               Common Femoral   Monophasic                               Posterior Tibial   Monophasic                               Dorsalis Pedis                                                    Peroneal                                            MIA                                            TBI                           LEFT   Waveform        Systolic BPs (mmHg)                                    143           Brachial   Monophasic                               Common Femoral   Monophasic                               Posterior Tibial   Monophasic                               Dorsalis Pedis                                                     Peroneal                                            MIA                                            TBI         Findings   Bilateral.    Doppler waveforms of the common femoral arteries are abnormally dampened/    monophasic.    Doppler waveforms at the ankle are monophasic.    The ankle pressures are not obtained due to pain.    Digit PPG waveforms are normal in the right toes.   Digit PPG waveforms are normal in the left 1st and 5th toe. The other toes    are absent/amputated.    Toe-brachial indices is reduced right side.   Toe-brachial indices are left side.    Toe-brachial indices:     Right:  0.54   Left:  0.90         Lab Values:    Lab Results   Component Value Date/Time    WBC 5.9 10/28/2021 05:56 AM    RBC 4.41 (L) 10/28/2021 05:56 AM    HEMOGLOBIN 12.8 (L) 10/28/2021 05:56 AM     HEMATOCRIT 39.3 (L) 10/28/2021 05:56 AM    CREACTPROT 6.25 (H) 10/07/2021 08:30 PM    SEDRATEWES 1 10/07/2021 08:30 PM    HBA1C 9.1 (H) 10/08/2021 04:26 AM        Culture Results show:  Recent Results (from the past 720 hour(s))   CULTURE WOUND W/ GRAM STAIN    Collection Time: 10/08/21  2:13 PM    Specimen: Left Leg; Wound   Result Value Ref Range    Significant Indicator POS (POS)     Source WND     Site LEFT LEG     Culture Result Rare growth mixed enteric collins. (A)     Gram Stain Result Moderate WBCs.  No organisms seen.       Culture Result (A)      Methicillin Resistant Staphylococcus aureus  Light growth  This isolate is presumed to be clindamycin resistant based on  detection of inducible resistance.  Clindamycin may still  be effective in some patients.      Culture Result Streptococcus agalactiae (Group B)  Light growth   (A)        Susceptibility    Methicillin resistant staphylococcus aureus - MELA     Azithromycin >4 Resistant mcg/mL     Clindamycin <=0.25 Resistant mcg/mL     Cefazolin <=8 Resistant mcg/mL     Cefepime 16 Resistant mcg/mL     Ceftaroline <=0.5 Sensitive mcg/mL     Daptomycin <=0.5 Sensitive mcg/mL     Ampicillin/sulbactam 16/8 Resistant mcg/mL     Erythromycin >4 Resistant mcg/mL     Vancomycin 1 Sensitive mcg/mL     Oxacillin >2 Resistant mcg/mL     Trimeth/Sulfa <=0.5/9.5 Sensitive mcg/mL     Tetracycline <=4 Sensitive mcg/mL       Pain Level/Medicated:  Max pain; Premed IV, topical lidocaine Soln x2 vials, Requires pain meds after dressing change       INTERVENTIONS BY WOUND TEAM:   Performed standard wound care which includes appropriate positioning, dressing removal and non-selective debridement. Pictures and measurements obtained weekly if/when required.    RIGHT LE  Preparation for Dressing removal: Dressing soaked with Lidocaine and NS.   Non-selectively Debrided with:  cleanser and gauze.  Sharp debridement: NA  Soo wound:  Primary Dressing: hydrofera blue, ABD, rolled  gauze  Secondary (Outer) Dressing: . Patient refused tubi .       Interdisciplinary consultation: Patient, Bedside RN        EVALUATION / RATIONALE FOR TREATMENT:  Most Recent Date:  10/28/21 Wounds imrproving slowly with increased granular tissue.  Discussed compression but pt states the tubigrips were painful.  See LPS note for details regarding d/c plan.  Anticipate slow resolution due to diminished blood flow and edema but encouraged by granular tissue formation.    10/26/21: RLE wound bed almost to surface level removed wound vac and applied collagen and hydrofera blue and hypafix tape, LLE posterior wounds are clean and almost to surface level applied guadalupe and hydrofera blue, anterior wound still have slough continued VF NPWT to try and clean wound up.      10/22/21: patient pain better than previous, wounds continuing to improve. Continue VF NPWT. hydrofera blue to L medial heel.       10/14: RLE lateral wound with adherent slough at base. Vera harry initiated to encourage mechanical debridement. LLE lateral and toe amp wound with clean, partially granulated wound base. Maintained regular wound vac suction to these sites. Implemented adaptic as a nonadherent layer to decrease pain upon removal next change.     Honeycolloid applied to anterior and posterior aspect on LLE given adherent slough at base. Pt will benefit from veraflo wound vac to LLE including bridging of scattered lesions, therefore, will need extra machine, cassette, Y-connector and tracpad next session.     Pt was in significant pain throughout the dressing care process. Will benefit from ongoing  Premedication and lidocaine soln for pain control. Check with primary RN prior to wound care as pt may be hypotensive and not appropriate for IV pain meds right away. LPS following patient, refer to LPS noted for POC.      Goals: Steady decrease in wound area and depth weekly.    WOUND TEAM PLAN OF CARE ([X] for frequency of wound follow up,):    Nursing to follow orders written for wound care. Contact wound team if area fails to progress, deteriorates or with any questions/concerns  Dressing changes by wound team:                   Follow up 3 times weekly:                NPWT change 3 times weekly: x    Follow up 1-2 times weekly:      Follow up Bi-Monthly:                   Follow up as needed:     Other (explain):     NURSING PLAN OF CARE ORDERS (X):  Dressing changes: See Dressing Care orders: x  Skin care: See Skin Care orders: x  RN Prevention Protocol: x  Rectal tube care: See Rectal Tube Care orders:   Other orders:    Anticipated discharge plans:   LTACH:        SNF/Rehab:      X will need ongoing wound care            Home Health Care:           Outpatient Wound Center:            Self/Family Care:        Other:                  Vac Discharge Needs:   Not Applicable Pt not on a wound vac:       Regular Vac while inpatient, alternative dressing at DC:        Regular Vac in use and continued at DC:           Reg. Vac w/ Skin Sub/Biologic in use. Will need to be changed 2x wkly:      Veraflo Vac while inpatient, ok to transition to Regular Vac on Discharge:  X       Veraflo Vac while inpatient, will need to remain on Veraflo Vac upon discharge:

## 2021-10-30 NOTE — PROGRESS NOTES
Patient is alert and oriented x 4. BS of 156 with 2 units on insulin coverage given. Complaint of Leg pain and prn pain medication given, see MAR. Wound vac suction at 125 mmHg with serosanguineous discharge noted. Plan for discharge tomorrow with wound vac off. Call light and personal items within reached. Can make his needs known. Kept safe an monitored.

## 2021-10-30 NOTE — DISCHARGE PLANNING
Anticipated Discharge Disposition:  Home with [facility] App[facility]ment, wound care supplies from [facility], and wound care supplies shipped to patient's home address by [facility] via [employer].      Action:  This RNCM scheduled OP wound appointment with Giovany Matt for Thursday 11/4 at 0900. Dr. Ardon completed [facility] supply order and was updated in person of wound clinic appointment. Per, Dr. Ardon, patient will dc with extra wound supplies. This RNCM faxed appropriate medical records to Giovany Matt (#[phone]) for first appointment     This RMCM met with patient at bedside and verified accuracy of facesheet and shipping address for [facility].     This RNCM received phone call from [facility], who verified patient has received supplies in the past, and that they will use patient's medical insurance for billing and will be shipping supplies via [employer] next week.       Per patient, he has a ride home that will be available to pick him up on Saturday 10/30. Dr. Sullivan aware.       Barriers to discharge: Transportation, medical supplies    Plan: [facility] will continue to follow to assist with dc needs.

## 2021-11-30 NOTE — OP REPORT
ACUTE CARE VASCULAR SERVICE  OPERATIVE NOTE    --------------------------------------------    Date of Service:           10/12/2021  Patient Name:              Luis Kellogg  Patient MRN:               [id number]    --------------------------------------------------------------------------------------------------    Preoperative Diagnosis:  -Peripheral arterial occlusive disease with arterial insufficiency and nonhealing wounds of the right lower extremity  -Peripheral arterial occlusive disease with arterial insufficiency and nonhealing wounds of the left lower extremity    Postoperative Diagnosis:  -Peripheral arterial occlusive disease with arterial insufficiency and nonhealing wounds of the right lower extremity  -Peripheral arterial occlusive disease with arterial insufficiency and nonhealing wounds of the left lower extremity    Procedure:  50250 Percutaneous access of the left common femoral artery with ultrasound guidance  28202 Nonselective catheterization of the infrarenal aorta  28636 Aortoiliac angiogram  81936 Selective catheter placement in the right below-knee popliteal artery  14067 Right lower extremity angiography  49747  6mm uncovered self expanding right SFA stent placement   69245 Left lower extremity angiography   Closure of the left common femoral artery access site with 6 Syrian Angio-Seal closure device which deployed successfully  _____ Sharp excisional debridement of bilateral lower extremity ulcerations including skin and subcutaneous tissue  -------------------------------------------------------------------------------------------------    Surgeon:                                 Valerio Purcell MD    Assistant:   None    Anesthesia:                             Sedation plus local anesthetic    EBL:                                        minimal    Complications:                        none    Disposition:                             Tolerated well, sent to recovery in stable  condition    -----------------------------------------------------------------------------------------------------      Procedure Summary:  The patient was properly identified in the preoperative area, taken to the operating room, and placed in a supine position and IV sedation was given to make patient comfortable.  The patient was prepped and draped in the usual sterile fashion.  Surgical timeout was called to identify the correct patient, procedure, and equipment.  Everyone was in agreement.     Local anesthetic was infiltrated in the right common femoral artery was accessed percutaneously. A catheter was advanced to the infrarenal aorta for an aortoiliac angiogram which showed no significant stenoses. The catheter was advanced up over to the left common femoral artery and a left lower extremity angiogram was performed which showed no significant stenoses and single-vessel posterior tibial runoff to the foot. The anterior tibial and posterior tibial artery appear to be chronically occluded and no attempt at revascularization was indicated.    I then used the right femoral sheath to perform a right lower extremity angiogram and this identified a segmental occlusion of the right superficial femoral artery. This was addressed by placing a second sheath in the left common femoral artery and coming up and over into the right superficial femoral artery. The occluded segment was crossed with a wire and crossing catheter and the catheter was advanced down to the right below-knee popliteal artery and angiogram was performed to show that the catheter was still in the true lumen. The vessel was initially treated with 6 mm plain angioplasty however there was residual disease and so a 6 mm x 80 mm stent was placed in the mid to distal superficial femoral artery. Post stent angioplasty was 6 mm plain balloon was performed. We repeated the angiogram and there was excellent recanalization of the SFA and three-vessel runoff to  the foot. Both sheaths were removed and the sites were closed with 6 Armenian Angio-Seal closure devices which deployed successfully and dry sterile bandages were placed.    I then turned my attention to the lower extremity wounds. I performed sharp excisional debridement of multiple wounds of the lower legs bilaterally including skin and subcutaneous tissue, a total of approximately 20 cm³ of tissue was removed, predominantly necrotic skin and subcutaneous tissue. Debridement was performed using scalpel and scissors and also curettes. Once debridement was complete I placed bulky sterile bandages on the wounds.    All counts were correct. Patient tolerated the procedure well. He was sent to recovery in stable condition.    Valerio Purcell MD  General and Vascular Surgery  [facility]  [phone]

## 2021-12-06 PROBLEM — J44.9 CHRONIC OBSTRUCTIVE PULMONARY DISEASE (HCC): Status: ACTIVE | Noted: 2021-11-09

## 2021-12-06 PROBLEM — Z96.653 PRESENCE OF BOTH ARTIFICIAL KNEE JOINTS: Status: ACTIVE | Noted: 2021-11-09

## 2021-12-06 PROBLEM — I87.333 CHRONIC VENOUS HYPERTENSION (IDIOPATHIC) WITH ULCER AND INFLAMMATION OF BILATERAL LOWER EXTREMITY (CODE): Status: ACTIVE | Noted: 2021-11-17

## 2021-12-06 PROBLEM — F17.200 NICOTINE DEPENDENCE WITH CURRENT USE: Status: ACTIVE | Noted: 2021-11-09

## 2021-12-06 PROBLEM — E11.65 TYPE 2 DIABETES MELLITUS WITH HYPERGLYCEMIA (HCC): Status: ACTIVE | Noted: 2021-11-09

## 2021-12-10 PROBLEM — G89.29 OTHER CHRONIC PAIN: Status: ACTIVE | Noted: 2021-12-10

## 2022-04-28 PROBLEM — S91.302A WOUND OF LEFT FOOT: Status: ACTIVE | Noted: 2022-04-28

## 2022-05-09 ENCOUNTER — PRE-ADMISSION TESTING (OUTPATIENT)
Dept: ADMISSIONS | Facility: MEDICAL CENTER | Age: 72
DRG: 240 | End: 2022-05-09
Attending: ORTHOPAEDIC SURGERY
Payer: MEDICARE

## 2022-05-09 NOTE — OR NURSING
Instructed patient to discuss medications with MD including ASA, opioid pain management, insulin dosage night before and Plavix. Patient is a poor historian and presents as disorganized/tangential

## 2022-05-17 ENCOUNTER — ANESTHESIA EVENT (OUTPATIENT)
Dept: SURGERY | Facility: MEDICAL CENTER | Age: 72
DRG: 240 | End: 2022-05-17
Payer: MEDICARE

## 2022-05-18 ENCOUNTER — ANESTHESIA (OUTPATIENT)
Dept: SURGERY | Facility: MEDICAL CENTER | Age: 72
DRG: 240 | End: 2022-05-18
Payer: MEDICARE

## 2022-05-18 ENCOUNTER — HOSPITAL ENCOUNTER (INPATIENT)
Facility: MEDICAL CENTER | Age: 72
LOS: 20 days | DRG: 240 | End: 2022-06-07
Attending: ORTHOPAEDIC SURGERY | Admitting: ORTHOPAEDIC SURGERY
Payer: MEDICARE

## 2022-05-18 DIAGNOSIS — S91.302A WOUND OF LEFT FOOT: ICD-10-CM

## 2022-05-18 DIAGNOSIS — G63 POLYNEUROPATHY ASSOCIATED WITH UNDERLYING DISEASE (HCC): ICD-10-CM

## 2022-05-18 DIAGNOSIS — I25.10 ATHEROSCLEROSIS OF NATIVE CORONARY ARTERY OF NATIVE HEART WITHOUT ANGINA PECTORIS: ICD-10-CM

## 2022-05-18 DIAGNOSIS — Z89.612 S/P AKA (ABOVE KNEE AMPUTATION), LEFT (HCC): ICD-10-CM

## 2022-05-18 DIAGNOSIS — M86.9 OSTEOMYELITIS OF ANKLE AND FOOT (HCC): ICD-10-CM

## 2022-05-18 DIAGNOSIS — I73.9 PERIPHERAL ARTERIAL DISEASE (HCC): ICD-10-CM

## 2022-05-18 DIAGNOSIS — I10 PRIMARY HYPERTENSION: ICD-10-CM

## 2022-05-18 DIAGNOSIS — Z79.4 TYPE 2 DIABETES MELLITUS WITH HYPERGLYCEMIA, WITH LONG-TERM CURRENT USE OF INSULIN (HCC): ICD-10-CM

## 2022-05-18 DIAGNOSIS — E11.65 TYPE 2 DIABETES MELLITUS WITH HYPERGLYCEMIA, WITH LONG-TERM CURRENT USE OF INSULIN (HCC): ICD-10-CM

## 2022-05-18 DIAGNOSIS — L03.115 CELLULITIS OF RIGHT LEG: ICD-10-CM

## 2022-05-18 DIAGNOSIS — K59.03 DRUG-INDUCED CONSTIPATION: ICD-10-CM

## 2022-05-18 DIAGNOSIS — Z72.0 TOBACCO USE: Chronic | ICD-10-CM

## 2022-05-18 PROBLEM — F12.10 MARIJUANA ABUSE: Chronic | Status: ACTIVE | Noted: 2022-05-18

## 2022-05-18 LAB
ALBUMIN SERPL BCP-MCNC: 3.7 G/DL (ref 3.2–4.9)
ALBUMIN/GLOB SERPL: 1.1 G/DL
ALP SERPL-CCNC: 90 U/L (ref 30–99)
ALT SERPL-CCNC: 10 U/L (ref 2–50)
ANION GAP SERPL CALC-SCNC: 8 MMOL/L (ref 7–16)
AST SERPL-CCNC: 13 U/L (ref 12–45)
BILIRUB SERPL-MCNC: 0.2 MG/DL (ref 0.1–1.5)
BUN SERPL-MCNC: 9 MG/DL (ref 8–22)
CALCIUM SERPL-MCNC: 9 MG/DL (ref 8.5–10.5)
CHLORIDE SERPL-SCNC: 100 MMOL/L (ref 96–112)
CO2 SERPL-SCNC: 25 MMOL/L (ref 20–33)
CREAT SERPL-MCNC: 0.55 MG/DL (ref 0.5–1.4)
EKG IMPRESSION: NORMAL
ERYTHROCYTE [DISTWIDTH] IN BLOOD BY AUTOMATED COUNT: 47.5 FL (ref 35.9–50)
GFR SERPLBLD CREATININE-BSD FMLA CKD-EPI: 106 ML/MIN/1.73 M 2
GLOBULIN SER CALC-MCNC: 3.5 G/DL (ref 1.9–3.5)
GLUCOSE BLD STRIP.AUTO-MCNC: 145 MG/DL (ref 65–99)
GLUCOSE BLD STRIP.AUTO-MCNC: 156 MG/DL (ref 65–99)
GLUCOSE BLD STRIP.AUTO-MCNC: 163 MG/DL (ref 65–99)
GLUCOSE BLD STRIP.AUTO-MCNC: 169 MG/DL (ref 65–99)
GLUCOSE SERPL-MCNC: 163 MG/DL (ref 65–99)
HCT VFR BLD AUTO: 40.8 % (ref 42–52)
HGB BLD-MCNC: 13 G/DL (ref 14–18)
INR PPP: 1.05 (ref 0.87–1.13)
MCH RBC QN AUTO: 27.5 PG (ref 27–33)
MCHC RBC AUTO-ENTMCNC: 31.9 G/DL (ref 33.7–35.3)
MCV RBC AUTO: 86.3 FL (ref 81.4–97.8)
PATHOLOGY CONSULT NOTE: NORMAL
PLATELET # BLD AUTO: 224 K/UL (ref 164–446)
PMV BLD AUTO: 9.5 FL (ref 9–12.9)
POTASSIUM SERPL-SCNC: 4.2 MMOL/L (ref 3.6–5.5)
PROT SERPL-MCNC: 7.2 G/DL (ref 6–8.2)
PROTHROMBIN TIME: 13.4 SEC (ref 12–14.6)
RBC # BLD AUTO: 4.73 M/UL (ref 4.7–6.1)
SODIUM SERPL-SCNC: 133 MMOL/L (ref 135–145)
WBC # BLD AUTO: 6.5 K/UL (ref 4.8–10.8)

## 2022-05-18 PROCEDURE — 27690 REVISE LOWER LEG TENDON: CPT | Performed by: ORTHOPAEDIC SURGERY

## 2022-05-18 PROCEDURE — 64447 NJX AA&/STRD FEMORAL NRV IMG: CPT | Mod: 59 | Performed by: ANESTHESIOLOGY

## 2022-05-18 PROCEDURE — 160036 HCHG PACU - EA ADDL 30 MINS PHASE I: Performed by: ORTHOPAEDIC SURGERY

## 2022-05-18 PROCEDURE — 160048 HCHG OR STATISTICAL LEVEL 1-5: Performed by: ORTHOPAEDIC SURGERY

## 2022-05-18 PROCEDURE — 93010 ELECTROCARDIOGRAM REPORT: CPT | Performed by: INTERNAL MEDICINE

## 2022-05-18 PROCEDURE — 8968 PR NO CHARGE - PROCEDURE: Performed by: SPECIALIST/TECHNOLOGIST, OTHER

## 2022-05-18 PROCEDURE — 99406 BEHAV CHNG SMOKING 3-10 MIN: CPT | Performed by: INTERNAL MEDICINE

## 2022-05-18 PROCEDURE — 500367 HCHG DRAIN KIT, HEMOVAC: Performed by: ORTHOPAEDIC SURGERY

## 2022-05-18 PROCEDURE — 160028 HCHG SURGERY MINUTES - 1ST 30 MINS LEVEL 3: Performed by: ORTHOPAEDIC SURGERY

## 2022-05-18 PROCEDURE — 700101 HCHG RX REV CODE 250: Performed by: ANESTHESIOLOGY

## 2022-05-18 PROCEDURE — 82962 GLUCOSE BLOOD TEST: CPT

## 2022-05-18 PROCEDURE — 501838 HCHG SUTURE GENERAL: Performed by: ORTHOPAEDIC SURGERY

## 2022-05-18 PROCEDURE — 700105 HCHG RX REV CODE 258: Performed by: ORTHOPAEDIC SURGERY

## 2022-05-18 PROCEDURE — 0LXM0ZZ TRANSFER LEFT UPPER LEG TENDON, OPEN APPROACH: ICD-10-PCS | Performed by: ORTHOPAEDIC SURGERY

## 2022-05-18 PROCEDURE — 85027 COMPLETE CBC AUTOMATED: CPT

## 2022-05-18 PROCEDURE — 99100 ANES PT EXTEME AGE<1 YR&>70: CPT | Performed by: ANESTHESIOLOGY

## 2022-05-18 PROCEDURE — 80053 COMPREHEN METABOLIC PANEL: CPT

## 2022-05-18 PROCEDURE — 3E0T3BZ INTRODUCTION OF ANESTHETIC AGENT INTO PERIPHERAL NERVES AND PLEXI, PERCUTANEOUS APPROACH: ICD-10-PCS | Performed by: ANESTHESIOLOGY

## 2022-05-18 PROCEDURE — 88307 TISSUE EXAM BY PATHOLOGIST: CPT

## 2022-05-18 PROCEDURE — 770001 HCHG ROOM/CARE - MED/SURG/GYN PRIV*

## 2022-05-18 PROCEDURE — 700105 HCHG RX REV CODE 258: Performed by: ANESTHESIOLOGY

## 2022-05-18 PROCEDURE — 85610 PROTHROMBIN TIME: CPT

## 2022-05-18 PROCEDURE — 700102 HCHG RX REV CODE 250 W/ 637 OVERRIDE(OP): Performed by: ANESTHESIOLOGY

## 2022-05-18 PROCEDURE — 0Y6D0Z3 DETACHMENT AT LEFT UPPER LEG, LOW, OPEN APPROACH: ICD-10-PCS | Performed by: ORTHOPAEDIC SURGERY

## 2022-05-18 PROCEDURE — 36415 COLL VENOUS BLD VENIPUNCTURE: CPT

## 2022-05-18 PROCEDURE — 88311 DECALCIFY TISSUE: CPT

## 2022-05-18 PROCEDURE — 160039 HCHG SURGERY MINUTES - EA ADDL 1 MIN LEVEL 3: Performed by: ORTHOPAEDIC SURGERY

## 2022-05-18 PROCEDURE — 700111 HCHG RX REV CODE 636 W/ 250 OVERRIDE (IP): Performed by: ANESTHESIOLOGY

## 2022-05-18 PROCEDURE — 700102 HCHG RX REV CODE 250 W/ 637 OVERRIDE(OP): Performed by: ORTHOPAEDIC SURGERY

## 2022-05-18 PROCEDURE — 160009 HCHG ANES TIME/MIN: Performed by: ORTHOPAEDIC SURGERY

## 2022-05-18 PROCEDURE — 99223 1ST HOSP IP/OBS HIGH 75: CPT | Mod: 25 | Performed by: INTERNAL MEDICINE

## 2022-05-18 PROCEDURE — 27590 AMPUTATE LEG AT THIGH: CPT | Mod: LT | Performed by: ORTHOPAEDIC SURGERY

## 2022-05-18 PROCEDURE — A9270 NON-COVERED ITEM OR SERVICE: HCPCS | Performed by: ANESTHESIOLOGY

## 2022-05-18 PROCEDURE — 160035 HCHG PACU - 1ST 60 MINS PHASE I: Performed by: ORTHOPAEDIC SURGERY

## 2022-05-18 PROCEDURE — 64447 NJX AA&/STRD FEMORAL NRV IMG: CPT | Performed by: ORTHOPAEDIC SURGERY

## 2022-05-18 PROCEDURE — 93005 ELECTROCARDIOGRAM TRACING: CPT | Performed by: ORTHOPAEDIC SURGERY

## 2022-05-18 PROCEDURE — A9270 NON-COVERED ITEM OR SERVICE: HCPCS | Performed by: ORTHOPAEDIC SURGERY

## 2022-05-18 PROCEDURE — 160002 HCHG RECOVERY MINUTES (STAT): Performed by: ORTHOPAEDIC SURGERY

## 2022-05-18 PROCEDURE — 76942 ECHO GUIDE FOR BIOPSY: CPT | Mod: 26,59 | Performed by: ANESTHESIOLOGY

## 2022-05-18 PROCEDURE — 700111 HCHG RX REV CODE 636 W/ 250 OVERRIDE (IP): Performed by: ORTHOPAEDIC SURGERY

## 2022-05-18 PROCEDURE — 01232 ANES OPN PX UPR 2/3 FEM AMP: CPT | Performed by: ANESTHESIOLOGY

## 2022-05-18 PROCEDURE — 700101 HCHG RX REV CODE 250: Performed by: ORTHOPAEDIC SURGERY

## 2022-05-18 RX ORDER — LIDOCAINE HYDROCHLORIDE 20 MG/ML
INJECTION, SOLUTION EPIDURAL; INFILTRATION; INTRACAUDAL; PERINEURAL PRN
Status: DISCONTINUED | OUTPATIENT
Start: 2022-05-18 | End: 2022-05-18 | Stop reason: SURG

## 2022-05-18 RX ORDER — HALOPERIDOL 5 MG/ML
1 INJECTION INTRAMUSCULAR
Status: DISCONTINUED | OUTPATIENT
Start: 2022-05-18 | End: 2022-05-18

## 2022-05-18 RX ORDER — GABAPENTIN 400 MG/1
400 CAPSULE ORAL 3 TIMES DAILY
Status: DISCONTINUED | OUTPATIENT
Start: 2022-05-18 | End: 2022-05-20

## 2022-05-18 RX ORDER — DEXAMETHASONE SODIUM PHOSPHATE 4 MG/ML
4 INJECTION, SOLUTION INTRA-ARTICULAR; INTRALESIONAL; INTRAMUSCULAR; INTRAVENOUS; SOFT TISSUE
Status: DISCONTINUED | OUTPATIENT
Start: 2022-05-18 | End: 2022-05-24

## 2022-05-18 RX ORDER — SCOLOPAMINE TRANSDERMAL SYSTEM 1 MG/1
1 PATCH, EXTENDED RELEASE TRANSDERMAL
Status: DISCONTINUED | OUTPATIENT
Start: 2022-05-18 | End: 2022-05-24

## 2022-05-18 RX ORDER — ACETAMINOPHEN 500 MG
1000 TABLET ORAL EVERY 6 HOURS
Status: DISCONTINUED | OUTPATIENT
Start: 2022-05-18 | End: 2022-05-22

## 2022-05-18 RX ORDER — SODIUM CHLORIDE, SODIUM LACTATE, POTASSIUM CHLORIDE, CALCIUM CHLORIDE 600; 310; 30; 20 MG/100ML; MG/100ML; MG/100ML; MG/100ML
INJECTION, SOLUTION INTRAVENOUS CONTINUOUS
Status: ACTIVE | OUTPATIENT
Start: 2022-05-18 | End: 2022-05-18

## 2022-05-18 RX ORDER — ATORVASTATIN CALCIUM 40 MG/1
80 TABLET, FILM COATED ORAL EVERY EVENING
Status: DISCONTINUED | OUTPATIENT
Start: 2022-05-18 | End: 2022-06-07 | Stop reason: HOSPADM

## 2022-05-18 RX ORDER — OXYCODONE HYDROCHLORIDE 10 MG/1
10 TABLET ORAL
Status: DISCONTINUED | OUTPATIENT
Start: 2022-05-18 | End: 2022-05-19

## 2022-05-18 RX ORDER — ONDANSETRON 4 MG/1
4 TABLET, ORALLY DISINTEGRATING ORAL EVERY 4 HOURS PRN
Status: DISCONTINUED | OUTPATIENT
Start: 2022-05-18 | End: 2022-06-06

## 2022-05-18 RX ORDER — BISACODYL 10 MG
10 SUPPOSITORY, RECTAL RECTAL
Status: DISCONTINUED | OUTPATIENT
Start: 2022-05-18 | End: 2022-05-26

## 2022-05-18 RX ORDER — OXYCODONE HCL 5 MG/5 ML
10 SOLUTION, ORAL ORAL
Status: COMPLETED | OUTPATIENT
Start: 2022-05-18 | End: 2022-05-18

## 2022-05-18 RX ORDER — BUPIVACAINE HYDROCHLORIDE 5 MG/ML
INJECTION, SOLUTION EPIDURAL; INTRACAUDAL PRN
Status: DISCONTINUED | OUTPATIENT
Start: 2022-05-18 | End: 2022-05-18 | Stop reason: SURG

## 2022-05-18 RX ORDER — HYDROMORPHONE HYDROCHLORIDE 1 MG/ML
0.1 INJECTION, SOLUTION INTRAMUSCULAR; INTRAVENOUS; SUBCUTANEOUS
Status: DISCONTINUED | OUTPATIENT
Start: 2022-05-18 | End: 2022-05-18

## 2022-05-18 RX ORDER — HYDROMORPHONE HYDROCHLORIDE 1 MG/ML
0.4 INJECTION, SOLUTION INTRAMUSCULAR; INTRAVENOUS; SUBCUTANEOUS
Status: DISCONTINUED | OUTPATIENT
Start: 2022-05-18 | End: 2022-05-18

## 2022-05-18 RX ORDER — POLYETHYLENE GLYCOL 3350 17 G/17G
1 POWDER, FOR SOLUTION ORAL
Status: DISCONTINUED | OUTPATIENT
Start: 2022-05-18 | End: 2022-05-18

## 2022-05-18 RX ORDER — HYDRALAZINE HYDROCHLORIDE 20 MG/ML
5 INJECTION INTRAMUSCULAR; INTRAVENOUS
Status: DISCONTINUED | OUTPATIENT
Start: 2022-05-18 | End: 2022-05-18

## 2022-05-18 RX ORDER — AMOXICILLIN 250 MG
1 CAPSULE ORAL NIGHTLY
Status: DISCONTINUED | OUTPATIENT
Start: 2022-05-18 | End: 2022-06-07 | Stop reason: HOSPADM

## 2022-05-18 RX ORDER — METOPROLOL TARTRATE 1 MG/ML
1 INJECTION, SOLUTION INTRAVENOUS
Status: DISCONTINUED | OUTPATIENT
Start: 2022-05-18 | End: 2022-05-18

## 2022-05-18 RX ORDER — BISACODYL 5 MG
5 TABLET, DELAYED RELEASE (ENTERIC COATED) ORAL
COMMUNITY

## 2022-05-18 RX ORDER — OXYCODONE HYDROCHLORIDE 10 MG/1
20 TABLET ORAL
Status: DISCONTINUED | OUTPATIENT
Start: 2022-05-18 | End: 2022-05-19

## 2022-05-18 RX ORDER — OXYCODONE HCL 5 MG/5 ML
5 SOLUTION, ORAL ORAL
Status: COMPLETED | OUTPATIENT
Start: 2022-05-18 | End: 2022-05-18

## 2022-05-18 RX ORDER — HALOPERIDOL 5 MG/ML
1 INJECTION INTRAMUSCULAR EVERY 6 HOURS PRN
Status: DISCONTINUED | OUTPATIENT
Start: 2022-05-18 | End: 2022-05-24

## 2022-05-18 RX ORDER — ACETAMINOPHEN 500 MG
1000 TABLET ORAL EVERY 6 HOURS PRN
Status: DISCONTINUED | OUTPATIENT
Start: 2022-05-23 | End: 2022-05-22

## 2022-05-18 RX ORDER — AMOXICILLIN 250 MG
1 CAPSULE ORAL
Status: DISCONTINUED | OUTPATIENT
Start: 2022-05-18 | End: 2022-06-07 | Stop reason: HOSPADM

## 2022-05-18 RX ORDER — ACETAMINOPHEN 325 MG/1
650 TABLET ORAL EVERY 6 HOURS PRN
Status: DISCONTINUED | OUTPATIENT
Start: 2022-05-18 | End: 2022-05-18

## 2022-05-18 RX ORDER — SODIUM CHLORIDE, SODIUM LACTATE, POTASSIUM CHLORIDE, CALCIUM CHLORIDE 600; 310; 30; 20 MG/100ML; MG/100ML; MG/100ML; MG/100ML
INJECTION, SOLUTION INTRAVENOUS
Status: DISCONTINUED | OUTPATIENT
Start: 2022-05-18 | End: 2022-05-18 | Stop reason: SURG

## 2022-05-18 RX ORDER — TRAZODONE HYDROCHLORIDE 150 MG/1
450 TABLET ORAL NIGHTLY
Status: DISCONTINUED | OUTPATIENT
Start: 2022-05-18 | End: 2022-06-07 | Stop reason: HOSPADM

## 2022-05-18 RX ORDER — LISINOPRIL 5 MG/1
5 TABLET ORAL DAILY
Status: DISCONTINUED | OUTPATIENT
Start: 2022-05-18 | End: 2022-06-07 | Stop reason: HOSPADM

## 2022-05-18 RX ORDER — CEFAZOLIN SODIUM 1 G/3ML
2 INJECTION, POWDER, FOR SOLUTION INTRAMUSCULAR; INTRAVENOUS ONCE
Status: DISCONTINUED | OUTPATIENT
Start: 2022-05-18 | End: 2022-05-18 | Stop reason: HOSPADM

## 2022-05-18 RX ORDER — ALBUTEROL SULFATE 2.5 MG/3ML
2.5 SOLUTION RESPIRATORY (INHALATION)
Status: DISCONTINUED | OUTPATIENT
Start: 2022-05-18 | End: 2022-05-18

## 2022-05-18 RX ORDER — HYDROMORPHONE HYDROCHLORIDE 2 MG/ML
INJECTION, SOLUTION INTRAMUSCULAR; INTRAVENOUS; SUBCUTANEOUS PRN
Status: DISCONTINUED | OUTPATIENT
Start: 2022-05-18 | End: 2022-05-18 | Stop reason: SURG

## 2022-05-18 RX ORDER — ENEMA 19; 7 G/133ML; G/133ML
1 ENEMA RECTAL
Status: DISCONTINUED | OUTPATIENT
Start: 2022-05-18 | End: 2022-05-25

## 2022-05-18 RX ORDER — ONDANSETRON 2 MG/ML
4 INJECTION INTRAMUSCULAR; INTRAVENOUS
Status: DISCONTINUED | OUTPATIENT
Start: 2022-05-18 | End: 2022-05-18

## 2022-05-18 RX ORDER — ROCURONIUM BROMIDE 10 MG/ML
INJECTION, SOLUTION INTRAVENOUS PRN
Status: DISCONTINUED | OUTPATIENT
Start: 2022-05-18 | End: 2022-05-18 | Stop reason: SURG

## 2022-05-18 RX ORDER — ONDANSETRON 2 MG/ML
4 INJECTION INTRAMUSCULAR; INTRAVENOUS EVERY 4 HOURS PRN
Status: DISCONTINUED | OUTPATIENT
Start: 2022-05-18 | End: 2022-05-18

## 2022-05-18 RX ORDER — ENOXAPARIN SODIUM 100 MG/ML
40 INJECTION SUBCUTANEOUS
Status: DISCONTINUED | OUTPATIENT
Start: 2022-05-18 | End: 2022-05-31

## 2022-05-18 RX ORDER — CARVEDILOL 6.25 MG/1
6.25 TABLET ORAL 2 TIMES DAILY WITH MEALS
Status: DISCONTINUED | OUTPATIENT
Start: 2022-05-18 | End: 2022-05-25

## 2022-05-18 RX ORDER — HEPARIN SODIUM 5000 [USP'U]/ML
5000 INJECTION, SOLUTION INTRAVENOUS; SUBCUTANEOUS EVERY 8 HOURS
Status: DISCONTINUED | OUTPATIENT
Start: 2022-05-18 | End: 2022-05-18

## 2022-05-18 RX ORDER — CEFAZOLIN SODIUM 2 G/100ML
2 INJECTION, SOLUTION INTRAVENOUS EVERY 8 HOURS
Status: COMPLETED | OUTPATIENT
Start: 2022-05-18 | End: 2022-05-19

## 2022-05-18 RX ORDER — CEFAZOLIN SODIUM 1 G/3ML
INJECTION, POWDER, FOR SOLUTION INTRAMUSCULAR; INTRAVENOUS PRN
Status: DISCONTINUED | OUTPATIENT
Start: 2022-05-18 | End: 2022-05-18 | Stop reason: SURG

## 2022-05-18 RX ORDER — DIPHENHYDRAMINE HYDROCHLORIDE 50 MG/ML
25 INJECTION INTRAMUSCULAR; INTRAVENOUS EVERY 6 HOURS PRN
Status: DISCONTINUED | OUTPATIENT
Start: 2022-05-18 | End: 2022-05-24

## 2022-05-18 RX ORDER — SODIUM CHLORIDE, SODIUM LACTATE, POTASSIUM CHLORIDE, CALCIUM CHLORIDE 600; 310; 30; 20 MG/100ML; MG/100ML; MG/100ML; MG/100ML
INJECTION, SOLUTION INTRAVENOUS CONTINUOUS
Status: DISCONTINUED | OUTPATIENT
Start: 2022-05-18 | End: 2022-05-18

## 2022-05-18 RX ORDER — HYDROMORPHONE HYDROCHLORIDE 1 MG/ML
0.2 INJECTION, SOLUTION INTRAMUSCULAR; INTRAVENOUS; SUBCUTANEOUS
Status: DISCONTINUED | OUTPATIENT
Start: 2022-05-18 | End: 2022-05-18

## 2022-05-18 RX ORDER — DEXTROSE MONOHYDRATE 25 G/50ML
25 INJECTION, SOLUTION INTRAVENOUS
Status: DISCONTINUED | OUTPATIENT
Start: 2022-05-18 | End: 2022-05-28

## 2022-05-18 RX ORDER — BISACODYL 10 MG
10 SUPPOSITORY, RECTAL RECTAL
Status: DISCONTINUED | OUTPATIENT
Start: 2022-05-18 | End: 2022-05-18

## 2022-05-18 RX ORDER — ONDANSETRON 2 MG/ML
4 INJECTION INTRAMUSCULAR; INTRAVENOUS EVERY 4 HOURS PRN
Status: DISCONTINUED | OUTPATIENT
Start: 2022-05-18 | End: 2022-06-06

## 2022-05-18 RX ORDER — POLYETHYLENE GLYCOL 3350 17 G/17G
1 POWDER, FOR SOLUTION ORAL 2 TIMES DAILY PRN
Status: DISCONTINUED | OUTPATIENT
Start: 2022-05-18 | End: 2022-05-22

## 2022-05-18 RX ORDER — HEPARIN SODIUM 5000 [USP'U]/ML
5000 INJECTION, SOLUTION INTRAVENOUS; SUBCUTANEOUS EVERY 8 HOURS
Status: DISCONTINUED | OUTPATIENT
Start: 2022-05-19 | End: 2022-05-18

## 2022-05-18 RX ORDER — HYDROMORPHONE HYDROCHLORIDE 1 MG/ML
1 INJECTION, SOLUTION INTRAMUSCULAR; INTRAVENOUS; SUBCUTANEOUS
Status: DISCONTINUED | OUTPATIENT
Start: 2022-05-18 | End: 2022-05-19

## 2022-05-18 RX ORDER — AMOXICILLIN 250 MG
2 CAPSULE ORAL 2 TIMES DAILY
Status: DISCONTINUED | OUTPATIENT
Start: 2022-05-18 | End: 2022-05-18

## 2022-05-18 RX ORDER — DOCUSATE SODIUM 100 MG/1
100 CAPSULE, LIQUID FILLED ORAL 2 TIMES DAILY
Status: DISCONTINUED | OUTPATIENT
Start: 2022-05-18 | End: 2022-06-07 | Stop reason: HOSPADM

## 2022-05-18 RX ORDER — PHENYLEPHRINE HCL IN 0.9% NACL 0.5 MG/5ML
SYRINGE (ML) INTRAVENOUS PRN
Status: DISCONTINUED | OUTPATIENT
Start: 2022-05-18 | End: 2022-05-18 | Stop reason: SURG

## 2022-05-18 RX ADMIN — Medication 100 MCG: at 07:50

## 2022-05-18 RX ADMIN — OXYCODONE 20 MG: 5 TABLET ORAL at 16:07

## 2022-05-18 RX ADMIN — FENTANYL CITRATE 25 MCG: 50 INJECTION, SOLUTION INTRAMUSCULAR; INTRAVENOUS at 09:16

## 2022-05-18 RX ADMIN — ATORVASTATIN CALCIUM 80 MG: 40 TABLET, FILM COATED ORAL at 17:16

## 2022-05-18 RX ADMIN — HYDROMORPHONE HYDROCHLORIDE 1 MG: 2 INJECTION INTRAMUSCULAR; INTRAVENOUS; SUBCUTANEOUS at 07:35

## 2022-05-18 RX ADMIN — LIDOCAINE HYDROCHLORIDE 0.5 ML: 10 INJECTION, SOLUTION EPIDURAL; INFILTRATION; INTRACAUDAL; PERINEURAL at 06:49

## 2022-05-18 RX ADMIN — SODIUM CHLORIDE, POTASSIUM CHLORIDE, SODIUM LACTATE AND CALCIUM CHLORIDE: 600; 310; 30; 20 INJECTION, SOLUTION INTRAVENOUS at 06:48

## 2022-05-18 RX ADMIN — CARVEDILOL 6.25 MG: 6.25 TABLET, FILM COATED ORAL at 17:15

## 2022-05-18 RX ADMIN — LIDOCAINE HYDROCHLORIDE 100 MG: 20 INJECTION, SOLUTION EPIDURAL; INFILTRATION; INTRACAUDAL at 07:05

## 2022-05-18 RX ADMIN — HYDROMORPHONE HYDROCHLORIDE 0.2 MG: 1 INJECTION, SOLUTION INTRAMUSCULAR; INTRAVENOUS; SUBCUTANEOUS at 10:31

## 2022-05-18 RX ADMIN — PROPOFOL 200 MG: 10 INJECTION, EMULSION INTRAVENOUS at 07:05

## 2022-05-18 RX ADMIN — INSULIN HUMAN 2 UNITS: 100 INJECTION, SOLUTION PARENTERAL at 16:39

## 2022-05-18 RX ADMIN — CEFAZOLIN SODIUM 2 G: 2 INJECTION, SOLUTION INTRAVENOUS at 16:07

## 2022-05-18 RX ADMIN — FENTANYL CITRATE 125 MCG: 50 INJECTION, SOLUTION INTRAMUSCULAR; INTRAVENOUS at 07:05

## 2022-05-18 RX ADMIN — CEFAZOLIN 2 G: 330 INJECTION, POWDER, FOR SOLUTION INTRAMUSCULAR; INTRAVENOUS at 07:05

## 2022-05-18 RX ADMIN — FENTANYL CITRATE 50 MCG: 50 INJECTION, SOLUTION INTRAMUSCULAR; INTRAVENOUS at 07:29

## 2022-05-18 RX ADMIN — Medication 100 MCG: at 07:30

## 2022-05-18 RX ADMIN — DOCUSATE SODIUM 100 MG: 100 CAPSULE, LIQUID FILLED ORAL at 17:15

## 2022-05-18 RX ADMIN — OXYCODONE HYDROCHLORIDE 10 MG: 5 SOLUTION ORAL at 09:16

## 2022-05-18 RX ADMIN — BUPIVACAINE HYDROCHLORIDE 30 ML: 5 INJECTION, SOLUTION EPIDURAL; INTRACAUDAL; PERINEURAL at 07:14

## 2022-05-18 RX ADMIN — GABAPENTIN 400 MG: 400 CAPSULE ORAL at 12:52

## 2022-05-18 RX ADMIN — HYDROMORPHONE HYDROCHLORIDE 1 MG: 1 INJECTION, SOLUTION INTRAMUSCULAR; INTRAVENOUS; SUBCUTANEOUS at 17:15

## 2022-05-18 RX ADMIN — FENTANYL CITRATE 50 MCG: 50 INJECTION, SOLUTION INTRAMUSCULAR; INTRAVENOUS at 09:35

## 2022-05-18 RX ADMIN — OXYCODONE 20 MG: 5 TABLET ORAL at 12:51

## 2022-05-18 RX ADMIN — ACETAMINOPHEN 1000 MG: 500 TABLET ORAL at 12:51

## 2022-05-18 RX ADMIN — FENTANYL CITRATE 25 MCG: 50 INJECTION, SOLUTION INTRAMUSCULAR; INTRAVENOUS at 09:56

## 2022-05-18 RX ADMIN — FENTANYL CITRATE 75 MCG: 50 INJECTION, SOLUTION INTRAMUSCULAR; INTRAVENOUS at 07:18

## 2022-05-18 RX ADMIN — EPHEDRINE SULFATE 10 MG: 50 INJECTION, SOLUTION INTRAVENOUS at 07:30

## 2022-05-18 RX ADMIN — HYDROMORPHONE HYDROCHLORIDE 0.2 MG: 1 INJECTION, SOLUTION INTRAMUSCULAR; INTRAVENOUS; SUBCUTANEOUS at 11:15

## 2022-05-18 RX ADMIN — Medication 100 MCG: at 07:54

## 2022-05-18 RX ADMIN — HYDROMORPHONE HYDROCHLORIDE 0.4 MG: 1 INJECTION, SOLUTION INTRAMUSCULAR; INTRAVENOUS; SUBCUTANEOUS at 10:50

## 2022-05-18 RX ADMIN — ROCURONIUM BROMIDE 50 MG: 10 INJECTION, SOLUTION INTRAVENOUS at 07:05

## 2022-05-18 RX ADMIN — EPHEDRINE SULFATE 10 MG: 50 INJECTION, SOLUTION INTRAVENOUS at 07:54

## 2022-05-18 RX ADMIN — SODIUM CHLORIDE, POTASSIUM CHLORIDE, SODIUM LACTATE AND CALCIUM CHLORIDE: 600; 310; 30; 20 INJECTION, SOLUTION INTRAVENOUS at 07:05

## 2022-05-18 RX ADMIN — EPHEDRINE SULFATE 10 MG: 50 INJECTION, SOLUTION INTRAVENOUS at 07:50

## 2022-05-18 RX ADMIN — LISINOPRIL 5 MG: 5 TABLET ORAL at 12:52

## 2022-05-18 RX ADMIN — Medication 100 MCG: at 08:15

## 2022-05-18 ASSESSMENT — PAIN DESCRIPTION - PAIN TYPE
TYPE: SURGICAL PAIN
TYPE: ACUTE PAIN

## 2022-05-18 ASSESSMENT — LIFESTYLE VARIABLES
ON A TYPICAL DAY WHEN YOU DRINK ALCOHOL HOW MANY DRINKS DO YOU HAVE: 0
TOTAL SCORE: 0
DOES PATIENT WANT TO STOP DRINKING: NO
HOW MANY TIMES IN THE PAST YEAR HAVE YOU HAD 5 OR MORE DRINKS IN A DAY: 0
EVER HAD A DRINK FIRST THING IN THE MORNING TO STEADY YOUR NERVES TO GET RID OF A HANGOVER: NO
TOTAL SCORE: 0
TOTAL SCORE: 0
HAVE PEOPLE ANNOYED YOU BY CRITICIZING YOUR DRINKING: NO
ALCOHOL_USE: NO
HAVE YOU EVER FELT YOU SHOULD CUT DOWN ON YOUR DRINKING: NO
EVER FELT BAD OR GUILTY ABOUT YOUR DRINKING: NO
CONSUMPTION TOTAL: NEGATIVE
AVERAGE NUMBER OF DAYS PER WEEK YOU HAVE A DRINK CONTAINING ALCOHOL: 0

## 2022-05-18 ASSESSMENT — ENCOUNTER SYMPTOMS
NAUSEA: 0
INSOMNIA: 0
HEADACHES: 0
FEVER: 0
SHORTNESS OF BREATH: 0
DIZZINESS: 0
BACK PAIN: 0
ABDOMINAL PAIN: 0
VOMITING: 0
NERVOUS/ANXIOUS: 0
PALPITATIONS: 0
EYE PAIN: 0
BLURRED VISION: 0
CHILLS: 0
COUGH: 0
ROS SKIN COMMENTS: RIGHT LEG WOUND

## 2022-05-18 ASSESSMENT — PAIN SCALES - GENERAL: PAIN_LEVEL: 4

## 2022-05-18 ASSESSMENT — FIBROSIS 4 INDEX
FIB4 SCORE: 1.41
FIB4 SCORE: 1.41

## 2022-05-18 NOTE — LETTER
May 4, 2022    Patient Name: Luis Kellogg  Surgeon Name: Aleks Buenrostro M.D.  Surgery Facility: [facility] ([address] [facility])  Surgery Date: 5/18/2022    The time of your surgery is not final and may change up to and until the day of your surgery. You will be contacted 24-48 hours prior to your surgery date with your check-in and surgery time.    If you will not be at one of the below numbers please call/text the surgery scheduler at [phone]  Preferred Phone: [phone]    BEFORE YOUR SURGERY   Pre Registration and/or Lab Work must be done within and no earlier than 28 days prior to your surgery date. Please call [facility] at [phone] for an appointment as soon as possible.    COVID testing is not required for non-symptomatic vaccinated patients with proof of vaccination at [facility].  For un-vaccinated patients please refer to the following instructions for your COVID testing requirements:    COVID test required 4-7 days prior to surgery, failure to do so can result in a cancellation.    The following locations offer COVID testing:    Approved facilities for COVID testing, if scheduled at [facility]:  · [facility] from 7:30am-3:30pm at [address] [place], [place]  · [facility] [phone] (Please call for an appointment)  · Your local pharmacy    Not scheduled at [facility] contact the scheduled facility for approved testing facilities.    Pre op Appointment:  Instructions: Bring a list of all medications you are taking including the dosing and frequency.    Please refrain from smoking any substance after midnight prior to surgery. Smoking may interfere with the anesthetic and frequently produces nausea during the recovery period.    Continue taking all lifesaving medications. Including the morning of your surgery with small sip of water.    Please read the MEDICATION INSTRUCTIONS below completely.    DAY OF YOUR  SURGERY  Nothing to eat or drink after midnight     Please arrive at the hospital/surgery center at the check-in time provided.     An adult will need to bring you and take you home after your surgery.     AFTER YOUR SURGERY  Post op Appointment:   Date: 06/02/22   Time: 09:00AM   With: Aleks Buenrostro M.D.   Location: [address] [place] [address]    TIME OFF WORK  FMLA or Disability forms can be faxed directly to: [phone] or you may drop them off at [address] [place] [address]. Our office charges a $35.00 fee per form. Forms will be completed within 10 business days of drop off and payment received. For the status of your forms you may contact our disability office directly [phone].    MEDICATION INSTRUCTIONS  The following medications should be stopped a minimum of 10 days prior to surgery:  All over the counter, Supplements & Herbal medications    Anorectics: Phentermine (Adipex-P, Lomaira and Suprenza), Phentermine-topiramate (Qsymia), Bupropion-naltrexone (Contrave)    Opiod Partial Agonists/Opioid Antagonists: Buprenorphine (Subocone, Belbuca, Butrans, Probuphine Implant, Sublocade), Naltrexone (ReVia, Vivitrol), Naloxone    Amphetamines: Dextroamphetamine/Amphetamine (Adderall, Mydayis), Methylphenidate Hydrochloride (Concerta, Metadate, Methylin, Ritalin)    The following medications should be stopped 5 days prior to surgery:  Blood Thinners: Any Aspirin, Aspirin products, anti-inflammatories such as ibuprofen and any blood thinners such as Coumadin and Plavix. Please consult your prescribing physician if you are on life saving blood thinners, in regards to when to stop medications prior to surgery.     The following medications should be stopped a minimum of 3 days prior to surgery:  PDE-5 inhibitors: Sildenafil (Viagra), Tadalafil (Cialis), Vardenafil (Levitra), Avanafil (Stendra)    MAO Inhibitors: Rasagiline (Azilect), Selegiline (Eldepryl, Emsam, Selapar),  Isocarboxazid (Marplan), Phenelzine (Nardil)

## 2022-05-18 NOTE — OR NURSING
Pt moved to striker bed, assist needed, pt presently sitting in hi fowlers position eating breakfast, all needs met at this time. Will con't to monitor patient until he moves over to the other pod awaiting a bed.

## 2022-05-18 NOTE — ANESTHESIA PROCEDURE NOTES
Peripheral Block    Date/Time: 5/18/2022 7:12 AM  Performed by: Eric Rosa D.O.  Authorized by: Eric Rosa D.O.     Patient Location:  OR  Start Time:  5/18/2022 7:12 AM  End Time:  5/18/2022 7:14 AM  Reason for Block: at surgeon's request and post-op pain management ONLY    patient identified, IV checked, site marked, risks and benefits discussed, surgical consent, monitors and equipment checked, pre-op evaluation and timeout performed    Patient Position:  Supine  Prep: ChloraPrep    Monitoring:  Heart rate, continuous pulse ox and cardiac monitor  Block Region:  Lower Extremity  Lower Extremity - Block Type:  FEMORAL nerve block, Infra-Inguinal approach    Laterality:  Left  Procedures: ultrasound guided  Image captured, interpreted and electronically stored.  Local Infiltration:  Lidocaine  Strength:  1 %  Dose:  3 ml  Block Type:  Single-shot  Needle Length:  100mm  Needle Gauge:  21 G  Needle Localization:  Ultrasound guidance  Injection Assessment:  Negative aspiration for heme, no paresthesia on injection, incremental injection and local visualized surrounding nerve on ultrasound  Evidence of intravascular injection: No     US Guided Femoral Nerve Block:   US probe placed at inguinal crease and the Femoral Nerve (FN) identified lateral to the Femoral Artery (FA).  Needle inserted lateral to probe in an in plane approach and advanced under direct visualization through the fascia robert and fascia iliaca remaining lateral to the FN.  After negative aspiration LA injected with ease and visualized surrounding the FN.

## 2022-05-18 NOTE — CONSULTS
Hospital Medicine Consultation    Date of Service  5/18/2022    Referring Physician  Dr. Buenrostro (orthopedic surgery)    Consulting Physician  Victor Manuel Kingston M.D.    Reason for Consultation  Medical management    History of Presenting Illness  71 y.o. male PMH T2DM on insulin at home complicated with neuropathy, HTN, PAD, tobacco use, obesity class 2, chronic opioid use who presented 5/18/2022 with severe left leg pain, worsening left leg infection, non radiating pain, not alleviated by percocet home medication. Complicated by T2DM with neuropathy, continued chronic tobacco use and PAD.  Patient was seen by Dr. Buenrostro 04/28/22 outpatient surgical evaluation, decision was made to have left leg amputation, which was performed today 05/18/22.  Patient requiring admission as he was hypotensive during the procedure and pain control.  Patient stated he also smokes marijuana. Patient stated he is constipated from percocet and feels that he does not empty his bladder well at home. He denied chest pain, SOB, abdominal pain.    Spoke with Dr. Buenrostro, he requested for services to be transferred to Hospitalist Services.    Review of Systems  Review of Systems   Constitutional: Negative for chills and fever.   HENT: Negative for congestion and hearing loss.    Eyes: Negative for blurred vision and pain.   Respiratory: Negative for cough and shortness of breath.    Cardiovascular: Negative for chest pain and palpitations.   Gastrointestinal: Negative for abdominal pain, nausea and vomiting.   Genitourinary: Negative for dysuria and hematuria.   Musculoskeletal: Positive for joint pain (left leg). Negative for back pain.   Skin: Negative for itching and rash.        Right leg wound   Neurological: Negative for dizziness and headaches.   Psychiatric/Behavioral: The patient is not nervous/anxious and does not have insomnia.    All other systems reviewed and are negative.      Past Medical History   has a past medical  history of Arthritis, Asthma, At risk for sleep apnea, Blood clotting disorder (HCC), Bowel habit changes, Breath shortness, Chicken pox, Chronic obstructive pulmonary disease (HCC) (11/9/2021), Diabetes (HCC), Heart disease, Hemorrhagic disorder (HCC), Hepatitis C, High cholesterol, Hyperlipidemia, Hypertension, Measles, Muscle disorder, Myocardial infarct (HCC), Parathyroid disorder (HCC), Psychiatric problem, Sleep apnea, Snoring, Substance abuse (HCC), and Urinary tract infection.    He has no past medical history of Addisons disease (HCC), Adrenal disorder (HCC), Allergy, Anemia, Anesthesia, Anginal syndrome (HCC), Anxiety, Arrhythmia, Blood transfusion without reported diagnosis, Bronchitis, Cancer (HCC), Carcinoma in situ of respiratory system, Cataract, CHF (congestive heart failure) (HCC), Clotting disorder (HCC), Cold, Continuous ambulatory peritoneal dialysis status (HCC), Coughing blood, Cushings syndrome (HCC), Depression, Diabetic neuropathy (HCC), Dialysis patient (HCC), GERD (gastroesophageal reflux disease), Glaucoma, Goiter, Head ache, Heart burn, Heart murmur, Heart valve disease, Hepatitis A, Hepatitis B, Hiatus hernia syndrome, HIV (human immunodeficiency virus infection) (HCC), IBD (inflammatory bowel disease), Indigestion, Kidney disease, Meningitis, Migraine, Osteoporosis, Pacemaker, Pituitary disease (HCC), Pneumonia, Pulmonary emphysema (HCC), Rheumatic fever, Seizure (HCC), Sickle cell disease (HCC), Stroke (HCC), Thyroid disease, Tuberculosis, Urinary bladder disorder, or Urinary incontinence.    Surgical History   has a past surgical history that includes knee replacement, total (Bilateral); other surgical procedure; pr unlisted proc, arthroscopy (Left, 7/25/2019); toe amputation (Left, 2/17/2020); angiogram (Bilateral, 10/12/2021); irrigation & debridement general (Bilateral, 10/12/2021); application or replacement, wound vac (Bilateral, 10/12/2021); and stent placement (Right,  10/12/2021).    Family History  family history includes Arthritis in his father; Cancer in his father; Diabetes in an other family member; Heart Disease in his father.    Social History   reports that he has been smoking cigarettes. He has a 27.00 pack-year smoking history. He has quit using smokeless tobacco. He reports previous alcohol use. He reports current drug use. Drugs: Inhaled and Marijuana.    Medications  Prior to Admission Medications   Prescriptions Last Dose Informant Patient Reported? Taking?   aspirin (ASA) 325 MG Tab 2022 at 0215 Patient Yes No   Sig: Take 975 mg by mouth every day.   atorvastatin (LIPITOR) 80 MG tablet 2022 at 2100 Patient No No   Sig: Take 1 Tablet by mouth every evening.   bisacodyl EC (DULCOLAX) 5 MG Tablet Delayed Response 2022 at 0215 Patient Yes Yes   Sig: Take 5 mg by mouth 1 time a day as needed for Constipation.   carvedilol (COREG) 6.25 MG Tab 2022 at am Patient No No   Sig: Take 1 Tablet by mouth 2 times a day with meals.   gabapentin (NEURONTIN) 600 MG tablet 2022 at am Patient No No   Sig: Take 2 Tabs by mouth 2 times a day.   Patient taking differently: Take 1,800 mg by mouth 2 times a day.   glucagon 1 MG Recon Soln   No No   Si mg by Intramuscular route as needed (FSBG BELOW 70 MG/DL AND UNABLE TO SWALLOW WITH NO IV ACCESS.).   insulin glargine (LANTUS) 100 UNIT/ML Solution 2022 at am Patient No No   Sig: Inject 20 Units under the skin every evening.   Patient taking differently: Inject 40 Units under the skin every day.   lisinopril (PRINIVIL) 5 MG Tab 2022 at am Patient Yes No   Sig: Take 5 mg by mouth every day.   oxyCODONE-acetaminophen (PERCOCET-10)  MG Tab 2022 at 0200 Patient Yes No   Sig: Take 2 Tablets by mouth every 3 hours as needed.   traZODone (DESYREL) 150 MG Tab 2022 at 2100 Patient Yes No   Sig: Take 450 mg by mouth every evening.      Facility-Administered Medications: None       Allergies  No  Known Allergies    Physical Exam  Temp:  [36.4 °C (97.5 °F)-36.6 °C (97.9 °F)] 36.6 °C (97.9 °F)  Pulse:  [] 84  Resp:  [12-20] 13  BP: ()/(46-78) 130/76  SpO2:  [94 %-100 %] 100 %    Physical Exam  Vitals and nursing note reviewed.   Constitutional:       General: He is not in acute distress.     Appearance: He is obese.      Comments: Thin appearing elderly male   HENT:      Head: Normocephalic and atraumatic.      Nose: Nose normal. No congestion.   Eyes:      General: No scleral icterus.     Extraocular Movements: Extraocular movements intact.   Cardiovascular:      Rate and Rhythm: Regular rhythm. Tachycardia present.      Pulses: Normal pulses.      Heart sounds: Murmur (systolic 2/6, loudest at left parasternal, no radiation.) heard.   Pulmonary:      Effort: Pulmonary effort is normal. No respiratory distress.      Breath sounds: Normal breath sounds.   Abdominal:      General: Abdomen is flat. Bowel sounds are normal. There is no distension.      Palpations: Abdomen is soft.   Musculoskeletal:         General: Tenderness (left leg surgical site, wrapped in ACE bandage) present. No swelling.      Cervical back: Normal range of motion and neck supple.      Comments: Sarcopenic   Skin:     General: Skin is warm.      Capillary Refill: Capillary refill takes less than 2 seconds.      Coloration: Skin is not jaundiced.      Findings: Lesion (right leg, dressed in guaze, visible saturation) present.   Neurological:      General: No focal deficit present.      Mental Status: He is alert and oriented to person, place, and time. Mental status is at baseline.      Motor: Weakness present.   Psychiatric:         Mood and Affect: Mood normal.         Behavior: Behavior normal.         Thought Content: Thought content normal.         Judgment: Judgment normal.         Fluids  Date 05/18/22 0700 - 05/19/22 0659   Shift 1495-6515 1806-2267 7973-7118 24 Hour Total   INTAKE   I.V. 1000   1000   Shift Total 1000    1000   OUTPUT   Blood 50   50   Shift Total 50   50   Weight (kg) 102.5 102.5 102.5 102.5       Laboratory  Recent Labs     05/18/22  0640   WBC 6.5   RBC 4.73   HEMOGLOBIN 13.0*   HEMATOCRIT 40.8*   MCV 86.3   MCH 27.5   MCHC 31.9*   RDW 47.5   PLATELETCT 224   MPV 9.5     Recent Labs     05/18/22  0640   SODIUM 133*   POTASSIUM 4.2   CHLORIDE 100   CO2 25   GLUCOSE 163*   BUN 9   CREATININE 0.55   CALCIUM 9.0                     Imaging  No orders to display       Assessment/Plan  * Cellulitis of left lower extremity s/p left leg above knee amputation- (present on admission)  Assessment & Plan  Patient came to hospital for planned left leg AKA with Dr. Buenrostro.  Patient had surgery this morning, vitals showed he was hypotensive during the procedure.  BP appears to be responding to IVF, indicated Intraoperative hypotension, does not appear to be shock.  - At this time patient does not appear to have any infection, no leukocytosis.  Infection controlled after AKA.  - Patient does have a right leg wound, will need to be evaluated.  - We will have patient on pain control for post surgery  - PT/OT for postsurgical evaluation and training  - Patient stated he lives alone but has friends and relatives that can help him.    Chronic ulcer of lower extremity (HCC)- (present on admission)  Assessment & Plan  Patient has had chronic bilateral lower extremity ulcers.  Left leg has been amputated.  Right leg shows chronic wound, due to diabetes and PAD  - Will consult limb preservation services, given patient has history of PAD    Peripheral vascular disease (HCC)- (present on admission)  Assessment & Plan  PAD complicating patient's diabetic neuropathy, and nonhealing ulcers.  - Left leg status post AKA  - Right leg with active wounds, consulting LPS  -Continue home atorvastatin.  Restart aspirin if patient does not show any significant signs of bleeding postop.    Tobacco use- (present on admission)  Assessment &  Plan  Patient stated she has been smoking since 10 years old.  Currently he smokes half a pack per day.  Spent a total of 6 minutes on tobacco cessation counseling including assistance on cessation with nicotine patches, gum, Chantix, Wellbutrin  Counseled patient on risks and dangers of smoking, worsening health status and potential irreversible risks.    Patient is willing to quit at this time, declined nicotine patch while hospitalized    95168 (smoking and tobacco cessation counseling visit; intermediate, greater than 3 minutes up to 10 minutes)    Primary hypertension- (present on admission)  Assessment & Plan  Patient is currently on lisinopril 5 mg, carvedilol 6.25 mg at home.  - Patient is currently low normal blood pressure postop  - We will hold home medications, restart once patient BP showing greater than 140/80    Type 2 diabetes mellitus with hyperglycemia (HCC)- (present on admission)  Assessment & Plan  T2DM, on home insulin, HbA1c 7.1 (12/2021)  Complicated with neuropathy  -Monitor Accuchecks TIDAC and Bedtime  -Insulin sliding scale, as per protocol  -Diabetic diet  -checking A1c    Drug-induced constipation- (present on admission)  Assessment & Plan  Patient is on chronic Percocet at home due to bilateral lower extremity pain.  - Patient will be on pain control for poor surgical needs  - Patient will be on bowel regiment, lactulose as needed if refractory    Hyponatremia- (present on admission)  Assessment & Plan  Patient sodium 133 on admission.  - Likely due to hypovolemia hyponatremia.  - Patient receiving IV fluids in PACU  - Recheck labs in the morning    Hyperlipidemia- (present on admission)  Assessment & Plan  Continue home atorvastatin    Marijuana abuse- (present on admission)  Assessment & Plan  Patient stated he smokes marijuana at night to help relieve some of his pain  - Counseled patient on cessation    DVT Prophylaxis: lovenox    Thank you for consulting Internal Medicine  Hospitalist team.  We are glad to help in your patient's case.  Hospitalist team to continue following patient as primary team.

## 2022-05-18 NOTE — ASSESSMENT & PLAN NOTE
Catheter right below-knee popliteal artery, 6mm uncovered self expanding Right SFA stent placement   10/12/2021  Atorvastatin  ASA

## 2022-05-18 NOTE — ANESTHESIA PREPROCEDURE EVALUATION
Case: [id number] Date/Time: 05/18/22 0645    Procedures:       LEFT ABOVE KNEE AMPUTATION, ADDUCTOR TENDON MYOTENODESIS (Left )      TRANSFER, TENDON    Diagnosis:       Peripheral arterial disease (HCC) [I73.9]      Wound of left foot [S91.302A]    Pre-op diagnosis: Peripheral arterial disease (HCC) [I73.9], Wound of left foot [S91.302A]    Location: [place] [address] / SURGERY [facility]    Surgeons: Aleks Buenrostro M.D.          Relevant Problems   PULMONARY   (positive) Chronic obstructive pulmonary disease (HCC)      NEURO   (positive) History of MI (myocardial infarction)   (positive) History of falling      CARDIAC   (positive) Atherosclerosis of native coronary artery without angina pectoris   (positive) Chronic venous hypertension (idiopathic) with ulcer and inflammation of bilateral lower extremity (CODE) (HCC)      ENDO   (positive) Type 2 diabetes mellitus with hyperglycemia (HCC)      Other   (positive) Diabetic infection of left foot (HCC)   (positive) Peripheral neuropathy (HCC)   (positive) Uncontrolled type 2 diabetes mellitus (HCC)       Physical Exam    Airway   Mallampati: II  TM distance: >3 FB  Neck ROM: full       Cardiovascular - normal exam  Rhythm: regular  Rate: normal  (-) murmur     Dental   (+) upper dentures, lower dentures           Pulmonary - normal exam  Breath sounds clear to auscultation     Abdominal    Neurological - normal exam                 Anesthesia Plan    ASA 3   ASA physical status 3 criteria: diabetes - poorly controlled    Plan - general and peripheral nerve block     Peripheral nerve block will be post-op pain control  Airway plan will be LMA          Induction: intravenous    Postoperative Plan: Postoperative administration of opioids is intended.    Pertinent diagnostic labs and testing reviewed    Informed Consent:    Anesthetic plan and risks discussed with patient.    Use of blood products discussed with: patient whom consented to blood products.

## 2022-05-18 NOTE — OR SURGEON
Immediate Post OP Note    PreOp Diagnosis: left foot osteomyelitis, left lower extremity full thickness non healing wounds      PostOp Diagnosis: same      Procedure(s):  LEFT ABOVE KNEE AMPUTATION, ADDUCTOR TENDON MYOTENODESIS - Wound Class: Dirty or Infected  TRANSFER, TENDON - Wound Class: Dirty or Infected    Surgeon(s):  Aleks Buenrostro M.D.    Anesthesiologist/Type of Anesthesia:  Anesthesiologist: Eric Rosa D.O./General    Surgical Staff:  Circulator: Jonny Cordova R.N.  Scrub Person: Foreign Boone  First Assist: Sailaja Ruano    Specimens removed if any:  ID Type Source Tests Collected by Time Destination   A : left leg Tissue Leg PATHOLOGY SPECIMEN Aleks Buenrostro M.D. 5/18/2022  7:32 AM        Estimated Blood Loss: 50cc    Findings: see dictation    Complications: none        5/18/2022 8:34 AM Aleks Buenrostro M.D.

## 2022-05-18 NOTE — ANESTHESIA POSTPROCEDURE EVALUATION
Patient: Luis Kellogg    Procedure Summary     Date: 05/18/22 Room / Location: [place] [address] / SURGERY [facility]    Anesthesia Start: 0659 Anesthesia Stop: 0829    Procedures:       LEFT ABOVE KNEE AMPUTATION, ADDUCTOR TENDON MYOTENODESIS (Left Leg Upper)      TRANSFER, TENDON (Left Leg Upper) Diagnosis:       Peripheral arterial disease (HCC)      Wound of left foot      (left diabetic wound osteomylitis)    Surgeons: Aleks Buenrostro M.D. Responsible Provider: Eric Rosa D.O.    Anesthesia Type: general ASA Status: 3          Final Anesthesia Type: general  Last vitals  BP   Blood Pressure : 130/76    Temp   36.6 °C (97.9 °F)    Pulse   84   Resp   13    SpO2   100 %      Anesthesia Post Evaluation    Patient location during evaluation: PACU  Patient participation: complete - patient participated  Level of consciousness: awake and alert  Pain score: 4    Airway patency: patent  Anesthetic complications: no  Cardiovascular status: hemodynamically stable  Respiratory status: acceptable  Hydration status: euvolemic    PONV: none          No complications documented.     Nurse Pain Score: 6 (NPRS)

## 2022-05-18 NOTE — ANESTHESIA TIME REPORT
Anesthesia Start and Stop Event Times     Date Time Event    5/18/2022 0657 Ready for Procedure     0659 Anesthesia Start     0829 Anesthesia Stop        Responsible Staff  05/18/22    Name Role Begin End    Eric Rosa D.O. Anesth 0659 0829        Overtime Reason:  no overtime (within assigned shift)    Comments:

## 2022-05-18 NOTE — ASSESSMENT & PLAN NOTE
Right first and second toe amputation at the level of the MTP joint, Right leg debridement and application of wound vacuum  5/27/2022  Wound care, Pain control

## 2022-05-18 NOTE — OP REPORT
DATE OF SERVICE:  05/18/2022     SERVICE:  Orthopedic surgery.     SURGEON:  Aleks Buenrostro MD     ASSISTANT:  BEATRICE Michele     PREOPERATIVE DIAGNOSES:  1.  Left foot chronic osteomyelitis.  2.  Left lower extremity chronic diabetic wounds.     POSTOPERATIVE DIAGNOSES:  1.  Left foot chronic osteomyelitis.  2.  Left lower extremity chronic diabetic wounds.     PROCEDURES PERFORMED:  1.  Left above knee amputation.  2.  Left adductor myotenodesis to the femur.     COMPLICATIONS:  None.     SPECIMENS:  Left lower extremity.     TOURNIQUET TIME:  26 minutes.     INDICATIONS FOR PROCEDURE:  The patient is a 71-year-old male with a history   of diabetes, COPD, peripheral arterial disease with chronic ulcerations   throughout the left lower extremity extending to the proximal tibia as well as   chronic osteomyelitis of the foot that he had previously undergone toe   amputations.  He was seen and evaluated in the multidisciplinary after by   vascular surgery as well as wound care and Orthopedic Surgery.  Operative and   nonoperative treatment were discussed with the patient.  He elected for   operative intervention.  Risks of procedure were discussed with the patient,   which include but are not limited to bleeding, worsening of infection, damage   to surrounding nerves, tendons, ligaments, other structures, risk of need for   higher level of amputation, continued pain, unsightly scar, immobility,   dissatisfaction with outcome, DVT, stroke, myocardial infarction and even   death.  Benefits include improved overall wound healing and improved   functional outcome.  The patient wished to proceed and surgical consent forms   were signed.     DESCRIPTION OF PROCEDURE:  On the day of surgery, the patient was met in the   preoperative area.  The operative extremity was identified by myself and   confirmed with the patient and marked with my initials.  He was then brought   back to the operating room and  placed supine on the operating room table with   all bony prominences padded.  Laryngeal mask airway anesthesia was undertaken   without incident.  Femoral nerve block was performed by the anesthesiologist   for postoperative pain control.  Left lower extremity was then prepped and   draped in the usual sterile fashion.  The patient received 2 grams Ancef   within 30 minutes of incision.  A sterile thigh tourniquet was placed on to   the left thigh.  Multidisciplinary timeout was performed confirming correct   site, correct patient, and correct procedure.  Once all were in agreement, we   then elevated the left lower extremity for approximately 2 minutes time and   tourniquet was brought to 250 mmHg.  We began by utilizing a fishmouth   incision centered over the distal femur and posterior aspect of the leg.  Full   thickness flaps were created.  Quad tendon was incised.  The femur was then   exposed.  This was then cut just proximal to the metaphysis of the distal   femur.  The abductor tendon was then identified and tagged for later repair   and transfer through drill holes in the distal femur.  Distal femur was then   cut with a sagittal saw.  Amputation knife was then utilized to cut on the   posterior aspect of the distal femur.  Left lower extremity was then removed   off the back table.  Femoral vessels were then identified, isolated and   ligated with 2-0 silk stick tie and 2-0 silk free tie to back this up.    Sciatic nerve was identified.  This was drawn down into the wound.  A 2-0   Vicryl suture was utilized to ligate this and the nerve was then cut and   allowed to retract into the soft tissues.  At this point, the tourniquet was   let down.  Hemostasis was achieved.  The adductor tendon was then again   identified.  Two drill holes were then placed into the lateral aspect of the   distal femur.  A #2 FiberWire was utilized to [place] whipstitch the abductor   tendon.  Suture limbs were then passed  through the drill holes in the distal   femur and then these were secured down over the distal femur.  Following this,   we then further debulked the musculature as well as fatty tissue as needed   for closure.  The wound was copiously irrigated.  Deep layer was closed with   0-Vicryl suture.  Dermal layer was closed with 2-0 Vicryl and skin was closed   with 2-0 nylon suture.  A deep drain was placed and hooked up to suction.    Sterile dressings were applied.  The patient was awoken from anesthesia, moved   onto the postoperative gurney and to PACU in stable condition.     POSTOPERATIVE PLAN:  The patient will be nonweightbearing on the left lower   extremity.  He will have a limb preservation service consultation for the   right lower extremity.  He will be admitted to the floor for inpatient   admission for mobility and physical therapy.  Hospitalist will consult and   help manage his medical problems post-surgically.        ______________________________  Aleks Buenrostro MD    [facility] [place] [facility]    DD:  05/18/2022 08:53  DT:  05/18/2022 09:14    Job#:  [id number]

## 2022-05-18 NOTE — ANESTHESIA PROCEDURE NOTES
Airway    Date/Time: 5/18/2022 7:05 AM  Performed by: Eric Rosa D.O.  Authorized by: Eric Rosa D.O.     Location:  OR  Urgency:  Elective  Indications for Airway Management:  Anesthesia      Spontaneous Ventilation: absent    Sedation Level:  Deep  Preoxygenated: Yes    Mask Difficulty Assessment:  0 - not attempted  Final Airway Type:  Supraglottic airway  Final Supraglottic Airway:  Standard LMA    SGA Size:  5  Number of Attempts at Approach:  1

## 2022-05-18 NOTE — PROGRESS NOTES
Received report from DARIEN Acevedo, and assumed care of patient. Patient is alert and oriented, has no immediate needs or complaints, and is resting comfortably. Vital signs are stable on 10L oxygen delivered via oxy mask. POC discussed. All questions answered.

## 2022-05-18 NOTE — OR NURSING
Report given to Jorje LEDEZMA via SBAR reviewed orders and patient history, no questions at this time.  Pt alert and oriented, resp even and nonlabored, in NAD, pt has pain medicated and no nausea at this time. Pt moves all ext, follows commands and verbalized understanding  of poc and discharge/admission. Family notified via text/phone patient is moving to phase II/room. Will con't to monitor until patient has transitioned.  Belongings on bed and beside bed-wheelchair

## 2022-05-19 ENCOUNTER — APPOINTMENT (OUTPATIENT)
Dept: RADIOLOGY | Facility: MEDICAL CENTER | Age: 72
DRG: 240 | End: 2022-05-19
Attending: NURSE PRACTITIONER
Payer: MEDICARE

## 2022-05-19 PROBLEM — S81.801A LEG WOUND, RIGHT: Status: ACTIVE | Noted: 2022-05-19

## 2022-05-19 PROBLEM — F11.90 CHRONIC, CONTINUOUS USE OF OPIOIDS: Status: ACTIVE | Noted: 2022-05-19

## 2022-05-19 PROBLEM — L03.115 CELLULITIS OF RIGHT LEG: Status: ACTIVE | Noted: 2022-05-19

## 2022-05-19 LAB
ALBUMIN SERPL BCP-MCNC: 3.1 G/DL (ref 3.2–4.9)
ANION GAP SERPL CALC-SCNC: 8 MMOL/L (ref 7–16)
BASOPHILS # BLD AUTO: 0.5 % (ref 0–1.8)
BASOPHILS # BLD: 0.03 K/UL (ref 0–0.12)
BUN SERPL-MCNC: 8 MG/DL (ref 8–22)
CALCIUM SERPL-MCNC: 8.9 MG/DL (ref 8.5–10.5)
CHLORIDE SERPL-SCNC: 100 MMOL/L (ref 96–112)
CO2 SERPL-SCNC: 27 MMOL/L (ref 20–33)
CORTIS SERPL-MCNC: 13.7 UG/DL (ref 0–23)
CREAT SERPL-MCNC: 0.46 MG/DL (ref 0.5–1.4)
EOSINOPHIL # BLD AUTO: 0.07 K/UL (ref 0–0.51)
EOSINOPHIL NFR BLD: 1.1 % (ref 0–6.9)
ERYTHROCYTE [DISTWIDTH] IN BLOOD BY AUTOMATED COUNT: 49 FL (ref 35.9–50)
EST. AVERAGE GLUCOSE BLD GHB EST-MCNC: 183 MG/DL
GFR SERPLBLD CREATININE-BSD FMLA CKD-EPI: 112 ML/MIN/1.73 M 2
GLUCOSE BLD STRIP.AUTO-MCNC: 139 MG/DL (ref 65–99)
GLUCOSE BLD STRIP.AUTO-MCNC: 158 MG/DL (ref 65–99)
GLUCOSE BLD STRIP.AUTO-MCNC: 160 MG/DL (ref 65–99)
GLUCOSE BLD STRIP.AUTO-MCNC: 170 MG/DL (ref 65–99)
GLUCOSE SERPL-MCNC: 154 MG/DL (ref 65–99)
HBA1C MFR BLD: 8 % (ref 4–5.6)
HCT VFR BLD AUTO: 35.8 % (ref 42–52)
HGB BLD-MCNC: 11.3 G/DL (ref 14–18)
IMM GRANULOCYTES # BLD AUTO: 0.03 K/UL (ref 0–0.11)
IMM GRANULOCYTES NFR BLD AUTO: 0.5 % (ref 0–0.9)
LACTATE BLD-SCNC: 1.2 MMOL/L (ref 0.5–2)
LACTATE BLD-SCNC: 1.5 MMOL/L (ref 0.5–2)
LACTATE BLD-SCNC: 1.5 MMOL/L (ref 0.5–2)
LACTATE BLD-SCNC: 1.8 MMOL/L (ref 0.5–2)
LYMPHOCYTES # BLD AUTO: 1.05 K/UL (ref 1–4.8)
LYMPHOCYTES NFR BLD: 16.1 % (ref 22–41)
MAGNESIUM SERPL-MCNC: 1.9 MG/DL (ref 1.5–2.5)
MCH RBC QN AUTO: 27.7 PG (ref 27–33)
MCHC RBC AUTO-ENTMCNC: 31.6 G/DL (ref 33.7–35.3)
MCV RBC AUTO: 87.7 FL (ref 81.4–97.8)
MONOCYTES # BLD AUTO: 0.78 K/UL (ref 0–0.85)
MONOCYTES NFR BLD AUTO: 11.9 % (ref 0–13.4)
NEUTROPHILS # BLD AUTO: 4.58 K/UL (ref 1.82–7.42)
NEUTROPHILS NFR BLD: 69.9 % (ref 44–72)
NRBC # BLD AUTO: 0 K/UL
NRBC BLD-RTO: 0 /100 WBC
PHOSPHATE SERPL-MCNC: 2.9 MG/DL (ref 2.5–4.5)
PLATELET # BLD AUTO: 208 K/UL (ref 164–446)
PMV BLD AUTO: 9.6 FL (ref 9–12.9)
POTASSIUM SERPL-SCNC: 4.2 MMOL/L (ref 3.6–5.5)
RBC # BLD AUTO: 4.08 M/UL (ref 4.7–6.1)
SODIUM SERPL-SCNC: 135 MMOL/L (ref 135–145)
WBC # BLD AUTO: 6.5 K/UL (ref 4.8–10.8)

## 2022-05-19 PROCEDURE — 700105 HCHG RX REV CODE 258: Performed by: NURSE PRACTITIONER

## 2022-05-19 PROCEDURE — 700111 HCHG RX REV CODE 636 W/ 250 OVERRIDE (IP): Performed by: NURSE PRACTITIONER

## 2022-05-19 PROCEDURE — 700102 HCHG RX REV CODE 250 W/ 637 OVERRIDE(OP): Performed by: ORTHOPAEDIC SURGERY

## 2022-05-19 PROCEDURE — 73590 X-RAY EXAM OF LOWER LEG: CPT | Mod: RT

## 2022-05-19 PROCEDURE — 83735 ASSAY OF MAGNESIUM: CPT

## 2022-05-19 PROCEDURE — 83036 HEMOGLOBIN GLYCOSYLATED A1C: CPT

## 2022-05-19 PROCEDURE — 87040 BLOOD CULTURE FOR BACTERIA: CPT

## 2022-05-19 PROCEDURE — 85025 COMPLETE CBC W/AUTO DIFF WBC: CPT

## 2022-05-19 PROCEDURE — 97597 DBRDMT OPN WND 1ST 20 CM/<: CPT

## 2022-05-19 PROCEDURE — 700111 HCHG RX REV CODE 636 W/ 250 OVERRIDE (IP): Performed by: ORTHOPAEDIC SURGERY

## 2022-05-19 PROCEDURE — 82962 GLUCOSE BLOOD TEST: CPT | Mod: 91

## 2022-05-19 PROCEDURE — 700102 HCHG RX REV CODE 250 W/ 637 OVERRIDE(OP): Performed by: NURSE PRACTITIONER

## 2022-05-19 PROCEDURE — 770001 HCHG ROOM/CARE - MED/SURG/GYN PRIV*

## 2022-05-19 PROCEDURE — 36415 COLL VENOUS BLD VENIPUNCTURE: CPT

## 2022-05-19 PROCEDURE — 99232 SBSQ HOSP IP/OBS MODERATE 35: CPT | Performed by: NURSE PRACTITIONER

## 2022-05-19 PROCEDURE — 73630 X-RAY EXAM OF FOOT: CPT | Mod: RT

## 2022-05-19 PROCEDURE — A9270 NON-COVERED ITEM OR SERVICE: HCPCS | Performed by: ORTHOPAEDIC SURGERY

## 2022-05-19 PROCEDURE — A9270 NON-COVERED ITEM OR SERVICE: HCPCS | Performed by: NURSE PRACTITIONER

## 2022-05-19 PROCEDURE — 82533 TOTAL CORTISOL: CPT

## 2022-05-19 PROCEDURE — 80069 RENAL FUNCTION PANEL: CPT

## 2022-05-19 PROCEDURE — 97162 PT EVAL MOD COMPLEX 30 MIN: CPT

## 2022-05-19 PROCEDURE — 83605 ASSAY OF LACTIC ACID: CPT | Mod: 91

## 2022-05-19 RX ORDER — HYDROMORPHONE HYDROCHLORIDE 1 MG/ML
1 INJECTION, SOLUTION INTRAMUSCULAR; INTRAVENOUS; SUBCUTANEOUS
Status: DISCONTINUED | OUTPATIENT
Start: 2022-05-19 | End: 2022-05-19

## 2022-05-19 RX ORDER — HYDROMORPHONE HYDROCHLORIDE 1 MG/ML
1 INJECTION, SOLUTION INTRAMUSCULAR; INTRAVENOUS; SUBCUTANEOUS EVERY 4 HOURS PRN
Status: DISCONTINUED | OUTPATIENT
Start: 2022-05-19 | End: 2022-05-23

## 2022-05-19 RX ORDER — SODIUM CHLORIDE 9 MG/ML
500 INJECTION, SOLUTION INTRAVENOUS
Status: COMPLETED | OUTPATIENT
Start: 2022-05-19 | End: 2022-05-20

## 2022-05-19 RX ORDER — SODIUM CHLORIDE 9 MG/ML
1000 INJECTION, SOLUTION INTRAVENOUS ONCE
Status: COMPLETED | OUTPATIENT
Start: 2022-05-19 | End: 2022-05-20

## 2022-05-19 RX ORDER — OXYCODONE HYDROCHLORIDE 10 MG/1
10 TABLET ORAL EVERY 6 HOURS PRN
Status: DISCONTINUED | OUTPATIENT
Start: 2022-05-19 | End: 2022-05-23

## 2022-05-19 RX ORDER — OXYCODONE HYDROCHLORIDE 10 MG/1
20 TABLET ORAL EVERY 6 HOURS PRN
Status: DISCONTINUED | OUTPATIENT
Start: 2022-05-19 | End: 2022-05-19

## 2022-05-19 RX ORDER — OXYCODONE HYDROCHLORIDE 10 MG/1
10 TABLET ORAL EVERY 6 HOURS PRN
Status: DISCONTINUED | OUTPATIENT
Start: 2022-05-19 | End: 2022-05-19

## 2022-05-19 RX ORDER — OXYCODONE HYDROCHLORIDE 10 MG/1
20 TABLET ORAL EVERY 6 HOURS PRN
Status: DISCONTINUED | OUTPATIENT
Start: 2022-05-19 | End: 2022-05-23

## 2022-05-19 RX ADMIN — ACETAMINOPHEN 1000 MG: 500 TABLET ORAL at 00:15

## 2022-05-19 RX ADMIN — OXYCODONE HYDROCHLORIDE 10 MG: 10 TABLET ORAL at 12:28

## 2022-05-19 RX ADMIN — OXYCODONE HYDROCHLORIDE 20 MG: 10 TABLET ORAL at 18:27

## 2022-05-19 RX ADMIN — DOCUSATE SODIUM 100 MG: 100 CAPSULE, LIQUID FILLED ORAL at 17:49

## 2022-05-19 RX ADMIN — INSULIN HUMAN 2 UNITS: 100 INJECTION, SOLUTION PARENTERAL at 12:36

## 2022-05-19 RX ADMIN — HYDROMORPHONE HYDROCHLORIDE 1 MG: 1 INJECTION, SOLUTION INTRAMUSCULAR; INTRAVENOUS; SUBCUTANEOUS at 22:23

## 2022-05-19 RX ADMIN — TRAZODONE HYDROCHLORIDE 450 MG: 150 TABLET ORAL at 22:31

## 2022-05-19 RX ADMIN — INSULIN HUMAN 2 UNITS: 100 INJECTION, SOLUTION PARENTERAL at 08:28

## 2022-05-19 RX ADMIN — HYDROMORPHONE HYDROCHLORIDE 1 MG: 1 INJECTION, SOLUTION INTRAMUSCULAR; INTRAVENOUS; SUBCUTANEOUS at 13:06

## 2022-05-19 RX ADMIN — ACETAMINOPHEN 1000 MG: 500 TABLET ORAL at 17:50

## 2022-05-19 RX ADMIN — SODIUM CHLORIDE 3 G: 900 INJECTION INTRAVENOUS at 12:28

## 2022-05-19 RX ADMIN — ENOXAPARIN SODIUM 40 MG: 40 INJECTION SUBCUTANEOUS at 00:14

## 2022-05-19 RX ADMIN — GABAPENTIN 400 MG: 400 CAPSULE ORAL at 12:28

## 2022-05-19 RX ADMIN — ACETAMINOPHEN 1000 MG: 500 TABLET ORAL at 12:28

## 2022-05-19 RX ADMIN — SODIUM CHLORIDE 3 G: 900 INJECTION INTRAVENOUS at 17:50

## 2022-05-19 RX ADMIN — INSULIN HUMAN 2 UNITS: 100 INJECTION, SOLUTION PARENTERAL at 22:42

## 2022-05-19 RX ADMIN — CEFAZOLIN SODIUM 2 G: 2 INJECTION, SOLUTION INTRAVENOUS at 00:21

## 2022-05-19 RX ADMIN — GABAPENTIN 400 MG: 400 CAPSULE ORAL at 06:17

## 2022-05-19 RX ADMIN — ATORVASTATIN CALCIUM 80 MG: 40 TABLET, FILM COATED ORAL at 17:49

## 2022-05-19 RX ADMIN — ACETAMINOPHEN 1000 MG: 500 TABLET ORAL at 06:17

## 2022-05-19 RX ADMIN — CARVEDILOL 6.25 MG: 6.25 TABLET, FILM COATED ORAL at 17:49

## 2022-05-19 RX ADMIN — DOCUSATE SODIUM 100 MG: 100 CAPSULE, LIQUID FILLED ORAL at 06:18

## 2022-05-19 RX ADMIN — SENNOSIDES AND DOCUSATE SODIUM 1 TABLET: 50; 8.6 TABLET ORAL at 22:32

## 2022-05-19 RX ADMIN — TRAZODONE HYDROCHLORIDE 450 MG: 150 TABLET ORAL at 00:21

## 2022-05-19 RX ADMIN — SODIUM CHLORIDE 500 ML: 9 INJECTION, SOLUTION INTRAVENOUS at 08:20

## 2022-05-19 RX ADMIN — SODIUM CHLORIDE 1000 ML: 9 INJECTION, SOLUTION INTRAVENOUS at 11:03

## 2022-05-19 ASSESSMENT — ENCOUNTER SYMPTOMS
ROS SKIN COMMENTS: RIGHT LEG WOUNDS
ABDOMINAL PAIN: 0
SORE THROAT: 0
DOUBLE VISION: 0
SHORTNESS OF BREATH: 0
COUGH: 0
HEADACHES: 0
SINUS PAIN: 0
BLURRED VISION: 0
CHILLS: 0
NAUSEA: 0
FEVER: 0
PALPITATIONS: 0
FOCAL WEAKNESS: 0
PSYCHIATRIC NEGATIVE: 1

## 2022-05-19 ASSESSMENT — COGNITIVE AND FUNCTIONAL STATUS - GENERAL
STANDING UP FROM CHAIR USING ARMS: A LOT
SUGGESTED CMS G CODE MODIFIER MOBILITY: CL
TOILETING: A LITTLE
MOVING TO AND FROM BED TO CHAIR: A LOT
DRESSING REGULAR LOWER BODY CLOTHING: A LOT
DAILY ACTIVITIY SCORE: 18
HELP NEEDED FOR BATHING: A LOT
DRESSING REGULAR UPPER BODY CLOTHING: A LITTLE
CLIMB 3 TO 5 STEPS WITH RAILING: TOTAL
TURNING FROM BACK TO SIDE WHILE IN FLAT BAD: A LITTLE
WALKING IN HOSPITAL ROOM: TOTAL
MOVING FROM LYING ON BACK TO SITTING ON SIDE OF FLAT BED: A LOT
MOBILITY SCORE: 11
SUGGESTED CMS G CODE MODIFIER DAILY ACTIVITY: CK

## 2022-05-19 ASSESSMENT — PAIN DESCRIPTION - PAIN TYPE
TYPE: SURGICAL PAIN
TYPE: SURGICAL PAIN
TYPE: ACUTE PAIN

## 2022-05-19 ASSESSMENT — PATIENT HEALTH QUESTIONNAIRE - PHQ9
1. LITTLE INTEREST OR PLEASURE IN DOING THINGS: NOT AT ALL
2. FEELING DOWN, DEPRESSED, IRRITABLE, OR HOPELESS: NOT AT ALL
SUM OF ALL RESPONSES TO PHQ9 QUESTIONS 1 AND 2: 0

## 2022-05-19 NOTE — THERAPY
Physical Therapy Contact Note    Patient Name: Luis Kellogg  Age:  71 y.o., Sex:  male  Medical Record #: [id number]  Today's Date: 5/19/2022    PT consult received.  Per RN pt currently w/ low BP despite recent bolus feed, and is currently holding pain medication d/t hypotension. Will hold acute PT eval and re attempt as able.    Erlin Barrett, PT, DPT [id number]

## 2022-05-19 NOTE — PROGRESS NOTES
Hospital Medicine Daily Progress Note    Date of Service  5/19/2022    Chief Complaint  Luis Kellogg is a 71 y.o. male admitted 5/18/2022 with left leg pain    Hospital Course  Peyman Kellogg is a 71 year old male with past medical history of T2DM, neuropathy, HTN, PAD and tobacco use who presented 5/18/2022 for a scheduled left AKA with Dr. Buenrostro. Patient hypotensive during the procedure and was admitted to [facility]. Transferred to hospitalist service by Dr. Buenrostro. Patient also has right lower extremity wounds, LPS consulted.       Interval Problem Update  Patient hypotensive this morning. Received 1500 mL bolus with improvement. Cortisol 13.7, lactic 1.8. Blood cultures pending. Started on Unasyn for right lower extremity wounds with drainage. LPS consulted. PT/OT evaluation pending.     I have personally seen and examined the patient at bedside. I discussed the plan of care with patient, bedside RN, charge RN and .    Consultants/Specialty  orthopedics    Code Status  Full Code    Disposition  Patient is not medically cleared for discharge.   Anticipate discharge to to home with close outpatient follow-up.  I have placed the appropriate orders for post-discharge needs.    Review of Systems  Review of Systems   Constitutional: Negative for chills and fever.   HENT: Negative for sinus pain and sore throat.    Eyes: Negative for blurred vision and double vision.   Respiratory: Negative for cough and shortness of breath.    Cardiovascular: Negative for chest pain and palpitations.   Gastrointestinal: Negative for abdominal pain and nausea.   Genitourinary: Negative for dysuria and urgency.   Musculoskeletal:        Right leg pain     Skin:        Right leg wounds     Neurological: Negative for focal weakness and headaches.   Psychiatric/Behavioral: Negative.    All other systems reviewed and are negative.       Physical Exam  Temp:  [36.3 °C (97.3 °F)-36.9 °C (98.5 °F)] 36.9 °C (98.5  °F)  Pulse:  [74-92] 83  Resp:  [16-18] 16  BP: ()/(50-86) 107/68  SpO2:  [87 %-97 %] 91 %    Physical Exam  Vitals and nursing note reviewed.   Constitutional:       General: He is not in acute distress.     Appearance: He is obese.      Comments: Unkempt     HENT:      Head: Normocephalic and atraumatic.      Right Ear: External ear normal.      Left Ear: External ear normal.      Nose: Nose normal. No congestion.      Mouth/Throat:      Mouth: Mucous membranes are dry.      Pharynx: Oropharynx is clear.   Eyes:      General:         Right eye: No discharge.         Left eye: No discharge.   Cardiovascular:      Rate and Rhythm: Normal rate.      Heart sounds: Murmur heard.   Pulmonary:      Effort: Pulmonary effort is normal.      Breath sounds: Normal breath sounds.   Abdominal:      General: There is no distension.      Palpations: Abdomen is soft.      Tenderness: There is no abdominal tenderness.   Musculoskeletal:         General: Tenderness present.      Cervical back: Neck supple. No tenderness.      Comments: S/p left AKA     Skin:     General: Skin is warm and dry.      Capillary Refill: Capillary refill takes 2 to 3 seconds.      Comments: Wounds on right foot and right lateral shin. Shin bandaged with visible drainage leaking through. Compression wrap to left stump       Neurological:      General: No focal deficit present.      Mental Status: He is alert and oriented to person, place, and time. Mental status is at baseline.   Psychiatric:         Mood and Affect: Mood normal.         Thought Content: Thought content normal.         Fluids    Intake/Output Summary (Last 24 hours) at 5/19/2022 1231  Last data filed at 5/19/2022 0810  Gross per 24 hour   Intake --   Output 2510 ml   Net -2510 ml       Laboratory  Recent Labs     05/18/22  0640 05/19/22  0334   WBC 6.5 6.5   RBC 4.73 4.08*   HEMOGLOBIN 13.0* 11.3*   HEMATOCRIT 40.8* 35.8*   MCV 86.3 87.7   MCH 27.5 27.7   MCHC 31.9* 31.6*   RDW 47.5  49.0   PLATELETCT 224 208   MPV 9.5 9.6     Recent Labs     05/18/22  0640 05/19/22  0334   SODIUM 133* 135   POTASSIUM 4.2 4.2   CHLORIDE 100 100   CO2 25 27   GLUCOSE 163* 154*   BUN 9 8   CREATININE 0.55 0.46*   CALCIUM 9.0 8.9     Recent Labs     05/18/22  1901   INR 1.05               Imaging  No orders to display        Assessment/Plan  * Cellulitis of left lower extremity s/p left leg above knee amputation- (present on admission)  Assessment & Plan  Pt presented to [facility] for scheduled left AKA with Dr. Buenrostro on 5/18. Pt was hypotensive during surgery and admitted to [facility]  No leukocytosis, afebrile  Continue pain control  PT/OT evaluation pending      Cellulitis of right leg- (present on admission)  Assessment & Plan  Pt has wounds on right lateral shin and right foot  Erythema and drainage noted   Blood cultures pending  Started on unasyn        Marijuana abuse- (present on admission)  Assessment & Plan  Patient stated he smokes marijuana at night to help relieve some of his pain  Cessation encouraged    Tobacco use- (present on admission)  Assessment & Plan  Smokes a half a pack per day  Smoking cessation encouraged, pt expressing interest in quitting  Declined nicotine patches      Primary hypertension- (present on admission)  Assessment & Plan  Home meds include lisinopril 5 mg and carvedilol 6.25 mg  Currently held due to hypotension  Resume when appropriate    Type 2 diabetes mellitus with hyperglycemia (HCC)- (present on admission)  Assessment & Plan  A1c 8.0  Complicated with neuropathy   Continue SSI  Resume Lantus when appropriate, current fsbg 140s-170s  Carb consistent diet      Chronic ulcer of lower extremity (HCC)- (present on admission)  Assessment & Plan  Patient has had chronic bilateral lower extremity ulcers, complicated by PAD and diabetes  S/p left AKA  Right leg with chronic wounds  LPS consulted       Drug-induced constipation- (present on admission)  Assessment & Plan  Patient is on  chronic Percocet at home due to bilateral lower extremity pain  Continue pain management in setting of recent AKA, wean as able  Continue bowel regimen     Hyponatremia- (present on admission)  Assessment & Plan  Resolved, Na 135    Peripheral vascular disease (HCC)- (present on admission)  Assessment & Plan  PAD complicating patient's diabetic neuropathy and nonhealing ulcers  S/p left AKA on 5/18  Right lower extremity wounds, LPS consulted  Continue atorvastatin  Resume aspirin when appropriate      Hyperlipidemia- (present on admission)  Assessment & Plan  Continue home atorvastatin       VTE prophylaxis: enoxaparin ppx    I have performed a physical exam and reviewed and updated ROS and Plan today (5/19/2022). In review of yesterday's note (5/18/2022), there are no changes except as documented above.

## 2022-05-19 NOTE — CARE PLAN
Problem: Skin Integrity  Goal: Skin integrity is maintained or improved  Outcome: Progressing     Problem: Fall Risk  Goal: Patient will remain free from falls  Outcome: Progressing     The patient is Watcher - Medium risk of patient condition declining or worsening    Shift Goals  Clinical Goals: rest, skin integrity  Patient Goals: sleep  Family Goals: no family present at this time    Progress made toward(s) clinical / shift goals:     Patient is not progressing towards the following goals:

## 2022-05-19 NOTE — DIETARY
"Nutrition services: Day 1 of admit.  Luis Kellogg is a 71 y.o. male with admitting DX of Peripheral arterial disease (HCC), Wound of left foot, Diabetic infection of left foot (HCC), Osteomyelitis of ankle and foot (HCC)  Consult received for MST of 3      Assessment:  Height: 170.2 cm (5' 7\")  Weight: 102 kg (225 lb 15.5 oz) on 5/18 via stand up scale.  Body mass index is 35.39 kg/m²., BMI classification: Obesity class II  Diet/Intake: Consistent CHO.    Evaluation:   1. MST screen reports loss of 24-33 lbs of weight in 3 months w/ no decrease in appetite and Pt has > stage 3 pressure ulcer.  2. Spoke w/ Pt at bedside and Pt claims that he has loss 50 lbs and that his UBW is around 250 lbs. Chart review shows that Pt's weight was 230 lbs on 6/7/2021 then gained weight to 240 lbs on 10/23/21 and lost some weight on 12/6/21 w/ Pt weighing 229 lbs (6% in 7 months) but has been stable since.  3. Pt states that he has not been eating and did not notice when it started. Pt claims it hurt to much to stand up and cook and therefore was not eating.  4. Pt appears normally nourished for age.  5. Pt was schedule for AKA on left leg today but does have wounds on right lower extremity. Per EMR, Pt has full thickness wound leg lateral right, no current images at this time. Limb preservation services consulted per APRN note.   6. Labs: Glucose 154 (H). Creatine 0.46 (L). A1C 8.0 (H).  7. Meds: SSI. BM protocol.  8. Last BM: PTA.    Malnutrition Risk: Does not meet criteria.    Recommendations/Plan:  1. Encourage intake of all meals  2. Document intake of all meals as % taken in ADL's to provide interdisciplinary communication across all shifts.   3. Monitor weight.  4. Nutrition rep will continue to see patient for ongoing meal and snack preferences.     RD available PRN.        "

## 2022-05-19 NOTE — PROGRESS NOTES
Pt BP noted to be in the 70's systolicly with multiple attempts checking his BP. PRN bolus administered after receiving orders from surgical NP    1030: after initially increasing, blood pressure dropped again to systolic in 80's. Orders received.

## 2022-05-19 NOTE — DISCHARGE PLANNING
Case Management Discharge Planning    Admission Date: 5/18/2022  GMLOS: 7  ALOS: 1    6-Clicks ADL Score:    6-Clicks Mobility Score:        Anticipated Discharge Dispo: Discharge Disposition: Discharged to home/self care (01)    DME Needed: No    Action(s) Taken: DC Assessment Complete (See below)    Escalations Completed: None    Medically Clear: No    Next Steps: Continue to monitor the patient for emerging post acute needs.    Barriers to Discharge: None. Patient lives in [place], [place]. Patient is aware he is accountable for arranging his own transportation home. Patient indicates his friend Gerardo will transport him home.    Is the patient up for discharge tomorrow: No

## 2022-05-19 NOTE — THERAPY
05/19/22 1026   Interdisciplinary Plan of Care Collaboration   Collaboration Comments OT referral received. Per RN pt hypotensive despite fluid bolus; holding pain meds. Will hold OT eval, continue to monitor and complete OT eval when appropriate.

## 2022-05-19 NOTE — PROGRESS NOTES
4 Eyes Skin Assessment Completed by Akiko RN and DARIEN Sebastian.    Head Redness, blotchy red spots on top of head  Ears WDL  Nose WDL  Mouth foul smelling, toothbrush provided  Neck Scab L side  Breast/Chest WDL  Shoulder Blades WDL  Spine WDL  (R) Arm/Elbow/Hand Scab to elbow  (L) Arm/Elbow/Hand Scab to elbow  Abdomen Redness and Rash under skin fold (photos uploaded)  Groin WDL  Scrotum/Coccyx/Buttocks WDL  (R) Leg ROSITA wrapped in old dressing on lower leg  (L) Leg New AKA, dressing in place   (R) Heel/Foot/Toe Redness, Discoloration, Swelling, Scab and Edema, wounds to great toe, second toe and bottom of foot (photos uploaded)  (L) Heel/Foot/Toe AKA        Devices In Places Blood Pressure Cuff, Pulse Ox and Nasal Cannula         Interventions In Place Gray Ear Foams, Heels Loaded W/Pillows and Pressure Redistribution Mattress   Waffle ordered    Possible Skin Injury Yes    Pictures Uploaded Into Epic Yes  Wound Consult Placed Yes  RN Wound Prevention Protocol Ordered Yes

## 2022-05-19 NOTE — CARE PLAN
Problem: Pain - Standard  Goal: Alleviation of pain or a reduction in pain to the patient’s comfort goal  Outcome: Progressing  Note: Medicating with prn and scheduled medications. Additionally using nonpharmacologic measures to assist with pain      Problem: Fall Risk  Goal: Patient will remain free from falls  Outcome: Progressing  Note: High fall risk interventions in place per HD scoring   The patient is Stable - Low risk of patient condition declining or worsening    Shift Goals  Clinical Goals: hemodynamic stability  Patient Goals: pain control  Family Goals: no family present at this time    Progress made toward(s) clinical / shift goals:  Bolus administered x2 per orders and holding off on administering hypotensive medications when pressures are <90 systolic     Patient is not progressing towards the following goals:

## 2022-05-19 NOTE — PROGRESS NOTES
Patient's stump dressing came off when sitting up to use urinal. Unable to reinforce. Replaced dressing with exact materials as what came off. IV dilaudid administered for pain crisis during dressing replacement. Updated provider

## 2022-05-19 NOTE — DISCHARGE PLANNING
Care Transition Team Assessment    LSW met with patient and conducted a Chart Review. The patient verified accuracy of the demographic information in the Facesheet.    Patient reports he lives alone. Prior to hospital admission, patient was independent with ADL's. The patient states he owns a pair of crutches.    Patient reports his insurance is Medicare and [employer]. Patient indicates his friend Gerardo will provide assistance when he returns home and transportation from     [place] to [place], [place]. Based on the information provided by this patient, the anticipated discharge disposition is Home with Outpatient Follow Up, pending recommendations from the interdisciplinary team.    Information Source  Orientation Level: Oriented X4  Information Given By: Patient  Informant's Name: Peyman  Who is responsible for making decisions for patient? : Patient    Readmission Evaluation  Is this a readmission?: No    Elopement Risk  Legal Hold: No  Ambulatory or Self Mobile in Wheelchair: Yes  Disoriented: No  Psychiatric Symptoms: None  History of Wandering: No  Elopement this Admit: No  Vocalizing Wanting to Leave: No  Displays Behaviors, Body Language Wanting to Leave: No-Not at Risk for Elopement  Elopement Risk: Not at Risk for Elopement    Interdisciplinary Discharge Planning  Patient or legal guardian wants to designate a caregiver: No    Discharge Preparedness  What is your plan after discharge?: Home with help, Home health care  What are your discharge supports?: Other (comment), Sibling  Prior Functional Level: Independent with Activities of Daily Living    Functional Assessment  Prior Functional Level: Independent with Activities of Daily Living    Finances  Financial Barriers to Discharge: No  Prescription Coverage: Yes    Vision / Hearing Impairment  Right Eye Vision: Wears Glasses  Left Eye Vision: Wears Glasses         Advance Directive  Advance Directive?: None  Advance Directive offered?: AD Booklet  refused    Domestic Abuse  Have you ever been the victim of abuse or violence?: No  Physical Abuse or Sexual Abuse: No  Verbal Abuse or Emotional Abuse: No  Possible Abuse/Neglect Reported to:: Not Applicable    Psychological Assessment  History of Substance Abuse: None  History of Psychiatric Problems: No    Discharge Risks or Barriers  Discharge risks or barriers?: No  Patient risk factors: Complex medical needs    Anticipated Discharge Information  Discharge Disposition: Discharged to home/self care (01)

## 2022-05-19 NOTE — PROGRESS NOTES
Post Op Day #1- Left above knee amputation, Left adductor myotenodesis to the femur.    HPI: Pleasant 70 yo male with a hx of diabetes, COPD, peripheral arterial disease with chronic ulcerations   throughout the left lower extremity extending to the proximal tibia as well as chronic osteomyelitis of the foot that he had previously undergone toe amputations.  Was evaluated by both Vascular surgery and Orthopedic surgery, pt elected to undergo Left above the knee amputation on 5/19/22.  Additionally, pt has ulcerations to the right lower extremity, pt to be seen by LPS.    Pt states that he did ok overnight, did have a fair amount of pain to the Left above knee amputation site.  Has been taking pain meds when he's not sleeping.  Denies nausea or vomiting.  Hemovac in place.     ROS: As above    PE:   Sleeping, easy to arouse.  Oriented x4. Surgical dressing the to Left above knee with hemovac, minimal bloody drainage present.  Gauze dressings in place to RLE, seeping through.    Plan:  -Hemovac to remain in place, will likely d/c on 5/20.  -Maintain original surgical dressing the Left above knee amputation site.  -LPS consulted for RLE.  If they are unable to see pt today, nursing to then change dressings.  -Pt to be evaluated by PT and OT.  -Pt will likely need placement to SNF or rehab.  -Discussed care with bedside RN.  Ok for one time bolus of 500 cc NS for BP <90.    --Medicine following, discuss further bolus needs with them.    Thank you for everyone's care.    DONTE Nova-BC (Dr. Buenrostro)  [facility]

## 2022-05-19 NOTE — HOSPITAL COURSE
Peyman Kellogg is a 71 year old male with past medical history of T2DM, neuropathy, HTN, PAD, chronic opoid dependence and tobacco use who presented 5/18/2022 for a scheduled left AKA with Dr. Buenrostro. Patient hypotensive during the procedure and was admitted to [facility]. Transferred to hospitalist service by Dr. Buenrostro. Patient also has right lower extremity wounds, cellulitis w exposed tendon/joints. LPS consulted and he eventually underwent R 1st, 2nd toe amp at the [facility] w debridement and NPWT application on 5/27/22.    Abx complete. Cleared by surgery. Referred to SNF. Renovation of his trailer/home to accomodate WC access has been in process.

## 2022-05-19 NOTE — RESPIRATORY CARE
COPD EDUCATION by COPD CLINICAL EDUCATOR  5/19/2022 at 4:20 PM by Bree Avila, RRT     Patient interviewed by COPD education team. Patient refused COPD program at this time. No Action Plan was completed denied any current Respiratory Medication use.                 COPD Assessment  COPD Clinical Specialists ONLY  COPD Education Initiated: Yes--Short Intervention (Angry about his care D/W Rn able to review that he doesn't use Oxygen or respiratory medications. He is an active smoker with no desire to quit. Provided materials about COPD/and smoking cessation and I thanked him for his time.)  DME Company: denied  Physician Name: refused to [facility]  Pulmonologist Name: refused to [facility]  [facility]:  (out of area)  Is this a COPD exacerbation patient?: No  (OP) Pulmonary Function Testing:  [facility]    Meds to Beds  Would the patient like to opt in for Bedside Medication Delivery at Discharge?: No    No Action Plan completed denied medication use and no active respiratory medications.

## 2022-05-20 LAB
ANION GAP SERPL CALC-SCNC: 10 MMOL/L (ref 7–16)
BUN SERPL-MCNC: 8 MG/DL (ref 8–22)
CALCIUM SERPL-MCNC: 8.7 MG/DL (ref 8.5–10.5)
CHLORIDE SERPL-SCNC: 105 MMOL/L (ref 96–112)
CO2 SERPL-SCNC: 24 MMOL/L (ref 20–33)
CREAT SERPL-MCNC: 0.49 MG/DL (ref 0.5–1.4)
ERYTHROCYTE [DISTWIDTH] IN BLOOD BY AUTOMATED COUNT: 47.5 FL (ref 35.9–50)
GFR SERPLBLD CREATININE-BSD FMLA CKD-EPI: 110 ML/MIN/1.73 M 2
GLUCOSE BLD STRIP.AUTO-MCNC: 122 MG/DL (ref 65–99)
GLUCOSE BLD STRIP.AUTO-MCNC: 151 MG/DL (ref 65–99)
GLUCOSE BLD STRIP.AUTO-MCNC: 157 MG/DL (ref 65–99)
GLUCOSE BLD STRIP.AUTO-MCNC: 175 MG/DL (ref 65–99)
GLUCOSE SERPL-MCNC: 159 MG/DL (ref 65–99)
HCT VFR BLD AUTO: 30.4 % (ref 42–52)
HGB BLD-MCNC: 9.9 G/DL (ref 14–18)
MCH RBC QN AUTO: 28.2 PG (ref 27–33)
MCHC RBC AUTO-ENTMCNC: 32.6 G/DL (ref 33.7–35.3)
MCV RBC AUTO: 86.6 FL (ref 81.4–97.8)
PLATELET # BLD AUTO: 175 K/UL (ref 164–446)
PMV BLD AUTO: 9.8 FL (ref 9–12.9)
POTASSIUM SERPL-SCNC: 3.6 MMOL/L (ref 3.6–5.5)
RBC # BLD AUTO: 3.51 M/UL (ref 4.7–6.1)
SODIUM SERPL-SCNC: 139 MMOL/L (ref 135–145)
WBC # BLD AUTO: 7.1 K/UL (ref 4.8–10.8)

## 2022-05-20 PROCEDURE — 700111 HCHG RX REV CODE 636 W/ 250 OVERRIDE (IP): Performed by: NURSE PRACTITIONER

## 2022-05-20 PROCEDURE — 36415 COLL VENOUS BLD VENIPUNCTURE: CPT

## 2022-05-20 PROCEDURE — 97535 SELF CARE MNGMENT TRAINING: CPT

## 2022-05-20 PROCEDURE — 700105 HCHG RX REV CODE 258: Performed by: NURSE PRACTITIONER

## 2022-05-20 PROCEDURE — 82962 GLUCOSE BLOOD TEST: CPT

## 2022-05-20 PROCEDURE — 700111 HCHG RX REV CODE 636 W/ 250 OVERRIDE (IP): Performed by: ORTHOPAEDIC SURGERY

## 2022-05-20 PROCEDURE — A9270 NON-COVERED ITEM OR SERVICE: HCPCS | Performed by: NURSE PRACTITIONER

## 2022-05-20 PROCEDURE — 770001 HCHG ROOM/CARE - MED/SURG/GYN PRIV*

## 2022-05-20 PROCEDURE — A9270 NON-COVERED ITEM OR SERVICE: HCPCS | Performed by: ORTHOPAEDIC SURGERY

## 2022-05-20 PROCEDURE — 97167 OT EVAL HIGH COMPLEX 60 MIN: CPT

## 2022-05-20 PROCEDURE — 85027 COMPLETE CBC AUTOMATED: CPT

## 2022-05-20 PROCEDURE — 700102 HCHG RX REV CODE 250 W/ 637 OVERRIDE(OP): Performed by: ORTHOPAEDIC SURGERY

## 2022-05-20 PROCEDURE — 80048 BASIC METABOLIC PNL TOTAL CA: CPT

## 2022-05-20 PROCEDURE — 700102 HCHG RX REV CODE 250 W/ 637 OVERRIDE(OP): Performed by: NURSE PRACTITIONER

## 2022-05-20 PROCEDURE — 99232 SBSQ HOSP IP/OBS MODERATE 35: CPT | Performed by: NURSE PRACTITIONER

## 2022-05-20 PROCEDURE — 97163 PT EVAL HIGH COMPLEX 45 MIN: CPT

## 2022-05-20 RX ORDER — NICOTINE 21 MG/24HR
14 PATCH, TRANSDERMAL 24 HOURS TRANSDERMAL
Status: DISCONTINUED | OUTPATIENT
Start: 2022-05-20 | End: 2022-06-07 | Stop reason: HOSPADM

## 2022-05-20 RX ORDER — SODIUM HYPOCHLORITE 1.25 MG/ML
SOLUTION TOPICAL 2 TIMES DAILY
Status: DISCONTINUED | OUTPATIENT
Start: 2022-05-20 | End: 2022-05-24

## 2022-05-20 RX ORDER — GABAPENTIN 400 MG/1
1200 CAPSULE ORAL 2 TIMES DAILY
Status: DISCONTINUED | OUTPATIENT
Start: 2022-05-20 | End: 2022-05-22

## 2022-05-20 RX ADMIN — OXYCODONE HYDROCHLORIDE 20 MG: 10 TABLET ORAL at 06:03

## 2022-05-20 RX ADMIN — ACETAMINOPHEN 1000 MG: 500 TABLET ORAL at 17:33

## 2022-05-20 RX ADMIN — LISINOPRIL 5 MG: 5 TABLET ORAL at 05:54

## 2022-05-20 RX ADMIN — CARVEDILOL 6.25 MG: 6.25 TABLET, FILM COATED ORAL at 08:56

## 2022-05-20 RX ADMIN — ACETAMINOPHEN 1000 MG: 500 TABLET ORAL at 11:51

## 2022-05-20 RX ADMIN — INSULIN HUMAN 2 UNITS: 100 INJECTION, SOLUTION PARENTERAL at 17:39

## 2022-05-20 RX ADMIN — ACETAMINOPHEN 1000 MG: 500 TABLET ORAL at 05:54

## 2022-05-20 RX ADMIN — CARVEDILOL 6.25 MG: 6.25 TABLET, FILM COATED ORAL at 17:32

## 2022-05-20 RX ADMIN — SODIUM CHLORIDE 3 G: 900 INJECTION INTRAVENOUS at 12:55

## 2022-05-20 RX ADMIN — OXYCODONE HYDROCHLORIDE 20 MG: 10 TABLET ORAL at 11:52

## 2022-05-20 RX ADMIN — OXYCODONE HYDROCHLORIDE 20 MG: 10 TABLET ORAL at 17:33

## 2022-05-20 RX ADMIN — SODIUM CHLORIDE 3 G: 900 INJECTION INTRAVENOUS at 05:55

## 2022-05-20 RX ADMIN — INSULIN HUMAN 2 UNITS: 100 INJECTION, SOLUTION PARENTERAL at 09:01

## 2022-05-20 RX ADMIN — ENOXAPARIN SODIUM 40 MG: 40 INJECTION SUBCUTANEOUS at 00:15

## 2022-05-20 RX ADMIN — ATORVASTATIN CALCIUM 80 MG: 40 TABLET, FILM COATED ORAL at 17:33

## 2022-05-20 RX ADMIN — SODIUM CHLORIDE 3 G: 900 INJECTION INTRAVENOUS at 17:35

## 2022-05-20 RX ADMIN — GABAPENTIN 1200 MG: 400 CAPSULE ORAL at 17:32

## 2022-05-20 RX ADMIN — SENNOSIDES AND DOCUSATE SODIUM 1 TABLET: 50; 8.6 TABLET ORAL at 20:47

## 2022-05-20 RX ADMIN — DOCUSATE SODIUM 100 MG: 100 CAPSULE, LIQUID FILLED ORAL at 17:33

## 2022-05-20 RX ADMIN — TRAZODONE HYDROCHLORIDE 450 MG: 150 TABLET ORAL at 20:47

## 2022-05-20 RX ADMIN — DOCUSATE SODIUM 100 MG: 100 CAPSULE, LIQUID FILLED ORAL at 05:54

## 2022-05-20 RX ADMIN — INSULIN HUMAN 2 UNITS: 100 INJECTION, SOLUTION PARENTERAL at 20:51

## 2022-05-20 RX ADMIN — GABAPENTIN 400 MG: 400 CAPSULE ORAL at 11:51

## 2022-05-20 RX ADMIN — ACETAMINOPHEN 1000 MG: 500 TABLET ORAL at 00:14

## 2022-05-20 RX ADMIN — SODIUM CHLORIDE 3 G: 900 INJECTION INTRAVENOUS at 00:14

## 2022-05-20 ASSESSMENT — ENCOUNTER SYMPTOMS
PSYCHIATRIC NEGATIVE: 1
ROS SKIN COMMENTS: RIGHT LEG WOUNDS
HEADACHES: 0
BLURRED VISION: 0
PALPITATIONS: 0
SORE THROAT: 0
SHORTNESS OF BREATH: 0
ABDOMINAL PAIN: 0
CHILLS: 0
SINUS PAIN: 0
FEVER: 0
COUGH: 0
DOUBLE VISION: 0
NAUSEA: 0
FOCAL WEAKNESS: 0

## 2022-05-20 ASSESSMENT — PAIN DESCRIPTION - PAIN TYPE
TYPE: SURGICAL PAIN;ACUTE PAIN

## 2022-05-20 ASSESSMENT — ACTIVITIES OF DAILY LIVING (ADL): TOILETING: INDEPENDENT

## 2022-05-20 ASSESSMENT — COGNITIVE AND FUNCTIONAL STATUS - GENERAL
MOVING TO AND FROM BED TO CHAIR: A LOT
CLIMB 3 TO 5 STEPS WITH RAILING: TOTAL
DAILY ACTIVITIY SCORE: 17
WALKING IN HOSPITAL ROOM: TOTAL
TOILETING: A LOT
MOVING FROM LYING ON BACK TO SITTING ON SIDE OF FLAT BED: UNABLE
SUGGESTED CMS G CODE MODIFIER MOBILITY: CL
TURNING FROM BACK TO SIDE WHILE IN FLAT BAD: A LITTLE
DRESSING REGULAR UPPER BODY CLOTHING: A LITTLE
HELP NEEDED FOR BATHING: A LOT
MOBILITY SCORE: 10
SUGGESTED CMS G CODE MODIFIER DAILY ACTIVITY: CK
DRESSING REGULAR LOWER BODY CLOTHING: A LOT
STANDING UP FROM CHAIR USING ARMS: A LOT

## 2022-05-20 ASSESSMENT — GAIT ASSESSMENTS: GAIT LEVEL OF ASSIST: UNABLE TO PARTICIPATE

## 2022-05-20 NOTE — PROGRESS NOTES
Patient was seen and evaluated at bedside    Drain unfortunately was accidentally removed last night.  He was evaluated by the LPS service, arterial ultrasound on the right lower extremity is pending.  Overall his pain is well controlled on the left lower extremity    Exam:  Left lower extremity examination consistent with healing above-knee amputation wound site, no surrounding erythema, sutures in place, no drain in place    Examination of the right lower extremity with chronic wounds over the first and second toes, nonpalpable pulses, mild surrounding erythema at the dorsal aspect of the toes, there is a chronic wound noted at the lateral foot as well as dorsal foot and more proximal leg    71-year-old male postoperative day 2 status post left above-knee amputation    Plan:  Nonweightbearing left lower extremity  Okay to change and reinforce dressings as needed for coverage on the left lower extremity  Pending arterial ultrasound studies of the right lower extremity  These wounds are chronic and he has known peripheral arterial disease.  He may not have unreconstructable disease on this right lower extremity, but we will further evaluate  Weight bearing as tolerated right lower extremity  PT/OT   We will continue to follow      Aleks Buenrostro MD

## 2022-05-20 NOTE — PROGRESS NOTES
Bedside report received.  Assessment complete.  A&O x 4. Patient calls appropriately.  Patient ambulates with max assist, r/t new AKA.   Patient has 6/10 pain. Declines interventions at this time.  Denies N&V. Tolerating diet.  Surgical incisions to Left AKA is well approximated with sutures. Patient removed surgical dressing and is refusing placement of new dressing at this time.  Dressing to RLE is in place, CDI.  + void, + flatus  Patient denies SOB.  Patient currently compliant with staff and resting in bed.  Review plan with of care with patient. Call light and personal belongings within reach. Hourly rounding in place. All needs met at this time.

## 2022-05-20 NOTE — PROGRESS NOTES
Hospital Medicine Daily Progress Note    Date of Service  5/20/2022    Chief Complaint  Luis Kellogg is a 71 y.o. male admitted 5/18/2022 with left leg pain    Hospital Course  Peyman Kellogg is a 71 year old male with past medical history of T2DM, neuropathy, HTN, PAD and tobacco use who presented 5/18/2022 for a scheduled left AKA with Dr. Buenrostro. Patient hypotensive during the procedure and was admitted to [facility]. Transferred to hospitalist service by Dr. Buenrostro. Patient also has right lower extremity wounds, LPS consulted.       Interval Problem Update  5/20 Pending arterial ultrasound studies of the right lower extremity.  Wound care. Unasyn.  Nonweightbearing left lower extremity.  WBAT at right.  Discussed every 2 turns with patient while in bed as well as judicious use of pain medication.  X-rays right LE negative for OM.  BP better. Hemoglobin stable.  Multimodal pain control. Home needs repair. Referred to SNF.    5/19 Patient hypotensive this morning. Received 1500 mL bolus with improvement. Cortisol 13.7, lactic 1.8. Blood cultures pending. Started on Unasyn for right lower extremity wounds with drainage. LPS consulted. PT/OT evaluation pending.     I have personally seen and examined the patient at bedside. I discussed the plan of care with patient, bedside RN, charge RN and .    Consultants/Specialty  orthopedics    Code Status  Full Code    Disposition  Patient is not medically cleared for discharge.   Anticipate discharge to to home with close outpatient follow-up.  I have placed the appropriate orders for post-discharge needs.    Review of Systems  Review of Systems   Constitutional: Negative for chills and fever.   HENT: Negative for sinus pain and sore throat.    Eyes: Negative for blurred vision and double vision.   Respiratory: Negative for cough and shortness of breath.    Cardiovascular: Negative for chest pain and palpitations.   Gastrointestinal: Negative for  abdominal pain and nausea.   Genitourinary: Negative for dysuria and urgency.   Musculoskeletal:        Right leg pain; LLE stump pain     Skin:        Right leg wounds     Neurological: Negative for focal weakness and headaches.   Psychiatric/Behavioral: Negative.    All other systems reviewed and are negative.       Physical Exam  Temp:  [35.8 °C (96.5 °F)-36.9 °C (98.5 °F)] 35.8 °C (96.5 °F)  Pulse:  [78-89] 78  Resp:  [16-18] 18  BP: (106-131)/(65-77) 122/77  SpO2:  [90 %-94 %] 90 %    Physical Exam  Vitals and nursing note reviewed.   Constitutional:       General: He is not in acute distress.     Appearance: He is obese.      Comments: Unkempt     HENT:      Head: Normocephalic and atraumatic.      Right Ear: External ear normal.      Left Ear: External ear normal.      Nose: Nose normal. No congestion.      Mouth/Throat:      Mouth: Mucous membranes are dry.      Pharynx: Oropharynx is clear.   Eyes:      General:         Right eye: No discharge.         Left eye: No discharge.   Cardiovascular:      Rate and Rhythm: Normal rate.      Heart sounds: Murmur heard.   Pulmonary:      Effort: Pulmonary effort is normal.      Breath sounds: Normal breath sounds.   Abdominal:      General: There is no distension.      Palpations: Abdomen is soft.      Tenderness: There is no abdominal tenderness.   Musculoskeletal:         General: Tenderness present.      Cervical back: Neck supple. No tenderness.      Comments: S/p left AKA     Skin:     General: Skin is warm and dry.      Capillary Refill: Capillary refill takes 2 to 3 seconds.      Comments: Wounds on right foot and right lateral shin. Shin bandaged with visible drainage leaking through. Compression wrap to left stump       Neurological:      General: No focal deficit present.      Mental Status: He is alert and oriented to person, place, and time. Mental status is at baseline.   Psychiatric:         Mood and Affect: Mood normal.         Thought Content: Thought  content normal.         Fluids    Intake/Output Summary (Last 24 hours) at 5/20/2022 1428  Last data filed at 5/20/2022 1358  Gross per 24 hour   Intake no documentation   Output 2003 ml   Net -2003 ml       Laboratory  Recent Labs     05/18/22  0640 05/19/22  0334 05/20/22  0150   WBC 6.5 6.5 7.1   RBC 4.73 4.08* 3.51*   HEMOGLOBIN 13.0* 11.3* 9.9*   HEMATOCRIT 40.8* 35.8* 30.4*   MCV 86.3 87.7 86.6   MCH 27.5 27.7 28.2   MCHC 31.9* 31.6* 32.6*   RDW 47.5 49.0 47.5   PLATELETCT 224 208 175   MPV 9.5 9.6 9.8     Recent Labs     05/18/22  0640 05/19/22  0334 05/20/22  0150   SODIUM 133* 135 139   POTASSIUM 4.2 4.2 3.6   CHLORIDE 100 100 105   CO2 25 27 24   GLUCOSE 163* 154* 159*   BUN 9 8 8   CREATININE 0.55 0.46* 0.49*   CALCIUM 9.0 8.9 8.7     Recent Labs     05/18/22  1901   INR 1.05               Imaging  DX-TIBIA AND FIBULA RIGHT   Final Result         1.  No acute traumatic bony injury.   2.  No destructive osseous lesion is appreciated, note that plain film is insensitive for evaluation of osteomyelitis, and bone scan would offer improved diagnostic sensitivity as clinically appropriate.      DX-FOOT-COMPLETE 3+ RIGHT   Final Result         1.  No acute traumatic bony injury. No destructive osseous lesion is seen, note that plain film can be insensitive for evaluation of osteomyelitis and bone scan would offer improved diagnostic sensitivity as clinically appropriate.   2.  Degenerative changes of the foot, greatest at the midfoot and hindfoot      US-EXTREMITY ARTERY LOWER UNILAT W/MIA (COMBO) RIGHT    (Results Pending)        Assessment/Plan  * Cellulitis of left lower extremity s/p left leg above knee amputation- (present on admission)  Assessment & Plan  Pt [facility] to [facility] for scheduled left AKA with Dr. Buenrostro on 5/18. Pt was hypotensive during surgery and admitted to [facility]  No leukocytosis, afebrile  Continue pain control  PT/OT evaluation pending      Chronic, continuous use of opioids  Assessment  & Plan  6 oxy 10/d at baseline  Carefully titrate oral/IV pain meds as appropriate  Multimodal therapy: celebrex/tylenol/Neurontin    Cellulitis of right leg- (present on admission)  Assessment & Plan  Pt has wounds on right lateral shin and right foot  Erythema and drainage noted   Blood cultures pending  Started on unasyn        Marijuana abuse- (present on admission)  Assessment & Plan  Patient stated he smokes marijuana at night to help relieve some of his pain  Cessation encouraged    Tobacco use- (present on admission)  Assessment & Plan  Smokes a half a pack per day  Smoking cessation encouraged, pt expressing interest in quitting  Declined nicotine patches      Primary hypertension- (present on admission)  Assessment & Plan  Home meds include lisinopril 5 mg and carvedilol 6.25 mg  Currently held due to hypotension  Resume when appropriate    Type 2 diabetes mellitus with hyperglycemia (HCC)- (present on admission)  Assessment & Plan  A1c 8.0  Complicated with neuropathy   Continue SSI  Resume Lantus when appropriate, current fsbg 140s-170s  Carb consistent diet      Chronic ulcer of lower extremity (HCC)- (present on admission)  Assessment & Plan  Patient has had chronic bilateral lower extremity ulcers, complicated by PAD and diabetes  S/p left AKA  Right leg with chronic wounds  LPS consulted       Drug-induced constipation- (present on admission)  Assessment & Plan  Patient is on chronic Percocet at home due to bilateral lower extremity pain  Continue pain management in setting of recent AKA, wean as able  Continue bowel regimen     Hyponatremia- (present on admission)  Assessment & Plan  Resolved, Na 135    Peripheral vascular disease (HCC)- (present on admission)  Assessment & Plan  PAD complicating patient's diabetic neuropathy and nonhealing ulcers  S/p left AKA on 5/18  Right lower extremity wounds, LPS consulted  Continue atorvastatin  Resume aspirin when appropriate  Resume home Neurontin      Hyperlipidemia- (present on admission)  Assessment & Plan  Continue home atorvastatin       VTE prophylaxis: enoxaparin ppx    I have performed a physical exam and reviewed and updated ROS and Plan today (5/20/2022). In review of yesterday's note (5/19/2022), there are no changes except as documented above.

## 2022-05-20 NOTE — CARE PLAN
The patient is Stable - Low risk of patient condition declining or worsening    Shift Goals  Clinical Goals: hemodynamic stability  Patient Goals: pain control  Family Goals: no family present at this time    Progress made toward(s) clinical / shift goals:  Pt was able to get some rest. Educated pt on calling for pain meds when wanted. Lft dressing changed 3 times during shift.    Patient is not progressing towards the following goals: N/A      Problem: Pain - Standard  Goal: Alleviation of pain or a reduction in pain to the patient’s comfort goal  Outcome: Progressing     Problem: Skin Integrity  Goal: Skin integrity is maintained or improved  Outcome: Progressing     Problem: Fall Risk  Goal: Patient will remain free from falls  Outcome: Progressing     Problem: Knowledge Deficit - Standard  Goal: Patient and family/care givers will demonstrate understanding of plan of care, disease process/condition, diagnostic tests and medications  Outcome: Progressing

## 2022-05-20 NOTE — PROGRESS NOTES
Pt dressing on lf AKA came off non suture part cleansed and dressing reapplied. Pt is very impulsive with movements and does not follow directions well.

## 2022-05-20 NOTE — PROGRESS NOTES
Patients Donis score is 16.  Patient educated on need to perform a 2 RN skin check, patient refusing at this time.   Patient educated of the need and risks, patient verbalizes understanding and still refuses.

## 2022-05-20 NOTE — WOUND TEAM
LIMB PRESERVATION SERVICE CONSULT      REFERRED BY:     DATE OF CONSULTATION: 5/19/2022    REASON FOR CONSULT: R foot and leg wounds     HISTORY OF PRESENT ILLNESS: Luis Kellogg is a 71 y.o.  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   Pt has been receiving treatment for R leg wounds since 10/2021.  Amputation to L LE was planned as an outpatient on 4/28/22 due to chronic wounds on that foot.      . 5/18/22 amputation with Dr Chitra DRUMMOND      Ortho - Dr Buenrostro has been consulted for right foot and leg.    Diagnosed with diabetes and is currently managing with glucagon and insulin.   Does have numbness to feet.  Checks feet routinel   Has had previous foot surgeries .         Smoking:   reports that he has been smoking cigarettes. He has a 27.00 pack-year smoking history. He has quit using smokeless tobacco.    Alcohol:   reports previous alcohol use.    Drug:   reports current drug use. Drugs: Inhaled and Marijuana.      PAST MEDICAL HISTORY:   Past Medical History:   Diagnosis Date   • Arthritis    • Asthma    • At risk for sleep apnea    • Blood clotting disorder (HCC)    • Bowel habit changes     Constipation    • Breath shortness     with exertion,   • Chicken pox    • Chronic obstructive pulmonary disease (HCC) 11/9/2021   • Diabetes (HCC)     Type 2    • Heart disease    • Hemorrhagic disorder (HCC)     Previous plavix    • Hepatitis C    • High cholesterol    • Hyperlipidemia    • Hypertension    • Measles    • Muscle disorder    • Myocardial infarct (HCC)     2016-stents   • Parathyroid disorder (HCC)    • Psychiatric problem    • Sleep apnea    • Snoring    • Substance abuse (HCC)    • Urinary tract infection         PAST SURGICAL HISTORY:   Past Surgical History:   Procedure  Laterality Date   • PB AMPUTATE THIGH,THRU FEMUR Left 5/18/2022    Procedure: LEFT ABOVE KNEE AMPUTATION, ADDUCTOR TENDON MYOTENODESIS;  Surgeon: Aleks Buenrostro M.D.;  Location: [facility];  Service: Orthopedics   • PB XFER SINGLE SUPERFICI LOW LEG TENDON Left 5/18/2022    Procedure: TRANSFER, TENDON;  Surgeon: Aleks Buenrostro M.D.;  Location: [facility];  Service: Orthopedics   • ANGIOGRAM Bilateral 10/12/2021    Procedure: BILATERAL LOWER EXTREMITY ANGIOGRAM;  Surgeon: Valerio Purcell M.D.;  Location: [facility];  Service: Vascular   • IRRIGATION & DEBRIDEMENT GENERAL Bilateral 10/12/2021    Procedure: IRRIGATION AND DEBRIDEMENT, WOUND, BILATERAL LOWER EXTREMITY;  Surgeon: Valerio Purcell M.D.;  Location: [facility];  Service: Vascular   • APPLICATION OR REPLACEMENT, WOUND VAC Bilateral 10/12/2021    Procedure: APPLICATION WOUND VAC;  Surgeon: Valerio Purcell M.D.;  Location: [facility];  Service: Vascular   • STENT PLACEMENT Right 10/12/2021    Procedure: STENT PLACEMENT, RIGHT SUPERFICIAL FEMORAL ARTERY;  Surgeon: Valerio Purcell M.D.;  Location: [facility];  Service: Vascular   • TOE AMPUTATION Left 2/17/2020    Procedure: LEFT SECOND, THIRD, AND FORTH TOE AMPUTATION;  Surgeon: Alfonso Esteban M.D.;  Location: [facility];  Service: Orthopedics   • CA UNLISTED PROC, ARTHROSCOPY Left 7/25/2019    Procedure: LEFT ENDOSCOPIC ULNAR NERVE DECOMPRESSION, LEFT CARPAL TUNNEL RELEASE;  Surgeon: Red Chacon M.D.;  Location: [facility];  Service: Orthopedics   • KNEE REPLACEMENT, TOTAL Bilateral    • OTHER SURGICAL PROCEDURE      back surgery - Unknown        MEDICATIONS:   Current Facility-Administered Medications   Medication Dose   • HYDROmorphone (Dilaudid) injection 1 mg  1 mg    Or   • oxyCODONE immediate release (ROXICODONE) tablet 10 mg  10 mg    Or   • oxyCODONE immediate release (ROXICODONE) tablet 20 mg   20 mg   • ampicillin/sulbactam (UNASYN) 3 g in  mL IVPB  3 g   • atorvastatin (LIPITOR) tablet 80 mg  80 mg   • carvedilol (COREG) tablet 6.25 mg  6.25 mg   • gabapentin (NEURONTIN) capsule 400 mg  400 mg   • lisinopril (PRINIVIL) tablet 5 mg  5 mg   • traZODone (DESYREL) tablet 450 mg  450 mg   • Pharmacy Consult Request ...Pain Management Review 1 Each  1 Each   • ondansetron (ZOFRAN) syringe/vial injection 4 mg  4 mg   • dexamethasone (DECADRON) injection 4 mg  4 mg   • diphenhydrAMINE (BENADRYL) injection 25 mg  25 mg   • haloperidol lactate (HALDOL) injection 1 mg  1 mg   • scopolamine (TRANSDERM-SCOP) patch 1 Patch  1 Patch   • docusate sodium (COLACE) capsule 100 mg  100 mg   • senna-docusate (PERICOLACE or SENOKOT S) 8.6-50 MG per tablet 1 Tablet  1 Tablet   • senna-docusate (PERICOLACE or SENOKOT S) 8.6-50 MG per tablet 1 Tablet  1 Tablet   • polyethylene glycol/lytes (MIRALAX) PACKET 1 Packet  1 Packet   • magnesium hydroxide (MILK OF MAGNESIA) suspension 30 mL  30 mL   • bisacodyl (DULCOLAX) suppository 10 mg  10 mg   • sodium phosphate (Fleet) enema 133 mL  1 Each   • enoxaparin (Lovenox) inj 40 mg  40 mg   • acetaminophen (TYLENOL) tablet 1,000 mg  1,000 mg    Followed by   • [START ON 5/23/2022] acetaminophen (TYLENOL) tablet 1,000 mg  1,000 mg   • insulin regular (HumuLIN R,NovoLIN R) injection  2-9 Units    And   • dextrose 50% (D50W) injection 25 g  25 g   • ondansetron (ZOFRAN ODT) dispertab 4 mg  4 mg       ALLERGIES:  No Known Allergies     FAMILY HISTORY:   Family History   Problem Relation Age of Onset   • Arthritis Father    • Cancer Father    • Heart Disease Father    • Diabetes Other              RESULTS:     Recent Labs     05/18/22  0640 05/19/22  0334   WBC 6.5 6.5   RBC 4.73 4.08*   HEMOGLOBIN 13.0* 11.3*   HEMATOCRIT 40.8* 35.8*   MCV 86.3 87.7   MCH 27.5 27.7   MCHC 31.9* 31.6*   RDW 47.5 49.0   PLATELETCT 224 208   MPV 9.5 9.6     Recent Labs     05/18/22  0640 05/19/22  0338  "  SODIUM 133* 135   POTASSIUM 4.2 4.2   CHLORIDE 100 100   CO2 25 27   GLUCOSE 163* 154*   BUN 9 8     Imaging:  US-EXTREMITY ARTERY LOWER UNILAT W/MIA (COMBO) RIGHT    (Results Pending)   DX-FOOT-COMPLETE 3+ RIGHT    (Results Pending)   DX-TIBIA AND FIBULA RIGHT    (Results Pending)         Xray/CT/MRI:   X-ray 5/19/22 R foot  IMPRESSION:        1.  No acute traumatic bony injury. No destructive osseous lesion is seen, note that plain film can be insensitive for evaluation of osteomyelitis and bone scan would offer improved diagnostic sensitivity as clinically appropriate.  2.  Degenerative changes of the foot, greatest at the midfoot and hindfoot    5/19/22 x-ray R tib fib  IMPRESSION:        1.  No acute traumatic bony injury.  2.  No destructive osseous lesion is appreciated, note that plain film is insensitive for evaluation of osteomyelitis, and bone scan would offer improved diagnostic sensitivity as clinically appropriate.    Arterial studies: ABIs ordered 5/19/22    A1c:  Lab Results   Component Value Date/Time    HBA1C 8.0 (H) 05/19/2022 03:34 AM            Microbiology:  Results     Procedure Component Value Units Date/Time    BLOOD CULTURE [[id number]] Collected: 05/19/22 1432    Order Status: Sent Specimen: Blood from Peripheral Updated: 05/19/22 1440    Narrative:      Per Hospital Policy: Only change Specimen Src: to \"Line\" if  specified by physician order.    BLOOD CULTURE [[id number]] Collected: 05/19/22 1021    Order Status: Sent Specimen: Blood from Peripheral Updated: 05/19/22 1027    Narrative:      Per Hospital Policy: Only change Specimen Src: to \"Line\" if  specified by physician order.           PHYSICAL EXAMINATION:     VITAL SIGNS: /66   Pulse 89   Temp 36.9 °C (98.5 °F) (Temporal)   Resp 17   Ht 1.702 m (5' 7\")   Wt 102 kg (225 lb 15.5 oz)   SpO2 91%   BMI 35.39 kg/m²         Lower Extremity Assessment:    Edema:   Mild pitting edema      Pulses:      Wound " Assessment:    Wound(s)          Wound 10/14/21 Full Thickness Wound Leg Lateral Right (Active)   Site Assessment Pink, yellow 05/21/22 0830   Periwound Assessment edema 05/21/22 0830   Margins  05/21/22 0830   Closure  05/21/22 0830   Drainage Amount Moderate 05/20/22 2040   Drainage Description Tan 05/20/22 2040   Treatments Offloading 05/19/22 0800   Dressing Cleansing/Solutions 1/4 Strength Dakin's Solution 05/20/22 2040   Dressing Options Adaptic;Moist Roll Gauze;Absorbent Abdominal Pad;Dry Gauze 05/20/22 2040   Dressing Changed Observed 05/21/22 0830   Dressing Status Clean;Dry;Intact 05/21/22 0830   Dressing Change/Treatment Frequency Every Shift, and As Needed 05/21/22 0830   NEXT Dressing Change/Treatment Date 05/21/22 05/21/22 0830   NEXT Weekly Photo (Inpatient Only) 05/20/22 05/19/22 1800   Non-staged Wound Description Full thickness 05/19/22 1800   Wound Length (cm) 30 cm 05/19/22 1800   Wound Width (cm) 18 cm 05/19/22 1800   Wound Surface Area (cm^2) 540 cm^2 05/19/22 1800   Wound Bed Granulation (%) 30 % 05/19/22 1800   Wound Bed Slough (%) 70 % 05/19/22 1800   Wound Odor Mild 05/19/22 1800   Pulses Right;2+;DP;Doppler 05/19/22 1800   Exposed Structures None 05/19/22 1800   WOUND NURSE ONLY - Time Spent with Patient (mins) 60 05/19/22 1800       Wound 10/14/21 Full Thickness Wound Leg;Foot Lateral;Dorsal Left (Active)       Wound 10/07/21 Heel Lateral Left unstag POA (Active)       Wound 05/18/22 Incision Leg Left adaptic 4x4 soft roll bias (Active)   Wound Image    05/19/22 1800   Site Assessment Clean;Dry;Intact;Pink;Red 05/21/22 0830   Periwound Assessment Clean;Dry;Pink 05/21/22 0830   Margins Attached edges 05/21/22 0830   Closure Approximated;Sutures 05/21/22 0830   Drainage Amount Small 05/19/22 1800   Drainage Description Sanguineous 05/19/22 1800   Treatments Offloading 05/19/22 0800   Dressing Options Open to Air 05/21/22 0830   Dressing Changed Changed 05/19/22 1800   Dressing Status  Clean;Dry;Intact 05/19/22 1315   NEXT Weekly Photo (Inpatient Only) 05/26/22 05/19/22 1800       Wound 05/19/22 Right great and second toe dorsal (Active)   Wound Image   05/19/22 1800   Site Assessment Dry red, yellow 05/21/22 0830   Periwound Assessment edema 05/21/22 0830   Margins  05/21/22 0830   Closure  05/21/22 0830   Drainage Amount Small  05/21/22 0830   Drainage Description Purulent 05/19/22 1800   Dressing Cleansing/Solutions 1/4 Strength Dakin's Solution 05/20/22 2040   Dressing Options Dry Roll Gauze;Nonadhesive Foam;Hypafix Tape 05/20/22 2040   Dressing Changed Observed 05/21/22 0830   Dressing Status Clean;Dry;Intact 05/21/22 0830   Dressing Change/Treatment Frequency Every Shift, and As Needed 05/21/22 0830   NEXT Dressing Change/Treatment Date 05/21/22 05/21/22 0830   NEXT Weekly Photo (Inpatient Only) 05/20/22 05/19/22 1800   Non-staged Wound Description Full thickness 05/19/22 1800   Wound Bed Slough (%) 100 % 05/19/22 1800   Wound Odor Foul 05/19/22 1800   Exposed Structures Bone 05/19/22 1800       Wound 05/19/22 Right Dorsal and lateral foot (Active)   Wound Image    05/19/22 1800   Site Assessment red 05/21/22 0830   Periwound Assessment  05/21/22 0830   Margins  05/21/22 0830   Closure  05/21/22 0830   Drainage Amount  05/21/22 0830   Drainage Description Serosanguineous 05/19/22 1800   Dressing Cleansing/Solutions 1/4 Strength Dakin's Solution 05/20/22 2040   Dressing Options Dry Roll Gauze;Nonadhesive Foam 05/20/22 2040   Dressing Changed Observed 05/21/22 0830   Dressing Status Clean;Dry;Intact 05/21/22 0830   Dressing Change/Treatment Frequency Every Shift, and As Needed 05/21/22 0830   NEXT Dressing Change/Treatment Date 05/21/22 05/21/22 0830   NEXT Weekly Photo (Inpatient Only) 05/20/22 05/19/22 1800   Non-staged Wound Description Full thickness 05/19/22 1800   Wound Bed Granulation (%) 100 % 05/19/22 1800   Wound Odor None 05/19/22 1800   Exposed Structures None 05/19/22 1800            Treatment - cleansed with saline and gauze, minimal debridement with currette was performed to the lateral leg wound to remove biofilm <20cm2.  dressed with adaptic (pt's request) saline soaked gauze, ABD and rolled gauze to the leg, foam and hypafix to the foot        ASSESSMENT AND PLAN:   71 y.o. admitted for for L AKA.  Pt also has chronic wounds on the R foot and leg.  There is bone exposure to the toe wounds with presumed osteomyelitis. The toe wounds likely will not resolve.  There is a large wound that does not appear to be deep on the lateral leg that is covered with biofilm.  This wound would benefit from debridement and compression - will reasses after MIA results become available.  Dr Buenrostro involved with R toe wounds.            Wound care:   -Wound care orders placed for nursing by [facility] wound team - 1/4 dakins      Vascular status:   arterial studies ordered    Surgery:    consulted Ortho Dr. Buenrostro - awaiting MIA results  -  Antibiotics:       Offloading: TBD    PT/OT:    PT involved      Diabetes Education:   -   Lab Results   Component Value Date/Time    HBA1C 8.0 (H) 05/19/2022 03:34 AM          Discharge Plan:    recommend patient discharges to skilled nursing facility for ongoing wound care which may include VAC    D/W: pt, RN, Dr. Buenrostro      Please contact [facility] through [employer].

## 2022-05-20 NOTE — PROGRESS NOTES
Pt moving in sporadic movements trying to get back into bed and pulled IV out. MD at bedside aware of pt pulling out hemavac.

## 2022-05-20 NOTE — DISCHARGE PLANNING
Case Management Discharge Planning    Admission Date: 5/18/2022  GMLOS: 7  ALOS: 2    6-Clicks ADL Score: 18  6-Clicks Mobility Score: 11  PT and/or OT Eval ordered: Not yet  Post-acute Referrals Ordered: not yet  Post-acute Choice Obtained: Yes, patient would like to go to [facility]  Has referral(s) been sent to post-acute provider: Not yet      Anticipated Discharge Dispo: Discharge Disposition: Disch to  rehab facility or [facility] part unit () (Rehab/ SNF)    DME Needed: Yes    DME Ordered: Nothing order yet    Action(s) Taken: Received a call from Deepika Rodgers, who is the Public Health Nurse from [facility] ([phone]). She connected to the patient via the Senior services in his area. [place]  She stated that she has been following the patient and that his living conditions at this time are not suitable for his return home. She stated that he has stairs and has a shower that he can barely fit into.  While he is in the hospital and at rehab, she would like to use the time to see if she can get his home revamped, by having a ramp added and retro-fitting his shower. She was trying to get an idea how long the patient will be in the hospital, as she would need time to work on these things. She was informed that no discharge plans have been made for him so far. She also stated that the patient has a very strong nicotine addiction and that perhaps we can look into nicotine replacement so that he will not try to leave AMA.       This  spoke with the patient who stated that he has been working with [facility] and she mentioned retrofitting his house previously. He is in agreement with that plan. He is also in agreement with discharging to skilled facility prior to going home.        Escalations Completed:     Medically Clear: No    Next Steps:     Barriers to Discharge: None    Is the patient up for discharge tomorrow: No

## 2022-05-20 NOTE — PROGRESS NOTES
Dressing came off for 4th time as well as the hemovac disloged accidentally by pt. Adaptic placed.

## 2022-05-20 NOTE — THERAPY
"Occupational Therapy   Initial Evaluation     Patient Name: Luis Kellogg  Age:  71 y.o., Sex:  male  Medical Record #: [id number]  Today's Date: 5/20/2022     Precautions: Fall Risk, Non Weight Bearing Left Lower Extremity  Comments: new AKA    Assessment    Patient is 71 y.o. male with h/o BLE chronic diabetic wounds, osteomyelitis to LLE, admitted for elective AKA. Pt seen for OT eval. Received in bed with L residual limb undressed. Per RN and notes, pt is hypermobile in bed with poor awareness of LLE dressing resulting in it repeatedly falling off. This session pt demonstrated impulsivity with mobility, thrashing at times requiring direct cueing to move with care and intention. Pt easily frustrated/irritated but also did offer gratitude towards therapists several times. Pt was able to mobilize to EOB and stand x 1 using FWW with 2-person assist. Unable to transfer to chair due to behaviors, safety concerns and pain. Pt required total A to manage R sock. Was able to complete oral care with set-up. Pt lives alone in mobile home with 3 step entry. Pt will benefit from acute OT and anticipate need for post-acute transitional care to maximize functional independence and safety.      Plan    Recommend Occupational Therapy 4 times per week until therapy goals are met for the following treatments:  Adaptive Equipment, Neuro Re-Education / Balance, Self Care/Activities of Daily Living, Therapeutic Activities, and Therapeutic Exercises.    DC Equipment Recommendations: Unable to determine at this time  Discharge Recommendations: Recommend post-acute placement for additional occupational therapy services prior to discharge home     Subjective    \"Can I use a knee scooter?\" (Educated on NWB to LLE)     Objective       05/20/22 1143   Prior Living Situation   Housing / Facility Mobile Home   Steps Into Home 3   Rail Left Rail  (Steps into Home)   Bathroom Set up Bathtub / Shower Combination   Equipment Owned " "Wheelchair;Front-Wheel Walker   Lives with - Patient's Self Care Capacity Alone and Able to Care For Self  (requires assist for I-ADL)   Comments Pt lives alone but has intermittent assist from friend \"Gerardo\"; reports difficulty completing I-ADL   Prior Level of ADL Function   Self Feeding Independent   Grooming / Hygiene Independent   Bathing Independent  (standing)   Dressing Independent   Toileting Independent   Prior Level of IADL Function   Medication Management Independent   Laundry Requires Assist   Kitchen Mobility Independent   Finances Independent   Home Management Requires Assist   Shopping Requires Assist   Prior Level Of Mobility Uses Wheel Chair for Community Mobility;Independent With Device in Home  (FWW in home)   Driving / Transportation Relatives / Others Provide Transportation   Cognition    Level of Consciousness Alert   Safety Awareness Impaired;Impulsive   Comments Pt moves very impulsively; demos decreased insight as evidenced by asking whether he could use a knee scooter   Sensation Upper Body   Comments Numbness to L digits 3-5, anticipate due to diabetic neuropathy   Balance Assessment   Sitting Balance (Static) Fair -   Sitting Balance (Dynamic) Poor +   Standing Balance (Static) Trace +   Standing Balance (Dynamic) Dependent   Weight Shift Sitting Poor   Weight Shift Standing Absent   Comments FWW for standing   Bed Mobility    Supine to Sit Minimal Assist  (HOB elevated ~30 degrees)   Sit to Supine Moderate Assist  (flat bed)   Scooting Maximal Assist  (EOB lateral to L)   ADL Assessment   Eating Modified Independent   Grooming Supervision  (bed level oral care)   Lower Body Dressing Total Assist  (don/doff R sock)   Toileting   (NT; declined need)   Functional Mobility   Sit to Stand Maximal Assist   Bed, Chair, Wheelchair Transfer Unable to Participate   Mobility Supine > < EOB, sit to stand at FWW, lateral seated scooting   Edema / Skin Assessment   Comments RLE with gauze dressing to " entire lower leg; LLE surgical site open to air with sutures exposed (RN aware)   Activity Tolerance   Sitting in Chair NT, unable   Sitting Edge of Bed 15 min total   Standing <1 min   Comments pain limits all activity   Short Term Goals   Short Term Goal # 1 Pt will complete ADL transfers with mod A   Short Term Goal # 2 Pt will complete LB dressing with min A using AE   Short Term Goal # 3 Pt will complete toileting with min A

## 2022-05-20 NOTE — PROGRESS NOTES
Pt dressing came off while going to edge of bed to urinate for 2nd time during shift. Dressing reapplied educated pt on importance of following instructions.

## 2022-05-21 ENCOUNTER — APPOINTMENT (OUTPATIENT)
Dept: RADIOLOGY | Facility: MEDICAL CENTER | Age: 72
DRG: 240 | End: 2022-05-21
Attending: NURSE PRACTITIONER
Payer: MEDICARE

## 2022-05-21 LAB
GLUCOSE BLD STRIP.AUTO-MCNC: 140 MG/DL (ref 65–99)
GLUCOSE BLD STRIP.AUTO-MCNC: 164 MG/DL (ref 65–99)
GLUCOSE BLD STRIP.AUTO-MCNC: 173 MG/DL (ref 65–99)
GLUCOSE BLD STRIP.AUTO-MCNC: 243 MG/DL (ref 65–99)

## 2022-05-21 PROCEDURE — A9270 NON-COVERED ITEM OR SERVICE: HCPCS | Performed by: NURSE PRACTITIONER

## 2022-05-21 PROCEDURE — A9270 NON-COVERED ITEM OR SERVICE: HCPCS | Performed by: ORTHOPAEDIC SURGERY

## 2022-05-21 PROCEDURE — 700102 HCHG RX REV CODE 250 W/ 637 OVERRIDE(OP): Performed by: NURSE PRACTITIONER

## 2022-05-21 PROCEDURE — 99232 SBSQ HOSP IP/OBS MODERATE 35: CPT

## 2022-05-21 PROCEDURE — 700101 HCHG RX REV CODE 250: Performed by: NURSE PRACTITIONER

## 2022-05-21 PROCEDURE — 82962 GLUCOSE BLOOD TEST: CPT | Mod: 91

## 2022-05-21 PROCEDURE — 99232 SBSQ HOSP IP/OBS MODERATE 35: CPT | Performed by: NURSE PRACTITIONER

## 2022-05-21 PROCEDURE — 700105 HCHG RX REV CODE 258: Performed by: NURSE PRACTITIONER

## 2022-05-21 PROCEDURE — 700111 HCHG RX REV CODE 636 W/ 250 OVERRIDE (IP): Performed by: ORTHOPAEDIC SURGERY

## 2022-05-21 PROCEDURE — 770001 HCHG ROOM/CARE - MED/SURG/GYN PRIV*

## 2022-05-21 PROCEDURE — 93922 UPR/L XTREMITY ART 2 LEVELS: CPT | Mod: 52,RT

## 2022-05-21 PROCEDURE — 700111 HCHG RX REV CODE 636 W/ 250 OVERRIDE (IP): Performed by: NURSE PRACTITIONER

## 2022-05-21 PROCEDURE — 93922 UPR/L XTREMITY ART 2 LEVELS: CPT | Mod: 26,52 | Performed by: INTERNAL MEDICINE

## 2022-05-21 PROCEDURE — 700102 HCHG RX REV CODE 250 W/ 637 OVERRIDE(OP): Performed by: ORTHOPAEDIC SURGERY

## 2022-05-21 RX ADMIN — SODIUM HYPOCHLORITE 1 ML: 1.25 SOLUTION TOPICAL at 18:22

## 2022-05-21 RX ADMIN — ENOXAPARIN SODIUM 40 MG: 40 INJECTION SUBCUTANEOUS at 00:04

## 2022-05-21 RX ADMIN — INSULIN HUMAN 2 UNITS: 100 INJECTION, SOLUTION PARENTERAL at 18:15

## 2022-05-21 RX ADMIN — ACETAMINOPHEN 1000 MG: 500 TABLET ORAL at 00:03

## 2022-05-21 RX ADMIN — INSULIN HUMAN 2 UNITS: 100 INJECTION, SOLUTION PARENTERAL at 12:27

## 2022-05-21 RX ADMIN — SODIUM CHLORIDE 3 G: 900 INJECTION INTRAVENOUS at 00:05

## 2022-05-21 RX ADMIN — GABAPENTIN 1200 MG: 400 CAPSULE ORAL at 06:03

## 2022-05-21 RX ADMIN — DOCUSATE SODIUM 100 MG: 100 CAPSULE, LIQUID FILLED ORAL at 06:03

## 2022-05-21 RX ADMIN — SODIUM CHLORIDE 3 G: 900 INJECTION INTRAVENOUS at 18:16

## 2022-05-21 RX ADMIN — OXYCODONE HYDROCHLORIDE 20 MG: 10 TABLET ORAL at 06:03

## 2022-05-21 RX ADMIN — ACETAMINOPHEN 1000 MG: 500 TABLET ORAL at 06:03

## 2022-05-21 RX ADMIN — CARVEDILOL 6.25 MG: 6.25 TABLET, FILM COATED ORAL at 18:15

## 2022-05-21 RX ADMIN — OXYCODONE HYDROCHLORIDE 20 MG: 10 TABLET ORAL at 18:13

## 2022-05-21 RX ADMIN — SODIUM CHLORIDE 3 G: 900 INJECTION INTRAVENOUS at 12:16

## 2022-05-21 RX ADMIN — OXYCODONE HYDROCHLORIDE 20 MG: 10 TABLET ORAL at 00:04

## 2022-05-21 RX ADMIN — DOCUSATE SODIUM 100 MG: 100 CAPSULE, LIQUID FILLED ORAL at 18:13

## 2022-05-21 RX ADMIN — TRAZODONE HYDROCHLORIDE 450 MG: 150 TABLET ORAL at 21:33

## 2022-05-21 RX ADMIN — OXYCODONE HYDROCHLORIDE 20 MG: 10 TABLET ORAL at 12:15

## 2022-05-21 RX ADMIN — SODIUM HYPOCHLORITE 1 ML: 1.25 SOLUTION TOPICAL at 06:09

## 2022-05-21 RX ADMIN — SODIUM CHLORIDE 3 G: 900 INJECTION INTRAVENOUS at 06:02

## 2022-05-21 RX ADMIN — LISINOPRIL 5 MG: 5 TABLET ORAL at 06:03

## 2022-05-21 RX ADMIN — ACETAMINOPHEN 1000 MG: 500 TABLET ORAL at 18:13

## 2022-05-21 RX ADMIN — INSULIN HUMAN 3 UNITS: 100 INJECTION, SOLUTION PARENTERAL at 21:34

## 2022-05-21 RX ADMIN — SENNOSIDES AND DOCUSATE SODIUM 1 TABLET: 50; 8.6 TABLET ORAL at 21:33

## 2022-05-21 RX ADMIN — GABAPENTIN 1200 MG: 400 CAPSULE ORAL at 18:13

## 2022-05-21 RX ADMIN — ACETAMINOPHEN 1000 MG: 500 TABLET ORAL at 12:15

## 2022-05-21 RX ADMIN — ATORVASTATIN CALCIUM 80 MG: 40 TABLET, FILM COATED ORAL at 18:13

## 2022-05-21 ASSESSMENT — ENCOUNTER SYMPTOMS
SHORTNESS OF BREATH: 0
DOUBLE VISION: 0
COUGH: 0
CHILLS: 0
FOCAL WEAKNESS: 0
ROS SKIN COMMENTS: RIGHT LEG WOUNDS
FEVER: 0
HEADACHES: 0
SORE THROAT: 0
SINUS PAIN: 0
ABDOMINAL PAIN: 0
NAUSEA: 0
BLURRED VISION: 0
PSYCHIATRIC NEGATIVE: 1
PALPITATIONS: 0

## 2022-05-21 ASSESSMENT — PAIN DESCRIPTION - PAIN TYPE
TYPE: ACUTE PAIN;SURGICAL PAIN
TYPE: SURGICAL PAIN;ACUTE PAIN
TYPE: SURGICAL PAIN;NEUROPATHIC PAIN
TYPE: SURGICAL PAIN;ACUTE PAIN
TYPE: SURGICAL PAIN;NEUROPATHIC PAIN
TYPE: SURGICAL PAIN;ACUTE PAIN

## 2022-05-21 NOTE — PROGRESS NOTES
Bedside report received.  Assessment complete.  A&O x 4. Patient calls appropriately.  Patient ambulates with max assist. NWB to LLE.  Patient has 9/10 pain. Pain managed with prescribed medications.  Denies N&V. Tolerating diet.  Surgical incision to AKA is well approximated with sutures and ROBI, no overt signs of infection noted.  Dressing to RLE, CDI  + void, + flatus, - BM.  Patient denies SOB.  Patient pleasant with staff and sitting up in bed.  Review plan with of care with patient. Call light and personal belongings within reach. Hourly rounding in place. All needs met at this time.

## 2022-05-21 NOTE — PROGRESS NOTES
LIMB PRESERVATION SERVICE   POST SURGICAL PROGRESS NOTE      HPI:  Luis Kellogg is a 71 y.o.  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   Pt has been receiving treatment for R leg wounds since 10/2021.  Amputation to L LE was planned as an outpatient on 4/28/22 due to chronic wounds on that foot.      SURGERY DATE: 5/18/2022 by Dr Buenrostro   PROCEDURE: PROCEDURES PERFORMED:  1.  Left above knee amputation.  2.  Left adductor myotenodesis to the femur.      INTERVAL HISTORY:  5/21/2022: POD #3.  Patient denies fevers, chills, nausea, vomiting.  Pain well controlled.  Patient states that dressing will not stay on his left AKA, due to movement.  Patient also verifies that he has been seen by [facility].      PERTINENT LPS RESULTS: 5/18/22  Pathology: FINAL DIAGNOSIS:     A. Left leg:          Above-the-knee amputation demonstrating the following features:          -The 2nd, 3rd, 4th phalanges are absent with associated diffuse           ulceration. There are multiple additional ulcerated lesions           ranging from 1.7 cm to 17.5 cm in maximum dimension, extending           to within 8.7 cm of the nearest proximal soft tissue margin.           There is minimal skin slippage noted. The knee displays           multiple metallic medical components consistent with knee           replacement hardware.          -A section taken through the ulcerated skin demonstrates focal           marked ulceration of the skin surface with marked acute           inflammation extending within the subjacent dermal tissue. The           underlying metatarsal bone shows predominantly fibroadipose           tissue within the medullary space. There is no evidence for           acute or chronic osteomyelitis.  "         -There is viable skin and soft tissue at the proximal resection           margin. The popliteal blood vessels demonstrate patent lumens           with focal calcifications within the blood vessel wall. The           proximal bone marrow tissue shows predominantly fibroadipose           tissue and blood clot.          -No malignancy is seen.     OR culture: none    SURGICAL SITE EXAM:      /76   Pulse 74   Temp 36.3 °C (97.3 °F) (Temporal)   Resp 18   Ht 1.702 m (5' 7\")   Wt 102 kg (225 lb 15.5 oz)   SpO2 94%   BMI 35.39 kg/m²   Monofilament testing has not been completed to right foot due to current wounds.    Surgical leg warm     Pedal Pulses:   R foot: palpable DP, palpable PT    Incision   location: Left AKA   Incision well approximated, sutures intact.   Slight erythema along suture line  Minimal edema  Scant dried bloody drainage     wound   location: Right dorsal first and second toe  Full thickness, does probe to bone  Wound characteristics:  red tissue with yellow adherent slough  Wound edges open  Moderate erythema  Scant purulent drainage  None follow     wound   location: Right dorsal foot  Full thickness, does not probe to bone  Wound characteristics:  red tissue with yellow adherent slough  Wound edges open  Moderate erythema  Serosanguineous drainage  No odor    wound   location: Right lateral foot  Full thickness, does not probe to bone  Wound characteristics: Pinpoint opening with red tissue, callus to periwound  Wound edges open  Slight erythema  No drainage  No odor       wound   location: Right lateral leg  Full thickness, does not probe to bone  Wound characteristics: Pink with yellow adherent slough  Wound edges open  Slight erythema  Slight edema  Moderate tan drainage      Left AKA dressing care completed by LPS APRN. Applied single layer of Adaptic over suture line. Secured with rolled gauze and Hypafix tape.   Right leg and foot dressing care orders in " "epic.      Photo(s): Left AKA    Lateral left AKA    Right 1st & 2nd toes dorsal     Right dorsal foot     Right lateral foot           DIABETES MANAGEMENT:    A1c:   Lab Results   Component Value Date/Time    HBA1C 8.0 (H) 05/19/2022 03:34 AM            INFECTION MANAGEMENT:    Results from last 7 days   Lab Units 05/20/22  0150 05/19/22  0334 05/18/22  0640   WBC 1501 K/uL 7.1 6.5 6.5   PLATELET COUNT 1518 K/uL 175 208 224     Wound culture results:   Results     Procedure Component Value Units Date/Time    BLOOD CULTURE [[id number]] Collected: 05/19/22 1021    Order Status: Completed Specimen: Blood from Peripheral Updated: 05/20/22 0723     Significant Indicator NEG     Source BLD     Site PERIPHERAL     Culture Result No Growth  Note: Blood cultures are incubated for 5 days and  are monitored continuously.Positive blood cultures  are called to the RN and reported as soon as  they are identified.      Narrative:      Per Hospital Policy: Only change Specimen Src: to \"Line\" if  specified by physician order.  Right     BLOOD CULTURE [[id number]] Collected: 05/19/22 1432    Order Status: Completed Specimen: Blood from Peripheral Updated: 05/20/22 0723     Significant Indicator NEG     Source BLD     Site PERIPHERAL     Culture Result No Growth  Note: Blood cultures are incubated for 5 days and  are monitored continuously.Positive blood cultures  are called to the RN and reported as soon as  they are identified.      Narrative:      Per Hospital Policy: Only change Specimen Src: to \"Line\" if  specified by physician order.  Right            ASSESSMENT/PLAN:   POD #3 S/P: Left above knee amputation, left adductor myotenodesis to the femur 5/18/2022 by Dr Buenrostro.  Left AKA clean dry and intact sutures in place, dressing orders updated for nursing.  MIA completed, updated Dr Buenrostro.       Wound care:   -Wound care orders updated for nursing by LPS and wound care team  -Right : RIGHT GREAT TOE, SECOND TOE, " DORSAL FOOT, LATERAL LEG - nursing to remove dressing, clean with saline and gauze, protect indu wound with barrier cream, place adaptic over wound beds, moisten rolled gauze with Dakins and wring out, cover all wounds with dakins soaked gauze, cover leg with ABDs and secure with rolled gauze.  Cover toes and foot with optifoam thin foam and secure with hypafix tape.  Contact LPS via voelte with questions  -Left AKA: Cleanse suture line with wound cleanser, pat dry.  Apply single layer of Adaptic over suture line, secure with roll gauze, secure with Hypafix tape.  To be changed by nursing every 48    Imaging/Labs:  -COVID-19: not detected this admission    Vascular status:   -Right pedal pulse with doppler diminished    Surgery:   --no further surgeries planned at this time     Antibiotics:   -ID not involved. Antibiotics managed by hospitalist.    Weight Bearing Status:   -Weight bearing as tolerated to right leg  -Nonweightbearing to left AKA    Offloading:   -None;   -Orthotic company: [employer].        PT/OT:   - involved. Last seen 5/20.       Diabetes Education:   -Not consulted     - Implications of loss of protective sensation (LOPS) discussed with patient- including increased risk for amputation.  Advised to check feet at least daily, moisturize feet, and to always wear protective foot wear.   -avoid trimming own nails. See podiatrist or certified foot and nail RN  -keep blood sugars <150 for improved wound healing            DISCHARGE PLAN:    Disposition:   TBD    Follow-up: Dr. Buenrostro . Sutures to be removed approximately 3 weeks post-op to left AKA        Discussed with: pt, RN, Dr. Buenrostro, DONTE Bustamante           Please note that this dictation was created using voice recognition software. I have  worked with technical experts from [facility]  [facility] to optimize the interface.  I have made every reasonable attempt to correct obvious errors, but there may be errors of grammar and possibly  content that I did not discover before finalizing the note.      Laurita Griffin, A.P.R.N.    If any questions or concerns, please contact LPS through voalte.

## 2022-05-21 NOTE — CARE PLAN
The patient is Stable - Low risk of patient condition declining or worsening    Shift Goals  Clinical Goals: Safety, Pain Management, Wound change  Patient Goals: Pain Management  Family Goals: no family present at this time    Progress made toward(s) clinical / shift goals:  Safety, Pain Management, Wound change      Problem: Pain - Standard  Goal: Alleviation of pain or a reduction in pain to the patient’s comfort goal  Outcome: Progressing     Problem: Skin Integrity  Goal: Skin integrity is maintained or improved  Outcome: Progressing     Problem: Fall Risk  Goal: Patient will remain free from falls  Outcome: Progressing     Problem: Knowledge Deficit - Standard  Goal: Patient and family/care givers will demonstrate understanding of plan of care, disease process/condition, diagnostic tests and medications  Outcome: Progressing

## 2022-05-21 NOTE — THERAPY
Physical Therapy   Initial Evaluation     Patient Name: Luis Kellogg  Age:  71 y.o., Sex:  male  Medical Record #: [id number]  Today's Date: 5/20/2022     Precautions  Precautions: Fall Risk;Non Weight Bearing Left Lower Extremity  Comments: new AKA    Assessment  Patient is 71 y.o. male presenting POD2 L AKA w/ adductor myotenodesis d/t worsening infection, along w/ PMH of DMII, HTN, PAD, PVD, obesity, chronic opioid use, and tobacco use.  He lives alone in a mobile home, however reports his friend, Gerardo, assists w/ IADL's at baseline (see flowsheet for home set up and PLOF).  Currently, the pt presents w/ uncontrolled LLE pain limiting his ability to complete functional mobility tasks.  He is able to demo supine<>sit eob CGA w/ use of trapeze, however requires maxA at this time for scooting/positioning in bed.  The pt requires modA x2 person for STS eob using fww, and unable to txr to wc 2/2 uncontrolled LLE pain during stand.  Provided pt handout and verbal edu regarding LE positioning s/p AKA to prevent contracture, and pt demo's limited carryover.  Recommend placement at this time given pt's inability to care for self and high risk of future falls.  Will follow for acute PT needs.     Plan    Recommend Physical Therapy 4 times per week until therapy goals are met for the following treatments:  Bed Mobility, Community Re-integration, Equipment, Gait Training, Manual Therapy, Neuro Re-Education / Balance, Orthotics Training, Self Care/Home Evaluation, Stair Training, Therapeutic Activities, and Therapeutic Exercises    DC Equipment Recommendations: None (pt owns fww and wc)  Discharge Recommendations: Recommend post-acute placement for additional physical therapy services prior to discharge home       Subjective/Objective       05/20/22 1141   Prior Living Situation   Prior Services Other (Comments)  (pt had assist from friend, Gerardo, for transport, shopping, cleaning, etc.)   Housing / Facility Mobile Home    Steps Into Home 3   Steps In Home 0   Equipment Owned Front-Wheel Walker;Wheelchair   Lives with - Patient's Self Care Capacity Alone and Able to Care For Self   Comments pt lives alone w/ support from a friend, Gerardo, for IADL's   Prior Level of Functional Mobility   Bed Mobility Independent   Transfer Status Independent   Ambulation Independent   Distance Ambulation (Feet)   (household distances)   Assistive Devices Used Front-Wheel Walker   Wheelchair Independent   Stairs Independent   Comments pt used fww at home, wc in community   History of Falls   History of Falls No   Date of Last Fall   (pt not forthcoming about fall hx)   Cognition    Cognition / Consciousness X   Level of Consciousness Alert   Comments pt impulsive and w/ very poor attention to tasks, poor receptivity to edu   Passive ROM Lower Body   Passive ROM Lower Body WDL   Active ROM Lower Body    Active ROM Lower Body  X   Comments RLE DF limited by weakness/neuropathy, otherwise WFL, LLE grossly limited by pain   Strength Lower Body   Lower Body Strength  X   Comments as above   Sensation Lower Body   Lower Extremity Sensation   X   Comments LLE distal/posterior stump hypersensitive to LT   Coordination Lower Body    Coordination Lower Body  WDL   Vision   Vision Comments pt denies any changes in vision   Balance Assessment   Sitting Balance (Static) Fair -   Sitting Balance (Dynamic) Fair -   Standing Balance (Static) Trace +   Standing Balance (Dynamic) Trace   Weight Shift Sitting Poor   Weight Shift Standing Poor   Comments stand w/ fww   Gait Analysis   Gait Level Of Assist Unable to Participate   Weight Bearing Status LLE NWB   Bed Mobility    Supine to Sit Contact Guard Assist   Sit to Supine Contact Guard Assist   Scooting Maximal Assist   Comments hob flat, use of trapeze   Functional Mobility   Sit to Stand Moderate Assist  (x2)   Bed, Chair, Wheelchair Transfer Unable to Participate   Mobility STS eob w/ fww   Comments txr to wc  "deferred 2/2 uncontrolled pain   Activity Tolerance   Sitting Edge of Bed 25min   Standing 2min   Edema / Skin Assessment   Edema / Skin  Not Assessed   Short Term Goals    Short Term Goal # 1 Pt will demo supine<>sit eob w/ hob flat and no rails/trapeze spv in 6 visits for independence w/ bed mobility.   Short Term Goal # 2 Pt will demo SPT eob<>wc using fww spv in 6 visits for OOB mobility tasks.   Short Term Goal # 3 Pt will demo ability to propel wc 250' spv on a level surface in 6 visits for access to the community.   Short Term Goal # 4 Pt will demo ability to \"bump up/down\" 3 stairs spv in 6 visits for access to his home.   Short Term Goal # 5 Pt will demo gait 100' using fww spv via 3 point pattern in 6 visits for household ambulation.   Education Group   Education Provided Role of Physical Therapist  (AKA positioning)   Role of Physical Therapist Patient Response Patient;Acceptance;Explanation;Demonstration;Action Demonstration   Additional Comments pt demo's limited carryover of edu provided   Problem List    Problems Pain;Impaired Bed Mobility;Impaired Transfers;Impaired Ambulation;Functional ROM Deficit;Functional Strength Deficit;Impaired Balance;Decreased Activity Tolerance;Safety Awareness Deficits / Cognition   Session Information   Date / Session Number  5/20- 1 (1/4, 5/26)     "

## 2022-05-21 NOTE — PROGRESS NOTES
4 Eyes Skin Assessment Completed by DARIEN Asher and DARIEN Martinez.    Head WDL  Ears WDL  Nose WDL  Mouth WDL  Neck WDL  Breast/Chest WDL  Shoulder Blades WDL  Spine Old scar to lower back near sacrum  (R) Arm/Elbow/Hand Redness on elbow  (L) Arm/Elbow/Hand Redness on elbow  Abdomen WDL  Groin WDL  Scrotum/Coccyx/Buttocks WDL  (R) Leg wound on lateral R calf, dressing in place  (L) Leg AKA, sutures old drainage noted  (R) Heel/Foot/Toe Great toe wound, dressing in place, second metatarsal wound, dressing in place; boggy heels with mepilex  (L) Heel/Foot/Toe AKA     Devices In Places Blood Pressure Cuff, Pulse Ox and SCD's    Interventions In Place Heel Mepilex, Waffle Overlay, Pillows, Barrier Cream and Pressure Redistribution Mattress    Possible Skin Injury No    Pictures Uploaded Into Epic N/A  Wound Consult Placed Yes  RN Wound Prevention Protocol Ordered No

## 2022-05-21 NOTE — CARE PLAN
Problem: Pain - Standard  Goal: Alleviation of pain or a reduction in pain to the patient’s comfort goal  Outcome: Progressing     Problem: Fall Risk  Goal: Patient will remain free from falls  Outcome: Progressing     Problem: Knowledge Deficit - Standard  Goal: Patient and family/care givers will demonstrate understanding of plan of care, disease process/condition, diagnostic tests and medications  Outcome: Progressing     The patient is Watcher - Medium risk of patient condition declining or worsening    Shift Goals  Clinical Goals: safety, pain control  Patient Goals: pain control  Family Goals: no family present at this time    Progress made toward(s) clinical / shift goals:  Patient reported pain to be managed with PRN medications. Patient remained safe and free from falls throughout shift.

## 2022-05-21 NOTE — PROGRESS NOTES
Assumed care of patient at 1845. Bedside report received. Assessment complete.  AA&Ox4. Denies CP/SOB.  Reporting 10/10 pain. Unable to medicate at this time.   Educated patient regarding pharmacologic and non pharmacologic modalities for pain management.  Skin per flowsheet.  Tolerating diabetic diet. Denies N/V.  + void. - BM. Last BM PTA.  Pt is NWB to L leg r/t AKA.  All needs met at this time. Call light within reach. Pt calls appropriately. Bed alarm on and audible. Bed low and locked, non skid socks in place. Hourly rounding in place.

## 2022-05-21 NOTE — PROGRESS NOTES
Hospital Medicine Daily Progress Note    Date of Service  5/21/2022    Chief Complaint  Luis Kellogg is a 71 y.o. male admitted 5/18/2022 with left leg pain    Hospital Course  Peyman Kellogg is a 71 year old male with past medical history of T2DM, neuropathy, HTN, PAD and tobacco use who presented 5/18/2022 for a scheduled left AKA with Dr. Buenrostro. Patient hypotensive during the procedure and was admitted to [facility]. Transferred to hospitalist service by Dr. Buenrostro. Patient also has right lower extremity wounds, LPS consulted.       Interval Problem Update  Right ultrasound pending. Continue antibiotics. Pt evaluated by PT/OT, recommending post acute placement. BP stable. Continue multimodal pain control.     I have personally seen and examined the patient at bedside. I discussed the plan of care with patient and bedside RN.    Consultants/Specialty  orthopedics and LPS    Code Status  Full Code    Disposition  Patient is not medically cleared for discharge.   Anticipate discharge to to skilled nursing facility.  I have placed the appropriate orders for post-discharge needs.    Review of Systems  Review of Systems   Constitutional: Negative for chills and fever.   HENT: Negative for sinus pain and sore throat.    Eyes: Negative for blurred vision and double vision.   Respiratory: Negative for cough and shortness of breath.    Cardiovascular: Negative for chest pain and palpitations.   Gastrointestinal: Negative for abdominal pain and nausea.   Genitourinary: Negative for dysuria and urgency.   Musculoskeletal:        Right leg pain     Skin:        Right leg wounds     Neurological: Negative for focal weakness and headaches.   Psychiatric/Behavioral: Negative.    All other systems reviewed and are negative.       Physical Exam  Temp:  [36.1 °C (97 °F)-36.6 °C (97.8 °F)] 36.3 °C (97.3 °F)  Pulse:  [74-85] 74  Resp:  [16-18] 18  BP: ()/(57-80) 117/76  SpO2:  [91 %-98 %] 94 %    Physical Exam  Vitals  and nursing note reviewed.   Constitutional:       General: He is not in acute distress.     Appearance: He is obese.      Comments: Unkempt     HENT:      Head: Normocephalic and atraumatic.      Right Ear: External ear normal.      Left Ear: External ear normal.      Nose: Nose normal. No congestion.      Mouth/Throat:      Mouth: Mucous membranes are moist.      Pharynx: Oropharynx is clear.   Eyes:      General:         Right eye: No discharge.         Left eye: No discharge.   Cardiovascular:      Rate and Rhythm: Normal rate.      Heart sounds: Murmur heard.   Pulmonary:      Effort: Pulmonary effort is normal.      Breath sounds: Normal breath sounds.   Abdominal:      General: There is no distension.      Palpations: Abdomen is soft.      Tenderness: There is no abdominal tenderness.   Musculoskeletal:         General: Tenderness present.      Cervical back: Neck supple. No tenderness.      Comments: S/p left AKA     Skin:     General: Skin is warm and dry.      Capillary Refill: Capillary refill takes 2 to 3 seconds.      Comments: Wounds on right foot and right lateral shin. Shin and foot bandaged. Left stump with sutures in place, no erythema or drainage.      Neurological:      General: No focal deficit present.      Mental Status: He is alert and oriented to person, place, and time. Mental status is at baseline.   Psychiatric:         Mood and Affect: Mood normal.         Thought Content: Thought content normal.         Fluids    Intake/Output Summary (Last 24 hours) at 5/21/2022 1112  Last data filed at 5/21/2022 0747  Gross per 24 hour   Intake 580 ml   Output 1050 ml   Net -470 ml       Laboratory  Recent Labs     05/19/22  0334 05/20/22  0150   WBC 6.5 7.1   RBC 4.08* 3.51*   HEMOGLOBIN 11.3* 9.9*   HEMATOCRIT 35.8* 30.4*   MCV 87.7 86.6   MCH 27.7 28.2   MCHC 31.6* 32.6*   RDW 49.0 47.5   PLATELETCT 208 175   MPV 9.6 9.8     Recent Labs     05/19/22  0334 05/20/22  0150   SODIUM 135 139   POTASSIUM  4.2 3.6   CHLORIDE 100 105   CO2 27 24   GLUCOSE 154* 159*   BUN 8 8   CREATININE 0.46* 0.49*   CALCIUM 8.9 8.7     Recent Labs     05/18/22  1901   INR 1.05               Imaging  DX-TIBIA AND FIBULA RIGHT   Final Result         1.  No acute traumatic bony injury.   2.  No destructive osseous lesion is appreciated, note that plain film is insensitive for evaluation of osteomyelitis, and bone scan would offer improved diagnostic sensitivity as clinically appropriate.      DX-FOOT-COMPLETE 3+ RIGHT   Final Result         1.  No acute traumatic bony injury. No destructive osseous lesion is seen, note that plain film can be insensitive for evaluation of osteomyelitis and bone scan would offer improved diagnostic sensitivity as clinically appropriate.   2.  Degenerative changes of the foot, greatest at the midfoot and hindfoot      US-MIA SINGLE LEVEL UNILAT RIGHT    (Results Pending)        Assessment/Plan  * Cellulitis of left lower extremity s/p left leg above knee amputation- (present on admission)  Assessment & Plan  Pt [facility] to [facility] for scheduled left AKA with Dr. Buenrostro on 5/18  No leukocytosis, afebrile  Continue pain control  PT/OT recommending post acute placement      Chronic, continuous use of opioids  Assessment & Plan  6 oxy 10/d at baseline  Carefully titrate oral/IV pain meds as appropriate  Multimodal therapy: celebrex/tylenol/Neurontin    Cellulitis of right leg- (present on admission)  Assessment & Plan  Pt has wounds on right lateral shin and right foot  Erythema and drainage noted   LPS consulted  Blood cultures pending  Ultrasound pending  Continue unasyn      Marijuana abuse- (present on admission)  Assessment & Plan  Patient stated he smokes marijuana at night to help relieve some of his pain  Cessation encouraged    Tobacco use- (present on admission)  Assessment & Plan  Smokes a half a pack per day  Smoking cessation encouraged, pt expressing interest in quitting  Nicotine patches  available      Primary hypertension- (present on admission)  Assessment & Plan  Post op hypotension resolved  Continue lisinopril 5 mg and carvedilol 6.25 mg with hold parameters    Type 2 diabetes mellitus with hyperglycemia (HCC)- (present on admission)  Assessment & Plan  A1c 8.0  Complicated with neuropathy   Continue SSI  Resume Lantus when appropriate, current fsbg 140s-170s  Carb consistent diet      Chronic ulcer of lower extremity (HCC)- (present on admission)  Assessment & Plan  Patient has had chronic bilateral lower extremity ulcers, complicated by PAD and diabetes  S/p left AKA  Right leg with chronic wounds  Right US pending  LPS consulted       Drug-induced constipation- (present on admission)  Assessment & Plan  Patient is on chronic Percocet at home due to bilateral lower extremity pain  Continue pain management in setting of recent AKA, wean as able  Continue bowel regimen     Peripheral vascular disease (HCC)- (present on admission)  Assessment & Plan  PAD complicating patient's diabetic neuropathy and nonhealing ulcers  S/p left AKA on 5/18  Right lower extremity wounds, LPS consulted  Right US pending  Continue atorvastatin  Resume home Neurontin     Hyperlipidemia- (present on admission)  Assessment & Plan  Continue home atorvastatin       VTE prophylaxis: enoxaparin ppx    I have performed a physical exam and reviewed and updated ROS and Plan today (5/21/2022). In review of yesterday's note (5/20/2022), there are no changes except as documented above.

## 2022-05-22 ENCOUNTER — APPOINTMENT (OUTPATIENT)
Dept: RADIOLOGY | Facility: MEDICAL CENTER | Age: 72
DRG: 240 | End: 2022-05-22
Attending: NURSE PRACTITIONER
Payer: MEDICARE

## 2022-05-22 LAB
GLUCOSE BLD STRIP.AUTO-MCNC: 164 MG/DL (ref 65–99)
GLUCOSE BLD STRIP.AUTO-MCNC: 172 MG/DL (ref 65–99)
GLUCOSE BLD STRIP.AUTO-MCNC: 183 MG/DL (ref 65–99)
GLUCOSE BLD STRIP.AUTO-MCNC: 189 MG/DL (ref 65–99)

## 2022-05-22 PROCEDURE — A9270 NON-COVERED ITEM OR SERVICE: HCPCS | Performed by: NURSE PRACTITIONER

## 2022-05-22 PROCEDURE — A9270 NON-COVERED ITEM OR SERVICE: HCPCS | Performed by: ORTHOPAEDIC SURGERY

## 2022-05-22 PROCEDURE — 93926 LOWER EXTREMITY STUDY: CPT | Mod: RT

## 2022-05-22 PROCEDURE — 99232 SBSQ HOSP IP/OBS MODERATE 35: CPT | Performed by: NURSE PRACTITIONER

## 2022-05-22 PROCEDURE — 700102 HCHG RX REV CODE 250 W/ 637 OVERRIDE(OP): Performed by: ORTHOPAEDIC SURGERY

## 2022-05-22 PROCEDURE — 700105 HCHG RX REV CODE 258: Performed by: NURSE PRACTITIONER

## 2022-05-22 PROCEDURE — 700111 HCHG RX REV CODE 636 W/ 250 OVERRIDE (IP): Performed by: NURSE PRACTITIONER

## 2022-05-22 PROCEDURE — 82962 GLUCOSE BLOOD TEST: CPT | Mod: 91

## 2022-05-22 PROCEDURE — 700102 HCHG RX REV CODE 250 W/ 637 OVERRIDE(OP): Performed by: NURSE PRACTITIONER

## 2022-05-22 PROCEDURE — 700111 HCHG RX REV CODE 636 W/ 250 OVERRIDE (IP): Performed by: ORTHOPAEDIC SURGERY

## 2022-05-22 PROCEDURE — 770001 HCHG ROOM/CARE - MED/SURG/GYN PRIV*

## 2022-05-22 RX ORDER — ACETAMINOPHEN 325 MG/1
650 TABLET ORAL EVERY 6 HOURS
Status: DISCONTINUED | OUTPATIENT
Start: 2022-05-22 | End: 2022-05-25

## 2022-05-22 RX ORDER — POLYETHYLENE GLYCOL 3350 17 G/17G
1 POWDER, FOR SOLUTION ORAL DAILY
Status: DISCONTINUED | OUTPATIENT
Start: 2022-05-22 | End: 2022-06-07 | Stop reason: HOSPADM

## 2022-05-22 RX ADMIN — POLYETHYLENE GLYCOL 3350 1 PACKET: 17 POWDER, FOR SOLUTION ORAL at 14:39

## 2022-05-22 RX ADMIN — ACETAMINOPHEN 1000 MG: 500 TABLET ORAL at 11:39

## 2022-05-22 RX ADMIN — INSULIN HUMAN 2 UNITS: 100 INJECTION, SOLUTION PARENTERAL at 21:35

## 2022-05-22 RX ADMIN — INSULIN GLARGINE-YFGN 10 UNITS: 100 INJECTION, SOLUTION SUBCUTANEOUS at 18:04

## 2022-05-22 RX ADMIN — SENNOSIDES AND DOCUSATE SODIUM 1 TABLET: 50; 8.6 TABLET ORAL at 21:33

## 2022-05-22 RX ADMIN — OXYCODONE HYDROCHLORIDE 20 MG: 10 TABLET ORAL at 21:33

## 2022-05-22 RX ADMIN — SODIUM HYPOCHLORITE 1 ML: 1.25 SOLUTION TOPICAL at 04:47

## 2022-05-22 RX ADMIN — SODIUM CHLORIDE 3 G: 900 INJECTION INTRAVENOUS at 11:39

## 2022-05-22 RX ADMIN — OXYCODONE HYDROCHLORIDE 20 MG: 10 TABLET ORAL at 08:26

## 2022-05-22 RX ADMIN — GABAPENTIN 1200 MG: 400 CAPSULE ORAL at 04:46

## 2022-05-22 RX ADMIN — HYDROMORPHONE HYDROCHLORIDE 1 MG: 1 INJECTION, SOLUTION INTRAMUSCULAR; INTRAVENOUS; SUBCUTANEOUS at 11:38

## 2022-05-22 RX ADMIN — ACETAMINOPHEN 1000 MG: 500 TABLET ORAL at 05:52

## 2022-05-22 RX ADMIN — ACETAMINOPHEN 650 MG: 325 TABLET ORAL at 17:51

## 2022-05-22 RX ADMIN — SODIUM CHLORIDE 3 G: 900 INJECTION INTRAVENOUS at 17:51

## 2022-05-22 RX ADMIN — INSULIN HUMAN 2 UNITS: 100 INJECTION, SOLUTION PARENTERAL at 08:27

## 2022-05-22 RX ADMIN — LISINOPRIL 5 MG: 5 TABLET ORAL at 04:46

## 2022-05-22 RX ADMIN — TRAZODONE HYDROCHLORIDE 450 MG: 150 TABLET ORAL at 21:33

## 2022-05-22 RX ADMIN — HYDROMORPHONE HYDROCHLORIDE 1 MG: 1 INJECTION, SOLUTION INTRAMUSCULAR; INTRAVENOUS; SUBCUTANEOUS at 18:04

## 2022-05-22 RX ADMIN — OXYCODONE HYDROCHLORIDE 20 MG: 10 TABLET ORAL at 14:39

## 2022-05-22 RX ADMIN — ACETAMINOPHEN 1000 MG: 500 TABLET ORAL at 00:26

## 2022-05-22 RX ADMIN — SODIUM CHLORIDE 3 G: 900 INJECTION INTRAVENOUS at 00:27

## 2022-05-22 RX ADMIN — SODIUM HYPOCHLORITE 10 ML: 1.25 SOLUTION TOPICAL at 17:52

## 2022-05-22 RX ADMIN — ENOXAPARIN SODIUM 40 MG: 40 INJECTION SUBCUTANEOUS at 00:27

## 2022-05-22 RX ADMIN — SODIUM CHLORIDE 3 G: 900 INJECTION INTRAVENOUS at 05:53

## 2022-05-22 RX ADMIN — GABAPENTIN 1800 MG: 300 CAPSULE ORAL at 17:51

## 2022-05-22 RX ADMIN — MAGNESIUM HYDROXIDE 30 ML: 400 SUSPENSION ORAL at 14:39

## 2022-05-22 RX ADMIN — CARVEDILOL 6.25 MG: 6.25 TABLET, FILM COATED ORAL at 08:25

## 2022-05-22 RX ADMIN — DOCUSATE SODIUM 100 MG: 100 CAPSULE, LIQUID FILLED ORAL at 04:46

## 2022-05-22 RX ADMIN — ATORVASTATIN CALCIUM 80 MG: 40 TABLET, FILM COATED ORAL at 17:51

## 2022-05-22 RX ADMIN — CARVEDILOL 6.25 MG: 6.25 TABLET, FILM COATED ORAL at 17:51

## 2022-05-22 RX ADMIN — INSULIN HUMAN 2 UNITS: 100 INJECTION, SOLUTION PARENTERAL at 13:23

## 2022-05-22 RX ADMIN — SENNOSIDES AND DOCUSATE SODIUM 1 TABLET: 50; 8.6 TABLET ORAL at 14:39

## 2022-05-22 RX ADMIN — OXYCODONE HYDROCHLORIDE 20 MG: 10 TABLET ORAL at 00:26

## 2022-05-22 RX ADMIN — DOCUSATE SODIUM 100 MG: 100 CAPSULE, LIQUID FILLED ORAL at 17:51

## 2022-05-22 RX ADMIN — INSULIN HUMAN 2 UNITS: 100 INJECTION, SOLUTION PARENTERAL at 18:04

## 2022-05-22 ASSESSMENT — PAIN DESCRIPTION - PAIN TYPE
TYPE: ACUTE PAIN;NEUROPATHIC PAIN
TYPE: SURGICAL PAIN;NEUROPATHIC PAIN
TYPE: ACUTE PAIN;SURGICAL PAIN
TYPE: ACUTE PAIN;SURGICAL PAIN

## 2022-05-22 ASSESSMENT — ENCOUNTER SYMPTOMS
HEADACHES: 0
CHILLS: 0
FOCAL WEAKNESS: 0
PSYCHIATRIC NEGATIVE: 1
NAUSEA: 0
FEVER: 0
PALPITATIONS: 0
BLURRED VISION: 0
SINUS PAIN: 0
COUGH: 0
ROS SKIN COMMENTS: RIGHT LEG WOUNDS
DOUBLE VISION: 0
SHORTNESS OF BREATH: 0
SORE THROAT: 0
ABDOMINAL PAIN: 0

## 2022-05-22 NOTE — PROGRESS NOTES
Assumed care of patient at 1845. Bedside report received. Assessment complete.  AA&Ox4. Denies CP/SOB.  Reporting 8/10 pain.  Declined intervention at this time.  Educated patient regarding pharmacologic and non pharmacologic modalities for pain management.  Skin per flowsheet.  Tolerating diabetic diet. Denies N/V.  + void. - BM. Last BM PTA  Pt ambulates MAX assist.  NWB to L leg r/t AKA.  All needs met at this time. Call light within reach. Pt calls appropriately. Bed alarm on and audible. Bed low and locked, non skid socks in place. Hourly rounding in place.

## 2022-05-22 NOTE — CARE PLAN
The patient is Stable - Low risk of patient condition declining or worsening    Shift Goals  Clinical Goals: Pain management, Patient safety,  Patient Goals: Pain management, Rest  Family Goals: no family present at this time    Progress made toward(s) clinical / shift goals:        Problem: Pain - Standard  Goal: Alleviation of pain or a reduction in pain to the patient’s comfort goal  Outcome: Progressing  Note: Pain managed with prescribed medications and nonpharmalogical pain interventions      Problem: Skin Integrity  Goal: Skin integrity is maintained or improved  Outcome: Progressing  Note: Appropriate skin integrity interventions in place      Problem: Fall Risk  Goal: Patient will remain free from falls  Outcome: Progressing  Note: Appropriate fall precautions and safety interventions in place        Patient is not progressing towards the following goals:

## 2022-05-22 NOTE — DISCHARGE PLANNING
Received Choice form at 1303  Agency/Facility Name: [facility]  Referral sent per Choice form @ [phone]

## 2022-05-22 NOTE — PROGRESS NOTES
Hospital Medicine Daily Progress Note    Date of Service  5/22/2022    Chief Complaint  Luis Kellogg is a 71 y.o. male admitted 5/18/2022 with left leg pain    Hospital Course  Peyman Kellogg is a 71 year old male with past medical history of T2DM, neuropathy, HTN, PAD and tobacco use who presented 5/18/2022 for a scheduled left AKA with Dr. Buenrostro. Patient hypotensive during the procedure and was admitted to [facility]. Transferred to hospitalist service by Dr. Buenrostro. Patient also has right lower extremity wounds, LPS consulted.       Interval Problem Update  Patient seen and examined this morning. BP remains stable. Cellulitis improving, continue antibiotics. Per LPS, vascular to evaluate patient. Continue wound care.     I have personally seen and examined the patient at bedside. I discussed the plan of care with patient and bedside RN.    Consultants/Specialty  orthopedics and LPS    Code Status  Full Code    Disposition  Patient is not medically cleared for discharge.   Anticipate discharge to to skilled nursing facility.  I have placed the appropriate orders for post-discharge needs.    Review of Systems  Review of Systems   Constitutional: Negative for chills and fever.   HENT: Negative for sinus pain and sore throat.    Eyes: Negative for blurred vision and double vision.   Respiratory: Negative for cough and shortness of breath.    Cardiovascular: Negative for chest pain and palpitations.   Gastrointestinal: Negative for abdominal pain and nausea.   Genitourinary: Negative for dysuria and urgency.   Musculoskeletal:        Right leg pain     Skin:        Right leg wounds     Neurological: Negative for focal weakness and headaches.   Psychiatric/Behavioral: Negative.    All other systems reviewed and are negative.       Physical Exam  Temp:  [36.1 °C (97 °F)-36.7 °C (98 °F)] 36.7 °C (98 °F)  Pulse:  [72-80] 75  Resp:  [18] 18  BP: (115-134)/(60-84) 134/77  SpO2:  [91 %-95 %] 91 %    Physical  Exam  Vitals and nursing note reviewed.   Constitutional:       General: He is not in acute distress.     Appearance: He is obese.      Comments: Unkempt     HENT:      Head: Normocephalic and atraumatic.      Right Ear: External ear normal.      Left Ear: External ear normal.      Nose: Nose normal. No congestion.      Mouth/Throat:      Mouth: Mucous membranes are moist.      Pharynx: Oropharynx is clear.   Eyes:      General:         Right eye: No discharge.         Left eye: No discharge.   Cardiovascular:      Rate and Rhythm: Normal rate.      Heart sounds: Murmur heard.   Pulmonary:      Effort: Pulmonary effort is normal.      Breath sounds: Normal breath sounds.   Abdominal:      General: There is no distension.      Palpations: Abdomen is soft.      Tenderness: There is no abdominal tenderness.   Musculoskeletal:         General: Tenderness present.      Cervical back: Neck supple. No tenderness.      Comments: S/p left AKA     Skin:     General: Skin is warm and dry.      Capillary Refill: Capillary refill takes 2 to 3 seconds.      Comments: Wounds on right foot and right lateral shin. Shin and foot bandaged. Left stump with sutures in place, no erythema or drainage.      Neurological:      General: No focal deficit present.      Mental Status: He is alert and oriented to person, place, and time. Mental status is at baseline.   Psychiatric:         Mood and Affect: Mood normal.         Thought Content: Thought content normal.         Fluids    Intake/Output Summary (Last 24 hours) at 5/22/2022 1105  Last data filed at 5/22/2022 0700  Gross per 24 hour   Intake 460 ml   Output 2400 ml   Net -1940 ml       Laboratory  Recent Labs     05/20/22  0150   WBC 7.1   RBC 3.51*   HEMOGLOBIN 9.9*   HEMATOCRIT 30.4*   MCV 86.6   MCH 28.2   MCHC 32.6*   RDW 47.5   PLATELETCT 175   MPV 9.8     Recent Labs     05/20/22  0150   SODIUM 139   POTASSIUM 3.6   CHLORIDE 105   CO2 24   GLUCOSE 159*   BUN 8   CREATININE 0.49*    CALCIUM 8.7                   Imaging  US-MIA SINGLE LEVEL UNILAT RIGHT   Final Result      DX-TIBIA AND FIBULA RIGHT   Final Result         1.  No acute traumatic bony injury.   2.  No destructive osseous lesion is appreciated, note that plain film is insensitive for evaluation of osteomyelitis, and bone scan would offer improved diagnostic sensitivity as clinically appropriate.      DX-FOOT-COMPLETE 3+ RIGHT   Final Result         1.  No acute traumatic bony injury. No destructive osseous lesion is seen, note that plain film can be insensitive for evaluation of osteomyelitis and bone scan would offer improved diagnostic sensitivity as clinically appropriate.   2.  Degenerative changes of the foot, greatest at the midfoot and hindfoot      US-EXTREMITY ARTERY LOWER UNILAT RIGHT    (Results Pending)        Assessment/Plan  * Cellulitis of left lower extremity s/p left leg above knee amputation- (present on admission)  Assessment & Plan  Pt presented to [facility] for scheduled left AKA with Dr. Buenrostro on 5/18  No leukocytosis, afebrile  Continue pain control  PT/OT recommending post acute placement      Chronic, continuous use of opioids  Assessment & Plan  6 oxy 10/d at baseline  Carefully titrate oral/IV pain meds as appropriate  Multimodal therapy: celebrex/tylenol/Neurontin    Cellulitis of right leg- (present on admission)  Assessment & Plan  Pt has wounds on right lateral shin and right foot  Erythema and drainage noted   LPS consulted  Blood cultures negative  Continue unasyn      Marijuana abuse- (present on admission)  Assessment & Plan  Patient stated he smokes marijuana at night to help relieve some of his pain  Cessation encouraged    Tobacco use- (present on admission)  Assessment & Plan  Smokes a half a pack per day  Smoking cessation encouraged, pt expressing interest in quitting  Nicotine patches available      Primary hypertension- (present on admission)  Assessment & Plan  Post op hypotension  resolved  Continue lisinopril 5 mg and carvedilol 6.25 mg with hold parameters    Type 2 diabetes mellitus with hyperglycemia (HCC)- (present on admission)  Assessment & Plan  A1c 8.0  Complicated with neuropathy   Continue SSI  FSBG trending up, resume Lantus  Carb consistent diet      Chronic ulcer of lower extremity (HCC)- (present on admission)  Assessment & Plan  Patient has had chronic bilateral lower extremity ulcers, complicated by PAD and diabetes  S/p left AKA  Right leg with chronic wounds  Right arterial ultrasound with triphasic waveforms through the dorsalis pedis   Pt to be evaluated by vascular  LPS consulted       Drug-induced constipation- (present on admission)  Assessment & Plan  Patient is on chronic Percocet at home due to bilateral lower extremity pain  Continue pain management in setting of recent AKA, wean as able  Continue bowel regimen     Peripheral vascular disease (HCC)- (present on admission)  Assessment & Plan  PAD complicating patient's diabetic neuropathy and nonhealing ulcers  S/p left AKA on 5/18  Right lower extremity wounds, LPS consulted  Right arterial ultrasound with triphasic waveforms through the dorsalis pedis   Continue atorvastatin  Continue gabapentin      Hyperlipidemia- (present on admission)  Assessment & Plan  Continue home atorvastatin       VTE prophylaxis: enoxaparin ppx    I have performed a physical exam and reviewed and updated ROS and Plan today (5/22/2022). In review of yesterday's note (5/21/2022), there are no changes except as documented above.

## 2022-05-22 NOTE — CARE PLAN
Problem: Pain - Standard  Goal: Alleviation of pain or a reduction in pain to the patient’s comfort goal  Outcome: Progressing     Problem: Skin Integrity  Goal: Skin integrity is maintained or improved  Outcome: Progressing     Problem: Fall Risk  Goal: Patient will remain free from falls  Outcome: Progressing     The patient is Watcher - Medium risk of patient condition declining or worsening    Shift Goals  Clinical Goals: safety, pain control  Patient Goals: pain control  Family Goals: no family present at this time    Progress made toward(s) clinical / shift goals:  Patient remained free from falls throughout shift. Patient reported pain to be managed with PRN medications. Educated on pharmacological and non pharmacological modalities.     Patient is not progressing towards the following goals:

## 2022-05-22 NOTE — PROGRESS NOTES
Pt A&Ox4  Pain managed with prescribed medications and nonpharmalogical pain interventions     Tolerating diet, denies n/v. - bowel sounds, + flatus, LBM PTA   +void   Saturating >90% on 1L O2 via NC   Denies SOB     Left AKA present, dressing in place   Right leg/foot wounds, dressing in place   NWB to LLE     Bed alarm in place for patient safety     Updated on plan of care. Safety education provided. Bed locked in low. Call light within reach. Rounding in place.

## 2022-05-22 NOTE — DISCHARGE PLANNING
"Case Management Discharge Planning    Admission Date: 5/18/2022  GMLOS: 7  ALOS: 4    6-Clicks ADL Score: 17  6-Clicks Mobility Score: 10  PT and/or OT Eval ordered: Yes  Post-acute Referrals Ordered: Yes  Post-acute Choice Obtained: Yes  Has referral(s) been sent to post-acute provider:  Yes      Anticipated Discharge Dispo: Discharge Disposition: Disch to IP rehab facility or distinct part unit (62) (Rehab/ SNF)    DME Needed: Patient reports he needs prosthetic     Action(s) Taken: Choice obtained and Referral(s) sent    Escalations Completed: None    Medically Clear: No    Next Steps: RN EVAN noted referral for SNF in chart.  CM note from Friday 5/20 states CM met with patient and he is in agreement to discharge to a skilled nursing prior to going home.  This RN CM met with patient at bedside to obtain choice.  Patient became very upset when discussing discharge planning to SNF and reports he was under the impression he would be receiving his rehab here in the hospital.  RN EVAN discussed acute care, versus rehab, versus SNF with patient. Patient reports he has been to previous SNF in [place] in the past and did not receive what he felt was adequate care.  Patient told this RN CM \"fuck this, I just want to get my prosthetic and go home.\"  RN EVAN attempted to calm patient and discuss options and choice form.  Patient reports he does not want to go to another facility owned by someone else and wants to stay in [facility].  Patient reports he would want to go to the [facility] if possible.  Discussed with patient criteria and that he would have to be assessed by the rehab physiatrist if okay with attending team to determine if he is a good candidate for the rehab hospital.  Patient reports he would like to do this and provided verbal choice for [facility]. Choice form completed and faxed to [facility].      Barriers to Discharge: Pending Placement    Is the patient up for discharge tomorrow: No      "

## 2022-05-22 NOTE — PROGRESS NOTES
Clinical photos of right foot reviewed  Non invasive arterial studies performed  Pending consult from Dr Purcell regarding recommendations  Likely right first and second toe osteomyelitis  Definitive management may include 1st and 2nd toe amputation  Can be performed on an outpatient basis pending adequate arterial flow to the right foot  Will continue to follow as inpatient      Aleks Buenrostro MD

## 2022-05-23 LAB
GLUCOSE BLD STRIP.AUTO-MCNC: 130 MG/DL (ref 65–99)
GLUCOSE BLD STRIP.AUTO-MCNC: 173 MG/DL (ref 65–99)
GLUCOSE BLD STRIP.AUTO-MCNC: 307 MG/DL (ref 65–99)

## 2022-05-23 PROCEDURE — 700102 HCHG RX REV CODE 250 W/ 637 OVERRIDE(OP): Performed by: ORTHOPAEDIC SURGERY

## 2022-05-23 PROCEDURE — 700102 HCHG RX REV CODE 250 W/ 637 OVERRIDE(OP): Performed by: NURSE PRACTITIONER

## 2022-05-23 PROCEDURE — 700105 HCHG RX REV CODE 258: Performed by: NURSE PRACTITIONER

## 2022-05-23 PROCEDURE — 770001 HCHG ROOM/CARE - MED/SURG/GYN PRIV*

## 2022-05-23 PROCEDURE — 700111 HCHG RX REV CODE 636 W/ 250 OVERRIDE (IP): Performed by: NURSE PRACTITIONER

## 2022-05-23 PROCEDURE — 99232 SBSQ HOSP IP/OBS MODERATE 35: CPT | Performed by: NURSE PRACTITIONER

## 2022-05-23 PROCEDURE — A9270 NON-COVERED ITEM OR SERVICE: HCPCS | Performed by: ORTHOPAEDIC SURGERY

## 2022-05-23 PROCEDURE — A9270 NON-COVERED ITEM OR SERVICE: HCPCS | Performed by: NURSE PRACTITIONER

## 2022-05-23 PROCEDURE — 700111 HCHG RX REV CODE 636 W/ 250 OVERRIDE (IP): Performed by: ORTHOPAEDIC SURGERY

## 2022-05-23 PROCEDURE — 82962 GLUCOSE BLOOD TEST: CPT

## 2022-05-23 RX ORDER — OXYCODONE HYDROCHLORIDE 10 MG/1
20 TABLET ORAL EVERY 4 HOURS PRN
Status: DISCONTINUED | OUTPATIENT
Start: 2022-05-23 | End: 2022-06-07 | Stop reason: HOSPADM

## 2022-05-23 RX ORDER — OXYCODONE HYDROCHLORIDE 10 MG/1
10 TABLET ORAL EVERY 4 HOURS PRN
Status: DISCONTINUED | OUTPATIENT
Start: 2022-05-23 | End: 2022-06-07 | Stop reason: HOSPADM

## 2022-05-23 RX ORDER — HYDROMORPHONE HYDROCHLORIDE 1 MG/ML
1 INJECTION, SOLUTION INTRAMUSCULAR; INTRAVENOUS; SUBCUTANEOUS EVERY 4 HOURS PRN
Status: DISCONTINUED | OUTPATIENT
Start: 2022-05-23 | End: 2022-06-07 | Stop reason: HOSPADM

## 2022-05-23 RX ADMIN — ACETAMINOPHEN 650 MG: 325 TABLET ORAL at 05:42

## 2022-05-23 RX ADMIN — SODIUM CHLORIDE 3 G: 900 INJECTION INTRAVENOUS at 23:07

## 2022-05-23 RX ADMIN — ACETAMINOPHEN 650 MG: 325 TABLET ORAL at 23:07

## 2022-05-23 RX ADMIN — SENNOSIDES AND DOCUSATE SODIUM 1 TABLET: 50; 8.6 TABLET ORAL at 20:41

## 2022-05-23 RX ADMIN — SODIUM CHLORIDE 3 G: 900 INJECTION INTRAVENOUS at 20:42

## 2022-05-23 RX ADMIN — ACETAMINOPHEN 650 MG: 325 TABLET ORAL at 11:34

## 2022-05-23 RX ADMIN — OXYCODONE HYDROCHLORIDE 20 MG: 10 TABLET ORAL at 14:12

## 2022-05-23 RX ADMIN — DOCUSATE SODIUM 100 MG: 100 CAPSULE, LIQUID FILLED ORAL at 05:42

## 2022-05-23 RX ADMIN — LISINOPRIL 5 MG: 5 TABLET ORAL at 05:42

## 2022-05-23 RX ADMIN — INSULIN HUMAN 6 UNITS: 100 INJECTION, SOLUTION PARENTERAL at 20:53

## 2022-05-23 RX ADMIN — POLYETHYLENE GLYCOL 3350 1 PACKET: 17 POWDER, FOR SOLUTION ORAL at 05:42

## 2022-05-23 RX ADMIN — OXYCODONE HYDROCHLORIDE 20 MG: 10 TABLET ORAL at 03:57

## 2022-05-23 RX ADMIN — SODIUM CHLORIDE 3 G: 900 INJECTION INTRAVENOUS at 11:34

## 2022-05-23 RX ADMIN — ACETAMINOPHEN 650 MG: 325 TABLET ORAL at 00:22

## 2022-05-23 RX ADMIN — HYDROMORPHONE HYDROCHLORIDE 1 MG: 1 INJECTION, SOLUTION INTRAMUSCULAR; INTRAVENOUS; SUBCUTANEOUS at 11:34

## 2022-05-23 RX ADMIN — OXYCODONE HYDROCHLORIDE 20 MG: 10 TABLET ORAL at 20:42

## 2022-05-23 RX ADMIN — SODIUM CHLORIDE 3 G: 900 INJECTION INTRAVENOUS at 00:22

## 2022-05-23 RX ADMIN — CARVEDILOL 6.25 MG: 6.25 TABLET, FILM COATED ORAL at 08:55

## 2022-05-23 RX ADMIN — ENOXAPARIN SODIUM 40 MG: 40 INJECTION SUBCUTANEOUS at 00:21

## 2022-05-23 RX ADMIN — SODIUM CHLORIDE 3 G: 900 INJECTION INTRAVENOUS at 05:43

## 2022-05-23 RX ADMIN — HYDROMORPHONE HYDROCHLORIDE 1 MG: 1 INJECTION, SOLUTION INTRAMUSCULAR; INTRAVENOUS; SUBCUTANEOUS at 00:31

## 2022-05-23 RX ADMIN — OXYCODONE HYDROCHLORIDE 20 MG: 10 TABLET ORAL at 09:59

## 2022-05-23 RX ADMIN — GABAPENTIN 1800 MG: 300 CAPSULE ORAL at 05:42

## 2022-05-23 RX ADMIN — TRAZODONE HYDROCHLORIDE 450 MG: 150 TABLET ORAL at 20:41

## 2022-05-23 RX ADMIN — ENOXAPARIN SODIUM 40 MG: 40 INJECTION SUBCUTANEOUS at 23:07

## 2022-05-23 RX ADMIN — INSULIN HUMAN 2 UNITS: 100 INJECTION, SOLUTION PARENTERAL at 12:33

## 2022-05-23 RX ADMIN — SODIUM HYPOCHLORITE 1 ML: 1.25 SOLUTION TOPICAL at 05:45

## 2022-05-23 ASSESSMENT — PAIN DESCRIPTION - PAIN TYPE
TYPE: ACUTE PAIN
TYPE: ACUTE PAIN;NEUROPATHIC PAIN
TYPE: ACUTE PAIN;NEUROPATHIC PAIN
TYPE: ACUTE PAIN

## 2022-05-23 ASSESSMENT — ENCOUNTER SYMPTOMS
FOCAL WEAKNESS: 0
SINUS PAIN: 0
ROS SKIN COMMENTS: RIGHT LEG WOUNDS
BLURRED VISION: 0
SORE THROAT: 0
COUGH: 0
NAUSEA: 0
SHORTNESS OF BREATH: 0
CHILLS: 0
PSYCHIATRIC NEGATIVE: 1
DOUBLE VISION: 0
HEADACHES: 0
FEVER: 0
ABDOMINAL PAIN: 0
PALPITATIONS: 0

## 2022-05-23 NOTE — PROGRESS NOTES
"This RN attempting to do patient's dressing change as ordered, patient unhappy with this RN's wound care work and dressing. Patient began to raise voice at this RN and made an advance to grab this RN. Patient screaming \"Get the hell out of here you are a terrible nurse\" This RN reinforced inappropriate behavior and exited room, charge RN and security notified   "

## 2022-05-23 NOTE — PROGRESS NOTES
Patient refusing waffle mattress and turns at this time, education provided, patient continues to refuse

## 2022-05-23 NOTE — PROGRESS NOTES
Assumed care of patient at 1845. Bedside report received. Assessment complete.  AA&Ox4. Denies CP/SOB.  Reporting 9/10 pain. Medicated per MAR.   Educated patient regarding pharmacologic and non pharmacologic modalities for pain management.  Skin per flowsheet.  Tolerating CHO diet. Denies N/V.  + void. - BM. Last BM PTA. Bowel protocol initiated.   Pt transfers x1-2 assist.   LLE NWB r/t AKA  All needs met at this time. Call light within reach. Pt calls appropriately. Bed alarm on and audible. Bed low and locked, non skid socks in place. Hourly rounding in place.

## 2022-05-23 NOTE — PROGRESS NOTES
[facility]ATION SERVICE   POST SURGICAL PROGRESS NOTE      HPI:  Luis Kellogg is a 71 y.o.  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   Pt has been receiving treatment for R leg wounds since 10/2021.  Amputation to L LE was planned as an outpatient on 4/28/22 due to chronic wounds on that foot.      SURGERY DATE: 5/18/2022 by Dr Buenrostro   PROCEDURE: PROCEDURES PERFORMED:  1.  Left above knee amputation.  2.  Left adductor myotenodesis to the femur.      INTERVAL HISTORY:  5/21/2022: POD #3.  Patient denies fevers, chills, nausea, vomiting.  Pain well controlled.  Patient states that dressing will not stay on his left AKA, due to movement.  Patient also verifies that he has been seen by [facility].  5/23/2022: POD #5.  Denies fevers, chills, nausea, vomiting.  Complaining of neuropathic pain.  Gabapentin increased.  Discussed case with vascular surgery, vascular intervention not needed at this time.          PERTINENT LPS RESULTS:   5/22/2022  RIGHT   Waveform        Peak Systolic Velocity (cm/s)                   Prox    Prox-Mid  Mid    Mid-Dist  Distal   Triphasic                         94                       CFA         Triphasic       72                                         PFA         Triphasic       115               161              122     SFA         Triphasic                         58                       POP         Biphasic        116                                91      AT         Triphasic       35                                 66      PT         Triphasic       60                                 37      SOFIA        FINDINGS   Right.       Diffuse atherosclerotic plaque.    50-75% stenosis in the mid superficial femoral artery.    Multiphasic flow  "demonstarted throoughout the extremity.       5/18/22  Pathology: FINAL DIAGNOSIS:     A. Left leg:          Above-the-knee amputation demonstrating the following features:          -The 2nd, 3rd, 4th phalanges are absent with associated diffuse           ulceration. There are multiple additional ulcerated lesions           ranging from 1.7 cm to 17.5 cm in maximum dimension, extending           to within 8.7 cm of the nearest proximal soft tissue margin.           There is minimal skin slippage noted. The knee displays           multiple metallic medical components consistent with knee           replacement hardware.          -A section taken through the ulcerated skin demonstrates focal           marked ulceration of the skin surface with marked acute           inflammation extending within the subjacent dermal tissue. The           underlying metatarsal bone shows predominantly fibroadipose           tissue within the medullary space. There is no evidence for           acute or chronic osteomyelitis.          -There is viable skin and soft tissue at the proximal resection           margin. The popliteal blood vessels demonstrate patent lumens           with focal calcifications within the blood vessel wall. The           proximal bone marrow tissue shows predominantly fibroadipose           tissue and blood clot.          -No malignancy is seen.     OR culture: none    SURGICAL SITE EXAM:      /61   Pulse 79   Temp 36.8 °C (98.3 °F) (Temporal)   Resp 18   Ht 1.702 m (5' 7\")   Wt 102 kg (225 lb 15.5 oz)   SpO2 94%   BMI 35.39 kg/m²   Monofilament testing has not been completed to right foot due to current wounds.    Surgical leg warm     Pedal Pulses:   R foot: palpable DP, palpable PT    Incision   location: Left AKA   Incision well approximated, sutures intact.   Severe ecchymosis to lateral thigh, posterior stump, anterior stump  No erythema  Moderate to severe edema  Scant serous drainage     " "wound   location: Right dorsal first and second toe  Full thickness, joint easily probed  Wound characteristics:  red nongranular tissue   wound edges open  Minimal to moderate erythema  Minimal serosanguineous       wound   location: Right dorsal foot  Full thickness, does not probe to bone  Wound characteristics: Red nongranular tissue   wound edges open  No erythema  No new drainage  No odor    wound   location: Right lateral foot-almost resolved  Full thickness, does not probe to bone  Wound characteristics: Pinpoint opening with red tissue, callus to periwound  Wound edges open  No erythema  No drainage  No odor       wound   location: Right lateral leg  Full thickness, does not probe to bone  Wound characteristics: Red nongranular tissue with slough 20% scattered  wound edges open  Slight erythema  Moderate to severe edema  Green drainage      Wound care to be completed by RN      Photo(s):       Left AKA 5/21/2022    Lateral left AKA      5/19/2022  Right 1st & 2nd toes dorsal     Right dorsal foot     Right lateral foot           DIABETES MANAGEMENT:    A1c:   Lab Results   Component Value Date/Time    HBA1C 8.0 (H) 05/19/2022 03:34 AM            INFECTION MANAGEMENT:    Results from last 7 days   Lab Units 05/20/22  0150 05/19/22  0334 05/18/22  0640   WBC 1501 K/uL 7.1 6.5 6.5   PLATELET COUNT 1518 K/uL 175 208 224     Wound culture results:   Results     Procedure Component Value Units Date/Time    BLOOD CULTURE [[id number]] Collected: 05/19/22 1021    Order Status: Completed Specimen: Blood from Peripheral Updated: 05/20/22 0723     Significant Indicator NEG     Source BLD     Site PERIPHERAL     Culture Result No Growth  Note: Blood cultures are incubated for 5 days and  are monitored continuously.Positive blood cultures  are called to the RN and reported as soon as  they are identified.      Narrative:      Per Hospital Policy: Only change Specimen Src: to \"Line\" if  specified by physician order.  Right " "AC    BLOOD CULTURE [[id number]] Collected: 05/19/22 1432    Order Status: Completed Specimen: Blood from Peripheral Updated: 05/20/22 0723     Significant Indicator NEG     Source BLD     Site PERIPHERAL     Culture Result No Growth  Note: Blood cultures are incubated for 5 days and  are monitored continuously.Positive blood cultures  are called to the RN and reported as soon as  they are identified.      Narrative:      Per Hospital Policy: Only change Specimen Src: to \"Line\" if  specified by physician order.  Right AC           ASSESSMENT/PLAN:   POD #5 S/P: Left above knee amputation, left adductor myotenodesis to the femur 5/18/2022 by Dr Buenrostro.          -Left AKA approximated with sutures.  Ecchymosis and pain to posterior and lateral leg.  Elevate with waffle cushion or Z flow pillow.  Discussed with RN.  Continue neuropathic and pain medications as needed.  - Arterial studies completed.  Discussed case with vascular surgery, Dr. Purcell no plans for vascular intervention at this time.  SFA stent open  - Right first and second toes: Cellulitis improved however joint easily palpated.  Discussed surgical and nonsurgical options.  Patient would like to proceed with surgery.  Discussed case with orthopedics.  Planning for right first and second toe amputation, right calf I&D with possible VAC placement this admission          Wound care:     RIGHT GREAT TOE, SECOND TOE, DORSAL FOOT, LATERAL LEG - nursing to remove dressing, clean with saline and gauze, protect indu wound with barrier cream, place adaptic over wound beds, moisten rolled gauze with Dakins and wring out, cover all wounds with dakins soaked gauze, cover leg with ABDs and secure with rolled gauze.  Cover toes and foot with optifoam thin foam and secure with hypafix tape.  Change twice daily  -Left AKA: Cleanse suture line with wound cleanser, pat dry.  Apply single layer of Adaptic over suture line, secure with roll gauze, secure with Hypafix " tape.  To be changed by nursing every 48 hours    Imaging/Labs:  -COVID-19: not detected this admission    Vascular status:   -Right pedal pulse with doppler diminished  - Right SFA stent placed October 2021.  Patient with multiphasic flow throughout extremity.  No plans for intervention by vascular surgery    Surgery:   -Patient would like to proceed with right great toe, second toe amputation and right calf I&D with possible VAC while admitted  - Discussed case with Dr. Buenrostro who will coordinate surgery.    Antibiotics:   -ID not involved. Antibiotics managed by hospitalist.    Weight Bearing Status:   -Weight bearing as tolerated to right leg  -Nonweightbearing to left AKA    Offloading:   -None;   -Orthotic company: [employer].    Offloading shoe ordered      PT/OT:   - involved. Last seen 5/20.       Diabetes Education:   -Not involved at this time    - Implications of loss of protective sensation (LOPS) discussed with patient- including increased risk for amputation.  Advised to check feet at least daily, moisturize feet, and to always wear protective foot wear.   -avoid trimming own nails. See podiatrist or certified foot and nail RN  -keep blood sugars <150 for improved wound healing            DISCHARGE PLAN:    Disposition:   TBD  Recommend SNF    Follow-up: Dr. Buenrostro . Sutures to be removed approximately 3 weeks post-op to left AKA        Discussed with: pt, RN, Dr. Buenrostro, Dr. Jazzy Winkler          Please note that this dictation was created using voice recognition software. I have  worked with technical experts from [facility] to optimize the interface.  I have made every reasonable attempt to correct obvious errors, but there may be errors of grammar and possibly content that I did not discover before finalizing the note.      Mary Woods, LIBERTY.P.R.N.    If any questions or concerns, please contact [facility] through voalte.

## 2022-05-23 NOTE — PROGRESS NOTES
Pt A&Ox4  Pain managed with prescribed medications   Tolerating diet, denies n/v. +bowel sounds, + flatus, LBM PTA   +void   Saturating >90% on  Room air   Denies SOB   Patient is NWB to Left lower extremity    Updated on plan of care. Safety education provided. Bed locked in low. Call light within reach. Rounding in place.

## 2022-05-23 NOTE — CARE PLAN
Problem: Pain - Standard  Goal: Alleviation of pain or a reduction in pain to the patient’s comfort goal  Outcome: Progressing     Problem: Skin Integrity  Goal: Skin integrity is maintained or improved  Outcome: Progressing     Problem: Fall Risk  Goal: Patient will remain free from falls  Outcome: Progressing     The patient is Watcher - Medium risk of patient condition declining or worsening    Shift Goals  Clinical Goals: pain management  Patient Goals: pain management, rest  Family Goals: no family present at this time    Progress made toward(s) clinical / shift goals:  Patient reported pain to be managed with PRN and scheduled medications. Educated on pharmacological and non pharmacological modalities.

## 2022-05-23 NOTE — PROGRESS NOTES
"This RN entered room to assess patient and administer medications. Patient verbalizes unhappiness with no one coming to take his breakfast order. This RN reinforced that nutrition would be by later to take an order and that this RN can put in breakfast order. Also informed patient that over the weekend there may be less staff for nutrition on the weekends and that may be why he did not have his order taken the previous day. Patient states \"I am not talking about the fucking weekend\" This RN reinforced that foul language is not appropriate, and that dietary is unable to come by early in the morning before breakfast trays are made. This RN again offered to call and put in an order for patient's breakfast and lunch, patient agreeable. After this RN placed order for patient's breakfast patient continued to raise voice at this RN regarding being unhappy with not being able to have someone come in and take his food order for every meal. This RN again reinforced that an order was just put in for him per his request. This RN also informed patient that it is not appropriate to raise his voice at nursing staff. This RN administered ordered medications to patient and patient stated \"You better come back in a fucking hour with my fucking oxy\" This RN reinforced that is not appropriate language and that this RN would now be leaving the room. Patient yells out to RN \"you are a pain in the ass\" this RN rentered room to speak to patient, patient directly said to this RN \"You are a fucking pain in the ass\" Charge RN notified   "

## 2022-05-23 NOTE — PROGRESS NOTES
Hospital Medicine Daily Progress Note    Date of Service  5/23/2022    Chief Complaint  Luis Kellogg is a 71 y.o. male admitted 5/18/2022 with left leg pain    Hospital Course  Peyman Kellogg is a 71 year old male with past medical history of T2DM, neuropathy, HTN, PAD and tobacco use who presented 5/18/2022 for a scheduled left AKA with Dr. Buenrostro. Patient hypotensive during the procedure and was admitted to [facility]. Transferred to hospitalist service by Dr. Buenrostro. Patient also has right lower extremity wounds, LPS consulted.       Interval Problem Update  Pt seen and examined this morning. Left AKA stump bandaged with no visible dressing. Pending evaluation from vascular for right lower extremity. Lantus increased. Pending SNF placement.     I have personally seen and examined the patient at bedside. I discussed the plan of care with patient and bedside RN.    Consultants/Specialty  orthopedics, vascular surgery and LPS    Code Status  Full Code    Disposition  Patient is not medically cleared for discharge.   Anticipate discharge to to skilled nursing facility.  I have placed the appropriate orders for post-discharge needs.    Review of Systems  Review of Systems   Constitutional: Negative for chills and fever.   HENT: Negative for sinus pain and sore throat.    Eyes: Negative for blurred vision and double vision.   Respiratory: Negative for cough and shortness of breath.    Cardiovascular: Negative for chest pain and palpitations.   Gastrointestinal: Negative for abdominal pain and nausea.   Genitourinary: Negative for dysuria and urgency.   Musculoskeletal:        Right leg pain     Skin:        Right leg wounds     Neurological: Negative for focal weakness and headaches.   Psychiatric/Behavioral: Negative.    All other systems reviewed and are negative.       Physical Exam  Temp:  [35.7 °C (96.2 °F)-36.9 °C (98.5 °F)] 35.7 °C (96.2 °F)  Pulse:  [73-78] 77  Resp:  [18] 18  BP: (119-149)/(75-92)  132/92  SpO2:  [93 %-94 %] 94 %    Physical Exam  Vitals and nursing note reviewed.   Constitutional:       General: He is not in acute distress.     Appearance: He is obese.      Comments: Unkempt     HENT:      Head: Normocephalic and atraumatic.      Right Ear: External ear normal.      Left Ear: External ear normal.      Nose: Nose normal. No congestion.      Mouth/Throat:      Mouth: Mucous membranes are moist.      Pharynx: Oropharynx is clear.   Eyes:      General:         Right eye: No discharge.         Left eye: No discharge.   Cardiovascular:      Rate and Rhythm: Normal rate.      Heart sounds: Murmur heard.   Pulmonary:      Effort: Pulmonary effort is normal.      Breath sounds: Normal breath sounds.   Abdominal:      General: There is no distension.      Palpations: Abdomen is soft.      Tenderness: There is no abdominal tenderness.   Musculoskeletal:         General: Tenderness present.      Cervical back: Neck supple. No tenderness.      Comments: S/p left AKA     Skin:     General: Skin is warm and dry.      Capillary Refill: Capillary refill takes 2 to 3 seconds.      Comments: Wounds on right foot and right lateral shin. Shin and foot bandaged. Left stump with sutures in place, no erythema or drainage.      Neurological:      General: No focal deficit present.      Mental Status: He is alert and oriented to person, place, and time. Mental status is at baseline.   Psychiatric:         Mood and Affect: Mood normal.         Thought Content: Thought content normal.         Fluids    Intake/Output Summary (Last 24 hours) at 5/23/2022 1256  Last data filed at 5/23/2022 0741  Gross per 24 hour   Intake 370 ml   Output 800 ml   Net -430 ml       Laboratory                        Imaging  US-EXTREMITY ARTERY LOWER UNILAT RIGHT   Final Result      US-MIA SINGLE LEVEL UNILAT RIGHT   Final Result      DX-TIBIA AND FIBULA RIGHT   Final Result         1.  No acute traumatic bony injury.   2.  No destructive  osseous lesion is appreciated, note that plain film is insensitive for evaluation of osteomyelitis, and bone scan would offer improved diagnostic sensitivity as clinically appropriate.      DX-FOOT-COMPLETE 3+ RIGHT   Final Result         1.  No acute traumatic bony injury. No destructive osseous lesion is seen, note that plain film can be insensitive for evaluation of osteomyelitis and bone scan would offer improved diagnostic sensitivity as clinically appropriate.   2.  Degenerative changes of the foot, greatest at the midfoot and hindfoot           Assessment/Plan  * Cellulitis of left lower extremity s/p left leg above knee amputation- (present on admission)  Assessment & Plan  Pt presented to [facility] for scheduled left AKA with Dr. Buenrostro on 5/18  No leukocytosis, afebrile  Continue pain control  PT/OT recommending post acute placement      Chronic, continuous use of opioids  Assessment & Plan  6 oxy 10/d at baseline  Carefully titrate oral/IV pain meds as appropriate  Multimodal therapy: celebrex/tylenol/Neurontin    Cellulitis of right leg- (present on admission)  Assessment & Plan  Pt has wounds on right lateral shin and right foot  Erythema and drainage noted   LPS consulted  Blood cultures negative  Continue unasyn      Marijuana abuse- (present on admission)  Assessment & Plan  Patient stated he smokes marijuana at night to help relieve some of his pain  Cessation encouraged    Tobacco use- (present on admission)  Assessment & Plan  Smokes a half a pack per day  Smoking cessation encouraged, pt expressing interest in quitting  Nicotine patches available      Primary hypertension- (present on admission)  Assessment & Plan  Post op hypotension resolved  Continue lisinopril 5 mg and carvedilol 6.25 mg with hold parameters    Type 2 diabetes mellitus with hyperglycemia (HCC)- (present on admission)  Assessment & Plan  A1c 8.0  Complicated with neuropathy   Continue SSI  FSBG trending up, Lantus  increased  Carb consistent diet      Chronic ulcer of lower extremity (HCC)- (present on admission)  Assessment & Plan  Patient has had chronic bilateral lower extremity ulcers, complicated by PAD and diabetes  S/p left AKA  Right leg with chronic wounds  Right arterial ultrasound with triphasic waveforms through the dorsalis pedis   Pt to be evaluated by vascular, per Dr. Buenrostro will likely need 1st and 2nd toe amputation  LPS consulted       Drug-induced constipation- (present on admission)  Assessment & Plan  Patient is on chronic Percocet at home due to bilateral lower extremity pain  Continue pain management in setting of recent AKA, wean as able  Continue bowel regimen     Peripheral vascular disease (HCC)- (present on admission)  Assessment & Plan  PAD complicating patient's diabetic neuropathy and nonhealing ulcers  S/p left AKA on 5/18  Right lower extremity wounds, LPS consulted  Right arterial ultrasound with triphasic waveforms through the dorsalis pedis   Pending vascular consult for RLE  Continue atorvastatin  Continue gabapentin      Hyperlipidemia- (present on admission)  Assessment & Plan  Continue home atorvastatin       VTE prophylaxis: enoxaparin ppx    I have performed a physical exam and reviewed and updated ROS and Plan today (5/23/2022). In review of yesterday's note (5/22/2022), there are no changes except as documented above.

## 2022-05-23 NOTE — PROGRESS NOTES
Patient reporting pain/discomforty in LLE, this RN offered to reposition patient, patient refuses repositioning, education provided to patient about importance of repositioning to take pressure off of the extremity to relieve pain/discomfort This RN offered heat/ice packs to patient, Patient refuses all alternative pain interventions offered

## 2022-05-24 PROBLEM — D64.9 ANEMIA: Status: ACTIVE | Noted: 2022-05-24

## 2022-05-24 PROBLEM — Z89.612 S/P AKA (ABOVE KNEE AMPUTATION), LEFT (HCC): Status: ACTIVE | Noted: 2018-05-15

## 2022-05-24 PROBLEM — Z79.4 TYPE 2 DIABETES MELLITUS WITH HYPERGLYCEMIA, WITH LONG-TERM CURRENT USE OF INSULIN (HCC): Status: ACTIVE | Noted: 2021-11-09

## 2022-05-24 LAB
BACTERIA BLD CULT: NORMAL
BACTERIA BLD CULT: NORMAL
GLUCOSE BLD STRIP.AUTO-MCNC: 133 MG/DL (ref 65–99)
GLUCOSE BLD STRIP.AUTO-MCNC: 152 MG/DL (ref 65–99)
GLUCOSE BLD STRIP.AUTO-MCNC: 179 MG/DL (ref 65–99)
GLUCOSE BLD STRIP.AUTO-MCNC: 191 MG/DL (ref 65–99)
SIGNIFICANT IND 70042: NORMAL
SIGNIFICANT IND 70042: NORMAL
SITE SITE: NORMAL
SITE SITE: NORMAL
SOURCE SOURCE: NORMAL
SOURCE SOURCE: NORMAL

## 2022-05-24 PROCEDURE — 700105 HCHG RX REV CODE 258: Performed by: NURSE PRACTITIONER

## 2022-05-24 PROCEDURE — 302098 PASTE RING (FLAT): Performed by: FAMILY MEDICINE

## 2022-05-24 PROCEDURE — A9270 NON-COVERED ITEM OR SERVICE: HCPCS | Performed by: ORTHOPAEDIC SURGERY

## 2022-05-24 PROCEDURE — A9270 NON-COVERED ITEM OR SERVICE: HCPCS | Performed by: NURSE PRACTITIONER

## 2022-05-24 PROCEDURE — 700111 HCHG RX REV CODE 636 W/ 250 OVERRIDE (IP): Performed by: ORTHOPAEDIC SURGERY

## 2022-05-24 PROCEDURE — 82962 GLUCOSE BLOOD TEST: CPT | Mod: 91

## 2022-05-24 PROCEDURE — 99232 SBSQ HOSP IP/OBS MODERATE 35: CPT | Performed by: FAMILY MEDICINE

## 2022-05-24 PROCEDURE — 700111 HCHG RX REV CODE 636 W/ 250 OVERRIDE (IP): Performed by: NURSE PRACTITIONER

## 2022-05-24 PROCEDURE — 97530 THERAPEUTIC ACTIVITIES: CPT

## 2022-05-24 PROCEDURE — 306591 TRAY SUTURE REMOVAL DISP: Performed by: FAMILY MEDICINE

## 2022-05-24 PROCEDURE — 700102 HCHG RX REV CODE 250 W/ 637 OVERRIDE(OP): Performed by: ORTHOPAEDIC SURGERY

## 2022-05-24 PROCEDURE — 97602 WOUND(S) CARE NON-SELECTIVE: CPT

## 2022-05-24 PROCEDURE — 770001 HCHG ROOM/CARE - MED/SURG/GYN PRIV*

## 2022-05-24 PROCEDURE — 700102 HCHG RX REV CODE 250 W/ 637 OVERRIDE(OP): Performed by: NURSE PRACTITIONER

## 2022-05-24 PROCEDURE — 97606 NEG PRS WND THER DME>50 SQCM: CPT

## 2022-05-24 RX ADMIN — OXYCODONE HYDROCHLORIDE 20 MG: 10 TABLET ORAL at 04:58

## 2022-05-24 RX ADMIN — INSULIN HUMAN 2 UNITS: 100 INJECTION, SOLUTION PARENTERAL at 21:11

## 2022-05-24 RX ADMIN — INSULIN HUMAN 2 UNITS: 100 INJECTION, SOLUTION PARENTERAL at 08:03

## 2022-05-24 RX ADMIN — LISINOPRIL 5 MG: 5 TABLET ORAL at 05:28

## 2022-05-24 RX ADMIN — OXYCODONE HYDROCHLORIDE 20 MG: 10 TABLET ORAL at 09:01

## 2022-05-24 RX ADMIN — INSULIN HUMAN 2 UNITS: 100 INJECTION, SOLUTION PARENTERAL at 18:21

## 2022-05-24 RX ADMIN — ATORVASTATIN CALCIUM 80 MG: 40 TABLET, FILM COATED ORAL at 17:34

## 2022-05-24 RX ADMIN — GABAPENTIN 1800 MG: 300 CAPSULE ORAL at 17:34

## 2022-05-24 RX ADMIN — DOCUSATE SODIUM 100 MG: 100 CAPSULE, LIQUID FILLED ORAL at 05:27

## 2022-05-24 RX ADMIN — SODIUM CHLORIDE 3 G: 900 INJECTION INTRAVENOUS at 17:35

## 2022-05-24 RX ADMIN — TRAZODONE HYDROCHLORIDE 450 MG: 150 TABLET ORAL at 21:11

## 2022-05-24 RX ADMIN — ACETAMINOPHEN 650 MG: 325 TABLET ORAL at 05:27

## 2022-05-24 RX ADMIN — SODIUM HYPOCHLORITE 1 ML: 1.25 SOLUTION TOPICAL at 06:11

## 2022-05-24 RX ADMIN — OXYCODONE HYDROCHLORIDE 20 MG: 10 TABLET ORAL at 21:47

## 2022-05-24 RX ADMIN — SODIUM CHLORIDE 3 G: 900 INJECTION INTRAVENOUS at 11:28

## 2022-05-24 RX ADMIN — ACETAMINOPHEN 650 MG: 325 TABLET ORAL at 11:28

## 2022-05-24 RX ADMIN — DOCUSATE SODIUM 100 MG: 100 CAPSULE, LIQUID FILLED ORAL at 17:35

## 2022-05-24 RX ADMIN — POLYETHYLENE GLYCOL 3350 1 PACKET: 17 POWDER, FOR SOLUTION ORAL at 05:27

## 2022-05-24 RX ADMIN — ACETAMINOPHEN 650 MG: 325 TABLET ORAL at 17:34

## 2022-05-24 RX ADMIN — SODIUM CHLORIDE 3 G: 900 INJECTION INTRAVENOUS at 05:27

## 2022-05-24 RX ADMIN — GABAPENTIN 1800 MG: 300 CAPSULE ORAL at 05:27

## 2022-05-24 RX ADMIN — OXYCODONE HYDROCHLORIDE 20 MG: 10 TABLET ORAL at 13:03

## 2022-05-24 RX ADMIN — OXYCODONE HYDROCHLORIDE 20 MG: 10 TABLET ORAL at 00:43

## 2022-05-24 RX ADMIN — SENNOSIDES AND DOCUSATE SODIUM 1 TABLET: 50; 8.6 TABLET ORAL at 21:11

## 2022-05-24 RX ADMIN — OXYCODONE HYDROCHLORIDE 20 MG: 10 TABLET ORAL at 17:34

## 2022-05-24 RX ADMIN — ENOXAPARIN SODIUM 40 MG: 40 INJECTION SUBCUTANEOUS at 23:45

## 2022-05-24 RX ADMIN — SODIUM CHLORIDE 3 G: 900 INJECTION INTRAVENOUS at 23:46

## 2022-05-24 ASSESSMENT — COGNITIVE AND FUNCTIONAL STATUS - GENERAL
STANDING UP FROM CHAIR USING ARMS: A LOT
MOBILITY SCORE: 9
TURNING FROM BACK TO SIDE WHILE IN FLAT BAD: A LITTLE
TOILETING: A LOT
MOVING FROM LYING ON BACK TO SITTING ON SIDE OF FLAT BED: UNABLE
DRESSING REGULAR UPPER BODY CLOTHING: A LITTLE
MOVING TO AND FROM BED TO CHAIR: UNABLE
CLIMB 3 TO 5 STEPS WITH RAILING: TOTAL
HELP NEEDED FOR BATHING: A LOT
WALKING IN HOSPITAL ROOM: TOTAL
DAILY ACTIVITIY SCORE: 16
DRESSING REGULAR LOWER BODY CLOTHING: TOTAL
SUGGESTED CMS G CODE MODIFIER MOBILITY: CM
SUGGESTED CMS G CODE MODIFIER DAILY ACTIVITY: CK

## 2022-05-24 ASSESSMENT — ENCOUNTER SYMPTOMS
PALPITATIONS: 0
NAUSEA: 0
HEADACHES: 0
WEAKNESS: 1
SHORTNESS OF BREATH: 0
DIAPHORESIS: 0
BLURRED VISION: 0
ABDOMINAL PAIN: 0
WHEEZING: 0
NERVOUS/ANXIOUS: 0
FEVER: 0
DIARRHEA: 0
SENSORY CHANGE: 0
HEARTBURN: 0
MYALGIAS: 0
DIZZINESS: 0
SPEECH CHANGE: 0
VOMITING: 0
SORE THROAT: 0
FOCAL WEAKNESS: 0
COUGH: 0
BACK PAIN: 0
NECK PAIN: 0
FLANK PAIN: 0
CHILLS: 0

## 2022-05-24 ASSESSMENT — PAIN DESCRIPTION - PAIN TYPE
TYPE: ACUTE PAIN

## 2022-05-24 ASSESSMENT — GAIT ASSESSMENTS: GAIT LEVEL OF ASSIST: UNABLE TO PARTICIPATE

## 2022-05-24 NOTE — CARE PLAN
The patient is Stable - Low risk of patient condition declining or worsening    Shift Goals  Clinical Goals: Pain management, dressing changes, IV abx  Patient Goals: Pain management, rest  Family Goals: no family present at this time    Progress made toward(s) clinical / shift goals:  pt pain monitored and medicated per MAR, pt call light and belongings within reach, bed locked and in lowest position, bed alarm on.      Problem: Pain - Standard  Goal: Alleviation of pain or a reduction in pain to the patient’s comfort goal  Outcome: Progressing     Problem: Fall Risk  Goal: Patient will remain free from falls  Outcome: Progressing     Problem: Knowledge Deficit - Standard  Goal: Patient and family/care givers will demonstrate understanding of plan of care, disease process/condition, diagnostic tests and medications  Outcome: Progressing

## 2022-05-24 NOTE — PROGRESS NOTES
Hospital Medicine Daily Progress Note    Date of Service  5/24/2022    Chief Complaint  Luis Kellogg is a 71 y.o. male admitted 5/18/2022 with osteomyelitis of the left foot.    Hospital Course  Admitted for elective left above-knee amputation for chronic lower extremity wounds/ulcer and chronic osteomyelitis of the left foot.  Hospital medicine was consulted on the case postoperatively.  LPS was also consulted on the case.  Patient appeared to have chronic right lower extremity wound/ulcers, with probable cellulitis.  He was started empiric coverage with IV Unasyn.    Interval Problem Update  Left AKA -pain controlled  Right lower extremity ulcers -discussed case with LPS  Hypertension - sbp 110-140  Diabetes - -300    I have personally seen and examined the patient at bedside. I discussed the plan of care with patient, bedside RN, charge RN,  and LPS.    Consultants/Specialty  orthopedics and LPS    Code Status  Full Code    Disposition  Patient is not medically cleared for discharge.   Anticipate discharge to to skilled nursing facility.  I have placed the appropriate orders for post-discharge needs.    Review of Systems  Review of Systems   Constitutional: Positive for malaise/fatigue. Negative for chills, diaphoresis and fever.   HENT: Negative for congestion, hearing loss and sore throat.    Eyes: Negative for blurred vision.   Respiratory: Negative for cough, shortness of breath and wheezing.    Cardiovascular: Negative for chest pain, palpitations and leg swelling.   Gastrointestinal: Negative for abdominal pain, diarrhea, heartburn, nausea and vomiting.   Genitourinary: Negative for dysuria, flank pain and hematuria.   Musculoskeletal: Negative for back pain, joint pain, myalgias and neck pain.   Skin: Negative for rash.   Neurological: Positive for weakness. Negative for dizziness, sensory change, speech change, focal weakness and headaches.   Psychiatric/Behavioral: The patient  is not nervous/anxious.         Physical Exam  Temp:  [36.3 °C (97.3 °F)-36.8 °C (98.3 °F)] 36.3 °C (97.3 °F)  Pulse:  [79-87] 83  Resp:  [16-18] 16  BP: (108-148)/(61-86) 119/82  SpO2:  [92 %-96 %] 92 %    Physical Exam  Vitals and nursing note reviewed.   Constitutional:       Appearance: He is obese.   HENT:      Head: Normocephalic and atraumatic.      Nose: No congestion.      Mouth/Throat:      Mouth: Mucous membranes are moist.   Eyes:      Extraocular Movements: Extraocular movements intact.      Conjunctiva/sclera: Conjunctivae normal.   Cardiovascular:      Rate and Rhythm: Normal rate and regular rhythm.   Pulmonary:      Effort: Pulmonary effort is normal.      Breath sounds: Normal breath sounds.   Abdominal:      General: There is no distension.      Tenderness: There is no abdominal tenderness. There is no guarding or rebound.   Musculoskeletal:      Cervical back: No tenderness.      Right lower leg: No edema.      Left lower leg: No edema.      Comments: Left AKA   Skin:     Findings: Wound (ulcers at left knee, left 1st and 2nd toes) present.   Neurological:      General: No focal deficit present.      Mental Status: He is alert and oriented to person, place, and time.      Cranial Nerves: No cranial nerve deficit.         Fluids    Intake/Output Summary (Last 24 hours) at 5/24/2022 1501  Last data filed at 5/24/2022 0824  Gross per 24 hour   Intake 0 ml   Output 2550 ml   Net -2550 ml       Laboratory                        Imaging  US-EXTREMITY ARTERY LOWER UNILAT RIGHT   Final Result      US-MIA SINGLE LEVEL UNILAT RIGHT   Final Result      DX-TIBIA AND FIBULA RIGHT   Final Result         1.  No acute traumatic bony injury.   2.  No destructive osseous lesion is appreciated, note that plain film is insensitive for evaluation of osteomyelitis, and bone scan would offer improved diagnostic sensitivity as clinically appropriate.      DX-FOOT-COMPLETE 3+ RIGHT   Final Result         1.  No acute  traumatic bony injury. No destructive osseous lesion is seen, note that plain film can be insensitive for evaluation of osteomyelitis and bone scan would offer improved diagnostic sensitivity as clinically appropriate.   2.  Degenerative changes of the foot, greatest at the midfoot and hindfoot           Assessment/Plan  * S/P AKA (above knee amputation), left (HCC)- (present on admission)  Assessment & Plan  pain control  PT/OT       Cellulitis of right leg- (present on admission)  Assessment & Plan  IV Unasyn      Anemia- (present on admission)  Assessment & Plan  Follow cbc    Chronic, continuous use of opioids- (present on admission)  Assessment & Plan  Pain control    Primary hypertension- (present on admission)  Assessment & Plan  Coreg, Lisinopril    Type 2 diabetes mellitus with hyperglycemia, with long-term current use of insulin (HCC)- (present on admission)  Assessment & Plan  Lantus, SSI      Chronic ulcer of lower extremity (HCC)- (present on admission)  Assessment & Plan  Wound care  Pain control  LPS following      Drug-induced constipation- (present on admission)  Assessment & Plan  bowel protocol    Hyponatremia- (present on admission)  Assessment & Plan  Follow bmp    Peripheral vascular disease (HCC)- (present on admission)  Assessment & Plan  Selective catheter placement in the right below-knee popliteal artery, Right lower extremity angiography, 6mm uncovered self expanding Right SFA stent placement   10/12/2021  Atorvastatin  Resume ASA when cleared by Orthopedic surgery    History of MI (myocardial infarction)- (present on admission)  Assessment & Plan  Coreg, Lipitor  Resume ASA when cleared by Orthopedic surgery    Marijuana abuse- (present on admission)  Assessment & Plan  Education and counseling    Tobacco use- (present on admission)  Assessment & Plan  Nicotine replacement protocol    Hyperlipidemia- (present on admission)  Assessment & Plan  Atorvastatin       VTE prophylaxis:  enoxaparin ppx    I have performed a physical exam and reviewed and updated ROS and Plan today (5/24/2022). In review of yesterday's note (5/23/2022), there are no changes except as documented above.

## 2022-05-24 NOTE — PROGRESS NOTES
Medium offloading shoe sized and pegs to R first and second toes removed. Delivered to pt bedside.

## 2022-05-24 NOTE — PROGRESS NOTES
Received report from previous shift RN  Assessment complete.  A&O x 4. Patient calls appropriately.  Patient is NWB of LLE due to an AKA. Bed alarm on.   Patient has 8/10 pain. Pain managed with prescribed medications.  Denies N&V. Tolerating diabetic diet.  Surgical L AKA, DIP, CDI.  + void, - flatus, - BM.  Patient denies SOB.  SCD's refused, ed provided.    Review plan of care with patient. Call light and personal belongings with in reach. Hourly rounding in place. All needs met at this time.

## 2022-05-24 NOTE — PROGRESS NOTES
Patient has refused any additional assessments, care or medication administration by this RN. Charge aware

## 2022-05-24 NOTE — THERAPY
"Occupational Therapy  Daily Treatment     Patient Name: Luis Kellogg  Age:  71 y.o., Sex:  male  Medical Record #: [id number]  Today's Date: 5/24/2022    Precautions: Fall Risk, Non Weight Bearing Left Lower Extremity  Comments: new L AKA; wound vac to RLE    Assessment    Pt seen for OT tx to include: bed mobility, sit to stands, LB dressing, toileting. Co-tx with PT due to prior performance and complexity of mobility. Pt able to stand from EOB with min/mod A to FWW. Too fatigue by end of session to attempt pivot to WC but appears capable with assist. Improved participation this session. Continues to be limited by pain, generalized weakness, balance impairment. Pt will benefit from continued acute OT.     Plan    Continue current treatment plan.    DC Equipment Recommendations: Unable to determine at this time  Discharge Recommendations: Recommend post-acute placement for additional occupational therapy services prior to discharge home    Subjective    \"They don't care about the individual. They just care about the protocol.\"     Objective       05/24/22 1513   Cognition    Level of Consciousness Alert   Safety Awareness Impulsive   Comments Improved participation this session, still impulsive but less so   Other Treatments   Other Treatments Provided Co-tx with PT based on prior performance and complexity of mobility   Balance   Sitting Balance (Static) Fair +   Sitting Balance (Dynamic) Fair   Standing Balance (Static) Poor   Standing Balance (Dynamic) Poor -   Weight Shift Sitting Poor   Weight Shift Standing Absent   Bed Mobility    Supine to Sit Contact Guard Assist   Sit to Supine Contact Guard Assist   Scooting Contact Guard Assist  (supine)   Comments HOB flat, used trapeze   Activities of Daily Living   Lower Body Dressing Total Assist  (doff/don R sock)   Toileting Total Assist  (seated EOB; total A to hold urinal)   Functional Mobility   Sit to Stand Moderate Assist   Bed, Chair, Wheelchair " Transfer Unable to Participate   Mobility Supine > < EOB, sit to stands at FWW   Short Term Goals   Short Term Goal # 1 Pt will complete ADL transfers with mod A   Goal Outcome # 1 Goal not met   Short Term Goal # 2 Pt will complete LB dressing with min A using AE   Goal Outcome # 2 Goal not met   Short Term Goal # 3 Pt will complete toileting with min A   Goal Outcome # 3 Goal not met

## 2022-05-24 NOTE — PROGRESS NOTES
Report received from RN, assumed care at 0700  Pt is A0X4, and responds appropriately   Pt declines any SOB, chest pain, new onset of numbness/ tingiling  Pt rates pain at 6/10, on a scale of 1-10, pt resting at this time and educated when next PRN is due   Pt is voiding adequatly and without hesitancy  Pt has + flatus, + bowel sounds,  BM on 5/18/2022 PTA   Pt moves to EOB with min assist, pt is WBAT to RLE, pt has AKA to left leg   Pt is tolerating a regular diabetic diet, pt denies any nausea/vomiting  Pt has AKA to leg leg, dressing in place is clean, dry and intact   Pt has wound to right great toe and second toe, dressing in place, changed this AM, clean dry and intact   Pt has wound to top of right foot, dressing in place, changed this AM, clean dry and intact   Pt has wound to lateral aspect of right leg,dressing in place, changed this AM, clean dry and intact   Pt has wound to lateral aspect of right foot, dressing in place, changed this AM, clean dry and intact         Plan of care discussed, all questions answered. Explained importance of calling before getting OOB and pt verbalizes understanding. Explained importance of oral care. Call light is within reach, treaded slipper socks on, bed in lowest/ locked position, hourly rounding in place, all needs met at this time

## 2022-05-24 NOTE — WOUND TEAM
[facility]  Inpatient Services  Initial Wound and Skin Care Evaluation    Admission Date: 5/18/2022     Last order of IP CONSULT TO WOUND CARE was found on 5/18/2022 from Hospital Encounter on 4/28/2022     HPI, PMH, SH: Reviewed    Past Surgical History:   Procedure Laterality Date   • PB AMPUTATE THIGH,THRU FEMUR Left 5/18/2022    Procedure: LEFT ABOVE KNEE AMPUTATION, ADDUCTOR TENDON MYOTENODESIS;  Surgeon: Aleks Buenrostro M.D.;  Location: [facility];  Service: Orthopedics   • PB XFER SINGLE SUPERFICI LOW LEG TENDON Left 5/18/2022    Procedure: TRANSFER, TENDON;  Surgeon: Aleks Buenrostro M.D.;  Location: [facility];  Service: Orthopedics   • ANGIOGRAM Bilateral 10/12/2021    Procedure: BILATERAL LOWER EXTREMITY ANGIOGRAM;  Surgeon: Valerio Purcell M.D.;  Location: [facility];  Service: Vascular   • IRRIGATION & DEBRIDEMENT GENERAL Bilateral 10/12/2021    Procedure: IRRIGATION AND DEBRIDEMENT, WOUND, BILATERAL LOWER EXTREMITY;  Surgeon: Valerio Purcell M.D.;  Location: [facility];  Service: Vascular   • APPLICATION OR REPLACEMENT, WOUND VAC Bilateral 10/12/2021    Procedure: APPLICATION WOUND VAC;  Surgeon: Valerio Purcell M.D.;  Location: [facility];  Service: Vascular   • STENT PLACEMENT Right 10/12/2021    Procedure: STENT PLACEMENT, RIGHT SUPERFICIAL FEMORAL ARTERY;  Surgeon: Valerio Purcell M.D.;  Location: [facility];  Service: Vascular   • TOE AMPUTATION Left 2/17/2020    Procedure: LEFT SECOND, THIRD, AND FORTH TOE AMPUTATION;  Surgeon: Alfonso Esteban M.D.;  Location: [facility];  Service: Orthopedics   • NH UNLISTED PROC, ARTHROSCOPY Left 7/25/2019    Procedure: LEFT ENDOSCOPIC ULNAR NERVE DECOMPRESSION, LEFT CARPAL TUNNEL RELEASE;  Surgeon: Red Chacon M.D.;  Location: [facility];  Service: Orthopedics   • KNEE REPLACEMENT, TOTAL Bilateral    • OTHER SURGICAL PROCEDURE      back  surgery - Unknown      Social History     Tobacco Use   • Smoking status: Current Every Day Smoker     Packs/day: 0.50     Years: 54.00     Pack years: 27.00     Types: Cigarettes   • Smokeless tobacco: Former User   Substance Use Topics   • Alcohol use: Not Currently     Alcohol/week: 0.0 oz     Comment: occas     No chief complaint on file.    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 Hx type 2 DM.    WOUND ASSESSMENT/LDA    Negative Pressure Wound Therapy 05/24/22 Chronic Leg Lateral Right (Active)   Dressing Type Medium;Black Foam (Veraflo) 05/24/22 1000   Number of Foam Pieces Used 2    Canister Changed Yes    NEXT Dressing Change/Treatment Date 05/27/22    VAC VeraFlo Irrigant Normal Saline    VAC VeraFlo Soak Time (mins) 10    VAC VeraFlo Instill Volume (ml) 55    VAC VeraFlo - Therapy Time (hrs) 3.5    VAC VeraFlo Pressure (mm/Hg) Intermittent;125 mmHg         Wound 10/14/21 Full Thickness Wound Leg Lateral Right (Active)   Wound Image    05/24/22 1000   Site Assessment Pink;Red;Yellow    Periwound Assessment Intact    Margins Attached edges    Closure None    Drainage Amount Moderate    Drainage Description Serosanguineous;Yellow    Treatments Cleansed;Offloading;Site care    Wound Cleansing Approved Wound Cleanser    Periwound Protectant Skin Protectant Wipes to Periwound;Paste Ring    Dressing Cleansing/Solutions Normal Saline    Dressing Options Wound Vac;Compression Wrap Two Layer    Dressing Changed New    Dressing Status Clean;Dry;Intact    Dressing Change/Treatment Frequency Every 72 hrs, and As Needed    NEXT Dressing Change/Treatment Date 05/27/22    NEXT Weekly Photo (Inpatient Only) 05/31/22     Non-staged Wound Description Full thickness    Wound Length (cm) 23.2 cm    Wound Width (cm) 12.4 cm    Wound Depth (cm) 0.1 cm    Wound Surface Area (cm^2) 287.68 cm^2    Wound Volume (cm^3) 28.768 cm^3    Wound Healing % 60    Wound Bed Granulation (%) 30 %    Wound Bed Slough (%) 70 %    Wound Odor Mild    Pulses Right;2+;DP;Doppler    Exposed Structures None    WOUND NURSE ONLY - Time Spent with Patient (mins) 60        Wound 05/19/22 Venous Ulcer MTH 1;MTH 2 Right great and second toe dorsal full thickness (hydrofera blue) (Active)   Wound Image      05/24/22 1000   Site Assessment Red;Pink;Hyperkeratosis    Periwound Assessment Intact    Margins Attached edges    Closure None    Drainage Amount Scant    Drainage Description Serosanguineous    Treatments Cleansed;Site care    Wound Cleansing Approved Wound Cleanser    Periwound Protectant Skin Protectant Wipes to Periwound    Dressing Cleansing/Solutions Not Applicable    Dressing Options Hydrofera Blue Ready;Dry Gauze;Hypafix Tape    Dressing Changed New    Dressing Status Clean;Dry;Intact    Dressing Change/Treatment Frequency Every 72 hrs, and As Needed    NEXT Dressing Change/Treatment Date 05/27/22    NEXT Weekly Photo (Inpatient Only) 05/31/22    Non-staged Wound Description Full thickness    Wound Length (cm) 2 cm    Wound Width (cm) 3 cm    Wound Depth (cm) 0.2 cm    Wound Surface Area (cm^2) 6 cm^2    Wound Volume (cm^3) 1.2 cm^3    Wound Bed Slough (%) 0 %    Wound Bed Eschar (%) 0 %    Wound Odor Foul    Exposed Structures Bone    WOUND NURSE ONLY - Time Spent with Patient (mins) 60                Vascular:    MIA:   MIA Results, Last 30 Days US-EXTREMITY ARTERY LOWER UNILAT RIGHT    Result Date: 5/22/2022  Narrative Lower Extremity  Arterial Duplex Report  Vascular Laboratory  CONCLUSIONS  1) 50-75% stenosis of the mid superficial femoral artery  2) Atherosclerosis  MILI HNASON  Exam Date:     05/22/2022 18:42  Room #:     Inpatient  Priority:      Routine  Ht (in):             Wt (lb):  Ordering Physician:        MARTHA TAI  Referring Physician:       [id number] Nogueira  Sonographer:               Frantz Carranza RDMS, RVT  Study Type:                Complete Unilateral  Technical Quality:         Adequate  Age:    71    Gender:     M  MRN:    [id number]  :    1950      BSA:  Indications:     PVD  CPT Codes:       58748  ICD Codes:       443.9  History:         Right superficial femoral artery stent, left above knee                   amputaion. prior duplex 10-8-21.  Limitations:                RIGHT  Waveform        Peak Systolic Velocity (cm/s)                  Prox    Prox-Mid  Mid    Mid-Dist  Distal  Triphasic                         94                       CFA  Triphasic       72                                         PFA  Triphasic       115               161              122     SFA  Triphasic                         58                       POP  Biphasic        116                                91      AT  Triphasic       35                                 66      PT  Triphasic       60                                 37      SOFIA                LEFT  Waveform        Peak Systolic Velocity (cm/s)                  Prox    Prox-Mid  Mid    Mid-Dist  Distal                                                             CFA                                                             PFA                                                             SFA                                                             POP                                                             AT                                                             PT                                                             SOFIA  FINDINGS  Right.  Diffuse atherosclerotic plaque.  50-75% stenosis in the mid superficial femoral artery.  Multiphasic flow demonstarted throoughout the extremity.  Elizabeth Monroy MD  (Electronically Signed)  Final Date:       22 May 2022 23:13    MIA Results, Last 30 Days US-MIA SINGLE LEVEL UNILAT RIGHT    Result Date: 2022  Narrative  Vascular Laboratory  Conclusions  Compared to the prior study on 10/8/21, waveforms looks better, triphasic  and high amplitude in present study.  The ankle pressures cannot be measured due to calcification and  noncompressibility of the tibial vessels.  MILI HANSON  Age:    71    Gender:     M  MRN:    [id number]  :    1950      BSA:  Exam Date:     2022 11:23  Room #:     Inpatient  Priority:     Routine  Ht (in):             Wt (lb):  Ordering Physician:        MARTHA TAI  Referring Physician:       [id number] Nogueira  Sonographer:               Deb Lazo RVT, [facility]  Study Type:                Complete Unilateral  Technical Quality:         Adequate  Indications:     Ulcer of calf, Ulcer of other part of foot (toes)  CPT Codes:       61877  ICD Codes:       707.12  707.15  History:         Right calf ulcer and ulceration of the right toes; history of                   right SFA stenting and left AKA  Limitations:     Unable to perform toe brachial index due to open                   wounds/patient unable to tolerate pressures over this area;                   left AKA                 RIGHT  Waveform            Systolic BPs (mmHg)                             121           Brachial  Triphasic                                Common Femoral  Triphasic                                Popliteal  Triphasic                  0             Posterior Tibial  Triphasic                  0             Dorsalis Pedis                                           Digit                                           MIA                                           TBI                       LEFT  Waveform        Systolic BPs (mmHg)                             127           Brachial                                           Common Femoral                                            Popliteal                                           Posterior Tibial                                           Dorsalis Pedis                                           Digit                                           MIA                                           TBI  Findings  Right.  Doppler waveforms of the common femoral, popliteal, posterior tibial, and  dorsalis pedis arteries are of high amplitude and triphasic.  The ankle pressures cannot be measured due to calcification and  noncompressibility of the tibial vessels.  Unable to perform toe brachial index due to open wounds/patient unable to  tolerate pressures over this area.  Left BHANU Singer MD  (Electronically Signed)  Final Date:      21 May 2022 20:31      Lab Values:    Lab Results   Component Value Date/Time    WBC 7.1 05/20/2022 01:50 AM    RBC 3.51 (L) 05/20/2022 01:50 AM    HEMOGLOBIN 9.9 (L) 05/20/2022 01:50 AM    HEMATOCRIT 30.4 (L) 05/20/2022 01:50 AM    CREACTPROT 6.25 (H) 10/07/2021 08:30 PM    SEDRATEWES 1 10/07/2021 08:30 PM    HBA1C 8.0 (H) 05/19/2022 03:34 AM        Culture Results show:  No results found for this or any previous visit (from the past 720 hour(s)).    Pain Level/Medicated:  PO       INTERVENTIONS BY WOUND TEAM:  Chart and images reviewed. Discussed with bedside RN. All areas of concern (based on picture review, LDA review and discussion with bedside RN) have been thoroughly assessed. Documentation of areas based on significant findings. This RN in to assess patient. Performed standard wound care which includes appropriate positioning, dressing removal and non-selective debridement. Pictures and measurements obtained weekly if/when required.  Preparation for Dressing removal: Dressing removed without difficulty  Non-selectively Debrided with:  Wound cleanser and gauze  Sharp debridement: NA      R lateral leg  Soo wound: Cleansed with wound cleanser and gauze, Prepped with no sting, paste ring x5, drape     Primary Dressing: medium veraflo foam x 1.5, drape  Secondary (Outer) Dressing: tracpad, NPWT,125mmHg suction achieved, test cycle complete, Tubi  E    R 1st and 2nd toe  Soo wound: Cleansed with wound cleanser and gauze  Primary Dressing: hydrofera blue, hypafix  Secondary (Outer) Dressing: NA    Interdisciplinary consultation: Patient, Bedside RN (Majo), Wound RN (Mary Lou)    EVALUATION / RATIONALE FOR TREATMENT:  Most Recent Date:  5/24: Discussed R lateral leg wound with Dr. Purcell. Ok for wound vac placement and compression. Veraflo wound vac initiated to right lateral leg wound to manage drainage, bioburden, increase tissue oxygenation and granulation. Light compression applied for improved venous return. Pt not agreeable and unable to tolerate 2 layer compression wrap at this time. Hydrofera Blue applied to right 1st and 2nd toes for the hydrophilic polyurethane foam which contains ethylene oxide used as a bactericidal, fungicidal, and sporicidal disinfectant. Hydrofera Blue also aids in maintaining a moist wound environment. The absorption properties of this dressing are important in collecting exudates and bacteria from the injured area. These harmful fluid secretions bind to the dressing removing it from the wound without the foam sticking to the wound causing more harm.       Goals: Steady decrease in wound area and depth weekly.    WOUND TEAM PLAN OF CARE ([X] for frequency of wound follow up,):   Nursing to follow orders written for wound care. Contact wound team if area fails to progress, deteriorates or with any questions/concerns  Dressing changes by wound team:                   Follow up 3 times weekly:                NPWT change 2 times weekly:   X Tues/Fri  Follow up 1-2 times weekly:    X Wound Team to change dressings to R toes w/ vac changes  Follow up Bi-Monthly:                   Follow up as needed:     Other (explain):     NURSING PLAN OF CARE ORDERS (X):  Dressing changes: See  Dressing Care orders: X  Skin care: See Skin Care orders:  RN Prevention Protocol:  Rectal tube care: See Rectal Tube Care orders:   Other orders:    RSKIN:   CURRENTLY IN PLACE (X), APPLIED THIS VISIT (A), ORDERED (O):   Q shift Donis:  X  Q shift pressure point assessments:  X    Surface/Positioning   Pressure redistribution mattress      X      Low Airloss          Bariatric foam      Bariatric CRISTOPHER     Waffle cushion        Waffle Overlay          Reposition q 2 hours      TAPs Turning system     Z Jason Pillow     Offloading/Redistribution   Sacral Mepilex (Silicone dressing)     Heel Mepilex (Silicone dressing)         Heel float boots (Prevalon boot)             Float Heels off Bed with Pillows       X    Respiratory RA  Silicone O2 tubing         Gray Foam Ear protectors     Cannula fixation Device (Tender )          High flow offloading Clip    Elastic head band offloading device      Anchorfast                                                         Trach with Optifoam split foam             Containment/Moisture Prevention   Rectal tube or BMS    Purwick/Condom Cath        Fajardo Catheter    Barrier wipes           Barrier paste       Antifungal tx      Interdry        Mobilization    Up to chair        Ambulate      PT/OT  X    Nutrition     Dietician        Diabetes Education      PO    X TF     TPN     NPO   # days     Other       Anticipated discharge plans:   LTACH:        SNF/Rehab:                X  Home Health Care:           Outpatient Wound Center:            Self/Family Care:        Other:                  Vac Discharge Needs:   Not Applicable Pt not on a wound vac:       Regular Vac while inpatient, alternative dressing at DC:        Regular Vac in use and continued at DC:            Reg. Vac w/ Skin Sub/Biologic in use. Will need to be changed 2x wkly:      Veraflo Vac while inpatient, ok to transition to Regular Vac on Discharge:  X         Veraflo Vac while inpatient, will need to remain on  Rand Vac upon discharge:

## 2022-05-24 NOTE — CARE PLAN
The patient is Stable - Low risk of patient condition declining or worsening    Shift Goals  Clinical Goals: Pain management, dressing changes, IV abx  Patient Goals: Pain management, rest  Family Goals: no family present at this time    Progress made toward(s) clinical / shift goals:        Problem: Pain - Standard  Goal: Alleviation of pain or a reduction in pain to the patient’s comfort goal  Outcome: Progressing     Problem: Fall Risk  Goal: Patient will remain free from falls  Outcome: Progressing     Problem: Knowledge Deficit - Standard  Goal: Patient and family/care givers will demonstrate understanding of plan of care, disease process/condition, diagnostic tests and medications  Outcome: Progressing

## 2022-05-25 ENCOUNTER — APPOINTMENT (OUTPATIENT)
Dept: RADIOLOGY | Facility: MEDICAL CENTER | Age: 72
DRG: 240 | End: 2022-05-25
Attending: FAMILY MEDICINE
Payer: MEDICARE

## 2022-05-25 LAB
ANION GAP SERPL CALC-SCNC: 9 MMOL/L (ref 7–16)
BUN SERPL-MCNC: 11 MG/DL (ref 8–22)
CALCIUM SERPL-MCNC: 8.7 MG/DL (ref 8.5–10.5)
CHLORIDE SERPL-SCNC: 104 MMOL/L (ref 96–112)
CO2 SERPL-SCNC: 23 MMOL/L (ref 20–33)
CREAT SERPL-MCNC: 0.47 MG/DL (ref 0.5–1.4)
ERYTHROCYTE [DISTWIDTH] IN BLOOD BY AUTOMATED COUNT: 45.2 FL (ref 35.9–50)
GFR SERPLBLD CREATININE-BSD FMLA CKD-EPI: 111 ML/MIN/1.73 M 2
GLUCOSE BLD STRIP.AUTO-MCNC: 147 MG/DL (ref 65–99)
GLUCOSE BLD STRIP.AUTO-MCNC: 155 MG/DL (ref 65–99)
GLUCOSE BLD STRIP.AUTO-MCNC: 175 MG/DL (ref 65–99)
GLUCOSE BLD STRIP.AUTO-MCNC: 177 MG/DL (ref 65–99)
GLUCOSE SERPL-MCNC: 146 MG/DL (ref 65–99)
HCT VFR BLD AUTO: 32.3 % (ref 42–52)
HGB BLD-MCNC: 10.7 G/DL (ref 14–18)
MCH RBC QN AUTO: 27.9 PG (ref 27–33)
MCHC RBC AUTO-ENTMCNC: 33.1 G/DL (ref 33.7–35.3)
MCV RBC AUTO: 84.3 FL (ref 81.4–97.8)
PLATELET # BLD AUTO: 260 K/UL (ref 164–446)
PMV BLD AUTO: 9.2 FL (ref 9–12.9)
POTASSIUM SERPL-SCNC: 3.9 MMOL/L (ref 3.6–5.5)
RBC # BLD AUTO: 3.83 M/UL (ref 4.7–6.1)
SODIUM SERPL-SCNC: 136 MMOL/L (ref 135–145)
WBC # BLD AUTO: 5.9 K/UL (ref 4.8–10.8)

## 2022-05-25 PROCEDURE — 99232 SBSQ HOSP IP/OBS MODERATE 35: CPT | Performed by: FAMILY MEDICINE

## 2022-05-25 PROCEDURE — 82962 GLUCOSE BLOOD TEST: CPT

## 2022-05-25 PROCEDURE — 700105 HCHG RX REV CODE 258: Performed by: FAMILY MEDICINE

## 2022-05-25 PROCEDURE — 99024 POSTOP FOLLOW-UP VISIT: CPT | Performed by: ORTHOPAEDIC SURGERY

## 2022-05-25 PROCEDURE — A9270 NON-COVERED ITEM OR SERVICE: HCPCS | Performed by: NURSE PRACTITIONER

## 2022-05-25 PROCEDURE — 36415 COLL VENOUS BLD VENIPUNCTURE: CPT

## 2022-05-25 PROCEDURE — 700105 HCHG RX REV CODE 258: Performed by: NURSE PRACTITIONER

## 2022-05-25 PROCEDURE — 770001 HCHG ROOM/CARE - MED/SURG/GYN PRIV*

## 2022-05-25 PROCEDURE — 700111 HCHG RX REV CODE 636 W/ 250 OVERRIDE (IP): Performed by: FAMILY MEDICINE

## 2022-05-25 PROCEDURE — 700102 HCHG RX REV CODE 250 W/ 637 OVERRIDE(OP): Performed by: ORTHOPAEDIC SURGERY

## 2022-05-25 PROCEDURE — 700102 HCHG RX REV CODE 250 W/ 637 OVERRIDE(OP): Performed by: NURSE PRACTITIONER

## 2022-05-25 PROCEDURE — 700111 HCHG RX REV CODE 636 W/ 250 OVERRIDE (IP): Performed by: ORTHOPAEDIC SURGERY

## 2022-05-25 PROCEDURE — 74018 RADEX ABDOMEN 1 VIEW: CPT

## 2022-05-25 PROCEDURE — A9270 NON-COVERED ITEM OR SERVICE: HCPCS | Performed by: ORTHOPAEDIC SURGERY

## 2022-05-25 PROCEDURE — 80048 BASIC METABOLIC PNL TOTAL CA: CPT

## 2022-05-25 PROCEDURE — 85027 COMPLETE CBC AUTOMATED: CPT

## 2022-05-25 PROCEDURE — 700102 HCHG RX REV CODE 250 W/ 637 OVERRIDE(OP): Performed by: FAMILY MEDICINE

## 2022-05-25 PROCEDURE — A9270 NON-COVERED ITEM OR SERVICE: HCPCS | Performed by: FAMILY MEDICINE

## 2022-05-25 PROCEDURE — 97535 SELF CARE MNGMENT TRAINING: CPT

## 2022-05-25 PROCEDURE — 700111 HCHG RX REV CODE 636 W/ 250 OVERRIDE (IP): Performed by: NURSE PRACTITIONER

## 2022-05-25 RX ORDER — CARVEDILOL 6.25 MG/1
3.12 TABLET ORAL 2 TIMES DAILY WITH MEALS
Status: DISCONTINUED | OUTPATIENT
Start: 2022-05-25 | End: 2022-06-07 | Stop reason: HOSPADM

## 2022-05-25 RX ORDER — ACETAMINOPHEN 500 MG
1000 TABLET ORAL 4 TIMES DAILY
Status: DISCONTINUED | OUTPATIENT
Start: 2022-05-25 | End: 2022-06-07 | Stop reason: HOSPADM

## 2022-05-25 RX ADMIN — SODIUM CHLORIDE 3 G: 900 INJECTION INTRAVENOUS at 12:00

## 2022-05-25 RX ADMIN — INSULIN HUMAN 2 UNITS: 100 INJECTION, SOLUTION PARENTERAL at 13:59

## 2022-05-25 RX ADMIN — TRAZODONE HYDROCHLORIDE 450 MG: 150 TABLET ORAL at 22:13

## 2022-05-25 RX ADMIN — GABAPENTIN 1800 MG: 300 CAPSULE ORAL at 17:27

## 2022-05-25 RX ADMIN — GABAPENTIN 1800 MG: 300 CAPSULE ORAL at 05:54

## 2022-05-25 RX ADMIN — SODIUM CHLORIDE 3 G: 900 INJECTION INTRAVENOUS at 18:17

## 2022-05-25 RX ADMIN — ACETAMINOPHEN 1000 MG: 500 TABLET, FILM COATED ORAL at 17:29

## 2022-05-25 RX ADMIN — POLYETHYLENE GLYCOL 3350 1 PACKET: 17 POWDER, FOR SOLUTION ORAL at 12:49

## 2022-05-25 RX ADMIN — SODIUM CHLORIDE 3 G: 900 INJECTION INTRAVENOUS at 05:56

## 2022-05-25 RX ADMIN — MAGNESIUM HYDROXIDE 30 ML: 400 SUSPENSION ORAL at 12:47

## 2022-05-25 RX ADMIN — DOCUSATE SODIUM 100 MG: 100 CAPSULE, LIQUID FILLED ORAL at 17:30

## 2022-05-25 RX ADMIN — OXYCODONE HYDROCHLORIDE 20 MG: 10 TABLET ORAL at 16:03

## 2022-05-25 RX ADMIN — DOCUSATE SODIUM 100 MG: 100 CAPSULE, LIQUID FILLED ORAL at 05:55

## 2022-05-25 RX ADMIN — CARVEDILOL 3.12 MG: 6.25 TABLET, FILM COATED ORAL at 17:29

## 2022-05-25 RX ADMIN — HYDROMORPHONE HYDROCHLORIDE 1 MG: 1 INJECTION, SOLUTION INTRAMUSCULAR; INTRAVENOUS; SUBCUTANEOUS at 09:29

## 2022-05-25 RX ADMIN — SENNOSIDES AND DOCUSATE SODIUM 1 TABLET: 50; 8.6 TABLET ORAL at 21:19

## 2022-05-25 RX ADMIN — INSULIN HUMAN 2 UNITS: 100 INJECTION, SOLUTION PARENTERAL at 21:00

## 2022-05-25 RX ADMIN — ENOXAPARIN SODIUM 40 MG: 40 INJECTION SUBCUTANEOUS at 22:53

## 2022-05-25 RX ADMIN — ACETAMINOPHEN 650 MG: 325 TABLET ORAL at 05:55

## 2022-05-25 RX ADMIN — OXYCODONE HYDROCHLORIDE 20 MG: 10 TABLET ORAL at 01:49

## 2022-05-25 RX ADMIN — OXYCODONE HYDROCHLORIDE 20 MG: 10 TABLET ORAL at 22:52

## 2022-05-25 RX ADMIN — OXYCODONE HYDROCHLORIDE 20 MG: 10 TABLET ORAL at 05:55

## 2022-05-25 RX ADMIN — ACETAMINOPHEN 650 MG: 325 TABLET ORAL at 11:59

## 2022-05-25 RX ADMIN — INSULIN HUMAN 2 UNITS: 100 INJECTION, SOLUTION PARENTERAL at 17:36

## 2022-05-25 RX ADMIN — OXYCODONE HYDROCHLORIDE 20 MG: 10 TABLET ORAL at 12:00

## 2022-05-25 RX ADMIN — ATORVASTATIN CALCIUM 80 MG: 40 TABLET, FILM COATED ORAL at 17:29

## 2022-05-25 ASSESSMENT — ENCOUNTER SYMPTOMS
FLANK PAIN: 0
HEARTBURN: 0
VOMITING: 0
MYALGIAS: 0
WEAKNESS: 1
HEADACHES: 0
CONSTIPATION: 1
COUGH: 0
SHORTNESS OF BREATH: 0
DIARRHEA: 0
SENSORY CHANGE: 0
SORE THROAT: 0
PALPITATIONS: 0
FEVER: 0
CHILLS: 0
ABDOMINAL PAIN: 0
FOCAL WEAKNESS: 0
BACK PAIN: 0
DIZZINESS: 0
NERVOUS/ANXIOUS: 0
BLURRED VISION: 0
NECK PAIN: 0
WHEEZING: 0
DIAPHORESIS: 0
NAUSEA: 0
SPEECH CHANGE: 0

## 2022-05-25 ASSESSMENT — PAIN DESCRIPTION - PAIN TYPE
TYPE: ACUTE PAIN

## 2022-05-25 NOTE — DISCHARGE PLANNING
[facility]    Referral from:  Dr. Negrito Rondon  Insurance Provider on Facesheet: Medicare/[employer]  Potential Rehab Diagnosis: Left AKA    Chart review indicates patient has on going medical management and therapy needs     D/C support:  TBD    Physiatry to consult.  POD 7 - Left AKA.  Current documentation reflect limited discharge support for independent return to community.  [place]  [facility] Nurse following patient reached out to [facility] dc planner,  voiced concern regarding return to present living conditions - home needs ramp/bathroom requires retro-fit to accommodate patient.      Last Covid test:       Thank you for the referral.

## 2022-05-25 NOTE — CARE PLAN
The patient is Stable - Low risk of patient condition declining or worsening    Shift Goals  Clinical Goals: Pain management, dressing change, IV abx  Patient Goals: pain management, rest  Family Goals: no family present at this time    Progress made toward(s) clinical / shift goals:  pt pain monitored and medicated per MAR, pt belongings and call light within reach, bed locked and in lowest position, bed alarm on.      Problem: Pain - Standard  Goal: Alleviation of pain or a reduction in pain to the patient’s comfort goal  Outcome: Progressing     Problem: Skin Integrity  Goal: Skin integrity is maintained or improved  Outcome: Progressing     Problem: Fall Risk  Goal: Patient will remain free from falls  Outcome: Progressing     Problem: Knowledge Deficit - Standard  Goal: Patient and family/care givers will demonstrate understanding of plan of care, disease process/condition, diagnostic tests and medications  Outcome: Progressing

## 2022-05-25 NOTE — PROGRESS NOTES
Hospital Medicine Daily Progress Note    Date of Service  5/25/2022    Chief Complaint  Luis Kellogg is a 71 y.o. male admitted 5/18/2022 with osteomyelitis of the left foot.    Hospital Course  Admitted for elective left above-knee amputation for chronic lower extremity wounds/ulcer and chronic osteomyelitis of the left foot.  Hospital medicine was consulted on the case postoperatively.  LPS was also consulted on the case.  Patient appeared to have chronic right lower extremity wound/ulcers, with probable cellulitis.  He was started on empiric coverage with IV Unasyn.    Interval Problem Update  Left AKA - pain controlled  Right lower extremity ulcers - discussed case with LPS, plan for surgery on 5/27  Hypertension - sbp 100s  Diabetes - -190  Constipation - still no BM    I have personally seen and examined the patient at bedside. I discussed the plan of care with patient, bedside RN, charge RN,  and LPS.    Consultants/Specialty  orthopedics and LPS    Code Status  Full Code    Disposition  Patient is not medically cleared for discharge.   Anticipate discharge to to skilled nursing facility.  I have placed the appropriate orders for post-discharge needs.    Review of Systems  Review of Systems   Constitutional: Positive for malaise/fatigue. Negative for chills, diaphoresis and fever.   HENT: Negative for congestion, hearing loss and sore throat.    Eyes: Negative for blurred vision.   Respiratory: Negative for cough, shortness of breath and wheezing.    Cardiovascular: Negative for chest pain, palpitations and leg swelling.   Gastrointestinal: Positive for constipation. Negative for abdominal pain, diarrhea, heartburn, nausea and vomiting.   Genitourinary: Negative for dysuria, flank pain and hematuria.   Musculoskeletal: Negative for back pain, joint pain, myalgias and neck pain.   Skin: Negative for rash.   Neurological: Positive for weakness. Negative for dizziness, sensory change,  speech change, focal weakness and headaches.   Psychiatric/Behavioral: The patient is not nervous/anxious.         Physical Exam  Temp:  [36.4 °C (97.5 °F)-36.9 °C (98.5 °F)] 36.5 °C (97.7 °F)  Pulse:  [72-83] 76  Resp:  [17-18] 18  BP: ()/(58-73) 104/58  SpO2:  [90 %-96 %] 90 %    Physical Exam  Vitals and nursing note reviewed.   Constitutional:       Appearance: He is obese.   HENT:      Head: Normocephalic and atraumatic.      Nose: No congestion.      Mouth/Throat:      Mouth: Mucous membranes are moist.   Eyes:      Extraocular Movements: Extraocular movements intact.      Conjunctiva/sclera: Conjunctivae normal.   Cardiovascular:      Rate and Rhythm: Normal rate and regular rhythm.   Pulmonary:      Effort: Pulmonary effort is normal.      Breath sounds: Normal breath sounds.   Abdominal:      General: There is distension.      Tenderness: There is no abdominal tenderness. There is no guarding or rebound.   Musculoskeletal:      Cervical back: No tenderness.      Right lower leg: No edema.      Left lower leg: No edema.      Comments: Wound vac at right leg  Left AKA   Skin:     Findings: Wound (ulcers at right knee, right 1st and 2nd toes) present.   Neurological:      General: No focal deficit present.      Mental Status: He is alert and oriented to person, place, and time.      Cranial Nerves: No cranial nerve deficit.         Fluids    Intake/Output Summary (Last 24 hours) at 5/25/2022 1246  Last data filed at 5/25/2022 1200  Gross per 24 hour   Intake 240 ml   Output 1300 ml   Net -1060 ml       Laboratory  Recent Labs     05/25/22  0122   WBC 5.9   RBC 3.83*   HEMOGLOBIN 10.7*   HEMATOCRIT 32.3*   MCV 84.3   MCH 27.9   MCHC 33.1*   RDW 45.2   PLATELETCT 260   MPV 9.2     Recent Labs     05/25/22  0122   SODIUM 136   POTASSIUM 3.9   CHLORIDE 104   CO2 23   GLUCOSE 146*   BUN 11   CREATININE 0.47*   CALCIUM 8.7                   Imaging  US-EXTREMITY ARTERY LOWER UNILAT RIGHT   Final Result       US-MIA SINGLE LEVEL UNILAT RIGHT   Final Result      DX-TIBIA AND FIBULA RIGHT   Final Result         1.  No acute traumatic bony injury.   2.  No destructive osseous lesion is appreciated, note that plain film is insensitive for evaluation of osteomyelitis, and bone scan would offer improved diagnostic sensitivity as clinically appropriate.      DX-FOOT-COMPLETE 3+ RIGHT   Final Result         1.  No acute traumatic bony injury. No destructive osseous lesion is seen, note that plain film can be insensitive for evaluation of osteomyelitis and bone scan would offer improved diagnostic sensitivity as clinically appropriate.   2.  Degenerative changes of the foot, greatest at the midfoot and hindfoot      [id number] VIEW    (Results Pending)        Assessment/Plan  * S/P AKA (above knee amputation), left (HCC)- (present on admission)  Assessment & Plan  pain control  PT/OT     Cellulitis of right leg- (present on admission)  Assessment & Plan  IV Unasyn    Anemia- (present on admission)  Assessment & Plan  Follow cbc    Chronic, continuous use of opioids- (present on admission)  Assessment & Plan  Pain control    Primary hypertension- (present on admission)  Assessment & Plan  Lisinopril with parameters  Decrease Coreg to 3.125 mg with parameters    Type 2 diabetes mellitus with hyperglycemia, with long-term current use of insulin ([facility])- (present on admission)  Assessment & Plan  Lantus, SSI      Chronic ulcer of lower extremity ([facility])- (present on admission)  Assessment & Plan  Wound care  Pain control  LPS following  Plan for surgery on 5/27      Drug-induced constipation- (present on admission)  Assessment & Plan  bowel protocol  May need enema  Check xray    Hyponatremia- (present on admission)  Assessment & Plan  Follow bmp    Peripheral vascular disease (HCC)- (present on admission)  Assessment & Plan  Selective catheter placement in the right below-knee popliteal artery, Right lower extremity angiography, 6mm  uncovered self expanding Right SFA stent placement   10/12/2021  Atorvastatin  Resume ASA when cleared by Orthopedic surgery    History of MI (myocardial infarction)- (present on admission)  Assessment & Plan  Coreg, Lipitor  Resume ASA when cleared by Orthopedic surgery    Marijuana abuse- (present on admission)  Assessment & Plan  Education and counseling    Tobacco use- (present on admission)  Assessment & Plan  Nicotine replacement protocol    Hyperlipidemia- (present on admission)  Assessment & Plan  Atorvastatin       VTE prophylaxis: enoxaparin ppx    I have performed a physical exam and reviewed and updated ROS and Plan today (5/25/2022). In review of yesterday's note (5/24/2022), there are no changes except as documented above.

## 2022-05-25 NOTE — PROGRESS NOTES
The patient was seen and evaluated at bedside    Is overall doing okay.  He states he has not had a bowel movement in the last several days.  His pain is otherwise well controlled.    Exam:  Left lower extremity dressings clean and dry  Right foot examination with dressings over first and second dorsal toes  Wound vacuum in place right leg with good suction seal  Nonpalpable pulses    Clinical photos reviewed of the right foot which demonstrated exposed tendon and likely underlying bone at the dorsal aspect of the first and second toes    Vascular studies reviewed    71-year-old male with peripheral arterial disease, chronic wounds to the right lower extremity, 1 week status post left above-knee amputation    Plan: At this point we discussed treatment options.  We discussed amputation of the first and second toes and replacement of the wound vacuum and further debridement of the leg.  Plan for surgical intervention on Friday  N.p.o. after midnight Thursday night  All questions answered      Aleks Buenrostro MD

## 2022-05-25 NOTE — THERAPY
Missed Therapy     Patient Name: Luis Kellogg  Age:  71 y.o., Sex:  male  Medical Record #: [id number]  Today's Date: 5/25/2022    PT tx attempted. Pt declining OOB activity at this time stating he was in too much pain after abdominal x-rays and awaiting pain meds. Pt encouraged to participate in bed level exercises which he also declined. Educated on importance of strengthenng especially given future toe amputations on R foot and mechanics for transferring to the chair. Discussed importance of tricep strength for transfers, offered to place theraband on bed for tricep strengthening, pt declining. He reports he remains motivated to be able to walk again in the future. Will re-attempt PT tx as able.    Ariane Esquivel, PT, DPT  Ext. [phone]

## 2022-05-25 NOTE — PROGRESS NOTES
Received report from previous shift RN  Assessment complete.  A&O x 4. Patient calls appropriately.  Patient is NWB of LLE due to AKA. Bed alarm on.   Patient has 8/10 pain. Pain managed with prescribed medications.  Denies N&V. Tolerating diabetic diet.  Surgical L AKA, DIP, CDI.  Wound vac to lateral side of R leg  + void, - flatus, - BM.  Patient denies SOB.  SCD's refused.    Review plan of care with patient. Call light and personal belongings with in reach. Hourly rounding in place. All needs met at this time.

## 2022-05-25 NOTE — PROGRESS NOTES
Bedside report received.  Assessment complete.  A&O x 4. Patient calls appropriately.  Patient up with x1 assist and FWW. Bed alarm on.   Patient has 9/10 pain. Pain managed with prescribed medications.  Denies N&V. Tolerating diabetic diet.  Left AKA with dressing, CDI. RLW with wound vac and tubi- to shin, CDI. Wound vac functioning properly at 125. Ulcer to top of right foot, ROBI. Dressing to first two toes on right foot, CDI.   + void, - flatus, - BM, last BM PTA  Patient denies SOB.  SCD's off, left AKA and RLE wounds with wound vac     Review plan with of care with patient. Call light and personal belongings within reach. Hourly rounding in place. All needs met at this time.

## 2022-05-25 NOTE — THERAPY
"Physical Therapy   Daily Treatment     Patient Name: Luis Kellogg  Age:  71 y.o., Sex:  male  Medical Record #: [id number]  Today's Date: 5/24/2022     Precautions  Precautions: Fall Risk;Non Weight Bearing Left Lower Extremity  Comments: new L AKA, wound vac RLE    Assessment    Patient with improved behavior, pain, and cooperation today. He mobilized as detailed below and appeared motivated to return to ambulation eventually. He was able to stand and performed tricep dips in standing with FWW. Unfortunately he was too fatigued to achieve transfer to  following standing activity. Will continue to follow.    Plan    Continue current treatment plan.    DC Equipment Recommendations: None  Discharge Recommendations: Recommend post-acute placement for additional physical therapy services prior to discharge home      Subjective    \"I think a man might be better suited to help me, with more upper body strength.\"     Objective       05/24/22 1514   Charge Group   Charges  Yes   PT Therapeutic Activities 1  (27 min, co treat with OT)   Precautions   Precautions Fall Risk;Non Weight Bearing Left Lower Extremity   Comments new L AKA, wound vac RLE   Vitals   O2 (LPM) 0   O2 Delivery Device None - Room Air   Pain 0 - 10 Group   Location Leg   Location Orientation Right;Left   Therapist Pain Assessment During Activity;Nurse Notified   Cognition    Cognition / Consciousness WDL   Level of Consciousness Alert   Safety Awareness Impaired;Impulsive   Comments improved cooperation, behavior, safety this session. particular and likes to self direct care   Balance   Sitting Balance (Static) Fair +   Sitting Balance (Dynamic) Fair   Standing Balance (Static) Poor   Standing Balance (Dynamic) Poor -   Weight Shift Sitting Fair   Weight Shift Standing Absent   Skilled Intervention Verbal Cuing;Compensatory Strategies;Facilitation;Sequencing   Gait Analysis   Gait Level Of Assist Unable to Participate   Weight Bearing Status NWB " "LLE   Bed Mobility    Supine to Sit Standby Assist   Sit to Supine Standby Assist   Scooting Standby Assist   Skilled Intervention Compensatory Strategies;Verbal Cuing   Comments used bed rails, trapeze   Functional Mobility   Sit to Stand Moderate Assist   Bed, Chair, Wheelchair Transfer Unable to Participate   Skilled Intervention Verbal Cuing;Compensatory Strategies;Facilitation;Sequencing   How much difficulty does the patient currently have...   Turning over in bed (including adjusting bedclothes, sheets and blankets)? 3   Sitting down on and standing up from a chair with arms (e.g., wheelchair, bedside commode, etc.) 1   Moving from lying on back to sitting on the side of the bed? 1   How much help from another person does the patient currently need...   Moving to and from a bed to a chair (including a wheelchair)? 2   Need to walk in a hospital room? 1   Climbing 3-5 steps with a railing? 1   6 clicks Mobility Score 9   Activity Tolerance   Sitting in Chair NT, unable   Sitting Edge of Bed 15 min   Standing 1-2 min total   Comments limited by pain, weakness   Patient / Family Goals    Patient / Family Goal #1 go home   Short Term Goals    Short Term Goal # 1 Pt will demo supine<>sit eob w/ hob flat and no rails/trapeze spv in 6 visits for independence w/ bed mobility.   Goal Outcome # 1 goal not met   Short Term Goal # 2 Pt will demo SPT eob<>wc using fww spv in 6 visits for OOB mobility tasks.   Goal Outcome # 2 Goal not met   Short Term Goal # 3 Pt will demo ability to propel wc 250' spv on a level surface in 6 visits for access to the community.   Goal Outcome # 3 Goal not met   Short Term Goal # 4 Pt will demo ability to \"bump up/down\" 3 stairs spv in 6 visits for access to his home.   Goal Outcome # 4 Goal not met   Short Term Goal # 5 Pt will demo gait 100' using fww spv via 3 point pattern in 6 visits for household ambulation.   Goal Outcome # 5 Goal not met   Anticipated Discharge Equipment and " Recommendations   DC Equipment Recommendations None   Discharge Recommendations Recommend post-acute placement for additional physical therapy services prior to discharge home   Interdisciplinary Plan of Care Collaboration   IDT Collaboration with  Nursing;Occupational Therapist   Patient Position at End of Therapy In Bed;Call Light within Reach;Tray Table within Reach;Phone within Reach   Collaboration Comments RN aware of visit, response   Session Information   Date / Session Number  5/24-2 (2/4, 5/26)

## 2022-05-26 ENCOUNTER — HOSPITAL ENCOUNTER (INPATIENT)
Facility: REHABILITATION | Age: 72
End: 2022-05-26
Attending: PHYSICAL MEDICINE & REHABILITATION | Admitting: PHYSICAL MEDICINE & REHABILITATION
Payer: MEDICARE

## 2022-05-26 LAB
GLUCOSE BLD STRIP.AUTO-MCNC: 124 MG/DL (ref 65–99)
GLUCOSE BLD STRIP.AUTO-MCNC: 146 MG/DL (ref 65–99)
GLUCOSE BLD STRIP.AUTO-MCNC: 189 MG/DL (ref 65–99)

## 2022-05-26 PROCEDURE — 700111 HCHG RX REV CODE 636 W/ 250 OVERRIDE (IP): Performed by: NURSE PRACTITIONER

## 2022-05-26 PROCEDURE — 97112 NEUROMUSCULAR REEDUCATION: CPT

## 2022-05-26 PROCEDURE — A9270 NON-COVERED ITEM OR SERVICE: HCPCS | Performed by: NURSE PRACTITIONER

## 2022-05-26 PROCEDURE — 82962 GLUCOSE BLOOD TEST: CPT | Mod: 91

## 2022-05-26 PROCEDURE — 700102 HCHG RX REV CODE 250 W/ 637 OVERRIDE(OP): Performed by: NURSE PRACTITIONER

## 2022-05-26 PROCEDURE — 99232 SBSQ HOSP IP/OBS MODERATE 35: CPT | Performed by: FAMILY MEDICINE

## 2022-05-26 PROCEDURE — 700102 HCHG RX REV CODE 250 W/ 637 OVERRIDE(OP): Performed by: FAMILY MEDICINE

## 2022-05-26 PROCEDURE — 700105 HCHG RX REV CODE 258: Performed by: FAMILY MEDICINE

## 2022-05-26 PROCEDURE — 99223 1ST HOSP IP/OBS HIGH 75: CPT | Mod: 25 | Performed by: PHYSICAL MEDICINE & REHABILITATION

## 2022-05-26 PROCEDURE — 97530 THERAPEUTIC ACTIVITIES: CPT

## 2022-05-26 PROCEDURE — 700102 HCHG RX REV CODE 250 W/ 637 OVERRIDE(OP): Performed by: ORTHOPAEDIC SURGERY

## 2022-05-26 PROCEDURE — A9270 NON-COVERED ITEM OR SERVICE: HCPCS | Performed by: ORTHOPAEDIC SURGERY

## 2022-05-26 PROCEDURE — 700111 HCHG RX REV CODE 636 W/ 250 OVERRIDE (IP): Performed by: FAMILY MEDICINE

## 2022-05-26 PROCEDURE — A9270 NON-COVERED ITEM OR SERVICE: HCPCS | Performed by: FAMILY MEDICINE

## 2022-05-26 PROCEDURE — 770001 HCHG ROOM/CARE - MED/SURG/GYN PRIV*

## 2022-05-26 PROCEDURE — 99406 BEHAV CHNG SMOKING 3-10 MIN: CPT | Performed by: PHYSICAL MEDICINE & REHABILITATION

## 2022-05-26 RX ORDER — BISACODYL 5 MG
10 TABLET, DELAYED RELEASE (ENTERIC COATED) ORAL
Status: DISCONTINUED | OUTPATIENT
Start: 2022-05-26 | End: 2022-06-07 | Stop reason: HOSPADM

## 2022-05-26 RX ORDER — ENEMA 19; 7 G/133ML; G/133ML
1 ENEMA RECTAL ONCE
Status: ACTIVE | OUTPATIENT
Start: 2022-05-26 | End: 2022-05-27

## 2022-05-26 RX ADMIN — SODIUM CHLORIDE 3 G: 900 INJECTION INTRAVENOUS at 18:30

## 2022-05-26 RX ADMIN — OXYCODONE HYDROCHLORIDE 20 MG: 10 TABLET ORAL at 14:49

## 2022-05-26 RX ADMIN — DOCUSATE SODIUM 100 MG: 100 CAPSULE, LIQUID FILLED ORAL at 05:41

## 2022-05-26 RX ADMIN — DOCUSATE SODIUM 100 MG: 100 CAPSULE, LIQUID FILLED ORAL at 17:22

## 2022-05-26 RX ADMIN — SODIUM CHLORIDE 3 G: 900 INJECTION INTRAVENOUS at 12:23

## 2022-05-26 RX ADMIN — POLYETHYLENE GLYCOL 3350 1 PACKET: 17 POWDER, FOR SOLUTION ORAL at 05:41

## 2022-05-26 RX ADMIN — OXYCODONE HYDROCHLORIDE 20 MG: 10 TABLET ORAL at 20:44

## 2022-05-26 RX ADMIN — ACETAMINOPHEN 1000 MG: 500 TABLET, FILM COATED ORAL at 17:22

## 2022-05-26 RX ADMIN — INSULIN HUMAN 2 UNITS: 100 INJECTION, SOLUTION PARENTERAL at 17:29

## 2022-05-26 RX ADMIN — GABAPENTIN 1800 MG: 300 CAPSULE ORAL at 17:22

## 2022-05-26 RX ADMIN — SODIUM CHLORIDE 3 G: 900 INJECTION INTRAVENOUS at 02:31

## 2022-05-26 RX ADMIN — HYDROMORPHONE HYDROCHLORIDE 1 MG: 1 INJECTION, SOLUTION INTRAMUSCULAR; INTRAVENOUS; SUBCUTANEOUS at 17:26

## 2022-05-26 RX ADMIN — BISACODYL 10 MG: 5 TABLET, COATED ORAL at 12:22

## 2022-05-26 RX ADMIN — ATORVASTATIN CALCIUM 80 MG: 40 TABLET, FILM COATED ORAL at 17:22

## 2022-05-26 RX ADMIN — TRAZODONE HYDROCHLORIDE 450 MG: 150 TABLET ORAL at 20:45

## 2022-05-26 RX ADMIN — ACETAMINOPHEN 1000 MG: 500 TABLET, FILM COATED ORAL at 05:41

## 2022-05-26 RX ADMIN — OXYCODONE HYDROCHLORIDE 20 MG: 10 TABLET ORAL at 03:47

## 2022-05-26 RX ADMIN — OXYCODONE HYDROCHLORIDE 20 MG: 10 TABLET ORAL at 09:55

## 2022-05-26 RX ADMIN — GABAPENTIN 1800 MG: 300 CAPSULE ORAL at 05:40

## 2022-05-26 RX ADMIN — SENNOSIDES AND DOCUSATE SODIUM 1 TABLET: 50; 8.6 TABLET ORAL at 20:44

## 2022-05-26 RX ADMIN — ACETAMINOPHEN 1000 MG: 500 TABLET, FILM COATED ORAL at 12:22

## 2022-05-26 RX ADMIN — LISINOPRIL 5 MG: 5 TABLET ORAL at 05:41

## 2022-05-26 RX ADMIN — SODIUM CHLORIDE 3 G: 900 INJECTION INTRAVENOUS at 05:42

## 2022-05-26 ASSESSMENT — COGNITIVE AND FUNCTIONAL STATUS - GENERAL
STANDING UP FROM CHAIR USING ARMS: A LOT
MOVING FROM LYING ON BACK TO SITTING ON SIDE OF FLAT BED: UNABLE
WALKING IN HOSPITAL ROOM: TOTAL
MOBILITY SCORE: 11
CLIMB 3 TO 5 STEPS WITH RAILING: TOTAL
SUGGESTED CMS G CODE MODIFIER MOBILITY: CL
MOVING TO AND FROM BED TO CHAIR: A LOT

## 2022-05-26 ASSESSMENT — ENCOUNTER SYMPTOMS
DIARRHEA: 0
CHILLS: 0
NERVOUS/ANXIOUS: 0
CONSTIPATION: 1
BACK PAIN: 0
BLURRED VISION: 0
HEARTBURN: 0
WHEEZING: 0
FOCAL WEAKNESS: 0
HEADACHES: 0
PALPITATIONS: 0
NAUSEA: 0
DIZZINESS: 0
SHORTNESS OF BREATH: 0
SENSORY CHANGE: 0
COUGH: 0
ABDOMINAL PAIN: 0
SPEECH CHANGE: 0
MYALGIAS: 0
WEAKNESS: 1
FLANK PAIN: 0
VOMITING: 0
NECK PAIN: 0
FEVER: 0
SORE THROAT: 0
DIAPHORESIS: 0

## 2022-05-26 ASSESSMENT — PAIN DESCRIPTION - PAIN TYPE
TYPE: ACUTE PAIN;SURGICAL PAIN
TYPE: ACUTE PAIN;SURGICAL PAIN

## 2022-05-26 ASSESSMENT — GAIT ASSESSMENTS: GAIT LEVEL OF ASSIST: UNABLE TO PARTICIPATE

## 2022-05-26 NOTE — DISCHARGE PLANNING
Care Transition Team Assessment    Information Source  Orientation Level: Oriented X4  Information Given By: Patient  Informant's Name: Peyman  Who is responsible for making decisions for patient? : Patient    Readmission Evaluation  Is this a readmission?: No    Elopement Risk  Legal Hold: No  Ambulatory or Self Mobile in Wheelchair: Yes  Disoriented: No  Psychiatric Symptoms: None  History of Wandering: No  Elopement this Admit: No  Vocalizing Wanting to Leave: No  Displays Behaviors, Body Language Wanting to Leave: No-Not at Risk for Elopement  Elopement Risk: Not at Risk for Elopement    Interdisciplinary Discharge Planning  Lives with - Patient's Self Care Capacity: Alone and Able to Care For Self (requires assist for I-ADL)  Patient or legal guardian wants to designate a caregiver: No  Housing / Facility: Mobile Home  Prior Services: Other (Comments) (Assist for I-ADL)    Discharge Preparedness  What is your plan after discharge?: Home with help  What are your discharge supports?: Child  Prior Functional Level: Independent with Activities of Daily Living    Functional Assesment  Prior Functional Level: Independent with Activities of Daily Living    Finances  Financial Barriers to Discharge: No  Prescription Coverage: Yes    Vision / Hearing Impairment  Right Eye Vision: Impaired, Wears Glasses  Left Eye Vision: Impaired, Wears Glasses         Advance Directive  Advance Directive?: None  Advance Directive offered?: AD Booklet refused    Domestic Abuse  Have you ever been the victim of abuse or violence?: No  Physical Abuse or Sexual Abuse: No  Verbal Abuse or Emotional Abuse: No  Possible Abuse/Neglect Reported to:: Not Applicable    Psychological Assessment  History of Substance Abuse: None  History of Psychiatric Problems: No  Non-compliant with Treatment: No  Newly Diagnosed Illness: No    Discharge Risks or Barriers  Discharge risks or barriers?: No  Patient risk factors: Complex medical needs    Anticipated  Discharge Information  Discharge Disposition: Disch to IP rehab facility or distinct part unit (62)

## 2022-05-26 NOTE — CARE PLAN
The patient is Watcher - Medium risk of patient condition declining or worsening    Shift Goals  Clinical Goals: pain management, have BM  Patient Goals:   Family Goals:     Progress made toward(s) clinical / shift goals:      Patient is not progressing towards the following goals:Patient reports pain is not controlled despite frequently administered PRNs and scheduled multi-modal regimen; patient advanced in bowel protocol      Problem: Pain - Standard  Goal: Alleviation of pain or a reduction in pain to the patient’s comfort goal  Outcome: Not Progressing    Problem: Fall Risk  Goal: Patient will remain free from falls  Outcome: Progressing

## 2022-05-26 NOTE — CONSULTS
Physical Medicine and Rehabilitation Consultation              Date of initial consultation: 5/26/2022  Consulting provider: Negrito Rondon MD  Reason for consultation: assess for acute inpatient rehab appropriateness  LOS: 8 Day(s)    Chief complaint: Left foot osteomyelitis    HPI: The patient is a 71 y.o. right hand dominant male with a past medical history of diabetes, hypertension, hyperlipidemia, tobacco use;  who presented on 5/18/2022  5:06 AM for an elective left above-knee amputation for chronic lower extremity wounds, ulcers and osteomyelitis of the left foot.  Patient also appeared to have probable cellulitis.  Patient was started on empiric coverage with IV Unasyn.  Patient also has right lower extremity ulcers, with surgery planned on 5/27.  Right foot was examined by Dr. Chitra BROWN who discussed amputation of the first and second toes and replacement of the wound VAC and further debridement of the right leg.    The patient currently reports overall feeling well.  Patient denies phantom limb pain in the left lower extremity, states that he is going to lose his first 2 toes on the right, then after that he should be good to go.  Patient lives in a 31 foot trailer on a shared property in [facility].  Patient denies having any support at home.  States he might be able to get a ramp built.    ROS  Pertinent positives are mentioned in the HPI, all others reviewed and are negative.    Social Hx:  1 SH  3 ERICK  With: Alone.  Therapy has reported that the patient has support from a friend, Gerardo for transport, shopping, cleaning.    Patient used a front wheel walker at home, wheelchair in community    Employment: Not working  Tobacco: 1 pack/day  Alcohol: Denies  Drugs: Denies    THERAPY:  Restrictions: Fall risk, NWB LLE   PT: Functional mobility   5/24: Unable to participate in gait, mod assist sit to stand  5/25: Refused activity due to pain    OT: ADLs  5/24: Total assist toileting and  lower body dressing    SLP:   None    IMAGING:  Right tib-fib x-ray 5/19/2022  1.  No acute traumatic bony injury.  2.  No destructive osseous lesion is appreciated, note that plain film is insensitive for evaluation of osteomyelitis, and bone scan would offer improved diagnostic sensitivity as clinically appropriate.    PROCEDURES:  Gerard Buenrostro MD 5/18/2022  1.  Left above knee amputation.  2.  Left adductor myotenodesis to the femur.    PMH:  Past Medical History:   Diagnosis Date   • Anemia 5/24/2022   • Arthritis    • Asthma    • At risk for sleep apnea    • Blood clotting disorder (HCC)    • Bowel habit changes     Constipation    • Breath shortness     with exertion,   • Chicken pox    • Chronic obstructive pulmonary disease (HCC) 11/9/2021   • Diabetes (HCC)     Type 2    • Heart disease    • Hemorrhagic disorder (HCC)     Previous plavix    • Hepatitis C    • High cholesterol    • Hyperlipidemia    • Hypertension    • Measles    • Muscle disorder    • Myocardial infarct (HCC)     2016-stents   • Parathyroid disorder (HCC)    • Psychiatric problem    • Sleep apnea    • Snoring    • Substance abuse (HCC)    • Urinary tract infection        PSH:  Past Surgical History:   Procedure Laterality Date   • PB AMPUTATE THIGH,THRU FEMUR Left 5/18/2022    Procedure: LEFT ABOVE KNEE AMPUTATION, ADDUCTOR TENDON MYOTENODESIS;  Surgeon: Aleks Buenrostro M.D.;  Location: [facility];  Service: Orthopedics   • PB XFER SINGLE SUPERFICI LOW LEG TENDON Left 5/18/2022    Procedure: TRANSFER, TENDON;  Surgeon: Aleks Buenrostro M.D.;  Location: [facility];  Service: Orthopedics   • ANGIOGRAM Bilateral 10/12/2021    Procedure: BILATERAL LOWER EXTREMITY ANGIOGRAM;  Surgeon: Valerio Purcell M.D.;  Location: [facility];  Service: Vascular   • IRRIGATION & DEBRIDEMENT GENERAL Bilateral 10/12/2021    Procedure: IRRIGATION AND DEBRIDEMENT, WOUND, BILATERAL LOWER EXTREMITY;  Surgeon:  Valerio Purcell M.D.;  Location: SUR[facility];  Service: Vascular   • APPLICATION OR REPLACEMENT, WOUND VAC Bilateral 10/12/2021    Procedure: APPLICATION WOUND VAC;  Surgeon: Valerio Purcell M.D.;  Location: SUR[facility]ERY [facility];  Service: Vascular   • STENT PLACEMENT Right 10/12/2021    Procedure: STENT PLACEMENT, RIGHT SUPERFICIAL FEMORAL ARTERY;  Surgeon: Valerio Purcell M.D.;  Location: SUR[facility]ERY [facility];  Service: Vascular   • TOE AMPUTATION Left 2/17/2020    Procedure: LEFT SECOND, THIRD, AND FORTH TOE AMPUTATION;  Surgeon: Alfonso Esteban M.D.;  Location: SURGERY [facility];  Service: Orthopedics   • [place] UNLISTED PROC, ARTHROSCOPY Left 7/25/2019    Procedure: LEFT ENDOSCOPIC ULNAR NERVE DECOMPRESSION, LEFT CARPAL TUNNEL RELEASE;  Surgeon: Red Chacon M.D.;  Location: SURGERY [facility];  Service: Orthopedics   • KNEE REPLACEMENT, TOTAL Bilateral    • OTHER SURGICAL PROCEDURE      back surgery - Unknown        FHX:  Family History   Problem Relation Age of Onset   • Arthritis Father    • Cancer Father    • Heart Disease Father    • Diabetes Other        Medications:  Current Facility-Administered Medications   Medication Dose   • acetaminophen (TYLENOL) tablet 1,000 mg  1,000 mg   • carvedilol (COREG) tablet 3.125 mg  3.125 mg   • insulin GLARGINE (Lantus,Semglee) injection  20 Units   • oxyCODONE immediate release (ROXICODONE) tablet 10 mg  10 mg    Or   • oxyCODONE immediate release (ROXICODONE) tablet 20 mg  20 mg    Or   • HYDROmorphone (Dilaudid) injection 1 mg  1 mg   • gabapentin (NEURONTIN) capsule 1,800 mg  1,800 mg   • polyethylene glycol/lytes (MIRALAX) PACKET 1 Packet  1 Packet   • nicotine (NICODERM) 14 MG/24HR 14 mg  14 mg   • ampicillin/sulbactam (UNASYN) 3 g in  mL IVPB  3 g   • atorvastatin (LIPITOR) tablet 80 mg  80 mg   • lisinopril (PRINIVIL) tablet 5 mg  5 mg   • traZODone (DESYREL) tablet 450 mg  450 mg   • Pharmacy Consult Request ...Pain Management  "Review 1 Each  1 Each   • ondansetron (ZOFRAN) syringe/vial injection 4 mg  4 mg   • docusate sodium (COLACE) capsule 100 mg  100 mg   • senna-docusate (PERICOLACE or SENOKOT S) 8.6-50 MG per tablet 1 Tablet  1 Tablet   • senna-docusate (PERICOLACE or SENOKOT S) 8.6-50 MG per tablet 1 Tablet  1 Tablet   • magnesium hydroxide (MILK OF MAGNESIA) suspension 30 mL  30 mL   • bisacodyl (DULCOLAX) suppository 10 mg  10 mg   • enoxaparin (Lovenox) inj 40 mg  40 mg   • insulin regular (HumuLIN R,NovoLIN R) injection  2-9 Units    And   • dextrose 50% (D50W) injection 25 g  25 g   • ondansetron (ZOFRAN ODT) dispertab 4 mg  4 mg       Allergies:  No Known Allergies      Physical Exam:  Vitals: BP (!) 144/72   Pulse 91   Temp 36.6 °C (97.8 °F) (Temporal)   Resp 17   Ht 1.702 m (5' 7\")   Wt 102 kg (225 lb 15.5 oz)   SpO2 93%   Gen: NAD  Head: NC/AT  Eyes/ Nose/ Mouth:  moist mucous membranes  Cardio: RRR, good distal perfusion, warm extremities  Pulm: normal respiratory effort, no cyanosis   Abd: Soft NTND, negative borborygmi   Ext: Left AKA, right wound VAC on anterior tibialis    Mental status: answers questions appropriately follows commands  Speech: fluent, no aphasia or dysarthria    Motor:      Upper Extremity  Myotome R L   Shoulder flexion C5 5 5   Elbow flexion C5 5 5   Wrist extension C6 5 5   Elbow extension C7 5 5   Finger flexion C8 5 5   Finger abduction T1 5 5     Lower Extremity Myotome R L   Hip flexion L2 4/5  short residual   Knee extension L3 4/5 x   Ankle dorsiflexion L4 1/5 x   Toe extension L5 0/5 x   Ankle plantarflexion S1 1/5 x     Labs: Reviewed and significant for   Recent Labs     05/25/22  0122   RBC 3.83*   HEMOGLOBIN 10.7*   HEMATOCRIT 32.3*   PLATELETCT 260     Recent Labs     05/25/22  0122   SODIUM 136   POTASSIUM 3.9   CHLORIDE 104   CO2 23   GLUCOSE 146*   BUN 11   CREATININE 0.47*   CALCIUM 8.7     Recent Results (from the past 24 hour(s))   POCT glucose device results    Collection " Time: 05/25/22  8:52 AM   Result Value Ref Range    POC Glucose, Blood 147 (H) 65 - 99 mg/dL   POCT glucose device results    Collection Time: 05/25/22  1:57 PM   Result Value Ref Range    POC Glucose, Blood 175 (H) 65 - 99 mg/dL   POCT glucose device results    Collection Time: 05/25/22  5:34 PM   Result Value Ref Range    POC Glucose, Blood 155 (H) 65 - 99 mg/dL   POCT glucose device results    Collection Time: 05/25/22  9:22 PM   Result Value Ref Range    POC Glucose, Blood 177 (H) 65 - 99 mg/dL         ASSESSMENT:  Patient is a 71 y.o. male admitted with left foot osteomyelitis, now status post AKA, planning to return to the OR tomorrow for right lower extremity first and second digit amputation, currently with wound VAC on right lower extremity tibialis anterior area    [facility] Code / Diagnosis to Support: 0005.3 - Amputation: Unilateral Lower Limb Above the Knee (AK)    Rehabilitation: Impaired ADLs and mobility  Patient is not a candidate for IPR due to lack of discharge support    All cases are subject to administrative review and recommendations may change    Additional Recommendations:  -Recommend SNF placement following medical stabilization.  Patient has good strength in his upper extremities, will likely be able to return home with crutches and front wheel walker if he is able to save his right foot.  -Continue PT OT, would appreciate evaluations postsurgery  - Tobacco abuse cessation counseling given today for ~5 min as it pertains to vascular disease, heart attack, stroke, wound healing, and pulmonary disease.   -Pain control per primary team with gabapentin, opioids  -PMR will sign off, please reconsult or reach out via Voalte if further evaluation or medical management is requested  -If patient requires more than forefoot amputation, please reconsult PMR to discuss BKA care      Thank you for allowing us to participate in the care of this patient.     Patient was seen for 83 minutes on unit/floor of  which > 50% of time was spent on counseling and coordination of care regarding the above, including prognosis, risk reduction, benefits of treatment, and options for next stage of care.    Jorge Smalls, DO   Physical Medicine and Rehabilitation     Please note that this dictation was created using voice recognition software. I have made every reasonable attempt to correct obvious errors, but there may be errors of grammar and possibly content that I did not discover before finalizing the note.

## 2022-05-26 NOTE — PROGRESS NOTES
Hospital Medicine Daily Progress Note    Date of Service  5/26/2022    Chief Complaint  Luis Kellogg is a 71 y.o. male admitted 5/18/2022 with osteomyelitis of the left foot.    Hospital Course  Admitted for elective left above-knee amputation for chronic lower extremity wounds/ulcer and chronic osteomyelitis of the left foot.  Hospital medicine was consulted on the case postoperatively.  LPS was also consulted on the case.  Patient appeared to have chronic right lower extremity wound/ulcers, with probable cellulitis.  He was started on empiric coverage with IV Unasyn.    Interval Problem Update  Left AKA - pain controlled  Right lower extremity ulcers - plan for surgery tomorrow  Hypertension - sbp   Diabetes - -170  Constipation - still no BM    I have personally seen and examined the patient at bedside. I discussed the plan of care with patient, bedside RN, charge RN and .    Consultants/Specialty  orthopedics and LPS    Code Status  Full Code    Disposition  Patient is not medically cleared for discharge.   Anticipate discharge to to skilled nursing facility.  I have placed the appropriate orders for post-discharge needs.    Review of Systems  Review of Systems   Constitutional: Positive for malaise/fatigue. Negative for chills, diaphoresis and fever.   HENT: Negative for congestion, hearing loss and sore throat.    Eyes: Negative for blurred vision.   Respiratory: Negative for cough, shortness of breath and wheezing.    Cardiovascular: Negative for chest pain, palpitations and leg swelling.   Gastrointestinal: Positive for constipation. Negative for abdominal pain, diarrhea, heartburn, nausea and vomiting.   Genitourinary: Negative for dysuria, flank pain and hematuria.   Musculoskeletal: Negative for back pain, joint pain, myalgias and neck pain.   Skin: Negative for rash.   Neurological: Positive for weakness. Negative for dizziness, sensory change, speech change, focal  weakness and headaches.   Psychiatric/Behavioral: The patient is not nervous/anxious.         Physical Exam  Temp:  [35.9 °C (96.7 °F)-36.6 °C (97.8 °F)] 36.3 °C (97.3 °F)  Pulse:  [60-91] 73  Resp:  [16-18] 17  BP: ()/(57-76) 115/76  SpO2:  [91 %-94 %] 94 %    Physical Exam  Vitals and nursing note reviewed.   Constitutional:       Appearance: He is obese.   HENT:      Head: Normocephalic and atraumatic.      Nose: No congestion.      Mouth/Throat:      Mouth: Mucous membranes are moist.   Eyes:      Extraocular Movements: Extraocular movements intact.      Conjunctiva/sclera: Conjunctivae normal.   Cardiovascular:      Rate and Rhythm: Normal rate and regular rhythm.   Pulmonary:      Effort: Pulmonary effort is normal.      Breath sounds: Normal breath sounds.   Abdominal:      General: There is distension.      Tenderness: There is no abdominal tenderness. There is no guarding or rebound.   Musculoskeletal:      Cervical back: No tenderness.      Right lower leg: No edema.      Left lower leg: No edema.      Comments: Wound vac at right leg  Left AKA   Skin:     Findings: Wound (ulcers at right knee, right 1st and 2nd toes) present.   Neurological:      General: No focal deficit present.      Mental Status: He is alert and oriented to person, place, and time.      Cranial Nerves: No cranial nerve deficit.         Fluids    Intake/Output Summary (Last 24 hours) at 5/26/2022 1244  Last data filed at 5/26/2022 0915  Gross per 24 hour   Intake 60 ml   Output 2320 ml   Net -2260 ml       Laboratory  Recent Labs     05/25/22  0122   WBC 5.9   RBC 3.83*   HEMOGLOBIN 10.7*   HEMATOCRIT 32.3*   MCV 84.3   MCH 27.9   MCHC 33.1*   RDW 45.2   PLATELETCT 260   MPV 9.2     Recent Labs     05/25/22  0122   SODIUM 136   POTASSIUM 3.9   CHLORIDE 104   CO2 23   GLUCOSE 146*   BUN 11   CREATININE 0.47*   CALCIUM 8.7                   Imaging  [id number] VIEW   Final Result      1. Large amount of stool throughout the colon  from the cecum to the rectum.   2. No bowel obstruction.   3. Other chronic degenerative changes.      US-EXTREMITY ARTERY LOWER UNILAT RIGHT   Final Result      US-MIA SINGLE LEVEL UNILAT RIGHT   Final Result      DX-TIBIA AND FIBULA RIGHT   Final Result         1.  No acute traumatic bony injury.   2.  No destructive osseous lesion is appreciated, note that plain film is insensitive for evaluation of osteomyelitis, and bone scan would offer improved diagnostic sensitivity as clinically appropriate.      DX-FOOT-COMPLETE 3+ RIGHT   Final Result         1.  No acute traumatic bony injury. No destructive osseous lesion is seen, note that plain film can be insensitive for evaluation of osteomyelitis and bone scan would offer improved diagnostic sensitivity as clinically appropriate.   2.  Degenerative changes of the foot, greatest at the midfoot and hindfoot           Assessment/Plan  * S/P AKA (above knee amputation), left (HCC)- (present on admission)  Assessment & Plan  pain control  PT/OT     Cellulitis of right leg- (present on admission)  Assessment & Plan  IV Unasyn    Anemia- (present on admission)  Assessment & Plan  Follow cbc    Chronic, continuous use of opioids- (present on admission)  Assessment & Plan  Pain control    Primary hypertension- (present on admission)  Assessment & Plan  Lisinopril with parameters  Decreased Coreg to 3.125 mg with parameters    Type 2 diabetes mellitus with hyperglycemia, with long-term current use of insulin (HCC)- (present on admission)  Assessment & Plan  Lantus, SSI      Chronic ulcer of lower extremity (HCC)- (present on admission)  Assessment & Plan  Wound care  Pain control  LPS following  Plan for surgery on 5/27      Drug-induced constipation- (present on admission)  Assessment & Plan  bowel protocol  Fleet enema today    Hyponatremia- (present on admission)  Assessment & Plan  Follow bmp    Peripheral vascular disease (HCC)- (present on admission)  Assessment &  Plan  Selective catheter placement in the right below-knee popliteal artery, Right lower extremity angiography, 6mm uncovered self expanding Right SFA stent placement   10/12/2021  Atorvastatin  Resume ASA when cleared by Orthopedic surgery    History of MI (myocardial infarction)- (present on admission)  Assessment & Plan  Coreg, Lipitor  Resume ASA when cleared by Orthopedic surgery    Marijuana abuse- (present on admission)  Assessment & Plan  Education and counseling    Tobacco use- (present on admission)  Assessment & Plan  Nicotine replacement protocol    Hyperlipidemia- (present on admission)  Assessment & Plan  Atorvastatin       VTE prophylaxis: enoxaparin ppx    I have performed a physical exam and reviewed and updated ROS and Plan today (5/26/2022). In review of yesterday's note (5/25/2022), there are no changes except as documented above.

## 2022-05-26 NOTE — DISCHARGE PLANNING
"This  attempted to reach the [facility] the [facility] nurse to check on progress with revamping the patient's home. For some reason, I keep getting message that \"(call is rejected\"). [phone]. I was able to obtain additional phone number from the patient - [facility] - [phone], I left a voicemail for a call back to see if they have made any progress with rehabbing the patient's home.   "

## 2022-05-26 NOTE — CARE PLAN
The patient is Stable - Low risk of patient condition declining or worsening    Shift Goals  Clinical Goals: safety  Patient Goals: pain management  Family Goals: no family present at this time    Progress made toward(s) clinical / shift goals:  Pt rested through the night pain meds given as needed. Right great toe dressing changed, due to pt accidentally pulling off.    Patient is not progressing towards the following goals:N/A      Problem: Pain - Standard  Goal: Alleviation of pain or a reduction in pain to the patient’s comfort goal  Outcome: Progressing     Problem: Skin Integrity  Goal: Skin integrity is maintained or improved  Outcome: Progressing     Problem: Fall Risk  Goal: Patient will remain free from falls  Outcome: Progressing     Problem: Knowledge Deficit - Standard  Goal: Patient and family/care givers will demonstrate understanding of plan of care, disease process/condition, diagnostic tests and medications  Outcome: Progressing

## 2022-05-26 NOTE — PROGRESS NOTES
Bedside report received.  Assessment complete.  A&O x 4. Patient calls appropriately.  Patient up with max assist. Bed alarm on.   Patient has 8/10 pain. Pain managed with prescribed medications.  Denies N&V. Tolerating diabetic diet.  Left AKA with dressing, CDI. RLW with wound vac and tubi- to shin, CDI. Wound vac functioning properly at 125. Ulcer to top of right foot, ROBI. Dressing to first two toes on right foot, CDI.   + void, + flatus, - BM. Last BM prior to arrival, patient refusing suppository and enema at this time. Requesting dulcolax tablets, Dr. Rondon updated  Patient denies SOB.  SCD's off, wounds to RLE.    Review plan with of care with patient. Call light and personal belongings within reach. Hourly rounding in place. All needs met at this time.

## 2022-05-27 ENCOUNTER — ANESTHESIA (OUTPATIENT)
Dept: SURGERY | Facility: MEDICAL CENTER | Age: 72
DRG: 240 | End: 2022-05-27
Payer: MEDICARE

## 2022-05-27 ENCOUNTER — ANESTHESIA EVENT (OUTPATIENT)
Dept: SURGERY | Facility: MEDICAL CENTER | Age: 72
DRG: 240 | End: 2022-05-27
Payer: MEDICARE

## 2022-05-27 LAB
ANION GAP SERPL CALC-SCNC: 10 MMOL/L (ref 7–16)
BUN SERPL-MCNC: 17 MG/DL (ref 8–22)
CALCIUM SERPL-MCNC: 8.8 MG/DL (ref 8.5–10.5)
CHLORIDE SERPL-SCNC: 104 MMOL/L (ref 96–112)
CO2 SERPL-SCNC: 22 MMOL/L (ref 20–33)
CREAT SERPL-MCNC: 0.55 MG/DL (ref 0.5–1.4)
ERYTHROCYTE [DISTWIDTH] IN BLOOD BY AUTOMATED COUNT: 46.5 FL (ref 35.9–50)
GFR SERPLBLD CREATININE-BSD FMLA CKD-EPI: 106 ML/MIN/1.73 M 2
GLUCOSE BLD STRIP.AUTO-MCNC: 132 MG/DL (ref 65–99)
GLUCOSE BLD STRIP.AUTO-MCNC: 142 MG/DL (ref 65–99)
GLUCOSE BLD STRIP.AUTO-MCNC: 203 MG/DL (ref 65–99)
GLUCOSE BLD STRIP.AUTO-MCNC: 212 MG/DL (ref 65–99)
GLUCOSE BLD STRIP.AUTO-MCNC: 253 MG/DL (ref 65–99)
GLUCOSE SERPL-MCNC: 131 MG/DL (ref 65–99)
HCT VFR BLD AUTO: 31.7 % (ref 42–52)
HGB BLD-MCNC: 10.4 G/DL (ref 14–18)
MCH RBC QN AUTO: 28.1 PG (ref 27–33)
MCHC RBC AUTO-ENTMCNC: 32.8 G/DL (ref 33.7–35.3)
MCV RBC AUTO: 85.7 FL (ref 81.4–97.8)
PLATELET # BLD AUTO: 289 K/UL (ref 164–446)
PMV BLD AUTO: 9.6 FL (ref 9–12.9)
POTASSIUM SERPL-SCNC: 3.9 MMOL/L (ref 3.6–5.5)
RBC # BLD AUTO: 3.7 M/UL (ref 4.7–6.1)
SODIUM SERPL-SCNC: 136 MMOL/L (ref 135–145)
WBC # BLD AUTO: 7.1 K/UL (ref 4.8–10.8)

## 2022-05-27 PROCEDURE — 700111 HCHG RX REV CODE 636 W/ 250 OVERRIDE (IP): Performed by: ORTHOPAEDIC SURGERY

## 2022-05-27 PROCEDURE — 160048 HCHG OR STATISTICAL LEVEL 1-5: Performed by: ORTHOPAEDIC SURGERY

## 2022-05-27 PROCEDURE — 501838 HCHG SUTURE GENERAL: Performed by: ORTHOPAEDIC SURGERY

## 2022-05-27 PROCEDURE — 01480 ANES OPEN PX LOWER L/A/F NOS: CPT | Performed by: ANESTHESIOLOGY

## 2022-05-27 PROCEDURE — 82962 GLUCOSE BLOOD TEST: CPT

## 2022-05-27 PROCEDURE — 700101 HCHG RX REV CODE 250: Performed by: FAMILY MEDICINE

## 2022-05-27 PROCEDURE — 36415 COLL VENOUS BLD VENIPUNCTURE: CPT

## 2022-05-27 PROCEDURE — 700102 HCHG RX REV CODE 250 W/ 637 OVERRIDE(OP): Performed by: FAMILY MEDICINE

## 2022-05-27 PROCEDURE — 99232 SBSQ HOSP IP/OBS MODERATE 35: CPT | Performed by: FAMILY MEDICINE

## 2022-05-27 PROCEDURE — 160027 HCHG SURGERY MINUTES - 1ST 30 MINS LEVEL 2: Performed by: ORTHOPAEDIC SURGERY

## 2022-05-27 PROCEDURE — 700111 HCHG RX REV CODE 636 W/ 250 OVERRIDE (IP): Performed by: ANESTHESIOLOGY

## 2022-05-27 PROCEDURE — 11046 DBRDMT MUSC&/FSCA EA ADDL: CPT | Mod: 79 | Performed by: ORTHOPAEDIC SURGERY

## 2022-05-27 PROCEDURE — 700105 HCHG RX REV CODE 258: Performed by: FAMILY MEDICINE

## 2022-05-27 PROCEDURE — A9270 NON-COVERED ITEM OR SERVICE: HCPCS | Performed by: ANESTHESIOLOGY

## 2022-05-27 PROCEDURE — 160038 HCHG SURGERY MINUTES - EA ADDL 1 MIN LEVEL 2: Performed by: ORTHOPAEDIC SURGERY

## 2022-05-27 PROCEDURE — A9270 NON-COVERED ITEM OR SERVICE: HCPCS | Performed by: FAMILY MEDICINE

## 2022-05-27 PROCEDURE — 0JBN0ZZ EXCISION OF RIGHT LOWER LEG SUBCUTANEOUS TISSUE AND FASCIA, OPEN APPROACH: ICD-10-PCS | Performed by: ORTHOPAEDIC SURGERY

## 2022-05-27 PROCEDURE — 700101 HCHG RX REV CODE 250: Performed by: ANESTHESIOLOGY

## 2022-05-27 PROCEDURE — 700111 HCHG RX REV CODE 636 W/ 250 OVERRIDE (IP): Performed by: NURSE PRACTITIONER

## 2022-05-27 PROCEDURE — 770001 HCHG ROOM/CARE - MED/SURG/GYN PRIV*

## 2022-05-27 PROCEDURE — 700102 HCHG RX REV CODE 250 W/ 637 OVERRIDE(OP): Performed by: ORTHOPAEDIC SURGERY

## 2022-05-27 PROCEDURE — A6223 GAUZE >16<=48 NO W/SAL W/O B: HCPCS | Performed by: ORTHOPAEDIC SURGERY

## 2022-05-27 PROCEDURE — 306591 TRAY SUTURE REMOVAL DISP: Performed by: FAMILY MEDICINE

## 2022-05-27 PROCEDURE — 700102 HCHG RX REV CODE 250 W/ 637 OVERRIDE(OP): Performed by: NURSE PRACTITIONER

## 2022-05-27 PROCEDURE — 99100 ANES PT EXTEME AGE<1 YR&>70: CPT | Performed by: ANESTHESIOLOGY

## 2022-05-27 PROCEDURE — 80048 BASIC METABOLIC PNL TOTAL CA: CPT

## 2022-05-27 PROCEDURE — 28820 AMPUTATION OF TOE: CPT | Mod: 79,RT | Performed by: ORTHOPAEDIC SURGERY

## 2022-05-27 PROCEDURE — 501330 HCHG SET, CYSTO IRRIG TUBING: Performed by: ORTHOPAEDIC SURGERY

## 2022-05-27 PROCEDURE — A9270 NON-COVERED ITEM OR SERVICE: HCPCS | Performed by: ORTHOPAEDIC SURGERY

## 2022-05-27 PROCEDURE — 160002 HCHG RECOVERY MINUTES (STAT): Performed by: ORTHOPAEDIC SURGERY

## 2022-05-27 PROCEDURE — A9270 NON-COVERED ITEM OR SERVICE: HCPCS | Performed by: NURSE PRACTITIONER

## 2022-05-27 PROCEDURE — 700111 HCHG RX REV CODE 636 W/ 250 OVERRIDE (IP): Performed by: FAMILY MEDICINE

## 2022-05-27 PROCEDURE — 700105 HCHG RX REV CODE 258: Performed by: ANESTHESIOLOGY

## 2022-05-27 PROCEDURE — 0Y6P0Z0 DETACHMENT AT RIGHT 1ST TOE, COMPLETE, OPEN APPROACH: ICD-10-PCS | Performed by: ORTHOPAEDIC SURGERY

## 2022-05-27 PROCEDURE — 700102 HCHG RX REV CODE 250 W/ 637 OVERRIDE(OP): Performed by: ANESTHESIOLOGY

## 2022-05-27 PROCEDURE — 160009 HCHG ANES TIME/MIN: Performed by: ORTHOPAEDIC SURGERY

## 2022-05-27 PROCEDURE — 160035 HCHG PACU - 1ST 60 MINS PHASE I: Performed by: ORTHOPAEDIC SURGERY

## 2022-05-27 PROCEDURE — 302098 PASTE RING (FLAT): Performed by: FAMILY MEDICINE

## 2022-05-27 PROCEDURE — 0Y6R0Z0 DETACHMENT AT RIGHT 2ND TOE, COMPLETE, OPEN APPROACH: ICD-10-PCS | Performed by: ORTHOPAEDIC SURGERY

## 2022-05-27 PROCEDURE — 11043 DBRDMT MUSC&/FSCA 1ST 20/<: CPT | Mod: 79,RT | Performed by: ORTHOPAEDIC SURGERY

## 2022-05-27 PROCEDURE — 85027 COMPLETE CBC AUTOMATED: CPT

## 2022-05-27 PROCEDURE — 700101 HCHG RX REV CODE 250

## 2022-05-27 RX ORDER — OXYCODONE HCL 5 MG/5 ML
10 SOLUTION, ORAL ORAL
Status: COMPLETED | OUTPATIENT
Start: 2022-05-27 | End: 2022-05-27

## 2022-05-27 RX ORDER — HYDROMORPHONE HYDROCHLORIDE 1 MG/ML
0.4 INJECTION, SOLUTION INTRAMUSCULAR; INTRAVENOUS; SUBCUTANEOUS
Status: DISCONTINUED | OUTPATIENT
Start: 2022-05-27 | End: 2022-05-27 | Stop reason: HOSPADM

## 2022-05-27 RX ORDER — BUPIVACAINE HYDROCHLORIDE 5 MG/ML
INJECTION, SOLUTION PERINEURAL
Status: DISCONTINUED | OUTPATIENT
Start: 2022-05-27 | End: 2022-05-27 | Stop reason: HOSPADM

## 2022-05-27 RX ORDER — HYDROMORPHONE HYDROCHLORIDE 1 MG/ML
0.1 INJECTION, SOLUTION INTRAMUSCULAR; INTRAVENOUS; SUBCUTANEOUS
Status: DISCONTINUED | OUTPATIENT
Start: 2022-05-27 | End: 2022-05-27 | Stop reason: HOSPADM

## 2022-05-27 RX ORDER — ONDANSETRON 2 MG/ML
4 INJECTION INTRAMUSCULAR; INTRAVENOUS
Status: DISCONTINUED | OUTPATIENT
Start: 2022-05-27 | End: 2022-06-06

## 2022-05-27 RX ORDER — ENEMA 19; 7 G/133ML; G/133ML
1 ENEMA RECTAL ONCE
Status: COMPLETED | OUTPATIENT
Start: 2022-05-27 | End: 2022-05-27

## 2022-05-27 RX ORDER — OXYCODONE HCL 5 MG/5 ML
5 SOLUTION, ORAL ORAL
Status: COMPLETED | OUTPATIENT
Start: 2022-05-27 | End: 2022-05-27

## 2022-05-27 RX ORDER — HYDROMORPHONE HYDROCHLORIDE 1 MG/ML
0.2 INJECTION, SOLUTION INTRAMUSCULAR; INTRAVENOUS; SUBCUTANEOUS
Status: DISCONTINUED | OUTPATIENT
Start: 2022-05-27 | End: 2022-05-27 | Stop reason: HOSPADM

## 2022-05-27 RX ORDER — HALOPERIDOL 5 MG/ML
1 INJECTION INTRAMUSCULAR
Status: DISCONTINUED | OUTPATIENT
Start: 2022-05-27 | End: 2022-05-27

## 2022-05-27 RX ORDER — SODIUM CHLORIDE, SODIUM LACTATE, POTASSIUM CHLORIDE, CALCIUM CHLORIDE 600; 310; 30; 20 MG/100ML; MG/100ML; MG/100ML; MG/100ML
INJECTION, SOLUTION INTRAVENOUS
Status: DISCONTINUED | OUTPATIENT
Start: 2022-05-27 | End: 2022-05-27 | Stop reason: SURG

## 2022-05-27 RX ORDER — DIPHENHYDRAMINE HYDROCHLORIDE 50 MG/ML
12.5 INJECTION INTRAMUSCULAR; INTRAVENOUS
Status: DISCONTINUED | OUTPATIENT
Start: 2022-05-27 | End: 2022-05-27

## 2022-05-27 RX ADMIN — SODIUM CHLORIDE 3 G: 900 INJECTION INTRAVENOUS at 00:50

## 2022-05-27 RX ADMIN — ATORVASTATIN CALCIUM 80 MG: 40 TABLET, FILM COATED ORAL at 18:24

## 2022-05-27 RX ADMIN — EPHEDRINE SULFATE 10 MG: 50 INJECTION INTRAMUSCULAR; INTRAVENOUS; SUBCUTANEOUS at 09:24

## 2022-05-27 RX ADMIN — SODIUM CHLORIDE, POTASSIUM CHLORIDE, SODIUM LACTATE AND CALCIUM CHLORIDE: 600; 310; 30; 20 INJECTION, SOLUTION INTRAVENOUS at 08:59

## 2022-05-27 RX ADMIN — SODIUM CHLORIDE 3 G: 900 INJECTION INTRAVENOUS at 23:43

## 2022-05-27 RX ADMIN — SODIUM CHLORIDE 3 G: 900 INJECTION INTRAVENOUS at 05:22

## 2022-05-27 RX ADMIN — ACETAMINOPHEN 1000 MG: 500 TABLET, FILM COATED ORAL at 11:55

## 2022-05-27 RX ADMIN — SODIUM PHOSPHATE 133 ML: 7; 19 ENEMA RECTAL at 16:28

## 2022-05-27 RX ADMIN — TRAZODONE HYDROCHLORIDE 450 MG: 150 TABLET ORAL at 20:23

## 2022-05-27 RX ADMIN — SODIUM CHLORIDE 3 G: 900 INJECTION INTRAVENOUS at 18:24

## 2022-05-27 RX ADMIN — FENTANYL CITRATE 50 MCG: 50 INJECTION, SOLUTION INTRAMUSCULAR; INTRAVENOUS at 09:12

## 2022-05-27 RX ADMIN — FENTANYL CITRATE 50 MCG: 50 INJECTION, SOLUTION INTRAMUSCULAR; INTRAVENOUS at 10:02

## 2022-05-27 RX ADMIN — SENNOSIDES AND DOCUSATE SODIUM 1 TABLET: 50; 8.6 TABLET ORAL at 20:22

## 2022-05-27 RX ADMIN — GABAPENTIN 1800 MG: 300 CAPSULE ORAL at 05:21

## 2022-05-27 RX ADMIN — INSULIN HUMAN 5 UNITS: 100 INJECTION, SOLUTION PARENTERAL at 20:29

## 2022-05-27 RX ADMIN — INSULIN HUMAN 3 UNITS: 100 INJECTION, SOLUTION PARENTERAL at 18:34

## 2022-05-27 RX ADMIN — DOCUSATE SODIUM 100 MG: 100 CAPSULE, LIQUID FILLED ORAL at 18:24

## 2022-05-27 RX ADMIN — OXYCODONE HYDROCHLORIDE 20 MG: 10 TABLET ORAL at 05:21

## 2022-05-27 RX ADMIN — GABAPENTIN 1800 MG: 300 CAPSULE ORAL at 18:38

## 2022-05-27 RX ADMIN — INSULIN HUMAN 3 UNITS: 100 INJECTION, SOLUTION PARENTERAL at 13:50

## 2022-05-27 RX ADMIN — OXYCODONE HYDROCHLORIDE 10 MG: 5 SOLUTION ORAL at 10:02

## 2022-05-27 RX ADMIN — SODIUM CHLORIDE 3 G: 900 INJECTION INTRAVENOUS at 11:53

## 2022-05-27 RX ADMIN — HYDROMORPHONE HYDROCHLORIDE 1 MG: 1 INJECTION, SOLUTION INTRAMUSCULAR; INTRAVENOUS; SUBCUTANEOUS at 11:53

## 2022-05-27 RX ADMIN — PROPOFOL 150 MG: 10 INJECTION, EMULSION INTRAVENOUS at 09:12

## 2022-05-27 RX ADMIN — ACETAMINOPHEN 1000 MG: 500 TABLET, FILM COATED ORAL at 18:24

## 2022-05-27 RX ADMIN — ACETAMINOPHEN 1000 MG: 500 TABLET, FILM COATED ORAL at 00:55

## 2022-05-27 RX ADMIN — OXYCODONE HYDROCHLORIDE 20 MG: 10 TABLET ORAL at 15:24

## 2022-05-27 RX ADMIN — ENOXAPARIN SODIUM 40 MG: 40 INJECTION SUBCUTANEOUS at 23:43

## 2022-05-27 RX ADMIN — OXYCODONE HYDROCHLORIDE 20 MG: 10 TABLET ORAL at 20:22

## 2022-05-27 RX ADMIN — ACETAMINOPHEN 1000 MG: 500 TABLET, FILM COATED ORAL at 05:21

## 2022-05-27 RX ADMIN — OXYCODONE HYDROCHLORIDE 20 MG: 10 TABLET ORAL at 00:55

## 2022-05-27 ASSESSMENT — ENCOUNTER SYMPTOMS
DIAPHORESIS: 0
FOCAL WEAKNESS: 0
ABDOMINAL PAIN: 0
SORE THROAT: 0
CHILLS: 0
MYALGIAS: 0
DIARRHEA: 0
SENSORY CHANGE: 0
DIZZINESS: 0
WHEEZING: 0
BACK PAIN: 0
SPEECH CHANGE: 0
COUGH: 0
PALPITATIONS: 0
FEVER: 0
HEARTBURN: 0
NAUSEA: 0
SHORTNESS OF BREATH: 0
HEADACHES: 0
NERVOUS/ANXIOUS: 0
NECK PAIN: 0
FLANK PAIN: 0
VOMITING: 0
WEAKNESS: 1
BLURRED VISION: 0
CONSTIPATION: 1

## 2022-05-27 ASSESSMENT — PAIN DESCRIPTION - PAIN TYPE
TYPE: SURGICAL PAIN
TYPE: ACUTE PAIN;SURGICAL PAIN
TYPE: ACUTE PAIN;SURGICAL PAIN
TYPE: SURGICAL PAIN
TYPE: ACUTE PAIN
TYPE: ACUTE PAIN;SURGICAL PAIN
TYPE: ACUTE PAIN;SURGICAL PAIN
TYPE: SURGICAL PAIN
TYPE: SURGICAL PAIN

## 2022-05-27 NOTE — ANESTHESIA PROCEDURE NOTES
Airway  Performed by: Red Dudley M.D.  Authorized by: Red Dudley M.D.     Location:  OR  Urgency:  Elective  Indications for Airway Management:  Anesthesia      Spontaneous Ventilation: absent    Sedation Level:  Deep  Preoxygenated: Yes    Final Airway Type:  Supraglottic airway  Final Supraglottic Airway:  Standard LMA    SGA Size:  4  Number of Attempts at Approach:  1

## 2022-05-27 NOTE — ANESTHESIA TIME REPORT
Anesthesia Start and Stop Event Times     Date Time Event    5/27/2022 0846 Ready for Procedure     0906 Anesthesia Start     0953 Anesthesia Stop        Responsible Staff  05/27/22    Name Role Begin End    Red Dudley M.D. Anesth 0906 0953        Overtime Reason:  no overtime (within assigned shift)    Comments:

## 2022-05-27 NOTE — ADDENDUM NOTE
Addendum  created 05/27/22 0955 by Red Dudley M.D.    Order list changed, Order sets accessed, Pharmacy for encounter modified

## 2022-05-27 NOTE — ANESTHESIA PREPROCEDURE EVALUATION
Case: [id number] Date/Time: 05/27/22 0830    Procedures:       AMPUTATION, TOE - 1ST AND 2ND (Right )      IRRIGATION AND DEBRIDEMENT, WOUND - LEG      APPLICATION OR REPLACEMENT, WOUND VAC    Location: [facility] OR [facility] / SURGERY [facility]    Surgeons: Aleks Buenrostro M.D.          Relevant Problems   PULMONARY   (positive) Chronic obstructive pulmonary disease (HCC)      NEURO   (positive) History of MI (myocardial infarction)   (positive) History of falling      CARDIAC   (positive) Atherosclerosis of native coronary artery without angina pectoris   (positive) Chronic venous hypertension (idiopathic) with ulcer and inflammation of bilateral lower extremity (CODE) ([facility])   (positive) Primary hypertension      ENDO   (positive) Type 2 diabetes mellitus with hyperglycemia, with long-term current use of insulin ([facility])      Other   (positive) Osteomyelitis of ankle and foot ([facility])       Physical Exam    Airway   Mallampati: III  TM distance: >3 FB  Neck ROM: full       Cardiovascular - normal exam  Rhythm: regular  Rate: normal  (-) murmur     Dental - normal exam           Pulmonary - normal exam  Breath sounds clear to auscultation     Abdominal    Neurological - normal exam                 Anesthesia Plan    ASA 3   ASA physical status 3 criteria: MI or angina - history (> 3 months)    Plan - general               Induction: intravenous    Postoperative Plan: Postoperative administration of opioids is intended.    Pertinent diagnostic labs and testing reviewed    Informed Consent:    Anesthetic plan and risks discussed with patient.    Use of blood products discussed with: patient whom consented to blood products.

## 2022-05-27 NOTE — CARE PLAN
The patient is Stable - Low risk of patient condition declining or worsening    Shift Goals  Clinical Goals: pain control, remain free from falls  Patient Goals:   Family Goals:     Progress made toward(s) clinical / shift goals: assess pain Q2-4H, administer PRNs as indicated, encourage patient to voice pain to staff; bed alarm on, call light in reach, patient educated not to mobilize without staff present     Patient is not progressing towards the following goals:      Problem: Pain - Standard  Goal: Alleviation of pain or a reduction in pain to the patient’s comfort goal  Outcome: Progressing     Problem: Fall Risk  Goal: Patient will remain free from falls  Outcome: Progressing

## 2022-05-27 NOTE — PROGRESS NOTES
AA&Ox4. Denies CP/SOB.  No report of pain pain.   Patient has been NPO since midnight. Denies N/V.  + void in urinal. Last BM PTA.  Pt is max assist.  Patient off to surgery.

## 2022-05-27 NOTE — PROGRESS NOTES
Hospital Medicine Daily Progress Note    Date of Service  5/27/2022    Chief Complaint  Luis Kellogg is a 71 y.o. male admitted 5/18/2022 with osteomyelitis of the left foot.    Hospital Course  Admitted for elective left above-knee amputation for chronic lower extremity wounds/ulcer and chronic osteomyelitis of the left foot.  Hospital medicine was consulted on the case postoperatively.  LPS was also consulted on the case.  Patient appeared to have chronic right lower extremity wound/ulcers, with probable cellulitis.  He was started on empiric coverage with IV Unasyn.    Interval Problem Update  Left AKA - pain controlled  Right lower extremity ulcers - surgery done today  Hypertension - sbp 100-140  Diabetes - -180  Constipation - still no BM, refused enema yesterday    I have personally seen and examined the patient at bedside. I discussed the plan of care with patient, bedside RN, charge RN and .    Consultants/Specialty  orthopedics and LPS    Code Status  Full Code    Disposition  Patient is not medically cleared for discharge.   Anticipate discharge to to skilled nursing facility.  I have placed the appropriate orders for post-discharge needs.    Review of Systems  Review of Systems   Constitutional: Positive for malaise/fatigue. Negative for chills, diaphoresis and fever.   HENT: Negative for congestion, hearing loss and sore throat.    Eyes: Negative for blurred vision.   Respiratory: Negative for cough, shortness of breath and wheezing.    Cardiovascular: Negative for chest pain, palpitations and leg swelling.   Gastrointestinal: Positive for constipation. Negative for abdominal pain, diarrhea, heartburn, nausea and vomiting.   Genitourinary: Negative for dysuria, flank pain and hematuria.   Musculoskeletal: Negative for back pain, joint pain, myalgias and neck pain.   Skin: Negative for rash.   Neurological: Positive for weakness. Negative for dizziness, sensory change, speech  change, focal weakness and headaches.   Psychiatric/Behavioral: The patient is not nervous/anxious.         Physical Exam  Temp:  [35.9 °C (96.6 °F)-36.6 °C (97.9 °F)] 36.6 °C (97.9 °F)  Pulse:  [] 89  Resp:  [16-20] 18  BP: (101-140)/(55-85) 126/66  SpO2:  [92 %-98 %] 98 %    Physical Exam  Vitals and nursing note reviewed.   Constitutional:       Appearance: He is obese.   HENT:      Head: Normocephalic and atraumatic.      Nose: No congestion.      Mouth/Throat:      Mouth: Mucous membranes are moist.   Eyes:      Extraocular Movements: Extraocular movements intact.      Conjunctiva/sclera: Conjunctivae normal.   Cardiovascular:      Rate and Rhythm: Normal rate and regular rhythm.   Pulmonary:      Effort: Pulmonary effort is normal.      Breath sounds: Normal breath sounds.   Abdominal:      General: There is distension.      Tenderness: There is no abdominal tenderness. There is no guarding or rebound.   Musculoskeletal:      Cervical back: No tenderness.      Right lower leg: No edema.      Left lower leg: No edema.      Comments: Wound vac at right leg  Right 1st and 2nd toe amputations  Left AKA   Skin:     General: Skin is warm and dry.   Neurological:      General: No focal deficit present.      Mental Status: He is alert and oriented to person, place, and time.      Cranial Nerves: No cranial nerve deficit.         Fluids    Intake/Output Summary (Last 24 hours) at 5/27/2022 1342  Last data filed at 5/27/2022 1249  Gross per 24 hour   Intake 2120 ml   Output 1725 ml   Net 395 ml       Laboratory  Recent Labs     05/25/22  0122 05/27/22  0236   WBC 5.9 7.1   RBC 3.83* 3.70*   HEMOGLOBIN 10.7* 10.4*   HEMATOCRIT 32.3* 31.7*   MCV 84.3 85.7   MCH 27.9 28.1   MCHC 33.1* 32.8*   RDW 45.2 46.5   PLATELETCT 260 289   MPV 9.2 9.6     Recent Labs     05/25/22  0122 05/27/22  0236   SODIUM 136 136   POTASSIUM 3.9 3.9   CHLORIDE 104 104   CO2 23 22   GLUCOSE 146* 131*   BUN 11 17   CREATININE 0.47* 0.55    CALCIUM 8.7 8.8                   Imaging  [id number] VIEW   Final Result      1. Large amount of stool throughout the colon from the cecum to the rectum.   2. No bowel obstruction.   3. Other chronic degenerative changes.      US-EXTREMITY ARTERY LOWER UNILAT RIGHT   Final Result      US-MIA SINGLE LEVEL UNILAT RIGHT   Final Result      DX-TIBIA AND FIBULA RIGHT   Final Result         1.  No acute traumatic bony injury.   2.  No destructive osseous lesion is appreciated, note that plain film is insensitive for evaluation of osteomyelitis, and bone scan would offer improved diagnostic sensitivity as clinically appropriate.      DX-FOOT-COMPLETE 3+ RIGHT   Final Result         1.  No acute traumatic bony injury. No destructive osseous lesion is seen, note that plain film can be insensitive for evaluation of osteomyelitis and bone scan would offer improved diagnostic sensitivity as clinically appropriate.   2.  Degenerative changes of the foot, greatest at the midfoot and hindfoot           Assessment/Plan  * S/P AKA (above knee amputation), left (HCC)- (present on admission)  Assessment & Plan  pain control  PT/OT     Cellulitis of right leg- (present on admission)  Assessment & Plan  IV Unasyn    Anemia- (present on admission)  Assessment & Plan  Follow cbc    Chronic, continuous use of opioids- (present on admission)  Assessment & Plan  Pain control    Primary hypertension- (present on admission)  Assessment & Plan  Lisinopril and Coreg    Type 2 diabetes mellitus with hyperglycemia, with long-term current use of insulin ([facility])- (present on admission)  Assessment & Plan  Lantus, SSI      Chronic ulcer of lower extremity (HCC)- (present on admission)  Assessment & Plan  Right first and second toe amputation at the level of the MTP joint, Right leg debridement and application of wound vacuum.  Wound care, Pain control      Drug-induced constipation- (present on admission)  Assessment & Plan  bowel protocol  Fleet  enema today    Hyponatremia- (present on admission)  Assessment & Plan  Follow bmp    Peripheral vascular disease (HCC)- (present on admission)  Assessment & Plan  Selective catheter placement in the right below-knee popliteal artery, Right lower extremity angiography, 6mm uncovered self expanding Right SFA stent placement   10/12/2021  Atorvastatin  Resume ASA when cleared by Orthopedic surgery    History of MI (myocardial infarction)- (present on admission)  Assessment & Plan  Coreg, Lipitor  Resume ASA when cleared by Orthopedic surgery    Marijuana abuse- (present on admission)  Assessment & Plan  Education and counseling    Tobacco use- (present on admission)  Assessment & Plan  Nicotine replacement protocol    Hyperlipidemia- (present on admission)  Assessment & Plan  Atorvastatin       VTE prophylaxis: enoxaparin ppx    I have performed a physical exam and reviewed and updated ROS and Plan today (5/27/2022). In review of yesterday's note (5/26/2022), there are no changes except as documented above.

## 2022-05-27 NOTE — OP REPORT
DATE OF SERVICE:  05/27/2022     SERVICE:  Orthopedic Surgery.     SURGEON:  Aleks Buenrostro MD     ASSISTANT:  None.     PREOPERATIVE DIAGNOSES:  1.  Right first and second chronic wounds with exposed tendon and joint.  2.  Right chronic lower extremity leg wound.  3.  Diabetes mellitus.  4.  Peripheral arterial disease.     POSTOPERATIVE DIAGNOSES:  1.  Right first and second chronic wounds with exposed tendon and joint.  2.  Right chronic lower extremity leg wound.  3.  Diabetes mellitus.  4.  Peripheral arterial disease.     PROCEDURES PERFORMED:  1.  Right first and second toe amputation at the level of the MTP joint.  2.  Right leg debridement and application of wound vacuum, approximately [id number] to fascia     COMPLICATIONS:  None.     SPECIMENS:  None.     TOURNIQUET TIME:  Zero minutes.     INDICATIONS FOR PROCEDURE:  The patient is a 71-year-old male with a history   of the above-stated diagnoses.  He was found to have exposed tendon and joint   on clinical examination.  He was evaluated in the multidisciplinary approach   with vascular surgery and wound care.  He was found to be cleared from a   vascular surgery standpoint, optimized from a prior SFA stent.  Operative and   nonoperative treatments were discussed with the patient.  He elected for   operative intervention.  Risks of procedure were discussed with the patient,   which include but not limited to bleeding, worsening of infection, damage to   surrounding nerves, tendons, ligaments, other structures, risk of need for   future revision surgery up to and including higher level of amputation,   continued pain, unsightly scar, dissatisfaction with outcome, DVT, stroke,   myocardial infarction and even death.  Benefits include improved overall   chance of wound healing and improved functional outcome.  The patient wished   to proceed and surgical consent forms were signed.     DESCRIPTION OF PROCEDURE:  On the day of surgery, the patient was  met in the   preoperative area.  The operative extremity was identified by myself and   confirmed, the patient marked with my initials.  He was then brought back to   the operating room and placed supine on the operating room table.  All bony   prominences padded.  Laryngeal mask airway anesthesia was undertaken without   incident.  Right lower extremity was then prepped and draped in the usual   sterile fashion.  He was given 2 grams Ancef prior to incision.    Multidisciplinary timeout was performed confirming correct site, correct   patient, and correct procedure.  Once all were in agreement, we began with the   toe amputation.  [place] incision was placed over the first and second   toes.  Full-thickness flaps were created.  MTP joint was then disarticulated   at the first and second toes.  The first and second toes were then removed off   of the back table.  This wound was then copiously irrigated.  Hemostasis was   achieved.  The wound was then closed in layers.  Sterile dressings were   applied to this area.  The right lower extremity wound measured approximately   14 cm x 10 cm.  This was then debrided in an excisional fashion to the level   of fascia.  This was done in an excisional fashion with rongeur, Mon   elevator, curette.  All devitalized tissue was removed.  This area was then   copiously irrigated with 3 liters of normal saline.  Wound vacuum sponge was   then cut and shaped to the size of the wound.  This was then placed over the   leg wound and good suction seal was created.  Soft dressings were applied over   this.  The patient was awoken from anesthesia, moved onto the postoperative   gurney and to PACU in stable condition.     POSTOPERATIVE PLAN:  The patient will be weightbearing as tolerated on the   right lower extremity.  He will be nonweightbearing on the left lower   extremity while he recovers from above knee amputation on the left.  He will   be evaluated by physical and  occupational therapy.  We will continue to follow   his clinical course.        ______________________________  MD ANGELIKA Kebede/[facility]    DD:  05/27/2022 10:19  DT:  05/27/2022 10:41    Job#:  [id number]

## 2022-05-27 NOTE — THERAPY
05/27/22 1226   Interdisciplinary Plan of Care Collaboration   Collaboration Comments Pt now s/p R toe amputations. Attempted OT tx. Pt politely declined due to being only a few hours post-op. Ongoing education on option of seated slide board transfers particulalry in light of new amputations with possible increase in RLE pain. Will attempt tx again.

## 2022-05-27 NOTE — PROGRESS NOTES
Plan for right first and second toe amputations, right leg irrigation and debridement, wound vacuum application  NPO  LILIA Buenrostro MD

## 2022-05-27 NOTE — PROGRESS NOTES
Patient back from surgery.   AA&Ox4. Denies CP/SOB.  Reporting 8/10 pain. Medicated per MAR.   Educated patient regarding pharmacologic and non pharmacologic modalities for pain management.  Skin per flowsheet.  Advancing back to diabetic diet after surgery. Denies N/V.  + void via urinal. Last BM 5/18 w/interventions in place.  Pt is max assist + will work with PT/OT.  All needs met at this time. Call light within reach. Pt calls appropriately. Bed low and locked, non skid socks in place. Hourly rounding in place.

## 2022-05-27 NOTE — OP REPORT
POST-OP NOTE FOR LIMB PRESERVATION SERVICE    SURGERY DATE: 5/27/2022    PROCEDURE: Procedure(s):  AMPUTATION, TOE - 1ST AND 2ND - Wound Class: Dirty or Infected  IRRIGATION AND DEBRIDEMENT, WOUND - LEG - Wound Class: Clean  APPLICATION OR REPLACEMENT, WOUND VAC - Wound Class: Clean    WEIGHT BEARING STATUS: Weight bearing as tolerated    PT CONSULT: Yes    ANTIBIOTICS: Continue current ABX    PLAN TO RETURN TO O.R.: No    WOUND CARE PLAN: Change dressing post op day 2    FOLLOW-UP: OP Wound Clinic    DURABLE MEDICAL EQUIPMENT: Offloading shoe    OTHER: n/a      Aleks Buenrostro M.D.

## 2022-05-27 NOTE — OR NURSING
0954: Pt arrived from OR, handoff received from anesthesiologist and RN. Dressing to R foot with roll gauze and ace wrap C/D/I. Wound vac to RLE intact, no leak.     1000: Patient medicated with oral pain meds per anesthesia orders. Pt declines any contacts to be clled at this time.     1030: Handoff to DARIEN Sawyer on T4. Transport requested.

## 2022-05-27 NOTE — THERAPY
Physical Therapy   Daily Treatment     Patient Name: Luis Kellogg  Age:  71 y.o., Sex:  male  Medical Record #: [id number]  Today's Date: 5/26/2022     Precautions  Precautions: Fall Risk;Non Weight Bearing Left Lower Extremity;Other (See Comments)  Comments: wound vac RLE    Assessment    The pt presents today agreeable to participate in tx session.  He was able to demo bed mobility SBA w/ hob elevated and use of trapeze, and STS x2 eob w/ fww modA for extension and stability.  Pt tolerated standing w/ fww and modA for stability ~2-3min at a time before sitting d/t pain.   Initiated side-hopping/pivoting RLE while stand eob w/ fww, however pt unsuccessful w/ advancing RLE any measurable distance despite maxA for facilitation.  Reviewed AKA positioning w/ the pt, and he was receptive.  Continue to recommend placement given pt's inability to care for self at this time.  Will follow.     Plan    Continue current treatment plan.    DC Equipment Recommendations: None  Discharge Recommendations: Recommend post-acute placement for additional physical therapy services prior to discharge home      Subjective/Objective       05/26/22 1655   Cognition    Cognition / Consciousness WDL   Level of Consciousness Alert   Comments pt pleasant and cooperative, however limited insight in regards to progression of his functional mobility   Passive ROM Lower Body   Passive ROM Lower Body WDL   Active ROM Lower Body    Active ROM Lower Body  X   Comments limited hip extension noted B during stance   Strength Lower Body   Lower Body Strength  X   Comments generalized weakness observed during functional mobility BLE   Sensation Lower Body   Lower Extremity Sensation   Not Tested   Neuro-Muscular Treatments   Neuro-Muscular Treatments Anterior weight shift;Weight Shift Right;Weight Shift Left   Comments stand w/ fww   Other Treatments   Other Treatments Provided STS and initiating weight shift for transfers   Balance   Sitting  "Balance (Static) Fair +   Sitting Balance (Dynamic) Fair   Standing Balance (Static) Poor   Standing Balance (Dynamic) Poor -   Weight Shift Sitting Fair   Weight Shift Standing Poor   Skilled Intervention Verbal Cuing;Tactile Cuing;Sequencing;Facilitation;Compensatory Strategies   Comments stand w fww   Gait Analysis   Gait Level Of Assist Unable to Participate   Weight Bearing Status NWB LLE   Bed Mobility    Supine to Sit Standby Assist   Sit to Supine Standby Assist   Scooting Standby Assist   Skilled Intervention Verbal Cuing   Comments hob elevated, use of trapeze   Functional Mobility   Sit to Stand Moderate Assist   Bed, Chair, Wheelchair Transfer Unable to Participate   Mobility STS x2 eob w/ fww, attempted side-hop and pivot RLE (unsuccessful)   Skilled Intervention Verbal Cuing;Tactile Cuing;Sequencing;Facilitation;Compensatory Strategies   Activity Tolerance   Sitting Edge of Bed 35min   Standing 5min total   Patient / Family Goals    Patient / Family Goal #1 go home   Goal #1 Outcome Goal not met   Short Term Goals    Short Term Goal # 1 Pt will demo supine<>sit eob w/ hob flat and no rails/trapeze spv in 6 visits for independence w/ bed mobility.   Goal Outcome # 1 goal not met   Short Term Goal # 2 Pt will demo SPT eob<>wc using fww spv in 6 visits for OOB mobility tasks.   Goal Outcome # 2 Goal not met   Short Term Goal # 3 Pt will demo ability to propel wc 250' spv on a level surface in 6 visits for access to the community.   Goal Outcome # 3 Goal not met   Short Term Goal # 4 Pt will demo ability to \"bump up/down\" 3 stairs spv in 6 visits for access to his home.   Goal Outcome # 4 Goal not met   Short Term Goal # 5 Pt will demo gait 100' using fww spv via 3 point pattern in 6 visits for household ambulation.   Goal Outcome # 5 Goal not met   Education Group   Education Provided Role of Physical Therapist;Exercises - Seated;Weight Bearing Status   Role of Physical Therapist Patient Response " Patient;Acceptance;Explanation;Demonstration;Action Demonstration   Exercises - Seated Patient Response Patient;Acceptance;Explanation;Demonstration;Action Demonstration   Weight Bearing Status Patient Response Patient;Acceptance;Explanation;Demonstration;Action Demonstration   Additional Comments pt receptive of edu provided   Session Information   Date / Session Number  5/26- 3 (3/4, 5/26)

## 2022-05-27 NOTE — ANESTHESIA POSTPROCEDURE EVALUATION
Patient: Luis Kellogg    Procedure Summary     Date: 05/27/22 Room / Location: [place] [id number] / SURGERY [facility]    Anesthesia Start: 0906 Anesthesia Stop: 0953    Procedures:       AMPUTATION, TOE - 1ST AND 2ND (Right Foot)      IRRIGATION AND DEBRIDEMENT, WOUND - LEG (Leg Lower)      APPLICATION OR REPLACEMENT, WOUND VAC (Leg Lower) Diagnosis: (Right Foot Chronic Wound )    Surgeons: Aleks Buenrostro M.D. Responsible Provider: Red Dudley M.D.    Anesthesia Type: general ASA Status: 3          Final Anesthesia Type: general  Last vitals  BP   Blood Pressure : 108/85    Temp   36.1 °C (97 °F)    Pulse   70   Resp   16    SpO2   92 %      Anesthesia Post Evaluation    No complications documented.     Nurse Pain Score: 8 (NPRS)

## 2022-05-27 NOTE — PROGRESS NOTES
Bedside report received from day shift nurse.  Pt A&Ox4  Tolerating CHO diet, denies n/v. Will be NPO @ 0000. Normoactive bowel sounds, passing flatus, LBM PTA. Bowel protocol in place. IV access through 20g CJ that is SL.  L AKA w/ dressing, CDI. Wound to R toes 1 & 2, ROBI. WV to R shin, veraflow.   Saturating >90% on RA.  Pt ambulates MAX assist. Unable to carry body weight on R leg.   Pain is controlled through medication orders. Updated on plan of care. Safety education provided. Bed locked in low. Call light within reach. Rounding in place.

## 2022-05-28 LAB
ANION GAP SERPL CALC-SCNC: 8 MMOL/L (ref 7–16)
BUN SERPL-MCNC: 14 MG/DL (ref 8–22)
CALCIUM SERPL-MCNC: 9.2 MG/DL (ref 8.5–10.5)
CHLORIDE SERPL-SCNC: 102 MMOL/L (ref 96–112)
CO2 SERPL-SCNC: 24 MMOL/L (ref 20–33)
CREAT SERPL-MCNC: 0.55 MG/DL (ref 0.5–1.4)
ERYTHROCYTE [DISTWIDTH] IN BLOOD BY AUTOMATED COUNT: 47.1 FL (ref 35.9–50)
GFR SERPLBLD CREATININE-BSD FMLA CKD-EPI: 106 ML/MIN/1.73 M 2
GLUCOSE BLD STRIP.AUTO-MCNC: 152 MG/DL (ref 65–99)
GLUCOSE BLD STRIP.AUTO-MCNC: 165 MG/DL (ref 65–99)
GLUCOSE BLD STRIP.AUTO-MCNC: 186 MG/DL (ref 65–99)
GLUCOSE SERPL-MCNC: 174 MG/DL (ref 65–99)
HCT VFR BLD AUTO: 32 % (ref 42–52)
HGB BLD-MCNC: 10.3 G/DL (ref 14–18)
MCH RBC QN AUTO: 27.9 PG (ref 27–33)
MCHC RBC AUTO-ENTMCNC: 32.2 G/DL (ref 33.7–35.3)
MCV RBC AUTO: 86.7 FL (ref 81.4–97.8)
PLATELET # BLD AUTO: 291 K/UL (ref 164–446)
PMV BLD AUTO: 9.6 FL (ref 9–12.9)
POTASSIUM SERPL-SCNC: 3.9 MMOL/L (ref 3.6–5.5)
RBC # BLD AUTO: 3.69 M/UL (ref 4.7–6.1)
SODIUM SERPL-SCNC: 134 MMOL/L (ref 135–145)
WBC # BLD AUTO: 7.8 K/UL (ref 4.8–10.8)

## 2022-05-28 PROCEDURE — 700102 HCHG RX REV CODE 250 W/ 637 OVERRIDE(OP): Performed by: NURSE PRACTITIONER

## 2022-05-28 PROCEDURE — 700111 HCHG RX REV CODE 636 W/ 250 OVERRIDE (IP): Performed by: FAMILY MEDICINE

## 2022-05-28 PROCEDURE — 82962 GLUCOSE BLOOD TEST: CPT | Mod: 91

## 2022-05-28 PROCEDURE — 99232 SBSQ HOSP IP/OBS MODERATE 35: CPT | Performed by: FAMILY MEDICINE

## 2022-05-28 PROCEDURE — 770001 HCHG ROOM/CARE - MED/SURG/GYN PRIV*

## 2022-05-28 PROCEDURE — 36415 COLL VENOUS BLD VENIPUNCTURE: CPT

## 2022-05-28 PROCEDURE — 85027 COMPLETE CBC AUTOMATED: CPT

## 2022-05-28 PROCEDURE — A9270 NON-COVERED ITEM OR SERVICE: HCPCS | Performed by: FAMILY MEDICINE

## 2022-05-28 PROCEDURE — 700102 HCHG RX REV CODE 250 W/ 637 OVERRIDE(OP): Performed by: ORTHOPAEDIC SURGERY

## 2022-05-28 PROCEDURE — A9270 NON-COVERED ITEM OR SERVICE: HCPCS | Performed by: ORTHOPAEDIC SURGERY

## 2022-05-28 PROCEDURE — 700105 HCHG RX REV CODE 258: Performed by: FAMILY MEDICINE

## 2022-05-28 PROCEDURE — 700102 HCHG RX REV CODE 250 W/ 637 OVERRIDE(OP): Performed by: FAMILY MEDICINE

## 2022-05-28 PROCEDURE — 80048 BASIC METABOLIC PNL TOTAL CA: CPT

## 2022-05-28 PROCEDURE — A9270 NON-COVERED ITEM OR SERVICE: HCPCS | Performed by: NURSE PRACTITIONER

## 2022-05-28 RX ORDER — DEXTROSE MONOHYDRATE 25 G/50ML
25 INJECTION, SOLUTION INTRAVENOUS
Status: DISCONTINUED | OUTPATIENT
Start: 2022-05-28 | End: 2022-06-07 | Stop reason: HOSPADM

## 2022-05-28 RX ADMIN — ACETAMINOPHEN 1000 MG: 500 TABLET, FILM COATED ORAL at 05:40

## 2022-05-28 RX ADMIN — TRAZODONE HYDROCHLORIDE 450 MG: 150 TABLET ORAL at 20:57

## 2022-05-28 RX ADMIN — ACETAMINOPHEN 1000 MG: 500 TABLET, FILM COATED ORAL at 18:22

## 2022-05-28 RX ADMIN — SENNOSIDES AND DOCUSATE SODIUM 1 TABLET: 50; 8.6 TABLET ORAL at 20:57

## 2022-05-28 RX ADMIN — OXYCODONE HYDROCHLORIDE 20 MG: 10 TABLET ORAL at 05:40

## 2022-05-28 RX ADMIN — OXYCODONE HYDROCHLORIDE 20 MG: 10 TABLET ORAL at 00:51

## 2022-05-28 RX ADMIN — DOCUSATE SODIUM 100 MG: 100 CAPSULE, LIQUID FILLED ORAL at 05:40

## 2022-05-28 RX ADMIN — OXYCODONE HYDROCHLORIDE 20 MG: 10 TABLET ORAL at 15:24

## 2022-05-28 RX ADMIN — GABAPENTIN 1800 MG: 300 CAPSULE ORAL at 05:41

## 2022-05-28 RX ADMIN — ACETAMINOPHEN 1000 MG: 500 TABLET, FILM COATED ORAL at 00:51

## 2022-05-28 RX ADMIN — OXYCODONE HYDROCHLORIDE 20 MG: 10 TABLET ORAL at 10:07

## 2022-05-28 RX ADMIN — DOCUSATE SODIUM 100 MG: 100 CAPSULE, LIQUID FILLED ORAL at 18:19

## 2022-05-28 RX ADMIN — SODIUM CHLORIDE 3 G: 900 INJECTION INTRAVENOUS at 05:38

## 2022-05-28 RX ADMIN — ATORVASTATIN CALCIUM 80 MG: 40 TABLET, FILM COATED ORAL at 18:20

## 2022-05-28 RX ADMIN — OXYCODONE HYDROCHLORIDE 20 MG: 10 TABLET ORAL at 20:57

## 2022-05-28 RX ADMIN — SODIUM CHLORIDE 3 G: 900 INJECTION INTRAVENOUS at 12:53

## 2022-05-28 RX ADMIN — ACETAMINOPHEN 1000 MG: 500 TABLET, FILM COATED ORAL at 12:52

## 2022-05-28 RX ADMIN — CARVEDILOL 3.12 MG: 6.25 TABLET, FILM COATED ORAL at 18:22

## 2022-05-28 RX ADMIN — GABAPENTIN 1800 MG: 300 CAPSULE ORAL at 18:20

## 2022-05-28 ASSESSMENT — ENCOUNTER SYMPTOMS
DIZZINESS: 0
SORE THROAT: 0
PALPITATIONS: 0
NAUSEA: 0
BLURRED VISION: 0
NERVOUS/ANXIOUS: 0
VOMITING: 0
MYALGIAS: 0
NECK PAIN: 0
FLANK PAIN: 0
BACK PAIN: 0
WEAKNESS: 1
CHILLS: 0
HEADACHES: 0
SHORTNESS OF BREATH: 0
DIAPHORESIS: 0
SPEECH CHANGE: 0
COUGH: 0
HEARTBURN: 0
SENSORY CHANGE: 0
WHEEZING: 0
FOCAL WEAKNESS: 0
DIARRHEA: 0
FEVER: 0
ABDOMINAL PAIN: 0

## 2022-05-28 ASSESSMENT — PAIN DESCRIPTION - PAIN TYPE
TYPE: ACUTE PAIN;NEUROPATHIC PAIN
TYPE: ACUTE PAIN;SURGICAL PAIN
TYPE: ACUTE PAIN;SURGICAL PAIN
TYPE: ACUTE PAIN;NEUROPATHIC PAIN
TYPE: ACUTE PAIN;NEUROPATHIC PAIN
TYPE: ACUTE PAIN;SURGICAL PAIN
TYPE: ACUTE PAIN;NEUROPATHIC PAIN

## 2022-05-28 ASSESSMENT — COGNITIVE AND FUNCTIONAL STATUS - GENERAL
TOILETING: A LITTLE
TURNING FROM BACK TO SIDE WHILE IN FLAT BAD: A LITTLE
DRESSING REGULAR LOWER BODY CLOTHING: A LITTLE
WALKING IN HOSPITAL ROOM: A LOT
HELP NEEDED FOR BATHING: A LITTLE
CLIMB 3 TO 5 STEPS WITH RAILING: A LOT
MOBILITY SCORE: 17
SUGGESTED CMS G CODE MODIFIER MOBILITY: CK
DRESSING REGULAR UPPER BODY CLOTHING: A LITTLE
SUGGESTED CMS G CODE MODIFIER DAILY ACTIVITY: CJ
DAILY ACTIVITIY SCORE: 20
MOVING FROM LYING ON BACK TO SITTING ON SIDE OF FLAT BED: A LITTLE
STANDING UP FROM CHAIR USING ARMS: A LITTLE

## 2022-05-28 NOTE — CARE PLAN
Problem: Pain - Standard  Goal: Alleviation of pain or a reduction in pain to the patient’s comfort goal  Outcome: Progressing     Problem: Skin Integrity  Goal: Skin integrity is maintained or improved  Outcome: Progressing     Problem: Fall Risk  Goal: Patient will remain free from falls  Outcome: Progressing       The patient is Stable - Low risk of patient condition declining or worsening    Shift Goals  Clinical Goals: Sugery; pain management  Patient Goals: pain management  Family Goals: no family present at this time    Progress made toward(s) clinical / shift goals:  Patient had surgery this afternoon. Pain is managed with PRN's.    Patient is not progressing towards the following goals:

## 2022-05-28 NOTE — PROGRESS NOTES
AA&Ox4. Denies CP/SOB.  Reporting 8/10 pain. Medicated per MAR.   Educated patient regarding pharmacologic and non pharmacologic modalities for pain management.  Skin per flowsheet. Bed bath performed by patient.   Tolerating diabetic diet. Denies N/V.  + void via urinal. Last BM 5/27.  Pt is max assist.  All needs met at this time. Call light within reach. Pt calls appropriately. Bed low and locked, non skid socks in place. Hourly rounding in place.

## 2022-05-28 NOTE — CARE PLAN
Problem: Pain - Standard  Goal: Alleviation of pain or a reduction in pain to the patient’s comfort goal  Outcome: Progressing     Problem: Fall Risk  Goal: Patient will remain free from falls  Outcome: Progressing     Problem: Knowledge Deficit - Standard  Goal: Patient and family/care givers will demonstrate understanding of plan of care, disease process/condition, diagnostic tests and medications  Outcome: Progressing       The patient is Watcher - Medium risk of patient condition declining or worsening    Shift Goals  Clinical Goals: pain control  Patient Goals: pain control, rest  Family Goals: no family present at this time    Progress made toward(s) clinical / shift goals:  Patient reported pain to be managed with PRN medications. Educated on pharmacological and non pharmacological modalities. Patient was able to rest intermittently throughout shift.

## 2022-05-28 NOTE — WOUND TEAM
Patient went to OR today with Dr. Buenrostro to undergo right first and second toe amputation and debridement of right leg wound with VAC application. Since VAC change done in OR, plan for next change to be on Monday 5/30/22

## 2022-05-28 NOTE — PROGRESS NOTES
Hospital Medicine Daily Progress Note    Date of Service  5/28/2022    Chief Complaint  Luis Kellogg is a 71 y.o. male admitted 5/18/2022 with osteomyelitis of the left foot.    Hospital Course  Admitted for elective left above-knee amputation for chronic lower extremity wounds/ulcer and chronic osteomyelitis of the left foot.  Hospital medicine was consulted on the case postoperatively.  LPS was also consulted on the case.  Patient appeared to have chronic right lower extremity wound/ulcers, with probable cellulitis.  He was started on empiric coverage with IV Unasyn. Patient underwent Right first and second toe amputation at the level of the MTP joint, Right leg debridement and application of wound vacuum on 5/27/2022.     Interval Problem Update  Left AKA - phantom pain  Right lower extremity ulcers - pain controlled  Hypertension - sbp 100-120  Diabetes - BS 200s  Constipation - + BM yesterday    I have personally seen and examined the patient at bedside. I discussed the plan of care with patient and bedside RN.    Consultants/Specialty  orthopedics and LPS    Code Status  Full Code    Disposition  Patient is not medically cleared for discharge.   Anticipate discharge to to skilled nursing facility.  I have placed the appropriate orders for post-discharge needs.    Review of Systems  Review of Systems   Constitutional: Positive for malaise/fatigue. Negative for chills, diaphoresis and fever.   HENT: Negative for congestion, hearing loss and sore throat.    Eyes: Negative for blurred vision.   Respiratory: Negative for cough, shortness of breath and wheezing.    Cardiovascular: Negative for chest pain, palpitations and leg swelling.   Gastrointestinal: Negative for abdominal pain, diarrhea, heartburn, nausea and vomiting.   Genitourinary: Negative for dysuria, flank pain and hematuria.   Musculoskeletal: Negative for back pain, joint pain, myalgias and neck pain.        Phantom pain left AKA   Skin:  Negative for rash.   Neurological: Positive for weakness. Negative for dizziness, sensory change, speech change, focal weakness and headaches.   Psychiatric/Behavioral: The patient is not nervous/anxious.         Physical Exam  Temp:  [36 °C (96.8 °F)-36.2 °C (97.1 °F)] 36 °C (96.8 °F)  Pulse:  [67-89] 68  Resp:  [17-20] 20  BP: (102-126)/(59-66) 108/62  SpO2:  [90 %-92 %] 92 %    Physical Exam  Vitals and nursing note reviewed.   Constitutional:       Appearance: He is obese.   HENT:      Head: Normocephalic and atraumatic.      Nose: No congestion.      Mouth/Throat:      Mouth: Mucous membranes are moist.   Eyes:      Extraocular Movements: Extraocular movements intact.      Conjunctiva/sclera: Conjunctivae normal.   Cardiovascular:      Rate and Rhythm: Normal rate and regular rhythm.   Pulmonary:      Effort: Pulmonary effort is normal.      Breath sounds: Normal breath sounds.   Abdominal:      General: There is distension.      Tenderness: There is no abdominal tenderness. There is no guarding or rebound.   Musculoskeletal:      Cervical back: No tenderness.      Right lower leg: No edema.      Left lower leg: No edema.      Comments: Wound vac at right leg  Right 1st and 2nd toe amputations  Left AKA   Skin:     General: Skin is warm and dry.   Neurological:      General: No focal deficit present.      Mental Status: He is alert and oriented to person, place, and time.      Cranial Nerves: No cranial nerve deficit.         Fluids    Intake/Output Summary (Last 24 hours) at 5/28/2022 1214  Last data filed at 5/28/2022 0754  Gross per 24 hour   Intake 220 ml   Output 2800 ml   Net -2580 ml       Laboratory  Recent Labs     05/27/22  0236 05/28/22 0218   WBC 7.1 7.8   RBC 3.70* 3.69*   HEMOGLOBIN 10.4* 10.3*   HEMATOCRIT 31.7* 32.0*   MCV 85.7 86.7   MCH 28.1 27.9   MCHC 32.8* 32.2*   RDW 46.5 47.1   PLATELETCT 289 291   MPV 9.6 9.6     Recent Labs     05/27/22  0236 05/28/22 0218   SODIUM 136 134*    POTASSIUM 3.9 3.9   CHLORIDE 104 102   CO2 22 24   GLUCOSE 131* 174*   BUN 17 14   CREATININE 0.55 0.55   CALCIUM 8.8 9.2                   Imaging  [id number] VIEW   Final Result      1. Large amount of stool throughout the colon from the cecum to the rectum.   2. No bowel obstruction.   3. Other chronic degenerative changes.      US-EXTREMITY ARTERY LOWER UNILAT RIGHT   Final Result      US-MIA SINGLE LEVEL UNILAT RIGHT   Final Result      DX-TIBIA AND FIBULA RIGHT   Final Result         1.  No acute traumatic bony injury.   2.  No destructive osseous lesion is appreciated, note that plain film is insensitive for evaluation of osteomyelitis, and bone scan would offer improved diagnostic sensitivity as clinically appropriate.      DX-FOOT-COMPLETE 3+ RIGHT   Final Result         1.  No acute traumatic bony injury. No destructive osseous lesion is seen, note that plain film can be insensitive for evaluation of osteomyelitis and bone scan would offer improved diagnostic sensitivity as clinically appropriate.   2.  Degenerative changes of the foot, greatest at the midfoot and hindfoot           Assessment/Plan  * S/P AKA (above knee amputation), left (HCC)- (present on admission)  Assessment & Plan  pain control  Neurontin for phantom pain  PT/OT     Cellulitis of right leg- (present on admission)  Assessment & Plan  IV Unasyn    Anemia- (present on admission)  Assessment & Plan  Follow cbc    Chronic, continuous use of opioids- (present on admission)  Assessment & Plan  Pain control    Primary hypertension- (present on admission)  Assessment & Plan  Lisinopril and Coreg    Type 2 diabetes mellitus with hyperglycemia, with long-term current use of insulin ([facility])- (present on admission)  Assessment & Plan  Lantus  Adjust SSI      Chronic ulcer of lower extremity ([facility])- (present on admission)  Assessment & Plan  Right first and second toe amputation at the level of the MTP joint, Right leg debridement and application  of wound vacuum  5/27/2022  Wound care, Pain control      Drug-induced constipation- (present on admission)  Assessment & Plan  bowel protocol  Fleet enema given yesterday    Hyponatremia- (present on admission)  Assessment & Plan  Follow bmp    Peripheral vascular disease (HCC)- (present on admission)  Assessment & Plan  Selective catheter placement in the right below-knee popliteal artery, Right lower extremity angiography, 6mm uncovered self expanding Right SFA stent placement   10/12/2021  Atorvastatin  Resume ASA when cleared by Orthopedic surgery    History of MI (myocardial infarction)- (present on admission)  Assessment & Plan  Coreg, Lipitor  Resume ASA when cleared by Orthopedic surgery    Marijuana abuse- (present on admission)  Assessment & Plan  Education and counseling    Tobacco use- (present on admission)  Assessment & Plan  Nicotine replacement protocol    Hyperlipidemia- (present on admission)  Assessment & Plan  Atorvastatin       VTE prophylaxis: enoxaparin ppx    I have performed a physical exam and reviewed and updated ROS and Plan today (5/28/2022). In review of yesterday's note (5/27/2022), there are no changes except as documented above.

## 2022-05-28 NOTE — PROGRESS NOTES
Assumed care of patient at 1845. Bedside report received. Assessment complete.  AA&Ox4. Denies CP/SOB.  Reporting 9/10 pain. Medicated per MAR.   Educated patient regarding pharmacologic and non pharmacologic modalities for pain management.  Skin per flowsheet.  Tolerating CHO diet. Denies N/V.  + void. + BM. Last BM 5/27.  Pt ambulates with max assist.  Pt is NWB to L leg r/t L AKA  All needs met at this time. Call light within reach. Bed alarm on and audible.nPt calls appropriately. Bed low and locked, non skid socks in place. Hourly rounding in place.

## 2022-05-29 LAB
ERYTHROCYTE [DISTWIDTH] IN BLOOD BY AUTOMATED COUNT: 48.2 FL (ref 35.9–50)
GLUCOSE BLD STRIP.AUTO-MCNC: 131 MG/DL (ref 65–99)
GLUCOSE BLD STRIP.AUTO-MCNC: 141 MG/DL (ref 65–99)
GLUCOSE BLD STRIP.AUTO-MCNC: 174 MG/DL (ref 65–99)
GLUCOSE BLD STRIP.AUTO-MCNC: 187 MG/DL (ref 65–99)
HCT VFR BLD AUTO: 33.5 % (ref 42–52)
HGB BLD-MCNC: 10.5 G/DL (ref 14–18)
MCH RBC QN AUTO: 27.5 PG (ref 27–33)
MCHC RBC AUTO-ENTMCNC: 31.3 G/DL (ref 33.7–35.3)
MCV RBC AUTO: 87.7 FL (ref 81.4–97.8)
PLATELET # BLD AUTO: 310 K/UL (ref 164–446)
PMV BLD AUTO: 9.8 FL (ref 9–12.9)
RBC # BLD AUTO: 3.82 M/UL (ref 4.7–6.1)
WBC # BLD AUTO: 6.4 K/UL (ref 4.8–10.8)

## 2022-05-29 PROCEDURE — 700102 HCHG RX REV CODE 250 W/ 637 OVERRIDE(OP): Performed by: ORTHOPAEDIC SURGERY

## 2022-05-29 PROCEDURE — A9270 NON-COVERED ITEM OR SERVICE: HCPCS | Performed by: NURSE PRACTITIONER

## 2022-05-29 PROCEDURE — 82962 GLUCOSE BLOOD TEST: CPT | Mod: 91

## 2022-05-29 PROCEDURE — A9270 NON-COVERED ITEM OR SERVICE: HCPCS | Performed by: FAMILY MEDICINE

## 2022-05-29 PROCEDURE — 700111 HCHG RX REV CODE 636 W/ 250 OVERRIDE (IP): Performed by: ORTHOPAEDIC SURGERY

## 2022-05-29 PROCEDURE — 36415 COLL VENOUS BLD VENIPUNCTURE: CPT

## 2022-05-29 PROCEDURE — 700111 HCHG RX REV CODE 636 W/ 250 OVERRIDE (IP): Performed by: NURSE PRACTITIONER

## 2022-05-29 PROCEDURE — 99232 SBSQ HOSP IP/OBS MODERATE 35: CPT | Performed by: FAMILY MEDICINE

## 2022-05-29 PROCEDURE — 700102 HCHG RX REV CODE 250 W/ 637 OVERRIDE(OP): Performed by: NURSE PRACTITIONER

## 2022-05-29 PROCEDURE — 770001 HCHG ROOM/CARE - MED/SURG/GYN PRIV*

## 2022-05-29 PROCEDURE — 85027 COMPLETE CBC AUTOMATED: CPT

## 2022-05-29 PROCEDURE — 700102 HCHG RX REV CODE 250 W/ 637 OVERRIDE(OP): Performed by: FAMILY MEDICINE

## 2022-05-29 PROCEDURE — A9270 NON-COVERED ITEM OR SERVICE: HCPCS | Performed by: ORTHOPAEDIC SURGERY

## 2022-05-29 RX ADMIN — OXYCODONE HYDROCHLORIDE 20 MG: 10 TABLET ORAL at 06:35

## 2022-05-29 RX ADMIN — OXYCODONE HYDROCHLORIDE 20 MG: 10 TABLET ORAL at 11:57

## 2022-05-29 RX ADMIN — ATORVASTATIN CALCIUM 80 MG: 40 TABLET, FILM COATED ORAL at 17:50

## 2022-05-29 RX ADMIN — GABAPENTIN 1800 MG: 300 CAPSULE ORAL at 17:50

## 2022-05-29 RX ADMIN — DOCUSATE SODIUM 100 MG: 100 CAPSULE, LIQUID FILLED ORAL at 04:36

## 2022-05-29 RX ADMIN — HYDROMORPHONE HYDROCHLORIDE 1 MG: 1 INJECTION, SOLUTION INTRAMUSCULAR; INTRAVENOUS; SUBCUTANEOUS at 09:35

## 2022-05-29 RX ADMIN — MAGNESIUM HYDROXIDE 30 ML: 400 SUSPENSION ORAL at 04:36

## 2022-05-29 RX ADMIN — SENNOSIDES AND DOCUSATE SODIUM 1 TABLET: 50; 8.6 TABLET ORAL at 21:05

## 2022-05-29 RX ADMIN — GABAPENTIN 1800 MG: 300 CAPSULE ORAL at 04:36

## 2022-05-29 RX ADMIN — ENOXAPARIN SODIUM 40 MG: 40 INJECTION SUBCUTANEOUS at 00:10

## 2022-05-29 RX ADMIN — LISINOPRIL 5 MG: 5 TABLET ORAL at 04:36

## 2022-05-29 RX ADMIN — ACETAMINOPHEN 1000 MG: 500 TABLET, FILM COATED ORAL at 11:56

## 2022-05-29 RX ADMIN — OXYCODONE HYDROCHLORIDE 20 MG: 10 TABLET ORAL at 02:37

## 2022-05-29 RX ADMIN — ACETAMINOPHEN 1000 MG: 500 TABLET, FILM COATED ORAL at 06:35

## 2022-05-29 RX ADMIN — OXYCODONE HYDROCHLORIDE 20 MG: 10 TABLET ORAL at 16:46

## 2022-05-29 RX ADMIN — ACETAMINOPHEN 1000 MG: 500 TABLET, FILM COATED ORAL at 00:10

## 2022-05-29 RX ADMIN — DOCUSATE SODIUM 100 MG: 100 CAPSULE, LIQUID FILLED ORAL at 17:51

## 2022-05-29 RX ADMIN — TRAZODONE HYDROCHLORIDE 450 MG: 150 TABLET ORAL at 22:05

## 2022-05-29 RX ADMIN — OXYCODONE HYDROCHLORIDE 20 MG: 10 TABLET ORAL at 21:04

## 2022-05-29 RX ADMIN — POLYETHYLENE GLYCOL 3350 1 PACKET: 17 POWDER, FOR SOLUTION ORAL at 04:37

## 2022-05-29 RX ADMIN — ACETAMINOPHEN 1000 MG: 500 TABLET, FILM COATED ORAL at 17:49

## 2022-05-29 ASSESSMENT — ENCOUNTER SYMPTOMS
FOCAL WEAKNESS: 0
SENSORY CHANGE: 0
ABDOMINAL PAIN: 0
DIAPHORESIS: 0
FLANK PAIN: 0
NAUSEA: 0
FEVER: 0
CONSTIPATION: 1
DIZZINESS: 0
WHEEZING: 0
NERVOUS/ANXIOUS: 0
MYALGIAS: 0
COUGH: 0
PALPITATIONS: 0
SPEECH CHANGE: 0
WEAKNESS: 1
HEADACHES: 0
BLURRED VISION: 0
BACK PAIN: 0
SORE THROAT: 0
HEARTBURN: 0
DIARRHEA: 0
CHILLS: 0
NECK PAIN: 0
SHORTNESS OF BREATH: 0
VOMITING: 0

## 2022-05-29 ASSESSMENT — PAIN DESCRIPTION - PAIN TYPE
TYPE: ACUTE PAIN;NEUROPATHIC PAIN
TYPE: PHANTOM PAIN
TYPE: ACUTE PAIN;PHANTOM PAIN
TYPE: ACUTE PAIN;PHANTOM PAIN
TYPE: PHANTOM PAIN
TYPE: PHANTOM PAIN

## 2022-05-29 NOTE — PROGRESS NOTES
Assumed care of patient at 1845. Bedside report received. Assessment complete.  AA&Ox4. Denies CP/SOB.  Reporting 8/10 pain. Medicated per MAR.   Educated patient regarding pharmacologic and non pharmacologic modalities for pain management.  Skin per flowsheet.  Tolerating CHO diet. Denies N/V at this time  + void. - BM. Last BM 5/27.  Bowel protocol initiated.   Pt transfers with x1-2 assist.  LLE NWB r/t AKA.  All needs met at this time. Call light within reach. Pt calls appropriately. Bed low and locked, non skid socks in place. Hourly rounding in place.  Patient declines bed alarm at this time. Education provided.

## 2022-05-29 NOTE — THERAPY
"Physical Therapy Contact Note    PT treatment attempted; patient declined due to pain. Also stated, \"You'll need help if you want to work with me.\" Will re attempt as able and appropriate.    Naz Dominguez, PT, DPT  [phone]    "

## 2022-05-29 NOTE — PROGRESS NOTES
Hospital Medicine Daily Progress Note    Date of Service  5/29/2022    Chief Complaint  Luis Kellogg is a 71 y.o. male admitted 5/18/2022 with osteomyelitis of the left foot.    Hospital Course  Admitted for elective left above-knee amputation for chronic lower extremity wounds/ulcer and chronic osteomyelitis of the left foot.  Hospital medicine was consulted on the case postoperatively.  LPS was also consulted on the case.  Patient appeared to have chronic right lower extremity wound/ulcers, with probable cellulitis.  He was started on empiric coverage with IV Unasyn. Patient underwent Right first and second toe amputation at the level of the MTP joint, Right leg debridement and application of wound vacuum on 5/27/2022.     Interval Problem Update  Left AKA - phantom pain  Right lower extremity ulcers - pain controlled  Hypertension - sbp 100-130  Diabetes - -180  Constipation - no BM yesterday    I have personally seen and examined the patient at bedside. I discussed the plan of care with patient and bedside RN.    Consultants/Specialty  orthopedics and LPS    Code Status  Full Code    Disposition  Patient is not medically cleared for discharge.   Anticipate discharge to to skilled nursing facility.  I have placed the appropriate orders for post-discharge needs.    Review of Systems  Review of Systems   Constitutional: Positive for malaise/fatigue. Negative for chills, diaphoresis and fever.   HENT: Negative for congestion, hearing loss and sore throat.    Eyes: Negative for blurred vision.   Respiratory: Negative for cough, shortness of breath and wheezing.    Cardiovascular: Negative for chest pain, palpitations and leg swelling.   Gastrointestinal: Positive for constipation. Negative for abdominal pain, diarrhea, heartburn, nausea and vomiting.   Genitourinary: Negative for dysuria, flank pain and hematuria.   Musculoskeletal: Negative for back pain, joint pain, myalgias and neck pain.         Phantom pain left AKA   Skin: Negative for rash.   Neurological: Positive for weakness. Negative for dizziness, sensory change, speech change, focal weakness and headaches.   Psychiatric/Behavioral: The patient is not nervous/anxious.         Physical Exam  Temp:  [36.2 °C (97.1 °F)-36.9 °C (98.4 °F)] 36.4 °C (97.6 °F)  Pulse:  [75-84] 78  Resp:  [16-18] 18  BP: (104-134)/(59-79) 112/69  SpO2:  [90 %-95 %] 94 %    Physical Exam  Vitals and nursing note reviewed.   Constitutional:       Appearance: He is obese.   HENT:      Head: Normocephalic and atraumatic.      Nose: No congestion.      Mouth/Throat:      Mouth: Mucous membranes are moist.   Eyes:      Extraocular Movements: Extraocular movements intact.      Conjunctiva/sclera: Conjunctivae normal.   Cardiovascular:      Rate and Rhythm: Normal rate and regular rhythm.   Pulmonary:      Effort: Pulmonary effort is normal.      Breath sounds: Normal breath sounds.   Abdominal:      General: There is distension.      Tenderness: There is no abdominal tenderness. There is no guarding or rebound.   Musculoskeletal:      Cervical back: No tenderness.      Right lower leg: No edema.      Left lower leg: No edema.      Comments: Wound vac at right leg  Right 1st and 2nd toe amputations  Left AKA   Skin:     General: Skin is warm and dry.   Neurological:      General: No focal deficit present.      Mental Status: He is alert and oriented to person, place, and time.      Cranial Nerves: No cranial nerve deficit.         Fluids    Intake/Output Summary (Last 24 hours) at 5/29/2022 1239  Last data filed at 5/29/2022 1148  Gross per 24 hour   Intake 660 ml   Output 1620 ml   Net -960 ml       Laboratory  Recent Labs     05/27/22  0236 05/28/22  0218 05/29/22  0513   WBC 7.1 7.8 6.4   RBC 3.70* 3.69* 3.82*   HEMOGLOBIN 10.4* 10.3* 10.5*   HEMATOCRIT 31.7* 32.0* 33.5*   MCV 85.7 86.7 87.7   MCH 28.1 27.9 27.5   MCHC 32.8* 32.2* 31.3*   RDW 46.5 47.1 48.2   PLATELETCT 289 291  310   MPV 9.6 9.6 9.8     Recent Labs     05/27/22  0236 05/28/22  0218   SODIUM 136 134*   POTASSIUM 3.9 3.9   CHLORIDE 104 102   CO2 22 24   GLUCOSE 131* 174*   BUN 17 14   CREATININE 0.55 0.55   CALCIUM 8.8 9.2                   Imaging  [id number] VIEW   Final Result      1. Large amount of stool throughout the colon from the cecum to the rectum.   2. No bowel obstruction.   3. Other chronic degenerative changes.      US-EXTREMITY ARTERY LOWER UNILAT RIGHT   Final Result      US-MIA SINGLE LEVEL UNILAT RIGHT   Final Result      DX-TIBIA AND FIBULA RIGHT   Final Result         1.  No acute traumatic bony injury.   2.  No destructive osseous lesion is appreciated, note that plain film is insensitive for evaluation of osteomyelitis, and bone scan would offer improved diagnostic sensitivity as clinically appropriate.      DX-FOOT-COMPLETE 3+ RIGHT   Final Result         1.  No acute traumatic bony injury. No destructive osseous lesion is seen, note that plain film can be insensitive for evaluation of osteomyelitis and bone scan would offer improved diagnostic sensitivity as clinically appropriate.   2.  Degenerative changes of the foot, greatest at the midfoot and hindfoot           Assessment/Plan  * S/P AKA (above knee amputation), left (HCC)- (present on admission)  Assessment & Plan  pain control  Neurontin for phantom pain  PT/OT     Cellulitis of right leg- (present on admission)  Assessment & Plan  finished course of IV Unasyn    Anemia- (present on admission)  Assessment & Plan  Follow cbc    Chronic, continuous use of opioids- (present on admission)  Assessment & Plan  Pain control    Primary hypertension- (present on admission)  Assessment & Plan  Lisinopril and Coreg    Type 2 diabetes mellitus with hyperglycemia, with long-term current use of insulin ([facility])- (present on admission)  Assessment & Plan  Lantus, SSI      Chronic ulcer of lower extremity ([facility])- (present on admission)  Assessment &  Plan  Right first and second toe amputation at the level of the MTP joint, Right leg debridement and application of wound vacuum  5/27/2022  Wound care, Pain control      Drug-induced constipation- (present on admission)  Assessment & Plan  bowel protocol  Plan for Fleet enema tomorrow if no BM today    Hyponatremia- (present on admission)  Assessment & Plan  Follow bmp    Peripheral vascular disease (HCC)- (present on admission)  Assessment & Plan  Selective catheter placement in the right below-knee popliteal artery, Right lower extremity angiography, 6mm uncovered self expanding Right SFA stent placement   10/12/2021  Atorvastatin  Resume ASA when cleared by Orthopedic surgery    History of MI (myocardial infarction)- (present on admission)  Assessment & Plan  Coreg, Lipitor  Resume ASA when cleared by Orthopedic surgery    Marijuana abuse- (present on admission)  Assessment & Plan  Education and counseling    Tobacco use- (present on admission)  Assessment & Plan  Nicotine replacement protocol    Hyperlipidemia- (present on admission)  Assessment & Plan  Atorvastatin       VTE prophylaxis: enoxaparin ppx    I have performed a physical exam and reviewed and updated ROS and Plan today (5/29/2022). In review of yesterday's note (5/28/2022), there are no changes except as documented above.

## 2022-05-29 NOTE — CARE PLAN
Problem: Pain - Standard  Goal: Alleviation of pain or a reduction in pain to the patient’s comfort goal  Outcome: Progressing     Problem: Skin Integrity  Goal: Skin integrity is maintained or improved  Outcome: Progressing     Problem: Fall Risk  Goal: Patient will remain free from falls  Outcome: Progressing       The patient is Stable - Low risk of patient condition declining or worsening    Shift Goals  Clinical Goals: Blood sugar control; pain control  Patient Goals: Blood sugar control; pain control  Family Goals: no family present at this time    Progress made toward(s) clinical / shift goals:  Pain controlled with scheduled and PRN meds. Continue to monitor blood sugar levels and cover with insulin + educate about diabetic diet.    Patient is not progressing towards the following goals:

## 2022-05-29 NOTE — CARE PLAN
Problem: Pain - Standard  Goal: Alleviation of pain or a reduction in pain to the patient’s comfort goal  Outcome: Progressing   Pain medicated per MAR  Problem: Skin Integrity  Goal: Skin integrity is maintained or improved  Outcome: Progressing   Patient turns self in bed   Problem: Fall Risk  Goal: Patient will remain free from falls  Outcome: Progressing  Patient free from falls this shift    The patient is Stable - Low risk of patient condition declining or worsening    Shift Goals  Clinical Goals: Pain Control  Patient Goals: Pain Control  Family Goals: no family present at this time    Progress made toward(s) clinical / shift goals:  Pain medicated per MAR     Patient is not progressing towards the following goals:

## 2022-05-29 NOTE — CARE PLAN
Problem: Pain - Standard  Goal: Alleviation of pain or a reduction in pain to the patient’s comfort goal  Outcome: Progressing     Problem: Skin Integrity  Goal: Skin integrity is maintained or improved  Outcome: Progressing     Problem: Fall Risk  Goal: Patient will remain free from falls  Outcome: Progressing     The patient is Watcher - Medium risk of patient condition declining or worsening    Shift Goals  Clinical Goals: pain control, BM  Patient Goals: pain control  Family Goals: no family present at this time    Progress made toward(s) clinical / shift goals:  Patient reported pain to be managed with PRN medications. Educated on pharmacological and non pharmacological modalities.     Patient is not progressing towards the following goals: Patient did not have a BM throughout shift.

## 2022-05-30 LAB
ERYTHROCYTE [DISTWIDTH] IN BLOOD BY AUTOMATED COUNT: 47.8 FL (ref 35.9–50)
GLUCOSE BLD STRIP.AUTO-MCNC: 117 MG/DL (ref 65–99)
GLUCOSE BLD STRIP.AUTO-MCNC: 122 MG/DL (ref 65–99)
GLUCOSE BLD STRIP.AUTO-MCNC: 123 MG/DL (ref 65–99)
GLUCOSE BLD STRIP.AUTO-MCNC: 218 MG/DL (ref 65–99)
HCT VFR BLD AUTO: 39 % (ref 42–52)
HGB BLD-MCNC: 12.2 G/DL (ref 14–18)
MCH RBC QN AUTO: 27.3 PG (ref 27–33)
MCHC RBC AUTO-ENTMCNC: 31.3 G/DL (ref 33.7–35.3)
MCV RBC AUTO: 87.2 FL (ref 81.4–97.8)
PLATELET # BLD AUTO: 370 K/UL (ref 164–446)
PMV BLD AUTO: 9.1 FL (ref 9–12.9)
RBC # BLD AUTO: 4.47 M/UL (ref 4.7–6.1)
WBC # BLD AUTO: 6.7 K/UL (ref 4.8–10.8)

## 2022-05-30 PROCEDURE — A9270 NON-COVERED ITEM OR SERVICE: HCPCS | Performed by: ORTHOPAEDIC SURGERY

## 2022-05-30 PROCEDURE — 97530 THERAPEUTIC ACTIVITIES: CPT

## 2022-05-30 PROCEDURE — 700111 HCHG RX REV CODE 636 W/ 250 OVERRIDE (IP): Performed by: NURSE PRACTITIONER

## 2022-05-30 PROCEDURE — A9270 NON-COVERED ITEM OR SERVICE: HCPCS | Performed by: NURSE PRACTITIONER

## 2022-05-30 PROCEDURE — 700102 HCHG RX REV CODE 250 W/ 637 OVERRIDE(OP): Performed by: FAMILY MEDICINE

## 2022-05-30 PROCEDURE — 700102 HCHG RX REV CODE 250 W/ 637 OVERRIDE(OP): Performed by: ORTHOPAEDIC SURGERY

## 2022-05-30 PROCEDURE — 99232 SBSQ HOSP IP/OBS MODERATE 35: CPT | Performed by: FAMILY MEDICINE

## 2022-05-30 PROCEDURE — 99232 SBSQ HOSP IP/OBS MODERATE 35: CPT | Performed by: NURSE PRACTITIONER

## 2022-05-30 PROCEDURE — 36415 COLL VENOUS BLD VENIPUNCTURE: CPT

## 2022-05-30 PROCEDURE — 700111 HCHG RX REV CODE 636 W/ 250 OVERRIDE (IP): Performed by: ORTHOPAEDIC SURGERY

## 2022-05-30 PROCEDURE — A9270 NON-COVERED ITEM OR SERVICE: HCPCS | Performed by: FAMILY MEDICINE

## 2022-05-30 PROCEDURE — 97605 NEG PRS WND THER DME<=50SQCM: CPT

## 2022-05-30 PROCEDURE — 770001 HCHG ROOM/CARE - MED/SURG/GYN PRIV*

## 2022-05-30 PROCEDURE — 302098 PASTE RING (FLAT): Performed by: FAMILY MEDICINE

## 2022-05-30 PROCEDURE — 85027 COMPLETE CBC AUTOMATED: CPT

## 2022-05-30 PROCEDURE — 700102 HCHG RX REV CODE 250 W/ 637 OVERRIDE(OP): Performed by: NURSE PRACTITIONER

## 2022-05-30 PROCEDURE — 82962 GLUCOSE BLOOD TEST: CPT | Mod: 91

## 2022-05-30 PROCEDURE — 700101 HCHG RX REV CODE 250: Performed by: FAMILY MEDICINE

## 2022-05-30 RX ORDER — LIDOCAINE HYDROCHLORIDE 40 MG/ML
SOLUTION TOPICAL
Status: DISCONTINUED | OUTPATIENT
Start: 2022-05-30 | End: 2022-06-07 | Stop reason: HOSPADM

## 2022-05-30 RX ORDER — LIDOCAINE HYDROCHLORIDE 20 MG/ML
20 INJECTION, SOLUTION INFILTRATION; PERINEURAL
Status: DISCONTINUED | OUTPATIENT
Start: 2022-05-30 | End: 2022-06-07 | Stop reason: HOSPADM

## 2022-05-30 RX ORDER — ENEMA 19; 7 G/133ML; G/133ML
1 ENEMA RECTAL ONCE
Status: COMPLETED | OUTPATIENT
Start: 2022-05-30 | End: 2022-05-30

## 2022-05-30 RX ORDER — LIDOCAINE HYDROCHLORIDE 20 MG/ML
1 JELLY TOPICAL
Status: DISCONTINUED | OUTPATIENT
Start: 2022-05-30 | End: 2022-06-07 | Stop reason: HOSPADM

## 2022-05-30 RX ADMIN — DOCUSATE SODIUM 100 MG: 100 CAPSULE, LIQUID FILLED ORAL at 17:36

## 2022-05-30 RX ADMIN — SENNOSIDES AND DOCUSATE SODIUM 1 TABLET: 50; 8.6 TABLET ORAL at 20:48

## 2022-05-30 RX ADMIN — OXYCODONE HYDROCHLORIDE 20 MG: 10 TABLET ORAL at 03:15

## 2022-05-30 RX ADMIN — OXYCODONE HYDROCHLORIDE 20 MG: 10 TABLET ORAL at 13:08

## 2022-05-30 RX ADMIN — BISACODYL 10 MG: 5 TABLET, COATED ORAL at 04:58

## 2022-05-30 RX ADMIN — DOCUSATE SODIUM 100 MG: 100 CAPSULE, LIQUID FILLED ORAL at 04:58

## 2022-05-30 RX ADMIN — ACETAMINOPHEN 1000 MG: 500 TABLET, FILM COATED ORAL at 17:35

## 2022-05-30 RX ADMIN — SENNOSIDES AND DOCUSATE SODIUM 1 TABLET: 50; 8.6 TABLET ORAL at 17:37

## 2022-05-30 RX ADMIN — ENOXAPARIN SODIUM 40 MG: 40 INJECTION SUBCUTANEOUS at 00:37

## 2022-05-30 RX ADMIN — POLYETHYLENE GLYCOL 3350 1 PACKET: 17 POWDER, FOR SOLUTION ORAL at 04:58

## 2022-05-30 RX ADMIN — ACETAMINOPHEN 1000 MG: 500 TABLET, FILM COATED ORAL at 05:48

## 2022-05-30 RX ADMIN — ATORVASTATIN CALCIUM 80 MG: 40 TABLET, FILM COATED ORAL at 17:35

## 2022-05-30 RX ADMIN — OXYCODONE HYDROCHLORIDE 20 MG: 10 TABLET ORAL at 21:40

## 2022-05-30 RX ADMIN — ENOXAPARIN SODIUM 40 MG: 40 INJECTION SUBCUTANEOUS at 23:47

## 2022-05-30 RX ADMIN — OXYCODONE HYDROCHLORIDE 20 MG: 10 TABLET ORAL at 17:35

## 2022-05-30 RX ADMIN — GABAPENTIN 1800 MG: 300 CAPSULE ORAL at 17:37

## 2022-05-30 RX ADMIN — ACETAMINOPHEN 1000 MG: 500 TABLET, FILM COATED ORAL at 23:47

## 2022-05-30 RX ADMIN — LIDOCAINE HYDROCHLORIDE 1 APPLICATION: 40 SOLUTION TOPICAL at 10:30

## 2022-05-30 RX ADMIN — TRAZODONE HYDROCHLORIDE 450 MG: 150 TABLET ORAL at 20:48

## 2022-05-30 RX ADMIN — ACETAMINOPHEN 1000 MG: 500 TABLET, FILM COATED ORAL at 00:37

## 2022-05-30 RX ADMIN — SODIUM PHOSPHATE 133 ML: 7; 19 ENEMA RECTAL at 10:12

## 2022-05-30 RX ADMIN — LISINOPRIL 5 MG: 5 TABLET ORAL at 04:58

## 2022-05-30 RX ADMIN — HYDROMORPHONE HYDROCHLORIDE 1 MG: 1 INJECTION, SOLUTION INTRAMUSCULAR; INTRAVENOUS; SUBCUTANEOUS at 10:54

## 2022-05-30 RX ADMIN — GABAPENTIN 1800 MG: 300 CAPSULE ORAL at 04:57

## 2022-05-30 RX ADMIN — ACETAMINOPHEN 1000 MG: 500 TABLET, FILM COATED ORAL at 12:32

## 2022-05-30 RX ADMIN — OXYCODONE HYDROCHLORIDE 20 MG: 10 TABLET ORAL at 08:56

## 2022-05-30 RX ADMIN — ASPIRIN 81 MG: 81 TABLET, COATED ORAL at 12:32

## 2022-05-30 ASSESSMENT — GAIT ASSESSMENTS: GAIT LEVEL OF ASSIST: UNABLE TO PARTICIPATE

## 2022-05-30 ASSESSMENT — ENCOUNTER SYMPTOMS
PALPITATIONS: 0
NAUSEA: 0
DIARRHEA: 0
SENSORY CHANGE: 0
DIZZINESS: 0
FEVER: 0
HEADACHES: 0
NERVOUS/ANXIOUS: 0
FOCAL WEAKNESS: 0
HEARTBURN: 0
WEAKNESS: 1
NECK PAIN: 0
SHORTNESS OF BREATH: 0
BACK PAIN: 0
COUGH: 0
FLANK PAIN: 0
SPEECH CHANGE: 0
SORE THROAT: 0
WHEEZING: 0
VOMITING: 0
MYALGIAS: 0
BLURRED VISION: 0
ABDOMINAL PAIN: 0
CONSTIPATION: 1
DIAPHORESIS: 0
CHILLS: 0

## 2022-05-30 ASSESSMENT — PAIN DESCRIPTION - PAIN TYPE
TYPE: ACUTE PAIN

## 2022-05-30 ASSESSMENT — COGNITIVE AND FUNCTIONAL STATUS - GENERAL
DRESSING REGULAR LOWER BODY CLOTHING: A LOT
MOVING TO AND FROM BED TO CHAIR: A LITTLE
SUGGESTED CMS G CODE MODIFIER DAILY ACTIVITY: CK
SUGGESTED CMS G CODE MODIFIER MOBILITY: CL
STANDING UP FROM CHAIR USING ARMS: A LITTLE
TOILETING: A LOT
MOVING FROM LYING ON BACK TO SITTING ON SIDE OF FLAT BED: UNABLE
CLIMB 3 TO 5 STEPS WITH RAILING: TOTAL
DAILY ACTIVITIY SCORE: 17
WALKING IN HOSPITAL ROOM: TOTAL
MOBILITY SCORE: 12
DRESSING REGULAR UPPER BODY CLOTHING: A LITTLE
TURNING FROM BACK TO SIDE WHILE IN FLAT BAD: A LITTLE
HELP NEEDED FOR BATHING: A LOT

## 2022-05-30 NOTE — THERAPY
"Physical Therapy   Daily Treatment     Patient Name: Luis Kellogg  Age:  71 y.o., Sex:  male  Medical Record #: [id number]  Today's Date: 5/30/2022     Precautions  Precautions: Fall Risk;Non Weight Bearing Left Lower Extremity  Comments: wound vac RLE    Assessment    Patient progressing with functional mobility however remains limited by pain, functional weakness, and decreased activity tolerance. He mobilized as detailed below; he required facilitation of WC and SB during transfer for safety. Provided education regarding slide board technique and benefits of OOB activity but question internalization of education. Will continue to follow.    Plan    Continue current treatment plan.    DC Equipment Recommendations:  (pressure relief cusion and slide board)  Discharge Recommendations: Recommend post-acute placement for additional physical therapy services prior to discharge home      Subjective    \"It hurts!\"     Objective       05/30/22 1327   Charge Group   Charges  Yes   PT Therapeutic Activities 2  (24 min)   Precautions   Precautions Fall Risk;Non Weight Bearing Left Lower Extremity   Comments wound vac RLE   Vitals   O2 (LPM) 0   O2 Delivery Device None - Room Air   Pain 0 - 10 Group   Location Leg   Location Orientation Left;Right   Therapist Pain Assessment During Activity;Nurse Notified   Cognition    Cognition / Consciousness WDL   Level of Consciousness Alert   Safety Awareness Impaired;Impulsive   Comments fast moving and easily irritable. cooperative with encouragement   Balance   Sitting Balance (Static) Fair +   Sitting Balance (Dynamic) Fair +   Standing Balance (Static) Poor -   Standing Balance (Dynamic) Poor -   Weight Shift Sitting Fair   Weight Shift Standing Poor  (with BUE support on FWW and RLE)   Skilled Intervention Verbal Cuing;Compensatory Strategies;Facilitation   Comments overt LOB but appears due to fear   Gait Analysis   Gait Level Of Assist Unable to Participate   Bed Mobility " "   Supine to Sit Supervised  (with bed features)   Sit to Supine   (NT, left in WC)   Scooting Supervised  (seated)   Skilled Intervention Verbal Cuing   Functional Mobility   Sit to Stand Moderate Assist  (x2)   Bed, Chair, Wheelchair Transfer Minimal Assist   Transfer Method Slide Board   Skilled Intervention Verbal Cuing;Compensatory Strategies;Facilitation   Comments initially tried stand pivot transfer but patient fearful and reported R foot sliding on floor. Required facilitation of WC and SB for safety during transfer   How much difficulty does the patient currently have...   Turning over in bed (including adjusting bedclothes, sheets and blankets)? 3   Sitting down on and standing up from a chair with arms (e.g., wheelchair, bedside commode, etc.) 1   Moving from lying on back to sitting on the side of the bed? 3   How much help from another person does the patient currently need...   Moving to and from a bed to a chair (including a wheelchair)? 3   Need to walk in a hospital room? 1   Climbing 3-5 steps with a railing? 1   6 clicks Mobility Score 12   Activity Tolerance   Sitting in Chair left in WC   Sitting Edge of Bed 5-8 min   Standing 3-5 min   Comments limited by pain, weakness, behavior   Patient / Family Goals    Patient / Family Goal #1 go home   Goal #1 Outcome Goal not met   Short Term Goals    Short Term Goal # 1 Pt will demo supine<>sit eob w/ hob flat and no rails/trapeze spv in 6 visits for independence w/ bed mobility.   Goal Outcome # 1 Progressing as expected   Short Term Goal # 2 Pt will demo SPT eob<>wc using fww spv in 6 visits for OOB mobility tasks.   Goal Outcome # 2 Goal not met   Short Term Goal # 3 Pt will demo ability to propel wc 250' spv on a level surface in 6 visits for access to the community.   Goal Outcome # 3 Goal not met   Short Term Goal # 4 Pt will demo ability to \"bump up/down\" 3 stairs spv in 6 visits for access to his home.   Goal Outcome # 4 Goal not met   Short " Term Goal # 5 Pt will demo gait 100' using fww spv via 3 point pattern in 6 visits for household ambulation.   Goal Outcome # 5 Goal not met   Anticipated Discharge Equipment and Recommendations   DC Equipment Recommendations   (pressure relief cusion and slide board)   Discharge Recommendations Recommend post-acute placement for additional physical therapy services prior to discharge home   Interdisciplinary Plan of Care Collaboration   IDT Collaboration with  Nursing;Therapy Tech   Patient Position at End of Therapy Seated;Call Light within Reach;Tray Table within Reach;Phone within Reach   Collaboration Comments RN awrae of visit, response   Session Information   Date / Session Number  5/30-4 (1/4, 6/2) attempted 5/29

## 2022-05-30 NOTE — PROGRESS NOTES
Assumed care of patient at 1845. Bedside report received. Assessment complete.  AA&Ox4. Denies CP/SOB.  Reporting 9/10 pain. Medicated per MAR.   Educated patient regarding pharmacologic and non pharmacologic modalities for pain management.  Skin per flowsheet.  Tolerating CHO diet. Denies N/V.  + void. - BM. Last BM 5/27. Bowel protocol initiated.   Pt transfers x1-2 assist.  NWB to LLE r/t AKA  All needs met at this time. Call light within reach. Pt calls appropriately. Bed low and locked, non skid socks in place. Hourly rounding in place.

## 2022-05-30 NOTE — PROGRESS NOTES
Assumed care of patient at 0645. Bedside report received. Assessment complete.  AA&Ox4. Denies CP/SOB.  Reporting 9/10 pain. Medicated per MAR.   Educated patient regarding pharmacologic and non pharmacologic modalities for pain management.  Skin per flowsheets  Tolerating diabetic diet. Denies N/V.  + void. + BM. Last BM 5/30  Pt ambulates Pivot to wheelchair, self ambulatory via wheelchair.  All needs met at this time. Call light within reach. Pt calls appropriately. Bed low and locked, non skid socks in place. Hourly rounding in place.       DATE:  03/10/2019

 

 

SUBJECTIVE:  The patient is stable, no events overnight.  The patient is pending possible EGD.

 

OBJECTIVE:

VITAL SIGNS:  Blood pressure is 111/29, pulse 67, respirations 20, temperature 98.4.

HEENT:  Head is normocephalic.

NECK:  Supple.

HEART:  Regular rate.

LUNGS:  Show diminished breath sounds at the base.

ABDOMEN:  Soft, nontender to palpation.  No rebound or guarding.

EXTREMITIES:  Negative for clubbing, cyanosis, no edema.

DERMATOLOGIC:  No rashes.

MUSCULOSKELETAL:  No joint effusion.

NEUROLOGIC:  No change in exam.

 

MEDICATIONS:  The patient's medications have been reviewed.

 

LABORATORY DATA:  The laboratory data has been reviewed.

 

ASSESSMENT AND PLAN:

1.  End-stage renal disease.  The patient is scheduled for dialysis today.

2.  Anemia.  Monitor hemoglobin and hematocrit levels.  Continue Epogen.  The patient is status post 
blood transfusion.

3.  The patient is pending possible EGD once cleared and stable.

4.  Mineral bone disorder.  Monitor calcium and phosphorus levels.

5.  Hypokalemia, improved.

6.  Acute respiratory failure, improved.  Continue to monitor.  Continue supplemental oxygen and bron
chodilators.  Etiology may be secondary to pneumonia.  Continue current antibiotic regimen.

7.  Elevated troponin, possible non-STEMI type 1 versus type 2.  Continue to monitor.  Follow up with
 Cardiology.

8.  Acute encephalopathy, etiology toxic metabolic.

9.  Diabetes.  Continue current insulin regimen.

10.  History of coronary artery disease.  Continue medical management.

11.  Hypothyroidism.  Continue Synthroid.

12.  Dyslipidemia.  Continue statin therapy.

13.  Hypertension.  Continue current blood pressure regimen.

 

 

Dictated By: ELODIA RAHMAN DO

 

NR/NTS

DD:    03/10/2019 08:57:30

DT:    03/10/2019 09:53:33

Conf#: [id number]

DID#:  [id number]

CC: BUCK ABDI MD; STEPHANE SENA MD;*EndCC*

## 2022-05-30 NOTE — CARE PLAN
Problem: Pain - Standard  Goal: Alleviation of pain or a reduction in pain to the patient’s comfort goal  Outcome: Progressing   Pain medicated per MAR   Problem: Fall Risk  Goal: Patient will remain free from falls  Outcome: Progressing  Staff available for ambulation this shift    The patient is Stable - Low risk of patient condition declining or worsening    Shift Goals  Clinical Goals: Bowel Movement  Patient Goals: Pain Control  Family Goals: N/A    Progress made toward(s) clinical / shift goals:  Bowel movement achieved     Patient is not progressing towards the following goals:      Problem: Skin Integrity  Goal: Skin integrity is maintained or improved  Outcome: Not Progressing   Patient refusing waffle mattress, education provided on risks of immobility

## 2022-05-30 NOTE — WOUND TEAM
[facility]  Inpatient Services  Wound and Skin Care Evaluation    Admission Date: 5/18/2022     Last order of IP CONSULT TO WOUND CARE was found on 5/18/2022 from Hospital Encounter on 4/28/2022     HPI, PMH, SH: Reviewed    Past Surgical History:   Procedure Laterality Date   • TOE AMPUTATION Right 5/27/2022    Procedure: AMPUTATION, TOE - 1ST AND 2ND;  Surgeon: Aleks Buenrostro M.D.;  Location: [facility];  Service: Orthopedics   • IRRIGATION & DEBRIDEMENT GENERAL  5/27/2022    Procedure: IRRIGATION AND DEBRIDEMENT, WOUND - LEG;  Surgeon: Aleks Buenrostro M.D.;  Location: [facility];  Service: Orthopedics   • APPLICATION OR REPLACEMENT, WOUND VAC  5/27/2022    Procedure: APPLICATION OR REPLACEMENT, WOUND VAC;  Surgeon: Aleks Buenrostro M.D.;  Location: [facility];  Service: Orthopedics   • PB AMPUTATE THIGH,THRU FEMUR Left 5/18/2022    Procedure: LEFT ABOVE KNEE AMPUTATION, ADDUCTOR TENDON MYOTENODESIS;  Surgeon: Aleks Buenrostro M.D.;  Location: [facility];  Service: Orthopedics   • PB XFER SINGLE SUPERFICI LOW LEG TENDON Left 5/18/2022    Procedure: TRANSFER, TENDON;  Surgeon: Aleks Buenrostro M.D.;  Location: [facility];  Service: Orthopedics   • ANGIOGRAM Bilateral 10/12/2021    Procedure: BILATERAL LOWER EXTREMITY ANGIOGRAM;  Surgeon: Valerio Purcell M.D.;  Location: [facility];  Service: Vascular   • IRRIGATION & DEBRIDEMENT GENERAL Bilateral 10/12/2021    Procedure: IRRIGATION AND DEBRIDEMENT, WOUND, BILATERAL LOWER EXTREMITY;  Surgeon: Valerio Purcell M.D.;  Location: [facility];  Service: Vascular   • APPLICATION OR REPLACEMENT, WOUND VAC Bilateral 10/12/2021    Procedure: APPLICATION WOUND VAC;  Surgeon: Valerio Purcell M.D.;  Location: [facility];  Service: Vascular   • STENT PLACEMENT Right 10/12/2021    Procedure: STENT PLACEMENT, RIGHT SUPERFICIAL FEMORAL ARTERY;  Surgeon:  Valerio Purcell M.D.;  Location: SURGERY [facility];  Service: Vascular   • TOE AMPUTATION Left 2/17/2020    Procedure: LEFT SECOND, THIRD, AND FORTH TOE AMPUTATION;  Surgeon: Alfonso Esteban M.D.;  Location: SURGERY [facility];  Service: Orthopedics   • NE UNLISTED PROC, ARTHROSCOPY Left 7/25/2019    Procedure: LEFT ENDOSCOPIC ULNAR NERVE DECOMPRESSION, LEFT CARPAL TUNNEL RELEASE;  Surgeon: Red Chacon M.D.;  Location: SURGERY [facility];  Service: Orthopedics   • KNEE REPLACEMENT, TOTAL Bilateral    • OTHER SURGICAL PROCEDURE      back surgery - Unknown      Social History     Tobacco Use   • Smoking status: Current Every Day Smoker     Packs/day: 0.50     Years: 54.00     Pack years: 27.00     Types: Cigarettes   • Smokeless tobacco: Former User   Substance Use Topics   • Alcohol use: Not Currently     Alcohol/week: 0.0 oz     Comment: occas     No chief complaint on file.    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 Hx type 2 DM.    WOUND ASSESSMENT/LDA     Negative Pressure Wound Therapy 05/24/22 Chronic Leg Lateral Right (Active)   Vacuum Serial Number [id number] 05/30/22 1100   NPWT Pump Mode / Pressure Setting Ulta    Dressing Type Medium;Black Foam (Veraflo)    Number of Foam Pieces Used 3    Canister Changed No    Output (mL) 0 mL    NEXT Dressing Change/Treatment Date 06/02/22    VAC VeraFlo Irrigant Normal Saline    VAC VeraFlo Soak Time (mins) 6    VAC VeraFlo Instill Volume (ml) 44    VAC VeraFlo - Therapy Time (hrs) 3.5    VAC VeraFlo Pressure (mm/Hg) Intermittent;125 mmHg    WOUND NURSE ONLY - Time Spent with Patient (mins) 60          Wound 05/19/22 Full Thickness Wound Leg Right  (Active)   Wound Image    05/30/22 1100   Site Assessment Red;Early/partial granulation    Periwound Assessment Clean;Dry;Intact    Margins Attached edges;Defined edges    Closure Secondary intention    Drainage Amount Small    Drainage Description Serosanguineous    Treatments Cleansed;Site care;Offloading;Topical Lidocaine    Wound Cleansing Approved Wound Cleanser    Periwound Protectant Skin Protectant Wipes to Periwound;Paste Ring;Drape    Dressing Cleansing/Solutions Normal Saline    Dressing Options Wound Vac;Mepilex;Compression Wrap Two Layer    Dressing Changed Changed    Dressing Status Clean;Dry;Intact    Dressing Change/Treatment Frequency Every 72 hrs, and As Needed    NEXT Dressing Change/Treatment Date 06/02/22    NEXT Weekly Photo (Inpatient Only) 06/06/22    Non-staged Wound Description Full thickness    Wound Length (cm) 21.5 cm    Wound Width (cm) 12 cm    Wound Depth (cm) 0.1 cm    Wound Surface Area (cm^2) 258 cm^2    Wound Volume (cm^3) 25.8 cm^3    Wound Bed Granulation (%) 100 %    Wound Odor Mild    Exposed Structures ROSITA        Wound 05/27/22 Incision Foot Right Adaptic, 4x4, softroll, Ace (Active)   Wound Image    05/30/22 1100   Site Assessment Clean;Dry;Intact    Periwound Assessment Clean;Dry;Intact    Margins Attached edges;Defined edges    Closure Surface sutures    Drainage Amount ROSITA    Drainage Description ROSITA    Dressing Options Dry Gauze;Hypafix Tape    Dressing Changed New    Dressing Status Clean;Dry;Intact    Exposed Structures None            Vascular:    MIA:   MIA Results, Last 30 Days US-EXTREMITY ARTERY LOWER UNILAT RIGHT    Result Date: 5/22/2022  Narrative Lower Extremity  Arterial Duplex Report  Vascular Laboratory  CONCLUSIONS  1) 50-75% stenosis of the mid superficial femoral artery  2) Atherosclerosis  MILI HANSON  Exam Date:     05/22/2022 18:42  Room #:     Inpatient  Priority:     Routine  Ht (in):             Wt (lb):  Ordering Physician:        MARTHA TAI  ASHA  Referring Physician:       [id number]  Sonographer:               Frantz Carranza RDMS, RVT  Study Type:                Complete Unilateral  Technical Quality:         Adequate  Age:    71    Gender:     M  MRN:    [id number]  :    1950      BSA:  Indications:     PVD  CPT Codes:       58194  ICD Codes:       443.9  History:         Right superficial femoral artery stent, left above knee                   amputaion. prior duplex 10-8-21.  Limitations:                RIGHT  Waveform        Peak Systolic Velocity (cm/s)                  Prox    Prox-Mid  Mid    Mid-Dist  Distal  Triphasic                         94                       CFA  Triphasic       72                                         PFA  Triphasic       115               161              122     SFA  Triphasic                         58                       POP  Biphasic        116                                91      AT  Triphasic       35                                 66      PT  Triphasic       60                                 37      SOFIA                LEFT  Waveform        Peak Systolic Velocity (cm/s)                  Prox    Prox-Mid  Mid    Mid-Dist  Distal                                                             CFA                                                             PFA                                                             SFA                                                             POP                                                             AT                                                             PT                                                             SOFIA  FINDINGS  Right.  Diffuse atherosclerotic plaque.  50-75% stenosis in the mid superficial femoral artery.  Multiphasic flow demonstarted throoughout the extremity.  Elizabeth Monroy MD  (Electronically Signed)  Final Date:      22 May 2022 23:13    MIA Results, Last 30 Days US-MIA SINGLE LEVEL UNILAT  RIGHT    Result Date: 2022  Narrative  Vascular Laboratory  Conclusions  Compared to the prior study on 10/8/21, waveforms looks better, triphasic  and high amplitude in present study.  The ankle pressures cannot be measured due to calcification and  noncompressibility of the tibial vessels.  MILI HANSON  Age:    71    Gender:     M  MRN:    [id number]  :    1950      BSA:  Exam Date:     2022 11:23  Room #:     Inpatient  Priority:     Routine  Ht (in):             Wt (lb):  Ordering Physician:        MARTHA TAI  Referring Physician:       [id number] Nogueira  Sonographer:               Deb Lazo                             MISA, [facility]  Study Type:                Complete Unilateral  Technical Quality:         Adequate  Indications:     Ulcer of calf, Ulcer of other part of foot (toes)  CPT Codes:       86629  ICD Codes:       707.12  707.15  History:         Right calf ulcer and ulceration of the right toes; history of                   right SFA stenting and left AKA  Limitations:     Unable to perform toe brachial index due to open                   wounds/patient unable to tolerate pressures over this area;                   left AKA                 RIGHT  Waveform            Systolic BPs (mmHg)                             121           Brachial  Triphasic                                Common Femoral  Triphasic                                Popliteal  Triphasic                  0             Posterior Tibial  Triphasic                  0             Dorsalis Pedis                                           Digit                                           MIA                                           TBI                       LEFT  Waveform        Systolic BPs (mmHg)                             127           Brachial                                           Common Femoral                                           Popliteal                                           Posterior Tibial                                            Dorsalis Pedis                                           Digit                                           MIA                                           TBI  Findings  Right.  Doppler waveforms of the common femoral, popliteal, posterior tibial, and  dorsalis pedis arteries are of high amplitude and triphasic.  The ankle pressures cannot be measured due to calcification and  noncompressibility of the tibial vessels.  Unable to perform toe brachial index due to open wounds/patient unable to  tolerate pressures over this area.  Left BHANU Singer MD  (Electronically Signed)  Final Date:      21 May 2022 20:31    Lab Values:    Lab Results   Component Value Date/Time    WBC 6.7 05/30/2022 12:49 AM    RBC 4.47 (L) 05/30/2022 12:49 AM    HEMOGLOBIN 12.2 (L) 05/30/2022 12:49 AM    HEMATOCRIT 39.0 (L) 05/30/2022 12:49 AM    CREACTPROT 6.25 (H) 10/07/2021 08:30 PM    SEDRATEWES 1 10/07/2021 08:30 PM    HBA1C 8.0 (H) 05/19/2022 03:34 AM      Culture Results show:  No results found for this or any previous visit (from the past 720 hour(s)).    Pain Level/Medicated:  Received PO and IV pain medications as well as 4% Lidocaine 45min prior to change. Tolerated well did have some pain with change.     INTERVENTIONS BY WOUND TEAM:  Chart and images reviewed. Discussed with bedside RN. All areas of concern (based on picture review, LDA review and discussion with bedside RN) have been thoroughly assessed. Documentation of areas based on significant findings. This RN in to assess patient. Performed standard wound care which includes appropriate positioning, dressing removal and non-selective debridement. Pictures and measurements obtained weekly if/when required.         RLE LATERAL  Preparation for Dressing removal: Dressing soaked with Lidocaine and NS  Cleansed with:  Wound cleanser and gauze.  Sharp debridement: NA  Soo wound: Cleansed with Wound cleanser and gauze, Prepped with  no sting and 3 paste rings  Primary Dressing: 3 pieces of half thickness cut to fit black veraflo foam applied over wound and secured with drape. A hole was cut in drape and trac pad applied over proximal aspect.   Secondary (Outer) Dressing: Fenestrated mepilex applied around vac tubing. Heel mepilex to heel. 2 layer compression applied (Foam layer followed by coban).    R Toe amp site and LLE BKA incision dressed with dry gauze and hypafix tape.    Interdisciplinary consultation: Patient, Bedside RN (Layton), Wound RN (Pawan), LPS APRN (Vinicius Woods)    EVALUATION / RATIONALE FOR TREATMENT:  Most Recent Date:  5/30/22: Pt to OR for debridement and toe amputation on Friday. Toe amp incision CDI. Dry gauze and hypafix applied per LPS recommendations. Pts RLE lateral wound clean with granulation tissue present. Veraflo continued however fluid and soak time decreased. See flowsheet for settings.     5/24: Discussed R lateral leg wound with Dr. Purcell. Ok for wound vac placement and compression. Veraflo wound vac initiated to right lateral leg wound to manage drainage, bioburden, increase tissue oxygenation and granulation. Light compression applied for improved venous return. Pt not agreeable and unable to tolerate 2 layer compression wrap at this time. Hydrofera Blue applied to right 1st and 2nd toes for the hydrophilic polyurethane foam which contains ethylene oxide used as a bactericidal, fungicidal, and sporicidal disinfectant. Hydrofera Blue also aids in maintaining a moist wound environment. The absorption properties of this dressing are important in collecting exudates and bacteria from the injured area. These harmful fluid secretions bind to the dressing removing it from the wound without the foam sticking to the wound causing more harm.       Goals: Steady decrease in wound area and depth weekly.    WOUND TEAM PLAN OF CARE ([X] for frequency of wound follow up,):   Nursing to follow orders written for wound  care. Contact wound team if area fails to progress, deteriorates or with any questions/concerns  Dressing changes by wound team:                   Follow up 3 times weekly:                NPWT change 2 times weekly:   X Monday/Thursday  Follow up 1-2 times weekly:      Follow up Bi-Monthly:                   Follow up as needed:     Other (explain):     NURSING PLAN OF CARE ORDERS (X):  Dressing changes: See Dressing Care orders: X  Skin care: See Skin Care orders:  RN Prevention Protocol:  Rectal tube care: See Rectal Tube Care orders:   Other orders:    RSKIN:   CURRENTLY IN PLACE (X), APPLIED THIS VISIT (A), ORDERED (O):   Q shift Donis:  X  Q shift pressure point assessments:  X    Surface/Positioning   Pressure redistribution mattress      X      Low Airloss          Bariatric foam      Bariatric CRISTOPHER     Waffle cushion        Waffle Overlay          Reposition q 2 hours      TAPs Turning system     Z Jason Pillow     Offloading/Redistribution   Sacral Mepilex (Silicone dressing)     Heel Mepilex (Silicone dressing)         Heel float boots (Prevalon boot)             Float Heels off Bed with Pillows       X    Respiratory RA  Silicone O2 tubing         Gray Foam Ear protectors     Cannula fixation Device (Tender )          High flow offloading Clip    Elastic head band offloading device      Anchorfast                                                         Trach with Optifoam split foam             Containment/Moisture Prevention   Rectal tube or BMS    Purwick/Condom Cath        Fajardo Catheter    Barrier wipes           Barrier paste       Antifungal tx      Interdry        Mobilization    Up to chair        Ambulate      PT/OT  X    Nutrition     Dietician        Diabetes Education      PO    X TF     TPN     NPO   # days     Other       Anticipated discharge plans:   LTACH:        SNF/Rehab:                X  Home Health Care:           Outpatient Wound Center:            Self/Family Care:        Other:                   Vac Discharge Needs:   Not Applicable Pt not on a wound vac:       Regular Vac while inpatient, alternative dressing at DC:        Regular Vac in use and continued at DC:            Reg. Vac w/ Skin Sub/Biologic in use. Will need to be changed 2x wkly:      Veraflo Vac while inpatient, ok to transition to Regular Vac on Discharge:  X         Veraflo Vac while inpatient, will need to remain on Veraflo Vac upon discharge:

## 2022-05-30 NOTE — PROGRESS NOTES
Hospital Medicine Daily Progress Note    Date of Service  5/30/2022    Chief Complaint  Luis Kellogg is a 71 y.o. male admitted 5/18/2022 with osteomyelitis of the left foot.    Hospital Course  Admitted for elective left above-knee amputation for chronic lower extremity wounds/ulcer and chronic osteomyelitis of the left foot.  Hospital medicine was consulted on the case postoperatively.  LPS was also consulted on the case.  Patient appeared to have chronic right lower extremity wound/ulcers, with probable cellulitis.  He was started on empiric coverage with IV Unasyn. Patient underwent Right first and second toe amputation at the level of the MTP joint, Right leg debridement and application of wound vacuum on 5/27/2022.     Interval Problem Update  Left AKA - phantom pain  Right lower extremity ulcers - pain controlled  Hypertension - sbp 110-120  Diabetes - -170  Constipation - no BM again yesterday, advised needs enema today    I have personally seen and examined the patient at bedside. I discussed the plan of care with patient, bedside RN and .    Consultants/Specialty  orthopedics and LPS    Code Status  Full Code    Disposition  Patient is medically cleared for discharge.   Anticipate discharge to to skilled nursing facility.  I have placed the appropriate orders for post-discharge needs.    Review of Systems  Review of Systems   Constitutional: Positive for malaise/fatigue. Negative for chills, diaphoresis and fever.   HENT: Negative for congestion, hearing loss and sore throat.    Eyes: Negative for blurred vision.   Respiratory: Negative for cough, shortness of breath and wheezing.    Cardiovascular: Negative for chest pain, palpitations and leg swelling.   Gastrointestinal: Positive for constipation. Negative for abdominal pain, diarrhea, heartburn, nausea and vomiting.   Genitourinary: Negative for dysuria, flank pain and hematuria.   Musculoskeletal: Negative for back pain,  joint pain, myalgias and neck pain.        Phantom pain left AKA   Skin: Negative for rash.   Neurological: Positive for weakness. Negative for dizziness, sensory change, speech change, focal weakness and headaches.   Psychiatric/Behavioral: The patient is not nervous/anxious.         Physical Exam  Temp:  [36.2 °C (97.2 °F)-36.9 °C (98.4 °F)] 36.2 °C (97.2 °F)  Pulse:  [70-76] 72  Resp:  [16-18] 16  BP: (115-123)/(70-74) 118/72  SpO2:  [90 %-96 %] 90 %    Physical Exam  Vitals and nursing note reviewed.   Constitutional:       Appearance: He is obese.   HENT:      Head: Normocephalic and atraumatic.      Nose: No congestion.      Mouth/Throat:      Mouth: Mucous membranes are moist.   Eyes:      Extraocular Movements: Extraocular movements intact.      Conjunctiva/sclera: Conjunctivae normal.   Cardiovascular:      Rate and Rhythm: Normal rate and regular rhythm.   Pulmonary:      Effort: Pulmonary effort is normal.      Breath sounds: Normal breath sounds.   Abdominal:      General: There is distension.      Tenderness: There is no abdominal tenderness. There is no guarding or rebound.   Musculoskeletal:      Cervical back: No tenderness.      Right lower leg: No edema.      Left lower leg: No edema.      Comments: Wound vac at right leg  Right 1st and 2nd toe amputations  Left AKA   Skin:     General: Skin is warm and dry.   Neurological:      General: No focal deficit present.      Mental Status: He is alert and oriented to person, place, and time.      Cranial Nerves: No cranial nerve deficit.         Fluids    Intake/Output Summary (Last 24 hours) at 5/30/2022 1217  Last data filed at 5/30/2022 0313  Gross per 24 hour   Intake 600 ml   Output 4400 ml   Net -3800 ml       Laboratory  Recent Labs     05/28/22  0218 05/29/22  0513 05/30/22  0049   WBC 7.8 6.4 6.7   RBC 3.69* 3.82* 4.47*   HEMOGLOBIN 10.3* 10.5* 12.2*   HEMATOCRIT 32.0* 33.5* 39.0*   MCV 86.7 87.7 87.2   MCH 27.9 27.5 27.3   MCHC 32.2* 31.3* 31.3*    RDW 47.1 48.2 47.8   PLATELETCT 291 310 370   MPV 9.6 9.8 9.1     Recent Labs     05/28/22  0218   SODIUM 134*   POTASSIUM 3.9   CHLORIDE 102   CO2 24   GLUCOSE 174*   BUN 14   CREATININE 0.55   CALCIUM 9.2                   Imaging  [id number] VIEW   Final Result      1. Large amount of stool throughout the colon from the cecum to the rectum.   2. No bowel obstruction.   3. Other chronic degenerative changes.      US-EXTREMITY ARTERY LOWER UNILAT RIGHT   Final Result      US-MIA SINGLE LEVEL UNILAT RIGHT   Final Result      DX-TIBIA AND FIBULA RIGHT   Final Result         1.  No acute traumatic bony injury.   2.  No destructive osseous lesion is appreciated, note that plain film is insensitive for evaluation of osteomyelitis, and bone scan would offer improved diagnostic sensitivity as clinically appropriate.      DX-FOOT-COMPLETE 3+ RIGHT   Final Result         1.  No acute traumatic bony injury. No destructive osseous lesion is seen, note that plain film can be insensitive for evaluation of osteomyelitis and bone scan would offer improved diagnostic sensitivity as clinically appropriate.   2.  Degenerative changes of the foot, greatest at the midfoot and hindfoot           Assessment/Plan  * S/P AKA (above knee amputation), left (HCC)- (present on admission)  Assessment & Plan  pain control  Neurontin for phantom pain  PT/OT   Case management for discharge planning    Cellulitis of right leg- (present on admission)  Assessment & Plan  finished course of IV Unasyn    Anemia- (present on admission)  Assessment & Plan  Follow cbc    Chronic, continuous use of opioids- (present on admission)  Assessment & Plan  Pain control    Primary hypertension- (present on admission)  Assessment & Plan  Lisinopril and Coreg    Type 2 diabetes mellitus with hyperglycemia, with long-term current use of insulin ([facility])- (present on admission)  Assessment & Plan  Lantus, SSI      Chronic ulcer of lower extremity ([facility])- (present on  admission)  Assessment & Plan  Right first and second toe amputation at the level of the MTP joint, Right leg debridement and application of wound vacuum  5/27/2022  Wound care, Pain control      Drug-induced constipation- (present on admission)  Assessment & Plan  bowel protocol  Fleet enema today    Hyponatremia- (present on admission)  Assessment & Plan  Follow bmp    Peripheral vascular disease (HCC)- (present on admission)  Assessment & Plan  Selective catheter placement in the right below-knee popliteal artery, Right lower extremity angiography, 6mm uncovered self expanding Right SFA stent placement   10/12/2021  Atorvastatin  Resume ASA    History of MI (myocardial infarction)- (present on admission)  Assessment & Plan  Coreg, Lipitor  Resume ASA     Marijuana abuse- (present on admission)  Assessment & Plan  Education and counseling    Tobacco use- (present on admission)  Assessment & Plan  Nicotine replacement protocol    Hyperlipidemia- (present on admission)  Assessment & Plan  Atorvastatin       VTE prophylaxis: enoxaparin ppx    I have performed a physical exam and reviewed and updated ROS and Plan today (5/30/2022). In review of yesterday's note (5/29/2022), there are no changes except as documented above.

## 2022-05-30 NOTE — THERAPY
"Occupational Therapy  Daily Treatment     Patient Name: Luis Kellogg  Age:  71 y.o., Sex:  male  Medical Record #: [id number]  Today's Date: 5/30/2022     Precautions: Fall Risk, Non Weight Bearing Left Lower Extremity, Weight Bearing As Tolerated Right Lower Extremity  Comments: wound vac RLE    Assessment    Pt seen for brief OT tx. Received in personal manual WC having been up ~20 min. Reports pain too high to tolerate. Assisted pt with slide board transfer WC > EOB. Pt able to push through RLE to facilitate transfer. Once supine, pt able to use trapeze to scoot self up towards HOB. Pt unable to attempt doffing R sock due to high pain. Pt is progressing with functional mobility, but continues to be limited by pain. Will benefit from ongoing acute OT.     Plan    Continue current treatment plan.    DC Equipment Recommendations: Unable to determine at this time  Discharge Recommendations: Recommend post-acute placement for additional occupational therapy services prior to discharge home    Subjective    \"I can't tolerate being up because of the pain.\"     Objective       05/30/22 1350   Balance   Sitting Balance (Static) Fair   Sitting Balance (Dynamic) Fair -   Weight Shift Sitting Fair   Bed Mobility    Supine to Sit Standby Assist   Scooting Standby Assist  (seated)   Comments flat bed, used OHT   Activities of Daily Living   Lower Body Dressing Total Assist  (doff R sock)   Comments unable to attempt due to pain   Functional Mobility   Bed, Chair, Wheelchair Transfer Minimal Assist   Transfer Method Slide Board  (WC > EOB)   Activity Tolerance   Sitting in Chair 20 min to WC   Sitting Edge of Bed 1 min   Standing NT   Comments OOB activity limited by pain   Short Term Goals   Short Term Goal # 1 Pt will complete ADL transfers with mod A   Goal Outcome # 1 Goal met, new goal added   Short Term Goal # 1 B  Pt will complete ADL transfers with supv   Short Term Goal # 2 Pt will complete LB dressing with min " A using AE   Goal Outcome # 2 Progressing slower than expected   Short Term Goal # 3 Pt will complete toileting with min A   Goal Outcome # 3 Progressing slower than expected

## 2022-05-30 NOTE — PROGRESS NOTES
LIMB PRESERVATION SERVICE   POST SURGICAL PROGRESS NOTE      HPI:  Luis Kellogg is a 71 y.o.  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   Pt has been receiving treatment for R leg wounds since 10/2021.  Amputation to L LE was planned as an outpatient on 4/28/22 due to chronic wounds on that foot.      SURGERY DATE: 5/18/2022 by Dr Buenrostro   PROCEDURE: PROCEDURES PERFORMED:  1.  Left above knee amputation.  2.  Left adductor myotenodesis to the femur.    SURGERY DATE: 5/27/2022 by Dr Buenrostro   PROCEDURE  1.  Right first and second toe amputation at the level of the MTP joint.  2.  Right leg debridement and application of wound vacuum, approximately [id number] to fascia        INTERVAL HISTORY:  5/21/2022: POD #3.  Patient denies fevers, chills, nausea, vomiting.  Pain well controlled.  Patient states that dressing will not stay on his left AKA, due to movement.  Patient also verifies that he has been seen by [facility].  5/23/2022: POD #5.  Denies fevers, chills, nausea, vomiting.  Complaining of neuropathic pain.  Gabapentin increased.  Discussed case with vascular surgery, vascular intervention not needed at this time.  5/30/2022: POD #3 s/p right toe amputation 1, 2, right calf I&D with VAC placement.  POD #12 s/p left AKA.  Seen with wound team.  Patient feeling well, complaining of scrotal pain.  Discharge planning in place        PERTINENT LPS RESULTS:           5/22/2022  RIGHT   Waveform        Peak Systolic Velocity (cm/s)                   Prox    Prox-Mid  Mid    Mid-Dist  Distal   Triphasic                         94                       CFA         Triphasic       72                                         PFA         Triphasic       115               161               "122     SFA         Triphasic                         58                       POP         Biphasic        116                                91      AT         Triphasic       35                                 66      PT         Triphasic       60                                 37      SOFIA        FINDINGS   Right.       Diffuse atherosclerotic plaque.    50-75% stenosis in the mid superficial femoral artery.    Multiphasic flow demonstarted throoughout the extremity.       5/18/22  Pathology: FINAL DIAGNOSIS:     A. Left leg:          Above-the-knee amputation demonstrating the following features:          -The 2nd, 3rd, 4th phalanges are absent with associated diffuse           ulceration. There are multiple additional ulcerated lesions           ranging from 1.7 cm to 17.5 cm in maximum dimension, extending           to within 8.7 cm of the nearest proximal soft tissue margin.           There is minimal skin slippage noted. The knee displays           multiple metallic medical components consistent with knee           replacement hardware.          -A section taken through the ulcerated skin demonstrates focal           marked ulceration of the skin surface with marked acute           inflammation extending within the subjacent dermal tissue. The           underlying metatarsal bone shows predominantly fibroadipose           tissue within the medullary space. There is no evidence for           acute or chronic osteomyelitis.          -There is viable skin and soft tissue at the proximal resection           margin. The popliteal blood vessels demonstrate patent lumens           with focal calcifications within the blood vessel wall. The           proximal bone marrow tissue shows predominantly fibroadipose           tissue and blood clot.          -No malignancy is seen.     OR culture: none    SURGICAL SITE EXAM:      /72   Pulse 72   Temp 36.2 °C (97.2 °F) (Temporal)   Resp 16   Ht 1.702 m (5' 7\")  " " Wt 102 kg (225 lb 15.5 oz)   SpO2 90%   BMI 35.39 kg/m²   Monofilament testing has not been completed to right foot due to current wounds.  BLE warm    Pedal Pulses:   R foot: palpable DP, palpable PT    Incision   location: Left AKA   Incision well approximated, sutures intact.    ecchymosis improved and evolving to lateral thigh, posterior stump, anterior stump  No erythema, mild inflammation lateral aspect   edema decreased  No new drainage    Incision  location: Right  first and second toe amputations  Incisions well approximated with sutures  No new drainage  No odor  Minimal edema  No erythema     wound   location: Right dorsal foot  Dry scab in place, stable    wound   location: Right lateral foot  Resolved with callus       wound   location: Right lateral calf  Full thickness, does not probe to bone  Wound characteristics: 100% Red tissue.  Granulation beginning.     wound edges open  Slight erythema  Moderate to severe edema  Moisture associated odor present      Wound care completed by wound RN.  See note.      Photo(s):           left AKA            Right 1st & 2nd toe amputation            Right lateral calf                DIABETES MANAGEMENT:    A1c:   Lab Results   Component Value Date/Time    HBA1C 8.0 (H) 05/19/2022 03:34 AM            INFECTION MANAGEMENT:    Results from last 7 days   Lab Units 05/30/22  0049 05/29/22  0513 05/28/22  0218 05/27/22  0236 05/25/22  0122   WBC 1501 K/uL 6.7 6.4 7.8 7.1 5.9   PLATELET COUNT 1518 K/uL 370 310 291 289 260     Wound culture results:   Results     Procedure Component Value Units Date/Time    BLOOD CULTURE [[id number]] Collected: 05/19/22 1432    Order Status: Completed Specimen: Blood from Peripheral Updated: 05/24/22 1500     Significant Indicator NEG     Source BLD     Site PERIPHERAL     Culture Result No growth after 5 days of incubation.    Narrative:      Per Hospital Policy: Only change Specimen Src: to \"Line\" if  specified by physician " "order.  Right AC    BLOOD CULTURE [[id number]] Collected: 05/19/22 1021    Order Status: Completed Specimen: Blood from Peripheral Updated: 05/24/22 1100     Significant Indicator NEG     Source BLD     Site PERIPHERAL     Culture Result No growth after 5 days of incubation.    Narrative:      Per Hospital Policy: Only change Specimen Src: to \"Line\" if  specified by physician order.  Right AC           ASSESSMENT/PLAN:   POD #3 s/p right first and second toe amputations, right calf I&D with VAC  POD #12 S/P: Left above knee amputation, left adductor myotenodesis to the femur 5/18/2022 by Dr Buenrostro.          -Left AKA approximated with sutures.  Ecchymosis improving  Elevate with waffle cushion or Z flow pillow.    - Right first and second toes: Amputation sites well approximated.  Right calf: Ulcer clean, red, granular tissue forming.  Severe edema.  Start 2 layer compression wrap  -Okay to discharge from LPS/Ortho standpoint to SNF once medically cleared        Wound care:     Left AKA: ABD pad, Hypafix.  Change every 72 hours and as needed dislodgment  Right first and second toe amp: Dry gauze, Hypafix tape.  Change every 72 hours  Right calf: VF VAC and 2 layer compression wrap.  If moisture associated odor continues.  Recommend discontinuing veraflo VAC and obtain wound culture.  Change q. 72 hours.  Managed by wound team      Imaging/Labs:  -COVID-19: not detected this admission    Vascular status:   -Right pedal pulse palpable  - Right SFA stent placed October 2021.  Patient with multiphasic flow throughout extremity.  No plans for intervention by vascular surgery.  Discussed with vascular surgery on 5/23/2022    Surgery:   No plans for further surgery    Antibiotics:   -ID not involved.  Currently not on antibiotics    Weight Bearing Status:   -Weight bearing as tolerated to right leg  -Nonweightbearing to left AKA    Offloading:   Offloading shoe right foot at bedside  -Orthotic company: [employer].  "         PT/OT:   - involved.       Diabetes Education:   -Not involved at this time    - Implications of loss of protective sensation (LOPS) discussed with patient- including increased risk for amputation.  Advised to check feet at least daily, moisturize feet, and to always wear protective foot wear.   -avoid trimming own nails. See podiatrist or certified foot and nail RN  -keep blood sugars <150 for improved wound healing            DISCHARGE PLAN:    Disposition:   SNF    Follow-up: Dr. Buenrostro . Sutures to be removed approximately 3 weeks post-op to left AKA and to toe amputation sites on right foot  Does not need veraflo VAC at discharge.  Okay to transition to regular VAC      Discussed with: pt, RN, Dr. Buenrostro, Mary Lou wound RN          Please note that this dictation was created using voice recognition software. I have  worked with technical experts from [facility] to optimize the interface.  I have made every reasonable attempt to correct obvious errors, but there may be errors of grammar and possibly content that I did not discover before finalizing the note.      Mary Woods, A.P.R.N.    If any questions or concerns, please contact [facility] through voalte.

## 2022-05-30 NOTE — CARE PLAN
Problem: Pain - Standard  Goal: Alleviation of pain or a reduction in pain to the patient’s comfort goal  Outcome: Progressing     Problem: Skin Integrity  Goal: Skin integrity is maintained or improved  Outcome: Progressing     Problem: Knowledge Deficit - Standard  Goal: Patient and family/care givers will demonstrate understanding of plan of care, disease process/condition, diagnostic tests and medications  Outcome: Progressing     The patient is Watcher - Medium risk of patient condition declining or worsening    Shift Goals  Clinical Goals: pain control, rest  Patient Goals: rest, pain control  Family Goals: no family present at this time    Progress made toward(s) clinical / shift goals:  Patient reported pain to be managed with PRN medications. Educated on pharmacological and non pharmacological modalities.  Patient was able to rest intermittently throughout shift.

## 2022-05-31 LAB
GLUCOSE BLD STRIP.AUTO-MCNC: 108 MG/DL (ref 65–99)
GLUCOSE BLD STRIP.AUTO-MCNC: 154 MG/DL (ref 65–99)
GLUCOSE BLD STRIP.AUTO-MCNC: 162 MG/DL (ref 65–99)
GLUCOSE BLD STRIP.AUTO-MCNC: 197 MG/DL (ref 65–99)

## 2022-05-31 PROCEDURE — 700102 HCHG RX REV CODE 250 W/ 637 OVERRIDE(OP): Performed by: ORTHOPAEDIC SURGERY

## 2022-05-31 PROCEDURE — A9270 NON-COVERED ITEM OR SERVICE: HCPCS | Performed by: ORTHOPAEDIC SURGERY

## 2022-05-31 PROCEDURE — 700102 HCHG RX REV CODE 250 W/ 637 OVERRIDE(OP): Performed by: NURSE PRACTITIONER

## 2022-05-31 PROCEDURE — 99232 SBSQ HOSP IP/OBS MODERATE 35: CPT | Performed by: NURSE PRACTITIONER

## 2022-05-31 PROCEDURE — 700102 HCHG RX REV CODE 250 W/ 637 OVERRIDE(OP): Performed by: FAMILY MEDICINE

## 2022-05-31 PROCEDURE — A9270 NON-COVERED ITEM OR SERVICE: HCPCS | Performed by: NURSE PRACTITIONER

## 2022-05-31 PROCEDURE — 770001 HCHG ROOM/CARE - MED/SURG/GYN PRIV*

## 2022-05-31 PROCEDURE — A9270 NON-COVERED ITEM OR SERVICE: HCPCS | Performed by: FAMILY MEDICINE

## 2022-05-31 PROCEDURE — 82962 GLUCOSE BLOOD TEST: CPT

## 2022-05-31 RX ORDER — NYSTATIN 100000 [USP'U]/G
POWDER TOPICAL 3 TIMES DAILY
Status: DISPENSED | OUTPATIENT
Start: 2022-05-31 | End: 2022-06-07

## 2022-05-31 RX ADMIN — GABAPENTIN 1800 MG: 300 CAPSULE ORAL at 04:24

## 2022-05-31 RX ADMIN — NYSTATIN: 100000 POWDER TOPICAL at 18:21

## 2022-05-31 RX ADMIN — OXYCODONE HYDROCHLORIDE 20 MG: 10 TABLET ORAL at 20:38

## 2022-05-31 RX ADMIN — NYSTATIN: 100000 POWDER TOPICAL at 12:36

## 2022-05-31 RX ADMIN — ACETAMINOPHEN 1000 MG: 500 TABLET, FILM COATED ORAL at 18:18

## 2022-05-31 RX ADMIN — ACETAMINOPHEN 1000 MG: 500 TABLET, FILM COATED ORAL at 12:34

## 2022-05-31 RX ADMIN — OXYCODONE HYDROCHLORIDE 20 MG: 10 TABLET ORAL at 12:34

## 2022-05-31 RX ADMIN — OXYCODONE HYDROCHLORIDE 20 MG: 10 TABLET ORAL at 16:40

## 2022-05-31 RX ADMIN — NYSTATIN: 100000 POWDER TOPICAL at 08:22

## 2022-05-31 RX ADMIN — RIVAROXABAN 10 MG: 10 TABLET, FILM COATED ORAL at 18:18

## 2022-05-31 RX ADMIN — OXYCODONE HYDROCHLORIDE 20 MG: 10 TABLET ORAL at 08:21

## 2022-05-31 RX ADMIN — ATORVASTATIN CALCIUM 80 MG: 40 TABLET, FILM COATED ORAL at 18:17

## 2022-05-31 RX ADMIN — SENNOSIDES AND DOCUSATE SODIUM 1 TABLET: 50; 8.6 TABLET ORAL at 20:38

## 2022-05-31 RX ADMIN — OXYCODONE HYDROCHLORIDE 20 MG: 10 TABLET ORAL at 04:24

## 2022-05-31 RX ADMIN — DOCUSATE SODIUM 100 MG: 100 CAPSULE, LIQUID FILLED ORAL at 04:24

## 2022-05-31 RX ADMIN — DOCUSATE SODIUM 100 MG: 100 CAPSULE, LIQUID FILLED ORAL at 18:18

## 2022-05-31 RX ADMIN — ASPIRIN 81 MG: 81 TABLET, COATED ORAL at 04:25

## 2022-05-31 RX ADMIN — TRAZODONE HYDROCHLORIDE 450 MG: 150 TABLET ORAL at 20:38

## 2022-05-31 RX ADMIN — GABAPENTIN 1800 MG: 300 CAPSULE ORAL at 18:17

## 2022-05-31 ASSESSMENT — PAIN DESCRIPTION - PAIN TYPE
TYPE: ACUTE PAIN
TYPE: PHANTOM PAIN
TYPE: ACUTE PAIN
TYPE: SURGICAL PAIN;PHANTOM PAIN
TYPE: PHANTOM PAIN
TYPE: PHANTOM PAIN
TYPE: ACUTE PAIN
TYPE: PHANTOM PAIN
TYPE: SURGICAL PAIN;PHANTOM PAIN
TYPE: PHANTOM PAIN

## 2022-05-31 ASSESSMENT — ENCOUNTER SYMPTOMS
ABDOMINAL PAIN: 0
FEVER: 0
MYALGIAS: 0
NAUSEA: 0
COUGH: 0
VOMITING: 0

## 2022-05-31 NOTE — PROGRESS NOTES
Bedside report received.  Assessment complete.  A&O x 4. Patient calls appropriately.  Patient ambulates with x2 assist. NWB LLE, WBAT RLE.   Patient has 6/10 pain. Pt declines pain intervention at this time.  Denies N&V. Tolerating DM diet.  R foot with dressing CDI. R calf with wound vac, no leaks. L stump with dressing CDI.  + void, + flatus, - BM.  Patient denies SOB.  Review plan with of care with patient. Call light and personal belongings within reach. Hourly rounding in place. All needs met at this time.

## 2022-05-31 NOTE — DISCHARGE PLANNING
Received Choice form at 1156  Agency/Facility Name: [facility]  Referral sent per Choice form @ 4847    Choice saved in .

## 2022-05-31 NOTE — CARE PLAN
The patient is Stable - Low risk of patient condition declining or worsening    Shift Goals  Clinical Goals: pain control, rest  Patient Goals: pain management    Progress made toward(s) clinical / shift goals:  Pain being controlled with PRN oxy and rest. Pt has been resting comfortably during the shift.      Problem: Pain - Standard  Goal: Alleviation of pain or a reduction in pain to the patient’s comfort goal  Outcome: Progressing     Problem: Fall Risk  Goal: Patient will remain free from falls  Outcome: Progressing     Problem: Knowledge Deficit - Standard  Goal: Patient and family/care givers will demonstrate understanding of plan of care, disease process/condition, diagnostic tests and medications  Outcome: Progressing       Patient is not progressing towards the following goals:

## 2022-05-31 NOTE — CARE PLAN
Problem: Pain - Standard  Goal: Alleviation of pain or a reduction in pain to the patient’s comfort goal  Outcome: Progressing   Pain medicated per MAR   Problem: Skin Integrity  Goal: Skin integrity is maintained or improved  Outcome: Progressing   Patient turns self in bed,   Problem: Fall Risk  Goal: Patient will remain free from falls  Outcome: Progressing  Staff available for ambulation this shift    The patient is Stable - Low risk of patient condition declining or worsening    Shift Goals  Clinical Goals: Pain Control  Patient Goals: Pain Control  Family Goals: N/A    Progress made toward(s) clinical / shift goals:  Pain medicated per MAR     Patient is not progressing towards the following goals:

## 2022-05-31 NOTE — PROGRESS NOTES
Assumed care of patient at 0645. Bedside report received. Assessment complete.  AA&Ox4. Denies CP/SOB.  Reporting 8/10 pain. Medicated per MAR. .  Educated patient regarding pharmacologic and non pharmacologic modalities for pain management.  Skin per flowsheets  Tolerating regular diet. Denies N/V.  + void. Last BM 5/10  Pt ambulates Pivot to wheelchair, self-ambulatory upon wheelchair.  All needs met at this time. Call light within reach. Pt calls appropriately. Bed low and locked, non skid socks in place. Hourly rounding in place.

## 2022-05-31 NOTE — PROGRESS NOTES
Hospital Medicine Daily Progress Note    Date of Service  5/31/2022    Chief Complaint  Luis Kellogg is a 71 y.o. male admitted 5/18/2022 with OM    Hospital Course  Peyman Kellogg is a 71 year old male with past medical history of T2DM, neuropathy, HTN, PAD and tobacco use who presented 5/18/2022 for a scheduled left AKA with Dr. Buenrostro. Patient hypotensive during the procedure and was admitted to [facility]. Transferred to hospitalist service by Dr. Buenrostro. Patient also has right lower extremity wounds, LPS consulted.       Interval Problem Update  VSS and WNL  POC more or less at goal  154 morphine milliequivalents yesterday/Oxy/Dilaudid  Has many questions about dispo    Consultants/Specialty  LPS    Code Status  Full Code    Disposition  Patient is medically cleared for discharge.   Anticipate discharge to to skilled nursing facility.  I have placed the appropriate orders for post-discharge needs.    Review of Systems  Review of Systems   Constitutional: Negative for fever.   Respiratory: Negative for cough.    Cardiovascular: Negative for chest pain.   Gastrointestinal: Negative for abdominal pain, nausea and vomiting.   Genitourinary: Negative for dysuria.   Musculoskeletal: Negative for myalgias.        Phantom limb pain   Skin: Negative for rash.   All other systems reviewed and are negative.       Physical Exam  Temp:  [35.8 °C (96.5 °F)-36.6 °C (97.9 °F)] 35.9 °C (96.7 °F)  Pulse:  [71-82] 81  Resp:  [16-18] 18  BP: (101-118)/(66-83) 115/83  SpO2:  [91 %-97 %] 92 %    Physical Exam  Vitals and nursing note reviewed.   HENT:      Head: Normocephalic and atraumatic.      Nose: Nose normal.   Eyes:      Conjunctiva/sclera: Conjunctivae normal.      Pupils: Pupils are equal, round, and reactive to light.   Cardiovascular:      Rate and Rhythm: Normal rate and regular rhythm.      Heart sounds: No murmur heard.    No friction rub.   Pulmonary:      Effort: No respiratory distress.   Abdominal:       General: Bowel sounds are normal. There is no distension.      Palpations: Abdomen is soft.   Musculoskeletal:      Cervical back: Neck supple.      Comments: LLE stump CDI; RLE NPWT CDI   Skin:     General: Skin is warm and dry.   Neurological:      Mental Status: He is alert.         Fluids    Intake/Output Summary (Last 24 hours) at 5/31/2022 1321  Last data filed at 5/31/2022 0930  Gross per 24 hour   Intake 360 ml   Output 2200 ml   Net -1840 ml       Laboratory  Recent Labs     05/29/22  0513 05/30/22  0049   WBC 6.4 6.7   RBC 3.82* 4.47*   HEMOGLOBIN 10.5* 12.2*   HEMATOCRIT 33.5* 39.0*   MCV 87.7 87.2   MCH 27.5 27.3   MCHC 31.3* 31.3*   RDW 48.2 47.8   PLATELETCT 310 370   MPV 9.8 9.1                       Imaging  [id number] VIEW   Final Result      1. Large amount of stool throughout the colon from the cecum to the rectum.   2. No bowel obstruction.   3. Other chronic degenerative changes.      US-EXTREMITY ARTERY LOWER UNILAT RIGHT   Final Result      US-MIA SINGLE LEVEL UNILAT RIGHT   Final Result      DX-TIBIA AND FIBULA RIGHT   Final Result         1.  No acute traumatic bony injury.   2.  No destructive osseous lesion is appreciated, note that plain film is insensitive for evaluation of osteomyelitis, and bone scan would offer improved diagnostic sensitivity as clinically appropriate.      DX-FOOT-COMPLETE 3+ RIGHT   Final Result         1.  No acute traumatic bony injury. No destructive osseous lesion is seen, note that plain film can be insensitive for evaluation of osteomyelitis and bone scan would offer improved diagnostic sensitivity as clinically appropriate.   2.  Degenerative changes of the foot, greatest at the midfoot and hindfoot           Assessment/Plan  * S/P AKA (above knee amputation), left (HCC)- (present on admission)  Assessment & Plan  pain control  Neurontin for phantom pain  PT/OT   Case management for discharge planning    Anemia- (present on admission)  Assessment &  Plan  Follow cbc    Chronic, continuous use of opioids- (present on admission)  Assessment & Plan  Pain control    Cellulitis of right leg- (present on admission)  Assessment & Plan  finished course of IV Unasyn    Marijuana abuse- (present on admission)  Assessment & Plan  Education and counseling    Tobacco use- (present on admission)  Assessment & Plan  Nicotine replacement protocol    Primary hypertension- (present on admission)  Assessment & Plan  Lisinopril and Coreg    Type 2 diabetes mellitus with hyperglycemia, with long-term current use of insulin (HCC)- (present on admission)  Assessment & Plan  Lantus, SSI      Chronic ulcer of lower extremity (HCC)- (present on admission)  Assessment & Plan  Right first and second toe amputation at the level of the MTP joint, Right leg debridement and application of wound vacuum  5/27/2022  Wound care, Pain control      Drug-induced constipation- (present on admission)  Assessment & Plan  bowel protocol  Fleet enema today    Hyponatremia- (present on admission)  Assessment & Plan  Follow bmp    Peripheral vascular disease (HCC)- (present on admission)  Assessment & Plan  Selective catheter placement in the right below-knee popliteal artery, Right lower extremity angiography, 6mm uncovered self expanding Right SFA stent placement   10/12/2021  Atorvastatin  Resume ASA    History of MI (myocardial infarction)- (present on admission)  Assessment & Plan  Coreg, Lipitor  Resume ASA     Hyperlipidemia- (present on admission)  Assessment & Plan  Atorvastatin       VTE prophylaxis: Xarelto 10 mg daily as prophylaxis    I have performed a physical exam and reviewed and updated ROS and Plan today (5/31/2022). In review of yesterday's note (5/30/2022), there are no changes except as documented above.

## 2022-05-31 NOTE — DISCHARGE PLANNING
"Case Management Discharge Planning    Admission Date: 5/18/2022  GMLOS: 7  ALOS: 13    6-Clicks ADL Score: 17  6-Clicks Mobility Score: 12  PT and/or OT Eval ordered: Yes  Post-acute Referrals Ordered: Yes  Post-acute Choice Obtained: No, patient was given information on several of the skilled facilities. He stated the he will need a few days to make a decision and he will need to speak with his provider first, because he had a bad experience in the past.   Has referral(s) been sent to post-acute provider:  Yes, blanket referral sent to local facilities      Anticipated Discharge Dispo: Discharge Disposition: Disch to IP rehab facility or distinct part unit (62)    DME Needed: TBD    Action(s) Taken: [place] skilled facility request sent. Spoke with Darlene from [facility]. She is the Public Health nurse who is following him. She stated that an application for his home repairs has been sent to \"[employer]\" which is the organization that will need to approve the repairs. Darlene would like an update as to which facility the patient is being discharged to once we know ([phone]).    Escalations Completed: None    Medically Clear: Yes    Next Steps: Continue to work on discharge plans for the patient    Barriers to Discharge: Patient being agreeable to transfer to skilled facility.      Is the patient up for discharge tomorrow: TBD      "

## 2022-06-01 LAB
GLUCOSE BLD STRIP.AUTO-MCNC: 143 MG/DL (ref 65–99)
GLUCOSE BLD STRIP.AUTO-MCNC: 148 MG/DL (ref 65–99)
GLUCOSE BLD STRIP.AUTO-MCNC: 153 MG/DL (ref 65–99)

## 2022-06-01 PROCEDURE — 700102 HCHG RX REV CODE 250 W/ 637 OVERRIDE(OP): Performed by: ORTHOPAEDIC SURGERY

## 2022-06-01 PROCEDURE — A9270 NON-COVERED ITEM OR SERVICE: HCPCS | Performed by: FAMILY MEDICINE

## 2022-06-01 PROCEDURE — 97110 THERAPEUTIC EXERCISES: CPT

## 2022-06-01 PROCEDURE — A9270 NON-COVERED ITEM OR SERVICE: HCPCS | Performed by: NURSE PRACTITIONER

## 2022-06-01 PROCEDURE — 770001 HCHG ROOM/CARE - MED/SURG/GYN PRIV*

## 2022-06-01 PROCEDURE — 99232 SBSQ HOSP IP/OBS MODERATE 35: CPT | Performed by: NURSE PRACTITIONER

## 2022-06-01 PROCEDURE — 700102 HCHG RX REV CODE 250 W/ 637 OVERRIDE(OP): Performed by: NURSE PRACTITIONER

## 2022-06-01 PROCEDURE — 82962 GLUCOSE BLOOD TEST: CPT | Mod: 91

## 2022-06-01 PROCEDURE — A9270 NON-COVERED ITEM OR SERVICE: HCPCS | Performed by: ORTHOPAEDIC SURGERY

## 2022-06-01 PROCEDURE — 99024 POSTOP FOLLOW-UP VISIT: CPT | Performed by: ORTHOPAEDIC SURGERY

## 2022-06-01 PROCEDURE — 700102 HCHG RX REV CODE 250 W/ 637 OVERRIDE(OP): Performed by: FAMILY MEDICINE

## 2022-06-01 RX ADMIN — ACETAMINOPHEN 1000 MG: 500 TABLET, FILM COATED ORAL at 06:23

## 2022-06-01 RX ADMIN — GABAPENTIN 1800 MG: 300 CAPSULE ORAL at 18:26

## 2022-06-01 RX ADMIN — GABAPENTIN 1800 MG: 300 CAPSULE ORAL at 06:22

## 2022-06-01 RX ADMIN — NYSTATIN: 100000 POWDER TOPICAL at 18:32

## 2022-06-01 RX ADMIN — RIVAROXABAN 10 MG: 10 TABLET, FILM COATED ORAL at 18:26

## 2022-06-01 RX ADMIN — TRAZODONE HYDROCHLORIDE 450 MG: 150 TABLET ORAL at 21:02

## 2022-06-01 RX ADMIN — OXYCODONE HYDROCHLORIDE 20 MG: 10 TABLET ORAL at 14:50

## 2022-06-01 RX ADMIN — OXYCODONE HYDROCHLORIDE 20 MG: 10 TABLET ORAL at 18:50

## 2022-06-01 RX ADMIN — ASPIRIN 81 MG: 81 TABLET, COATED ORAL at 06:23

## 2022-06-01 RX ADMIN — ACETAMINOPHEN 1000 MG: 500 TABLET, FILM COATED ORAL at 23:23

## 2022-06-01 RX ADMIN — POLYETHYLENE GLYCOL 3350 1 PACKET: 17 POWDER, FOR SOLUTION ORAL at 06:22

## 2022-06-01 RX ADMIN — ATORVASTATIN CALCIUM 80 MG: 40 TABLET, FILM COATED ORAL at 18:26

## 2022-06-01 RX ADMIN — NYSTATIN: 100000 POWDER TOPICAL at 08:05

## 2022-06-01 RX ADMIN — OXYCODONE HYDROCHLORIDE 20 MG: 10 TABLET ORAL at 06:22

## 2022-06-01 RX ADMIN — ACETAMINOPHEN 1000 MG: 500 TABLET, FILM COATED ORAL at 13:19

## 2022-06-01 RX ADMIN — SENNOSIDES AND DOCUSATE SODIUM 1 TABLET: 50; 8.6 TABLET ORAL at 21:02

## 2022-06-01 RX ADMIN — OXYCODONE HYDROCHLORIDE 20 MG: 10 TABLET ORAL at 01:04

## 2022-06-01 RX ADMIN — OXYCODONE HYDROCHLORIDE 20 MG: 10 TABLET ORAL at 23:22

## 2022-06-01 RX ADMIN — DOCUSATE SODIUM 100 MG: 100 CAPSULE, LIQUID FILLED ORAL at 18:26

## 2022-06-01 RX ADMIN — OXYCODONE HYDROCHLORIDE 20 MG: 10 TABLET ORAL at 10:50

## 2022-06-01 RX ADMIN — DOCUSATE SODIUM 100 MG: 100 CAPSULE, LIQUID FILLED ORAL at 06:23

## 2022-06-01 RX ADMIN — ACETAMINOPHEN 1000 MG: 500 TABLET, FILM COATED ORAL at 18:26

## 2022-06-01 ASSESSMENT — ENCOUNTER SYMPTOMS
COUGH: 0
NAUSEA: 0
VOMITING: 0
ABDOMINAL PAIN: 0
MYALGIAS: 0
FEVER: 0

## 2022-06-01 ASSESSMENT — COGNITIVE AND FUNCTIONAL STATUS - GENERAL
MOVING TO AND FROM BED TO CHAIR: A LITTLE
STANDING UP FROM CHAIR USING ARMS: A LITTLE
TURNING FROM BACK TO SIDE WHILE IN FLAT BAD: A LITTLE
MOVING FROM LYING ON BACK TO SITTING ON SIDE OF FLAT BED: UNABLE
CLIMB 3 TO 5 STEPS WITH RAILING: TOTAL
SUGGESTED CMS G CODE MODIFIER MOBILITY: CL
MOBILITY SCORE: 12
WALKING IN HOSPITAL ROOM: TOTAL

## 2022-06-01 ASSESSMENT — PAIN DESCRIPTION - PAIN TYPE
TYPE: ACUTE PAIN;SURGICAL PAIN
TYPE: ACUTE PAIN;SURGICAL PAIN
TYPE: SURGICAL PAIN;PHANTOM PAIN
TYPE: ACUTE PAIN;SURGICAL PAIN
TYPE: SURGICAL PAIN;PHANTOM PAIN
TYPE: SURGICAL PAIN;PHANTOM PAIN
TYPE: ACUTE PAIN;SURGICAL PAIN

## 2022-06-01 NOTE — CARE PLAN
Problem: Pain - Standard  Goal: Alleviation of pain or a reduction in pain to the patient’s comfort goal  Outcome: Progressing  Note: Patient reports severe pain. Using nonpharmacologic pain interventions and PRN pain medications     Problem: Skin Integrity  Goal: Skin integrity is maintained or improved  Outcome: Progressing  Note: Left stump dressing changed per order   The patient is Stable - Low risk of patient condition declining or worsening    Shift Goals  Clinical Goals: wound care, pain control  Patient Goals: pain control  Family Goals: N/A    Progress made toward(s) clinical / shift goals:  Wound care performed and medicated for pain as needed     Patient is not progressing towards the following goals:

## 2022-06-01 NOTE — PROGRESS NOTES
Bedside report received.  Assessment complete.  A&O x 4. Patient calls appropriately.  Patient ambulates with x1 assist. NWB LLE, WBAT RLE. Bed alarm on.   Patient has 9/10 pain. Pain managed with prescribed medications.  Denies N&V. Tolerating DM diet.  Wound vac to RLE, no leaks, dressing CDI. R foot with dressing CDI. L stump with dressing CDI.  + void, + flatus, - BM.  Patient denies SOB.  Review plan with of care with patient. Call light and personal belongings within reach. Hourly rounding in place. All needs met at this time.

## 2022-06-01 NOTE — DISCHARGE PLANNING
Received a telephone call from [place] ([phone]), [facility]. They received the referral from [facility]. He stated that patient lives in an RV. He is sending a contractor out to assess the RV to see if it the door can be enlarged and a ramp can be added. He stated that he already has the funding, he just needs the quote. He does not know how long the work will take. He also stated that he spoke with the patient and he is open to going to [facility]. [facility] will [facility] sent to [facility].

## 2022-06-01 NOTE — PROGRESS NOTES
Report received from RN, assumed care at 0645  Pt is A0X4, and responds appropriately   Pt declines any SOB, chest pain, new onset of numbness/ tingiling  Pt rates pain at 8/10, on a scale of 1-10, pt medicated per MAR  Pt is voiding adequatly and without hesitancy  Pt has + flatus, + bowel sounds, + BM on 5/30  Pt x1-2 person for transfers to commode/ wheelchair   Pt is tolerating a diet, pt denies any nausea/vomiting  LLE stump cleansed and dressing changed. Sutures in place with mild redness at incision site. RLE wound vac in place. ROSITA wounds due to dressing   High fall risk per HD, fall interventions in place as patient will allow. Refuses wristband however is agrees to bed alarm at this time. Refuses SCD to RLE d/t pain.   Plan of care discussed, all questions answered. Explained importance of calling before getting OOB and pt verbalizes understanding. Explained importance of oral care. Call light is within reach, treaded slipper socks on, bed in lowest/ locked position, hourly rounding in place, all needs met at this time

## 2022-06-01 NOTE — PROGRESS NOTES
Hospital Medicine Daily Progress Note    Date of Service  6/1/2022    Chief Complaint  Luis Kellogg is a 71 y.o. male admitted 5/18/2022 with OM    Hospital Course  Peyman Kellogg is a 71 year old male with past medical history of T2DM, neuropathy, HTN, PAD and tobacco use who presented 5/18/2022 for a scheduled left AKA with Dr. Buenrostro. Patient hypotensive during the procedure and was admitted to [facility]. Transferred to hospitalist service by Dr. Buenrostro. Patient also has right lower extremity wounds, cellulitis w exposed tendon/joints. LPS consulted and he eventually underwent R 1st, 2nd toe amp at the [facility] w debridement and NPWT application on 5/27/22.    Abx complete. Referred to SNF. Renovation of his trailer/home to accomodate WC access has been in process.      Interval Problem Update  6/1 VSS. No c/o. FANTA. SNF referrals in process.    5/31 VSS and WNL  POC more or less at goal  154 morphine milliequivalents yesterday/Oxy/Dilaudid  Has many questions about dispo    Consultants/Specialty  LPS    Code Status  Full Code    Disposition  Patient is medically cleared for discharge.   Anticipate discharge to to skilled nursing facility.  I have placed the appropriate orders for post-discharge needs.    Review of Systems  Review of Systems   Constitutional: Negative for fever.   Respiratory: Negative for cough.    Cardiovascular: Negative for chest pain.   Gastrointestinal: Negative for abdominal pain, nausea and vomiting.   Genitourinary: Negative for dysuria.   Musculoskeletal: Negative for myalgias.        Phantom limb pain   Skin: Negative for rash.   All other systems reviewed and are negative.       Physical Exam  Temp:  [36 °C (96.8 °F)-36.8 °C (98.2 °F)] 36.8 °C (98.2 °F)  Pulse:  [77-88] 77  Resp:  [15-19] 15  BP: (101-110)/(64-75) 107/70  SpO2:  [90 %-93 %] 90 %    Physical Exam  Vitals and nursing note reviewed.   HENT:      Head: Normocephalic and atraumatic.      Nose: Nose normal.   Eyes:       Conjunctiva/sclera: Conjunctivae normal.      Pupils: Pupils are equal, round, and reactive to light.   Cardiovascular:      Rate and Rhythm: Normal rate and regular rhythm.      Heart sounds: No murmur heard.    No friction rub.   Pulmonary:      Effort: No respiratory distress.   Abdominal:      General: Bowel sounds are normal. There is no distension.      Palpations: Abdomen is soft.   Musculoskeletal:      Cervical back: Neck supple.      Comments: LLE stump CDI; RLE NPWT CDI   Skin:     General: Skin is warm and dry.   Neurological:      Mental Status: He is alert and oriented to person, place, and time.   Psychiatric:         Behavior: Behavior is cooperative.         Fluids    Intake/Output Summary (Last 24 hours) at 6/1/2022 1216  Last data filed at 6/1/2022 0819  Gross per 24 hour   Intake 600 ml   Output 2150 ml   Net -1550 ml       Laboratory  Recent Labs     05/30/22  0049   WBC 6.7   RBC 4.47*   HEMOGLOBIN 12.2*   HEMATOCRIT 39.0*   MCV 87.2   MCH 27.3   MCHC 31.3*   RDW 47.8   PLATELETCT 370   MPV 9.1                       Imaging  [id number] VIEW   Final Result      1. Large amount of stool throughout the colon from the cecum to the rectum.   2. No bowel obstruction.   3. Other chronic degenerative changes.      US-EXTREMITY ARTERY LOWER UNILAT RIGHT   Final Result      US-MIA SINGLE LEVEL UNILAT RIGHT   Final Result      DX-TIBIA AND FIBULA RIGHT   Final Result         1.  No acute traumatic bony injury.   2.  No destructive osseous lesion is appreciated, note that plain film is insensitive for evaluation of osteomyelitis, and bone scan would offer improved diagnostic sensitivity as clinically appropriate.      DX-FOOT-COMPLETE 3+ RIGHT   Final Result         1.  No acute traumatic bony injury. No destructive osseous lesion is seen, note that plain film can be insensitive for evaluation of osteomyelitis and bone scan would offer improved diagnostic sensitivity as clinically appropriate.   2.   Degenerative changes of the foot, greatest at the midfoot and hindfoot           Assessment/Plan  * S/P AKA (above knee amputation), left (HCC)- (present on admission)  Assessment & Plan  pain control  Neurontin for phantom pain  PT/OT   Case management for discharge planning    Anemia- (present on admission)  Assessment & Plan  Follow cbc    Chronic, continuous use of opioids- (present on admission)  Assessment & Plan  Pain control    Cellulitis of right leg- (present on admission)  Assessment & Plan  finished course of IV Unasyn    Marijuana abuse- (present on admission)  Assessment & Plan  Education and counseling    Tobacco use- (present on admission)  Assessment & Plan  Nicotine replacement protocol    Primary hypertension- (present on admission)  Assessment & Plan  Lisinopril and Coreg    Type 2 diabetes mellitus with hyperglycemia, with long-term current use of insulin (HCC)- (present on admission)  Assessment & Plan  Lantus, SSI      Chronic ulcer of lower extremity (HCC)- (present on admission)  Assessment & Plan  Right first and second toe amputation at the level of the MTP joint, Right leg debridement and application of wound vacuum  5/27/2022  Wound care, Pain control      Drug-induced constipation- (present on admission)  Assessment & Plan  bowel protocol  Fleet enema today    Hyponatremia- (present on admission)  Assessment & Plan  Follow bmp    Peripheral vascular disease (HCC)- (present on admission)  Assessment & Plan  Selective catheter placement in the right below-knee popliteal artery, Right lower extremity angiography, 6mm uncovered self expanding Right SFA stent placement   10/12/2021  Atorvastatin  Resume ASA    History of MI (myocardial infarction)- (present on admission)  Assessment & Plan  Coreg, Lipitor  Resume ASA     Hyperlipidemia- (present on admission)  Assessment & Plan  Atorvastatin       VTE prophylaxis: Xarelto 10 mg daily as prophylaxis    I have performed a physical exam and  reviewed and updated ROS and Plan today (6/1/2022). In review of yesterday's note (5/31/2022), there are no changes except as documented above.

## 2022-06-01 NOTE — PROGRESS NOTES
The patient was seen and evaluated at bedside    He is overall doing pretty well.  His pain is controlled but he does get some intermittent pain in the left lower extremity.    Exam:  Dressings on left lower extremity and right lower extremity clean and dry    71-year-old male 2-week status post left above-knee amputation and approximate 5 days status post right leg irrigation and debridement and second and first toe amputation doing well    Plan:  Stable from orthopedic standpoint  Weightbearing as tolerated right lower extremity in postoperative shoe  Continue wound vacuum and wound care on right lower extremity per wound team  Stable for discharge from orthopedic standpoint  Placement  Continue PT/OT    Aleks Buenrostro MD

## 2022-06-01 NOTE — DISCHARGE PLANNING
[facility] Name: [facility]  Spoke To: Mary Anne  Outcome: Left a voicemail asking for referral status update.     @0945-  [facility]: [facility]  Outcome: Asked for referral status and a call back.     @1025-  [facility] Name: [facility]  Spoke To: Mary Anne  Outcome: Mary Anne called and inquired wound vac type and pt's SSN.     @1158-  [facility]ency[facility]: [facility]  Spoke To: Mary Anne   Outcome: Per Mary Anne pt is still pending.     @1341-  Per RN CM request, sending SNF referral to [facility].       @1507-  [facility]ility Name: [facility]  Spoke To: Mary Anne   Outcome: Per jennifer North is currently reviewing referral.     @1511-  [facility]ency[facility]: [facility]   Spoke To: Irina   Outcome: Per Irina she is currently reviewing the referral.

## 2022-06-01 NOTE — CARE PLAN
The patient is Stable - Low risk of patient condition declining or worsening    Shift Goals  Clinical Goals: pain control, rest  Patient Goals: rest    Progress made toward(s) clinical / shift goals:  Pain being controlled with PRN oxy 20 mg per MAR, elevation of RLE and rest. Pt has been resting comfortably during the shift.      Problem: Pain - Standard  Goal: Alleviation of pain or a reduction in pain to the patient’s comfort goal  Outcome: Progressing     Problem: Fall Risk  Goal: Patient will remain free from falls  Outcome: Progressing     Problem: Knowledge Deficit - Standard  Goal: Patient and family/care givers will demonstrate understanding of plan of care, disease process/condition, diagnostic tests and medications  Outcome: Progressing       Patient is not progressing towards the following goals:

## 2022-06-01 NOTE — PROGRESS NOTES
4 Eyes Skin Assessment Completed by DARIEN Sanders and DARIEN Friedman.    Head WDL  Ears WDL  Nose WDL  Mouth WDL  Neck WDL  Breast/Chest WDL  Shoulder Blades WDL  Spine WDL  (R) Arm/Elbow/Hand Scab  (L) Arm/Elbow/Hand WDL  Abdomen WDL  Groin Redness and Excoriation  Scrotum/Coccyx/Buttocks Redness and Blanching  (R) Leg Swelling, Edema and Incision. Wound vac to lower portion of RLE.   (L) Leg Redness, Blanching, Edema and Incision to L AKA. Sutures approximating wound and dressing replaced  (R) Heel/Foot/Toe swelling, edema, incision with wound vac  (L) Heel/Foot/Toe N/A          Devices In Places, refused      Interventions In Place Overhead trapeze Pillows, Heels Loaded W/Pillows and Pressure Redistribution Mattress    Possible Skin Injury No    Pictures Uploaded Into Epic N/A  Wound Consult Placed N/A  RN Wound Prevention Protocol Ordered No

## 2022-06-02 PROBLEM — Z76.5 MALINGERING: Status: ACTIVE | Noted: 2022-06-02

## 2022-06-02 LAB
GLUCOSE BLD STRIP.AUTO-MCNC: 120 MG/DL (ref 65–99)
GLUCOSE BLD STRIP.AUTO-MCNC: 147 MG/DL (ref 65–99)
GLUCOSE BLD STRIP.AUTO-MCNC: 159 MG/DL (ref 65–99)
GLUCOSE BLD STRIP.AUTO-MCNC: 194 MG/DL (ref 65–99)

## 2022-06-02 PROCEDURE — 99231 SBSQ HOSP IP/OBS SF/LOW 25: CPT | Performed by: NURSE PRACTITIONER

## 2022-06-02 PROCEDURE — 700102 HCHG RX REV CODE 250 W/ 637 OVERRIDE(OP): Performed by: FAMILY MEDICINE

## 2022-06-02 PROCEDURE — 700102 HCHG RX REV CODE 250 W/ 637 OVERRIDE(OP): Performed by: ORTHOPAEDIC SURGERY

## 2022-06-02 PROCEDURE — 700101 HCHG RX REV CODE 250: Performed by: FAMILY MEDICINE

## 2022-06-02 PROCEDURE — 97606 NEG PRS WND THER DME>50 SQCM: CPT

## 2022-06-02 PROCEDURE — 700102 HCHG RX REV CODE 250 W/ 637 OVERRIDE(OP): Performed by: NURSE PRACTITIONER

## 2022-06-02 PROCEDURE — 97530 THERAPEUTIC ACTIVITIES: CPT

## 2022-06-02 PROCEDURE — 770001 HCHG ROOM/CARE - MED/SURG/GYN PRIV*

## 2022-06-02 PROCEDURE — A9270 NON-COVERED ITEM OR SERVICE: HCPCS | Performed by: ORTHOPAEDIC SURGERY

## 2022-06-02 PROCEDURE — 97602 WOUND(S) CARE NON-SELECTIVE: CPT

## 2022-06-02 PROCEDURE — A9270 NON-COVERED ITEM OR SERVICE: HCPCS | Performed by: NURSE PRACTITIONER

## 2022-06-02 PROCEDURE — 97535 SELF CARE MNGMENT TRAINING: CPT

## 2022-06-02 PROCEDURE — A9270 NON-COVERED ITEM OR SERVICE: HCPCS | Performed by: FAMILY MEDICINE

## 2022-06-02 PROCEDURE — 82962 GLUCOSE BLOOD TEST: CPT

## 2022-06-02 PROCEDURE — 99231 SBSQ HOSP IP/OBS SF/LOW 25: CPT

## 2022-06-02 RX ADMIN — DOCUSATE SODIUM 100 MG: 100 CAPSULE, LIQUID FILLED ORAL at 04:28

## 2022-06-02 RX ADMIN — ACETAMINOPHEN 1000 MG: 500 TABLET, FILM COATED ORAL at 12:47

## 2022-06-02 RX ADMIN — OXYCODONE HYDROCHLORIDE 20 MG: 10 TABLET ORAL at 04:28

## 2022-06-02 RX ADMIN — ACETAMINOPHEN 1000 MG: 500 TABLET, FILM COATED ORAL at 17:01

## 2022-06-02 RX ADMIN — SENNOSIDES AND DOCUSATE SODIUM 1 TABLET: 50; 8.6 TABLET ORAL at 22:17

## 2022-06-02 RX ADMIN — NYSTATIN: 100000 POWDER TOPICAL at 04:28

## 2022-06-02 RX ADMIN — OXYCODONE HYDROCHLORIDE 20 MG: 10 TABLET ORAL at 17:02

## 2022-06-02 RX ADMIN — POLYETHYLENE GLYCOL 3350 1 PACKET: 17 POWDER, FOR SOLUTION ORAL at 04:27

## 2022-06-02 RX ADMIN — ATORVASTATIN CALCIUM 80 MG: 40 TABLET, FILM COATED ORAL at 17:03

## 2022-06-02 RX ADMIN — LIDOCAINE HYDROCHLORIDE 30 ML: 40 SOLUTION TOPICAL at 11:21

## 2022-06-02 RX ADMIN — OXYCODONE HYDROCHLORIDE 20 MG: 10 TABLET ORAL at 08:45

## 2022-06-02 RX ADMIN — RIVAROXABAN 10 MG: 10 TABLET, FILM COATED ORAL at 17:03

## 2022-06-02 RX ADMIN — ASPIRIN 81 MG: 81 TABLET, COATED ORAL at 04:27

## 2022-06-02 RX ADMIN — DOCUSATE SODIUM 100 MG: 100 CAPSULE, LIQUID FILLED ORAL at 17:02

## 2022-06-02 RX ADMIN — OXYCODONE HYDROCHLORIDE 20 MG: 10 TABLET ORAL at 22:17

## 2022-06-02 RX ADMIN — TRAZODONE HYDROCHLORIDE 450 MG: 150 TABLET ORAL at 22:16

## 2022-06-02 RX ADMIN — MAGNESIUM HYDROXIDE 30 ML: 400 SUSPENSION ORAL at 17:06

## 2022-06-02 RX ADMIN — GABAPENTIN 1800 MG: 300 CAPSULE ORAL at 04:28

## 2022-06-02 RX ADMIN — GABAPENTIN 1800 MG: 300 CAPSULE ORAL at 17:02

## 2022-06-02 RX ADMIN — OXYCODONE HYDROCHLORIDE 20 MG: 10 TABLET ORAL at 12:47

## 2022-06-02 ASSESSMENT — PATIENT HEALTH QUESTIONNAIRE - PHQ9
1. LITTLE INTEREST OR PLEASURE IN DOING THINGS: NOT AT ALL
SUM OF ALL RESPONSES TO PHQ9 QUESTIONS 1 AND 2: 0
2. FEELING DOWN, DEPRESSED, IRRITABLE, OR HOPELESS: NOT AT ALL

## 2022-06-02 ASSESSMENT — PAIN DESCRIPTION - PAIN TYPE
TYPE: ACUTE PAIN;CHRONIC PAIN
TYPE: ACUTE PAIN;SURGICAL PAIN
TYPE: ACUTE PAIN
TYPE: ACUTE PAIN;SURGICAL PAIN;CHRONIC PAIN
TYPE: ACUTE PAIN;CHRONIC PAIN;SURGICAL PAIN
TYPE: ACUTE PAIN
TYPE: ACUTE PAIN;SURGICAL PAIN;CHRONIC PAIN
TYPE: ACUTE PAIN
TYPE: ACUTE PAIN;SURGICAL PAIN;CHRONIC PAIN
TYPE: ACUTE PAIN;CHRONIC PAIN;SURGICAL PAIN
TYPE: SURGICAL PAIN;ACUTE PAIN;CHRONIC PAIN

## 2022-06-02 ASSESSMENT — COGNITIVE AND FUNCTIONAL STATUS - GENERAL
DRESSING REGULAR LOWER BODY CLOTHING: A LOT
TOILETING: A LOT
HELP NEEDED FOR BATHING: A LOT
DRESSING REGULAR UPPER BODY CLOTHING: A LITTLE
SUGGESTED CMS G CODE MODIFIER DAILY ACTIVITY: CK
DAILY ACTIVITIY SCORE: 17

## 2022-06-02 ASSESSMENT — ENCOUNTER SYMPTOMS
VOMITING: 0
NAUSEA: 0
COUGH: 0
FEVER: 0
MYALGIAS: 0
ABDOMINAL PAIN: 0

## 2022-06-02 NOTE — CARE PLAN
The patient is Stable - Low risk of patient condition declining or worsening    Shift Goals  Clinical Goals: pain control, rest  Patient Goals: pain control    Progress made toward(s) clinical / shift goals:  Pain being controlled with PRN oxy 20mg according to MAR. PT has been resting comfortably during the shift.      Problem: Pain - Standard  Goal: Alleviation of pain or a reduction in pain to the patient’s comfort goal  Outcome: Progressing     Problem: Fall Risk  Goal: Patient will remain free from falls  Outcome: Progressing     Problem: Knowledge Deficit - Standard  Goal: Patient and family/care givers will demonstrate understanding of plan of care, disease process/condition, diagnostic tests and medications  Outcome: Progressing       Patient is not progressing towards the following goals:

## 2022-06-02 NOTE — THERAPY
"Physical Therapy   Daily Treatment     Patient Name: Luis Kellogg  Age:  71 y.o., Sex:  male  Medical Record #: [id number]  Today's Date: 6/1/2022     Precautions  Precautions: Fall Risk;Non Weight Bearing Left Lower Extremity;Weight Bearing As Tolerated Right Lower Extremity  Comments: wound vac RLE; WBAT RLE in post op shoe    Assessment    Patient refused OOB activity today. Provided patient theraband and instructed UE exercises and detailed below. Patient verbalized agreement to mobilizing OOB and into WC tomorrow. Will continue to follow.    Plan    Continue current treatment plan.    DC Equipment Recommendations: Unable to determine at this time (per chart patient has WC, will likely require SB and pressure relief cushion)  Discharge Recommendations: Recommend post-acute placement for additional physical therapy services prior to discharge home      Subjective    \"I just can't today. Every time I touch my leg it kills me.\"     Objective       06/01/22 1601   Charge Group   Charges  Yes   PT Therapeutic Exercise 1  (10 min)   Precautions   Precautions Fall Risk;Non Weight Bearing Left Lower Extremity;Weight Bearing As Tolerated Right Lower Extremity   Comments wound vac RLE   Vitals   O2 (LPM) 0   O2 Delivery Device None - Room Air   Pain 0 - 10 Group   Location Leg   Location Orientation Left   Therapist Pain Assessment During Activity;Nurse Notified   Cognition    Cognition / Consciousness X   Level of Consciousness Alert   Safety Awareness Impaired;Impulsive   Comments self limiting and poor insight, reporting needing people to \"lift him up\" despite education regarding role of PT and mechanism of strengthening. distracted by pain   Sitting Lower Body Exercises   Comments instructed seated UE exercises including reclined tricep pushups, seated rows with resistance band; patient will require reinforcement of education   Balance   Sitting Balance (Static) Fair   Comments long sitting in bed throughout, " "refused mobility today   How much difficulty does the patient currently have...   Turning over in bed (including adjusting bedclothes, sheets and blankets)? 3   Sitting down on and standing up from a chair with arms (e.g., wheelchair, bedside commode, etc.) 1   Moving from lying on back to sitting on the side of the bed? 3   How much help from another person does the patient currently need...   Moving to and from a bed to a chair (including a wheelchair)? 3   Need to walk in a hospital room? 1   Climbing 3-5 steps with a railing? 1   6 clicks Mobility Score 12   Activity Tolerance   Sitting in Chair refused   Sitting Edge of Bed refused   Standing refused   Patient / Family Goals    Patient / Family Goal #1 go home   Goal #1 Outcome Goal not met   Short Term Goals    Short Term Goal # 1 Pt will demo supine<>sit eob w/ hob flat and no rails/trapeze spv in 6 visits for independence w/ bed mobility.   Goal Outcome # 1   (refused)   Short Term Goal # 2 Pt will demo SPT eob<>wc using fww spv in 6 visits for OOB mobility tasks.   Goal Outcome # 2   (refused)   Short Term Goal # 3 Pt will demo ability to propel wc 250' spv on a level surface in 6 visits for access to the community.   Goal Outcome # 3   (refused)   Short Term Goal # 4 Pt will demo ability to \"bump up/down\" 3 stairs spv in 6 visits for access to his home.   Goal Outcome # 4   (refused; per chart patient will have ramp installed at time of DC home)   Short Term Goal # 5 Pt will demo gait 100' using fww spv via 3 point pattern in 6 visits for household ambulation.   Goal Outcome # 5 Goal not met  (this goal currently unrealistic given progress)   Anticipated Discharge Equipment and Recommendations   DC Equipment Recommendations Unable to determine at this time  (per chart patient has WC, will likely require SB and pressure relief cushion)   Discharge Recommendations Recommend post-acute placement for additional physical therapy services prior to discharge home "   Interdisciplinary Plan of Care Collaboration   IDT Collaboration with  Nursing   Patient Position at End of Therapy In Bed;Call Light within Reach;Tray Table within Reach;Phone within Reach   Collaboration Comments RN aware of visit, response   Session Information   Date / Session Number  6/1-5 (2/4, 6/2) attempted 5/29

## 2022-06-02 NOTE — PROGRESS NOTES
Report received from RN, assumed care at 0645  Pt is A0x4, and responds appropriately  Pt declines any SOB, chest pain, new onset of numbness/tingling  Pt rates pain at 8/10, on a scale of 1-10, pt medicated per MAR  Pt is voiding adequately and without hesitancy  Pt has + flatus, + bowel sounds, + BM on 5/30  Pt can slide board transfer with x2 person assist  Pt is tolerating a diet, pt denies any nausea/vomiting  Left AKA with dressing covering stump. Wound vac to Right lower extremity, changed today by wound team.   Plan of care discussed, all questions answered. Explained importance of calling before getting OOB and pt verbalized understanding. Explained importance of oral care. Call light is within reach, treaded slipper socks on, bed in lowest/locked position, hourly rounding in place, all needs met at this time.   Bed alarm on and set to 0 seconds

## 2022-06-02 NOTE — WOUND TEAM
[facility]  Inpatient Services  Wound and Skin Care Evaluation    Admission Date: 5/18/2022     Last order of IP CONSULT TO WOUND CARE was found on 5/18/2022 from Hospital Encounter on 4/28/2022     HPI, PMH, SH: Reviewed    Past Surgical History:   Procedure Laterality Date   • TOE AMPUTATION Right 5/27/2022    Procedure: AMPUTATION, TOE - 1ST AND 2ND;  Surgeon: Aleks Buenrostro M.D.;  Location: [facility];  Service: Orthopedics   • IRRIGATION & DEBRIDEMENT GENERAL  5/27/2022    Procedure: IRRIGATION AND DEBRIDEMENT, WOUND - LEG;  Surgeon: Aleks Buenrostro M.D.;  Location: [facility];  Service: Orthopedics   • APPLICATION OR REPLACEMENT, WOUND VAC  5/27/2022    Procedure: APPLICATION OR REPLACEMENT, WOUND VAC;  Surgeon: Aleks Buenrostro M.D.;  Location: [facility];  Service: Orthopedics   • PB AMPUTATE THIGH,THRU FEMUR Left 5/18/2022    Procedure: LEFT ABOVE KNEE AMPUTATION, ADDUCTOR TENDON MYOTENODESIS;  Surgeon: Aleks Buenrostro M.D.;  Location: [facility];  Service: Orthopedics   • PB XFER SINGLE SUPERFICI LOW LEG TENDON Left 5/18/2022    Procedure: TRANSFER, TENDON;  Surgeon: Aleks Buenrostro M.D.;  Location: [facility];  Service: Orthopedics   • ANGIOGRAM Bilateral 10/12/2021    Procedure: BILATERAL LOWER EXTREMITY ANGIOGRAM;  Surgeon: Valerio Purcell M.D.;  Location: [facility];  Service: Vascular   • IRRIGATION & DEBRIDEMENT GENERAL Bilateral 10/12/2021    Procedure: IRRIGATION AND DEBRIDEMENT, WOUND, BILATERAL LOWER EXTREMITY;  Surgeon: Valerio Purcell M.D.;  Location: [facility];  Service: Vascular   • APPLICATION OR REPLACEMENT, WOUND VAC Bilateral 10/12/2021    Procedure: APPLICATION WOUND VAC;  Surgeon: Valerio Purcell M.D.;  Location: [facility];  Service: Vascular   • STENT PLACEMENT Right 10/12/2021    Procedure: STENT PLACEMENT, RIGHT SUPERFICIAL FEMORAL ARTERY;  Surgeon:  Valerio Purcell M.D.;  Location: SUR[facility]ERY [facility];  Service: Vascular   • TOE AMPUTATION Left 2/17/2020    Procedure: LEFT SECOND, THIRD, AND FORTH TOE AMPUTATION;  Surgeon: Alfonso Esteban M.D.;  Location: SURGERY [facility];  Service: Orthopedics   • W[place] UNLISTED PROC, ARTHROSCOPY Left 7/25/2019    Procedure: LEFT ENDOSCOPIC ULNAR NERVE DECOMPRESSION, LEFT CARPAL TUNNEL RELEASE;  Surgeon: Red Chacon M.D.;  Location: SURGERY [facility];  Service: Orthopedics   • KNEE REPLACEMENT, TOTAL Bilateral    • OTHER SURGICAL PROCEDURE      back surgery - Unknown      Social History     Tobacco Use   • Smoking status: Current Every Day Smoker     Packs/day: 0.50     Years: 54.00     Pack years: 27.00     Types: Cigarettes   • Smokeless tobacco: Former User   Substance Use Topics   • Alcohol use: Not Currently     Alcohol/week: 0.0 oz     Comment: occas     No chief complaint on file.    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 Hx type 2 DM.    WOUND ASSESSMENT/LDA        Negative Pressure Wound Therapy 10/12/21 Other (Comment);Surgical Leg Left (Active)       Negative Pressure Wound Therapy 05/24/22 Chronic Leg Lateral Right (Active)   Vacuum Serial Number [id number] 06/01/22 0800   NPWT Pump Mode / Pressure Setting Ulta;Continuous;125 mmHg 06/02/22 1100   Dressing Type Medium;Black Foam (Veraflo) 06/02/22 1100   Number of Foam Pieces Used 1 06/02/22 1100   Canister Changed No 06/02/22 1100   Output (mL) 800 mL 06/02/22 0727   NEXT Dressing Change/Treatment Date 06/06/22 06/02/22 1100   VAC VeraFlo Irrigant Normal Saline 06/02/22 1100   VAC VeraFlo Soak Time (mins) 6 06/02/22 1100   VAC VeraFlo Instill  Volume (ml) 20 06/02/22 1100   VAC VeraFlo - Therapy Time (hrs) 3.5 06/02/22 1100   VAC VeraFlo Pressure (mm/Hg) Continuous;125 mmHg 06/02/22 1100   WOUND NURSE ONLY - Time Spent with Patient (mins) 60 06/02/22 1100     Wound 05/19/22 Full Thickness Wound Leg Right (Active)   Wound Image   06/02/22 1100   Site Assessment Red;Drainage 06/02/22 1100   Periwound Assessment Edema 06/02/22 1100   Margins Defined edges;Attached edges 06/02/22 1100   Closure Secondary intention 06/02/22 1100   Drainage Amount Moderate 06/02/22 1100   Drainage Description Sanguineous 06/02/22 1100   Treatments Cleansed;Compression;Site care 06/02/22 1100   Wound Cleansing Approved Wound Cleanser 06/02/22 1100   Periwound Protectant Skin Protectant Wipes to Periwound;Paste Ring;Drape 06/02/22 1100   Dressing Cleansing/Solutions Normal Saline 06/02/22 1100   Dressing Options Wound Vac 06/02/22 1100   Dressing Changed Changed 06/02/22 1100   Dressing Status Clean;Dry;Intact 06/02/22 1100   Dressing Change/Treatment Frequency By Wound Team Only 06/02/22 1100   NEXT Dressing Change/Treatment Date 06/06/22 06/02/22 1100   NEXT Weekly Photo (Inpatient Only) 06/09/22 06/02/22 1100   Non-staged Wound Description Full thickness 06/02/22 1100   Wound Length (cm) 21 cm 06/02/22 1100   Wound Width (cm) 12 cm 06/02/22 1100   Wound Depth (cm) 0 cm 06/02/22 1100   Wound Surface Area (cm^2) 252 cm^2 06/02/22 1100   Wound Volume (cm^3) 0 cm^3 06/02/22 1100   Wound Healing % 100 06/02/22 1100   Wound Bed Granulation (%) 100 % 05/19/22 1800   Shape oval 06/02/22 1100   Wound Odor None 06/02/22 1100   Exposed Structures None 06/02/22 1100   WOUND NURSE ONLY - Time Spent with Patient (mins) 60 06/02/22 1100           Vascular:    MIA:   MIA Results, Last 30 Days US-EXTREMITY ARTERY LOWER UNILAT RIGHT    Result Date: 5/22/2022  Narrative Lower Extremity  Arterial Duplex Report  Vascular Laboratory  CONCLUSIONS  1) 50-75% stenosis of the mid superficial femoral  artery  2) Atherosclerosis  MILI HANSON  Exam Date:     2022 18:42  Room #:     Inpatient  Priority:     Routine  Ht (in):             Wt (lb):  Ordering Physician:        MARTHA TAI  Referring Physician:       [id number]  Sonographer:               Frantz Carranza RDMS,                             DENEVRT  Study Type:                Complete Unilateral  Technical Quality:         Adequate  Age:    71    Gender:     M  MRN:    [id number]  :    1950      BSA:  Indications:     PVD  CPT Codes:       41152  ICD Codes:       443.9  History:         Right superficial femoral artery stent, left above knee                   amputaion. prior duplex 10-8-21.  Limitations:                RIGHT  Waveform        Peak Systolic Velocity (cm/s)                  Prox    Prox-Mid  Mid    Mid-Dist  Distal  Triphasic                         94                       CFA  Triphasic       72                                         PFA  Triphasic       115               161              122     SFA  Triphasic                         58                       POP  Biphasic        116                                91      AT  Triphasic       35                                 66      PT  Triphasic       60                                 37      SOFIA                LEFT  Waveform        Peak Systolic Velocity (cm/s)                  Prox    Prox-Mid  Mid    Mid-Dist  Distal                                                             CFA                                                             PFA                                                             SFA                                                             POP                                                             AT                                                             PT                                                             SOFIA  FINDINGS  Right.  Diffuse atherosclerotic plaque.  50-75% stenosis in the mid superficial femoral artery.   Multiphasic flow demonstarted throoughout the extremity.  Elizabeth Monroy MD  (Electronically Signed)  Final Date:      22 May 2022 23:13    MIA Results, Last 30 Days US-MIA SINGLE LEVEL UNILAT RIGHT    Result Date: 2022  Narrative  Vascular Laboratory  Conclusions  Compared to the prior study on 10/8/21, waveforms looks better, triphasic  and high amplitude in present study.  The ankle pressures cannot be measured due to calcification and  noncompressibility of the tibial vessels.  MILI HANSON  Age:    71    Gender:     M  MRN:    [id number]  :    1950      BSA:  Exam Date:     2022 11:23  Room #:     Inpatient  Priority:     Routine  Ht (in):             Wt (lb):  Ordering Physician:        MARTHA TAI  Referring Physician:       [id number]  Sonographer:               Deb Lazo                             MISA, [facility]  Study Type:                Complete Unilateral  Technical Quality:         Adequate  Indications:     Ulcer of calf, Ulcer of other part of foot (toes)  CPT Codes:       27973  ICD Codes:       707.12  707.15  History:         Right calf ulcer and ulceration of the right toes; history of                   right SFA stenting and left AKA  Limitations:     Unable to perform toe brachial index due to open                   wounds/patient unable to tolerate pressures over this area;                   left AKA                 RIGHT  Waveform            Systolic BPs (mmHg)                             121           Brachial  Triphasic                                Common Femoral  Triphasic                                Popliteal  Triphasic                  0             Posterior Tibial  Triphasic                  0             Dorsalis Pedis                                           Digit                                           MIA                                           TBI                       LEFT  Waveform        Systolic BPs (mmHg)                              127           Brachial                                           Common Femoral                                           Popliteal                                           Posterior Tibial                                           Dorsalis Pedis                                           Digit                                           MIA                                           TBI  Findings  Right.  Doppler waveforms of the common femoral, popliteal, posterior tibial, and  dorsalis pedis arteries are of high amplitude and triphasic.  The ankle pressures cannot be measured due to calcification and  noncompressibility of the tibial vessels.  Unable to perform toe brachial index due to open wounds/patient unable to  tolerate pressures over this area.  Left AKLIBERTY  Shlomo Singer MD  (Electronically Signed)  Final Date:      21 May 2022 20:31    Lab Values:    Lab Results   Component Value Date/Time    WBC 6.7 05/30/2022 12:49 AM    RBC 4.47 (L) 05/30/2022 12:49 AM    HEMOGLOBIN 12.2 (L) 05/30/2022 12:49 AM    HEMATOCRIT 39.0 (L) 05/30/2022 12:49 AM    CREACTPROT 6.25 (H) 10/07/2021 08:30 PM    SEDRATEWES 1 10/07/2021 08:30 PM    HBA1C 8.0 (H) 05/19/2022 03:34 AM      Culture Results show:  No results found for this or any previous visit (from the past 720 hour(s)).    Pain Level/Medicated:  Received PO and IV pain medications as well as 4% Lidocaine 45min prior to change. Tolerated well did have some pain with change.     INTERVENTIONS BY WOUND TEAM:  Chart and images reviewed. Discussed with bedside RN. All areas of concern (based on picture review, LDA review and discussion with bedside RN) have been thoroughly assessed. Documentation of areas based on significant findings. This RN in to assess patient. Performed standard wound care which includes appropriate positioning, dressing removal and non-selective debridement. Pictures and measurements obtained weekly if/when required.         RLE  LATERAL  Preparation for Dressing removal: Dressing soaked with Lidocaine and NS  Cleansed with:  Wound cleanser and gauze.  Sharp debridement: NA  Soo wound: Cleansed with Wound cleanser and gauze, Prepped with no sting and 3 paste rings  Primary Dressing: 3 pieces of half thickness cut to fit black veraflo foam applied over wound and secured with drape. A hole was cut in drape and trac pad applied over proximal aspect.   Secondary (Outer) Dressing: Fenestrated mepilex applied around vac tubing. Heel mepilex to heel. 2 layer compression applied (Foam layer followed by coban).    R Toe amp site and LLE BKA incision dressed with dry gauze and hypafix tape.    Interdisciplinary consultation: Patient, Bedside RN Marilyn, LPS APRN Laurita, Wound RN Alicia     EVALUATION / RATIONALE FOR TREATMENT:  Most Recent Date:    6/2/2022: Right lower extremity wound clean, red, and with no odor. Muscle exposed, but covered with granulation tissue, however remains edematous. Right LE also still with generalized edema. Pt reported leaking overnight and wound with moderate amount of drainage - VF settings thus decreased. Continue VF and two layer compression.     ** Dressing to incisions along right TMA site and left stump changed by LPS APRN **     5/30/22: Pt to OR for debridement and toe amputation on Friday. Toe amp incision CDI. Dry gauze and hypafix applied per LPS recommendations. Pts RLE lateral wound clean with granulation tissue present. Veraflo continued however fluid and soak time decreased. See flowsheet for settings.     5/24: Discussed R lateral leg wound with Dr. Purcell. Ok for wound vac placement and compression. Veraflo wound vac initiated to right lateral leg wound to manage drainage, bioburden, increase tissue oxygenation and granulation. Light compression applied for improved venous return. Pt not agreeable and unable to tolerate 2 layer compression wrap at this time. Hydrofera Blue applied to right 1st and 2nd  toes for the hydrophilic polyurethane foam which contains ethylene oxide used as a bactericidal, fungicidal, and sporicidal disinfectant. Hydrofera Blue also aids in maintaining a moist wound environment. The absorption properties of this dressing are important in collecting exudates and bacteria from the injured area. These harmful fluid secretions bind to the dressing removing it from the wound without the foam sticking to the wound causing more harm.       Goals: Steady decrease in wound area and depth weekly.    WOUND TEAM PLAN OF CARE ([X] for frequency of wound follow up,):   Nursing to follow orders written for wound care. Contact wound team if area fails to progress, deteriorates or with any questions/concerns  Dressing changes by wound team:                   Follow up 3 times weekly:                NPWT change 2 times weekly:   X Monday/Thursday  Follow up 1-2 times weekly:      Follow up Bi-Monthly:                   Follow up as needed:     Other (explain):     NURSING PLAN OF CARE ORDERS (X):  Dressing changes: See Dressing Care orders: X  Skin care: See Skin Care orders:  RN Prevention Protocol:  Rectal tube care: See Rectal Tube Care orders:   Other orders:    RSKIN:   CURRENTLY IN PLACE (X), APPLIED THIS VISIT (A), ORDERED (O):   Q shift Donis:  X  Q shift pressure point assessments:  X    Surface/Positioning   Pressure redistribution mattress      X      Low Airloss          Bariatric foam      Bariatric CRISTOPHER     Waffle cushion        Waffle Overlay          Reposition q 2 hours      TAPs Turning system     Z Jason Pillow     Offloading/Redistribution   Sacral Mepilex (Silicone dressing)     Heel Mepilex (Silicone dressing)         Heel float boots (Prevalon boot)             Float Heels off Bed with Pillows       X    Respiratory RA  Silicone O2 tubing         Gray Foam Ear protectors     Cannula fixation Device (Tender )          High flow offloading Clip    Elastic head band offloading device       Anchorfast                                                         Trach with Optifoam split foam             Containment/Moisture Prevention   Rectal tube or BMS    Purwick/Condom Cath        Fajardo Catheter    Barrier wipes           Barrier paste       Antifungal tx      Interdry        Mobilization    Up to chair        Ambulate      PT/OT  X    Nutrition     Dietician        Diabetes Education      PO    X TF     TPN     NPO   # days     Other       Anticipated discharge plans:   LTACH:        SNF/Rehab:                X  Home Health Care:           Outpatient Wound Center:            Self/Family Care:        Other:                  Vac Discharge Needs:   Not Applicable Pt not on a wound vac:       Regular Vac while inpatient, alternative dressing at DC:        Regular Vac in use and continued at DC:            Reg. Vac w/ Skin Sub/Biologic in use. Will need to be changed 2x wkly:      Veraflo Vac while inpatient, ok to transition to Regular Vac on Discharge:  X         Veraflo Vac while inpatient, will need to remain on Veraflo Vac upon discharge:

## 2022-06-02 NOTE — PROGRESS NOTES
"Hospital Medicine Daily Progress Note    Date of Service  6/2/2022    Chief Complaint  Luis Kellogg is a 71 y.o. male admitted 5/18/2022 with OM    Hospital Course  Peyman Kellogg is a 71 year old male with past medical history of T2DM, neuropathy, HTN, PAD and tobacco use who presented 5/18/2022 for a scheduled left AKA with Dr. Buenrostro. Patient hypotensive during the procedure and was admitted to [facility]. Transferred to hospitalist service by Dr. Buenrostro. Patient also has right lower extremity wounds, cellulitis w exposed tendon/joints. LPS consulted and he eventually underwent R 1st, 2nd toe amp at the [facility] w debridement and NPWT application on 5/27/22.    Abx complete. Referred to SNF. Renovation of his trailer/home to accomodate WC access has been in process.      Interval Problem Update  6/2 No clinical changes. Awaits SNF. Says he feels he is being \"evicted.\" Then claimed to be joking after re-educated that this is to facility therapy; stump healing/maturation/mobility. Then c/o that he never signed the Choice form. Showed him the scanned copy with circles over his choices, and the verbal order signed on his behalf. Later said he remembered.    6/1 VSS. No c/o. FANTA. SNF referrals in process.    5/31 VSS and WNL  POC more or less at goal  154 morphine milliequivalents yesterday/Oxy/Dilaudid  Has many questions about dispo    Consultants/Specialty  LPS    Code Status  Full Code    Disposition  Patient is medically cleared for discharge.   Anticipate discharge to to skilled nursing facility.  I have placed the appropriate orders for post-discharge needs.    Review of Systems  Review of Systems   Constitutional: Negative for fever.   Respiratory: Negative for cough.    Cardiovascular: Negative for chest pain.   Gastrointestinal: Negative for abdominal pain, nausea and vomiting.   Genitourinary: Negative for dysuria.   Musculoskeletal: Negative for myalgias.        Phantom limb pain   Skin: Negative for " rash.   All other systems reviewed and are negative.       Physical Exam  Temp:  [36 °C (96.8 °F)-36.2 °C (97.1 °F)] 36 °C (96.8 °F)  Pulse:  [73-83] 73  Resp:  [16-18] 18  BP: ()/(70-75) 119/75  SpO2:  [94 %-96 %] 94 %    Physical Exam  Vitals and nursing note reviewed.   HENT:      Head: Normocephalic and atraumatic.      Nose: Nose normal.   Eyes:      Conjunctiva/sclera: Conjunctivae normal.      Pupils: Pupils are equal, round, and reactive to light.   Cardiovascular:      Rate and Rhythm: Normal rate and regular rhythm.      Heart sounds: No murmur heard.    No friction rub.   Pulmonary:      Effort: No respiratory distress.   Abdominal:      General: Bowel sounds are normal. There is no distension.      Palpations: Abdomen is soft.   Musculoskeletal:      Cervical back: Neck supple.      Comments: LLE stump CDI; RLE NPWT CDI   Skin:     General: Skin is warm and dry.   Neurological:      Mental Status: He is alert and oriented to person, place, and time.   Psychiatric:         Behavior: Behavior is cooperative.         Fluids    Intake/Output Summary (Last 24 hours) at 6/2/2022 1021  Last data filed at 6/2/2022 0419  Gross per 24 hour   Intake 120 ml   Output 2280 ml   Net -2160 ml       Laboratory                        Imaging  [id number] VIEW   Final Result      1. Large amount of stool throughout the colon from the cecum to the rectum.   2. No bowel obstruction.   3. Other chronic degenerative changes.      US-EXTREMITY ARTERY LOWER UNILAT RIGHT   Final Result      US-MIA SINGLE LEVEL UNILAT RIGHT   Final Result      DX-TIBIA AND FIBULA RIGHT   Final Result         1.  No acute traumatic bony injury.   2.  No destructive osseous lesion is appreciated, note that plain film is insensitive for evaluation of osteomyelitis, and bone scan would offer improved diagnostic sensitivity as clinically appropriate.      DX-FOOT-COMPLETE 3+ RIGHT   Final Result         1.  No acute traumatic bony injury. No  destructive osseous lesion is seen, note that plain film can be insensitive for evaluation of osteomyelitis and bone scan would offer improved diagnostic sensitivity as clinically appropriate.   2.  Degenerative changes of the foot, greatest at the midfoot and hindfoot           Assessment/Plan  * S/P AKA (above knee amputation), left (HCC)- (present on admission)  Assessment & Plan  pain control  Neurontin for phantom pain  PT/OT   Case management for discharge planning    Malingering  Assessment & Plan  6/2 Appears to be seeking modalities to contest his discharge    Anemia- (present on admission)  Assessment & Plan  Follow cbc    Chronic, continuous use of opioids- (present on admission)  Assessment & Plan  Pain control    Cellulitis of right leg- (present on admission)  Assessment & Plan  finished course of IV Unasyn    Marijuana abuse- (present on admission)  Assessment & Plan  Education and counseling    Tobacco use- (present on admission)  Assessment & Plan  Nicotine replacement protocol    Primary hypertension- (present on admission)  Assessment & Plan  Lisinopril and Coreg    Type 2 diabetes mellitus with hyperglycemia, with long-term current use of insulin ([facility])- (present on admission)  Assessment & Plan  Lantus, SSI      Chronic ulcer of lower extremity ([facility])- (present on admission)  Assessment & Plan  Right first and second toe amputation at the level of the MTP joint, Right leg debridement and application of wound vacuum  5/27/2022  Wound care, Pain control      Drug-induced constipation- (present on admission)  Assessment & Plan  bowel protocol  Fleet enema today    Hyponatremia- (present on admission)  Assessment & Plan  Follow bmp    Peripheral vascular disease (HCC)- (present on admission)  Assessment & Plan  Selective catheter placement in the right below-knee popliteal artery, Right lower extremity angiography, 6mm uncovered self expanding Right SFA stent placement    10/12/2021  Atorvastatin  Resume ASA    History of MI (myocardial infarction)- (present on admission)  Assessment & Plan  Coreg, Lipitor  Resume ASA     Hyperlipidemia- (present on admission)  Assessment & Plan  Atorvastatin       VTE prophylaxis: Xarelto 10 mg daily as prophylaxis    I have performed a physical exam and reviewed and updated ROS and Plan today (6/2/2022). In review of yesterday's note (6/1/2022), there are no changes except as documented above.

## 2022-06-02 NOTE — DISCHARGE PLANNING
[facility], [facility].     Per DARIEN Dorsey request, expanding referrals to remaining local area SNFs.     @0911-  Agency/Facility Name: [facility]   Outcome: Left a voicemail asking for referral status.     @1032-  Agency/Facility Name: [facility] [place]   Spoke To: Mary Anne   Outcome: Per Mary Anne, she is still waiting updates from [facility].     @1300-  Agency/Facility Name: [facility]  Spoke To: Mary Anne   Outcome: Per Mary Anne ROWLEY at [facility] has accepted pt. They are just waiting for bed availability.

## 2022-06-02 NOTE — THERAPY
"Occupational Therapy  Daily Treatment     Patient Name: Luis Kellogg  Age:  71 y.o., Sex:  male  Medical Record #: [id number]  Today's Date: 6/2/2022     Precautions: Fall Risk, Non Weight Bearing Left Lower Extremity, Weight Bearing As Tolerated Right Lower Extremity  Comments: wound vac to RLE; post-op shoe to R foot    Assessment    Pt seen for OT tx to include: bed mobility, LB dressing, transfer training. Pt able to start pants over R foot, LLE in long-sit then roll to pull past hips. Total A for R sock and post-op shoe. Pt completed slide board transfer with assist only for board placement. Encouraged to remain up in WC for OOB tolerance, however pt c/o dicomfort to residual limb in sitting. Pt is progressing towards OT goals. Limited primarily by pain. Will benefit from continued acute OT.     Plan    Continue current treatment plan.    DC Equipment Recommendations: Unable to determine at this time  Discharge Recommendations: Recommend post-acute placement for additional occupational therapy services prior to discharge home    Subjective    \"They're working on building me a ramp.\"     Objective       06/02/22 1033   Balance   Sitting Balance (Static) Fair +   Sitting Balance (Dynamic) Fair -   Weight Shift Sitting Fair   Comments standing NT   Bed Mobility    Supine to Sit Standby Assist   Scooting Standby Assist  (seated)   Rolling Standby Assist   Comments flat bed, uses trapeze   Activities of Daily Living   Grooming Modified Independent;Seated  (oral care in WC)   Lower Body Dressing Maximal Assist  (don hospital pants at bed level; don/doff R sock, don R post-op shoe)   Toileting   (NT; declined need)   Functional Mobility   Bed, Chair, Wheelchair Transfer Minimal Assist   Transfer Method Slide Board  (EOB > WC)   Mobility Supine > EOB, slide board to WC   Short Term Goals   Short Term Goal # 1 B  Pt will complete ADL transfers with supv   Goal Outcome  # 1 B Progressing as expected   Short Term " Goal # 2 Pt will complete LB dressing with min A using AE   Goal Outcome # 2 Progressing as expected   Short Term Goal # 3 Pt will complete toileting with min A   Goal Outcome # 3   (NT this session)

## 2022-06-02 NOTE — PROGRESS NOTES
Bedside report received.  Assessment complete.  A&O x 4. Patient calls appropriately.  Patient ambulates with max assist. NWB LLE, RLE WBAT. Bed alarm on.   Patient has 7/10 pain. Declines pain intervention at this time.  Denies N&V. Tolerating DM diet.  RLE with wound vac, no leaks, dressing CDI. L AKA, dressing CDI.  + void, + flatus, - BM.  Patient denies SOB.  Review plan with of care with patient. Call light and personal belongings within reach. Hourly rounding in place. All needs met at this time.

## 2022-06-02 NOTE — PROGRESS NOTES
LIMB PRESERVATION SERVICE   POST SURGICAL PROGRESS NOTE      HPI:  Luis Kellogg is a 71 y.o.  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   Pt has been receiving treatment for R leg wounds since 10/2021.  Amputation to L LE was planned as an outpatient on 4/28/22 due to chronic wounds on that foot.      SURGERY DATE: 5/18/2022 by Dr Buenrostro   PROCEDURE: PROCEDURES PERFORMED:  1.  Left above knee amputation.  2.  Left adductor myotenodesis to the femur.    SURGERY DATE: 5/27/2022 by Dr Buenrostro   PROCEDURE  1.  Right first and second toe amputation at the level of the MTP joint.  2.  Right leg debridement and application of wound vacuum, approximately [id number] to fascia        INTERVAL HISTORY:  5/21/2022: POD #3.  Patient denies fevers, chills, nausea, vomiting.  Pain well controlled.  Patient states that dressing will not stay on his left AKA, due to movement.  Patient also verifies that he has been seen by [facility].  5/23/2022: POD #5.  Denies fevers, chills, nausea, vomiting.  Complaining of neuropathic pain.  Gabapentin increased.  Discussed case with vascular surgery, vascular intervention not needed at this time.  5/30/2022: POD #3 s/p right toe amputation 1, 2, right calf I&D with VAC placement.  POD #12 s/p left AKA.  Seen with wound team.  Patient feeling well, complaining of scrotal pain.  Discharge planning in place  6/2/2022: POD#5 s/p right toe amputation 1, 2, right calf I&D with VAC placement.  POD #15 s/p left AKA.  Seen with wound team.  Patient feeling well.  Seen with wound team for VAC change, patient premedicated.  Discharge planning in place, patient had questions on having VAC at SNF.  Questions answered      PERTINENT LPS RESULTS:       5/22/2022  RIGHT    Pulmonary Critical Care ICU Daily Progress Note    Assessment & Plan     69M w/ CAD s/p 4V CABG, ICM NYHA class III, EF 20%  BiV ICD, carotid artery disease, hypertension, hyperlipidemia, IBS, rheumatoid arthritis, remote history of smoking who presented for evaluation of advanced heart failure therapies s/p axillary IABP placement on 11/20    # Acute on chronic systolic CHF  # Decompensated heart failure  # Hypotension/Cardiogenic shock  # CAD s/p 4V CABG  # Ischemic cardiomyopathy status post BiV ICD  # A. Fib  # s/p axillary IABP placement 11/20   - needed IABP   -[place] monitoring noted: SvO2 67%, CI 2.4   - diuresing still   - mgt per Cardiology / CHF team   - outpatient medical tx limited by hypotension   - if I+Os accurate, pt w/ (-)18.4L balance since admission   - s/p axillary IABP mgt per Cardiology   - OHTx rather than LVAD being considered  # h/o smoking; potentially 30-pack years  # Abnormal chest imaging  # L effusion and LLL atelectasis   - stable 11/22 CXR   - no obvious emphysematous changes on CT, no ILD   - no need to tap L effusion    - prob too small to easily tap     - no clinical indication too tap    - pt continues to diurese, anticipate effusion will dec in size   - will eventually need PFTs to objectively assess for possible airway obstuction   - no steroids or bronchodilators indicated at this time   - keep O2 sat 90% or greater  # Hypertension  # Hyperlipidemia  # Carotid artery disease   -mgt per primary service, Cardiology  # Rheumatoid arthritis  # IBD  # Hypothyroidism  # DVT prophylaxis   - on heparin gtt.  # FULL CODE    ARISCAT score 50. High risk of post-op pulmonary complications (42%) but this includes minor complications of little clinical significance (i.e.mild wheezing/bronchospasm)    [facility]  Class 3 => associated with 4.2% post-op respiratory failure (no h/o COPD but no PFTs to objectively assess for airway obstruction)    Based on current  Waveform        Peak Systolic Velocity (cm/s)                   Prox    Prox-Mid  Mid    Mid-Dist  Distal   Triphasic                         94                       CFA         Triphasic       72                                         PFA         Triphasic       115               161              122     SFA         Triphasic                         58                       POP         Biphasic        116                                91      AT         Triphasic       35                                 66      PT         Triphasic       60                                 37      SOFIA        FINDINGS   Right.       Diffuse atherosclerotic plaque.    50-75% stenosis in the mid superficial femoral artery.    Multiphasic flow demonstarted throoughout the extremity.       5/18/22  Pathology: FINAL DIAGNOSIS:     A. Left leg:          Above-the-knee amputation demonstrating the following features:          -The 2nd, 3rd, 4th phalanges are absent with associated diffuse           ulceration. There are multiple additional ulcerated lesions           ranging from 1.7 cm to 17.5 cm in maximum dimension, extending           to within 8.7 cm of the nearest proximal soft tissue margin.           There is minimal skin slippage noted. The knee displays           multiple metallic medical components consistent with knee           replacement hardware.          -A section taken through the ulcerated skin demonstrates focal           marked ulceration of the skin surface with marked acute           inflammation extending within the subjacent dermal tissue. The           underlying metatarsal bone shows predominantly fibroadipose           tissue within the medullary space. There is no evidence for           acute or chronic osteomyelitis.          -There is viable skin and soft tissue at the proximal resection           margin. The popliteal blood vessels demonstrate patent lumens           with focal calcifications within the  information patient is deemed OK for LVAD/OHT from pulmonary standpoint as low/moderate risk.    30 minutes critical care excluding teaching/procedure time    Subjective     Seen and examined   On RA, O2 sat 97% Up in chair. NAD. No complaints. Has walked to and from bathroom but has not walked in the hallway yet.    INFUSIONS: heparin, milrinone 0.375  I+O: (-) 1.4L balance on   IABP - 1:1  SWAN: RA-11  PA-  (28) CO/CI 5.2/2.8    Chart/labs/imaging personally reviewed.     XR unchanged from ; no report. No significant change since  VB.37/55/ ---/ 32 / 67%  CBC OK; Hgb 9.7   Na 136 K 3.7 BUN 17  Cr 1.28      Objective   Vital signs for last 24 hours:  Temp:  [99 °F (37.2 °C)] 99 °F (37.2 °C)  Heart Rate:  [70-89] 83  Resp:  [12-24] 17  Arterial Line BP: ()/(51-78) 111/60  Weight change:     Intake/Output last 3 shifts:  I/O last 3 completed shifts:  In: 1104.4 [P.O.:240; I.V.:864.4]  Out: 2470 [Urine:2470]  Intake/Output this shift:  I/O this shift:  In: 77.3 [I.V.:77.3]  Out: 125 [Urine:125]    Vent settings for last 24 hours:     PLATEAU PRESSURE:  Hemodynamic parameters for last 24 hours:  PAP (mmHg): (39-62)/(11-30)   CVP:  [4 mmHg-15 mmHg] 11 mmHg  CO:  [5.1 l/min-6.8 l/min] 5.2 l/min  CI:  [2.4 l/min/m2-3.2 l/min/m2] 2.4 l/min/m2  Physical Exam:  GENERAL: sitting up in chair, room air O2 97%  HEENT: no stridor/accessory muscle use, R IJ [place]  CHEST: R clear, dec BS L base otherwise clear  CVS: IABP sounds (1:1) regular (paced) not tachy  ABDOMEN: full, soft ND/NT (+) BS  EXT: no edema, cyanosis, clubbing. Periphery warm        Medications:  Current Facility-Administered Medications   Medication Dose Route Frequency Provider Last Rate Last Admin   • furosemide (LASIX INJECT) injection 20 mg  20 mg Intravenous BID Ruben Morales MD   20 mg at 20   • heparin 2 unit/mL in NaCL 0.9% solution  3 mL/hr Other Continuous Ruben Morales MD 3 mL/hr  "blood vessel wall. The           proximal bone marrow tissue shows predominantly fibroadipose           tissue and blood clot.          -No malignancy is seen.     OR culture: none    SURGICAL SITE EXAM:      /75   Pulse 73   Temp 36 °C (96.8 °F) (Temporal)   Resp 18   Ht 1.702 m (5' 7\")   Wt 102 kg (225 lb 15.5 oz)   SpO2 94%   BMI 35.39 kg/m²   Monofilament testing has not been completed to right foot due to current wounds.  BLE warm    Pedal Pulses:   R foot: palpable DP, palpable PT    Incision   location: Left AKA   Incision well approximated, sutures intact.    Ecchymosis remains to lateral thigh, posterior stump, anterior stump  No erythema  No  drainage    Incision  location: Right  first and second toe amputations  Incisions well approximated with sutures  No new drainage  No odor  Minimal edema  No erythema     wound   location: Right dorsal foot  Dry scab in place, stable    wound   location: Right lateral foot  Resolved with callus     wound   location: Right lateral calf  Full thickness, does not probe to bone  Wound characteristics: 100% Red tissue.  Granulation beginning.     wound edges open  Slight erythema  Moderate to severe edema  No odor      Wound care completed by wound RN and LPS APRN.  See note.      Photo(s):     left AKA      Right 1st & 2nd toe amputation      Right lateral calf        DIABETES MANAGEMENT:    A1c:   Lab Results   Component Value Date/Time    HBA1C 8.0 (H) 05/19/2022 03:34 AM            INFECTION MANAGEMENT:    Results from last 7 days   Lab Units 05/30/22  0049 05/29/22  0513 05/28/22  0218 05/27/22  0236   WBC 1501 K/uL 6.7 6.4 7.8 7.1   PLATELET COUNT 1518 K/uL 370 310 291 289     Wound culture results:   Results     ** No results found for the last 168 hours. **           ASSESSMENT/PLAN:   POD #5 s/p right first and second toe amputations, right calf I&D with VAC  POD #15 S/P: Left above knee amputation, left adductor myotenodesis to the femur 5/18/2022 " at 11/21/20 1505 3 mL/hr at 11/21/20 1505   • dextrose 5 % infusion   Intravenous Continuous Ruben Morales MD   New Bag at 11/20/20 1343   • sodium chloride 0.9% infusion   Intravenous Continuous Ruben Morales MD 6 mL/hr at 11/20/20 2209 New Bag at 11/20/20 2209   • sodium chloride 0.9% infusion   Intravenous Continuous Ruben Morales MD 3 mL/hr at 11/23/20 0800 Rate Verify at 11/23/20 0800   • potassium CHLORIDE (KLOR-CON M) ariane ER tablet 20 mEq  20 mEq Oral Daily with breakfast Mile Gaines MD   20 mEq at 11/22/20 0827   • heparin (porcine) 25,000 units/250 mL in dextrose 5 % infusion  0-30 Units/kg/hr (Dosing Weight) Intravenous Continuous Ana HARJEET Warren CNP 14.1 mL/hr at 11/23/20 0800 15 Units/kg/hr at 11/23/20 0800   • heparin (porcine) injection 4,000 Units  4,000 Units Intravenous PRN Ana HARJEET Warren CNP       • heparin (porcine) injection 2,000 Units  2,000 Units Intravenous PRN Ana Warren CNP   2,000 Units at 11/21/20 1459   • nitroPRUSSide (NIPRIDE) 50 mg/100 mL in sodium chloride 0.9 % infusion  0.25 mcg/kg/min (Dosing Weight) Intravenous Continuous Ruben Morales MD   Stopped at 11/17/20 1430   • calcium carbonate (TUMS) chewable tablet 1,000 mg  1,000 mg Oral Q4H PRN Ruth Ann Alvarado MD   1,000 mg at 11/16/20 0807   • levothyroxine (SYNTHROID, LEVOTHROID) tablet 225 mcg  225 mcg Oral QAM AC Dilia Lamar   225 mcg at 11/23/20 0623   • cholecalciferol (VITAMIN D) tablet 25 mcg  25 mcg Oral Daily Dilia Lamar   25 mcg at 11/22/20 0903   • AMIODarone (PACERONE) tablet 100 mg  100 mg Oral Daily LEONARDO Rosa   100 mg at 11/22/20 0903   • pantoprazole (PROTONIX) EC tablet 40 mg  40 mg Oral BID LEONARDO Rosa   40 mg at 11/22/20 2019   • atorvastatin (LIPITOR) tablet 40 mg  40 mg Oral Nightly LEONARDO Rosa   40 mg at 11/22/20 2019   • tamsulosin (FLOMAX) capsule 0.4 mg  0.4 mg Oral Daily Benita Harmon CNS   0.4 mg at 11/22/20 0903   • sodium chloride  0.9 % flush bag 25 mL  25 mL Intravenous PRN Sana Mohsin, DO       • sodium chloride (NORMAL SALINE) 0.9 % bolus 500 mL  500 mL Intravenous PRN Sana Mohsin, DO       • Potassium Standard Replacement Protocol   Does not apply See Admin Instructions Sana Mohsin, DO       • Magnesium Standard Replacement Protocol   Does not apply See Admin Instructions Sana Mohsin, DO       • ondansetron (ZOFRAN) injection 4 mg  4 mg Intravenous BID PRN Sana Mohsin, DO       • acetaminophen (TYLENOL) tablet 650 mg  650 mg Oral Q4H PRN Sana Mohsin, DO   650 mg at 11/19/20 0333   • HYDROcodone-acetaminophen (NORCO) 5-325 MG per tablet 1 tablet  1 tablet Oral Q4H PRN Sana Mohsin, DO   1 tablet at 11/23/20 0012   • docusate sodium-sennosides (SENOKOT S) 50-8.6 MG 2 tablet  2 tablet Oral Daily PRN Sana Mohsin, DO   2 tablet at 11/22/20 1324   • milrinone (PRIMACOR) 20 mg/100 mL in dextrose 5 % infusion premix  0.375 mcg/kg/min (Dosing Weight) Intravenous Continuous Ruben Morales MD 10.6 mL/hr at 11/23/20 0800 0.375 mcg/kg/min at 11/23/20 0800           Results:  Labs:      IMAGING STUDIES:  11/22/20 pCXR:    FINDINGS:    Single view of the chest was obtained.    The heart size is mildly enlarged and pulmonary vascularity is prominent centrally The lung fields are hypoaerated. Persistent elevation left hemidiaphragm is noted with left basilar subsegmental atelectasis /pleural effusion. Status post sternotomy   changes are identified. AICD and EKG leads are redemonstrated. IAPB is suggested at the level of the aortic arch. There is a tube overlying the mid mediastinal region. No pneumothorax.    Degenerative changes of the dorsal spine and shoulders are present.    IMPRESSION:  Stable appearance compared to the prior study redemonstrating left basilar consolidation/pleural effusion with elevation left hemidiaphragm and pulmonary vascular congestive changes.            Flaco Lizarraga MD  11/23/2020                 by Dr Buenrostro.        -Left AKA approximated with sutures.  Ecchymosis improving  Elevate with waffle cushion or Z flow pillow.    - Right first and second toes: Amputation sites well approximated.  Right calf: Ulcer clean, red, granular tissue forming.  Severe edema.  Continue with 2 layer compression wrap  -Okay to discharge from LPS/Ortho standpoint to SNF once medically cleared      Wound care:     Left AKA: Good layer of Adaptic over suture line, secure with Mepilex  Right first and second toe amp: Dry gauze, Hypafix tape.  Change every 72 hours  Right calf: VF VAC and 2 layer compression wrap.   Change q. 72 hours.  Managed by wound team      Imaging/Labs:  -COVID-19: not detected this admission    Vascular status:   -Right pedal pulse palpable  - Right SFA stent placed October 2021.  Patient with multiphasic flow throughout extremity.  No plans for intervention by vascular surgery.  Discussed with vascular surgery on 5/23/2022    Surgery:   No plans for further surgery    Antibiotics:   -ID not involved.  Currently not on antibiotics    Weight Bearing Status:   -Weight bearing as tolerated to right leg  -Nonweightbearing to left AKA    Offloading:   Offloading shoe right foot at bedside  -Orthotic company: [employer].        PT/OT:   - involved.       Diabetes Education:   -Not involved at this time    - Implications of loss of protective sensation (LOPS) discussed with patient- including increased risk for amputation.  Advised to check feet at least daily, moisturize feet, and to always wear protective foot wear.   -avoid trimming own nails. See podiatrist or certified foot and nail RN  -keep blood sugars <150 for improved wound healing            DISCHARGE PLAN:    Disposition:   SNF    Follow-up: Dr. Buenrostro . Sutures to be removed approximately 3 weeks post-op to left AKA and to toe amputation sites on right foot    Does not need veraflo VAC at discharge.  Okay to transition to regular  VAC      Discussed with: pt, RN, Gregorio KENT          Please note that this dictation was created using voice recognition software. I have  worked with technical experts from [facility] to optimize the interface.  I have made every reasonable attempt to correct obvious errors, but there may be errors of grammar and possibly content that I did not discover before finalizing the note.      Laurita Griffin, A.P.R.N.    If any questions or concerns, please contact LPS through voalte.

## 2022-06-02 NOTE — CARE PLAN
Problem: Pain - Standard  Goal: Alleviation of pain or a reduction in pain to the patient’s comfort goal  Outcome: Progressing  Note: Patient reports chronic severe pain. Medicating patient with PRN and scheduled medications per MAR. Encouraging nonpharmacologic pain relief as well     Problem: Skin Integrity  Goal: Skin integrity is maintained or improved  Outcome: Progressing  Note: Wound vac changed by would team today    The patient is Stable - Low risk of patient condition declining or worsening    Shift Goals  Clinical Goals: OOB  Patient Goals: wash hair  Family Goals: N/A    Progress made toward(s) clinical / shift goals:  patient OOB for a short period with OT today    Patient is not progressing towards the following goals: OOB activity limited by pain

## 2022-06-03 PROBLEM — Z02.9 PROBLEM RELATED TO DISCHARGE PLANNING: Status: ACTIVE | Noted: 2022-06-03

## 2022-06-03 LAB
GLUCOSE BLD STRIP.AUTO-MCNC: 128 MG/DL (ref 65–99)
GLUCOSE BLD STRIP.AUTO-MCNC: 146 MG/DL (ref 65–99)
GLUCOSE BLD STRIP.AUTO-MCNC: 163 MG/DL (ref 65–99)

## 2022-06-03 PROCEDURE — A9270 NON-COVERED ITEM OR SERVICE: HCPCS | Performed by: FAMILY MEDICINE

## 2022-06-03 PROCEDURE — 770001 HCHG ROOM/CARE - MED/SURG/GYN PRIV*

## 2022-06-03 PROCEDURE — 82962 GLUCOSE BLOOD TEST: CPT | Mod: 91

## 2022-06-03 PROCEDURE — 700102 HCHG RX REV CODE 250 W/ 637 OVERRIDE(OP): Performed by: ORTHOPAEDIC SURGERY

## 2022-06-03 PROCEDURE — 700102 HCHG RX REV CODE 250 W/ 637 OVERRIDE(OP): Performed by: FAMILY MEDICINE

## 2022-06-03 PROCEDURE — A9270 NON-COVERED ITEM OR SERVICE: HCPCS | Performed by: ORTHOPAEDIC SURGERY

## 2022-06-03 PROCEDURE — 700102 HCHG RX REV CODE 250 W/ 637 OVERRIDE(OP): Performed by: NURSE PRACTITIONER

## 2022-06-03 PROCEDURE — 99231 SBSQ HOSP IP/OBS SF/LOW 25: CPT | Performed by: NURSE PRACTITIONER

## 2022-06-03 PROCEDURE — A9270 NON-COVERED ITEM OR SERVICE: HCPCS | Performed by: NURSE PRACTITIONER

## 2022-06-03 RX ADMIN — ACETAMINOPHEN 1000 MG: 500 TABLET, FILM COATED ORAL at 05:47

## 2022-06-03 RX ADMIN — ACETAMINOPHEN 1000 MG: 500 TABLET, FILM COATED ORAL at 13:30

## 2022-06-03 RX ADMIN — GABAPENTIN 1800 MG: 300 CAPSULE ORAL at 18:14

## 2022-06-03 RX ADMIN — GABAPENTIN 1800 MG: 300 CAPSULE ORAL at 05:47

## 2022-06-03 RX ADMIN — POLYETHYLENE GLYCOL 3350 1 PACKET: 17 POWDER, FOR SOLUTION ORAL at 05:48

## 2022-06-03 RX ADMIN — MAGNESIUM HYDROXIDE 30 ML: 400 SUSPENSION ORAL at 10:30

## 2022-06-03 RX ADMIN — DOCUSATE SODIUM 100 MG: 100 CAPSULE, LIQUID FILLED ORAL at 05:47

## 2022-06-03 RX ADMIN — ASPIRIN 81 MG: 81 TABLET, COATED ORAL at 05:47

## 2022-06-03 RX ADMIN — DOCUSATE SODIUM 100 MG: 100 CAPSULE, LIQUID FILLED ORAL at 18:14

## 2022-06-03 RX ADMIN — NYSTATIN: 100000 POWDER TOPICAL at 05:50

## 2022-06-03 RX ADMIN — TRAZODONE HYDROCHLORIDE 450 MG: 150 TABLET ORAL at 22:03

## 2022-06-03 RX ADMIN — ATORVASTATIN CALCIUM 80 MG: 40 TABLET, FILM COATED ORAL at 18:14

## 2022-06-03 RX ADMIN — OXYCODONE HYDROCHLORIDE 20 MG: 10 TABLET ORAL at 15:10

## 2022-06-03 RX ADMIN — SENNOSIDES AND DOCUSATE SODIUM 1 TABLET: 50; 8.6 TABLET ORAL at 21:24

## 2022-06-03 RX ADMIN — OXYCODONE HYDROCHLORIDE 20 MG: 10 TABLET ORAL at 02:16

## 2022-06-03 RX ADMIN — NYSTATIN: 100000 POWDER TOPICAL at 13:30

## 2022-06-03 RX ADMIN — OXYCODONE HYDROCHLORIDE 20 MG: 10 TABLET ORAL at 06:26

## 2022-06-03 RX ADMIN — OXYCODONE HYDROCHLORIDE 20 MG: 10 TABLET ORAL at 10:31

## 2022-06-03 RX ADMIN — OXYCODONE HYDROCHLORIDE 20 MG: 10 TABLET ORAL at 21:23

## 2022-06-03 RX ADMIN — ACETAMINOPHEN 1000 MG: 500 TABLET, FILM COATED ORAL at 18:14

## 2022-06-03 ASSESSMENT — ENCOUNTER SYMPTOMS
ABDOMINAL PAIN: 0
VOMITING: 0
COUGH: 0
NAUSEA: 0
MYALGIAS: 0
FEVER: 0

## 2022-06-03 ASSESSMENT — PAIN DESCRIPTION - PAIN TYPE
TYPE: SURGICAL PAIN;ACUTE PAIN
TYPE: ACUTE PAIN;SURGICAL PAIN
TYPE: SURGICAL PAIN;ACUTE PAIN
TYPE: ACUTE PAIN;SURGICAL PAIN
TYPE: ACUTE PAIN;SURGICAL PAIN

## 2022-06-03 NOTE — PROGRESS NOTES
"Hospital Medicine Daily Progress Note    Date of Service  6/3/2022    Chief Complaint  Luis Kellogg is a 71 y.o. male admitted 5/18/2022 with OM    Hospital Course  Peyman Kellogg is a 71 year old male with past medical history of T2DM, neuropathy, HTN, PAD and tobacco use who presented 5/18/2022 for a scheduled left AKA with Dr. Buenrostro. Patient hypotensive during the procedure and was admitted to [facility]. Transferred to hospitalist service by Dr. Buenrostro. Patient also has right lower extremity wounds, cellulitis w exposed tendon/joints. LPS consulted and he eventually underwent R 1st, 2nd toe amp at the [facility] w debridement and NPWT application on 5/27/22.    Abx complete. Referred to SNF. Renovation of his trailer/home to accomodate WC access has been in process.      Interval Problem Update  6/3 Accepted to SNF. Awaits bed availability. POC at goal.    6/2 No clinical changes. Awaits SNF. Says he feels he is being \"evicted.\" Then claimed to be joking after re-educated that this is to facility therapy; stump healing/maturation/mobility. Then c/o that he never signed the Choice form. Showed him the scanned copy with circles over his choices, and the verbal order signed on his behalf. Later said he remembered.    6/1 VSS. No c/o. FANTA. SNF referrals in process.    5/31 VSS and WNL  POC more or less at goal  154 morphine milliequivalents yesterday/Oxy/Dilaudid  Has many questions about dispo    Consultants/Specialty  LPS    Code Status  Full Code    Disposition  Patient is medically cleared for discharge.   Anticipate discharge to to skilled nursing facility.  I have placed the appropriate orders for post-discharge needs.    Review of Systems  Review of Systems   Constitutional: Negative for fever.   Respiratory: Negative for cough.    Cardiovascular: Negative for chest pain.   Gastrointestinal: Negative for abdominal pain, nausea and vomiting.   Genitourinary: Negative for dysuria.   Musculoskeletal: Negative " for myalgias.        Phantom limb pain   Skin: Negative for rash.   All other systems reviewed and are negative.       Physical Exam  Temp:  [36.1 °C (96.9 °F)-36.4 °C (97.5 °F)] 36.1 °C (96.9 °F)  Pulse:  [76-92] 76  Resp:  [18-20] 20  BP: ()/(54-76) 117/76  SpO2:  [92 %-96 %] 96 %    Physical Exam  Vitals and nursing note reviewed.   HENT:      Head: Normocephalic and atraumatic.      Nose: Nose normal.   Eyes:      Conjunctiva/sclera: Conjunctivae normal.      Pupils: Pupils are equal, round, and reactive to light.   Cardiovascular:      Rate and Rhythm: Normal rate and regular rhythm.      Heart sounds: No murmur heard.    No friction rub.   Pulmonary:      Effort: No respiratory distress.   Abdominal:      General: Bowel sounds are normal. There is no distension.      Palpations: Abdomen is soft.   Musculoskeletal:      Cervical back: Neck supple.      Comments: LLE stump CDI; RLE NPWT CDI   Skin:     General: Skin is warm and dry.   Neurological:      Mental Status: He is alert and oriented to person, place, and time.   Psychiatric:         Behavior: Behavior is cooperative.         Fluids    Intake/Output Summary (Last 24 hours) at 6/3/2022 1206  Last data filed at 6/3/2022 1031  Gross per 24 hour   Intake 860 ml   Output 1125 ml   Net -265 ml       Laboratory                        Imaging  [id number] VIEW   Final Result      1. Large amount of stool throughout the colon from the cecum to the rectum.   2. No bowel obstruction.   3. Other chronic degenerative changes.      US-EXTREMITY ARTERY LOWER UNILAT RIGHT   Final Result      US-MIA SINGLE LEVEL UNILAT RIGHT   Final Result      DX-TIBIA AND FIBULA RIGHT   Final Result         1.  No acute traumatic bony injury.   2.  No destructive osseous lesion is appreciated, note that plain film is insensitive for evaluation of osteomyelitis, and bone scan would offer improved diagnostic sensitivity as clinically appropriate.      DX-FOOT-COMPLETE 3+ RIGHT    Final Result         1.  No acute traumatic bony injury. No destructive osseous lesion is seen, note that plain film can be insensitive for evaluation of osteomyelitis and bone scan would offer improved diagnostic sensitivity as clinically appropriate.   2.  Degenerative changes of the foot, greatest at the midfoot and hindfoot           Assessment/Plan  * S/P AKA (above knee amputation), left (HCC)- (present on admission)  Assessment & Plan  pain control  Neurontin for phantom pain  PT/OT   Case management for discharge planning    Problem related to discharge planning  Assessment & Plan  New LLE BHANU who lives in a trailer without wheelchair access  Stump not ready to accept orthosis, and no orthosis immediately available  Await modifications including a ramp/wheelchair access to his trailer prior to discharge home  Has been declined by many SNFs so far    Malingering  Assessment & Plan  6/2 Appears to be seeking modalities to contest his discharge    Anemia- (present on admission)  Assessment & Plan  Follow cbc    Chronic, continuous use of opioids- (present on admission)  Assessment & Plan  Pain control    Cellulitis of right leg- (present on admission)  Assessment & Plan  finished course of IV Unasyn    Marijuana abuse- (present on admission)  Assessment & Plan  Education and counseling    Tobacco use- (present on admission)  Assessment & Plan  Nicotine replacement protocol    Primary hypertension- (present on admission)  Assessment & Plan  Lisinopril and Coreg    Type 2 diabetes mellitus with hyperglycemia, with long-term current use of insulin (HCC)- (present on admission)  Assessment & Plan  Lantus, SSI      Chronic ulcer of lower extremity (HCC)- (present on admission)  Assessment & Plan  Right first and second toe amputation at the level of the MTP joint, Right leg debridement and application of wound vacuum  5/27/2022  Wound care, Pain control      Hyponatremia- (present on admission)  Assessment &  Plan  Mild    Peripheral vascular disease (HCC)- (present on admission)  Assessment & Plan  Catheter right below-knee popliteal artery, 6mm uncovered self expanding Right SFA stent placement   10/12/2021  Atorvastatin  ASA    History of MI (myocardial infarction)- (present on admission)  Assessment & Plan  Coreg, Lipitor  ASA     Hyperlipidemia- (present on admission)  Assessment & Plan  Atorvastatin    Drug-induced constipation- (present on admission)  Assessment & Plan  bowel protocol         VTE prophylaxis: Xarelto 10 mg daily as prophylaxis    I have performed a physical exam and reviewed and updated ROS and Plan today (6/3/2022). In review of yesterday's note (6/2/2022), there are no changes except as documented above.

## 2022-06-03 NOTE — PROGRESS NOTES
Pt A&Ox4. Saturating >90% on room air.  Pt verbalizes acute and surgical pain; PRN pain management in use.  Pt denies SOB/chest pain  +numbness or tingling to BLE.  Wound vac to R foot. L AKA with drg to stump.  Tolerating diabetic diet, denies N/V.  Hypoactive bowel sounds, LBM 5/30 (bowel protocol meds in use per MAR), +void.  Pt ambulates with x2 assist to wheelchair with use of a slideboard.  SCDs refused; L AKA. Xarelto in use.  Fall risk interventions in place.

## 2022-06-03 NOTE — PROGRESS NOTES
4 Eyes Skin Assessment Completed by DARIEN Sanders and DARIEN Friedman.     Head WDL  Ears WDL  Nose WDL  Mouth WDL  Neck WDL  Breast/Chest WDL  Shoulder Blades WDL  Spine WDL  (R) Arm/Elbow/Hand Scab  (L) Arm/Elbow/Hand WDL  Abdomen WDL  Groin Redness and Excoriation  Scrotum/Coccyx/Buttocks Redness and Blanching  (R) Leg Swelling, Edema and Incision. Wound vac to lower portion of RLE.   (L) Leg Redness, Blanching, Edema and Incision to L AKA. Sutures approximating wound and dressing replaced  (R) Heel/Foot/Toe swelling, edema, incision with wound vac  (L) Heel/Foot/Toe N/A              Devices In Places, refused        Interventions In Place Overhead trapeze Pillows. Refuses waffle, mepilex, or turns  Possible Skin Injury No     Pictures Uploaded Into Epic N/A  Wound Consult Placed N/A  RN Wound Prevention Protocol Ordered No

## 2022-06-03 NOTE — CARE PLAN
Problem: Pain - Standard  Goal: Alleviation of pain or a reduction in pain to the patient’s comfort goal  Outcome: Progressing     Problem: Skin Integrity  Goal: Skin integrity is maintained or improved  Outcome: Progressing     Problem: Fall Risk  Goal: Patient will remain free from falls  Outcome: Progressing   The patient is Stable - Low risk of patient condition declining or worsening    Shift Goals  Clinical Goals: OOB for meals; pain control; monitor for s/s of infection  Patient Goals: pain control; rest  Family Goals: N/A    Progress made toward(s) clinical / shift goals:  yes

## 2022-06-03 NOTE — DISCHARGE PLANNING
Agency/Facility Name: [facility]  Spoke To: JESSICA Caldwell Supervisor  Outcome: Asked for bed availability today.

## 2022-06-03 NOTE — ASSESSMENT & PLAN NOTE
New LLE AKA who lives in a trailer without wheelchair access  Stump not ready to accept orthosis, and no orthosis immediately available  Await modifications including a ramp/wheelchair access to his trailer prior to discharge home  Has been declined by many SNFs so far

## 2022-06-04 LAB
GLUCOSE BLD STRIP.AUTO-MCNC: 121 MG/DL (ref 65–99)
GLUCOSE BLD STRIP.AUTO-MCNC: 124 MG/DL (ref 65–99)
GLUCOSE BLD STRIP.AUTO-MCNC: 130 MG/DL (ref 65–99)
GLUCOSE BLD STRIP.AUTO-MCNC: 165 MG/DL (ref 65–99)
GLUCOSE BLD STRIP.AUTO-MCNC: 169 MG/DL (ref 65–99)

## 2022-06-04 PROCEDURE — 700102 HCHG RX REV CODE 250 W/ 637 OVERRIDE(OP): Performed by: NURSE PRACTITIONER

## 2022-06-04 PROCEDURE — 700101 HCHG RX REV CODE 250: Performed by: NURSE PRACTITIONER

## 2022-06-04 PROCEDURE — 700102 HCHG RX REV CODE 250 W/ 637 OVERRIDE(OP): Performed by: ORTHOPAEDIC SURGERY

## 2022-06-04 PROCEDURE — 99232 SBSQ HOSP IP/OBS MODERATE 35: CPT | Performed by: NURSE PRACTITIONER

## 2022-06-04 PROCEDURE — A9270 NON-COVERED ITEM OR SERVICE: HCPCS | Performed by: ORTHOPAEDIC SURGERY

## 2022-06-04 PROCEDURE — 770001 HCHG ROOM/CARE - MED/SURG/GYN PRIV*

## 2022-06-04 PROCEDURE — A9270 NON-COVERED ITEM OR SERVICE: HCPCS | Performed by: NURSE PRACTITIONER

## 2022-06-04 PROCEDURE — 700102 HCHG RX REV CODE 250 W/ 637 OVERRIDE(OP): Performed by: FAMILY MEDICINE

## 2022-06-04 PROCEDURE — 82962 GLUCOSE BLOOD TEST: CPT

## 2022-06-04 PROCEDURE — A9270 NON-COVERED ITEM OR SERVICE: HCPCS | Performed by: FAMILY MEDICINE

## 2022-06-04 RX ADMIN — GABAPENTIN 1800 MG: 300 CAPSULE ORAL at 17:05

## 2022-06-04 RX ADMIN — NYSTATIN: 100000 POWDER TOPICAL at 12:12

## 2022-06-04 RX ADMIN — MINERAL OIL 1 EACH: 1000 LIQUID ORAL at 10:39

## 2022-06-04 RX ADMIN — NYSTATIN: 100000 POWDER TOPICAL at 18:00

## 2022-06-04 RX ADMIN — CARVEDILOL 3.12 MG: 6.25 TABLET, FILM COATED ORAL at 09:41

## 2022-06-04 RX ADMIN — ACETAMINOPHEN 1000 MG: 500 TABLET, FILM COATED ORAL at 12:13

## 2022-06-04 RX ADMIN — OXYCODONE HYDROCHLORIDE 20 MG: 10 TABLET ORAL at 21:07

## 2022-06-04 RX ADMIN — OXYCODONE HYDROCHLORIDE 20 MG: 10 TABLET ORAL at 17:06

## 2022-06-04 RX ADMIN — DOCUSATE SODIUM 100 MG: 100 CAPSULE, LIQUID FILLED ORAL at 05:32

## 2022-06-04 RX ADMIN — SENNOSIDES AND DOCUSATE SODIUM 1 TABLET: 50; 8.6 TABLET ORAL at 21:11

## 2022-06-04 RX ADMIN — OXYCODONE HYDROCHLORIDE 20 MG: 10 TABLET ORAL at 01:12

## 2022-06-04 RX ADMIN — TRAZODONE HYDROCHLORIDE 450 MG: 150 TABLET ORAL at 21:12

## 2022-06-04 RX ADMIN — OXYCODONE HYDROCHLORIDE 20 MG: 10 TABLET ORAL at 05:33

## 2022-06-04 RX ADMIN — GABAPENTIN 1800 MG: 300 CAPSULE ORAL at 05:31

## 2022-06-04 RX ADMIN — ACETAMINOPHEN 1000 MG: 500 TABLET, FILM COATED ORAL at 17:07

## 2022-06-04 RX ADMIN — POLYETHYLENE GLYCOL 3350 1 PACKET: 17 POWDER, FOR SOLUTION ORAL at 05:33

## 2022-06-04 RX ADMIN — NYSTATIN: 100000 POWDER TOPICAL at 05:34

## 2022-06-04 RX ADMIN — ASPIRIN 81 MG: 81 TABLET, COATED ORAL at 05:33

## 2022-06-04 RX ADMIN — LISINOPRIL 5 MG: 5 TABLET ORAL at 05:32

## 2022-06-04 RX ADMIN — ACETAMINOPHEN 1000 MG: 500 TABLET, FILM COATED ORAL at 05:32

## 2022-06-04 RX ADMIN — ATORVASTATIN CALCIUM 80 MG: 40 TABLET, FILM COATED ORAL at 17:06

## 2022-06-04 RX ADMIN — OXYCODONE HYDROCHLORIDE 20 MG: 10 TABLET ORAL at 10:53

## 2022-06-04 ASSESSMENT — PAIN DESCRIPTION - PAIN TYPE
TYPE: ACUTE PAIN;SURGICAL PAIN
TYPE: ACUTE PAIN;CHRONIC PAIN
TYPE: NEUROPATHIC PAIN;ACUTE PAIN
TYPE: SURGICAL PAIN;ACUTE PAIN
TYPE: ACUTE PAIN;SURGICAL PAIN

## 2022-06-04 ASSESSMENT — ENCOUNTER SYMPTOMS
ABDOMINAL PAIN: 0
FEVER: 0
COUGH: 0
NAUSEA: 0
VOMITING: 0
MYALGIAS: 0

## 2022-06-04 NOTE — PROGRESS NOTES
"Hospital Medicine Daily Progress Note    Date of Service  6/4/2022    Chief Complaint  Luis Kellogg is a 71 y.o. male admitted 5/18/2022 with OM    Hospital Course  Peyman Kellogg is a 71 year old male with past medical history of T2DM, neuropathy, HTN, PAD and tobacco use who presented 5/18/2022 for a scheduled left AKA with Dr. Buenrostro. Patient hypotensive during the procedure and was admitted to [facility]. Transferred to hospitalist service by Dr. Buenrostro. Patient also has right lower extremity wounds, cellulitis w exposed tendon/joints. LPS consulted and he eventually underwent R 1st, 2nd toe amp at the [facility] w debridement and NPWT application on 5/27/22.    Abx complete. Referred to SNF. Renovation of his trailer/home to accomodate WC access has been in process.      Interval Problem Update  6/4 Says he constipated today. Last BM several days ago [place] [facility] ordered. POC controlled.     6/3 Accepted to SNF. Awaits bed availability. POC at goal.     6/2 No clinical changes. Awaits SNF. Says he feels he is being \"evicted.\" Then claimed to be joking after re-educated that this is to facility therapy; stump healing/maturation/mobility. Then c/o that he never signed the Choice form. Showed him the scanned copy with circles over his choices, and the verbal order signed on his behalf. Later said he remembered.    6/1 VSS. No c/o. FANTA. SNF referrals in process.    5/31 VSS and WNL  POC more or less at goal  154 morphine milliequivalents yesterday/Oxy/Dilaudid  Has many questions about dispo    Consultants/Specialty  LPS    Code Status  Full Code    Disposition  Patient is medically cleared for discharge.   Anticipate discharge to to skilled nursing facility.  I have placed the appropriate orders for post-discharge needs.    Review of Systems  Review of Systems   Constitutional: Negative for fever.   Respiratory: Negative for cough.    Cardiovascular: Negative for chest pain.   Gastrointestinal: Negative for " abdominal pain, nausea and vomiting.   Genitourinary: Negative for dysuria.   Musculoskeletal: Negative for myalgias.        Phantom limb pain   Skin: Negative for rash.   All other systems reviewed and are negative.       Physical Exam  Temp:  [36.3 °C (97.4 °F)-36.7 °C (98 °F)] 36.3 °C (97.4 °F)  Pulse:  [60-88] 88  Resp:  [16-18] 16  BP: (102-126)/(67-78) 126/73  SpO2:  [90 %-93 %] 91 %    Physical Exam  Vitals and nursing note reviewed.   HENT:      Head: Normocephalic and atraumatic.      Nose: Nose normal.   Eyes:      Conjunctiva/sclera: Conjunctivae normal.      Pupils: Pupils are equal, round, and reactive to light.   Cardiovascular:      Rate and Rhythm: Normal rate and regular rhythm.      Heart sounds: No murmur heard.    No friction rub.   Pulmonary:      Effort: No respiratory distress.   Abdominal:      General: Bowel sounds are normal. There is no distension.      Palpations: Abdomen is soft.   Musculoskeletal:      Cervical back: Neck supple.      Comments: LLE stump CDI; RLE NPWT CDI   Skin:     General: Skin is warm and dry.   Neurological:      Mental Status: He is alert and oriented to person, place, and time.   Psychiatric:         Behavior: Behavior is cooperative.         Fluids    Intake/Output Summary (Last 24 hours) at 6/4/2022 1046  Last data filed at 6/4/2022 0508  Gross per 24 hour   Intake 800 ml   Output 2675 ml   Net -1875 ml       Laboratory                        Imaging  [id number] VIEW   Final Result      1. Large amount of stool throughout the colon from the cecum to the rectum.   2. No bowel obstruction.   3. Other chronic degenerative changes.      US-EXTREMITY ARTERY LOWER UNILAT RIGHT   Final Result      US-IMA SINGLE LEVEL UNILAT RIGHT   Final Result      DX-TIBIA AND FIBULA RIGHT   Final Result         1.  No acute traumatic bony injury.   2.  No destructive osseous lesion is appreciated, note that plain film is insensitive for evaluation of osteomyelitis, and bone scan  would offer improved diagnostic sensitivity as clinically appropriate.      DX-FOOT-COMPLETE 3+ RIGHT   Final Result         1.  No acute traumatic bony injury. No destructive osseous lesion is seen, note that plain film can be insensitive for evaluation of osteomyelitis and bone scan would offer improved diagnostic sensitivity as clinically appropriate.   2.  Degenerative changes of the foot, greatest at the midfoot and hindfoot           Assessment/Plan  * S/P AKA (above knee amputation), left (HCC)- (present on admission)  Assessment & Plan  pain control  Neurontin for phantom pain  PT/OT   Case management for discharge planning    Problem related to discharge planning  Assessment & Plan  New LLE BHANU who lives in a trailer without wheelchair access  Stump not ready to accept orthosis, and no orthosis immediately available  Await modifications including a ramp/wheelchair access to his trailer prior to discharge home  Has been declined by many SNFs so far    Malingering  Assessment & Plan  6/2 Appears to be seeking modalities to contest his discharge    Anemia- (present on admission)  Assessment & Plan  Follow cbc    Chronic, continuous use of opioids- (present on admission)  Assessment & Plan  Pain control    Cellulitis of right leg- (present on admission)  Assessment & Plan  finished course of IV Unasyn    Marijuana abuse- (present on admission)  Assessment & Plan  Education and counseling    Tobacco use- (present on admission)  Assessment & Plan  Nicotine replacement protocol    Primary hypertension- (present on admission)  Assessment & Plan  Lisinopril and Coreg    Type 2 diabetes mellitus with hyperglycemia, with long-term current use of insulin ([facility])- (present on admission)  Assessment & Plan  Lantus, SSI      Chronic ulcer of lower extremity (HCC)- (present on admission)  Assessment & Plan  Right first and second toe amputation at the level of the MTP joint, Right leg debridement and application of wound  vacuum  5/27/2022  Wound care, Pain control      Hyponatremia- (present on admission)  Assessment & Plan  Mild    Peripheral vascular disease (HCC)- (present on admission)  Assessment & Plan  Catheter right below-knee popliteal artery, 6mm uncovered self expanding Right SFA stent placement   10/12/2021  Atorvastatin  ASA    History of MI (myocardial infarction)- (present on admission)  Assessment & Plan  Coreg, Lipitor  ASA     Hyperlipidemia- (present on admission)  Assessment & Plan  Atorvastatin    Drug-induced constipation- (present on admission)  Assessment & Plan  bowel protocol  6/4 [place] [facility] Cocktail       VTE prophylaxis: Xarelto 10 mg daily as prophylaxis    I have performed a physical exam and reviewed and updated ROS and Plan today (6/4/2022). In review of yesterday's note (6/3/2022), there are no changes except as documented above.

## 2022-06-04 NOTE — PROGRESS NOTES
Report received from RN, assumed care at 1845  Pt is A0X4, and responds appropriately   Pt declines any SOB, chest pain, new onset of numbness/ tingiling  Pt rates pain at 8/10, on a scale of 1-10, pt medicated per MAR  + voiding  Pt has + flatus, + bowel sounds, + BM on  Pt up with x2 assist to wheelchair. Bed alarm on.   L AKA, wound vac to RLE, right toes 1 and 2 amputated.   Dressing in place.  Pt is tolerating a diabetic diet, pt denies any nausea/vomiting at this time  Plan of care discussed, all questions answered. Explained importance of calling before getting OOB and pt verbalizes understanding. Explained importance of oral care. Call light is within reach, treaded slipper socks on, bed in lowest/ locked position, hourly rounding in place, all needs met at this time

## 2022-06-04 NOTE — PROGRESS NOTES
Bedside report received.  Assessment complete.  A&O x 4. Patient calls appropriately.  Patient up with x1 assist to WC. Bed alarm on.  L AKA, wound vac to RLE, right toes 1 and 2 amputated, dressing in place.   Patient has 7/10 pain. Declines interventions.   Denies N&V. Tolerating diet.  + void, + flatus, - BM. Provider notified, Enema ordered.  Patient denies SOB.  Review plan with of care with patient. Call light and personal belongings with in reach. Hourly rounding in place. All needs met at this time

## 2022-06-04 NOTE — PROGRESS NOTES
Bedside report received.  Assessment complete.  A&O x 4. Patient calls appropriately.  Patient up with x1 assist to WC. Bed alarm on.  L AKA, wound vac to RLE, right toes 1 and 2 amputated, dressing in place.   Patient has 7-8/10 pain. medicated  Denies N&V. Tolerating diet.  + void, + flatus, - BM.  Patient denies SOB.  Review plan with of care with patient. Call light and personal belongings with in reach. Hourly rounding in place. All needs met at this time.

## 2022-06-04 NOTE — CARE PLAN
The patient is Stable - Low risk of patient condition declining or worsening    Shift Goals  Clinical Goals: pain control, ambulate  Patient Goals: pain control; rest  Family Goals: N/A    Progress made toward(s) clinical / shift goals:    Problem: Pain - Standard  Goal: Alleviation of pain or a reduction in pain to the patient’s comfort goal  Outcome: Progressing     Problem: Skin Integrity  Goal: Skin integrity is maintained or improved  Outcome: Progressing       Problem: Fall Risk  Goal: Patient will remain free from falls  Outcome: Progressing       Patient is not progressing towards the following goals:

## 2022-06-04 NOTE — CARE PLAN
The patient is Stable - Low risk of patient condition declining or worsening    Shift Goals  Clinical Goals: pain control, rest  Patient Goals: pain control, sleep  Family Goals: N/A    Progress made toward(s) clinical / shift goals:    Problem: Pain - Standard  Goal: Alleviation of pain or a reduction in pain to the patient’s comfort goal  Outcome: Progressing     Problem: Knowledge Deficit - Standard  Goal: Patient and family/care givers will demonstrate understanding of plan of care, disease process/condition, diagnostic tests and medications  Outcome: Progressing       Patient being medicated appropriately for pain per MAR. Patient is able to verbalize his understanding of plan of care.

## 2022-06-05 LAB
GLUCOSE BLD STRIP.AUTO-MCNC: 125 MG/DL (ref 65–99)
GLUCOSE BLD STRIP.AUTO-MCNC: 135 MG/DL (ref 65–99)
GLUCOSE BLD STRIP.AUTO-MCNC: 144 MG/DL (ref 65–99)
GLUCOSE BLD STRIP.AUTO-MCNC: 173 MG/DL (ref 65–99)

## 2022-06-05 PROCEDURE — A9270 NON-COVERED ITEM OR SERVICE: HCPCS | Performed by: ORTHOPAEDIC SURGERY

## 2022-06-05 PROCEDURE — 82962 GLUCOSE BLOOD TEST: CPT | Mod: 91

## 2022-06-05 PROCEDURE — 700102 HCHG RX REV CODE 250 W/ 637 OVERRIDE(OP): Performed by: NURSE PRACTITIONER

## 2022-06-05 PROCEDURE — 700102 HCHG RX REV CODE 250 W/ 637 OVERRIDE(OP): Performed by: FAMILY MEDICINE

## 2022-06-05 PROCEDURE — 770001 HCHG ROOM/CARE - MED/SURG/GYN PRIV*

## 2022-06-05 PROCEDURE — A9270 NON-COVERED ITEM OR SERVICE: HCPCS | Performed by: NURSE PRACTITIONER

## 2022-06-05 PROCEDURE — 99231 SBSQ HOSP IP/OBS SF/LOW 25: CPT | Performed by: NURSE PRACTITIONER

## 2022-06-05 PROCEDURE — 700102 HCHG RX REV CODE 250 W/ 637 OVERRIDE(OP): Performed by: ORTHOPAEDIC SURGERY

## 2022-06-05 PROCEDURE — A9270 NON-COVERED ITEM OR SERVICE: HCPCS | Performed by: FAMILY MEDICINE

## 2022-06-05 RX ADMIN — OXYCODONE HYDROCHLORIDE 20 MG: 10 TABLET ORAL at 21:01

## 2022-06-05 RX ADMIN — ASPIRIN 81 MG: 81 TABLET, COATED ORAL at 06:04

## 2022-06-05 RX ADMIN — ATORVASTATIN CALCIUM 80 MG: 40 TABLET, FILM COATED ORAL at 16:48

## 2022-06-05 RX ADMIN — OXYCODONE HYDROCHLORIDE 20 MG: 10 TABLET ORAL at 16:47

## 2022-06-05 RX ADMIN — NYSTATIN: 100000 POWDER TOPICAL at 06:04

## 2022-06-05 RX ADMIN — OXYCODONE HYDROCHLORIDE 20 MG: 10 TABLET ORAL at 01:15

## 2022-06-05 RX ADMIN — ACETAMINOPHEN 1000 MG: 500 TABLET, FILM COATED ORAL at 06:04

## 2022-06-05 RX ADMIN — TRAZODONE HYDROCHLORIDE 450 MG: 150 TABLET ORAL at 21:01

## 2022-06-05 RX ADMIN — OXYCODONE HYDROCHLORIDE 20 MG: 10 TABLET ORAL at 06:04

## 2022-06-05 RX ADMIN — GABAPENTIN 1800 MG: 300 CAPSULE ORAL at 06:04

## 2022-06-05 RX ADMIN — ACETAMINOPHEN 1000 MG: 500 TABLET, FILM COATED ORAL at 12:25

## 2022-06-05 RX ADMIN — GABAPENTIN 1800 MG: 300 CAPSULE ORAL at 16:47

## 2022-06-05 RX ADMIN — ACETAMINOPHEN 1000 MG: 500 TABLET, FILM COATED ORAL at 16:48

## 2022-06-05 RX ADMIN — OXYCODONE HYDROCHLORIDE 20 MG: 10 TABLET ORAL at 12:25

## 2022-06-05 ASSESSMENT — ENCOUNTER SYMPTOMS
VOMITING: 0
COUGH: 0
MYALGIAS: 0
ABDOMINAL PAIN: 0
FEVER: 0
NAUSEA: 0

## 2022-06-05 ASSESSMENT — PAIN DESCRIPTION - PAIN TYPE
TYPE: NEUROPATHIC PAIN;CHRONIC PAIN
TYPE: CHRONIC PAIN;NEUROPATHIC PAIN
TYPE: NEUROPATHIC PAIN;CHRONIC PAIN

## 2022-06-05 NOTE — CARE PLAN
Problem: Skin Integrity  Goal: Skin integrity is maintained or improved  Outcome: Progressing     Problem: Pain - Standard  Goal: Alleviation of pain or a reduction in pain to the patient’s comfort goal  Outcome: Progressing     The patient is Stable - Low risk of patient condition declining or worsening    Shift Goals  Clinical Goals: pain control  Patient Goals: Snf  Family Goals: N/A    Progress made toward(s) clinical / shift goals:  Pain controlled on orals. Pt able to mobilize to EOB self with assistance of trapeze bar, tolerate well, turns side to side OK    Patient is not progressing towards the following goals:

## 2022-06-05 NOTE — PROGRESS NOTES
"Hospital Medicine Daily Progress Note    Date of Service  6/5/2022    Chief Complaint  Luis Kellogg is a 71 y.o. male admitted 5/18/2022 with OM    Hospital Course  Peyman Kellogg is a 71 year old male with past medical history of T2DM, neuropathy, HTN, PAD and tobacco use who presented 5/18/2022 for a scheduled left AKA with Dr. Buenrostro. Patient hypotensive during the procedure and was admitted to [facility]. Transferred to hospitalist service by Dr. Buenrostro. Patient also has right lower extremity wounds, cellulitis w exposed tendon/joints. LPS consulted and he eventually underwent R 1st, 2nd toe amp at the [facility] w debridement and NPWT application on 5/27/22.    Abx complete. Referred to SNF. Renovation of his trailer/home to accomodate WC access has been in process.      Interval Problem Update  6/5 BM yesterday. No c/o. Working on his banking. Awaits bed at [facility]. Medically clear.     6/4 Says he constipated today. Last BM several days ago [place] [facility] ordered. POC controlled.     6/3 Accepted to SNF. Awaits bed availability. POC at goal.     6/2 No clinical changes. Awaits SNF. Says he feels he is being \"evicted.\" Then claimed to be joking after re-educated that this is to facility therapy; stump healing/maturation/mobility. Then c/o that he never signed the Choice form. Showed him the scanned copy with circles over his choices, and the verbal order signed on his behalf. Later said he remembered.    6/1 VSS. No c/o. FANTA. SNF referrals in process.    5/31 VSS and WNL  POC more or less at goal  154 morphine milliequivalents yesterday/Oxy/Dilaudid  Has many questions about dispo    Consultants/Specialty  LPS    Code Status  Full Code    Disposition  Patient is medically cleared for discharge.   Anticipate discharge to to skilled nursing facility.  I have placed the appropriate orders for post-discharge needs.    Review of Systems  Review of Systems   Constitutional: Negative for fever.   Respiratory: " Negative for cough.    Cardiovascular: Negative for chest pain.   Gastrointestinal: Negative for abdominal pain, nausea and vomiting.   Genitourinary: Negative for dysuria.   Musculoskeletal: Negative for myalgias.        Phantom limb pain   Skin: Negative for rash.   All other systems reviewed and are negative.       Physical Exam  Temp:  [36.1 °C (97 °F)-36.7 °C (98 °F)] 36.4 °C (97.5 °F)  Pulse:  [65-85] 85  Resp:  [17-18] 18  BP: ()/(52-66) 100/66  SpO2:  [94 %-96 %] 96 %    Physical Exam  Vitals and nursing note reviewed.   HENT:      Head: Normocephalic and atraumatic.      Nose: Nose normal.   Eyes:      Conjunctiva/sclera: Conjunctivae normal.      Pupils: Pupils are equal, round, and reactive to light.   Cardiovascular:      Rate and Rhythm: Normal rate and regular rhythm.      Heart sounds: No murmur heard.    No friction rub.   Pulmonary:      Effort: No respiratory distress.   Abdominal:      General: Bowel sounds are normal. There is no distension.      Palpations: Abdomen is soft.   Musculoskeletal:      Cervical back: Neck supple.      Comments: LLE stump CDI; RLE NPWT CDI   Skin:     General: Skin is warm and dry.   Neurological:      Mental Status: He is alert and oriented to person, place, and time.   Psychiatric:         Behavior: Behavior is cooperative.         Fluids    Intake/Output Summary (Last 24 hours) at 6/5/2022 1116  Last data filed at 6/5/2022 1000  Gross per 24 hour   Intake 840 ml   Output 1400 ml   Net -560 ml       Laboratory                        Imaging  [id number] VIEW   Final Result      1. Large amount of stool throughout the colon from the cecum to the rectum.   2. No bowel obstruction.   3. Other chronic degenerative changes.      US-EXTREMITY ARTERY LOWER UNILAT RIGHT   Final Result      US-MIA SINGLE LEVEL UNILAT RIGHT   Final Result      DX-TIBIA AND FIBULA RIGHT   Final Result         1.  No acute traumatic bony injury.   2.  No destructive osseous lesion is  appreciated, note that plain film is insensitive for evaluation of osteomyelitis, and bone scan would offer improved diagnostic sensitivity as clinically appropriate.      DX-FOOT-COMPLETE 3+ RIGHT   Final Result         1.  No acute traumatic bony injury. No destructive osseous lesion is seen, note that plain film can be insensitive for evaluation of osteomyelitis and bone scan would offer improved diagnostic sensitivity as clinically appropriate.   2.  Degenerative changes of the foot, greatest at the midfoot and hindfoot           Assessment/Plan  * S/P AKA (above knee amputation), left (HCC)- (present on admission)  Assessment & Plan  pain control  Neurontin for phantom pain  PT/OT   Case management for discharge planning    Problem related to discharge planning  Assessment & Plan  New LLE BHANU who lives in a trailer without wheelchair access  Stump not ready to accept orthosis, and no orthosis immediately available  Await modifications including a ramp/wheelchair access to his trailer prior to discharge home  Has been declined by many SNFs so far    Malingering  Assessment & Plan  6/2 Appears to be seeking modalities to contest his discharge    Anemia- (present on admission)  Assessment & Plan  Follow cbc    Chronic, continuous use of opioids- (present on admission)  Assessment & Plan  Pain control    Cellulitis of right leg- (present on admission)  Assessment & Plan  finished course of IV Unasyn    Marijuana abuse- (present on admission)  Assessment & Plan  Education and counseling    Tobacco use- (present on admission)  Assessment & Plan  Nicotine replacement protocol    Primary hypertension- (present on admission)  Assessment & Plan  Lisinopril and Coreg    Type 2 diabetes mellitus with hyperglycemia, with long-term current use of insulin (HCC)- (present on admission)  Assessment & Plan  Lantus, SSI      Chronic ulcer of lower extremity (HCC)- (present on admission)  Assessment & Plan  Right first and second  toe amputation at the level of the MTP joint, Right leg debridement and application of wound vacuum  5/27/2022  Wound care, Pain control      Hyponatremia- (present on admission)  Assessment & Plan  Mild    Peripheral vascular disease (HCC)- (present on admission)  Assessment & Plan  Catheter right below-knee popliteal artery, 6mm uncovered self expanding Right SFA stent placement   10/12/2021  Atorvastatin  ASA    History of MI (myocardial infarction)- (present on admission)  Assessment & Plan  Coreg, Lipitor  ASA     Hyperlipidemia- (present on admission)  Assessment & Plan  Atorvastatin    Drug-induced constipation- (present on admission)  Assessment & Plan  bowel protocol  6/4 [place] [facility] Cocktail       VTE prophylaxis: Xarelto 10 mg daily as prophylaxis    I have performed a physical exam and reviewed and updated ROS and Plan today (6/5/2022). In review of yesterday's note (6/4/2022), there are no changes except as documented above.

## 2022-06-06 LAB
GLUCOSE BLD STRIP.AUTO-MCNC: 120 MG/DL (ref 65–99)
GLUCOSE BLD STRIP.AUTO-MCNC: 124 MG/DL (ref 65–99)
GLUCOSE BLD STRIP.AUTO-MCNC: 144 MG/DL (ref 65–99)
GLUCOSE BLD STRIP.AUTO-MCNC: 153 MG/DL (ref 65–99)

## 2022-06-06 PROCEDURE — 97606 NEG PRS WND THER DME>50 SQCM: CPT

## 2022-06-06 PROCEDURE — 700102 HCHG RX REV CODE 250 W/ 637 OVERRIDE(OP): Performed by: FAMILY MEDICINE

## 2022-06-06 PROCEDURE — A9270 NON-COVERED ITEM OR SERVICE: HCPCS | Performed by: FAMILY MEDICINE

## 2022-06-06 PROCEDURE — A9270 NON-COVERED ITEM OR SERVICE: HCPCS | Performed by: NURSE PRACTITIONER

## 2022-06-06 PROCEDURE — 306591 TRAY SUTURE REMOVAL DISP: Performed by: NURSE PRACTITIONER

## 2022-06-06 PROCEDURE — 99232 SBSQ HOSP IP/OBS MODERATE 35: CPT | Performed by: NURSE PRACTITIONER

## 2022-06-06 PROCEDURE — 700102 HCHG RX REV CODE 250 W/ 637 OVERRIDE(OP): Performed by: ORTHOPAEDIC SURGERY

## 2022-06-06 PROCEDURE — 700102 HCHG RX REV CODE 250 W/ 637 OVERRIDE(OP): Performed by: NURSE PRACTITIONER

## 2022-06-06 PROCEDURE — A9270 NON-COVERED ITEM OR SERVICE: HCPCS | Performed by: ORTHOPAEDIC SURGERY

## 2022-06-06 PROCEDURE — 770001 HCHG ROOM/CARE - MED/SURG/GYN PRIV*

## 2022-06-06 PROCEDURE — 700101 HCHG RX REV CODE 250: Performed by: FAMILY MEDICINE

## 2022-06-06 PROCEDURE — 302098 PASTE RING (FLAT): Performed by: NURSE PRACTITIONER

## 2022-06-06 PROCEDURE — 82962 GLUCOSE BLOOD TEST: CPT

## 2022-06-06 PROCEDURE — 700111 HCHG RX REV CODE 636 W/ 250 OVERRIDE (IP): Performed by: NURSE PRACTITIONER

## 2022-06-06 RX ADMIN — NYSTATIN: 100000 POWDER TOPICAL at 17:35

## 2022-06-06 RX ADMIN — SENNOSIDES AND DOCUSATE SODIUM 1 TABLET: 50; 8.6 TABLET ORAL at 20:16

## 2022-06-06 RX ADMIN — GABAPENTIN 1800 MG: 300 CAPSULE ORAL at 17:26

## 2022-06-06 RX ADMIN — GABAPENTIN 1800 MG: 300 CAPSULE ORAL at 06:14

## 2022-06-06 RX ADMIN — RIVAROXABAN 10 MG: 10 TABLET, FILM COATED ORAL at 17:26

## 2022-06-06 RX ADMIN — HYDROMORPHONE HYDROCHLORIDE 1 MG: 1 INJECTION, SOLUTION INTRAMUSCULAR; INTRAVENOUS; SUBCUTANEOUS at 10:58

## 2022-06-06 RX ADMIN — ACETAMINOPHEN 1000 MG: 500 TABLET, FILM COATED ORAL at 13:42

## 2022-06-06 RX ADMIN — METFORMIN HYDROCHLORIDE 500 MG: 500 TABLET ORAL at 17:26

## 2022-06-06 RX ADMIN — OXYCODONE HYDROCHLORIDE 20 MG: 10 TABLET ORAL at 01:20

## 2022-06-06 RX ADMIN — LISINOPRIL 5 MG: 5 TABLET ORAL at 06:15

## 2022-06-06 RX ADMIN — ACETAMINOPHEN 1000 MG: 500 TABLET, FILM COATED ORAL at 01:20

## 2022-06-06 RX ADMIN — ACETAMINOPHEN 1000 MG: 500 TABLET, FILM COATED ORAL at 06:15

## 2022-06-06 RX ADMIN — LIDOCAINE HYDROCHLORIDE 1 APPLICATION: 40 SOLUTION TOPICAL at 10:10

## 2022-06-06 RX ADMIN — ASPIRIN 81 MG: 81 TABLET, COATED ORAL at 06:14

## 2022-06-06 RX ADMIN — OXYCODONE HYDROCHLORIDE 20 MG: 10 TABLET ORAL at 09:32

## 2022-06-06 RX ADMIN — OXYCODONE HYDROCHLORIDE 20 MG: 10 TABLET ORAL at 19:49

## 2022-06-06 RX ADMIN — NYSTATIN: 100000 POWDER TOPICAL at 06:20

## 2022-06-06 RX ADMIN — DOCUSATE SODIUM 100 MG: 100 CAPSULE, LIQUID FILLED ORAL at 17:26

## 2022-06-06 RX ADMIN — OXYCODONE HYDROCHLORIDE 20 MG: 10 TABLET ORAL at 15:16

## 2022-06-06 RX ADMIN — SENNOSIDES AND DOCUSATE SODIUM 1 TABLET: 50; 8.6 TABLET ORAL at 15:20

## 2022-06-06 RX ADMIN — TRAZODONE HYDROCHLORIDE 450 MG: 150 TABLET ORAL at 22:03

## 2022-06-06 RX ADMIN — OXYCODONE HYDROCHLORIDE 20 MG: 10 TABLET ORAL at 06:19

## 2022-06-06 RX ADMIN — ATORVASTATIN CALCIUM 80 MG: 40 TABLET, FILM COATED ORAL at 17:26

## 2022-06-06 RX ADMIN — METFORMIN HYDROCHLORIDE 500 MG: 500 TABLET ORAL at 13:41

## 2022-06-06 RX ADMIN — NYSTATIN: 100000 POWDER TOPICAL at 13:45

## 2022-06-06 RX ADMIN — ACETAMINOPHEN 1000 MG: 500 TABLET, FILM COATED ORAL at 17:26

## 2022-06-06 ASSESSMENT — PAIN DESCRIPTION - PAIN TYPE
TYPE: NEUROPATHIC PAIN
TYPE: NEUROPATHIC PAIN;CHRONIC PAIN
TYPE: NEUROPATHIC PAIN
TYPE: ACUTE PAIN

## 2022-06-06 ASSESSMENT — ENCOUNTER SYMPTOMS
FEVER: 0
VOMITING: 0
NAUSEA: 0
ABDOMINAL PAIN: 0
MYALGIAS: 0
COUGH: 0

## 2022-06-06 NOTE — CARE PLAN
Problem: Pain - Standard  Goal: Alleviation of pain or a reduction in pain to the patient’s comfort goal  Outcome: Progressing   Pain medicated per Mar  Problem: Skin Integrity  Goal: Skin integrity is maintained or improved  Outcome: Progressing   Patient turns self in bed   Problem: Fall Risk  Goal: Patient will remain free from falls  Outcome: Progressing  Patient free from falls this shift    The patient is Stable - Low risk of patient condition declining or worsening    Shift Goals  Clinical Goals: Pain Control  Patient Goals: Pain Control  Family Goals: N/A    Progress made toward(s) clinical / shift goals:  Pain medicated per MAR     Patient is not progressing towards the following goals:

## 2022-06-06 NOTE — DISCHARGE PLANNING
Spoke with Beba from \[facility]\"", who will assess patient for admission to their facility. She discussed this with the patient who was in agreement. Referral to be sent to their facility.

## 2022-06-06 NOTE — DISCHARGE PLANNING
Agency/Facility Name: [facility]   Spoke To: Alessandra  Outcome: Patients referral has just been received for review. Will call back once referral has been completely reviewed to discuss further details.

## 2022-06-06 NOTE — WOUND TEAM
[facility]  Inpatient Services  Wound and Skin Care Evaluation    Admission Date: 5/18/2022     Last order of IP CONSULT TO WOUND CARE was found on 5/18/2022 from Hospital Encounter on 4/28/2022     HPI, PMH, SH: Reviewed    Past Surgical History:   Procedure Laterality Date   • TOE AMPUTATION Right 5/27/2022    Procedure: AMPUTATION, TOE - 1ST AND 2ND;  Surgeon: Aleks Buenrostro M.D.;  Location: [facility];  Service: Orthopedics   • IRRIGATION & DEBRIDEMENT GENERAL  5/27/2022    Procedure: IRRIGATION AND DEBRIDEMENT, WOUND - LEG;  Surgeon: Aleks Buenrostro M.D.;  Location: [facility];  Service: Orthopedics   • APPLICATION OR REPLACEMENT, WOUND VAC  5/27/2022    Procedure: APPLICATION OR REPLACEMENT, WOUND VAC;  Surgeon: Aleks Buenrostro M.D.;  Location: [facility];  Service: Orthopedics   • PB AMPUTATE THIGH,THRU FEMUR Left 5/18/2022    Procedure: LEFT ABOVE KNEE AMPUTATION, ADDUCTOR TENDON MYOTENODESIS;  Surgeon: Aleks Buenrostro M.D.;  Location: [facility];  Service: Orthopedics   • PB XFER SINGLE SUPERFICI LOW LEG TENDON Left 5/18/2022    Procedure: TRANSFER, TENDON;  Surgeon: Aleks Buenrostro M.D.;  Location: [facility];  Service: Orthopedics   • ANGIOGRAM Bilateral 10/12/2021    Procedure: BILATERAL LOWER EXTREMITY ANGIOGRAM;  Surgeon: Valerio Purcell M.D.;  Location: [facility];  Service: Vascular   • IRRIGATION & DEBRIDEMENT GENERAL Bilateral 10/12/2021    Procedure: IRRIGATION AND DEBRIDEMENT, WOUND, BILATERAL LOWER EXTREMITY;  Surgeon: Valerio Purcell M.D.;  Location: [facility];  Service: Vascular   • APPLICATION OR REPLACEMENT, WOUND VAC Bilateral 10/12/2021    Procedure: APPLICATION WOUND VAC;  Surgeon: Valerio Purcell M.D.;  Location: [facility];  Service: Vascular   • STENT PLACEMENT Right 10/12/2021    Procedure: STENT PLACEMENT, RIGHT SUPERFICIAL FEMORAL ARTERY;  Surgeon:  Valerio Purcell M.D.;  Location: [facility];  Service: Vascular   • TOE AMPUTATION Left 2/17/2020    Procedure: LEFT SECOND, THIRD, AND FORTH TOE AMPUTATION;  Surgeon: Alfonso Esteban M.D.;  Location: SUR[facility]ERY [facility];  Service: Orthopedics   • NE UNLISTED PROC, ARTHROSCOPY Left 7/25/2019    Procedure: LEFT ENDOSCOPIC ULNAR NERVE DECOMPRESSION, LEFT CARPAL TUNNEL RELEASE;  Surgeon: Red Chacon M.D.;  Location: SURGERY [facility];  Service: Orthopedics   • KNEE REPLACEMENT, TOTAL Bilateral    • OTHER SURGICAL PROCEDURE      back surgery - Unknown      Social History     Tobacco Use   • Smoking status: Current Every Day Smoker     Packs/day: 0.50     Years: 54.00     Pack years: 27.00     Types: Cigarettes   • Smokeless tobacco: Former User   Substance Use Topics   • Alcohol use: Not Currently     Alcohol/week: 0.0 oz     Comment: occas     No chief complaint on file.    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 Hx type 2 DM.    WOUND ASSESSMENT/LDA  Negative Pressure Wound Therapy 05/24/22 Chronic Leg Lateral Right (Active)   Vacuum Serial Number [id number] 06/02/22 0845   NPWT Pump Mode / Pressure Setting Ulta;Continuous;125 mmHg    Dressing Type Medium;Black Foam (Regular);Non-adherent    Number of Foam Pieces Used 4    Canister Changed No    Output (mL) 600 mL    NEXT Dressing Change/Treatment Date 06/08/22    VAC VeraFlo Irrigant Other (Comments)    VAC VeraFlo Soak Time (mins) 0    VAC VeraFlo Instill Volume (ml) 0    VAC VeraFlo - Therapy Time (hrs) 0    VAC VeraFlo Pressure (mm/Hg) Intermittent;125 mmHg    WOUND NURSE ONLY - Time Spent with Patient (mins) 60      Wound 05/19/22 Full  Thickness Wound Leg Right (Active)   Wound Image   22 1100   Site Assessment Red;Early/partial granulation    Periwound Assessment Clean;Dry;Intact    Margins Attached edges;Defined edges    Closure Secondary intention    Drainage Amount Small    Drainage Description Serosanguineous    Treatments Cleansed;Site care;Offloading;Compression;Topical Lidocaine    Wound Cleansing Approved Wound Cleanser    Periwound Protectant Skin Protectant Wipes to Periwound;Paste Ring;Adaptic    Dressing Cleansing/Solutions Not Applicable    Dressing Options Adaptic;Wound Vac;Mepilex;Compression Wrap Two Layer    Dressing Changed Changed    Dressing Status Clean;Dry;Intact    Dressing Change/Treatment Frequency Monday, Wednesday, Friday, and As Needed    NEXT Dressing Change/Treatment Date 22    NEXT Weekly Photo (Inpatient Only) 22    Non-staged Wound Description Full thickness    Wound Length (cm) 21 cm    Wound Width (cm) 12 cm    Wound Depth (cm) 0 cm    Wound Surface Area (cm^2) 252 cm^2    Wound Volume (cm^3) 0 cm^3    Wound Healing % 100    Wound Bed Granulation (%) 100 %    Shape Oval    Wound Odor None    Exposed Structures None    WOUND NURSE ONLY - Time Spent with Patient (mins) 60       Vascular:    MIA:   MIA Results, Last 30 Days US-EXTREMITY ARTERY LOWER UNILAT RIGHT    Result Date: 2022  Narrative Lower Extremity  Arterial Duplex Report  Vascular Laboratory  CONCLUSIONS  1) 50-75% stenosis of the mid superficial femoral artery  2) Atherosclerosis  MILI HANSON  Exam Date:     2022 18:42  Room #:     Inpatient  Priority:     Routine  Ht (in):             Wt (lb):  Ordering Physician:        MARTHA TAI  Referring Physician:       [id number] Nogueira  Sonographer:               Frantz Carranza RDMS, RVT  Study Type:                Complete Unilateral  Technical Quality:         Adequate  Age:    71    Gender:     M  MRN:    [id number]  :    1950      BSA:   Indications:     PVD  CPT Codes:       75342  ICD Codes:       443.9  History:         Right superficial femoral artery stent, left above knee                   amputaion. prior duplex 10-8-21.  Limitations:                RIGHT  Waveform        Peak Systolic Velocity (cm/s)                  Prox    Prox-Mid  Mid    Mid-Dist  Distal  Triphasic                         94                       CFA  Triphasic       72                                         PFA  Triphasic       115               161              122     SFA  Triphasic                         58                       POP  Biphasic        116                                91      AT  Triphasic       35                                 66      PT  Triphasic       60                                 37      SOFIA                LEFT  Waveform        Peak Systolic Velocity (cm/s)                  Prox    Prox-Mid  Mid    Mid-Dist  Distal                                                             CFA                                                             PFA                                                             SFA                                                             POP                                                             AT                                                             PT                                                             SOFIA  FINDINGS  Right.  Diffuse atherosclerotic plaque.  50-75% stenosis in the mid superficial femoral artery.  Multiphasic flow demonstarted throoughout the extremity.  Elizabeth Monroy MD  (Electronically Signed)  Final Date:      22 May 2022 23:13    MIA Results, Last 30 Days US-MIA SINGLE LEVEL UNILAT RIGHT    Result Date: 5/21/2022  Narrative  Vascular Laboratory  Conclusions  Compared to the prior study on 10/8/21, waveforms looks better, triphasic  and high amplitude in present study.  The ankle pressures cannot be measured due to calcification and  noncompressibility of the tibial  andres  VALENTIN MILI  Age:    71    Gender:     M  MRN:    [id number]  :    1950      BSA:  Exam Date:     2022 11:23  Room #:     Inpatient  Priority:     Routine  Ht (in):             Wt (lb):  Ordering Physician:        MARTHA TAI  Referring Physician:       [id number]  Sonographer:               Deb Lazo                             MISA, [facility]  Study Type:                Complete Unilateral  Technical Quality:         Adequate  Indications:     Ulcer of calf, Ulcer of other part of foot (toes)  CPT Codes:       86639  ICD Codes:       707.12  707.15  History:         Right calf ulcer and ulceration of the right toes; history of                   right SFA stenting and left AKA  Limitations:     Unable to perform toe brachial index due to open                   wounds/patient unable to tolerate pressures over this area;                   left AKA                 RIGHT  Waveform            Systolic BPs (mmHg)                             121           Brachial  Triphasic                                Common Femoral  Triphasic                                Popliteal  Triphasic                  0             Posterior Tibial  Triphasic                  0             Dorsalis Pedis                                           Digit                                           MIA                                           TBI                       LEFT  Waveform        Systolic BPs (mmHg)                             127           Brachial                                           Common Femoral                                           Popliteal                                           Posterior Tibial                                           Dorsalis Pedis                                           Digit                                           MIA                                           TBI  Findings  Right.  Doppler waveforms of the common femoral, popliteal, posterior tibial, and   dorsalis pedis arteries are of high amplitude and triphasic.  The ankle pressures cannot be measured due to calcification and  noncompressibility of the tibial vessels.  Unable to perform toe brachial index due to open wounds/patient unable to  tolerate pressures over this area.  Left BHANU Singer MD  (Electronically Signed)  Final Date:      21 May 2022 20:31    Lab Values:    Lab Results   Component Value Date/Time    WBC 6.7 05/30/2022 12:49 AM    RBC 4.47 (L) 05/30/2022 12:49 AM    HEMOGLOBIN 12.2 (L) 05/30/2022 12:49 AM    HEMATOCRIT 39.0 (L) 05/30/2022 12:49 AM    CREACTPROT 6.25 (H) 10/07/2021 08:30 PM    SEDRATEWES 1 10/07/2021 08:30 PM    HBA1C 8.0 (H) 05/19/2022 03:34 AM      Culture Results show:  No results found for this or any previous visit (from the past 720 hour(s)).    Pain Level/Medicated:  Received PO and IV pain medications as well as 4% Lidocaine 45min prior to change. Tolerated was distracted on phone during change.     INTERVENTIONS BY WOUND TEAM:  Chart and images reviewed. Discussed with bedside RN. All areas of concern (based on picture review, LDA review and discussion with bedside RN) have been thoroughly assessed. Documentation of areas based on significant findings. This RN in to assess patient. Performed standard wound care which includes appropriate positioning, dressing removal and non-selective debridement. Pictures and measurements obtained weekly if/when required.         RLE LATERAL  Preparation for Dressing removal: Dressing soaked with Lidocaine and NS  Cleansed with:  Wound cleanser and gauze.  Sharp debridement: NA  Soo wound: Cleansed with Wound cleanser and gauze, Prepped with no sting and drape  Primary Dressing: 3 pieces of half thickness cut to fit black Regular foam applied over wound and secured with drape. A hole was cut in drape and trac pad applied over proximal aspect.   Secondary (Outer) Dressing: Fenestrated mepilex applied around vac tubing. Heel  mepilex to heel. 2 layer compression applied (Foam layer followed by coban).    Interdisciplinary consultation: Patient, Bedside RN ([place])     EVALUATION / RATIONALE FOR TREATMENT:  Most Recent Date:    6/6/22: Transitioned to regular vac due to slight odor noted and signs of hypergranulation to wound bed. Will return to MWF changes. Pt possibly to DC to [facility] this week.     6/2/2022: Right lower extremity wound clean, red, and with no odor. Muscle exposed, but covered with granulation tissue, however remains edematous. Right LE also still with generalized edema. Pt reported leaking overnight and wound with moderate amount of drainage - VF settings thus decreased. Continue VF and two layer compression.   ** Dressing to incisions along right TMA site and left stump changed by LPS APRN **   5/30/22: Pt to OR for debridement and toe amputation on Friday. Toe amp incision CDI. Dry gauze and hypafix applied per LPS recommendations. Pts RLE lateral wound clean with granulation tissue present. Veraflo continued however fluid and soak time decreased. See flowsheet for settings.   5/24: Discussed R lateral leg wound with Dr. Purcell. Ok for wound vac placement and compression. Veraflo wound vac initiated to right lateral leg wound to manage drainage, bioburden, increase tissue oxygenation and granulation. Light compression applied for improved venous return. Pt not agreeable and unable to tolerate 2 layer compression wrap at this time. Hydrofera Blue applied to right 1st and 2nd toes for the hydrophilic polyurethane foam which contains ethylene oxide used as a bactericidal, fungicidal, and sporicidal disinfectant. Hydrofera Blue also aids in maintaining a moist wound environment. The absorption properties of this dressing are important in collecting exudates and bacteria from the injured area. These harmful fluid secretions bind to the dressing removing it from the wound without the foam sticking to the wound causing more  harm.     Goals: Steady decrease in wound area and depth weekly.    WOUND TEAM PLAN OF CARE ([X] for frequency of wound follow up,):   Nursing to follow orders written for wound care. Contact wound team if area fails to progress, deteriorates or with any questions/concerns  Dressing changes by wound team:                   Follow up 3 times weekly:                NPWT change 3 times weekly:   X MWF  Follow up 1-2 times weekly:      Follow up Bi-Monthly:                   Follow up as needed:     Other (explain):     NURSING PLAN OF CARE ORDERS (X):  Dressing changes: See Dressing Care orders: X  Skin care: See Skin Care orders:  RN Prevention Protocol:  Rectal tube care: See Rectal Tube Care orders:   Other orders:    RSKIN:   CURRENTLY IN PLACE (X), APPLIED THIS VISIT (A), ORDERED (O):   Q shift Donis:  X  Q shift pressure point assessments:  X    Surface/Positioning   Pressure redistribution mattress      X      Low Airloss          Bariatric foam      Bariatric CRISTOPHER     Waffle cushion        Waffle Overlay          Reposition q 2 hours      TAPs Turning system     Z Jason Pillow     Offloading/Redistribution   Sacral Mepilex (Silicone dressing)     Heel Mepilex (Silicone dressing)         Heel float boots (Prevalon boot)             Float Heels off Bed with Pillows       X    Respiratory RA  Silicone O2 tubing         Gray Foam Ear protectors     Cannula fixation Device (Tender )          High flow offloading Clip    Elastic head band offloading device      Anchorfast                                                         Trach with Optifoam split foam             Containment/Moisture Prevention   Rectal tube or BMS    Purwick/Condom Cath        Fajardo Catheter    Barrier wipes           Barrier paste       Antifungal tx      Interdry        Mobilization    Up to chair        Ambulate      PT/OT  X    Nutrition     Dietician        Diabetes Education      PO    X TF     TPN     NPO   # days     Other        Anticipated discharge plans:   LTACH:      X, [facility] vs.   SNF/Rehab:                X, [facility]  Home Health Care:           Outpatient Wound Center:            Self/Family Care:        Other:                  Vac Discharge Needs:   Not Applicable Pt not on a wound vac:       Regular Vac while inpatient, alternative dressing at DC:        Regular Vac in use and continued at DC:          X  Reg. Vac w/ Skin Sub/Biologic in use. Will need to be changed 2x wkly:      Veraflo Vac while inpatient, ok to transition to Regular Vac on Discharge:           Veraflo Vac while inpatient, will need to remain on Veraflo Vac upon discharge:

## 2022-06-06 NOTE — DISCHARGE PLANNING
Received Choice form at [phone]  Agency/Facility Name: [facility]   Referral sent per Choice form @ [phone]

## 2022-06-06 NOTE — PROGRESS NOTES
Bedside report received.  Assessment complete.  A&O x 4. Patient calls appropriately.  Patient up with x2 assist to commode. Bed alarm on.   Patient has 7-9/10 pain. medicated  Denies N&V. Tolerating diet.  + void, + flatus, + BM.  Patient denies SOB.  L AKA DIP CDI.  Wound vac and dressing to RLE CDI. Right toes 1 and 2 amputated.  Review plan with of care with patient. Call light and personal belongings with in reach. Hourly rounding in place. All needs met at this time.

## 2022-06-06 NOTE — CARE PLAN
The patient is Stable - Low risk of patient condition declining or worsening    Shift Goals  Clinical Goals: pain control  Patient Goals: Snf  Family Goals: N/A    Progress made toward(s) clinical / shift goals:  Pt rested through the night, pain meds given as needed.    Patient is not progressing towards the following goals:N/A    Problem: Pain - Standard  Goal: Alleviation of pain or a reduction in pain to the patient’s comfort goal  Outcome: Progressing     Problem: Skin Integrity  Goal: Skin integrity is maintained or improved  Outcome: Progressing     Problem: Fall Risk  Goal: Patient will remain free from falls  Outcome: Progressing     Problem: Knowledge Deficit - Standard  Goal: Patient and family/care givers will demonstrate understanding of plan of care, disease process/condition, diagnostic tests and medications  Outcome: Progressing

## 2022-06-06 NOTE — PROGRESS NOTES
Assumed care of patient at 0645. Bedside report received. Assessment complete.  AA&Ox4. Denies CP/SOB.  Reporting 8/10 pain. Medicated per MAR.   Educated patient regarding pharmacologic and non pharmacologic modalities for pain management.  Skin per flowsheets  Tolerating regular diet. Denies N/V.  + void Last BM 6/5  Pt ambulates pivot to wheelchair, self ambulatory upon wheelchair .  All needs met at this time. Call light within reach. Pt calls appropriately. Bed low and locked, non skid socks in place. Hourly rounding in place.

## 2022-06-06 NOTE — DISCHARGE PLANNING
Agency/Facility Name: [facility]   Spoke To: Mary Anne   Outcome: DPA  left voicemail regarding  patients referral status.     RN CM Notified

## 2022-06-06 NOTE — PROGRESS NOTES
"Hospital Medicine Daily Progress Note    Date of Service  6/6/2022    Chief Complaint  Luis Kellogg is a 71 y.o. male admitted 5/18/2022 with OM    Hospital Course  Peyman Kellogg is a 71 year old male with past medical history of T2DM, neuropathy, HTN, PAD, chronic opoid dependence and tobacco use who presented 5/18/2022 for a scheduled left AKA with Dr. Buenrostro. Patient hypotensive during the procedure and was admitted to [facility]. Transferred to hospitalist service by Dr. Buenrostro. Patient also has right lower extremity wounds, cellulitis w exposed tendon/joints. LPS consulted and he eventually underwent R 1st, 2nd toe amp at the [facility] w debridement and NPWT application on 5/27/22.    Abx complete. Cleared by surgery. Referred to SNF. Renovation of his trailer/home to accomodate WC access has been in process.      Interval Problem Update  6/6 FANTA overnight. POC more or less at goal. Medically clear. Add metformin given normal Cr. 1-2 POC occasionally outside of goal. Can likely DC SSI if he will take metformin.    6/5 BM yesterday. No c/o. Working on his banking. Awaits bed at [facility]. Medically clear.     6/4 Says he constipated today. Last BM several days ago [facility] ordered. POC controlled.     6/3 Accepted to SNF. Awaits bed availability. POC at goal.     6/2 No clinical changes. Awaits SNF. Says he feels he is being \"evicted.\" Then claimed to be joking after re-educated that this is to facility therapy; stump healing/maturation/mobility. Then c/o that he never signed the Choice form. Showed him the scanned copy with circles over his choices, and the verbal order signed on his behalf. Later said he remembered.    6/1 VSS. No c/o. FANTA. SNF referrals in process.    5/31 VSS and WNL  POC more or less at goal  154 morphine milliequivalents yesterday/Oxy/Dilaudid  Has many questions about dispo    Consultants/Specialty  LPS    Code Status  Full Code    Disposition  Patient is medically cleared for " discharge.   Anticipate discharge to SNF vs LTAC.  I have placed the appropriate orders for post-discharge needs.    Review of Systems  Review of Systems   Constitutional: Negative for fever.   Respiratory: Negative for cough.    Cardiovascular: Negative for chest pain.   Gastrointestinal: Negative for abdominal pain, nausea and vomiting.   Genitourinary: Negative for dysuria.   Musculoskeletal: Negative for myalgias.        Phantom limb pain   Skin: Negative for rash.   All other systems reviewed and are negative.       Physical Exam  Temp:  [35.9 °C (96.6 °F)-36.6 °C (97.9 °F)] 36.6 °C (97.8 °F)  Pulse:  [75-90] 75  Resp:  [17-18] 18  BP: (101-130)/(59-77) 130/61  SpO2:  [91 %-93 %] 92 %    Physical Exam  Vitals and nursing note reviewed.   HENT:      Head: Normocephalic and atraumatic.      Nose: Nose normal.   Eyes:      Conjunctiva/sclera: Conjunctivae normal.      Pupils: Pupils are equal, round, and reactive to light.   Cardiovascular:      Rate and Rhythm: Normal rate and regular rhythm.      Heart sounds: No murmur heard.    No friction rub.   Pulmonary:      Effort: No respiratory distress.   Abdominal:      General: Bowel sounds are normal. There is no distension.      Palpations: Abdomen is soft.   Musculoskeletal:      Cervical back: Neck supple.      Comments: LLE stump CDI; RLE NPWT CDI   Skin:     General: Skin is warm and dry.   Neurological:      Mental Status: He is alert and oriented to person, place, and time.   Psychiatric:         Behavior: Behavior is cooperative.         Fluids    Intake/Output Summary (Last 24 hours) at 6/6/2022 1232  Last data filed at 6/6/2022 0500  Gross per 24 hour   Intake 540 ml   Output 2450 ml   Net -1910 ml       Laboratory                        Imaging  [id number] VIEW   Final Result      1. Large amount of stool throughout the colon from the cecum to the rectum.   2. No bowel obstruction.   3. Other chronic degenerative changes.      US-EXTREMITY ARTERY LOWER  UNILAT RIGHT   Final Result      US-MIA SINGLE LEVEL UNILAT RIGHT   Final Result      DX-TIBIA AND FIBULA RIGHT   Final Result         1.  No acute traumatic bony injury.   2.  No destructive osseous lesion is appreciated, note that plain film is insensitive for evaluation of osteomyelitis, and bone scan would offer improved diagnostic sensitivity as clinically appropriate.      DX-FOOT-COMPLETE 3+ RIGHT   Final Result         1.  No acute traumatic bony injury. No destructive osseous lesion is seen, note that plain film can be insensitive for evaluation of osteomyelitis and bone scan would offer improved diagnostic sensitivity as clinically appropriate.   2.  Degenerative changes of the foot, greatest at the midfoot and hindfoot           Assessment/Plan  * S/P AKA (above knee amputation), left (HCC)- (present on admission)  Assessment & Plan  pain control  Neurontin for phantom pain  PT/OT   Case management for discharge planning    Problem related to discharge planning  Assessment & Plan  New LLE BHANU who lives in a trailer without wheelchair access  Stump not ready to accept orthosis, and no orthosis immediately available  Await modifications including a ramp/wheelchair access to his trailer prior to discharge home  Has been declined by many SNFs so far    Malingering  Assessment & Plan  6/2 Appears to be seeking modalities to contest his discharge    Anemia- (present on admission)  Assessment & Plan  Follow cbc    Chronic, continuous use of opioids- (present on admission)  Assessment & Plan  Pain control  Wean as tolerated  Multi-modal pain control    Cellulitis of right leg- (present on admission)  Assessment & Plan  finished course of IV Unasyn    Marijuana abuse- (present on admission)  Assessment & Plan  Education and counseling    Tobacco use- (present on admission)  Assessment & Plan  Nicotine replacement protocol    Primary hypertension- (present on admission)  Assessment & Plan  Lisinopril and  Coreg    Type 2 diabetes mellitus with hyperglycemia, with long-term current use of insulin (HCC)- (present on admission)  Assessment & Plan  Lantus, SSI  CHO diet  6/6 Add metformin if he'll take it. Try to DC SSI if able    Chronic ulcer of lower extremity (HCC)- (present on admission)  Assessment & Plan  Right first and second toe amputation at the level of the MTP joint, Right leg debridement and application of wound vacuum  5/27/2022  Wound care, Pain control      Hyponatremia- (present on admission)  Assessment & Plan  Mild    Peripheral vascular disease (HCC)- (present on admission)  Assessment & Plan  Catheter right below-knee popliteal artery, 6mm uncovered self expanding Right SFA stent placement   10/12/2021  Atorvastatin  ASA    History of MI (myocardial infarction)- (present on admission)  Assessment & Plan  Coreg, Lipitor  ASA     Hyperlipidemia- (present on admission)  Assessment & Plan  Atorvastatin    Drug-induced constipation- (present on admission)  Assessment & Plan  bowel protocol  6/4 [place] [facility] [employer]    RESOLVED       VTE prophylaxis: Xarelto 10 mg daily as prophylaxis    I have performed a physical exam and reviewed and updated ROS and Plan today (6/6/2022). In review of yesterday's note (6/5/2022), there are no changes except as documented above.

## 2022-06-07 VITALS
TEMPERATURE: 98.6 F | WEIGHT: 225.97 LBS | DIASTOLIC BLOOD PRESSURE: 66 MMHG | SYSTOLIC BLOOD PRESSURE: 101 MMHG | OXYGEN SATURATION: 95 % | HEART RATE: 66 BPM | RESPIRATION RATE: 18 BRPM | BODY MASS INDEX: 35.47 KG/M2 | HEIGHT: 67 IN

## 2022-06-07 PROBLEM — E87.1 HYPONATREMIA: Status: RESOLVED | Noted: 2020-02-15 | Resolved: 2022-06-07

## 2022-06-07 LAB
GLUCOSE BLD STRIP.AUTO-MCNC: 105 MG/DL (ref 65–99)
GLUCOSE BLD STRIP.AUTO-MCNC: 112 MG/DL (ref 65–99)

## 2022-06-07 PROCEDURE — A9270 NON-COVERED ITEM OR SERVICE: HCPCS | Performed by: ORTHOPAEDIC SURGERY

## 2022-06-07 PROCEDURE — 700102 HCHG RX REV CODE 250 W/ 637 OVERRIDE(OP): Performed by: NURSE PRACTITIONER

## 2022-06-07 PROCEDURE — A9270 NON-COVERED ITEM OR SERVICE: HCPCS | Performed by: NURSE PRACTITIONER

## 2022-06-07 PROCEDURE — 700102 HCHG RX REV CODE 250 W/ 637 OVERRIDE(OP): Performed by: ORTHOPAEDIC SURGERY

## 2022-06-07 PROCEDURE — A9270 NON-COVERED ITEM OR SERVICE: HCPCS | Performed by: FAMILY MEDICINE

## 2022-06-07 PROCEDURE — 99239 HOSP IP/OBS DSCHRG MGMT >30: CPT | Performed by: STUDENT IN AN ORGANIZED HEALTH CARE EDUCATION/TRAINING PROGRAM

## 2022-06-07 PROCEDURE — 700102 HCHG RX REV CODE 250 W/ 637 OVERRIDE(OP): Performed by: FAMILY MEDICINE

## 2022-06-07 PROCEDURE — 82962 GLUCOSE BLOOD TEST: CPT

## 2022-06-07 RX ORDER — CARVEDILOL 3.12 MG/1
3.12 TABLET ORAL 2 TIMES DAILY WITH MEALS
Qty: 60 TABLET | Status: SHIPPED
Start: 2022-06-07

## 2022-06-07 RX ORDER — ACETAMINOPHEN 500 MG
1000 TABLET ORAL 4 TIMES DAILY
Qty: 30 TABLET | Refills: 0 | Status: SHIPPED | OUTPATIENT
Start: 2022-06-07

## 2022-06-07 RX ORDER — OXYCODONE HYDROCHLORIDE 20 MG/1
20 TABLET ORAL EVERY 4 HOURS PRN
Qty: 18 TABLET | Refills: 0 | Status: SHIPPED | OUTPATIENT
Start: 2022-06-07 | End: 2022-06-10

## 2022-06-07 RX ORDER — PSEUDOEPHEDRINE HCL 30 MG
100 TABLET ORAL 2 TIMES DAILY
Qty: 60 CAPSULE | Status: SHIPPED
Start: 2022-06-07

## 2022-06-07 RX ORDER — NICOTINE 21 MG/24HR
1 PATCH, TRANSDERMAL 24 HOURS TRANSDERMAL
Qty: 30 PATCH | Status: SHIPPED
Start: 2022-06-07

## 2022-06-07 RX ORDER — ASPIRIN 81 MG/1
81 TABLET ORAL DAILY
Qty: 30 TABLET | Status: SHIPPED
Start: 2022-06-08

## 2022-06-07 RX ORDER — GABAPENTIN 300 MG/1
1800 CAPSULE ORAL 2 TIMES DAILY
Qty: 90 CAPSULE | Status: SHIPPED
Start: 2022-06-07

## 2022-06-07 RX ADMIN — OXYCODONE HYDROCHLORIDE 20 MG: 10 TABLET ORAL at 07:02

## 2022-06-07 RX ADMIN — ACETAMINOPHEN 1000 MG: 500 TABLET, FILM COATED ORAL at 06:04

## 2022-06-07 RX ADMIN — ASPIRIN 81 MG: 81 TABLET, COATED ORAL at 06:04

## 2022-06-07 RX ADMIN — OXYCODONE HYDROCHLORIDE 20 MG: 10 TABLET ORAL at 02:50

## 2022-06-07 RX ADMIN — METFORMIN HYDROCHLORIDE 500 MG: 500 TABLET ORAL at 09:21

## 2022-06-07 RX ADMIN — OXYCODONE HYDROCHLORIDE 20 MG: 10 TABLET ORAL at 12:23

## 2022-06-07 RX ADMIN — LISINOPRIL 5 MG: 5 TABLET ORAL at 06:04

## 2022-06-07 RX ADMIN — ACETAMINOPHEN 1000 MG: 500 TABLET, FILM COATED ORAL at 12:23

## 2022-06-07 RX ADMIN — DOCUSATE SODIUM 100 MG: 100 CAPSULE, LIQUID FILLED ORAL at 06:04

## 2022-06-07 RX ADMIN — OXYCODONE HYDROCHLORIDE 20 MG: 10 TABLET ORAL at 17:06

## 2022-06-07 RX ADMIN — GABAPENTIN 1800 MG: 300 CAPSULE ORAL at 05:59

## 2022-06-07 ASSESSMENT — PAIN DESCRIPTION - PAIN TYPE
TYPE: NEUROPATHIC PAIN
TYPE: ACUTE PAIN
TYPE: NEUROPATHIC PAIN

## 2022-06-07 NOTE — DISCHARGE SUMMARY
Discharge Summary    CHIEF COMPLAINT ON ADMISSION  No chief complaint on file.      Reason for Admission  Peripheral arterial disease (HCC)     CODE STATUS  Full Code    HPI & HOSPITAL COURSE  Peyman Kellogg is a 71 year old male with past medical history of insulin-dependent type 2 diabetes, peripheral neuropathy, hypertension, peripheral vascular disease, chronic opoid dependence and tobacco use who presented 5/18/2022 for a scheduled left above-knee amputation with Dr. Buenrostro. Patient was hypotensive during the procedure and was admitted to surgery postoperatively. Patient also has right lower extremity wounds, cellulitis with exposed tendon/joints. LPS consulted and he eventually underwent R 1st, 2nd metatarsal amputation and placement of wound VAC on 5/27/22.  Patient completed course of antibiotics.  He was assessed by therapy and postacute care was recommended.  Patient will transition to [facility] for ongoing therapy and wound care needs.    At the time of discharge patient is at his cognitive baseline and hemodynamically stable.  He is comfortable discharge plan.      Therefore, he is discharged in fair and stable condition to a long-term acute care hospital.    The patient met 2-midnight criteria for an inpatient stay at the time of discharge.      FOLLOW UP ITEMS POST DISCHARGE  Wound care  Weightbearing as tolerated on right lower extremity in postoperative shoe, will need follow-up with orthopedic surgery  Chronic medical conditions: Diabetes control  Blood pressure management, home lisinopril discontinued due to soft pressures    DISCHARGE DIAGNOSES  Principal Problem:    S/P AKA (above knee amputation), left (HCC) POA: Yes  Active Problems:    Hyperlipidemia (Chronic) POA: Yes      Overview: Formatting of this note might be different from the original.      HYPERLIPIDEMIA, UNSPECIFIED    History of MI (myocardial infarction) (Chronic) POA: Yes    Peripheral vascular disease (HCC) POA: Yes      Overview:  S/P revascularization surgery of right lower extremity on 10/12/21 wit SFA       stent. He has declined vascular referral.            Last Assessment & Plan:       Formatting of this note might be different from the original.      S/P revascularization surgery of right lower extremity on 10/12/21 with       SFA stent place.  Recommended follow up with Vascular surgery and offered       referral to [facility], but he declined due to cost.       Biphasic pulses heard with doppler.  Will do trial of 2 layer lite       compression wraps. Discussed if he developed pain from the wraps, he       should take off the wraps.    Drug-induced constipation POA: Yes    Chronic ulcer of lower extremity (HCC) POA: Yes      Overview: Last Assessment & Plan:       Formatting of this note might be different from the original.      Unchanged, likely because he had to take the dressings off.. Distal wounds       debrided of fibrin/slough, but he did not tolerate debridement of medial       wound or proximal wound. Continue wet silver gauze.            Last Assessment & Plan:       Formatting of this note might be different from the original.      Unchanged, likely because his compression wrap became wet and he took it       off. He did not tolerate debridement. Continue wet silver gauze, sorbions,       2 layer lite compression wrap.      Last Assessment & Plan:       Formatting of this note might be different from the original.      Improved. Wounds debrided of fibrin/slough. Continue wet silver gauze,       sorbions and 2 layer lite compression wrap.    Type 2 diabetes mellitus with hyperglycemia, with long-term current use of insulin (HCC) POA: Yes      Overview: Last Assessment & Plan:       Formatting of this note might be different from the original.      Needs improved control. Encouraged him to follow up with PCP as planned.    Wound of left foot POA: Yes      Overview: Added automatically from request for surgery  [id number]    Primary hypertension POA: Yes    Tobacco use (Chronic) POA: Yes    Marijuana abuse (Chronic) POA: Yes    Osteomyelitis of ankle and foot (HCC) POA: Yes    Cellulitis of right leg POA: Yes    Chronic, continuous use of opioids POA: Yes    Anemia POA: Yes    Malingering POA: Unknown    Problem related to discharge planning POA: Unknown  Resolved Problems:    Hyponatremia POA: Yes      FOLLOW UP  No future appointments.  Aleks Buenrostro M.D.  [address] [place] [id number] [phone]    Follow up  For suture removal proximately 3 weeks postop    TULIO Fontenot  [address] [place] [address] [phone]    Follow up  follow up post hospitalization      MEDICATIONS ON DISCHARGE     Medication List      START taking these medications      Instructions   acetaminophen 500 MG Tabs  Commonly known as: TYLENOL   Take 2 Tablets by mouth 4 times a day.  Dose: 1,000 mg     aspirin 81 MG EC tablet  Start taking on: June 8, 2022  Replaces: aspirin 325 MG Tabs   Take 1 Tablet by mouth every day.  Dose: 81 mg     docusate sodium 100 MG Caps   Take 100 mg by mouth 2 times a day.  Dose: 100 mg     gabapentin 300 MG Caps  Commonly known as: NEURONTIN  Replaces: gabapentin 600 MG tablet   Take 6 Capsules by mouth 2 times a day.  Dose: 1,800 mg     insulin regular 100 Unit/mL Soln  Commonly known as: HumuLIN R   Inject 2-14 Units under the skin 4 Times a Day,Before Meals and at Bedtime.  Dose: 2-14 Units     metFORMIN 500 MG Tabs  Commonly known as: GLUCOPHAGE   Take 1 Tablet by mouth 2 times a day with meals.  Dose: 500 mg     nicotine 14 MG/24HR Pt24  Commonly known as: NICODERM   Place 1 Patch on the skin 1 time a day as needed (Nicotine withdrawl).  Dose: 1 Patch     Oxycodone HCl 20 MG Tabs   Take 1 Tablet by mouth every four hours as needed for Severe Pain for up to 3 days.  Dose: 20 mg        CHANGE how you take these medications      Instructions   carvedilol 3.125 MG  Tabs  What changed:   · medication strength  · how much to take  Commonly known as: COREG   Take 1 Tablet by mouth 2 times a day with meals.  Dose: 3.125 mg     insulin glargine 100 UNIT/ML Soln  What changed:   · how much to take  · when to take this  Commonly known as: Lantus   Inject 20 Units under the skin every evening.  Dose: 20 Units        CONTINUE taking these medications      Instructions   atorvastatin 80 MG tablet  Commonly known as: LIPITOR   Take 1 Tablet by mouth every evening.  Dose: 80 mg     bisacodyl EC 5 MG Tbec   Take 5 mg by mouth 1 time a day as needed for Constipation.  Dose: 5 mg     glucagon 1 MG Solr   1 mg by Intramuscular route as needed (FSBG BELOW 70 MG/DL AND UNABLE TO SWALLOW WITH NO IV ACCESS.).  Dose: 1 mg     traZODone 150 MG Tabs  Commonly known as: DESYREL   Take 450 mg by mouth every evening.  Dose: 450 mg        STOP taking these medications    aspirin 325 MG Tabs  Commonly known as: ASA  Replaced by: aspirin 81 MG EC tablet     gabapentin 600 MG tablet  Commonly known as: NEURONTIN  Replaced by: gabapentin 300 MG Caps     lisinopril 5 MG Tabs  Commonly known as: PRINIVIL     oxyCODONE-acetaminophen  MG Tabs  Commonly known as: PERCOCET-10            Allergies  No Known Allergies    DIET  Orders Placed This Encounter   Procedures   • Diet Order Diet: Consistent CHO (Diabetic)     Standing Status:   Standing     Number of Occurrences:   1     Order Specific Question:   Diet:     Answer:   Consistent CHO (Diabetic) [4]       ACTIVITY  As tolerated and directed by skilled nursing.  Weight bearing as tolerated    LINES, DRAINS, AND WOUNDS  This is an automated list. Peripheral IVs will be removed prior to discharge.  Peripheral IV 06/06/22 22 G Left Antecubital (Active)   Site Assessment Clean;Dry;Intact 06/06/22 1400   Dressing Type Transparent Film;Occlusive 06/06/22 1400   Line Status Saline locked;Scrubbed the hub prior to access;Flushed 06/06/22 1400   Dressing Status  Clean;Dry;Intact 06/06/22 1400   Dressing Intervention N/A 06/06/22 1400   Dressing Change Due 06/13/22 06/06/22 1400   Infiltration Grading ([facility], [facility]) 0 06/06/22 1400   Phlebitis Scale ([facility] Only) 0 06/06/22 1400       Wound 02/12/20 Full Thickness Wound Leg Posterior Left (Active)       Wound 02/12/20 Diabetic Ulcer Toe, 2nd Anterior Left (Active)       Wound 02/13/20 Partial Thickness Wound Pretibial;Leg Left;Proximal Left (Active)       Wound 02/17/20 Incision Foot (Active)       Wound 10/24/20 Toe, 2nd;Toe, 3rd;Toe, 4th amputation (Active)       Wound 10/24/20 Ankle Medial (Active)       Wound 10/24/20 Leg Outer (Active)       Wound 10/30/20 Diabetic Ulcer Toe, 2nd;Toe, 3rd;Toe, 4th Left Amputation on 2nd-4th toes left foot (Active)       Wound 10/30/20 Venous Ulcer Leg Lower;Medial;Posterior Left (Active)       Wound 10/30/20 Leg Lateral;Lower Left (Active)       Wound 12/09/20 Diabetic Ulcer Foot Distal Right 2nd toe right foot  (Active)       Wound 02/17/21 Knee Left (Active)       Wound 03/24/21 Arterial Ulcer Toe, Hallux Anterior Right arterial wound great toe (Active)       Wound 03/24/21 Arterial Ulcer Foot Dorsal Left Dorasl aspect left foot (Active)       Wound 10/12/21 Incision Groin Left  angioseal on left leg (Active)       Wound 10/12/21 Groin Right (Active)       Wound 10/14/21 Full Thickness Wound Leg;Foot Lateral;Dorsal Left (Active)       Wound 10/07/21 Heel Lateral Left unstag POA (Active)       Wound 05/18/22 Incision Leg Left adaptic 4x4 soft roll bias (Active)   Wound Image    05/24/22 1000   Site Assessment ROSITA 06/06/22 2018   Periwound Assessment ROSITA 06/06/22 2018   Margins ROSITA 06/06/22 2018   Closure ROSITA 06/06/22 0932   Drainage Amount None 06/06/22 0932   Drainage Description ROSITA 06/06/22 0932   Treatments Offloading 06/06/22 0932   Dressing Options Adaptic;Dry Gauze 06/06/22 0932   Dressing Changed Observed 06/06/22 0932   Dressing Status Clean;Dry;Intact 06/06/22 0932   Dressing  Change/Treatment Frequency Every 48 hrs, and As Needed 06/06/22 2018   NEXT Dressing Change/Treatment Date 06/05/22 06/06/22 0932   NEXT Weekly Photo (Inpatient Only) 05/26/22 06/02/22 2201   WOUND NURSE ONLY - Time Spent with Patient (mins) 60 06/02/22 1100       Wound 05/19/22 Full Thickness Wound Leg Right (Active)   Wound Image   06/02/22 1100   Site Assessment ROSITA 06/06/22 2018   Periwound Assessment ROSITA 06/06/22 2018   Margins ROSITA 06/06/22 2018   Closure Secondary intention 06/06/22 1100   Drainage Amount Small 06/06/22 1100   Drainage Description Serosanguineous 06/06/22 1100   Treatments Cleansed;Site care;Offloading;Compression;Topical Lidocaine 06/06/22 1100   Wound Cleansing Approved Wound Cleanser 06/06/22 1100   Periwound Protectant Skin Protectant Wipes to Periwound;Paste Ring;Adaptic 06/06/22 1100   Dressing Cleansing/Solutions Not Applicable 06/06/22 1100   Dressing Options Adaptic;Wound Vac;Mepilex;Compression Wrap Two Layer 06/06/22 1100   Dressing Changed Changed 06/06/22 1100   Dressing Status Clean;Dry;Intact 06/06/22 1100   Dressing Change/Treatment Frequency By Wound Team Only 06/06/22 2018   NEXT Dressing Change/Treatment Date 06/08/22 06/06/22 1100   NEXT Weekly Photo (Inpatient Only) 06/08/22 06/06/22 1100   Non-staged Wound Description Full thickness 06/06/22 1100   Wound Length (cm) 21 cm 06/02/22 1100   Wound Width (cm) 12 cm 06/02/22 1100   Wound Depth (cm) 0 cm 06/02/22 1100   Wound Surface Area (cm^2) 252 cm^2 06/02/22 1100   Wound Volume (cm^3) 0 cm^3 06/02/22 1100   Wound Healing % 100 06/02/22 1100   Wound Bed Granulation (%) 100 % 05/19/22 1800   Shape Oval 06/06/22 1100   Wound Odor None 06/06/22 1100   Exposed Structures None 06/06/22 1100   WOUND NURSE ONLY - Time Spent with Patient (mins) 60 06/06/22 1100       Wound 05/27/22 Incision Foot Right Adaptic, 4x4, softroll, Ace (Active)   Wound Image    05/30/22 1100   Site Assessment ROSITA 06/06/22 2018   Periwound Assessment ROSITA  06/06/22 2018   Margins ROSITA 06/06/22 2018   Closure [facility] 06/06/22 0932   Drainage [facility] 06/06/22 0932   Drainage Description [facility] 06/06/22 0932   Dressing Options Dry Gauze;Hypafix Tape 06/06/22 0932   Dressing Changed Observed 06/06/22 0932   Dressing Status Clean;Dry;Intact 06/06/22 0932   Exposed Structures None 05/30/22 1100       Peripheral IV 06/06/22 22 G Left Antecubital (Active)   Site Assessment Clean;Dry;Intact 06/06/22 1400   Dressing Type Transparent Film;Occlusive 06/06/22 1400   Line Status Saline locked;Scrubbed the hub prior to access;Flushed 06/06/22 1400   Dressing Status Clean;Dry;Intact 06/06/22 1400   Dressing Intervention N/A 06/06/22 1400   Dressing Change Due 06/13/22 06/06/22 1400   Infiltration Grading ([facility], [facility]) 0 06/06/22 1400   Phlebitis Scale ([facility] Only) 0 06/06/22 1400               MENTAL STATUS ON TRANSFER     Patient alert and oriented x3 at cognitive baseline       CONSULTATIONS  Orthopedic surgery    PROCEDURES  Left above-knee amputation 5/18/2022  Left adductor myotendodesis to the femur   Right first and second toe amputation at the level of the MTP joint 5/27/2022  Right leg debridement and application of wound VAC    LABORATORY  Lab Results   Component Value Date    SODIUM 134 (L) 05/28/2022    POTASSIUM 3.9 05/28/2022    CHLORIDE 102 05/28/2022    CO2 24 05/28/2022    GLUCOSE 174 (H) 05/28/2022    BUN 14 05/28/2022    CREATININE 0.55 05/28/2022        Lab Results   Component Value Date    WBC 6.7 05/30/2022    HEMOGLOBIN 12.2 (L) 05/30/2022    HEMATOCRIT 39.0 (L) 05/30/2022    PLATELETCT 370 05/30/2022        Total time of the discharge process exceeds 40 minutes.

## 2022-06-07 NOTE — PROGRESS NOTES
Discharging Patient home per physician order.  Discharged with medical transportatation.  Demonstrated understanding of discharge instructions, follow up appointments, home medications, prescriptions, home care for surgical wound.  Ambulating without assistance, voiding without difficulty, pain well controlled, tolerating oral medications, oxygen saturation greater than 90% , tolerating diet. Educational handouts given and discussed.  Verbalized understanding of discharge instructions and educational handouts.  Stated several reasons why to return to ED or seek medical attention. All questions answered.  Belongings and dressing supplies with patient at time of discharge.

## 2022-06-07 NOTE — DISCHARGE INSTRUCTIONS
Discharge Instructions    Discharged to other by medical transportation with escort. Discharged via ambulance, hospital escort: Yes.  Special equipment needed: Wheelchair and Wound VAC    Be sure to schedule a follow-up appointment with your primary care doctor or any specialists as instructed.     Discharge Plan:        I understand that a diet low in cholesterol, fat, and sodium is recommended for good health. Unless I have been given specific instructions below for another diet, I accept this instruction as my diet prescription.   Other diet: diabetic    Special Instructions: None    Is patient discharged on Warfarin / Coumadin?   No     Depression / Suicide Risk    As you are discharged from this [facility] facility, it is important to learn how to keep safe from harming yourself.    Recognize the warning signs:  Abrupt changes in personality, positive or negative- including increase in energy   Giving away possessions  Change in eating patterns- significant weight changes-  positive or negative  Change in sleeping patterns- unable to sleep or sleeping all the time   Unwillingness or inability to communicate  Depression  Unusual sadness, discouragement and loneliness  Talk of wanting to die  Neglect of personal appearance   Rebelliousness- reckless behavior  Withdrawal from people/activities they love  Confusion- inability to concentrate     If you or a loved one observes any of these behaviors or has concerns about self-harm, here's what you can do:  Talk about it- your feelings and reasons for harming yourself  Remove any means that you might use to hurt yourself (examples: pills, rope, extension cords, firearm)  Get professional help from the community (Mental Health, Substance Abuse, psychological counseling)  Do not be alone:Call your Safe Contact- someone whom you trust who will be there for you.  Call your local CRISIS HOTLINE 114-1494 or 837-112-3623  Call your local Children's Mobile Crisis Response  Team [facility] (935) 060-9715 or www.Ruci.cn  Call the toll free National Suicide Prevention Hotlines   National Suicide Prevention Lifeline 224-456-PPFU (8995)  [facility] Network 800-SUICIDE (639-1671)      Take medications as directed   Follow up with orthopedic surgery as scheduled   Toe Amputation, Care After  This sheet gives you information about how to care for yourself after your procedure. Your health care provider may also give you more specific instructions. If you have problems or questions, contact your health care provider.  What can I expect after the procedure?  After the procedure, it is common to have some pain. Pain usually improves within a week.  Follow these instructions at home:  Medicines  Take over-the-counter and prescription medicines only as told by your health care provider.  If you were prescribed an antibiotic medicine, take it as told by your health care provider. Do not stop taking the antibiotic even if you start to feel better.  Ask your health care provider if the medicine prescribed to you:  Requires you to avoid driving or using heavy machinery.  Can cause constipation. You may need to take actions to prevent or treat constipation, such as:  Drink enough fluid to keep your urine pale yellow.  Take over-the-counter or prescription medicines.  Eat foods that are high in fiber, such as beans, whole grains, and fresh fruits and vegetables.  Limit foods that are high in fat and processed sugars, such as fried or sweet foods.  Managing pain, stiffness, and swelling    If directed, put ice on the painful area.  Put ice in a plastic bag.  Place a towel between your skin and the bag.  Leave the ice on for 20 minutes, 2-3 times a day.  Move your other toes often to reduce stiffness and swelling.  Raise (elevate) your foot above the level of your heart while you are sitting or lying down.  Incision care         Follow instructions from your health care provider about  how to take care of your incision. Make sure you:  Wash your hands with soap and water before and after you change your bandage (dressing). If soap and water are not available, use hand .  Change your dressing as told by your health care provider.  Leave stitches (sutures), skin glue, or adhesive strips in place. These skin closures may need to stay in place for 2 weeks or longer. If adhesive strip edges start to loosen and curl up, you may trim the loose edges. Do not remove adhesive strips completely unless your health care provider tells you to do that.  Check your incision area every day for signs of infection. Check for:  More redness, swelling, or pain.  Fluid or blood.  Warmth.  Pus or a bad smell.  Bathing  Do not take baths, swim, use a hot tub, or soak your foot until your health care provider approves. Ask your health care provider if you may take showers. You may only be allowed to take sponge baths.  If your dressing has been removed, you may wash your skin with warm water and soap.  Activity  Rest as told by your health care provider.  Avoid sitting for a long time without moving. Get up to take short walks every 1-2 hours. This is important to improve blood flow and breathing. Ask for help if you feel weak or unsteady.  If you have been given crutches, use them as told by your health care provider.  Do exercises as told by your health care provider.  Return to your normal activities as told by your health care provider. Ask your health care provider what activities are safe for you.  General instructions  Do not drive for 24 hours if you were given a sedative during your procedure.  Do not use any products that contain nicotine or tobacco, such as cigarettes, e-cigarettes, and chewing tobacco. If you need help quitting, ask your health care provider.  Ask your health care provider about wearing supportive shoes or using inserts to help with your balance when walking.  If you have diabetes,  keep your blood sugar under good control and check your feet daily for sores or open areas.  Keep all follow-up visits as told by your health care provider. This is important.  Contact a health care provider if:  You have signs of infection at your incision, such as:  Redness, swelling, or pain.  Fluid or blood.  Warmth.  Pus or a bad smell.  You have a fever or chills.  Your dressing is soaked with blood.  Your sutures tear or they separate.  You have numbness or tingling in your toes or foot.  Your foot is cool or pale, or it changes color.  Your pain does not improve after you take your medicine.  Get help right away if you have:  Pain or swelling near your incision that gets worse or does not go away.  Red streaks on your skin near your toes, foot, or leg.  Pain in your calf or behind your knee.  Shortness of breath.  Chest pain.  Summary  After the procedure, it is common to have some pain. Pain usually improves within a week.  If you were prescribed an antibiotic medicine, take it as told by your health care provider. Do not stop taking the antibiotic even if you start to feel better.  Change your dressing as told by your health care provider.  Contact a health care provider if you have signs of infection.  Keep all follow-up visits as told by your health care provider. This is important.  This information is not intended to replace advice given to you by your health care provider. Make sure you discuss any questions you have with your health care provider.  Document Released: 11/28/2016 Document Revised: 04/08/2020 Document Reviewed: 12/10/2019  [employer] Patient Education © 2020 [employer]  Leg Amputation, Care After  This sheet gives you information about how to care for yourself after your procedure. Your health care provider may also give you more specific instructions. If you have problems or questions, contact your health care provider.  What can I expect after the procedure?  After the procedure, it  is common to have:  A little blood or fluid coming from your incision.  Pain from your incision.  Pain that feels like it is coming from the leg that has been removed (phantom pain). This can last for a year or longer.  Skin breakdown on your stump (residual limb).  Feelings of depression, anxiety, and fear.  Follow these instructions at home:  Medicines  Take over-the-counter and prescription medicines only as told by your health care provider.  If you were prescribed an antibiotic medicine, take it as told by your health care provider. Do not stop taking the antibiotic even if you start to feel better.  Bathing  Do not take baths, swim, use a hot tub, or get your residual limb wet until your health care provider approves. You may only be allowed to take sponge baths.  Ask your health care provider when you may start taking showers. After taking a shower, make sure to rinse and dry your residual limb carefully.  Incision care    Check your residual limb, especially your incision area, every day. Check for:  More redness, swelling, or pain.  More fluid or blood.  Warmth.  Pus or a bad smell.  Blisters.  Scrapes.  Follow instructions from your health care provider about how to take care of your incision. Make sure you:  Wash your hands with soap and water before you change your bandage (dressing). If soap and water are not available, use hand .  Change your dressing as told by your health care provider.  Leave stitches (sutures), skin glue, or adhesive strips in place. These skin closures may need to stay in place for 2 weeks or longer. If adhesive strip edges start to loosen and curl up, you may trim the loose edges. Do not remove adhesive strips completely unless your health care provider tells you to do that.  Activity  Return to your normal activities as told by your health care provider. Ask your health care provider what activities are safe for you.  Do physical therapy exercises as told by your health  care provider.  If you have been fitted with an artificial leg (prosthesis) or have been given crutches, use them as told by your health care provider.  Eating and drinking  Eat a healthy diet that includes whole grains, fruits and vegetables, low-fat dairy products, and lean proteins.  Drink enough fluid to keep your urine pale yellow.  Driving  Work with an occupational therapist to learn new strategies for safe driving with an amputation.  Do not drive or use heavy equipment while taking prescription pain medicine.  General instructions  To prevent or treat constipation while you are taking prescription pain medicine, your health care provider may recommend that you:  Drink enough fluid to keep your urine pale yellow.  Take over-the-counter or prescription medicines.  Eat foods that are high in fiber, such as fresh fruits and vegetables, whole grains, and beans.  Limit foods that are high in fat and processed sugars, such as fried and sweet foods.  Do not use oils, lotion, cream, or rubbing alcohol on the remaining part of your leg.  Wear compression stockings as told by your health care provider.  If you have trouble coping with your amputation, contact your health care provider. Some feelings of depression, anxiety, or fear are normal after an amputation, but if you struggle with these feelings or if they get overwhelming, your provider may be able to recommend a therapist or support group to help you.  Do not use any products that contain nicotine or tobacco, such as cigarettes and e-cigarettes. These can delay bone healing. If you need help quitting, ask your health care provider.  Keep all follow-up visits as told by your health care provider. This is important.  Contact a health care provider if:  You have a fever.  You have more tenderness in your residual limb.  You have a rash or itchy skin.  You have a cough or chills and you feel achy and weak.  You have trouble coping with your amputation.  You have  blisters or scrapes on your residual limb.  Get help right away if:  You have severe pain in your residual limb.  You have more redness, swelling, or pain around your incision.  You have more fluid or blood coming from your incision.  Your incision feels warm to the touch, tender, and painful.  You have pus or a bad smell coming from your incision.  You feel light-headed and have shortness of breath.  You have blood-soaked bandages.  You cough up blood.  You have chest pain or pain when taking a deep breath or coughing. If you have these symptoms, do not drive yourself to the hospital. Call emergency services right away.  If you ever feel like you may hurt yourself or others, or have thoughts about taking your own life, get help right away. You can go to your nearest emergency department or call:  Your local emergency services (911 in the [place]  A suicide crisis helpline, such as the National Suicide Prevention Lifeline at 1-504.994.2568. This is open 24 hours a day.  Summary  After a leg amputation, you may have pain that feels like it is coming from the leg that was removed (phantom pain). This can last for a year or longer.  Follow instructions from your health care provider about how to take care of your incision.  Check your residual limb, especially your incision area, every day. More redness, swelling, or pain may be a sign of infection.  Contact your health care provider if you have trouble coping with your amputation.  This information is not intended to replace advice given to you by your health care provider. Make sure you discuss any questions you have with your health care provider.  Document Released: 03/28/2018 Document Revised: 03/28/2018 Document Reviewed: 03/28/2018  Elsevier Patient Education © 2020 [employer]

## 2022-06-07 NOTE — PROGRESS NOTES
Assumed care of patient at 0645. Bedside report received. Assessment complete.  AA&Ox4. Denies CP/SOB.  Reporting 0/10 pain. Declined intervention at this time.  Educated patient regarding pharmacologic and non pharmacologic modalities for pain management.  Skin per flowsheets  Tolerating diabetic diet. Denies N/V.  + void. + BM. Last BM 6/7  Pt ambulates Pivot to wheelchair, self ambulatory upon wheelchair   All needs met at this time. Call light within reach. Pt calls appropriately. Bed low and locked, non skid socks in place. Hourly rounding in place.

## 2022-06-07 NOTE — DISCHARGE PLANNING
Telephone call to Obinna from [facility] . According to his voice mail, he is on vacation until . Left voice mail for his supervisor Veronica [phone] for status on the rehab work to be performed on his home. Awaiting call back.     [facility] has been [facility]. He is in agreement with the discharge plans. Telephone call to his brother Lito, left voice mail informing him of the patient's disposition.      Telephone call to Deepika from [facility]. She was informed about the transfer plans and was given the contact address and telephone number to the facility. She stated that she will follow up with whomever is covering for Obinna and let him know about the transfer.      I included in the paperwork to ride-line that the patient has a personal wheelchair and to allow him to take it with him to [facility].      T422  SUSANNAH HANSON  : [birth year]  Transport Company Scheduled:  [employer]  Spoke with Reyna meyer [facility] to schedule transport.    Scheduled Date: 2022  Scheduled Time: 1700    Destination: [facility]    If there are any changes needed to the DC transportation scheduled, please contact [facility] at ext. [phone] between the hours of 1334-8040 Mon-Fri. If outside those hours, contact the ED Case Manager at ext. [phone].

## 2022-06-07 NOTE — DISCHARGE PLANNING
Agency/Facility Name: [facility]   Spoke To: Emy  Outcome: Per Emy, they are still working on admissions list for the day.

## 2022-06-07 NOTE — DISCHARGE PLANNING
DC Transport Scheduled    Received request at: [id number]    Transport Company Scheduled:  [employer]  Spoke with Reyna at [facility] to schedule transport.    Scheduled Date: 06/07/2022  Scheduled Time: 1700    Destination: [facility]    Notified care team of scheduled transport via Vo[employer]te.     If there are any changes needed to the DC transportation scheduled, please contact [facility] at ext. [phone] between the hours of 8479-2677 Mon-Fri. If outside those hours, contact the ED Case Manager at ext. [phone].

## 2022-06-07 NOTE — CARE PLAN
The patient is Stable - Low risk of patient condition declining or worsening    Shift Goals  Clinical Goals: Pain Control  Patient Goals: Pain Control  Family Goals: N/A    Progress made toward(s) clinical / shift goals:  Pt rested through the night, PRN pain meds given as needed. Pt monitored closely for signs of decline.    Patient is not progressing towards the following goals:      Problem: Pain - Standard  Goal: Alleviation of pain or a reduction in pain to the patient’s comfort goal  Outcome: Progressing     Problem: Skin Integrity  Goal: Skin integrity is maintained or improved  Outcome: Progressing     Problem: Fall Risk  Goal: Patient will remain free from falls  Outcome: Progressing     Problem: Knowledge Deficit - Standard  Goal: Patient and family/care givers will demonstrate understanding of plan of care, disease process/condition, diagnostic tests and medications  Outcome: Progressing

## 2022-06-07 NOTE — CARE PLAN
Problem: Pain - Standard  Goal: Alleviation of pain or a reduction in pain to the patient’s comfort goal  Outcome: Progressing  Pain medicated per MAR    Problem: Skin Integrity  Goal: Skin integrity is maintained or improved  Outcome: Progressing   Patient turns self in bed   Problem: Fall Risk  Goal: Patient will remain free from falls  Outcome: Progressing  Patient free from falls this shift    The patient is Stable - Low risk of patient condition declining or worsening    Shift Goals  Clinical Goals: Discharge  Patient Goals: Discharge  Family Goals: N/A    Progress made toward(s) clinical / shift goals:  Patient to [facility]     Patient is not progressing towards the following goals:

## 2022-06-08 LAB — GLUCOSE BLD STRIP.AUTO-MCNC: 159 MG/DL (ref 65–99)

## 2022-06-08 NOTE — DISCHARGE PLANNING
Received a call from Veronica from [facility]. She was given the address and contact information to [facility]. She stated that she will follow up with him.

## 2024-09-10 NOTE — PROGRESS NOTES
60 ml of contrast were injected throughout the case. 40 mL of contrast was the total wasted during the case. 100 mL was the total amount used during the case. Assumed care of patient at 0645. Bedside report received. Assessment complete.  AA&Ox4. Denies CP/SOB.  Reporting 8/10 pain. Medicated per MAR.   Educated patient regarding pharmacologic and non pharmacologic modalities for pain management.  Skin per flowsheets  Tolerating regular diet. Denies N/V.  + void. + BM. Last BM 5/27  Pt ambulates LLE NonWB, x2 to wheelchair   All needs met at this time. Call light within reach. Pt calls appropriately. Bed low and locked, non skid socks in place. Hourly rounding in place.

## 2025-04-21 NOTE — PROGRESS NOTES
LIMB PRESERVATION SERVICE     71 y.o.  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   Pt has been receiving treatment for R leg wounds since 10/2021.      5/24/2022: POD #6 left AKA.  Patient aware and agreeable to surgery for right foot/leg.  He would like surgery to be arranged while admitted.          PLAN  Right great toe and right second toe amputation, right calf I&D with VAC change by Dr. Buenrostro on Friday  NPO Thursday MN    D/W: Wound team, Dr. Buenrostro   Declines

## (undated) DEVICE — COVER PROBE INTRAOPERATIVE KIT (10EA/CA)

## (undated) DEVICE — SODIUM CHL. IRRIGATION 0.9% 3000ML (4EA/CA 65CA/PF)

## (undated) DEVICE — GLOVE BIOGEL PI ORTHO SZ 7.5 PF LF (40PR/BX)

## (undated) DEVICE — KIT ANESTHESIA W/CIRCUIT & 3/LT BAG W/FILTER (20EA/CA)

## (undated) DEVICE — BANDAGE ELASTIC 6 HONEYCOMB - 6X5YD LF (20/CA)"

## (undated) DEVICE — KIT EVACUATER 3 SPRING PVC LF 1/8 DRAIN SIZE (10EA/CA)"

## (undated) DEVICE — CANNULA W/ SUPPLY TUBING O2 - (50/CA)

## (undated) DEVICE — TRAY SRGPRP PVP IOD WT PRP - (20/CA)

## (undated) DEVICE — STAPLER SKIN DISP - (6/BX 10BX/CA) VISISTAT

## (undated) DEVICE — BANDAGE ELASTIC 4 HONEYCOMB - 4"X5YD LF (20/CA)"

## (undated) DEVICE — SET LEADWIRE 5 LEAD BEDSIDE DISPOSABLE ECG (1SET OF 5/EA)

## (undated) DEVICE — ELECTRODE DUAL RETURN W/ CORD - (50/PK)

## (undated) DEVICE — NEEDLE MAYO CATGUT TPR POINT - (2EA/PK20PK/BX)

## (undated) DEVICE — GLOVE BIOGEL ECLIPSE PF LATEX SIZE 8.0  (50PR/BX)

## (undated) DEVICE — LACTATED RINGERS INJ 1000 ML - (14EA/CA 60CA/PF)

## (undated) DEVICE — SUTURE 3-0 ETHILON FS-1 - (36/BX) 30 INCH

## (undated) DEVICE — GLOVE BIOGEL SZ 7 SURGICAL PF LTX - (50PR/BX 4BX/CA)

## (undated) DEVICE — SUCTION INSTRUMENT YANKAUER BULBOUS TIP W/O VENT (50EA/CA)

## (undated) DEVICE — GLOVE BIOGEL SZ 8 SURGICAL PF LTX - (50PR/BX 4BX/CA)

## (undated) DEVICE — SUTURE 0 VICRYL PLUS CT-1 - 36 INCH (36/BX)

## (undated) DEVICE — BLADE SURGICAL #10 - (50/BX)

## (undated) DEVICE — DRESSING, WOUND VAC MED.

## (undated) DEVICE — BLADE SURGICAL #15 - (50/BX 3BX/CA)

## (undated) DEVICE — COVER LIGHT HANDLE FLEXIBLE - SOFT (2EA/PK 80PK/CA)

## (undated) DEVICE — VAC CANISTER W/GEL 500ML DUP

## (undated) DEVICE — NEPTUNE 4 PORT MANIFOLD - (20/PK)

## (undated) DEVICE — SUTURE 3-0 VICRYL PLUS SH - 27 INCH (36/BX)

## (undated) DEVICE — TUBING CLEARLINK DUO-VENT - C-FLO (48EA/CA)

## (undated) DEVICE — SUTURE 2-0 SILK 12 X 18" (36PK/BX)"

## (undated) DEVICE — TOWELS CLOTH SURGICAL - (4/PK 20PK/CA)

## (undated) DEVICE — PAD LAP STERILE 18 X 18 - (5/PK 40PK/CA)

## (undated) DEVICE — GLOVE SZ 7 BIOGEL PI MICRO - PF LF (50PR/BX 4BX/CA)

## (undated) DEVICE — SUTURE GENERAL

## (undated) DEVICE — CONTAINER, SPECIMEN, STERILE

## (undated) DEVICE — STOCKINET BIAS 6 IN STERILE - (20/CA)

## (undated) DEVICE — Device

## (undated) DEVICE — SPLINT PLASTER 5 IN X 30 IN - (50EA/BX 6BX/CA)

## (undated) DEVICE — GLOVE BIOGEL PI INDICATOR SZ 8.0 SURGICAL PF LF -(50/BX 4BX/CA)

## (undated) DEVICE — SUTURE ETHILON 2-0 FSLX 30 (36PK/BX)"

## (undated) DEVICE — DEVICE INFLATION DIGITAL BLUE DIAMOND (5EA/BX)

## (undated) DEVICE — SUTURE 2-0 SILK SH 30 IN C/R (12PK/BX)

## (undated) DEVICE — SET EXTENSION WITH 2 PORTS (48EA/CA) ***PART #2C8610 IS A SUBSTITUTE*****

## (undated) DEVICE — GOWN WARMING STANDARD FLEX - (30/CA)

## (undated) DEVICE — MASK ANESTHESIA ADULT  - (100/CA)

## (undated) DEVICE — PADDING CAST 4 IN X 4 YDS - SOF-ROLL (12RL/BG 6BG/CT)

## (undated) DEVICE — TUBING STERILE EXTENSION 40 QUICK CONNECT (10EA/PK)"

## (undated) DEVICE — CANISTER SUCTION 3000ML MECHANICAL FILTER AUTO SHUTOFF MEDI-VAC NONSTERILE LF DISP  (40EA/CA)

## (undated) DEVICE — SET INTRO MIRCROPUNCTURE - MPIS-501-SST

## (undated) DEVICE — GLOVE BIOGEL INDICATOR SZ 6.5 SURGICAL PF LTX - (50PR/BX 4BX/CA)

## (undated) DEVICE — PACK LOWER EXTREMITY - (2/CA)

## (undated) DEVICE — TOWEL STOP TIMEOUT SAFETY FLAG (40EA/CA)

## (undated) DEVICE — SET IRRIGATION CYSTOSCOPY TUBE L80 IN (20EA/CA)

## (undated) DEVICE — PADDING CAST 4 IN STERILE - 4 X 4 YDS (24/CA)

## (undated) DEVICE — CONTAINER SPECIMEN BAG OR - STERILE 4 OZ W/LID (100EA/CA)

## (undated) DEVICE — DRESSING ABDOMINAL PAD STERILE 8 X 10" (360EA/CA)"

## (undated) DEVICE — BOVIE BLADE COATED - (50/PK)

## (undated) DEVICE — SUTURE 2-0 VICRYL PLUS CT-1 36 (36PK/BX)"

## (undated) DEVICE — GLIDECATH ANGLE 4F X 100CM (5EA/BX)

## (undated) DEVICE — SPONGE GAUZESTER 4 X 4 4PLY - (128PK/CA)

## (undated) DEVICE — PADDING CAST 6 IN STERILE - 6 X 4 YDS (24/CA)

## (undated) DEVICE — DRAPE SURGICAL U 77X120 - (10/CA)

## (undated) DEVICE — GOWN SURGEONS X-LARGE - DISP. (30/CA)

## (undated) DEVICE — SODIUM CHL. INJ. 0.9% 500ML (24EA/CA 50CA/PF)

## (undated) DEVICE — BANDAGE ROLL STERILE BULKEE 4.5 IN X 4 YD (100EA/CA)

## (undated) DEVICE — SENSOR SPO2 NEO LNCS ADHESIVE (20/BX) SEE USER NOTES

## (undated) DEVICE — SHEATH DESTINATION WITH DILATOR 6FR 45CM

## (undated) DEVICE — DRESSING 3X8 ADAPTIC GAUZE - NON-ADHERING (36/PK 6PK/BX)

## (undated) DEVICE — SOD. CHL. INJ. 0.9% 1000 ML - (14EA/CA 60CA/PF)

## (undated) DEVICE — PROTECTOR ULNA NERVE - (36PR/CA)

## (undated) DEVICE — ELECTRODE 850 FOAM ADHESIVE - HYDROGEL RADIOTRNSPRNT (50/PK)

## (undated) DEVICE — SODIUM CHL IRRIGATION 0.9% 1000ML (12EA/CA)

## (undated) DEVICE — TRAY SKIN SCRUB PVP WET (20EA/CA) PART #DYND70356 DISCONTINUED

## (undated) DEVICE — SCRUB SOLUTION EXIDINE 4% 40Z - 48/CS CHLORAHEXADINE GLUCONATE

## (undated) DEVICE — SUTURE 3-0 ETHILON PS-1 (36PK/BX)

## (undated) DEVICE — WRAP CO-FLEX 4IN X 5YD STERIL - SELF-ADHERENT (18/CA)

## (undated) DEVICE — CHLORAPREP 26 ML APPLICATOR - ORANGE TINT(25/CA)

## (undated) DEVICE — SWAB CULTURE AMIES ESWAB (50EA/PK)

## (undated) DEVICE — SHEATH RO 4F 10CM (10EA/BX)

## (undated) DEVICE — BLADE SAGITTAL SAW DUAL CUT 25.0 X 90.0 X 1.27MM (1/EA)

## (undated) DEVICE — CATHETER ANGIO OMNI FLUSH 4FR 65CM - (10/BX)

## (undated) DEVICE — HEAD HOLDER JUNIOR/ADULT

## (undated) DEVICE — SLEEVE VASO CALF MED - (10PR/CA)

## (undated) DEVICE — BANDAGE ELASTIC 4 IN X 5 YDS - LATEX FREE(10/BX 5BX/CA)

## (undated) DEVICE — SUTURE 2-0 VICRYL PLUS CT-1 - 8 X 18 INCH(12/BX)

## (undated) DEVICE — SUTURE 3-0 VICRYL PLUS CT-1 - 8 X 18 INCH (12/BX)

## (undated) DEVICE — GOWN SURGEONS LARGE - (32/CA)

## (undated) DEVICE — PACK MINOR BASIN - (2EA/CA)

## (undated) DEVICE — SLEEVE, VASO, THIGH, MED